# Patient Record
Sex: FEMALE | Race: WHITE | Employment: FULL TIME | URBAN - METROPOLITAN AREA
[De-identification: names, ages, dates, MRNs, and addresses within clinical notes are randomized per-mention and may not be internally consistent; named-entity substitution may affect disease eponyms.]

---

## 2017-07-16 ENCOUNTER — APPOINTMENT (OUTPATIENT)
Dept: CT IMAGING | Age: 62
DRG: 179 | End: 2017-07-16
Payer: COMMERCIAL

## 2017-07-16 ENCOUNTER — HOSPITAL ENCOUNTER (INPATIENT)
Age: 62
LOS: 3 days | Discharge: HOME | DRG: 179 | End: 2017-07-19
Attending: EMERGENCY MEDICINE | Admitting: INTERNAL MEDICINE
Payer: COMMERCIAL

## 2017-07-16 ENCOUNTER — APPOINTMENT (OUTPATIENT)
Dept: GENERAL RADIOLOGY | Age: 62
DRG: 179 | End: 2017-07-16
Payer: COMMERCIAL

## 2017-07-16 DIAGNOSIS — J18.9 PNEUMONIA OF LEFT LOWER LOBE DUE TO INFECTIOUS ORGANISM: Primary | ICD-10-CM

## 2017-07-16 PROBLEM — R11.2 NAUSEA AND VOMITING: Status: ACTIVE | Noted: 2017-07-16

## 2017-07-16 PROBLEM — Z72.0 TOBACCO ABUSE: Status: ACTIVE | Noted: 2017-07-16

## 2017-07-16 LAB
ABSOLUTE EOS #: 0 K/UL (ref 0–0.4)
ABSOLUTE LYMPH #: 1.5 K/UL (ref 1–4.8)
ABSOLUTE MONO #: 1.1 K/UL (ref 0.1–1.2)
ANION GAP SERPL CALCULATED.3IONS-SCNC: 14 MMOL/L (ref 9–17)
BASOPHILS # BLD: 0 %
BASOPHILS ABSOLUTE: 0 K/UL (ref 0–0.2)
BUN BLDV-MCNC: 12 MG/DL (ref 6–20)
BUN/CREAT BLD: ABNORMAL (ref 9–20)
CALCIUM SERPL-MCNC: 8.8 MG/DL (ref 8.6–10.4)
CHLORIDE BLD-SCNC: 95 MMOL/L (ref 98–107)
CO2: 22 MMOL/L (ref 20–31)
CREAT SERPL-MCNC: 0.7 MG/DL (ref 0.5–0.9)
DIFFERENTIAL TYPE: ABNORMAL
EOSINOPHILS RELATIVE PERCENT: 0 %
GFR AFRICAN AMERICAN: >60 ML/MIN
GFR NON-AFRICAN AMERICAN: >60 ML/MIN
GFR SERPL CREATININE-BSD FRML MDRD: ABNORMAL ML/MIN/{1.73_M2}
GFR SERPL CREATININE-BSD FRML MDRD: ABNORMAL ML/MIN/{1.73_M2}
GLUCOSE BLD-MCNC: 137 MG/DL (ref 70–99)
HCT VFR BLD CALC: 44.9 % (ref 36–46)
HEMOGLOBIN: 15 G/DL (ref 12–16)
LYMPHOCYTES # BLD: 7 %
MCH RBC QN AUTO: 30.1 PG (ref 26–34)
MCHC RBC AUTO-ENTMCNC: 33.4 G/DL (ref 31–37)
MCV RBC AUTO: 90.3 FL (ref 80–100)
MONOCYTES # BLD: 5 %
PDW BLD-RTO: 15 % (ref 12.5–15.4)
PLATELET # BLD: 259 K/UL (ref 140–450)
PLATELET ESTIMATE: ABNORMAL
PMV BLD AUTO: 7.6 FL (ref 6–12)
POC TROPONIN I: 0 NG/ML (ref 0–0.1)
POC TROPONIN INTERP: NORMAL
POTASSIUM SERPL-SCNC: 3.7 MMOL/L (ref 3.7–5.3)
RBC # BLD: 4.97 M/UL (ref 4–5.2)
RBC # BLD: ABNORMAL 10*6/UL
SEG NEUTROPHILS: 88 %
SEGMENTED NEUTROPHILS ABSOLUTE COUNT: 19.5 K/UL (ref 1.8–7.7)
SODIUM BLD-SCNC: 131 MMOL/L (ref 135–144)
WBC # BLD: 22.1 K/UL (ref 3.5–11)
WBC # BLD: ABNORMAL 10*3/UL

## 2017-07-16 PROCEDURE — 6360000004 HC RX CONTRAST MEDICATION: Performed by: EMERGENCY MEDICINE

## 2017-07-16 PROCEDURE — 93005 ELECTROCARDIOGRAM TRACING: CPT

## 2017-07-16 PROCEDURE — 6360000002 HC RX W HCPCS: Performed by: INTERNAL MEDICINE

## 2017-07-16 PROCEDURE — 6370000000 HC RX 637 (ALT 250 FOR IP): Performed by: NURSE PRACTITIONER

## 2017-07-16 PROCEDURE — 6360000002 HC RX W HCPCS: Performed by: EMERGENCY MEDICINE

## 2017-07-16 PROCEDURE — 6370000000 HC RX 637 (ALT 250 FOR IP): Performed by: EMERGENCY MEDICINE

## 2017-07-16 PROCEDURE — 99285 EMERGENCY DEPT VISIT HI MDM: CPT

## 2017-07-16 PROCEDURE — 2580000003 HC RX 258: Performed by: INTERNAL MEDICINE

## 2017-07-16 PROCEDURE — 2060000000 HC ICU INTERMEDIATE R&B

## 2017-07-16 PROCEDURE — 96374 THER/PROPH/DIAG INJ IV PUSH: CPT

## 2017-07-16 PROCEDURE — 6370000000 HC RX 637 (ALT 250 FOR IP): Performed by: INTERNAL MEDICINE

## 2017-07-16 PROCEDURE — 96375 TX/PRO/DX INJ NEW DRUG ADDON: CPT

## 2017-07-16 PROCEDURE — 71020 XR CHEST STANDARD TWO VW: CPT

## 2017-07-16 PROCEDURE — 2580000003 HC RX 258: Performed by: EMERGENCY MEDICINE

## 2017-07-16 PROCEDURE — 84484 ASSAY OF TROPONIN QUANT: CPT

## 2017-07-16 PROCEDURE — 99222 1ST HOSP IP/OBS MODERATE 55: CPT | Performed by: INTERNAL MEDICINE

## 2017-07-16 PROCEDURE — 71260 CT THORAX DX C+: CPT

## 2017-07-16 PROCEDURE — 85025 COMPLETE CBC W/AUTO DIFF WBC: CPT

## 2017-07-16 PROCEDURE — 80048 BASIC METABOLIC PNL TOTAL CA: CPT

## 2017-07-16 RX ORDER — ASPIRIN 81 MG/1
324 TABLET, CHEWABLE ORAL ONCE
Status: COMPLETED | OUTPATIENT
Start: 2017-07-16 | End: 2017-07-16

## 2017-07-16 RX ORDER — ONDANSETRON 2 MG/ML
4 INJECTION INTRAMUSCULAR; INTRAVENOUS ONCE
Status: COMPLETED | OUTPATIENT
Start: 2017-07-16 | End: 2017-07-16

## 2017-07-16 RX ORDER — ALBUTEROL SULFATE 2.5 MG/3ML
2.5 SOLUTION RESPIRATORY (INHALATION)
Status: DISCONTINUED | OUTPATIENT
Start: 2017-07-16 | End: 2017-07-17

## 2017-07-16 RX ORDER — ACETAMINOPHEN 325 MG/1
650 TABLET ORAL EVERY 4 HOURS PRN
Status: DISCONTINUED | OUTPATIENT
Start: 2017-07-16 | End: 2017-07-19 | Stop reason: HOSPADM

## 2017-07-16 RX ORDER — BUDESONIDE AND FORMOTEROL FUMARATE DIHYDRATE 80; 4.5 UG/1; UG/1
2 AEROSOL RESPIRATORY (INHALATION) DAILY
Status: ON HOLD | COMMUNITY
End: 2017-07-19 | Stop reason: HOSPADM

## 2017-07-16 RX ORDER — 0.9 % SODIUM CHLORIDE 0.9 %
1000 INTRAVENOUS SOLUTION INTRAVENOUS ONCE
Status: COMPLETED | OUTPATIENT
Start: 2017-07-16 | End: 2017-07-16

## 2017-07-16 RX ORDER — NICOTINE 21 MG/24HR
1 PATCH, TRANSDERMAL 24 HOURS TRANSDERMAL DAILY
Status: DISCONTINUED | OUTPATIENT
Start: 2017-07-16 | End: 2017-07-19 | Stop reason: HOSPADM

## 2017-07-16 RX ORDER — HYDROCODONE BITARTRATE AND ACETAMINOPHEN 5; 325 MG/1; MG/1
2 TABLET ORAL EVERY 4 HOURS PRN
Status: DISCONTINUED | OUTPATIENT
Start: 2017-07-16 | End: 2017-07-19 | Stop reason: HOSPADM

## 2017-07-16 RX ORDER — ALBUTEROL SULFATE 90 UG/1
2 AEROSOL, METERED RESPIRATORY (INHALATION) EVERY 4 HOURS PRN
COMMUNITY
End: 2017-07-31 | Stop reason: SDUPTHER

## 2017-07-16 RX ORDER — SODIUM CHLORIDE 0.9 % (FLUSH) 0.9 %
10 SYRINGE (ML) INJECTION PRN
Status: DISCONTINUED | OUTPATIENT
Start: 2017-07-16 | End: 2017-07-19 | Stop reason: HOSPADM

## 2017-07-16 RX ORDER — ONDANSETRON 2 MG/ML
4 INJECTION INTRAMUSCULAR; INTRAVENOUS EVERY 6 HOURS PRN
Status: DISCONTINUED | OUTPATIENT
Start: 2017-07-16 | End: 2017-07-19 | Stop reason: HOSPADM

## 2017-07-16 RX ORDER — SODIUM CHLORIDE 0.9 % (FLUSH) 0.9 %
10 SYRINGE (ML) INJECTION EVERY 12 HOURS SCHEDULED
Status: DISCONTINUED | OUTPATIENT
Start: 2017-07-16 | End: 2017-07-19 | Stop reason: HOSPADM

## 2017-07-16 RX ORDER — HYDROCODONE BITARTRATE AND ACETAMINOPHEN 5; 325 MG/1; MG/1
1 TABLET ORAL EVERY 4 HOURS PRN
Status: DISCONTINUED | OUTPATIENT
Start: 2017-07-16 | End: 2017-07-19 | Stop reason: HOSPADM

## 2017-07-16 RX ORDER — SODIUM CHLORIDE 9 MG/ML
INJECTION, SOLUTION INTRAVENOUS CONTINUOUS
Status: DISCONTINUED | OUTPATIENT
Start: 2017-07-16 | End: 2017-07-19 | Stop reason: HOSPADM

## 2017-07-16 RX ORDER — MORPHINE SULFATE 4 MG/ML
4 INJECTION, SOLUTION INTRAMUSCULAR; INTRAVENOUS ONCE
Status: COMPLETED | OUTPATIENT
Start: 2017-07-16 | End: 2017-07-16

## 2017-07-16 RX ADMIN — IOVERSOL 75 ML: 741 INJECTION INTRA-ARTERIAL; INTRAVENOUS at 11:35

## 2017-07-16 RX ADMIN — ONDANSETRON 4 MG: 2 INJECTION INTRAMUSCULAR; INTRAVENOUS at 21:15

## 2017-07-16 RX ADMIN — SODIUM CHLORIDE: 9 INJECTION, SOLUTION INTRAVENOUS at 14:39

## 2017-07-16 RX ADMIN — SODIUM CHLORIDE 1000 ML: 9 INJECTION, SOLUTION INTRAVENOUS at 13:35

## 2017-07-16 RX ADMIN — MORPHINE SULFATE 4 MG: 4 INJECTION, SOLUTION INTRAMUSCULAR; INTRAVENOUS at 10:27

## 2017-07-16 RX ADMIN — ENOXAPARIN SODIUM 40 MG: 40 INJECTION SUBCUTANEOUS at 14:39

## 2017-07-16 RX ADMIN — ACETAMINOPHEN 650 MG: 325 TABLET ORAL at 19:53

## 2017-07-16 RX ADMIN — HYDROCODONE BITARTRATE AND ACETAMINOPHEN 2 TABLET: 5; 325 TABLET ORAL at 15:50

## 2017-07-16 RX ADMIN — ONDANSETRON 4 MG: 2 INJECTION INTRAMUSCULAR; INTRAVENOUS at 12:45

## 2017-07-16 RX ADMIN — CEFTRIAXONE SODIUM 1 G: 1 INJECTION, POWDER, FOR SOLUTION INTRAMUSCULAR; INTRAVENOUS at 11:01

## 2017-07-16 RX ADMIN — CEFTRIAXONE SODIUM 1 G: 1 INJECTION, POWDER, FOR SOLUTION INTRAMUSCULAR; INTRAVENOUS at 14:39

## 2017-07-16 RX ADMIN — AZITHROMYCIN MONOHYDRATE 500 MG: 500 INJECTION, POWDER, LYOPHILIZED, FOR SOLUTION INTRAVENOUS at 12:48

## 2017-07-16 RX ADMIN — ONDANSETRON 4 MG: 2 INJECTION, SOLUTION INTRAMUSCULAR; INTRAVENOUS at 10:28

## 2017-07-16 RX ADMIN — ASPIRIN 81 MG 324 MG: 81 TABLET ORAL at 11:01

## 2017-07-16 ASSESSMENT — PAIN DESCRIPTION - ORIENTATION: ORIENTATION: LEFT

## 2017-07-16 ASSESSMENT — PAIN SCALES - GENERAL
PAINLEVEL_OUTOF10: 8
PAINLEVEL_OUTOF10: 5
PAINLEVEL_OUTOF10: 9

## 2017-07-16 ASSESSMENT — PAIN DESCRIPTION - FREQUENCY: FREQUENCY: CONTINUOUS

## 2017-07-16 ASSESSMENT — ENCOUNTER SYMPTOMS
DIARRHEA: 0
SHORTNESS OF BREATH: 1
ABDOMINAL PAIN: 0
CONSTIPATION: 0
COUGH: 1
NAUSEA: 1
CHEST TIGHTNESS: 0
VOMITING: 1
SORE THROAT: 0

## 2017-07-16 ASSESSMENT — PAIN DESCRIPTION - PAIN TYPE
TYPE: ACUTE PAIN
TYPE: ACUTE PAIN

## 2017-07-16 ASSESSMENT — PAIN DESCRIPTION - LOCATION
LOCATION: CHEST
LOCATION: CHEST

## 2017-07-16 ASSESSMENT — PAIN DESCRIPTION - DESCRIPTORS: DESCRIPTORS: SORE;SHARP

## 2017-07-17 LAB
ANION GAP SERPL CALCULATED.3IONS-SCNC: 13 MMOL/L (ref 9–17)
BUN BLDV-MCNC: 13 MG/DL (ref 6–20)
BUN/CREAT BLD: ABNORMAL (ref 9–20)
CALCIUM SERPL-MCNC: 8.5 MG/DL (ref 8.6–10.4)
CHLORIDE BLD-SCNC: 98 MMOL/L (ref 98–107)
CO2: 24 MMOL/L (ref 20–31)
CREAT SERPL-MCNC: 0.62 MG/DL (ref 0.5–0.9)
DIRECT EXAM: ABNORMAL
DIRECT EXAM: NORMAL
DIRECT EXAM: NORMAL
GFR AFRICAN AMERICAN: >60 ML/MIN
GFR NON-AFRICAN AMERICAN: >60 ML/MIN
GFR SERPL CREATININE-BSD FRML MDRD: ABNORMAL ML/MIN/{1.73_M2}
GFR SERPL CREATININE-BSD FRML MDRD: ABNORMAL ML/MIN/{1.73_M2}
GLUCOSE BLD-MCNC: 111 MG/DL (ref 70–99)
HCT VFR BLD CALC: 37.7 % (ref 36–46)
HEMOGLOBIN: 12.8 G/DL (ref 12–16)
Lab: ABNORMAL
Lab: NORMAL
MCH RBC QN AUTO: 30.4 PG (ref 26–34)
MCHC RBC AUTO-ENTMCNC: 34 G/DL (ref 31–37)
MCV RBC AUTO: 89.5 FL (ref 80–100)
PDW BLD-RTO: 14.1 % (ref 12.5–15.4)
PLATELET # BLD: 224 K/UL (ref 140–450)
PMV BLD AUTO: 8 FL (ref 6–12)
POTASSIUM SERPL-SCNC: 3.4 MMOL/L (ref 3.7–5.3)
RBC # BLD: 4.22 M/UL (ref 4–5.2)
SODIUM BLD-SCNC: 135 MMOL/L (ref 135–144)
SPECIMEN DESCRIPTION: ABNORMAL
SPECIMEN DESCRIPTION: NORMAL
STATUS: ABNORMAL
STATUS: NORMAL
WBC # BLD: 17.1 K/UL (ref 3.5–11)

## 2017-07-17 PROCEDURE — 80048 BASIC METABOLIC PNL TOTAL CA: CPT

## 2017-07-17 PROCEDURE — 6370000000 HC RX 637 (ALT 250 FOR IP): Performed by: NURSE PRACTITIONER

## 2017-07-17 PROCEDURE — 94640 AIRWAY INHALATION TREATMENT: CPT

## 2017-07-17 PROCEDURE — 87899 AGENT NOS ASSAY W/OPTIC: CPT

## 2017-07-17 PROCEDURE — 6360000002 HC RX W HCPCS: Performed by: INTERNAL MEDICINE

## 2017-07-17 PROCEDURE — 85027 COMPLETE CBC AUTOMATED: CPT

## 2017-07-17 PROCEDURE — 99232 SBSQ HOSP IP/OBS MODERATE 35: CPT | Performed by: INTERNAL MEDICINE

## 2017-07-17 PROCEDURE — 6370000000 HC RX 637 (ALT 250 FOR IP): Performed by: INTERNAL MEDICINE

## 2017-07-17 PROCEDURE — 92610 EVALUATE SWALLOWING FUNCTION: CPT

## 2017-07-17 PROCEDURE — 87449 NOS EACH ORGANISM AG IA: CPT

## 2017-07-17 PROCEDURE — 2060000000 HC ICU INTERMEDIATE R&B

## 2017-07-17 PROCEDURE — 2580000003 HC RX 258: Performed by: INTERNAL MEDICINE

## 2017-07-17 PROCEDURE — 36415 COLL VENOUS BLD VENIPUNCTURE: CPT

## 2017-07-17 RX ORDER — ALBUTEROL SULFATE 2.5 MG/3ML
2.5 SOLUTION RESPIRATORY (INHALATION)
Status: DISCONTINUED | OUTPATIENT
Start: 2017-07-17 | End: 2017-07-17

## 2017-07-17 RX ORDER — ALBUTEROL SULFATE 2.5 MG/3ML
2.5 SOLUTION RESPIRATORY (INHALATION) EVERY 6 HOURS PRN
Status: DISCONTINUED | OUTPATIENT
Start: 2017-07-17 | End: 2017-07-19 | Stop reason: HOSPADM

## 2017-07-17 RX ORDER — LEVOFLOXACIN 750 MG/1
750 TABLET ORAL DAILY
Status: DISCONTINUED | OUTPATIENT
Start: 2017-07-17 | End: 2017-07-19 | Stop reason: HOSPADM

## 2017-07-17 RX ORDER — IBUPROFEN 600 MG/1
600 TABLET ORAL EVERY 6 HOURS PRN
Status: DISCONTINUED | OUTPATIENT
Start: 2017-07-17 | End: 2017-07-19 | Stop reason: HOSPADM

## 2017-07-17 RX ADMIN — ACETAMINOPHEN 650 MG: 325 TABLET ORAL at 00:05

## 2017-07-17 RX ADMIN — IBUPROFEN 600 MG: 600 TABLET, FILM COATED ORAL at 02:12

## 2017-07-17 RX ADMIN — ONDANSETRON 4 MG: 2 INJECTION INTRAMUSCULAR; INTRAVENOUS at 12:55

## 2017-07-17 RX ADMIN — MOMETASONE FUROATE AND FORMOTEROL FUMARATE DIHYDRATE 2 PUFF: 200; 5 AEROSOL RESPIRATORY (INHALATION) at 20:28

## 2017-07-17 RX ADMIN — LEVOFLOXACIN 750 MG: 750 TABLET, FILM COATED ORAL at 20:55

## 2017-07-17 RX ADMIN — CEFTRIAXONE SODIUM 1 G: 1 INJECTION, POWDER, FOR SOLUTION INTRAMUSCULAR; INTRAVENOUS at 17:12

## 2017-07-17 RX ADMIN — ACETAMINOPHEN 650 MG: 325 TABLET ORAL at 17:27

## 2017-07-17 RX ADMIN — ENOXAPARIN SODIUM 40 MG: 40 INJECTION SUBCUTANEOUS at 20:57

## 2017-07-17 RX ADMIN — IBUPROFEN 600 MG: 600 TABLET, FILM COATED ORAL at 21:20

## 2017-07-17 RX ADMIN — SODIUM CHLORIDE: 9 INJECTION, SOLUTION INTRAVENOUS at 21:20

## 2017-07-17 RX ADMIN — ONDANSETRON 4 MG: 2 INJECTION INTRAMUSCULAR; INTRAVENOUS at 17:20

## 2017-07-17 RX ADMIN — AZITHROMYCIN MONOHYDRATE 500 MG: 500 INJECTION, POWDER, LYOPHILIZED, FOR SOLUTION INTRAVENOUS at 17:12

## 2017-07-17 RX ADMIN — ONDANSETRON 4 MG: 2 INJECTION INTRAMUSCULAR; INTRAVENOUS at 03:15

## 2017-07-17 ASSESSMENT — PAIN SCALES - GENERAL
PAINLEVEL_OUTOF10: 9
PAINLEVEL_OUTOF10: 3

## 2017-07-18 PROBLEM — A48.1 LEGIONELLA PNEUMONIA (HCC): Status: ACTIVE | Noted: 2017-07-18

## 2017-07-18 LAB
ANION GAP SERPL CALCULATED.3IONS-SCNC: 14 MMOL/L (ref 9–17)
BUN BLDV-MCNC: 8 MG/DL (ref 6–20)
BUN/CREAT BLD: ABNORMAL (ref 9–20)
CALCIUM SERPL-MCNC: 8.6 MG/DL (ref 8.6–10.4)
CHLORIDE BLD-SCNC: 105 MMOL/L (ref 98–107)
CO2: 23 MMOL/L (ref 20–31)
CREAT SERPL-MCNC: 0.61 MG/DL (ref 0.5–0.9)
GFR AFRICAN AMERICAN: >60 ML/MIN
GFR NON-AFRICAN AMERICAN: >60 ML/MIN
GFR SERPL CREATININE-BSD FRML MDRD: ABNORMAL ML/MIN/{1.73_M2}
GFR SERPL CREATININE-BSD FRML MDRD: ABNORMAL ML/MIN/{1.73_M2}
GLUCOSE BLD-MCNC: 129 MG/DL (ref 70–99)
HCT VFR BLD CALC: 35.8 % (ref 36–46)
HEMOGLOBIN: 12 G/DL (ref 12–16)
MCH RBC QN AUTO: 30.3 PG (ref 26–34)
MCHC RBC AUTO-ENTMCNC: 33.6 G/DL (ref 31–37)
MCV RBC AUTO: 90.2 FL (ref 80–100)
PDW BLD-RTO: 14.2 % (ref 12.5–15.4)
PLATELET # BLD: 204 K/UL (ref 140–450)
PMV BLD AUTO: 8 FL (ref 6–12)
POTASSIUM SERPL-SCNC: 3.5 MMOL/L (ref 3.7–5.3)
RBC # BLD: 3.97 M/UL (ref 4–5.2)
SODIUM BLD-SCNC: 142 MMOL/L (ref 135–144)
WBC # BLD: 6.7 K/UL (ref 3.5–11)

## 2017-07-18 PROCEDURE — 6360000002 HC RX W HCPCS: Performed by: NURSE PRACTITIONER

## 2017-07-18 PROCEDURE — 94640 AIRWAY INHALATION TREATMENT: CPT

## 2017-07-18 PROCEDURE — 36415 COLL VENOUS BLD VENIPUNCTURE: CPT

## 2017-07-18 PROCEDURE — 80048 BASIC METABOLIC PNL TOTAL CA: CPT

## 2017-07-18 PROCEDURE — 6360000002 HC RX W HCPCS: Performed by: INTERNAL MEDICINE

## 2017-07-18 PROCEDURE — 6370000000 HC RX 637 (ALT 250 FOR IP): Performed by: NURSE PRACTITIONER

## 2017-07-18 PROCEDURE — 2060000000 HC ICU INTERMEDIATE R&B

## 2017-07-18 PROCEDURE — 85027 COMPLETE CBC AUTOMATED: CPT

## 2017-07-18 PROCEDURE — 99232 SBSQ HOSP IP/OBS MODERATE 35: CPT | Performed by: FAMILY MEDICINE

## 2017-07-18 PROCEDURE — 6370000000 HC RX 637 (ALT 250 FOR IP): Performed by: INTERNAL MEDICINE

## 2017-07-18 RX ORDER — METOCLOPRAMIDE HYDROCHLORIDE 5 MG/ML
10 INJECTION INTRAMUSCULAR; INTRAVENOUS EVERY 6 HOURS
Status: DISCONTINUED | OUTPATIENT
Start: 2017-07-18 | End: 2017-07-19 | Stop reason: HOSPADM

## 2017-07-18 RX ADMIN — ONDANSETRON 4 MG: 2 INJECTION INTRAMUSCULAR; INTRAVENOUS at 08:31

## 2017-07-18 RX ADMIN — ENOXAPARIN SODIUM 40 MG: 40 INJECTION SUBCUTANEOUS at 10:10

## 2017-07-18 RX ADMIN — ONDANSETRON 4 MG: 2 INJECTION INTRAMUSCULAR; INTRAVENOUS at 21:48

## 2017-07-18 RX ADMIN — METOCLOPRAMIDE 10 MG: 5 INJECTION, SOLUTION INTRAMUSCULAR; INTRAVENOUS at 19:18

## 2017-07-18 RX ADMIN — IBUPROFEN 600 MG: 600 TABLET, FILM COATED ORAL at 14:51

## 2017-07-18 RX ADMIN — ONDANSETRON 4 MG: 2 INJECTION INTRAMUSCULAR; INTRAVENOUS at 00:07

## 2017-07-18 RX ADMIN — MOMETASONE FUROATE AND FORMOTEROL FUMARATE DIHYDRATE 2 PUFF: 200; 5 AEROSOL RESPIRATORY (INHALATION) at 08:48

## 2017-07-18 RX ADMIN — METOCLOPRAMIDE 10 MG: 5 INJECTION, SOLUTION INTRAMUSCULAR; INTRAVENOUS at 10:11

## 2017-07-18 RX ADMIN — LEVOFLOXACIN 750 MG: 750 TABLET, FILM COATED ORAL at 10:10

## 2017-07-18 RX ADMIN — METOCLOPRAMIDE 10 MG: 5 INJECTION, SOLUTION INTRAMUSCULAR; INTRAVENOUS at 03:36

## 2017-07-18 ASSESSMENT — ENCOUNTER SYMPTOMS
SHORTNESS OF BREATH: 1
NAUSEA: 0
COUGH: 1
ABDOMINAL PAIN: 0
VOMITING: 0
DIARRHEA: 0
WHEEZING: 0

## 2017-07-18 ASSESSMENT — PAIN SCALES - GENERAL: PAINLEVEL_OUTOF10: 8

## 2017-07-19 VITALS
HEIGHT: 68 IN | SYSTOLIC BLOOD PRESSURE: 131 MMHG | BODY MASS INDEX: 28.19 KG/M2 | RESPIRATION RATE: 18 BRPM | OXYGEN SATURATION: 95 % | WEIGHT: 186 LBS | TEMPERATURE: 98.3 F | DIASTOLIC BLOOD PRESSURE: 79 MMHG | HEART RATE: 76 BPM

## 2017-07-19 LAB
ANION GAP SERPL CALCULATED.3IONS-SCNC: 13 MMOL/L (ref 9–17)
BUN BLDV-MCNC: 10 MG/DL (ref 6–20)
BUN/CREAT BLD: ABNORMAL (ref 9–20)
CALCIUM SERPL-MCNC: 8.2 MG/DL (ref 8.6–10.4)
CHLORIDE BLD-SCNC: 109 MMOL/L (ref 98–107)
CO2: 22 MMOL/L (ref 20–31)
CREAT SERPL-MCNC: 0.48 MG/DL (ref 0.5–0.9)
GFR AFRICAN AMERICAN: >60 ML/MIN
GFR NON-AFRICAN AMERICAN: >60 ML/MIN
GFR SERPL CREATININE-BSD FRML MDRD: ABNORMAL ML/MIN/{1.73_M2}
GFR SERPL CREATININE-BSD FRML MDRD: ABNORMAL ML/MIN/{1.73_M2}
GLUCOSE BLD-MCNC: 97 MG/DL (ref 70–99)
POTASSIUM SERPL-SCNC: 3.6 MMOL/L (ref 3.7–5.3)
SODIUM BLD-SCNC: 144 MMOL/L (ref 135–144)

## 2017-07-19 PROCEDURE — 6370000000 HC RX 637 (ALT 250 FOR IP): Performed by: INTERNAL MEDICINE

## 2017-07-19 PROCEDURE — 99239 HOSP IP/OBS DSCHRG MGMT >30: CPT | Performed by: FAMILY MEDICINE

## 2017-07-19 PROCEDURE — 2580000003 HC RX 258: Performed by: INTERNAL MEDICINE

## 2017-07-19 PROCEDURE — 80048 BASIC METABOLIC PNL TOTAL CA: CPT

## 2017-07-19 PROCEDURE — 94640 AIRWAY INHALATION TREATMENT: CPT

## 2017-07-19 PROCEDURE — 36415 COLL VENOUS BLD VENIPUNCTURE: CPT

## 2017-07-19 PROCEDURE — 6360000002 HC RX W HCPCS: Performed by: INTERNAL MEDICINE

## 2017-07-19 PROCEDURE — 6370000000 HC RX 637 (ALT 250 FOR IP): Performed by: NURSE PRACTITIONER

## 2017-07-19 PROCEDURE — 6360000002 HC RX W HCPCS: Performed by: NURSE PRACTITIONER

## 2017-07-19 RX ORDER — LEVOFLOXACIN 750 MG/1
750 TABLET ORAL DAILY
Qty: 8 TABLET | Refills: 0 | Status: SHIPPED | OUTPATIENT
Start: 2017-07-19 | End: 2017-07-27

## 2017-07-19 RX ORDER — IBUPROFEN 600 MG/1
600 TABLET ORAL EVERY 6 HOURS PRN
Qty: 60 TABLET | Refills: 0 | Status: SHIPPED | OUTPATIENT
Start: 2017-07-19 | End: 2020-08-19

## 2017-07-19 RX ORDER — NICOTINE 21 MG/24HR
1 PATCH, TRANSDERMAL 24 HOURS TRANSDERMAL DAILY
Qty: 30 PATCH | Refills: 3 | Status: SHIPPED | OUTPATIENT
Start: 2017-07-19 | End: 2019-02-19

## 2017-07-19 RX ADMIN — METOCLOPRAMIDE 10 MG: 5 INJECTION, SOLUTION INTRAMUSCULAR; INTRAVENOUS at 09:36

## 2017-07-19 RX ADMIN — MOMETASONE FUROATE AND FORMOTEROL FUMARATE DIHYDRATE 2 PUFF: 200; 5 AEROSOL RESPIRATORY (INHALATION) at 09:19

## 2017-07-19 RX ADMIN — ENOXAPARIN SODIUM 40 MG: 40 INJECTION SUBCUTANEOUS at 09:36

## 2017-07-19 RX ADMIN — ACETAMINOPHEN 650 MG: 325 TABLET ORAL at 02:10

## 2017-07-19 RX ADMIN — METOCLOPRAMIDE 10 MG: 5 INJECTION, SOLUTION INTRAMUSCULAR; INTRAVENOUS at 02:10

## 2017-07-19 RX ADMIN — LEVOFLOXACIN 750 MG: 750 TABLET, FILM COATED ORAL at 09:36

## 2017-07-19 RX ADMIN — SODIUM CHLORIDE: 9 INJECTION, SOLUTION INTRAVENOUS at 00:52

## 2017-07-19 ASSESSMENT — ENCOUNTER SYMPTOMS
WHEEZING: 0
COUGH: 1
NAUSEA: 0
ABDOMINAL PAIN: 0
DIARRHEA: 0
SHORTNESS OF BREATH: 1
VOMITING: 0

## 2017-07-19 ASSESSMENT — PAIN SCALES - GENERAL: PAINLEVEL_OUTOF10: 8

## 2017-07-20 LAB
EKG ATRIAL RATE: 107 BPM
EKG P AXIS: 12 DEGREES
EKG P-R INTERVAL: 100 MS
EKG Q-T INTERVAL: 332 MS
EKG QRS DURATION: 82 MS
EKG QTC CALCULATION (BAZETT): 443 MS
EKG R AXIS: 42 DEGREES
EKG T AXIS: 62 DEGREES
EKG VENTRICULAR RATE: 107 BPM

## 2017-07-31 ENCOUNTER — OFFICE VISIT (OUTPATIENT)
Dept: FAMILY MEDICINE CLINIC | Age: 62
End: 2017-07-31
Payer: COMMERCIAL

## 2017-07-31 VITALS
TEMPERATURE: 97.7 F | HEART RATE: 65 BPM | DIASTOLIC BLOOD PRESSURE: 86 MMHG | SYSTOLIC BLOOD PRESSURE: 119 MMHG | RESPIRATION RATE: 22 BRPM | BODY MASS INDEX: 27.43 KG/M2 | HEIGHT: 68 IN | WEIGHT: 181 LBS | OXYGEN SATURATION: 97 %

## 2017-07-31 DIAGNOSIS — R07.89 OTHER CHEST PAIN: ICD-10-CM

## 2017-07-31 DIAGNOSIS — R13.10 DYSPHAGIA, UNSPECIFIED TYPE: ICD-10-CM

## 2017-07-31 DIAGNOSIS — A48.1 LEGIONELLA PNEUMONIA (HCC): Primary | ICD-10-CM

## 2017-07-31 DIAGNOSIS — Z09 HOSPITAL DISCHARGE FOLLOW-UP: ICD-10-CM

## 2017-07-31 DIAGNOSIS — Z76.89 ENCOUNTER TO ESTABLISH CARE: ICD-10-CM

## 2017-07-31 DIAGNOSIS — Z12.31 ENCOUNTER FOR MAMMOGRAM TO ESTABLISH BASELINE MAMMOGRAM: ICD-10-CM

## 2017-07-31 PROCEDURE — 99204 OFFICE O/P NEW MOD 45 MIN: CPT | Performed by: NURSE PRACTITIONER

## 2017-07-31 RX ORDER — ALBUTEROL SULFATE 90 UG/1
2 AEROSOL, METERED RESPIRATORY (INHALATION) EVERY 4 HOURS PRN
Qty: 1 INHALER | Refills: 3 | Status: SHIPPED | OUTPATIENT
Start: 2017-07-31 | End: 2018-03-22 | Stop reason: SDUPTHER

## 2017-07-31 ASSESSMENT — ENCOUNTER SYMPTOMS
SHORTNESS OF BREATH: 1
SINUS PRESSURE: 0
CONSTIPATION: 0
DIARRHEA: 0
TROUBLE SWALLOWING: 1
CHEST TIGHTNESS: 0
ABDOMINAL PAIN: 0
BLOOD IN STOOL: 0
RHINORRHEA: 0
COUGH: 0
EYE DISCHARGE: 0

## 2017-08-08 ENCOUNTER — HOSPITAL ENCOUNTER (OUTPATIENT)
Age: 62
Setting detail: SPECIMEN
Discharge: HOME | End: 2017-08-08
Payer: COMMERCIAL

## 2017-08-08 DIAGNOSIS — Z76.89 ENCOUNTER TO ESTABLISH CARE: ICD-10-CM

## 2017-08-08 LAB
CHOLESTEROL/HDL RATIO: 6.5
CHOLESTEROL: 271 MG/DL
HDLC SERPL-MCNC: 42 MG/DL
HEPATITIS C ANTIBODY: NONREACTIVE
HIV AG/AB: NONREACTIVE
LDL CHOLESTEROL: 187 MG/DL (ref 0–130)
THYROXINE, FREE: 1.02 NG/DL (ref 0.93–1.7)
TRIGL SERPL-MCNC: 212 MG/DL
TSH SERPL DL<=0.05 MIU/L-ACNC: 5.29 MIU/L (ref 0.3–5)
VLDLC SERPL CALC-MCNC: ABNORMAL MG/DL (ref 1–30)

## 2017-08-09 DIAGNOSIS — E78.2 MIXED HYPERLIPIDEMIA: Primary | ICD-10-CM

## 2017-08-09 RX ORDER — ATORVASTATIN CALCIUM 20 MG/1
20 TABLET, FILM COATED ORAL DAILY
Qty: 30 TABLET | Refills: 3 | Status: SHIPPED | OUTPATIENT
Start: 2017-08-09 | End: 2017-08-16

## 2017-08-16 ENCOUNTER — OFFICE VISIT (OUTPATIENT)
Dept: GASTROENTEROLOGY | Age: 62
End: 2017-08-16
Payer: COMMERCIAL

## 2017-08-16 VITALS
WEIGHT: 184 LBS | SYSTOLIC BLOOD PRESSURE: 123 MMHG | TEMPERATURE: 97.2 F | OXYGEN SATURATION: 96 % | BODY MASS INDEX: 27.89 KG/M2 | DIASTOLIC BLOOD PRESSURE: 83 MMHG | RESPIRATION RATE: 14 BRPM | HEART RATE: 89 BPM | HEIGHT: 68 IN

## 2017-08-16 DIAGNOSIS — R13.10 DYSPHAGIA, UNSPECIFIED TYPE: Primary | ICD-10-CM

## 2017-08-16 DIAGNOSIS — Z12.11 COLON CANCER SCREENING: ICD-10-CM

## 2017-08-16 PROCEDURE — 99244 OFF/OP CNSLTJ NEW/EST MOD 40: CPT | Performed by: INTERNAL MEDICINE

## 2017-08-16 ASSESSMENT — ENCOUNTER SYMPTOMS
BACK PAIN: 1
ABDOMINAL PAIN: 0
CONSTIPATION: 1
ABDOMINAL DISTENTION: 1
BLOOD IN STOOL: 0
ALLERGIC/IMMUNOLOGIC NEGATIVE: 1
ANAL BLEEDING: 0
TROUBLE SWALLOWING: 1
VOMITING: 0
COUGH: 1
NAUSEA: 0
RECTAL PAIN: 0
DIARRHEA: 0
WHEEZING: 1
SHORTNESS OF BREATH: 1

## 2017-08-17 RX ORDER — SODIUM, POTASSIUM,MAG SULFATES 17.5-3.13G
SOLUTION, RECONSTITUTED, ORAL ORAL
Qty: 1 BOTTLE | Refills: 0 | Status: SHIPPED | OUTPATIENT
Start: 2017-08-17 | End: 2019-02-19 | Stop reason: ALTCHOICE

## 2017-08-21 ENCOUNTER — OFFICE VISIT (OUTPATIENT)
Dept: FAMILY MEDICINE CLINIC | Age: 62
End: 2017-08-21
Payer: COMMERCIAL

## 2017-08-21 VITALS
HEART RATE: 74 BPM | HEIGHT: 68 IN | SYSTOLIC BLOOD PRESSURE: 138 MMHG | RESPIRATION RATE: 18 BRPM | WEIGHT: 186 LBS | TEMPERATURE: 97.9 F | OXYGEN SATURATION: 94 % | DIASTOLIC BLOOD PRESSURE: 88 MMHG | BODY MASS INDEX: 28.19 KG/M2

## 2017-08-21 DIAGNOSIS — Z71.6 ENCOUNTER FOR SMOKING CESSATION COUNSELING: ICD-10-CM

## 2017-08-21 DIAGNOSIS — N64.4 NIPPLE PAIN: Primary | ICD-10-CM

## 2017-08-21 PROCEDURE — 99214 OFFICE O/P EST MOD 30 MIN: CPT | Performed by: NURSE PRACTITIONER

## 2017-08-21 RX ORDER — BUPROPION HYDROCHLORIDE 300 MG/1
300 TABLET ORAL EVERY MORNING
Qty: 30 TABLET | Refills: 3 | Status: SHIPPED | OUTPATIENT
Start: 2017-08-21 | End: 2018-03-22 | Stop reason: ALTCHOICE

## 2017-08-21 ASSESSMENT — ENCOUNTER SYMPTOMS
ABDOMINAL PAIN: 0
RHINORRHEA: 0
SHORTNESS OF BREATH: 1
COUGH: 0
CONSTIPATION: 0
EYE DISCHARGE: 0
BLOOD IN STOOL: 0
DIARRHEA: 0
TROUBLE SWALLOWING: 1
SINUS PRESSURE: 0
CHEST TIGHTNESS: 0

## 2017-08-21 ASSESSMENT — PATIENT HEALTH QUESTIONNAIRE - PHQ9
2. FEELING DOWN, DEPRESSED OR HOPELESS: 0
SUM OF ALL RESPONSES TO PHQ9 QUESTIONS 1 & 2: 0
1. LITTLE INTEREST OR PLEASURE IN DOING THINGS: 0
SUM OF ALL RESPONSES TO PHQ QUESTIONS 1-9: 0

## 2017-09-01 ENCOUNTER — HOSPITAL ENCOUNTER (OUTPATIENT)
Dept: WOMENS IMAGING | Age: 62
Discharge: HOME | End: 2017-09-01
Payer: COMMERCIAL

## 2017-09-01 DIAGNOSIS — R52 PAIN: ICD-10-CM

## 2017-09-01 DIAGNOSIS — N64.4 NIPPLE PAIN: ICD-10-CM

## 2017-09-01 PROCEDURE — G0279 TOMOSYNTHESIS, MAMMO: HCPCS

## 2017-09-01 PROCEDURE — 76642 ULTRASOUND BREAST LIMITED: CPT

## 2017-09-20 ENCOUNTER — HOSPITAL ENCOUNTER (OUTPATIENT)
Age: 62
Setting detail: OUTPATIENT SURGERY
Discharge: HOME | End: 2017-09-20
Attending: INTERNAL MEDICINE | Admitting: INTERNAL MEDICINE
Payer: COMMERCIAL

## 2017-09-20 VITALS
WEIGHT: 186 LBS | BODY MASS INDEX: 28.19 KG/M2 | HEART RATE: 72 BPM | RESPIRATION RATE: 16 BRPM | TEMPERATURE: 97.7 F | HEIGHT: 68 IN | SYSTOLIC BLOOD PRESSURE: 120 MMHG | DIASTOLIC BLOOD PRESSURE: 83 MMHG | OXYGEN SATURATION: 96 %

## 2017-09-20 PROCEDURE — 7100000011 HC PHASE II RECOVERY - ADDTL 15 MIN: Performed by: INTERNAL MEDICINE

## 2017-09-20 PROCEDURE — 3609012400 HC EGD TRANSORAL BIOPSY SINGLE/MULTIPLE: Performed by: INTERNAL MEDICINE

## 2017-09-20 PROCEDURE — 99152 MOD SED SAME PHYS/QHP 5/>YRS: CPT | Performed by: INTERNAL MEDICINE

## 2017-09-20 PROCEDURE — 3609010600 HC COLONOSCOPY POLYPECTOMY SNARE/COLD BIOPSY: Performed by: INTERNAL MEDICINE

## 2017-09-20 PROCEDURE — C1726 CATH, BAL DIL, NON-VASCULAR: HCPCS | Performed by: INTERNAL MEDICINE

## 2017-09-20 PROCEDURE — 88305 TISSUE EXAM BY PATHOLOGIST: CPT

## 2017-09-20 PROCEDURE — 45380 COLONOSCOPY AND BIOPSY: CPT | Performed by: INTERNAL MEDICINE

## 2017-09-20 PROCEDURE — 7100000010 HC PHASE II RECOVERY - FIRST 15 MIN: Performed by: INTERNAL MEDICINE

## 2017-09-20 PROCEDURE — 99153 MOD SED SAME PHYS/QHP EA: CPT | Performed by: INTERNAL MEDICINE

## 2017-09-20 PROCEDURE — 6370000000 HC RX 637 (ALT 250 FOR IP): Performed by: INTERNAL MEDICINE

## 2017-09-20 PROCEDURE — 43249 ESOPH EGD DILATION <30 MM: CPT | Performed by: INTERNAL MEDICINE

## 2017-09-20 PROCEDURE — 6360000002 HC RX W HCPCS: Performed by: INTERNAL MEDICINE

## 2017-09-20 PROCEDURE — 43239 EGD BIOPSY SINGLE/MULTIPLE: CPT | Performed by: INTERNAL MEDICINE

## 2017-09-20 PROCEDURE — 2580000003 HC RX 258: Performed by: INTERNAL MEDICINE

## 2017-09-20 RX ORDER — MEPERIDINE HYDROCHLORIDE 50 MG/ML
INJECTION INTRAMUSCULAR; INTRAVENOUS; SUBCUTANEOUS PRN
Status: DISCONTINUED | OUTPATIENT
Start: 2017-09-20 | End: 2017-09-20 | Stop reason: HOSPADM

## 2017-09-20 RX ORDER — SODIUM CHLORIDE, SODIUM LACTATE, POTASSIUM CHLORIDE, CALCIUM CHLORIDE 600; 310; 30; 20 MG/100ML; MG/100ML; MG/100ML; MG/100ML
INJECTION, SOLUTION INTRAVENOUS CONTINUOUS
Status: DISCONTINUED | OUTPATIENT
Start: 2017-09-20 | End: 2017-09-20 | Stop reason: HOSPADM

## 2017-09-20 RX ORDER — MIDAZOLAM HYDROCHLORIDE 1 MG/ML
INJECTION INTRAMUSCULAR; INTRAVENOUS PRN
Status: DISCONTINUED | OUTPATIENT
Start: 2017-09-20 | End: 2017-09-20 | Stop reason: HOSPADM

## 2017-09-20 RX ADMIN — SODIUM CHLORIDE, POTASSIUM CHLORIDE, SODIUM LACTATE AND CALCIUM CHLORIDE: 600; 310; 30; 20 INJECTION, SOLUTION INTRAVENOUS at 08:37

## 2017-09-20 ASSESSMENT — PAIN SCALES - GENERAL
PAINLEVEL_OUTOF10: 0

## 2017-09-20 ASSESSMENT — PAIN - FUNCTIONAL ASSESSMENT: PAIN_FUNCTIONAL_ASSESSMENT: 0-10

## 2017-09-21 LAB — SURGICAL PATHOLOGY REPORT: NORMAL

## 2017-10-16 ENCOUNTER — HOSPITAL ENCOUNTER (OUTPATIENT)
Age: 62
Setting detail: OBSERVATION
Discharge: HOME | End: 2017-10-16
Attending: EMERGENCY MEDICINE | Admitting: EMERGENCY MEDICINE
Payer: COMMERCIAL

## 2017-10-16 ENCOUNTER — APPOINTMENT (OUTPATIENT)
Dept: GENERAL RADIOLOGY | Age: 62
End: 2017-10-16
Payer: COMMERCIAL

## 2017-10-16 ENCOUNTER — APPOINTMENT (OUTPATIENT)
Dept: NUCLEAR MEDICINE | Age: 62
End: 2017-10-16
Payer: COMMERCIAL

## 2017-10-16 VITALS
WEIGHT: 180 LBS | HEART RATE: 74 BPM | SYSTOLIC BLOOD PRESSURE: 133 MMHG | RESPIRATION RATE: 16 BRPM | OXYGEN SATURATION: 97 % | BODY MASS INDEX: 27.28 KG/M2 | TEMPERATURE: 97.7 F | HEIGHT: 68 IN | DIASTOLIC BLOOD PRESSURE: 88 MMHG

## 2017-10-16 DIAGNOSIS — R07.9 CHEST PAIN, UNSPECIFIED TYPE: Primary | ICD-10-CM

## 2017-10-16 LAB
ABSOLUTE EOS #: 0.2 K/UL (ref 0–0.4)
ABSOLUTE LYMPH #: 2.7 K/UL (ref 1–4.8)
ABSOLUTE MONO #: 0.4 K/UL (ref 0.1–1.2)
ALBUMIN SERPL-MCNC: 4 G/DL (ref 3.5–5.2)
ALBUMIN/GLOBULIN RATIO: 1.4 (ref 1–2.5)
ALP BLD-CCNC: 105 U/L (ref 35–104)
ALT SERPL-CCNC: 13 U/L (ref 5–33)
ANION GAP SERPL CALCULATED.3IONS-SCNC: 12 MMOL/L (ref 9–17)
AST SERPL-CCNC: 13 U/L
BASOPHILS # BLD: 0 %
BASOPHILS ABSOLUTE: 0 K/UL (ref 0–0.2)
BILIRUB SERPL-MCNC: 0.22 MG/DL (ref 0.3–1.2)
BILIRUBIN DIRECT: 0.11 MG/DL
BILIRUBIN, INDIRECT: 0.11 MG/DL (ref 0–1)
BUN BLDV-MCNC: 9 MG/DL (ref 8–23)
BUN/CREAT BLD: ABNORMAL (ref 9–20)
CALCIUM SERPL-MCNC: 8.9 MG/DL (ref 8.6–10.4)
CHLORIDE BLD-SCNC: 104 MMOL/L (ref 98–107)
CO2: 25 MMOL/L (ref 20–31)
CREAT SERPL-MCNC: 0.56 MG/DL (ref 0.5–0.9)
DIFFERENTIAL TYPE: ABNORMAL
EOSINOPHILS RELATIVE PERCENT: 2 %
GFR AFRICAN AMERICAN: >60 ML/MIN
GFR NON-AFRICAN AMERICAN: >60 ML/MIN
GFR SERPL CREATININE-BSD FRML MDRD: ABNORMAL ML/MIN/{1.73_M2}
GFR SERPL CREATININE-BSD FRML MDRD: ABNORMAL ML/MIN/{1.73_M2}
GLOBULIN: ABNORMAL G/DL (ref 1.5–3.8)
GLUCOSE BLD-MCNC: 137 MG/DL (ref 70–99)
HCT VFR BLD CALC: 44.7 % (ref 36–46)
HEMOGLOBIN: 14.8 G/DL (ref 12–16)
LIPASE: 16 U/L (ref 13–60)
LV EF: 68 %
LVEF MODALITY: NORMAL
LYMPHOCYTES # BLD: 24 %
MCH RBC QN AUTO: 29.7 PG (ref 26–34)
MCHC RBC AUTO-ENTMCNC: 33 G/DL (ref 31–37)
MCV RBC AUTO: 89.9 FL (ref 80–100)
MONOCYTES # BLD: 4 %
PDW BLD-RTO: 15 % (ref 12.5–15.4)
PLATELET # BLD: 303 K/UL (ref 140–450)
PLATELET ESTIMATE: ABNORMAL
PMV BLD AUTO: 7.8 FL (ref 6–12)
POC TROPONIN I: 0 NG/ML (ref 0–0.1)
POC TROPONIN I: 0 NG/ML (ref 0–0.1)
POC TROPONIN INTERP: NORMAL
POC TROPONIN INTERP: NORMAL
POTASSIUM SERPL-SCNC: 3.4 MMOL/L (ref 3.7–5.3)
RBC # BLD: 4.98 M/UL (ref 4–5.2)
RBC # BLD: ABNORMAL 10*6/UL
SEG NEUTROPHILS: 70 %
SEGMENTED NEUTROPHILS ABSOLUTE COUNT: 7.8 K/UL (ref 1.8–7.7)
SODIUM BLD-SCNC: 141 MMOL/L (ref 135–144)
TOTAL PROTEIN: 6.9 G/DL (ref 6.4–8.3)
WBC # BLD: 11.1 K/UL (ref 3.5–11)
WBC # BLD: ABNORMAL 10*3/UL

## 2017-10-16 PROCEDURE — 80076 HEPATIC FUNCTION PANEL: CPT

## 2017-10-16 PROCEDURE — 85025 COMPLETE CBC W/AUTO DIFF WBC: CPT

## 2017-10-16 PROCEDURE — A9500 TC99M SESTAMIBI: HCPCS | Performed by: EMERGENCY MEDICINE

## 2017-10-16 PROCEDURE — 6360000002 HC RX W HCPCS: Performed by: EMERGENCY MEDICINE

## 2017-10-16 PROCEDURE — G0378 HOSPITAL OBSERVATION PER HR: HCPCS

## 2017-10-16 PROCEDURE — 93005 ELECTROCARDIOGRAM TRACING: CPT

## 2017-10-16 PROCEDURE — 3430000000 HC RX DIAGNOSTIC RADIOPHARMACEUTICAL: Performed by: EMERGENCY MEDICINE

## 2017-10-16 PROCEDURE — 96374 THER/PROPH/DIAG INJ IV PUSH: CPT

## 2017-10-16 PROCEDURE — 78452 HT MUSCLE IMAGE SPECT MULT: CPT

## 2017-10-16 PROCEDURE — 6360000002 HC RX W HCPCS

## 2017-10-16 PROCEDURE — 83690 ASSAY OF LIPASE: CPT

## 2017-10-16 PROCEDURE — 96376 TX/PRO/DX INJ SAME DRUG ADON: CPT

## 2017-10-16 PROCEDURE — 96375 TX/PRO/DX INJ NEW DRUG ADDON: CPT

## 2017-10-16 PROCEDURE — 93017 CV STRESS TEST TRACING ONLY: CPT | Performed by: NURSE PRACTITIONER

## 2017-10-16 PROCEDURE — 80048 BASIC METABOLIC PNL TOTAL CA: CPT

## 2017-10-16 PROCEDURE — 71020 XR CHEST STANDARD TWO VW: CPT

## 2017-10-16 PROCEDURE — 84484 ASSAY OF TROPONIN QUANT: CPT

## 2017-10-16 PROCEDURE — 2580000003 HC RX 258: Performed by: EMERGENCY MEDICINE

## 2017-10-16 PROCEDURE — 99285 EMERGENCY DEPT VISIT HI MDM: CPT

## 2017-10-16 RX ORDER — SODIUM CHLORIDE 0.9 % (FLUSH) 0.9 %
10 SYRINGE (ML) INJECTION EVERY 12 HOURS SCHEDULED
Status: DISCONTINUED | OUTPATIENT
Start: 2017-10-16 | End: 2017-10-16 | Stop reason: HOSPADM

## 2017-10-16 RX ORDER — MORPHINE SULFATE 10 MG/ML
INJECTION, SOLUTION INTRAMUSCULAR; INTRAVENOUS
Status: COMPLETED
Start: 2017-10-16 | End: 2017-10-16

## 2017-10-16 RX ORDER — SODIUM CHLORIDE 0.9 % (FLUSH) 0.9 %
10 SYRINGE (ML) INJECTION PRN
Status: DISCONTINUED | OUTPATIENT
Start: 2017-10-16 | End: 2017-10-16 | Stop reason: HOSPADM

## 2017-10-16 RX ORDER — SODIUM CHLORIDE 9 MG/ML
INJECTION, SOLUTION INTRAVENOUS ONCE
Status: COMPLETED | OUTPATIENT
Start: 2017-10-16 | End: 2017-10-16

## 2017-10-16 RX ORDER — METOPROLOL TARTRATE 5 MG/5ML
2.5 INJECTION INTRAVENOUS PRN
Status: DISCONTINUED | OUTPATIENT
Start: 2017-10-16 | End: 2017-10-16

## 2017-10-16 RX ORDER — MORPHINE SULFATE 2 MG/ML
4 INJECTION, SOLUTION INTRAMUSCULAR; INTRAVENOUS ONCE
Status: DISCONTINUED | OUTPATIENT
Start: 2017-10-16 | End: 2017-10-16 | Stop reason: HOSPADM

## 2017-10-16 RX ORDER — AMINOPHYLLINE DIHYDRATE 25 MG/ML
100 INJECTION, SOLUTION INTRAVENOUS
Status: COMPLETED | OUTPATIENT
Start: 2017-10-16 | End: 2017-10-16

## 2017-10-16 RX ORDER — LORATADINE 10 MG/1
10 CAPSULE, LIQUID FILLED ORAL DAILY
COMMUNITY
End: 2019-02-19 | Stop reason: SDUPTHER

## 2017-10-16 RX ORDER — MORPHINE SULFATE 2 MG/ML
2 INJECTION, SOLUTION INTRAMUSCULAR; INTRAVENOUS EVERY 4 HOURS PRN
Status: DISCONTINUED | OUTPATIENT
Start: 2017-10-16 | End: 2017-10-16 | Stop reason: HOSPADM

## 2017-10-16 RX ORDER — NITROGLYCERIN 0.4 MG/1
0.4 TABLET SUBLINGUAL EVERY 5 MIN PRN
Status: DISCONTINUED | OUTPATIENT
Start: 2017-10-16 | End: 2017-10-16

## 2017-10-16 RX ORDER — MORPHINE SULFATE 4 MG/ML
4 INJECTION, SOLUTION INTRAMUSCULAR; INTRAVENOUS EVERY 4 HOURS PRN
Status: DISCONTINUED | OUTPATIENT
Start: 2017-10-16 | End: 2017-10-16 | Stop reason: HOSPADM

## 2017-10-16 RX ORDER — BUPROPION HYDROCHLORIDE 150 MG/1
300 TABLET ORAL EVERY MORNING
Status: DISCONTINUED | OUTPATIENT
Start: 2017-10-16 | End: 2017-10-16 | Stop reason: HOSPADM

## 2017-10-16 RX ORDER — ACETAMINOPHEN 325 MG/1
650 TABLET ORAL EVERY 4 HOURS PRN
Status: DISCONTINUED | OUTPATIENT
Start: 2017-10-16 | End: 2017-10-16 | Stop reason: HOSPADM

## 2017-10-16 RX ADMIN — MORPHINE SULFATE 4 MG: 10 INJECTION, SOLUTION INTRAMUSCULAR; INTRAVENOUS at 08:26

## 2017-10-16 RX ADMIN — TETRAKIS(2-METHOXYISOBUTYLISOCYANIDE)COPPER(I) TETRAFLUOROBORATE 47 MILLICURIE: 1 INJECTION, POWDER, LYOPHILIZED, FOR SOLUTION INTRAVENOUS at 14:15

## 2017-10-16 RX ADMIN — REGADENOSON 0.4 MG: 0.08 INJECTION, SOLUTION INTRAVENOUS at 11:59

## 2017-10-16 RX ADMIN — Medication 10 ML: at 11:36

## 2017-10-16 RX ADMIN — AMINOPHYLLINE 100 MG: 25 INJECTION, SOLUTION INTRAVENOUS at 12:01

## 2017-10-16 RX ADMIN — SODIUM CHLORIDE: 9 INJECTION, SOLUTION INTRAVENOUS at 11:45

## 2017-10-16 RX ADMIN — TETRAKIS(2-METHOXYISOBUTYLISOCYANIDE)COPPER(I) TETRAFLUOROBORATE 15.4 MILLICURIE: 1 INJECTION, POWDER, LYOPHILIZED, FOR SOLUTION INTRAVENOUS at 12:00

## 2017-10-16 ASSESSMENT — PAIN SCALES - GENERAL
PAINLEVEL_OUTOF10: 0
PAINLEVEL_OUTOF10: 8

## 2017-10-16 ASSESSMENT — ENCOUNTER SYMPTOMS
BACK PAIN: 1
SHORTNESS OF BREATH: 1
ABDOMINAL PAIN: 0
RHINORRHEA: 0
CHEST TIGHTNESS: 1

## 2017-10-16 ASSESSMENT — HEART SCORE: ECG: 0

## 2017-10-16 ASSESSMENT — PAIN DESCRIPTION - PAIN TYPE: TYPE: ACUTE PAIN

## 2017-10-16 ASSESSMENT — PAIN DESCRIPTION - FREQUENCY: FREQUENCY: INTERMITTENT

## 2017-10-16 NOTE — ED NOTES
Report to [facility]. Plan for pt to go to stress, then be transported to floor.       Nanci Ibarra RN  10/16/17 1007

## 2017-10-16 NOTE — PROGRESS NOTES
CDU Daily Progress Note  Attending Physician       Pt Name: Elisa Shanks  MRN: [id number]  [place] 1955  Date of evaluation: 10/16/17    I performed a history and physical examination of the patient and discussed management with the resident. I reviewed the residents note and agree with the documented findings and plan of care. Any areas of disagreement are noted on the chart. I was personally present for the key portions of any procedures. I have documented in the chart those procedures where I was not present during the key portions. I have reviewed the emergency nurses triage note. I agree with the chief complaint, past medical history, past surgical history, allergies, medications, social and family history as documented unless otherwise noted below. Documentation of the HPI, Physical Exam and Medical Decision Making performed by medical students or scribes is based on my personal performance of the HPI, PE and MDM. For Physician Assistant/ Nurse Practitioner cases/documentation I have personally evaluated this patient and have completed at least one if not all key elements of the E/M (history, physical exam, and MDM). Additional findings are as noted. The Family History, Social History and Review of Systems are unchanged from the previous day. No significant events overnight. Sharp pain in back associated with the chest tightness assoc with some SOB intermittent over last few days, not exertion related. Denies nausea, vomiting, diaphoresis. SOme cardiac work up some years ago but no stress. PMHx: smoker, asthma. COPD. FHx twin brother with 3 previous heart attacks, trops neg, CXR no acute. Stress test ordered. Patient smokes 10 cigarettes per day for 40 years. Smoking cessation counseling for 3 minutes. Discussed the effects of smoking in regards to heart disease, lung disease, stroke and cancer.  I explained how this negatively effects their condition, will contribute to increased

## 2017-10-16 NOTE — PROCEDURES
[address]                 [place] [address]                             CARDIAC STRESS TEST    PATIENT NAME: Chas Davila                    :             1955  MED REC NO:   [id number]                              ROOM:            6711  ACCOUNT NO:   [de-identified]                            ADMISSION DATE:  10/16/2017  PROVIDER:     Tiffanie Perez STRESS STUDY    DATE OF STUDY:  10/16/2017    INDICATION:  Chest discomfort. CONSENT:  The test was explained and consent was signed. PROTOCOL:  Lexiscan, 0.4 mg infused. 100 mg IV Aminophylline was  given 2 minutes post Cardiolite injection. PREINFUSION EKG:  Normal.  PREINFUSION HR:  73 bpm, infusion HR, 100 bpm.  HR response to  Lexiscan was normal.  PREINFUSION BP:  144/86 mmHg, infusion BP, 160/87 mmHg. BP response  to Lexiscan was normal.  LEXISCAN EKG:  Normal.  CHEST DISCOMFORT:  No chest  discomfort with Lexiscan nor in recovery. ISCHEMIC EKG CHANGES:  None. IMPRESSION:  Electrocardiographically negative Lexiscan stress study.   **Cardiolite report issued from the department of Nuclear Medicine**      Ki Salazar    D: 10/16/2017 14:47:28       T: 10/16/2017 14:49:35     SHAKEEL/BEATA  Job#: [id number]    Doc#: Unknown

## 2017-10-16 NOTE — CARE COORDINATION
Case Management Initial Discharge Plan  Rosa Joyce,         Readmission Risk              Readmission Risk:        20.5       Age 72 or Greater:  0    Admitted from SNF or Requires Paid or Family Care:  0    Currently has CHF,COPD,ARF,CRI,or is on dialysis:  4    Takes more than 5 Prescription Medications:  4    Takes Digoxin,Insulin,Anticoagulants,Narcotics or ASA/Plavix:  [address] in Past 12 Months:  10    On Disability:  0    Patient Considers own Health:  2.5            Met with:patient to discuss discharge plans. Information verified: address, contacts, phone number, , insurance Yes  PCP: Tory Cruz CNP  Date of last visit: 1 month ago    Insurance Provider: Doris Liu 150    Discharge Planning  Current Residence:  Private Residence  Living Arrangements:  [address] has 1 stories/none stairs to climb  Support Systems:  None  Current Services PTA:  None Supplier: none  Patient able to perform ADL's:Independent  DME used to aid ambulation prior to admission: none/during admission none    Potential Assistance Needed:       Pharmacy: [facility]orge   Potential Assistance Purchasing Medications:  No  Does patient want to participate in local refill/ meds to beds program?  No    Patient agreeable to home care: No  [place] of choice provided:  n/a      Type of Home Care Services:  None  Patient expects to be discharged to:  home    Prior SNF/Rehab Placement and Facility: none  Agreeable to SNF/Rehab: No  [place] of choice provided: no   Evaluation: yes    Expected Discharge date:      Follow Up Appointment: Best Day/ Time:      Transportation provider: pt drove herself here  Transportation arrangements needed for discharge: No    Discharge Plan: home        Electronically signed by Ramandeep Reina RN on 10/16/17 at 10:31 AM

## 2017-10-16 NOTE — ED PROVIDER NOTES
[address]  ED  Emergency Department Encounter  Emergency Medicine Resident     Pt Name: Manoj Fall  MRN: [id number]  [place] 1955  Date of evaluation: 10/16/17  PCP:  Butch Huynh [id number]       Chief Complaint   Patient presents with    Shortness of Breath    Chest Pain       HISTORY OF PRESENT ILLNESS  (Location/Symptom, Timing/Onset, Context/Setting, Quality, Duration, Modifying Factors, Severity, Associated signs/symptoms)     Manoj Fall is a 63 y.o. female who presents With complaints last night of sharp stabbing pain radiating into her back associated with the chest tightness that when occurs takes her breath away. States the pain started last night and is worse when she lays back. States the pain is intermittent and is radiates to the epigastric area. Patient denies any associated nausea, vomiting, changes in urination, changes in bowel movement. Patient denying any abdominal pain. Denies any flank pain. Patient does state that she had a heart workup several years ago but has never had a stress test in the past.  Does state that she smokes daily. Has a twin brother that has had 3 previous heart attacks. Patient also stating that she does have past medical history of asthma COPD. PAST MEDICAL / SURGICAL / SOCIAL / FAMILY HISTORY      has a past medical history of Asthma; COPD (chronic obstructive pulmonary disease) ([address] and Osteoarthritis. has a past surgical history that includes Appendectomy; Hysterectomy; Esophagus dilation; Colonoscopy; Endoscopy, colon, diagnostic; egd transoral biopsy single/multiple (N/A, 9/20/2017); and colsc flx w/rmvl of tumor polyp lesion snare tq (N/A, 9/20/2017). Social History     Social History    Marital status:      Spouse name: N/A    Number of children: N/A    Years of education: N/A     Occupational History    Not on file.      Social History Main Topics    Smoking status: Current Every Day Smoker     Packs/day: 0.50     Types: Cigarettes    Smokeless tobacco: Never Used    Alcohol use No    Drug use: No    Sexual activity: No     Other Topics Concern    Not on file     Social History Narrative    No narrative on file       Family History   Problem Relation Age of Onset    Other Mother     Diabetes Mother     Prostate Cancer Father        Allergies:  Review of patient's allergies indicates no known allergies. Home Medications:  Prior to Admission medications    Medication Sig Start Date End Date Taking? Authorizing Provider   loratadine (CLARITIN) 10 MG capsule Take 10 mg by mouth daily   Yes Historical Provider, MD   mometasone-formoterol [facility]) 200-5 MCG/ACT inhaler Inhale 2 puffs into the lungs 2 times daily 7/31/17 10/16/17 Yes Trice Rutherford CNP   albuterol sulfate  (90 Base) MCG/ACT inhaler Inhale 2 puffs into the lungs every 4 hours as needed for Wheezing or Shortness of Breath 7/31/17  Yes Trice Rutherford CNP   buPROPion (WELLBUTRIN XL) 300 MG extended release tablet Take 1 tablet by mouth every morning 8/21/17   Trice Rutherford CNP   Na Sulfate-K Sulfate-Mg Sulf 17.5-3.13-1.6 GM/180ML SOLN Use as directed in your patient instructions. 8/17/17   Raquel Bains MD   ibuprofen (ADVIL;MOTRIN) 600 MG tablet Take 1 tablet by mouth every 6 hours as needed for Pain 7/19/17   Susan Poole MD   nicotine (NICODERM CQ) 14 MG/24HR Place 1 patch onto the skin daily 7/19/17   Susan Poole MD       REVIEW OF SYSTEMS    (2-9 systems for level 4, 10 or more for level 5)      Review of Systems   Constitutional: Negative for chills and fever. HENT: Negative for congestion and rhinorrhea. Eyes: Negative for visual disturbance. Respiratory: Positive for chest tightness and shortness of breath. Cardiovascular: Negative for chest pain. Gastrointestinal: Negative for abdominal pain. Genitourinary: Negative for dysuria and frequency.    Musculoskeletal: Positive for back pain. Negative for arthralgias. Skin: Negative for wound. Neurological: Negative for dizziness and light-headedness. PHYSICAL EXAM   (up to 7 for level 4, 8 or more for level 5)      INITIAL VITALS:   BP (!) 140/77   Pulse 66   Temp 97.5 °F (36.4 °C) (Oral)   Resp 20   Wt 180 lb (81.6 kg)   SpO2 96%   BMI 27.37 kg/m²     Physical Exam   Constitutional: She is oriented to person, place, and time. No distress. HENT:   Head: Normocephalic and atraumatic. Eyes: Right eye exhibits no discharge. Left eye exhibits no discharge. Cardiovascular: Normal rate, regular rhythm and normal heart sounds. Exam reveals no friction rub. No murmur heard. Pulmonary/Chest: Effort normal and breath sounds normal. No stridor. No respiratory distress. She has no wheezes. She has no rales. She exhibits no tenderness. Abdominal: Soft. She exhibits no distension. There is no tenderness. There is no guarding. Neurological: She is alert and oriented to person, place, and time. Skin: Skin is warm and dry. She is not diaphoretic. Nursing note and vitals reviewed.       DIFFERENTIAL  DIAGNOSIS     PLAN (LABS / IMAGING / EKG):  Orders Placed This Encounter   Procedures    XR CHEST STANDARD (2 VW)    NM Myocardial Spect Rest Exercise or Rx    Basic Metabolic Panel    CBC Auto Differential    Hepatic Function Panel    LIPASE    Continuous Pulse Oximetry    POCT troponin    POCT troponin    POCT troponin    EKG 12 Lead    Insert peripheral IV    Insert peripheral IV    PATIENT STATUS (FROM ED OR OR/PROCEDURAL) Observation       MEDICATIONS ORDERED:  Orders Placed This Encounter   Medications    morphine injection 4 mg    morphine (PF) 10 MG/ML injection     JUDY LOZADA: cabinet override       DDX: Emergent: ACS/NSTEMI/STEMI/angina, arrhythmia, trauma, aortic dissection,  PE, PNA, pneumothroax, esophageal rupture, tamponade, Cocaine use  Nonemergent: pneumonia, pericarditis, GERD, MSK, Endocarditis, anxiety     Evaluate for: diaphoresis, present chest pain, tachypnea, BP both arms, heart sounds, JVD, tender chest wall, wheezing    HEART Risk Score for Chest Pain Patients   History and Physical Exam Suspicion Level  (Nausea, Vomiting, Diaphoresis, Radiation, Exertion)   Slightly Suspicious (0 pts)   Moderately Suspicious (1 pt)   Highly Suspicious (2 pts)   EKG Interpretation   Normal (0 pts)   Non-Specific Repolarization Disturbance (1 pt)   Significant ST-Depression (2 pts)   Age of Patient (in years)   = 39 (0 pts)   46-64 (1 pt)   = 65 (2 pts)   Risk Factors    No Risk Factors (0 pts)   1-2 Risk Factors (1 pt)   = 3 Risk Factors (2 pts)   Risk Factors Include:   Hypercholesterolemia   Hypertension   Diabetes Mellitus   Cigarette smoking   Positive family history   Obesity   CAD   (SLE, CKDz, HIV, Cocaine abuse)   Troponin Levels   = Normal Limit (0 pts)   1-3 Times Normal Limit (1 pt)   > 3 Times Normal Limit (2 pts)  TOTAL: 4    Percent Risk for Major Adverse Cardiac Event (MACE)  0-3 pts indicates low risk for MACE   2.5% (DISCHARGE)   4-7 pts indicates moderate risk for MACE  20.3% (OBS)  8-10 pts indicates high risk for MACE  72.7% (EARLY INVASIVE TX)    DIAGNOSTIC RESULTS / EMERGENCY DEPARTMENT COURSE / MDM     LABS:  Results for orders placed or performed during the hospital encounter of 82/43/21   Basic Metabolic Panel   Result Value Ref Range    Glucose 137 (H) 70 - 99 mg/dL    BUN 9 8 - 23 mg/dL    CREATININE 0.56 0.50 - 0.90 mg/dL    Bun/Cre Ratio NOT REPORTED 9 - 20    Calcium 8.9 8.6 - 10.4 mg/dL    Sodium 141 135 - 144 mmol/L    Potassium 3.4 (L) 3.7 - 5.3 mmol/L    Chloride 104 98 - 107 mmol/L    CO2 25 20 - 31 mmol/L    Anion Gap 12 9 - 17 mmol/L    GFR Non-African American >60 >60 mL/min    GFR African American >60 >60 mL/min    GFR Comment          GFR Staging NOT REPORTED    CBC Auto Differential   Result Value Ref Range    WBC 11.1 (H) 3.5 - 11.0 k/uL    RBC 4.98 4.0 - 5.2 m/uL    Hemoglobin 14.8 12.0 - 16.0 g/dL    Hematocrit 44.7 36 - 46 %    MCV 89.9 80 - 100 fL    MCH 29.7 26 - 34 pg    MCHC 33.0 31 - 37 g/dL    RDW 15.0 12.5 - 15.4 %    Platelets 372 437 - 091 k/uL    MPV 7.8 6.0 - 12.0 fL    Differential Type NOT REPORTED     Seg Neutrophils 70 %    Lymphocytes 24 %    Monocytes 4 %    Eosinophils % 2 %    Basophils 0 %    Segs Absolute 7.80 (H) 1.8 - 7.7 k/uL    Absolute Lymph # 2.70 1.0 - 4.8 k/uL    Absolute Mono # 0.40 0.1 - 1.2 k/uL    Absolute Eos # 0.20 0.0 - 0.4 k/uL    Basophils # 0.00 0.0 - 0.2 k/uL    WBC Morphology NOT REPORTED     RBC Morphology NOT REPORTED     Platelet Estimate NOT REPORTED    Hepatic Function Panel   Result Value Ref Range    Alb 4.0 3.5 - 5.2 g/dL    Alkaline Phosphatase 105 (H) 35 - 104 U/L    ALT 13 5 - 33 U/L    AST 13 <32 U/L    Total Bilirubin 0.22 (L) 0.3 - 1.2 mg/dL    Bilirubin, Direct 0.11 <0.31 mg/dL    Bilirubin, Indirect 0.11 0.00 - 1.00 mg/dL    Total Protein 6.9 6.4 - 8.3 g/dL    Globulin NOT REPORTED 1.5 - 3.8 g/dL    Albumin/Globulin Ratio 1.4 1.0 - 2.5   LIPASE   Result Value Ref Range    Lipase 16 13 - 60 U/L   POCT troponin   Result Value Ref Range    POC Troponin I 0.00 0.00 - 0.10 ng/mL    POC Troponin Interp       The Troponin-I (POC) results cannot be compared to the Troponin-T results. POCT troponin   Result Value Ref Range    POC Troponin I 0.00 0.00 - 0.10 ng/mL    POC Troponin Interp       The Troponin-I (POC) results cannot be compared to the Troponin-T results. RADIOLOGY:  Xr Chest Standard (2 Vw)    Result Date: 10/16/2017  EXAMINATION: TWO VIEWS OF THE CHEST 10/16/2017 8:21 am COMPARISON: 07/16/2017 HISTORY: ORDERING SYSTEM PROVIDED HISTORY: back pain, intermittent chest tightness TECHNOLOGIST PROVIDED HISTORY: Reason for exam:->back pain, intermittent chest tightness FINDINGS: Interval resolution of the previously demonstrated left lower lobe pneumonia. The lungs are clear.   No pleural effusion or pneumothorax. Prominent right pericardial fat pad present. Heart size and mediastinal contours are stable. Pulmonary vascular is within normal limits. No acute cardiopulmonary abnormality. Interval resolution of the previous left lower lobe pneumonia. EKG    Rhythm: normal sinus   Rate: normal  Axis: normal  Ectopy: none  Conduction: normal  ST Segments: nonspecific changes  T Waves: non specific changes  Q Waves: none    Clinical Impression: non-specific EKG    Normal Interval Reference:  P-wave <110 ms  -200 ms  QRS <100 ms  QT <420 ms  QTc 330-470 ms    All EKG's are interpreted by the Emergency Department Physician who either signs or Co-signs this chart in the absence of a cardiologist.    EMERGENCY DEPARTMENT COURSE:    INITIAL IMPRESSION: Patient with concerning for atypical presentation for ACS. We'll obtain cardiac workup. Given involvement of epigastrium area will also obtain lipase. Anticipate admission to observation unit for stress test given her elevated heart score as well as no follow-up with heart doctors. MDM: Discussed with patient at bedside the cardiac workup is negative so far. However, given patient's pain is still intermittent and comes and goes and is not relieved entirely while she has been here in the emergency department and elevated heart score would like to admit for stress testing. Patient is agreeable plan. Patient will be admitted for stress tests as well as possible cardiology evaluation at the discretion of the observation unit team.    PROCEDURES:  None    CONSULTS:  None    FINAL IMPRESSION      1.  Chest pain, unspecified type          DISPOSITION / PLAN     DISPOSITION Admitted    PATIENT REFERRED TO:  Samra [address] [id number]  [phone]            DISCHARGE MEDICATIONS:  New Prescriptions    No medications on file       Jv Rodriguez MD  Emergency Medicine Resident, PGY-2  [address] [facility]    (Please note that portions of this note were completed with a voice recognition program.  Efforts were made to edit the dictations but occasionally words are mis-transcribed.)       Mckayla Barraza MD  Resident  10/16/17 1007

## 2017-10-16 NOTE — ED NOTES
Pt is ambulatory with steady gait to room 9. Pt reports SOB, chest tightness, and back pain intermittently since Friday. Pt states that she was recently here for pneumonia, and she is worried that it's that again. Pt is alert, oriented, speaking in full sentences. Cardiac workup in progress.       Amy Leiva RN  10/16/17 6735

## 2017-10-16 NOTE — ED PROVIDER NOTES
[address] [facility]     Emergency Department     Faculty Attestation        I performed a history and physical examination of the patient and discussed management with the resident. I reviewed the residents note and agree with the documented findings and plan of care. Any areas of disagreement are noted on the chart. I was personally present for the key portions of any procedures. I have documented in the chart those procedures where I was not present during the key portions. I have reviewed the emergency nurses triage note. I agree with the chief complaint, past medical history, past surgical history, allergies, medications, social and family history as documented unless otherwise noted below. For mid-level providers such as nurse practitioners as well as physicians assistants:    I have personally seen and evaluated the patient. I find the patient's history and physical exam are consistent with NP/PA documentation. I agree with the care provided, treatment rendered, disposition, & follow-up plan. Additional findings are as noted. Vital Signs: BP (!) 140/77   Pulse 82   Temp 97.5 °F (36.4 °C) (Oral)   Resp 18   Wt 180 lb (81.6 kg)   SpO2 97%   BMI 27.37 kg/m²   PCP:  Mandi Rivera CNP    Pertinent Comments:     Patient presents the emergency Department for evaluation of chest pain and nausea. She states for the past 24 hours she's had episodes of a sharp stabbing pain in her back. It's been happening more frequently and woke her up repeatedly at night. During one of these episodes she had substernal chest pressure as well as jaw pain. She had a cardiac cath 10 years ago that, per patient, was negative. She has a twin brother who is had 3 heart attacks before requiring cardiac stenting. She unfortunately continues to smoke.   She was admitted to the hospital in July of this year for pneumonia and had CT which showed no evidence of

## 2017-10-17 ENCOUNTER — TELEPHONE (OUTPATIENT)
Dept: FAMILY MEDICINE CLINIC | Age: 62
End: 2017-10-17

## 2017-10-17 LAB
EKG ATRIAL RATE: 76 BPM
EKG P AXIS: 56 DEGREES
EKG P-R INTERVAL: 134 MS
EKG Q-T INTERVAL: 374 MS
EKG QRS DURATION: 84 MS
EKG QTC CALCULATION (BAZETT): 420 MS
EKG R AXIS: 30 DEGREES
EKG T AXIS: 58 DEGREES
EKG VENTRICULAR RATE: 76 BPM

## 2017-10-17 NOTE — H&P
rash     (PQRS) Advance directives on face sheet per hospital policy. No change unless specifically mentioned in chart    PAST MEDICAL HISTORY    has a past medical history of Asthma; COPD (chronic obstructive pulmonary disease) ([address] and Osteoarthritis. I have reviewed the past medical history with the patient and it is pertinent to this complaint. SURGICAL HISTORY      has a past surgical history that includes Appendectomy; Hysterectomy; Esophagus dilation; Colonoscopy; Endoscopy, colon, diagnostic; egd transoral biopsy single/multiple (N/A, 2017); and colsc flx w/rmvl of tumor polyp lesion snare tq (N/A, 2017). I have reviewed and agree with Surgical History entered    CURRENT MEDICATIONS       No current facility-administered medications for this encounter. All medication charted and reviewed. ALLERGIES     has No Known Allergies. FAMILY HISTORY     indicated that her mother is . She indicated that her father is . family history includes Diabetes in her mother; Other in her mother; Prostate Cancer in her father. The patient denies any pertinent family history. I have reviewed and agree with the family history entered. I have reviewed the Family History and it is significant to the case    SOCIAL HISTORY      reports that she has been smoking Cigarettes. She has been smoking about 0.50 packs per day. She has never used smokeless tobacco. She reports that she does not drink alcohol or use drugs. I have reviewed and agree with all Social.  There are no concerns for substance abuse/use. PHYSICAL EXAM     INITIAL VITALS:  height is 5' 8\" (1.727 m) and weight is 180 lb (81.6 kg). Her oral temperature is 97.7 °F (36.5 °C). Her blood pressure is 133/88 and her pulse is 74. Her respiration is 16 and oxygen saturation is 97%.       CONSTITUTIONAL: AOx4, no apparent distress, appears stated age    HEAD: normocephalic, atraumatic   EYES: PERRLA, EOMI    ENT: moist adjusted to account for potential artifact. Summed stress score:  0 Summed rest score:  0 Summed reversibility score:  0     1. No definitive scintigraphic evidence for reversible ischemia or infarct. 2. Left ventricular ejection fraction of 68%. 3.  Please see report for EKG portion of the examination which will be performed separately by physician from cardiology. Risk stratification:  Low risk Note:  Risk stratification incorporates both clinical history and test results. Final risk determination is the responsibility of the ordering provider as history and other test results may increase or decrease the risk stratification reported for this examination. Risk stratification criteria are adapted from \"Noninvasive Risk Stratification\" criteria from [employer]. Al, ACC/AATS/AHA/ASE/ASNC/SCAI/SCCT/STS 2017 Appropriate Use Criteria For Coronary Revascularization in Patients With Stable Ischemic Heart Disease [facility] Volume 69, Issue 17, May 2017 High risk (>3% annual death or MI) 1. Severe resting LV dysfunction (LVEF >35%) not readily explained by non coronary causes 2. Resting perfusion abnormalities greater than 10% of the myocardium in patients without prior history or evidence of MI 3. Stress-induced perfusion abnormalities encumbering greater than or equal to 10% myocardium or stress segmental scores indicating multiple vascular territories with abnormalities 4. Stress-induced LV dilatation (TID ratio greater than 1.19 for exercise and greater than 1.39 for regadenoson) Intermediate risk (1% to 3% annual death or MI) 1. Mild/moderate resting LV dysfunction (LVEF 35% to 49%) not readily explained by non coronary causes. 2.  Resting perfusion abnormalities in 5%-9.9% of the myocardium in patients without a history or prior evidence of MI 3.   Stress-induced perfusion abnormality encumbering 5%-9.9% of the myocardium or stress segmental scores indicating 1 vascular territory with abnormalities but without LV dilation 4. Small wall motion abnormality involving 1-2 segments and only 1 coronary bed. Low Risk (Less than 1% annual death or MI) 1. Normal or small myocardial perfusion defect at rest or with stress encumbering less than 5% of the myocardium. LABS:  I have reviewed and interpreted all available lab results.   Labs Reviewed   BASIC METABOLIC PANEL - Abnormal; Notable for the following:        Result Value    Glucose 137 (*)     Potassium 3.4 (*)     All other components within normal limits   CBC WITH AUTO DIFFERENTIAL - Abnormal; Notable for the following:     WBC 11.1 (*)     Segs Absolute 7.80 (*)     All other components within normal limits   HEPATIC FUNCTION PANEL - Abnormal; Notable for the following:     Alkaline Phosphatase 105 (*)     Total Bilirubin 0.22 (*)     All other components within normal limits   LIPASE   POCT TROPONIN   POCT TROPONIN   POCT TROPONIN   POCT TROPONIN           SCREENING TOOLS:    HEART Risk Score for Chest Pain Patients   History and Physical Exam Suspicion Level  (Nausea, Vomiting, Diaphoresis, Radiation, Exertion)   Slightly Suspicious (0 pts)   Moderately Suspicious (1 pt)   Highly Suspicious (2 pts)   EKG Interpretation   Normal (0 pts)   Non-Specific Repolarization Disturbance (1 pt)   Significant ST-Depression (2 pts)   Age of Patient (in years)   = 39 (0 pts)   46-64 (1 pt)   = 65 (2 pts)   Risk Factors   No Risk Factors (0 pts)   1-2 Risk Factors (1 pt)   = 3 Risk Factors (2 pts)   Risk Factors Include:   Hypercholesterolemia   Hypertension   Diabetes Mellitus   Cigarette smoking   Positive family history   Obesity   CAD   (SLE, CKDz, HIV, Cocaine abuse)   Troponin Levels   = Normal Limit (0 pts)   1-3 Times Normal Limit (1 pt)   > 3 Times Normal Limit (2 pts)  TOTAL: 4    Percent Risk for Major Adverse Cardiac Event (MACE)  0-3 pts indicates low risk for MACE   2.5% (DISCHARGE)   4-7 pts indicates moderate risk for MACE  20.3% (OBS)  8-10 pts indicates high risk for MACE  72.7% (EARLY INVASIVE TX)    CDU IMPRESSION / PLAN      Manoj Fall is a 63 y.o. female who presents with    1. Acute 10/10 stabbing mid/thoracic back pain that radiates into her midchest as tightness with associated shortness of breath described as\" took my breath away. \"    -Stress test  -Further workup and evaluation   -Continue home meds, pain control   -Monitor vitals, labs, imaging     DISPO: pending stress test     CONSULTS:    None    PROCEDURES:  Not indicated       PATIENT REFERRED TO:    Eliseo Luciano [address] [place] [id number]  [phone]          Gopal Munoz MD  [address] [facility] [phone]      Dr. Vinicius Pereira will make appointment for you to be seen this week. --  Carlie Marinelli DO   Emergency Medicine Resident     This dictation was generated by voice recognition computer software. Although all attempts are made to edit the dictation for accuracy, there may be errors in the transcription that are not intended.

## 2017-10-18 NOTE — DISCHARGE SUMMARY
Range    POC Troponin I 0.00 0.00 - 0.10 ng/mL    POC Troponin Interp       The Troponin-I (POC) results cannot be compared to the Troponin-T results. EKG 12 Lead   Result Value Ref Range    Ventricular Rate 76 BPM    Atrial Rate 76 BPM    P-R Interval 134 ms    QRS Duration 84 ms    Q-T Interval 374 ms    QTc Calculation (Bazett) 420 ms    P Axis 56 degrees    R Axis 30 degrees    T Axis 58 degrees     Xr Chest Standard (2 Vw)    Result Date: 10/16/2017  EXAMINATION: TWO VIEWS OF THE CHEST 10/16/2017 8:21 am COMPARISON: 07/16/2017 HISTORY: ORDERING SYSTEM PROVIDED HISTORY: back pain, intermittent chest tightness TECHNOLOGIST PROVIDED HISTORY: Reason for exam:->back pain, intermittent chest tightness FINDINGS: Interval resolution of the previously demonstrated left lower lobe pneumonia. The lungs are clear. No pleural effusion or pneumothorax. Prominent right pericardial fat pad present. Heart size and mediastinal contours are stable. Pulmonary vascular is within normal limits. No acute cardiopulmonary abnormality. Interval resolution of the previous left lower lobe pneumonia. Nm Myocardial Spect Rest Exercise Or Rx    Result Date: 10/16/2017  EXAMINATION: MYOCARDIAL PERFUSION IMAGING 10/16/2017 3:46 pm TECHNIQUE: Rest dose:  47 mCi Tc-99m sestamibi intravenously Stress dose:  15.4  mCi Tc-99m sestamibi intravenously Under cardiology supervision, 0.4 mg Lexiscan was infused intravenously prior to injection of the stress dose. SPECT imaging was acquired following injection of the sestamibi. ECG gating was obtained following the stress acquisition. COMPARISON: None Available.  HISTORY: ORDERING SYSTEM PROVIDED HISTORY: CHEST PAIN, ACUTE CORONARY SYNDROME SUSPECT TECHNOLOGIST PROVIDED HISTORY: Ordering Physician Provided Reason for Exam: chest pain, acute coronary syndrome suspect Mechanism of Injury: jaw pain, nausea, cardiac cath 10yrs ago, smoker 51-year-old female with chest pain, jaw pain, palpitations, numbness, shortness of breath, lightheadedness/dizziness, nausea, cardiac catheterization, high cholesterol, and personal history of smoking FINDINGS: The patient achieved a maximum heart rate of 100 beats per minute, 62 % of the maximum age predicted heart rate of 160 beats per minute. Perfusion: There is no scintigraphic evidence for a reversible or fixed perfusion defect to suggest reversible ischemia or infarct. Function: The gated SPECT data demonstrates left ventricular size and normal wall motion. Left ventricular ejection fraction:  68% TID score:  1.12  (Threshold value of 1.39 is used for Lexiscan stress with Tc-99m). There is no stress-induced cavitary dilatation to suggest compensated triple vessel disease. End diastolic volume:  46RU Scores are visually adjusted to account for potential artifact. Summed stress score:  0 Summed rest score:  0 Summed reversibility score:  0     1. No definitive scintigraphic evidence for reversible ischemia or infarct. 2. Left ventricular ejection fraction of 68%. 3.  Please see report for EKG portion of the examination which will be performed separately by physician from cardiology. Risk stratification:  Low risk Note:  Risk stratification incorporates both clinical history and test results. Final risk determination is the responsibility of the ordering provider as history and other test results may increase or decrease the risk stratification reported for this examination. Risk stratification criteria are adapted from \"Noninvasive Risk Stratification\" criteria from [employer]. Al, ACC/AATS/AHA/ASE/ASNC/SCAI/SCCT/STS 2017 Appropriate Use Criteria For Coronary Revascularization in Patients With Stable Ischemic Heart Disease [facility] Volume 69, Issue 17, May 2017 High risk (>3% annual death or MI) 1. Severe resting LV dysfunction (LVEF >35%) not readily explained by non coronary causes 2.   Resting perfusion abnormalities greater than 10% of the myocardium in patients without prior history or evidence of MI 3. Stress-induced perfusion abnormalities encumbering greater than or equal to 10% myocardium or stress segmental scores indicating multiple vascular territories with abnormalities 4. Stress-induced LV dilatation (TID ratio greater than 1.19 for exercise and greater than 1.39 for regadenoson) Intermediate risk (1% to 3% annual death or MI) 1. Mild/moderate resting LV dysfunction (LVEF 35% to 49%) not readily explained by non coronary causes. 2.  Resting perfusion abnormalities in 5%-9.9% of the myocardium in patients without a history or prior evidence of MI 3. Stress-induced perfusion abnormality encumbering 5%-9.9% of the myocardium or stress segmental scores indicating 1 vascular territory with abnormalities but without LV dilation 4. Small wall motion abnormality involving 1-2 segments and only 1 coronary bed. Low Risk (Less than 1% annual death or MI) 1. Normal or small myocardial perfusion defect at rest or with stress encumbering less than 5% of the myocardium. Physical Exam:    General appearance - NAD, AOx 3  Lungs -CTAB, no R/R/R  Heart - RRR, no M/R/G  Abdomen - Soft, NT/ND  Neurological:  MAEx4, No focal motor deficit, sensory loss  Extremities - Cap refil <2 sec in all ext., no edema  Skin -warm, dry      Hospital Course:  Clinical course has improved, labs and imaging reviewed. Julio C Camargo originally presented to the hospital on 10/16/2017  7:47 AM with acute 10/10 stabbing mid/thoracic back pain that radiates into her midchest as tightness with associated shortness of breath described as\" took my breath away. \"  Patient states these episodes started on Friday, were intermittent, lasting a few seconds at a time, and were mild. States they've become more frequent, and progressively worse. Last episode was this morning.   Patient states she smokes daily, and has a twin brother that has had 3 previous heart attacks in the last 10

## 2017-10-27 NOTE — TELEPHONE ENCOUNTER
Lmr, 2nd message to call our office to schedule f/u.       The pt has last appt on:  10/16/2017 Status: Comp    Time: 1:15 PM Length: 15   Visit Type: STV NM JORGE LUIS SPECT SCAN

## 2017-12-01 DIAGNOSIS — R13.10 DYSPHAGIA, UNSPECIFIED TYPE: ICD-10-CM

## 2017-12-01 DIAGNOSIS — Z12.11 COLON CANCER SCREENING: ICD-10-CM

## 2018-03-22 ENCOUNTER — OFFICE VISIT (OUTPATIENT)
Dept: FAMILY MEDICINE CLINIC | Age: 63
End: 2018-03-22
Payer: COMMERCIAL

## 2018-03-22 ENCOUNTER — HOSPITAL ENCOUNTER (OUTPATIENT)
Age: 63
Setting detail: SPECIMEN
Discharge: HOME | End: 2018-03-22
Payer: COMMERCIAL

## 2018-03-22 VITALS
BODY MASS INDEX: 28.79 KG/M2 | OXYGEN SATURATION: 96 % | HEIGHT: 68 IN | TEMPERATURE: 96.7 F | HEART RATE: 81 BPM | DIASTOLIC BLOOD PRESSURE: 78 MMHG | RESPIRATION RATE: 20 BRPM | WEIGHT: 190 LBS | SYSTOLIC BLOOD PRESSURE: 128 MMHG

## 2018-03-22 DIAGNOSIS — M25.421 ELBOW EFFUSION, RIGHT: ICD-10-CM

## 2018-03-22 DIAGNOSIS — R35.89 POLYURIA: ICD-10-CM

## 2018-03-22 DIAGNOSIS — R53.83 FATIGUE, UNSPECIFIED TYPE: Primary | ICD-10-CM

## 2018-03-22 DIAGNOSIS — R73.9 HYPERGLYCEMIA: ICD-10-CM

## 2018-03-22 DIAGNOSIS — Z72.0 TOBACCO ABUSE: ICD-10-CM

## 2018-03-22 DIAGNOSIS — R52 ACHING: ICD-10-CM

## 2018-03-22 DIAGNOSIS — R09.89 LUNG CRACKLES: ICD-10-CM

## 2018-03-22 DIAGNOSIS — R79.89 ELEVATED TSH: ICD-10-CM

## 2018-03-22 DIAGNOSIS — Z71.6 ENCOUNTER FOR SMOKING CESSATION COUNSELING: ICD-10-CM

## 2018-03-22 PROBLEM — R11.2 NAUSEA AND VOMITING: Status: RESOLVED | Noted: 2017-07-16 | Resolved: 2018-03-22

## 2018-03-22 LAB
ABSOLUTE EOS #: 0.27 K/UL (ref 0–0.44)
ABSOLUTE IMMATURE GRANULOCYTE: 0.06 K/UL (ref 0–0.3)
ABSOLUTE LYMPH #: 3.07 K/UL (ref 1.1–3.7)
ABSOLUTE MONO #: 0.57 K/UL (ref 0.1–1.2)
ANION GAP SERPL CALCULATED.3IONS-SCNC: 14 MMOL/L (ref 9–17)
BASOPHILS # BLD: 1 % (ref 0–2)
BASOPHILS ABSOLUTE: 0.05 K/UL (ref 0–0.2)
BUN BLDV-MCNC: 12 MG/DL (ref 8–23)
BUN/CREAT BLD: ABNORMAL (ref 9–20)
CALCIUM SERPL-MCNC: 9.3 MG/DL (ref 8.6–10.4)
CHLORIDE BLD-SCNC: 100 MMOL/L (ref 98–107)
CO2: 25 MMOL/L (ref 20–31)
CREAT SERPL-MCNC: 0.57 MG/DL (ref 0.5–0.9)
DIFFERENTIAL TYPE: ABNORMAL
EOSINOPHILS RELATIVE PERCENT: 3 % (ref 1–4)
GFR AFRICAN AMERICAN: >60 ML/MIN
GFR NON-AFRICAN AMERICAN: >60 ML/MIN
GFR SERPL CREATININE-BSD FRML MDRD: ABNORMAL ML/MIN/{1.73_M2}
GFR SERPL CREATININE-BSD FRML MDRD: ABNORMAL ML/MIN/{1.73_M2}
GLUCOSE BLD-MCNC: 105 MG/DL (ref 70–99)
HCT VFR BLD CALC: 44.9 % (ref 36.3–47.1)
HEMOGLOBIN: 14.3 G/DL (ref 11.9–15.1)
IMMATURE GRANULOCYTES: 1 %
LYMPHOCYTES # BLD: 30 % (ref 24–43)
MCH RBC QN AUTO: 29.5 PG (ref 25.2–33.5)
MCHC RBC AUTO-ENTMCNC: 31.8 G/DL (ref 28.4–34.8)
MCV RBC AUTO: 92.6 FL (ref 82.6–102.9)
MONOCYTES # BLD: 6 % (ref 3–12)
NRBC AUTOMATED: 0 PER 100 WBC
PDW BLD-RTO: 13.8 % (ref 11.8–14.4)
PLATELET # BLD: 338 K/UL (ref 138–453)
PLATELET ESTIMATE: ABNORMAL
PMV BLD AUTO: 9.4 FL (ref 8.1–13.5)
POTASSIUM SERPL-SCNC: 3.7 MMOL/L (ref 3.7–5.3)
RBC # BLD: 4.85 M/UL (ref 3.95–5.11)
RBC # BLD: ABNORMAL 10*6/UL
SEG NEUTROPHILS: 59 % (ref 36–65)
SEGMENTED NEUTROPHILS ABSOLUTE COUNT: 6.21 K/UL (ref 1.5–8.1)
SODIUM BLD-SCNC: 139 MMOL/L (ref 135–144)
THYROXINE, FREE: 1.3 NG/DL (ref 0.93–1.7)
TSH SERPL DL<=0.05 MIU/L-ACNC: 5.42 MIU/L (ref 0.3–5)
WBC # BLD: 10.2 K/UL (ref 3.5–11.3)
WBC # BLD: ABNORMAL 10*3/UL

## 2018-03-22 PROCEDURE — 99214 OFFICE O/P EST MOD 30 MIN: CPT | Performed by: NURSE PRACTITIONER

## 2018-03-22 RX ORDER — BUPROPION HYDROCHLORIDE 300 MG/1
300 TABLET ORAL EVERY MORNING
Qty: 30 TABLET | Refills: 5 | Status: SHIPPED | OUTPATIENT
Start: 2018-03-22 | End: 2019-02-19 | Stop reason: SDUPTHER

## 2018-03-22 RX ORDER — ALBUTEROL SULFATE 90 UG/1
2 AEROSOL, METERED RESPIRATORY (INHALATION) EVERY 4 HOURS PRN
Qty: 1 INHALER | Refills: 3 | Status: SHIPPED | OUTPATIENT
Start: 2018-03-22 | End: 2019-02-19 | Stop reason: SDUPTHER

## 2018-03-22 ASSESSMENT — ENCOUNTER SYMPTOMS
COUGH: 1
VOICE CHANGE: 1
SHORTNESS OF BREATH: 1
SORE THROAT: 1

## 2018-03-22 NOTE — PROGRESS NOTES
[address] [facility] [address]  Dept: [phone]  Dept Fax: [phone]      Tiera Fonseca is a 63 y.o. female who presents today for her medical conditions/complaints as noted below. Tiera Fonseca is c/o of Hoarse (x 2 weeks); Asthma (since she lost her voice ); and Joint Swelling (right elbow tender)            HPI:     HPI  Roberto Aguilar is here with sore hoarse throat. She does have a cough. Nothing is coming up. She has not had any recent illness. She did have legionella pneumonia last year and she has not felt the same since. She is fatigued, having pain, calvin in the shins when she wakes. This pain does improve over the day. She has a swollen right elbow. It is not painful. She did not injure it. There is no redness or warmth.    No results found for: LABA1C          ( goal A1C is < 7)   No results found for: LABMICR  LDL Cholesterol (mg/dL)   Date Value   08/08/2017 187 (H)       (goal LDL is <100)   AST (U/L)   Date Value   10/16/2017 13     ALT (U/L)   Date Value   10/16/2017 13     BUN (mg/dL)   Date Value   10/16/2017 9     BP Readings from Last 3 Encounters:   03/22/18 128/78   10/16/17 133/88   09/20/17 120/83          (goal 120/80)    Past Medical History:   Diagnosis Date    Asthma     COPD (chronic obstructive pulmonary disease) (HCC)     Esophageal ring     Gastritis     Hyperplastic colon polyp     Osteoarthritis       Past Surgical History:   Procedure Laterality Date    APPENDECTOMY      COLONOSCOPY  09/20/2017    FRAGMENTS OF HYPERPLASTIC POLYP    ENDOSCOPY, COLON, DIAGNOSTIC      ESOPHAGEAL DILATATION      HYSTERECTOMY      ND COLSC FLX W/RMVL OF TUMOR POLYP LESION SNARE TQ N/A 9/20/2017    COLONOSCOPY POLYPECTOMY COLD BIOPSY performed by Pamela Zuniga MD at [address] EGD TRANSORAL BIOPSY SINGLE/MULTIPLE N/A 9/20/2017    EGD BIOPSY with esophageal dilitation performed by Pamela Zuniga MD at [facility] into the lungs every 4 hours as needed for Wheezing or Shortness of Breath  Dispense: 1 Inhaler; Refill: 3  - Tiotropium Bromide-Olodaterol 2.5-2.5 MCG/ACT AERS; Inhale 2 puffs into the lungs daily  Dispense: 1 Inhaler; Refill: 5    9. Elbow effusion, right  Will watch      Orders Placed This Encounter   Procedures    XR CHEST (2 VW)     Standing Status:   Future     Standing Expiration Date:   3/22/2019     Order Specific Question:   Reason for exam:     Answer:   crackles, history of pneumonia    TSH With Reflex Ft4     Standing Status:   Future     Standing Expiration Date:   3/22/2019    Hemoglobin A1C     Standing Status:   Future     Standing Expiration Date:   3/22/2019    CBC Auto Differential     Standing Status:   Future     Standing Expiration Date:   3/22/2019    Basic Metabolic Panel     Standing Status:   Future     Standing Expiration Date:   3/22/2019       No Follow-up on file. Patient given educational materials - see patient instructions. Discussed use, benefit, and side effects of prescribed medications. All patient questions answered. Pt voiced understanding. Reviewed health maintenance. Instructed to continue current medications, diet and exercise. Patient agreed with treatment plan. Follow up as directed.      Electronically signed by Tammy Suresh NP on 3/22/2018

## 2018-03-23 LAB
ESTIMATED AVERAGE GLUCOSE: 134 MG/DL
HBA1C MFR BLD: 6.3 % (ref 4–6)

## 2018-04-04 ENCOUNTER — OFFICE VISIT (OUTPATIENT)
Dept: FAMILY MEDICINE CLINIC | Age: 63
End: 2018-04-04
Payer: COMMERCIAL

## 2018-04-04 VITALS
HEART RATE: 83 BPM | HEIGHT: 68 IN | OXYGEN SATURATION: 97 % | WEIGHT: 189 LBS | TEMPERATURE: 97.7 F | BODY MASS INDEX: 28.64 KG/M2 | RESPIRATION RATE: 20 BRPM | DIASTOLIC BLOOD PRESSURE: 74 MMHG | SYSTOLIC BLOOD PRESSURE: 130 MMHG

## 2018-04-04 DIAGNOSIS — R22.33 NODULE OF FINGER OF BOTH HANDS: ICD-10-CM

## 2018-04-04 DIAGNOSIS — R73.03 PRE-DIABETES: ICD-10-CM

## 2018-04-04 DIAGNOSIS — R49.0 VOICE HOARSENESS: ICD-10-CM

## 2018-04-04 DIAGNOSIS — E03.9 HYPOTHYROIDISM, UNSPECIFIED TYPE: Primary | ICD-10-CM

## 2018-04-04 PROCEDURE — 99214 OFFICE O/P EST MOD 30 MIN: CPT | Performed by: NURSE PRACTITIONER

## 2018-04-04 RX ORDER — FLUTICASONE PROPIONATE 50 MCG
2 SPRAY, SUSPENSION (ML) NASAL DAILY
Qty: 1 BOTTLE | Refills: 11 | Status: SHIPPED | OUTPATIENT
Start: 2018-04-04 | End: 2019-02-19 | Stop reason: ALTCHOICE

## 2018-04-04 RX ORDER — LEVOTHYROXINE SODIUM 25 UG/1
25 CAPSULE ORAL DAILY
Qty: 30 CAPSULE | Refills: 1 | Status: SHIPPED | OUTPATIENT
Start: 2018-04-04 | End: 2019-02-19 | Stop reason: SDUPTHER

## 2018-04-04 RX ORDER — METFORMIN HYDROCHLORIDE 500 MG/1
500 TABLET, EXTENDED RELEASE ORAL
Qty: 60 TABLET | Refills: 11 | Status: SHIPPED | OUTPATIENT
Start: 2018-04-04 | End: 2019-04-16 | Stop reason: SDUPTHER

## 2018-04-04 ASSESSMENT — ENCOUNTER SYMPTOMS
SHORTNESS OF BREATH: 0
VOICE CHANGE: 1
SORE THROAT: 0
COUGH: 0

## 2019-02-19 ENCOUNTER — OFFICE VISIT (OUTPATIENT)
Dept: FAMILY MEDICINE CLINIC | Age: 64
End: 2019-02-19
Payer: COMMERCIAL

## 2019-02-19 VITALS
DIASTOLIC BLOOD PRESSURE: 80 MMHG | HEART RATE: 76 BPM | BODY MASS INDEX: 26.4 KG/M2 | TEMPERATURE: 98.5 F | WEIGHT: 173.6 LBS | OXYGEN SATURATION: 95 % | SYSTOLIC BLOOD PRESSURE: 122 MMHG

## 2019-02-19 DIAGNOSIS — R22.33 NODULE OF FINGER OF BOTH HANDS: Primary | ICD-10-CM

## 2019-02-19 DIAGNOSIS — Z76.0 MEDICATION REFILL: ICD-10-CM

## 2019-02-19 DIAGNOSIS — R73.03 PRE-DIABETES: ICD-10-CM

## 2019-02-19 DIAGNOSIS — E03.9 HYPOTHYROIDISM, UNSPECIFIED TYPE: ICD-10-CM

## 2019-02-19 PROCEDURE — 99214 OFFICE O/P EST MOD 30 MIN: CPT | Performed by: NURSE PRACTITIONER

## 2019-02-19 RX ORDER — LEVOTHYROXINE SODIUM 25 UG/1
25 CAPSULE ORAL DAILY
Qty: 30 CAPSULE | Refills: 1 | Status: SHIPPED | OUTPATIENT
Start: 2019-02-19 | End: 2019-04-20 | Stop reason: SDUPTHER

## 2019-02-19 RX ORDER — BUPROPION HYDROCHLORIDE 300 MG/1
300 TABLET ORAL EVERY MORNING
Qty: 30 TABLET | Refills: 5 | Status: SHIPPED | OUTPATIENT
Start: 2019-02-19 | End: 2019-09-05 | Stop reason: SDUPTHER

## 2019-02-19 RX ORDER — LORATADINE 10 MG/1
10 CAPSULE, LIQUID FILLED ORAL DAILY
Qty: 30 CAPSULE | Refills: 5 | Status: SHIPPED | OUTPATIENT
Start: 2019-02-19 | End: 2019-09-05

## 2019-02-19 RX ORDER — ALBUTEROL SULFATE 90 UG/1
2 AEROSOL, METERED RESPIRATORY (INHALATION) EVERY 4 HOURS PRN
Qty: 1 INHALER | Refills: 3 | Status: SHIPPED | OUTPATIENT
Start: 2019-02-19 | End: 2019-09-05 | Stop reason: SDUPTHER

## 2019-02-19 ASSESSMENT — ENCOUNTER SYMPTOMS
CHEST TIGHTNESS: 0
ABDOMINAL PAIN: 0
COUGH: 0
DIARRHEA: 0
EYE DISCHARGE: 0
RHINORRHEA: 0
SHORTNESS OF BREATH: 0
CONSTIPATION: 0
SINUS PRESSURE: 0
BLOOD IN STOOL: 0

## 2019-02-19 ASSESSMENT — PATIENT HEALTH QUESTIONNAIRE - PHQ9
SUM OF ALL RESPONSES TO PHQ QUESTIONS 1-9: 0
2. FEELING DOWN, DEPRESSED OR HOPELESS: 0
SUM OF ALL RESPONSES TO PHQ9 QUESTIONS 1 & 2: 0
1. LITTLE INTEREST OR PLEASURE IN DOING THINGS: 0
SUM OF ALL RESPONSES TO PHQ QUESTIONS 1-9: 0

## 2019-04-08 ENCOUNTER — HOSPITAL ENCOUNTER (OUTPATIENT)
Age: 64
Setting detail: SPECIMEN
Discharge: HOME | End: 2019-04-08
Payer: COMMERCIAL

## 2019-04-08 DIAGNOSIS — E03.9 HYPOTHYROIDISM, UNSPECIFIED TYPE: ICD-10-CM

## 2019-04-08 DIAGNOSIS — R22.33 NODULE OF FINGER OF BOTH HANDS: ICD-10-CM

## 2019-04-08 DIAGNOSIS — R73.03 PRE-DIABETES: ICD-10-CM

## 2019-04-08 LAB
ESTIMATED AVERAGE GLUCOSE: 120 MG/DL
HBA1C MFR BLD: 5.8 % (ref 4–6)
RHEUMATOID FACTOR: <10 IU/ML
TSH SERPL DL<=0.05 MIU/L-ACNC: 3.31 MIU/L (ref 0.3–5)

## 2019-04-16 ENCOUNTER — OFFICE VISIT (OUTPATIENT)
Dept: FAMILY MEDICINE CLINIC | Age: 64
End: 2019-04-16
Payer: COMMERCIAL

## 2019-04-16 VITALS
BODY MASS INDEX: 27.37 KG/M2 | OXYGEN SATURATION: 97 % | HEIGHT: 68 IN | WEIGHT: 180.6 LBS | DIASTOLIC BLOOD PRESSURE: 74 MMHG | SYSTOLIC BLOOD PRESSURE: 112 MMHG | HEART RATE: 78 BPM

## 2019-04-16 DIAGNOSIS — K59.00 CONSTIPATION, UNSPECIFIED CONSTIPATION TYPE: Primary | ICD-10-CM

## 2019-04-16 DIAGNOSIS — Z76.0 MEDICATION REFILL: ICD-10-CM

## 2019-04-16 DIAGNOSIS — G89.29 CHRONIC BILATERAL LOW BACK PAIN WITH RIGHT-SIDED SCIATICA: ICD-10-CM

## 2019-04-16 DIAGNOSIS — Z72.0 TOBACCO ABUSE: ICD-10-CM

## 2019-04-16 DIAGNOSIS — R73.03 PRE-DIABETES: ICD-10-CM

## 2019-04-16 DIAGNOSIS — M54.41 CHRONIC BILATERAL LOW BACK PAIN WITH RIGHT-SIDED SCIATICA: ICD-10-CM

## 2019-04-16 DIAGNOSIS — R22.33 NODULE OF FINGER OF BOTH HANDS: ICD-10-CM

## 2019-04-16 PROCEDURE — 99214 OFFICE O/P EST MOD 30 MIN: CPT | Performed by: NURSE PRACTITIONER

## 2019-04-16 RX ORDER — METFORMIN HYDROCHLORIDE 500 MG/1
500 TABLET, EXTENDED RELEASE ORAL
Qty: 60 TABLET | Refills: 11 | Status: SHIPPED | OUTPATIENT
Start: 2019-04-16 | End: 2020-06-30

## 2019-04-16 RX ORDER — POLYETHYLENE GLYCOL 3350 17 G/17G
17 POWDER, FOR SOLUTION ORAL EVERY OTHER DAY
Qty: 510 G | Refills: 3 | Status: ON HOLD | OUTPATIENT
Start: 2019-04-16 | End: 2021-06-10 | Stop reason: HOSPADM

## 2019-04-16 ASSESSMENT — ENCOUNTER SYMPTOMS
ABDOMINAL PAIN: 0
SINUS PRESSURE: 0
SHORTNESS OF BREATH: 0
CONSTIPATION: 1
COUGH: 0
DIARRHEA: 0
BLOOD IN STOOL: 0
CHEST TIGHTNESS: 0
EYE DISCHARGE: 0
RHINORRHEA: 0

## 2019-04-16 NOTE — PROGRESS NOTES
[address] [place]YAN  [phone]    Aman Khoury is a 66 y.o. female who presents today for her  medical conditions/complaints as noted below. Aman Khoury is c/o of No chief complaint on file. Karina Greer HPI:     HPI       Wt Readings from Last 3 Encounters:   04/16/19 180 lb 9.6 oz (81.9 kg)   02/19/19 173 lb 9.6 oz (78.7 kg)   04/04/18 189 lb (85.7 kg)       Long time constipation- take metamucil daily. Takes laxatives every couple of weeks. She stopped drinking pop, this did not help. She did have a colonoscopy in sept 2017 that showed some small polyps (removed) and diverticula. She notices brbpr with stools, this is a long term problem. No abd pain. She is drinking more water. Pt would like back checked. Chronic back pain. It is in the lumbar. Hurts when she bends and pushes on it. She has numbness that goes into her right buttocks and leg. If she is standing this increases the sharp pain. No loss of bowel or bladder, no numbness or tingling in groin. She does take aleve or ibuprofen. She states she has to roll out of bed. Once she gets up she is ok. Bumps on her hands that are getting painful.  She has had these for years. Robert Dart hands are painful when she wakes up in the am.she feels they are causing her to lose her strength. Using strength balls helps with the pain. She does get some pins and needles feelings in her fingers but she denies any wrist involvement. The nodules have gotten larger over the years. RF negative. Nursing note reviewed and discussed with patient. Patient's medications, allergies, past medical, surgical, social and family histories were reviewed and updated asappropriate.     Current Outpatient Medications   Medication Sig Dispense Refill    mometasone-formoterol (DULERA) 200-5 MCG/ACT inhaler Inhale 2 puffs into the lungs 2 times daily 18 g 0    metFORMIN (GLUCOPHAGE-XR) 500 MG Years: 40.00     Pack years: 20.00     Types: Cigarettes    Smokeless tobacco: Never Used   Substance Use Topics    Alcohol use: No      No Known Allergies    Subjective:      Review of Systems   Constitutional: Positive for fatigue. Negative for activity change, appetite change, chills and fever. HENT: Negative for congestion, ear pain, rhinorrhea and sinus pressure. Eyes: Negative for discharge and visual disturbance. Respiratory: Negative for cough, chest tightness and shortness of breath. Cardiovascular: Negative for chest pain, palpitations and leg swelling. Gastrointestinal: Positive for constipation. Negative for abdominal pain, blood in stool and diarrhea. Genitourinary: Negative for difficulty urinating and hematuria. Musculoskeletal: Positive for arthralgias (bilateral hands). Negative for myalgias. Skin: Negative for rash. Neurological: Negative for dizziness, light-headedness and headaches. Psychiatric/Behavioral: Negative for self-injury. Other pertinent ROS in HPI  Objective:     /74   Pulse 78   Ht 5' 8\" (1.727 m)   Wt 180 lb 9.6 oz (81.9 kg)   SpO2 97%   BMI 27.46 kg/m²    Physical Exam   Constitutional: She is oriented to person, place, and time. She appears well-developed and well-nourished. No distress. HENT:   Head: Normocephalic and atraumatic. Right Ear: External ear normal.   Left Ear: External ear normal.   Nose: Nose normal.   Mouth/Throat: Oropharynx is clear and moist.   Eyes: Pupils are equal, round, and reactive to light. Conjunctivae and EOM are normal.   Neck: Trachea normal, normal range of motion and full passive range of motion without pain. No thyroid mass present. Cardiovascular: Normal rate, regular rhythm, S1 normal, S2 normal and normal heart sounds. Exam reveals no distant heart sounds and no friction rub. Pulmonary/Chest: Effort normal and breath sounds normal. No accessory muscle usage. No respiratory distress.    Abdominal: Soft. Bowel sounds are normal. She exhibits no distension, no ascites and no mass. Musculoskeletal: Normal range of motion. Pain free ROM     Lymphadenopathy:     She has no cervical adenopathy. Neurological: She is oriented to person, place, and time. Gait is normal.   Skin: Skin is warm and dry. No rash noted. She is not diaphoretic. Psychiatric: She has a normal mood and affect. Her behavior is normal. Judgment and thought content normal.     Assessment/PLAN     1. Constipation, unspecified constipation type  Dietary fiber and water encouraged   - polyethylene glycol (MIRALAX) powder; Take 17 g by mouth every other day  Dispense: 510 g; Refill: 3    2. Medication refill    - mometasone-formoterol (DULERA) 200-5 MCG/ACT inhaler; Inhale 2 puffs into the lungs 2 times daily  Dispense: 18 g; Refill: 0    3. Tobacco abuse      4. Pre-diabetes  Improved  Continue to avoid pop  - metFORMIN (GLUCOPHAGE-XR) 500 MG extended release tablet; Take 1 tablet by mouth 2 times daily (before meals) Take 500 mg a day for 2 weeks, then increase the dose to twice a day. Dispense: 60 tablet; Refill: 11    5. Nodule of finger of both hands    - XR HAND LEFT (MIN 3 VIEWS); Future  - XR HAND RIGHT (MIN 3 VIEWS); Future    6. Chronic bilateral low back pain with right-sided sciatica    - XR LUMBAR SPINE (MIN 4 VIEWS); Future      RTO if symptoms worsen or fail to improve  Pt agreeable with plan      Patient given educational materials - see patientinstructions. Discussed use, benefit, and side effects of prescribed medications. All patient questions answered. Pt voiced understanding. Reviewed health maintenance. Instructed to continue current medications, diet andexercise. 1.  Rosa received counseling on the following healthy behaviors: nutrition, exercise and medication adherence  2. Patient given educationalmaterials when available - see patient instructions when applicable  3.   Discussed use, benefit, and side effects of prescribed medications. Barriersto medication compliance addressed. All patient questions answered. Pt voiced understanding. 4.  Reviewed prior labs and health maintenance  5. Continue current medications, diet and exercise. CompletedRefills   Requested Prescriptions     Signed Prescriptions Disp Refills    mometasone-formoterol (DULERA) 200-5 MCG/ACT inhaler 18 g 0     Sig: Inhale 2 puffs into the lungs 2 times daily    metFORMIN (GLUCOPHAGE-XR) 500 MG extended release tablet 60 tablet 11     Sig: Take 1 tablet by mouth 2 times daily (before meals) Take 500 mg a day for 2 weeks, then increase the dose to twice a day.     polyethylene glycol (MIRALAX) powder 510 g 3     Sig: Take 17 g by mouth every other day    tiotropium (SPIRIVA RESPIMAT) 2.5 MCG/ACT AERS inhaler 1 Inhaler 5     Sig: Inhale 2 puffs into the lungs daily         Electronically signed by Jose Denny CNP on 4/16/2019 at 12:02 PM

## 2019-04-16 NOTE — PROGRESS NOTES
Pt her follow up on labs, thyroid, prediabetes  C/o dry mouth and dizziness    Visit Information    Have you changed or started any medications since your last visit including any over-the-counter medicines, vitamins, or herbal medicines? no   Have you stopped taking any of your medications? Is so, why? -  no  Are you having any side effects from any of your medications? - yes - dry mouth, dizziness    Have you seen any other physician or provider since your last visit?  no   Have you had any other diagnostic tests since your last visit?  no   Have you been seen in the emergency room and/or had an admission in a hospital since we last saw you?  no   Have you had your routine dental cleaning in the past 6 months?  no     Do you have an active MyChart account? If no, what is the barrier?   Yes    Patient Care Team:  KEELY Montgomery CNP as PCP - General (Certified Nurse Practitioner)  KELEY Montgomery CNP as PCP - S Attributed Provider    Medical History Review  Past Medical, Family, and Social History reviewed and does contribute to the patient presenting condition    Health Maintenance   Topic Date Due    Pneumococcal 0-64 years Vaccine (1 of 1 - PPSV23) 09/30/1963    Cervical cancer screen  09/30/1978    DTaP/Tdap/Td vaccine (1 - Tdap) 12/01/2019 (Originally 9/30/1976)    Flu vaccine (Season Ended) 02/19/2020 (Originally 9/1/2019)    Shingles Vaccine (1 of 2) 02/19/2020 (Originally 9/30/2007)    Breast cancer screen  09/01/2019    A1C test (Diabetic or Prediabetic)  04/08/2020    Colon cancer screen colonoscopy  09/20/2020    Lipid screen  08/08/2022    Hepatitis C screen  Completed    HIV screen  Completed

## 2019-04-20 DIAGNOSIS — E03.9 HYPOTHYROIDISM, UNSPECIFIED TYPE: ICD-10-CM

## 2019-04-23 RX ORDER — LEVOTHYROXINE SODIUM 0.03 MG/1
TABLET ORAL
Qty: 30 TABLET | Refills: 5 | Status: SHIPPED | OUTPATIENT
Start: 2019-04-23 | End: 2019-09-05 | Stop reason: SDUPTHER

## 2019-06-15 DIAGNOSIS — Z76.0 MEDICATION REFILL: ICD-10-CM

## 2019-06-17 RX ORDER — MOMETASONE FUROATE AND FORMOTEROL FUMARATE DIHYDRATE 200; 5 UG/1; UG/1
AEROSOL RESPIRATORY (INHALATION)
Qty: 3 INHALER | Refills: 1 | Status: SHIPPED | OUTPATIENT
Start: 2019-06-17 | End: 2019-09-05

## 2019-06-17 NOTE — TELEPHONE ENCOUNTER
Last visit: 4/16/19  Last Med refill: 4/16/19  Does patient have enough medication for 72 hours: Yes    Next Visit Date:  No future appointments.     Health Maintenance   Topic Date Due    Pneumococcal 0-64 years Vaccine (1 of 1 - PPSV23) 09/30/1963    Cervical cancer screen  09/30/1978    DTaP/Tdap/Td vaccine (1 - Tdap) 12/01/2019 (Originally 9/30/1976)    Flu vaccine (Season Ended) 02/19/2020 (Originally 9/1/2019)    Shingles Vaccine (1 of 2) 02/19/2020 (Originally 9/30/2007)    Breast cancer screen  09/01/2019    A1C test (Diabetic or Prediabetic)  04/08/2020    Colon cancer screen colonoscopy  09/20/2020    Lipid screen  08/08/2022    Hepatitis C screen  Completed    HIV screen  Completed       Hemoglobin A1C (%)   Date Value   04/08/2019 5.8   03/22/2018 6.3 (H)             ( goal A1C is < 7)   No results found for: LABMICR  LDL Cholesterol (mg/dL)   Date Value   08/08/2017 187 (H)       (goal LDL is <100)   AST (U/L)   Date Value   10/16/2017 13     ALT (U/L)   Date Value   10/16/2017 13     BUN (mg/dL)   Date Value   03/22/2018 12     BP Readings from Last 3 Encounters:   04/16/19 112/74   02/19/19 122/80   04/04/18 130/74          (goal 120/80)    All Future Testing planned in [facility]  Lab Frequency Next Occurrence   XR LUMBAR SPINE (MIN 4 VIEWS) Once 06/10/2019   XR HAND LEFT (MIN 3 VIEWS) Once 06/30/2019   XR HAND RIGHT (MIN 3 VIEWS) Once 06/10/2019               Patient Active Problem List:     Pneumonia of left lower lobe due to infectious organism (HCC)     Tobacco abuse     Legionella pneumonia (HCC)     Dysphagia     Hyperplastic colon polyp     Esophageal ring     Gastritis     Elbow effusion, right

## 2019-06-25 ENCOUNTER — HOSPITAL ENCOUNTER (OUTPATIENT)
Dept: GENERAL RADIOLOGY | Facility: CLINIC | Age: 64
Discharge: HOME | End: 2019-06-27
Payer: COMMERCIAL

## 2019-06-25 ENCOUNTER — APPOINTMENT (OUTPATIENT)
Dept: GENERAL RADIOLOGY | Facility: CLINIC | Age: 64
End: 2019-06-25
Payer: COMMERCIAL

## 2019-06-25 ENCOUNTER — HOSPITAL ENCOUNTER (OUTPATIENT)
Facility: CLINIC | Age: 64
Discharge: HOME | End: 2019-06-27
Payer: COMMERCIAL

## 2019-06-25 DIAGNOSIS — R22.33 NODULE OF FINGER OF BOTH HANDS: ICD-10-CM

## 2019-06-25 DIAGNOSIS — G89.29 CHRONIC BILATERAL LOW BACK PAIN WITH RIGHT-SIDED SCIATICA: ICD-10-CM

## 2019-06-25 DIAGNOSIS — M54.41 CHRONIC BILATERAL LOW BACK PAIN WITH RIGHT-SIDED SCIATICA: ICD-10-CM

## 2019-06-25 PROCEDURE — 72100 X-RAY EXAM L-S SPINE 2/3 VWS: CPT

## 2019-06-25 PROCEDURE — 73130 X-RAY EXAM OF HAND: CPT

## 2019-09-05 ENCOUNTER — OFFICE VISIT (OUTPATIENT)
Dept: FAMILY MEDICINE CLINIC | Age: 64
End: 2019-09-05
Payer: COMMERCIAL

## 2019-09-05 VITALS
SYSTOLIC BLOOD PRESSURE: 118 MMHG | HEART RATE: 90 BPM | BODY MASS INDEX: 27.43 KG/M2 | WEIGHT: 180.4 LBS | TEMPERATURE: 97.8 F | OXYGEN SATURATION: 95 % | DIASTOLIC BLOOD PRESSURE: 78 MMHG

## 2019-09-05 DIAGNOSIS — Z76.0 MEDICATION REFILL: ICD-10-CM

## 2019-09-05 DIAGNOSIS — R22.33 NODULE OF FINGER OF BOTH HANDS: ICD-10-CM

## 2019-09-05 DIAGNOSIS — R07.89 CHEST PRESSURE: ICD-10-CM

## 2019-09-05 DIAGNOSIS — Z12.31 ENCOUNTER FOR SCREENING MAMMOGRAM FOR BREAST CANCER: ICD-10-CM

## 2019-09-05 DIAGNOSIS — E03.9 HYPOTHYROIDISM, UNSPECIFIED TYPE: Primary | ICD-10-CM

## 2019-09-05 PROCEDURE — 99214 OFFICE O/P EST MOD 30 MIN: CPT | Performed by: NURSE PRACTITIONER

## 2019-09-05 RX ORDER — LEVOTHYROXINE SODIUM 0.03 MG/1
TABLET ORAL
Qty: 45 TABLET | Refills: 5 | Status: SHIPPED | OUTPATIENT
Start: 2019-09-05 | End: 2020-01-03 | Stop reason: SDUPTHER

## 2019-09-05 RX ORDER — ALBUTEROL SULFATE 90 UG/1
2 AEROSOL, METERED RESPIRATORY (INHALATION) EVERY 4 HOURS PRN
Qty: 1 INHALER | Refills: 3 | Status: SHIPPED | OUTPATIENT
Start: 2019-09-05 | End: 2020-02-11 | Stop reason: SDUPTHER

## 2019-09-05 RX ORDER — BUPROPION HYDROCHLORIDE 300 MG/1
300 TABLET ORAL EVERY MORNING
Qty: 30 TABLET | Refills: 5 | Status: SHIPPED | OUTPATIENT
Start: 2019-09-05 | End: 2020-08-19

## 2019-09-05 NOTE — PROGRESS NOTES
Patient is present for med refills    Patient would like to discuss thyroid medication  Patient states she is tired, having hair loss    Pharmacy and medication reviewed with patient    Visit Information    Have you changed or started any medications since your last visit including any over-the-counter medicines, vitamins, or herbal medicines? no   Have you stopped taking any of your medications? Is so, why? -  no  Are you having any side effects from any of your medications? - no    Have you seen any other physician or provider since your last visit?  no   Have you had any other diagnostic tests since your last visit?  no   Have you been seen in the emergency room and/or had an admission in a hospital since we last saw you?  no   Have you had your routine dental cleaning in the past 6 months?  no     Do you have an active MyChart account? If no, what is the barrier?   Yes    Patient Care Team:  KEELY Lopez CNP as PCP - General (Certified Nurse Practitioner)  KEELY Lopez CNP as PCP - [facility]    Medical History Review  Past Medical, Family, and Social History reviewed and does contribute to the patient presenting condition    Health Maintenance   Topic Date Due    Pneumococcal 0-64 years Vaccine (1 of 1 - PPSV23) 09/30/1963    Cervical cancer screen  09/30/1978    Breast cancer screen  09/01/2019    Flu vaccine (1) 02/19/2020 (Originally 9/1/2019)    Shingles Vaccine (1 of 2) 02/19/2020 (Originally 9/30/2007)    A1C test (Diabetic or Prediabetic)  04/08/2020    Colon cancer screen colonoscopy  09/20/2020    Lipid screen  08/08/2022    DTaP/Tdap/Td vaccine (2 - Td) 01/01/2026    Hepatitis C screen  Completed    HIV screen  Completed

## 2019-09-05 NOTE — PROGRESS NOTES
[address] [place]  [phone]    Jana Messer is a 66 y.o. female who presents today for her  medical conditions/complaints as noted below. Jana Messer is c/o of Medication Refill  . HPI:     HPI   Pt states she is fatigued and losing hair. She did feel much better when she first started taking the levothyroxine. Copd- breathing is stable. Chest pressure yesterday when cutting grass. She got light headed. Denies sweating. Sat down for a while and felt better. Pt states it has happened before. This is worse than the feeling that she had when she had the stress test. Did not go to er because it \"went away\". Denies sob. Toe nail fungus for years. Has tried otc. Bumps on her hands that are getting painful.  She has had these for years. [facility] hands are painful when she wakes up in the am.she feels they are causing her to lose her strength. Using strength balls helps with the pain. She does get some pins and needles feelings in her fingers but she denies any wrist involvement. The nodules have gotten larger over the years. RF negative. Wt Readings from Last 3 Encounters:   09/05/19 180 lb 6.4 oz (81.8 kg)   04/16/19 180 lb 9.6 oz (81.9 kg)   02/19/19 173 lb 9.6 oz (78.7 kg)         Nursing note reviewed and discussed with patient. Patient's medications, allergies, past medical, surgical, social and family histories were reviewed and updated asappropriate.     Current Outpatient Medications   Medication Sig Dispense Refill    buPROPion (WELLBUTRIN XL) 300 MG extended release tablet Take 1 tablet by mouth every morning 30 tablet 5    albuterol sulfate  (90 Base) MCG/ACT inhaler Inhale 2 puffs into the lungs every 4 hours as needed for Wheezing or Shortness of Breath 1 Inhaler 3    tiotropium (SPIRIVA RESPIMAT) 2.5 MCG/ACT AERS inhaler Inhale 2 puffs into the lungs daily 1 Inhaler 5    [facility] Digital Screen Bilateral [[id number]]; Future    3. Medication refill    - buPROPion (WELLBUTRIN XL) 300 MG extended release tablet; Take 1 tablet by mouth every morning  Dispense: 30 tablet; Refill: 5  - albuterol sulfate  (90 Base) MCG/ACT inhaler; Inhale 2 puffs into the lungs every 4 hours as needed for Wheezing or Shortness of Breath  Dispense: 1 Inhaler; Refill: 3    4. Chest pressure  ekg wnl  Referral to cardio   - EKG 12 lead; Future  - AFL - Yao Cervantes MD, Cardiology, [facility]    5. Nodule of finger of both hands    - US UNLISTED PROCEDURE DIAG; Future      RTO if symptoms worsen or fail to improve  Pt agreeable with plan      Patient given educational materials - see patientinstructions. Discussed use, benefit, and side effects of prescribed medications. All patient questions answered. Pt voiced understanding. Reviewed health maintenance. Instructed to continue current medications, diet andexercise. 1.  Rosa received counseling on the following healthy behaviors: nutrition, exercise and medication adherence  2. Patient given educationalmaterials when available - see patient instructions when applicable  3. Discussed use, benefit, and side effects of prescribed medications. Barriersto medication compliance addressed. All patient questions answered. Pt voiced understanding. 4.  Reviewed prior labs and health maintenance  5. Continue current medications, diet and exercise.     CompletedRefills   Requested Prescriptions     Signed Prescriptions Disp Refills    buPROPion (WELLBUTRIN XL) 300 MG extended release tablet 30 tablet 5     Sig: Take 1 tablet by mouth every morning    albuterol sulfate  (90 Base) MCG/ACT inhaler 1 Inhaler 3     Sig: Inhale 2 puffs into the lungs every 4 hours as needed for Wheezing or Shortness of Breath    tiotropium (SPIRIVA RESPIMAT) 2.5 MCG/ACT AERS inhaler 1 Inhaler 5     Sig: Inhale 2 puffs into the lungs daily    levothyroxine (SYNTHROID)

## 2019-09-11 ENCOUNTER — TELEPHONE (OUTPATIENT)
Dept: FAMILY MEDICINE CLINIC | Age: 64
End: 2019-09-11

## 2019-09-11 DIAGNOSIS — R07.89 CHEST PRESSURE: ICD-10-CM

## 2019-09-11 DIAGNOSIS — R22.33 NODULE OF FINGER OF BOTH HANDS: Primary | ICD-10-CM

## 2019-09-11 PROCEDURE — 93000 ELECTROCARDIOGRAM COMPLETE: CPT | Performed by: NURSE PRACTITIONER

## 2019-09-15 ASSESSMENT — ENCOUNTER SYMPTOMS
EYE DISCHARGE: 0
DIARRHEA: 0
COUGH: 0
BLOOD IN STOOL: 0
CONSTIPATION: 0
ABDOMINAL PAIN: 0
RHINORRHEA: 0
SHORTNESS OF BREATH: 0
CHEST TIGHTNESS: 0
SINUS PRESSURE: 0

## 2019-10-02 ENCOUNTER — OFFICE VISIT (OUTPATIENT)
Dept: FAMILY MEDICINE CLINIC | Age: 64
End: 2019-10-02
Payer: COMMERCIAL

## 2019-10-02 VITALS
TEMPERATURE: 97.8 F | OXYGEN SATURATION: 95 % | DIASTOLIC BLOOD PRESSURE: 80 MMHG | BODY MASS INDEX: 27.28 KG/M2 | WEIGHT: 179.4 LBS | HEART RATE: 81 BPM | SYSTOLIC BLOOD PRESSURE: 130 MMHG

## 2019-10-02 DIAGNOSIS — J44.9 CHRONIC OBSTRUCTIVE PULMONARY DISEASE, UNSPECIFIED COPD TYPE (HCC): ICD-10-CM

## 2019-10-02 DIAGNOSIS — R06.2 WHEEZE: Primary | ICD-10-CM

## 2019-10-02 DIAGNOSIS — R05.9 COUGH: ICD-10-CM

## 2019-10-02 PROCEDURE — 99213 OFFICE O/P EST LOW 20 MIN: CPT | Performed by: NURSE PRACTITIONER

## 2019-10-02 RX ORDER — METHYLPREDNISOLONE 4 MG/1
TABLET ORAL
Qty: 1 KIT | Refills: 0 | Status: SHIPPED | OUTPATIENT
Start: 2019-10-02 | End: 2019-10-08

## 2019-10-02 RX ORDER — BUDESONIDE AND FORMOTEROL FUMARATE DIHYDRATE 160; 4.5 UG/1; UG/1
2 AEROSOL RESPIRATORY (INHALATION) 2 TIMES DAILY
Qty: 3 INHALER | Refills: 1 | Status: SHIPPED | OUTPATIENT
Start: 2019-10-02 | End: 2020-02-11 | Stop reason: SDUPTHER

## 2019-10-02 ASSESSMENT — ENCOUNTER SYMPTOMS
SHORTNESS OF BREATH: 1
WHEEZING: 1
COUGH: 1
RHINORRHEA: 0

## 2019-10-22 ENCOUNTER — OFFICE VISIT (OUTPATIENT)
Dept: FAMILY MEDICINE CLINIC | Age: 64
End: 2019-10-22
Payer: COMMERCIAL

## 2019-10-22 VITALS
BODY MASS INDEX: 27.28 KG/M2 | RESPIRATION RATE: 18 BRPM | WEIGHT: 180 LBS | OXYGEN SATURATION: 99 % | HEIGHT: 68 IN | SYSTOLIC BLOOD PRESSURE: 136 MMHG | TEMPERATURE: 98.8 F | DIASTOLIC BLOOD PRESSURE: 78 MMHG | HEART RATE: 89 BPM

## 2019-10-22 DIAGNOSIS — R05.9 COUGH: Primary | ICD-10-CM

## 2019-10-22 DIAGNOSIS — Z87.09 HISTORY OF COPD: ICD-10-CM

## 2019-10-22 PROCEDURE — 99202 OFFICE O/P NEW SF 15 MIN: CPT | Performed by: NURSE PRACTITIONER

## 2019-10-22 RX ORDER — GUAIFENESIN AND CODEINE PHOSPHATE 100; 10 MG/5ML; MG/5ML
5 SOLUTION ORAL EVERY 6 HOURS PRN
Qty: 100 ML | Refills: 0 | Status: SHIPPED | OUTPATIENT
Start: 2019-10-22 | End: 2019-10-25

## 2019-10-22 RX ORDER — AZITHROMYCIN 250 MG/1
250 TABLET, FILM COATED ORAL SEE ADMIN INSTRUCTIONS
Qty: 6 TABLET | Refills: 0 | Status: SHIPPED | OUTPATIENT
Start: 2019-10-22 | End: 2019-10-27

## 2019-10-22 RX ORDER — BENZONATATE 200 MG/1
200 CAPSULE ORAL 3 TIMES DAILY PRN
Qty: 20 CAPSULE | Refills: 0 | Status: SHIPPED | OUTPATIENT
Start: 2019-10-22 | End: 2019-10-29

## 2019-10-22 ASSESSMENT — PATIENT HEALTH QUESTIONNAIRE - PHQ9
SUM OF ALL RESPONSES TO PHQ QUESTIONS 1-9: 0
SUM OF ALL RESPONSES TO PHQ9 QUESTIONS 1 & 2: 0
1. LITTLE INTEREST OR PLEASURE IN DOING THINGS: 0
SUM OF ALL RESPONSES TO PHQ QUESTIONS 1-9: 0
2. FEELING DOWN, DEPRESSED OR HOPELESS: 0

## 2019-10-22 ASSESSMENT — ENCOUNTER SYMPTOMS
COUGH: 1
WHEEZING: 1
SHORTNESS OF BREATH: 0
SORE THROAT: 0
SINUS PRESSURE: 0

## 2020-01-03 ENCOUNTER — TELEPHONE (OUTPATIENT)
Dept: FAMILY MEDICINE CLINIC | Age: 65
End: 2020-01-03

## 2020-01-03 RX ORDER — LEVOTHYROXINE SODIUM 0.03 MG/1
TABLET ORAL
Qty: 135 TABLET | Refills: 2 | Status: SHIPPED | OUTPATIENT
Start: 2020-01-03 | End: 2020-03-24 | Stop reason: SDUPTHER

## 2020-02-12 RX ORDER — BUDESONIDE AND FORMOTEROL FUMARATE DIHYDRATE 160; 4.5 UG/1; UG/1
2 AEROSOL RESPIRATORY (INHALATION) 2 TIMES DAILY
Qty: 1 INHALER | Refills: 5 | Status: SHIPPED | OUTPATIENT
Start: 2020-02-12 | End: 2020-03-24 | Stop reason: SDUPTHER

## 2020-02-12 RX ORDER — ALBUTEROL SULFATE 90 UG/1
2 AEROSOL, METERED RESPIRATORY (INHALATION) EVERY 4 HOURS PRN
Qty: 1 INHALER | Refills: 5 | Status: SHIPPED | OUTPATIENT
Start: 2020-02-12 | End: 2020-03-24

## 2020-03-24 RX ORDER — BUDESONIDE AND FORMOTEROL FUMARATE DIHYDRATE 160; 4.5 UG/1; UG/1
2 AEROSOL RESPIRATORY (INHALATION) 2 TIMES DAILY
Qty: 1 INHALER | Refills: 5 | Status: SHIPPED | OUTPATIENT
Start: 2020-03-24 | End: 2020-08-19 | Stop reason: SDUPTHER

## 2020-03-24 RX ORDER — LEVOTHYROXINE SODIUM 0.03 MG/1
TABLET ORAL
Qty: 135 TABLET | Refills: 1 | Status: SHIPPED | OUTPATIENT
Start: 2020-03-24 | End: 2020-07-10

## 2020-06-30 RX ORDER — METFORMIN HYDROCHLORIDE 500 MG/1
TABLET, EXTENDED RELEASE ORAL
Qty: 60 TABLET | Refills: 0 | Status: SHIPPED | OUTPATIENT
Start: 2020-06-30 | End: 2020-08-19 | Stop reason: SDUPTHER

## 2020-06-30 RX ORDER — LEVOTHYROXINE SODIUM 0.03 MG/1
TABLET ORAL
Qty: 135 TABLET | Refills: 0 | OUTPATIENT
Start: 2020-06-30

## 2020-07-10 RX ORDER — LEVOTHYROXINE SODIUM 0.03 MG/1
TABLET ORAL
Qty: 135 TABLET | Refills: 0 | Status: SHIPPED | OUTPATIENT
Start: 2020-07-10 | End: 2020-12-28

## 2020-07-10 NOTE — TELEPHONE ENCOUNTER
Last visit: 10/02/2019  Last Med refill: 6/24/2020  Does patient have enough medication for 72 hours: Yes    Next Visit Date:  No future appointments.     Health Maintenance   Topic Date Due    Cervical cancer screen  09/30/1978    Shingles Vaccine (1 of 2) 09/30/2007    Breast cancer screen  09/01/2019    A1C test (Diabetic or Prediabetic)  04/08/2020    Flu vaccine (1) 09/01/2020    Colon cancer screen colonoscopy  09/20/2020    Lipid screen  08/08/2022    DTaP/Tdap/Td vaccine (3 - Td) 01/01/2026    Pneumococcal 0-64 years Vaccine  Completed    Hepatitis C screen  Completed    HIV screen  Completed    Hepatitis A vaccine  Aged Out    Hepatitis B vaccine  Aged Out    Hib vaccine  Aged Out    Meningococcal (ACWY) vaccine  Aged Out       Hemoglobin A1C (%)   Date Value   04/08/2019 5.8   03/22/2018 6.3 (H)             ( goal A1C is < 7)   No results found for: LABMICR  LDL Cholesterol (mg/dL)   Date Value   08/08/2017 187 (H)       (goal LDL is <100)   AST (U/L)   Date Value   10/16/2017 13     ALT (U/L)   Date Value   10/16/2017 13     BUN (mg/dL)   Date Value   03/22/2018 12     BP Readings from Last 3 Encounters:   10/22/19 136/78   10/02/19 130/80   09/05/19 118/78          (goal 120/80)    All Future Testing planned in [facility]  Lab Frequency Next Occurrence   [facility] Digital Screen Bilateral [[id number]] Once 07/15/2020               Patient Active Problem List:     Pneumonia of left lower lobe due to infectious organism (HCC)     Tobacco abuse     Legionella pneumonia ([facility] [address]     Dysphagia     Hyperplastic colon polyp     Esophageal ring     Gastritis     Elbow effusion, right

## 2020-07-10 NOTE — TELEPHONE ENCOUNTER
I left a detailed message letting Lauraine Lefort know she needs to get labs done to check TSH levels. I advised her to go to a [facility] lab where the orders are in the system to have drawn.

## 2020-08-05 ENCOUNTER — TELEPHONE (OUTPATIENT)
Dept: FAMILY MEDICINE CLINIC | Age: 65
End: 2020-08-05

## 2020-08-05 ENCOUNTER — HOSPITAL ENCOUNTER (OUTPATIENT)
Age: 65
Setting detail: SPECIMEN
Discharge: HOME | End: 2020-08-05
Payer: COMMERCIAL

## 2020-08-05 LAB
ANION GAP SERPL CALCULATED.3IONS-SCNC: 15 MMOL/L (ref 9–17)
BUN BLDV-MCNC: 10 MG/DL (ref 8–23)
BUN/CREAT BLD: NORMAL (ref 9–20)
CALCIUM SERPL-MCNC: 9.6 MG/DL (ref 8.6–10.4)
CHLORIDE BLD-SCNC: 104 MMOL/L (ref 98–107)
CHOLESTEROL, FASTING: 237 MG/DL
CHOLESTEROL/HDL RATIO: 6.8
CO2: 24 MMOL/L (ref 20–31)
CREAT SERPL-MCNC: 0.6 MG/DL (ref 0.5–0.9)
GFR AFRICAN AMERICAN: >60 ML/MIN
GFR NON-AFRICAN AMERICAN: >60 ML/MIN
GFR SERPL CREATININE-BSD FRML MDRD: NORMAL ML/MIN/{1.73_M2}
GFR SERPL CREATININE-BSD FRML MDRD: NORMAL ML/MIN/{1.73_M2}
GLUCOSE BLD-MCNC: 92 MG/DL (ref 70–99)
HCT VFR BLD CALC: 46.2 % (ref 36.3–47.1)
HDLC SERPL-MCNC: 35 MG/DL
HEMOGLOBIN: 14.7 G/DL (ref 11.9–15.1)
LDL CHOLESTEROL: 161 MG/DL (ref 0–130)
MCH RBC QN AUTO: 29.7 PG (ref 25.2–33.5)
MCHC RBC AUTO-ENTMCNC: 31.8 G/DL (ref 28.4–34.8)
MCV RBC AUTO: 93.3 FL (ref 82.6–102.9)
NRBC AUTOMATED: 0 PER 100 WBC
PDW BLD-RTO: 13.8 % (ref 11.8–14.4)
PLATELET # BLD: 372 K/UL (ref 138–453)
PMV BLD AUTO: 9.6 FL (ref 8.1–13.5)
POTASSIUM SERPL-SCNC: 4.4 MMOL/L (ref 3.7–5.3)
RBC # BLD: 4.95 M/UL (ref 3.95–5.11)
SODIUM BLD-SCNC: 143 MMOL/L (ref 135–144)
TRIGLYCERIDE, FASTING: 207 MG/DL
TSH SERPL DL<=0.05 MIU/L-ACNC: 2.82 MIU/L (ref 0.3–5)
VLDLC SERPL CALC-MCNC: ABNORMAL MG/DL (ref 1–30)
WBC # BLD: 12 K/UL (ref 3.5–11.3)

## 2020-08-05 NOTE — TELEPHONE ENCOUNTER
Patient stopped in office for labs. States her thyroid lab is normally WITH Reflex, however the currently order is without.

## 2020-08-12 RX ORDER — ATORVASTATIN CALCIUM 20 MG/1
20 TABLET, FILM COATED ORAL DAILY
Qty: 90 TABLET | Refills: 1 | Status: ON HOLD
Start: 2020-08-12 | End: 2021-05-08 | Stop reason: HOSPADM

## 2020-08-19 ENCOUNTER — OFFICE VISIT (OUTPATIENT)
Dept: FAMILY MEDICINE CLINIC | Age: 65
End: 2020-08-19
Payer: COMMERCIAL

## 2020-08-19 VITALS
TEMPERATURE: 97.9 F | WEIGHT: 197.8 LBS | SYSTOLIC BLOOD PRESSURE: 110 MMHG | HEART RATE: 83 BPM | BODY MASS INDEX: 30.08 KG/M2 | DIASTOLIC BLOOD PRESSURE: 80 MMHG | OXYGEN SATURATION: 95 %

## 2020-08-19 LAB — HBA1C MFR BLD: 6.3 %

## 2020-08-19 PROCEDURE — 99214 OFFICE O/P EST MOD 30 MIN: CPT | Performed by: NURSE PRACTITIONER

## 2020-08-19 PROCEDURE — 83036 HEMOGLOBIN GLYCOSYLATED A1C: CPT | Performed by: NURSE PRACTITIONER

## 2020-08-19 RX ORDER — METFORMIN HYDROCHLORIDE 500 MG/1
TABLET, EXTENDED RELEASE ORAL
Qty: 60 TABLET | Refills: 5 | Status: ON HOLD
Start: 2020-08-19 | End: 2021-06-10 | Stop reason: HOSPADM

## 2020-08-19 RX ORDER — BUDESONIDE AND FORMOTEROL FUMARATE DIHYDRATE 160; 4.5 UG/1; UG/1
2 AEROSOL RESPIRATORY (INHALATION) 2 TIMES DAILY
Qty: 1 INHALER | Refills: 5 | Status: ON HOLD
Start: 2020-08-19 | End: 2021-06-10 | Stop reason: HOSPADM

## 2020-08-19 RX ORDER — ALBUTEROL SULFATE 90 UG/1
AEROSOL, METERED RESPIRATORY (INHALATION)
Qty: 18 G | Refills: 5 | Status: ON HOLD
Start: 2020-08-19 | End: 2021-06-10 | Stop reason: HOSPADM

## 2020-08-19 RX ORDER — MELOXICAM 7.5 MG/1
7.5 TABLET ORAL DAILY
Qty: 30 TABLET | Refills: 3 | Status: ON HOLD
Start: 2020-08-19 | End: 2021-06-10 | Stop reason: HOSPADM

## 2020-08-19 ASSESSMENT — PATIENT HEALTH QUESTIONNAIRE - PHQ9
SUM OF ALL RESPONSES TO PHQ9 QUESTIONS 1 & 2: 0
2. FEELING DOWN, DEPRESSED OR HOPELESS: 0
SUM OF ALL RESPONSES TO PHQ QUESTIONS 1-9: 0
SUM OF ALL RESPONSES TO PHQ QUESTIONS 1-9: 0
1. LITTLE INTEREST OR PLEASURE IN DOING THINGS: 0

## 2020-08-19 ASSESSMENT — ENCOUNTER SYMPTOMS
SHORTNESS OF BREATH: 0
COUGH: 0

## 2020-08-19 NOTE — PROGRESS NOTES
Patient is present for check up    Patient states she has an ache in her hands and knees, states it is her arthritis  States she has been taking otc meds but states they are not helping

## 2020-08-19 NOTE — PROGRESS NOTES
[address] [place]  [phone]    Shannan Cooper is a 60 y.o. female who presents today for her  medical conditions/complaints as noted below. Shannan Cooper is c/o of Check-Up  . HPI:     HPI     COPD:  Current treatment includes short-acting beta agonist inhaler, combined beta agonist/steroid inhaler. spiriva. Residual symptoms: chronic dyspnea. She denies increased dyspnea, decreased exercise tolerance, cough, purulent sputum. She requires her rescue inhaler varies time(s) per day. Reports her breathing has been bad with the humidity. Uses katie a lot more regularly when it is humid. Hands are achey. This is a chronic problem. otc helps some. She would like to try another medication for this problem. a1c is 6.3. Reminded about colon due next month    Nursing note reviewed and discussed with patient. Patient's medications, allergies, past medical, surgical, social and family histories were reviewed and updated asappropriate. Current Outpatient Medications   Medication Sig Dispense Refill    albuterol sulfate HFA (VENTOLIN HFA) 108 (90 Base) MCG/ACT inhaler INHALE TWO PUFFS BY MOUTH EVERY 4 HOURS AS NEEDED FOR WHEEZING OR SHORTNESS OF BREATH 18 g 5    budesonide-formoterol (SYMBICORT) 160-4.5 MCG/ACT AERO Inhale 2 puffs into the lungs 2 times daily 1 Inhaler 5    tiotropium (SPIRIVA RESPIMAT) 2.5 MCG/ACT AERS inhaler Inhale 2 puffs into the lungs daily 1 Inhaler 5    meloxicam (MOBIC) 7.5 MG tablet Take 1 tablet by mouth daily 30 tablet 3    metFORMIN (GLUCOPHAGE-XR) 500 MG extended release tablet TAKE ONE TABLET BY MOUTH TWICE A DAY BEFORE MEALS 60 tablet 5    atorvastatin (LIPITOR) 20 MG tablet Take 1 tablet by mouth daily 90 tablet 1    levothyroxine (SYNTHROID) 25 MCG tablet TAKE ONE AND ONE-HALF TABLET BY MOUTH DAILY 135 tablet 0    ciclopirox (PENLAC) 8 % solution Apply topically nightly.  1 Bottle 2  polyethylene glycol (MIRALAX) powder Take 17 g by mouth every other day 510 g 3     No current facility-administered medications for this visit. Past Medical History:   Diagnosis Date    Asthma     COPD (chronic obstructive pulmonary disease) (HCC)     Esophageal ring     Gastritis     Hyperplastic colon polyp     Osteoarthritis       Past Surgical History:   Procedure Laterality Date    APPENDECTOMY      COLONOSCOPY  09/20/2017    FRAGMENTS OF HYPERPLASTIC POLYP    ENDOSCOPY, COLON, DIAGNOSTIC      ESOPHAGEAL DILATATION      HYSTERECTOMY      RI COLSC FLX W/RMVL OF TUMOR POLYP LESION SNARE TQ N/A 9/20/2017    COLONOSCOPY POLYPECTOMY COLD BIOPSY performed by Nicole Gastelum MD at [facility] EGD TRANSORAL BIOPSY SINGLE/MULTIPLE N/A 9/20/2017    EGD BIOPSY with esophageal dilitation performed by Nicole Gastelum MD at [facility]  09/2017    E-ring and gastritis      Family History   Problem Relation Age of Onset    Other Mother     Diabetes Mother     Prostate Cancer Father      Social History     Tobacco Use    Smoking status: Current Every Day Smoker     Packs/day: 0.50     Years: 40.00     Pack years: 20.00     Types: Cigarettes    Smokeless tobacco: Never Used   Substance Use Topics    Alcohol use: No      No Known Allergies    Subjective:      Review of Systems   Constitutional: Negative for chills and fever. Respiratory: Negative for cough and shortness of breath. Cardiovascular: Negative for chest pain, palpitations and leg swelling. Other pertinent ROS in HPI  Objective:     /80 (Site: Left Upper Arm, Position: Sitting, Cuff Size: Large Adult)   Pulse 83   Temp 97.9 °F (36.6 °C)   Wt 197 lb 12.8 oz (89.7 kg)   SpO2 95%   BMI 30.08 kg/m²    Physical Exam  Constitutional:       Appearance: She is well-developed.    HENT:      Right Ear: External ear normal.      Left Ear: External ear normal.      Nose: Nose normal.   Neck: Musculoskeletal: Full passive range of motion without pain and normal range of motion. Cardiovascular:      Rate and Rhythm: Normal rate and regular rhythm. Heart sounds: Normal heart sounds, S1 normal and S2 normal.   Pulmonary:      Effort: Pulmonary effort is normal. No respiratory distress. Breath sounds: Normal breath sounds. Musculoskeletal: Normal range of motion. General: No deformity. Skin:     General: Skin is warm and dry. Neurological:      Mental Status: She is alert and oriented to person, place, and time. Lab Review   Hospital Outpatient Visit on 08/05/2020   Component Date Value    WBC 08/05/2020 12.0*    RBC 08/05/2020 4.95     Hemoglobin 08/05/2020 14.7     Hematocrit 08/05/2020 46.2     MCV 08/05/2020 93.3     MCH 08/05/2020 29.7     MCHC 08/05/2020 31.8     RDW 08/05/2020 13.8     Platelets 37/83/6212 372     MPV 08/05/2020 9.6     NRBC Automated 08/05/2020 0.0     TSH 08/05/2020 2.82     Glucose 08/05/2020 92     BUN 08/05/2020 10     CREATININE 08/05/2020 0.60     Bun/Cre Ratio 08/05/2020 NOT REPORTED     Calcium 08/05/2020 9.6     Sodium 08/05/2020 143     Potassium 08/05/2020 4.4     Chloride 08/05/2020 104     CO2 08/05/2020 24     Anion Gap 08/05/2020 15     GFR Non- 08/05/2020 >60     GFR  08/05/2020 >60     GFR Comment 08/05/2020          GFR Staging 08/05/2020 NOT REPORTED     Cholesterol, Fasting 08/05/2020 237*    HDL 08/05/2020 35*    LDL Cholesterol 08/05/2020 161*    Chol/HDL Ratio 08/05/2020 6.8*    Triglyceride, Fasting 08/05/2020 207*    VLDL 08/05/2020 NOT REPORTED*     Assessment/PLAN   1. Chronic obstructive pulmonary disease, unspecified COPD type ([facility] [address]  Smoking cessation discussed, not interested at this time.    - albuterol sulfate HFA (VENTOLIN HFA) 108 (90 Base) MCG/ACT inhaler; INHALE TWO PUFFS BY MOUTH EVERY 4 HOURS AS NEEDED FOR WHEEZING OR SHORTNESS OF BREATH  Dispense: 18 g; Refill: 5  - budesonide-formoterol (SYMBICORT) 160-4.5 MCG/ACT AERO; Inhale 2 puffs into the lungs 2 times daily  Dispense: 1 Inhaler; Refill: 5  - tiotropium (SPIRIVA RESPIMAT) 2.5 MCG/ACT AERS inhaler; Inhale 2 puffs into the lungs daily  Dispense: 1 Inhaler; Refill: 5    2. Encounter for screening mammogram for breast cancer    - [facility] DIGITAL SCREEN W OR WO CAD BILATERAL; Future    3. Medication refill    - albuterol sulfate HFA (VENTOLIN HFA) 108 (90 Base) MCG/ACT inhaler; INHALE TWO PUFFS BY MOUTH EVERY 4 HOURS AS NEEDED FOR WHEEZING OR SHORTNESS OF BREATH  Dispense: 18 g; Refill: 5    4. Pre-diabetes    - POCT glycosylated hemoglobin (Hb A1C)  - metFORMIN (GLUCOPHAGE-XR) 500 MG extended release tablet; TAKE ONE TABLET BY MOUTH TWICE A DAY BEFORE MEALS  Dispense: 60 tablet; Refill: 5    5. Bilateral hand pain    - meloxicam (MOBIC) 7.5 MG tablet; Take 1 tablet by mouth daily  Dispense: 30 tablet; Refill: 3      RTO if symptoms worsen or fail to improve  Pt agreeable with plan      Patient given educational materials - see patientinstructions. Discussed use, benefit, and side effects of prescribed medications. All patient questions answered. Pt voiced understanding. Reviewed health maintenance. Instructed to continue current medications, diet andexercise. 1.  Rosa received counseling on the following healthy behaviors: nutrition, exercise and medication adherence  2. Patient given educationalmaterials when available - see patient instructions when applicable  3. Discussed use, benefit, and side effects of prescribed medications. Barriersto medication compliance addressed. All patient questions answered. Pt voiced understanding. 4.  Reviewed prior labs and health maintenance when applicable. 5.  Continue current medications, diet and exercise.     CompletedRefills   Requested Prescriptions     Signed Prescriptions Disp Refills    albuterol sulfate HFA (VENTOLIN HFA) 108 (90 Base) MCG/ACT inhaler 18 g 5     Sig: INHALE TWO PUFFS BY MOUTH EVERY 4 HOURS AS NEEDED FOR WHEEZING OR SHORTNESS OF BREATH    budesonide-formoterol (SYMBICORT) 160-4.5 MCG/ACT AERO 1 Inhaler 5     Sig: Inhale 2 puffs into the lungs 2 times daily    tiotropium (SPIRIVA RESPIMAT) 2.5 MCG/ACT AERS inhaler 1 Inhaler 5     Sig: Inhale 2 puffs into the lungs daily    meloxicam (MOBIC) 7.5 MG tablet 30 tablet 3     Sig: Take 1 tablet by mouth daily    metFORMIN (GLUCOPHAGE-XR) 500 MG extended release tablet 60 tablet 5     Sig: TAKE ONE TABLET BY MOUTH TWICE A DAY BEFORE MEALS       This note was transcribed using dictation with Dragon services. Efforts were made to correct any errors but some words may be misinterpreted.     Electronically signed by Cori Egan CNP on 8/19/2020 at 9:16 AM

## 2020-11-24 ENCOUNTER — TELEPHONE (OUTPATIENT)
Dept: FAMILY MEDICINE CLINIC | Age: 65
End: 2020-11-24

## 2020-12-28 NOTE — TELEPHONE ENCOUNTER
Last visit: 8/19/20  Last Med refill: 7/10/20  Does patient have enough medication for 72 hours: No:     Next Visit Date:  No future appointments.     Health Maintenance   Topic Date Due    Cervical cancer screen  09/30/1978    Breast cancer screen  09/01/2019    Flu vaccine (1) 09/01/2020    Colon cancer screen colonoscopy  09/20/2020    Shingles Vaccine (1 of 2) 08/19/2021 (Originally 9/30/2007)    Lipid screen  08/05/2021    A1C test (Diabetic or Prediabetic)  08/19/2021    DTaP/Tdap/Td vaccine (3 - Td) 01/01/2026    Pneumococcal 0-64 years Vaccine  Completed    Hepatitis C screen  Completed    HIV screen  Completed    Hepatitis A vaccine  Aged Out    Hepatitis B vaccine  Aged Out    Hib vaccine  Aged Out    Meningococcal (ACWY) vaccine  Aged Out       Hemoglobin A1C (%)   Date Value   08/19/2020 6.3   04/08/2019 5.8   03/22/2018 6.3 (H)             ( goal A1C is < 7)   No results found for: LABMICR  LDL Cholesterol (mg/dL)   Date Value   08/05/2020 161 (H)   08/08/2017 187 (H)       (goal LDL is <100)   AST (U/L)   Date Value   10/16/2017 13     ALT (U/L)   Date Value   10/16/2017 13     BUN (mg/dL)   Date Value   08/05/2020 10     BP Readings from Last 3 Encounters:   08/19/20 110/80   10/22/19 136/78   10/02/19 130/80          (goal 120/80)    All Future Testing planned in [facility]  Lab Frequency Next Occurrence   [facility] DIGITAL SCREEN W OR WO CAD BILATERAL Once 12/24/2020               Patient Active Problem List:     Pneumonia of left lower lobe due to infectious organism     Tobacco abuse     Legionella pneumonia ([facility] [address]     Dysphagia     Hyperplastic colon polyp     Esophageal ring     Gastritis     Elbow effusion, right

## 2020-12-29 RX ORDER — LEVOTHYROXINE SODIUM 0.03 MG/1
TABLET ORAL
Qty: 135 TABLET | Refills: 1 | Status: ON HOLD
Start: 2020-12-29 | End: 2021-06-10 | Stop reason: HOSPADM

## 2021-05-04 ENCOUNTER — APPOINTMENT (OUTPATIENT)
Dept: INTERVENTIONAL RADIOLOGY/VASCULAR | Age: 66
DRG: 024 | End: 2021-05-04
Payer: COMMERCIAL

## 2021-05-04 ENCOUNTER — APPOINTMENT (OUTPATIENT)
Dept: CT IMAGING | Age: 66
DRG: 024 | End: 2021-05-04
Payer: COMMERCIAL

## 2021-05-04 ENCOUNTER — HOSPITAL ENCOUNTER (OUTPATIENT)
Age: 66
Setting detail: SPECIMEN
Discharge: HOME | End: 2021-05-04

## 2021-05-04 ENCOUNTER — HOSPITAL ENCOUNTER (INPATIENT)
Age: 66
LOS: 6 days | Discharge: NURSING, REHAB OR LONG-TERM CARE | DRG: 024 | End: 2021-05-10
Attending: EMERGENCY MEDICINE | Admitting: PSYCHIATRY & NEUROLOGY
Payer: COMMERCIAL

## 2021-05-04 ENCOUNTER — ANESTHESIA (OUTPATIENT)
Dept: INTERVENTIONAL RADIOLOGY/VASCULAR | Age: 66
DRG: 024 | End: 2021-05-04
Payer: COMMERCIAL

## 2021-05-04 ENCOUNTER — ANESTHESIA EVENT (OUTPATIENT)
Dept: INTERVENTIONAL RADIOLOGY/VASCULAR | Age: 66
DRG: 024 | End: 2021-05-04
Payer: COMMERCIAL

## 2021-05-04 DIAGNOSIS — G89.29 OTHER CHRONIC PAIN: ICD-10-CM

## 2021-05-04 DIAGNOSIS — I63.511 CEREBROVASCULAR ACCIDENT (CVA) DUE TO OCCLUSION OF RIGHT MIDDLE CEREBRAL ARTERY (HCC): Primary | ICD-10-CM

## 2021-05-04 LAB
% CKMB: 1.9 % (ref 0–3)
% CKMB: ABNORMAL %
ABSOLUTE EOS #: 0.16 K/UL (ref 0–0.44)
ABSOLUTE IMMATURE GRANULOCYTE: 0.13 K/UL (ref 0–0.3)
ABSOLUTE LYMPH #: 2.26 K/UL (ref 1.1–3.7)
ABSOLUTE MONO #: 1.09 K/UL (ref 0.1–1.2)
ALLEN TEST: ABNORMAL
ANION GAP SERPL CALCULATED.3IONS-SCNC: 13 MMOL/L (ref 9–17)
ANION GAP SERPL CALCULATED.3IONS-SCNC: ABNORMAL MMOL/L
ANION GAP: 11 MMOL/L (ref 7–16)
BASOPHILS # BLD: 0 % (ref 0–2)
BASOPHILS ABSOLUTE: 0.04 K/UL (ref 0–0.2)
BUN BLDV-MCNC: 19 MG/DL (ref 8–23)
BUN BLDV-MCNC: ABNORMAL MG/DL
BUN/CREAT BLD: ABNORMAL (ref 9–20)
BUN/CREAT BLD: ABNORMAL (ref 9–20)
CALCIUM SERPL-MCNC: 8.6 MG/DL (ref 8.6–10.4)
CALCIUM SERPL-MCNC: ABNORMAL MG/DL
CHLORIDE BLD-SCNC: 105 MMOL/L (ref 98–107)
CHLORIDE BLD-SCNC: ABNORMAL MMOL/L
CK MB: 6.2 NG/ML
CK MB: ABNORMAL NG/ML
CKMB INTERPRETATION: ABNORMAL
CKMB INTERPRETATION: ABNORMAL
CO2: 23 MMOL/L (ref 20–31)
CO2: ABNORMAL MMOL/L
CREAT SERPL-MCNC: 0.49 MG/DL (ref 0.5–0.9)
CREAT SERPL-MCNC: ABNORMAL MG/DL
DIFFERENTIAL TYPE: ABNORMAL
EOSINOPHILS RELATIVE PERCENT: 1 % (ref 1–4)
FIO2: ABNORMAL
GFR AFRICAN AMERICAN: >60 ML/MIN
GFR AFRICAN AMERICAN: ABNORMAL ML/MIN
GFR NON-AFRICAN AMERICAN: >60 ML/MIN
GFR NON-AFRICAN AMERICAN: >60 ML/MIN
GFR NON-AFRICAN AMERICAN: ABNORMAL ML/MIN
GFR SERPL CREATININE-BSD FRML MDRD: >60 ML/MIN
GFR SERPL CREATININE-BSD FRML MDRD: ABNORMAL ML/MIN/{1.73_M2}
GFR SERPL CREATININE-BSD FRML MDRD: NORMAL ML/MIN/{1.73_M2}
GLUCOSE BLD-MCNC: 106 MG/DL (ref 70–99)
GLUCOSE BLD-MCNC: 119 MG/DL (ref 74–100)
GLUCOSE BLD-MCNC: ABNORMAL MG/DL
HCO3 VENOUS: 27 MMOL/L (ref 22–29)
HCT VFR BLD CALC: 51.5 % (ref 36.3–47.1)
HEMOGLOBIN: 16.5 G/DL (ref 11.9–15.1)
IMMATURE GRANULOCYTES: 1 %
INR BLD: ABNORMAL
LYMPHOCYTES # BLD: 12 % (ref 24–43)
MCH RBC QN AUTO: 29.4 PG (ref 25.2–33.5)
MCHC RBC AUTO-ENTMCNC: 32 G/DL (ref 28.4–34.8)
MCV RBC AUTO: 91.8 FL (ref 82.6–102.9)
MODE: ABNORMAL
MONOCYTES # BLD: 6 % (ref 3–12)
MYOGLOBIN: 365 NG/ML (ref 25–58)
MYOGLOBIN: ABNORMAL NG/ML
NEGATIVE BASE EXCESS, VEN: ABNORMAL (ref 0–2)
NRBC AUTOMATED: 0 PER 100 WBC
O2 DEVICE/FLOW/%: ABNORMAL
O2 SAT, VEN: 37 % (ref 60–85)
PARTIAL THROMBOPLASTIN TIME: ABNORMAL SEC
PATIENT TEMP: ABNORMAL
PCO2, VEN: 44 MM HG (ref 41–51)
PDW BLD-RTO: 13.7 % (ref 11.8–14.4)
PH VENOUS: 7.39 (ref 7.32–7.43)
PLATELET # BLD: 413 K/UL (ref 138–453)
PLATELET ESTIMATE: ABNORMAL
PMV BLD AUTO: 9.9 FL (ref 8.1–13.5)
PO2, VEN: 22.1 MM HG (ref 30–50)
POC BUN: 20 MG/DL (ref 8–26)
POC CHLORIDE: 106 MMOL/L (ref 98–107)
POC CREATININE: 0.73 MG/DL (ref 0.51–1.19)
POC HEMATOCRIT: 49 % (ref 36–46)
POC HEMOGLOBIN: 16.5 G/DL (ref 12–16)
POC IONIZED CALCIUM: 1.07 MMOL/L (ref 1.15–1.33)
POC LACTIC ACID: 1.34 MMOL/L (ref 0.56–1.39)
POC PCO2 TEMP: ABNORMAL MM HG
POC PH TEMP: ABNORMAL
POC PO2 TEMP: ABNORMAL MM HG
POC POTASSIUM: 4.8 MMOL/L (ref 3.5–4.5)
POC SODIUM: 143 MMOL/L (ref 138–146)
POC TCO2: 27 MMOL/L (ref 22–30)
POSITIVE BASE EXCESS, VEN: 2 (ref 0–3)
POTASSIUM SERPL-SCNC: 3.6 MMOL/L (ref 3.7–5.3)
POTASSIUM SERPL-SCNC: ABNORMAL MMOL/L
PROTHROMBIN TIME: ABNORMAL SEC
RBC # BLD: 5.61 M/UL (ref 3.95–5.11)
RBC # BLD: ABNORMAL 10*6/UL
SAMPLE SITE: ABNORMAL
SARS-COV-2, RAPID: NOT DETECTED
SEG NEUTROPHILS: 80 % (ref 36–65)
SEGMENTED NEUTROPHILS ABSOLUTE COUNT: 14.76 K/UL (ref 1.5–8.1)
SODIUM BLD-SCNC: 141 MMOL/L (ref 135–144)
SODIUM BLD-SCNC: ABNORMAL MMOL/L
SPECIMEN DESCRIPTION: NORMAL
TOTAL CK: 333 U/L (ref 26–192)
TOTAL CK: ABNORMAL U/L
TOTAL CO2, VENOUS: ABNORMAL MMOL/L (ref 23–30)
TROPONIN INTERP: ABNORMAL
TROPONIN INTERP: NORMAL
TROPONIN T: ABNORMAL NG/ML
TROPONIN T: NORMAL NG/ML
TROPONIN, HIGH SENSITIVITY: 13 NG/L (ref 0–14)
TROPONIN, HIGH SENSITIVITY: ABNORMAL NG/L (ref 0–14)
WBC # BLD: 18.4 K/UL (ref 3.5–11.3)
WBC # BLD: ABNORMAL 10*3/UL

## 2021-05-04 PROCEDURE — 85610 PROTHROMBIN TIME: CPT

## 2021-05-04 PROCEDURE — 2709999900 HC NON-CHARGEABLE SUPPLY

## 2021-05-04 PROCEDURE — 2580000003 HC RX 258: Performed by: STUDENT IN AN ORGANIZED HEALTH CARE EDUCATION/TRAINING PROGRAM

## 2021-05-04 PROCEDURE — 6360000004 HC RX CONTRAST MEDICATION: Performed by: EMERGENCY MEDICINE

## 2021-05-04 PROCEDURE — 99255 IP/OBS CONSLTJ NEW/EST HI 80: CPT | Performed by: PSYCHIATRY & NEUROLOGY

## 2021-05-04 PROCEDURE — C1894 INTRO/SHEATH, NON-LASER: HCPCS

## 2021-05-04 PROCEDURE — 85025 COMPLETE CBC W/AUTO DIFF WBC: CPT

## 2021-05-04 PROCEDURE — A0425 GROUND MILEAGE: HCPCS

## 2021-05-04 PROCEDURE — C1769 GUIDE WIRE: HCPCS

## 2021-05-04 PROCEDURE — 82565 ASSAY OF CREATININE: CPT

## 2021-05-04 PROCEDURE — C1725 CATH, TRANSLUMIN NON-LASER: HCPCS

## 2021-05-04 PROCEDURE — 82553 CREATINE MB FRACTION: CPT

## 2021-05-04 PROCEDURE — 70496 CT ANGIOGRAPHY HEAD: CPT

## 2021-05-04 PROCEDURE — 83874 ASSAY OF MYOGLOBIN: CPT

## 2021-05-04 PROCEDURE — 84484 ASSAY OF TROPONIN QUANT: CPT

## 2021-05-04 PROCEDURE — A0427 ALS1-EMERGENCY: HCPCS

## 2021-05-04 PROCEDURE — 6360000002 HC RX W HCPCS: Performed by: NURSE ANESTHETIST, CERTIFIED REGISTERED

## 2021-05-04 PROCEDURE — 82330 ASSAY OF CALCIUM: CPT

## 2021-05-04 PROCEDURE — C1887 CATHETER, GUIDING: HCPCS

## 2021-05-04 PROCEDURE — 51702 INSERT TEMP BLADDER CATH: CPT

## 2021-05-04 PROCEDURE — 87635 SARS-COV-2 COVID-19 AMP PRB: CPT

## 2021-05-04 PROCEDURE — 80051 ELECTROLYTE PANEL: CPT

## 2021-05-04 PROCEDURE — 3700000001 HC ADD 15 MINUTES (ANESTHESIA)

## 2021-05-04 PROCEDURE — 70450 CT HEAD/BRAIN W/O DYE: CPT

## 2021-05-04 PROCEDURE — 82550 ASSAY OF CK (CPK): CPT

## 2021-05-04 PROCEDURE — 80048 BASIC METABOLIC PNL TOTAL CA: CPT

## 2021-05-04 PROCEDURE — 61645 PERQ ART M-THROMBECT &/NFS: CPT | Performed by: PSYCHIATRY & NEUROLOGY

## 2021-05-04 PROCEDURE — 82803 BLOOD GASES ANY COMBINATION: CPT

## 2021-05-04 PROCEDURE — 85730 THROMBOPLASTIN TIME PARTIAL: CPT

## 2021-05-04 PROCEDURE — C1757 CATH, THROMBECTOMY/EMBOLECT: HCPCS

## 2021-05-04 PROCEDURE — 6370000000 HC RX 637 (ALT 250 FOR IP): Performed by: PSYCHIATRY & NEUROLOGY

## 2021-05-04 PROCEDURE — 3700000000 HC ANESTHESIA ATTENDED CARE

## 2021-05-04 PROCEDURE — 83605 ASSAY OF LACTIC ACID: CPT

## 2021-05-04 PROCEDURE — 85014 HEMATOCRIT: CPT

## 2021-05-04 PROCEDURE — 84520 ASSAY OF UREA NITROGEN: CPT

## 2021-05-04 PROCEDURE — 82947 ASSAY GLUCOSE BLOOD QUANT: CPT

## 2021-05-04 PROCEDURE — 99284 EMERGENCY DEPT VISIT MOD MDM: CPT

## 2021-05-04 PROCEDURE — 93005 ELECTROCARDIOGRAM TRACING: CPT | Performed by: STUDENT IN AN ORGANIZED HEALTH CARE EDUCATION/TRAINING PROGRAM

## 2021-05-04 PROCEDURE — 2000000003 HC NEURO ICU R&B

## 2021-05-04 RX ORDER — CEFAZOLIN SODIUM 2 G/50ML
SOLUTION INTRAVENOUS PRN
Status: DISCONTINUED | OUTPATIENT
Start: 2021-05-04 | End: 2021-05-05 | Stop reason: SDUPTHER

## 2021-05-04 RX ORDER — 0.9 % SODIUM CHLORIDE 0.9 %
1000 INTRAVENOUS SOLUTION INTRAVENOUS ONCE
Status: COMPLETED | OUTPATIENT
Start: 2021-05-04 | End: 2021-05-04

## 2021-05-04 RX ORDER — FENTANYL CITRATE 50 UG/ML
INJECTION, SOLUTION INTRAMUSCULAR; INTRAVENOUS PRN
Status: DISCONTINUED | OUTPATIENT
Start: 2021-05-04 | End: 2021-05-05 | Stop reason: SDUPTHER

## 2021-05-04 RX ORDER — SODIUM CHLORIDE 9 MG/ML
INJECTION, SOLUTION INTRAVENOUS CONTINUOUS PRN
Status: DISCONTINUED | OUTPATIENT
Start: 2021-05-04 | End: 2021-05-05 | Stop reason: SDUPTHER

## 2021-05-04 RX ORDER — ASPIRIN 300 MG/1
300 SUPPOSITORY RECTAL ONCE
Status: COMPLETED | OUTPATIENT
Start: 2021-05-04 | End: 2021-05-04

## 2021-05-04 RX ADMIN — FENTANYL CITRATE 25 MCG: 50 INJECTION, SOLUTION INTRAMUSCULAR; INTRAVENOUS at 23:41

## 2021-05-04 RX ADMIN — IOPAMIDOL 90 ML: 755 INJECTION, SOLUTION INTRAVENOUS at 19:58

## 2021-05-04 RX ADMIN — CEFAZOLIN SODIUM 2000 MG: 2 SOLUTION INTRAVENOUS at 23:01

## 2021-05-04 RX ADMIN — SODIUM CHLORIDE: 9 INJECTION, SOLUTION INTRAVENOUS at 22:37

## 2021-05-04 RX ADMIN — FENTANYL CITRATE 25 MCG: 50 INJECTION, SOLUTION INTRAMUSCULAR; INTRAVENOUS at 22:55

## 2021-05-04 RX ADMIN — ASPIRIN 300 MG: 300 SUPPOSITORY RECTAL at 21:16

## 2021-05-04 RX ADMIN — SODIUM CHLORIDE 1000 ML: 9 INJECTION, SOLUTION INTRAVENOUS at 20:32

## 2021-05-04 ASSESSMENT — PULMONARY FUNCTION TESTS
PIF_VALUE: 1
PIF_VALUE: 2
PIF_VALUE: 22
PIF_VALUE: 2
PIF_VALUE: 1
PIF_VALUE: 2
PIF_VALUE: 1
PIF_VALUE: 29
PIF_VALUE: 29
PIF_VALUE: 2
PIF_VALUE: 19
PIF_VALUE: 1
PIF_VALUE: 1
PIF_VALUE: 31
PIF_VALUE: 1
PIF_VALUE: 2
PIF_VALUE: 1
PIF_VALUE: 3
PIF_VALUE: 2
PIF_VALUE: 1
PIF_VALUE: 2
PIF_VALUE: 1
PIF_VALUE: 27
PIF_VALUE: 1
PIF_VALUE: 24
PIF_VALUE: 1
PIF_VALUE: 26
PIF_VALUE: 1
PIF_VALUE: 1

## 2021-05-04 ASSESSMENT — ENCOUNTER SYMPTOMS
NAUSEA: 0
WHEEZING: 0
VOMITING: 0
COLOR CHANGE: 0
ABDOMINAL PAIN: 0
CHEST TIGHTNESS: 0
BACK PAIN: 0
SHORTNESS OF BREATH: 0
DIARRHEA: 0
CONSTIPATION: 0
TACHYPNEA: 1
COUGH: 0

## 2021-05-04 ASSESSMENT — LIFESTYLE VARIABLES: SMOKING_STATUS: 1

## 2021-05-04 ASSESSMENT — PAIN DESCRIPTION - LOCATION: LOCATION: HEAD

## 2021-05-04 NOTE — ED PROVIDER NOTES
[facility] ED  Emergency Department Encounter  EmergencyMedicine Resident     Pt Name:Rosa Hughes Cargo  MRN: [id number]  [place] 1955  Date of evaluation: 5/4/21  PCP:  KEELY Sheikh CNP    CHIEF COMPLAINT       Chief Complaint   Patient presents with    Cerebrovascular Accident     Pt arrives via MSU straight to CT scanner       HISTORY OF PRESENT ILLNESS  (Location/Symptom, Timing/Onset, Context/Setting, Quality, Duration, Modifying Factors, Severity.)      Daiana Jarrell is a 63 y.o. female who presents with left-sided weakness, sensory loss, hemineglect, right-sided gaze preference with last known well 10 PM last night. Patient apparently rolled off her couch, and has been this way since. Patient arrived at 7:40 PM with stroke team at bedside and was immediately in the [place] [address]. Patient did present for mobile stroke received a normal stroke that showed an acute/subacute right MCA territory infarct. Stroke team at bedside upon arrival, additional imaging ordered. Patient denies any complaints other than the weakness on the left side. Past medical history of diabetes, hypothyroid, COPD, TIA approximately 15 years ago    PAST MEDICAL / SURGICAL / SOCIAL / FAMILY HISTORY      has a past medical history of Asthma, COPD (chronic obstructive pulmonary disease) ([facility] [address] Esophageal ring, Gastritis, Hyperplastic colon polyp, and Osteoarthritis. has a past surgical history that includes Appendectomy; Hysterectomy; Esophagus dilation; Colonoscopy (09/20/2017); Endoscopy, colon, diagnostic; pr egd transoral biopsy single/multiple (N/A, 9/20/2017); pr colsc flx w/rmvl of tumor polyp lesion snare tq (N/A, 9/20/2017); and Upper gastrointestinal endoscopy (09/2017).       Social History     Socioeconomic History    Marital status:      Spouse name: Not on file    Number of children: Not on file    Years of education: Not on file    Highest education level: Not the lungs 2 times daily 8/19/20   KEELY Schofield CNP   tiotropium (SPIRIVA RESPIMAT) 2.5 MCG/ACT AERS inhaler Inhale 2 puffs into the lungs daily 8/19/20   KEELY Schofield CNP   meloxicam (MOBIC) 7.5 MG tablet Take 1 tablet by mouth daily 8/19/20   KEELY Schofield CNP   metFORMIN (GLUCOPHAGE-XR) 500 MG extended release tablet TAKE ONE TABLET BY MOUTH TWICE A DAY BEFORE MEALS 8/19/20   KEELY Schofield CNP   atorvastatin (LIPITOR) 20 MG tablet Take 1 tablet by mouth daily 8/12/20   KEELY Schofield CNP   ciclopirox (PENLAC) 8 % solution Apply topically nightly. 9/5/19   KEELY Schofield CNP   polyethylene glycol [facility]) powder Take 17 g by mouth every other day 4/16/19   KEELY Schofield CNP       REVIEW OF SYSTEMS    (2-9 systems for level 4, 10 or more for level 5)      Review of Systems   Constitutional: Negative for chills, diaphoresis, fatigue and fever. HENT: Negative for congestion and dental problem. Respiratory: Negative for cough, chest tightness, shortness of breath and wheezing. Cardiovascular: Negative for chest pain, palpitations and leg swelling. Gastrointestinal: Negative for abdominal pain, constipation, diarrhea, nausea and vomiting. Endocrine: Negative for polydipsia, polyphagia and polyuria. Genitourinary: Negative for difficulty urinating, dysuria and urgency. Musculoskeletal: Negative for arthralgias, back pain, neck pain and neck stiffness. Skin: Negative for color change, pallor and rash. Neurological: Positive for weakness (Left-sided upper and lower extremity), numbness (Left upper and lower extremity) and headaches. Negative for dizziness and light-headedness. Psychiatric/Behavioral: Negative for behavioral problems and confusion.        PHYSICAL EXAM   (up to 7 for level 4, 8 or more for level 5)      INITIAL VITALS:   BP (!) 142/80   Pulse 87   Resp 23   Ht 5' 8\" (1.727 m) DIAGNOSIS     PLAN (LABS / IMAGING / EKG):  Orders Placed This Encounter   Procedures    COVID-19, Rapid    CT Head WO Contrast    CT HEAD WO CONTRAST    CTA HEAD NECK W CONTRAST    MRI LIMITED BRAIN    IR ANGIOGRAM CAROTID C EREBRAL BILATERAL    STROKE PANEL    Urinalysis with microscopic    ELECTROLYTES PLUS    Hemoglobin and hematocrit, blood    CALCIUM, IONIC (POC)    PREVIOUS SPECIMEN    Basic Metabolic Panel    CK isoenzymes    Myoglobin, Serum    Troponin    PREVIOUS SPECIMEN    Protime-INR    APTT    Venous Blood Gas, POC    Creatinine W/GFR Point of Care    POCT urea (BUN)    Lactic Acid, POC    POCT Glucose    EKG 12 Lead    PATIENT STATUS (FROM ED OR OR/PROCEDURAL) Inpatient       MEDICATIONS ORDERED:  Orders Placed This Encounter   Medications    iopamidol (ISOVUE-370) 76 % injection 90 mL    0.9 % sodium chloride bolus    aspirin suppository 300 mg       DDX: Stroke    MDM/IMPRESSION: Is a 57-year-old female presenting with left-sided weakness, right-sided gaze preference, left hemineglect, left-sided numbness. CT head shows confirmation of acute/subacute right MCA infarct. CTA does show an M1 occlusion. Stroke team at bedside. Stroke panel ordered, EKG ordered, plan to admit. Patient will outside of TPA window as last known well was 10 PM last night, and presented almost 22 hours after initial symptoms    DIAGNOSTIC RESULTS / [facility] / [facility]   LAB RESULTS:  Results for orders placed or performed during the hospital encounter of 05/04/21   COVID-19, Rapid    Specimen: Nasopharyngeal Swab   Result Value Ref Range    Specimen Description . NASOPHARYNGEAL SWAB     SARS-CoV-2, Rapid Not Detected Not Detected   STROKE PANEL   Result Value Ref Range    Glucose SPECIMEN GROSSLY HEMOLYZED mg/dL    BUN SPECIMEN GROSSLY HEMOLYZED mg/dL    CREATININE SPECIMEN GROSSLY HEMOLYZED mg/dL    Bun/Cre Ratio SPECIMEN GROSSLY HEMOLYZED 9 - 20    Calcium SPECIMEN GROSSLY Hg   Creatinine W/GFR Point of Care   Result Value Ref Range    POC Creatinine 0.73 0.51 - 1.19 mg/dL    GFR Comment >60 >60 mL/min    GFR Non-African American >60 >60 mL/min    GFR Comment         POCT urea (BUN)   Result Value Ref Range    POC BUN 20 8 - 26 mg/dL   Lactic Acid, POC   Result Value Ref Range    POC Lactic Acid 1.34 0.56 - 1.39 mmol/L   POCT Glucose   Result Value Ref Range    POC Glucose 119 (H) 74 - 100 mg/dL         RADIOLOGY:  CTA HEAD NECK W CONTRAST   Final Result   Right middle cerebral artery M1 segment occlusion/near occlusion with   decreased size and number of right middle cerebral artery branches in   comparison to the left. Focal severe stenosis of the left vertebral artery distal V4 segment. Dominant right vertebral artery. Critical results were called by Dr. Darryl Perrin Sessions to DR Gurdeep Girard on 5/4/2021   at 20:20.         [address]   Final Result   Moderate-sized acute/subacute stroke in the right middle cerebral artery   territory. No acute intracranial hemorrhage. Critical results were called by Dr. Darryl Perrin Sessions to DR Gurdeep Girard on 5/4/2021   at 20:04. CT Head WO Contrast   Final Result   Moderate-size acute/subacute stroke in the right middle cerebral artery   territory involving the right temporal and frontal lobes as well as the right   basal ganglia. ASPECTS score is 5/10. No acute intracranial hemorrhage. Critical results were called by Dr. Darryl Perrin Sessions to [facility] on   5/4/2021 at 19:36.          MRI LIMITED BRAIN    (Results Pending)   IR ANGIOGRAM CAROTID C EREBRAL BILATERAL    (Results Pending)        EKG  EKG Interpretation    Interpreted by emergency department physician    Rhythm: normal sinus   Rate: normal  Axis: normal  Ectopy: none  Conduction: normal  ST Segments: no acute change  T Waves: no acute change  Q Waves: none    Clinical Impression: no acute changes    Jordi Rudolph    All EKG's are interpreted by the Emergency Department Physician who either signs or Co-signs this chart in the absence of a cardiologist.    EMERGENCY DEPARTMENT COURSE:    Stroke team at bedside upon arrival.  Patient to be admitted to neuro critical care after going to endovascular lab. PROCEDURES:      CONSULTS:  None    CRITICAL CARE:      FINAL IMPRESSION      1. Cerebrovascular accident (CVA) due to occlusion of right middle cerebral artery ([facility] [address]          DISPOSITION / PLAN     DISPOSITION Admitted 05/04/2021 10:36:08 PM      PATIENT REFERRED TO:  No follow-up provider specified.     DISCHARGE MEDICATIONS:  New Prescriptions    No medications on file       Leanna Kirby DO  Emergency Medicine Resident    (Please note that portions of thisnote were completed with a voice recognition program.  Efforts were made to edit the dictations but occasionally words are mis-transcribed.)     Leanna Kirby DO  Resident  05/04/21 1301

## 2021-05-04 NOTE — ED NOTES
Bed: 15  Expected date:   Expected time:   Means of arrival:   Comments:  Mobile stroke     Bruce Chance RN  05/04/21 1949

## 2021-05-05 ENCOUNTER — APPOINTMENT (OUTPATIENT)
Dept: MRI IMAGING | Age: 66
DRG: 024 | End: 2021-05-05
Payer: COMMERCIAL

## 2021-05-05 VITALS — TEMPERATURE: 96.8 F | OXYGEN SATURATION: 100 % | SYSTOLIC BLOOD PRESSURE: 134 MMHG | DIASTOLIC BLOOD PRESSURE: 74 MMHG

## 2021-05-05 LAB
-: ABNORMAL
ABSOLUTE EOS #: 0.13 K/UL (ref 0–0.44)
ABSOLUTE IMMATURE GRANULOCYTE: 0.11 K/UL (ref 0–0.3)
ABSOLUTE LYMPH #: 2.56 K/UL (ref 1.1–3.7)
ABSOLUTE MONO #: 0.9 K/UL (ref 0.1–1.2)
AMORPHOUS: ABNORMAL
ANION GAP SERPL CALCULATED.3IONS-SCNC: 14 MMOL/L (ref 9–17)
BACTERIA: ABNORMAL
BASOPHILS # BLD: 0 % (ref 0–2)
BASOPHILS ABSOLUTE: 0.04 K/UL (ref 0–0.2)
BILIRUBIN URINE: NEGATIVE
BUN BLDV-MCNC: 16 MG/DL (ref 8–23)
BUN/CREAT BLD: ABNORMAL (ref 9–20)
CALCIUM SERPL-MCNC: 7.7 MG/DL (ref 8.6–10.4)
CASTS UA: ABNORMAL /LPF (ref 0–8)
CHLORIDE BLD-SCNC: 112 MMOL/L (ref 98–107)
CHOLESTEROL/HDL RATIO: 7.1
CHOLESTEROL: 206 MG/DL
CO2: 19 MMOL/L (ref 20–31)
COLOR: YELLOW
CREAT SERPL-MCNC: 0.37 MG/DL (ref 0.5–0.9)
CRYSTALS, UA: ABNORMAL /HPF
DIFFERENTIAL TYPE: ABNORMAL
EOSINOPHILS RELATIVE PERCENT: 1 % (ref 1–4)
EPITHELIAL CELLS UA: ABNORMAL /HPF (ref 0–5)
ESTIMATED AVERAGE GLUCOSE: 123 MG/DL
GFR AFRICAN AMERICAN: >60 ML/MIN
GFR NON-AFRICAN AMERICAN: >60 ML/MIN
GFR SERPL CREATININE-BSD FRML MDRD: ABNORMAL ML/MIN/{1.73_M2}
GFR SERPL CREATININE-BSD FRML MDRD: ABNORMAL ML/MIN/{1.73_M2}
GLUCOSE BLD-MCNC: 104 MG/DL (ref 70–99)
GLUCOSE URINE: NEGATIVE
HBA1C MFR BLD: 5.9 % (ref 4–6)
HCT VFR BLD CALC: 39.4 % (ref 36.3–47.1)
HDLC SERPL-MCNC: 29 MG/DL
HEMOGLOBIN: 12.6 G/DL (ref 11.9–15.1)
IMMATURE GRANULOCYTES: 1 %
KETONES, URINE: ABNORMAL
LDL CHOLESTEROL: 140 MG/DL (ref 0–130)
LEUKOCYTE ESTERASE, URINE: NEGATIVE
LV EF: 55 %
LVEF MODALITY: NORMAL
LYMPHOCYTES # BLD: 17 % (ref 24–43)
MAGNESIUM: 1.9 MG/DL (ref 1.6–2.6)
MCH RBC QN AUTO: 29.7 PG (ref 25.2–33.5)
MCHC RBC AUTO-ENTMCNC: 32 G/DL (ref 28.4–34.8)
MCV RBC AUTO: 92.9 FL (ref 82.6–102.9)
MONOCYTES # BLD: 6 % (ref 3–12)
MRSA, DNA, NASAL: NORMAL
MUCUS: ABNORMAL
NITRITE, URINE: NEGATIVE
NRBC AUTOMATED: 0 PER 100 WBC
OTHER OBSERVATIONS UA: ABNORMAL
PDW BLD-RTO: 13.5 % (ref 11.8–14.4)
PH UA: 5.5 (ref 5–8)
PLATELET # BLD: 317 K/UL (ref 138–453)
PLATELET ESTIMATE: ABNORMAL
PMV BLD AUTO: 9.2 FL (ref 8.1–13.5)
POTASSIUM SERPL-SCNC: 3.5 MMOL/L (ref 3.7–5.3)
PROTEIN UA: ABNORMAL
RBC # BLD: 4.24 M/UL (ref 3.95–5.11)
RBC # BLD: ABNORMAL 10*6/UL
RBC UA: ABNORMAL /HPF (ref 0–4)
RENAL EPITHELIAL, UA: ABNORMAL /HPF
SEG NEUTROPHILS: 75 % (ref 36–65)
SEGMENTED NEUTROPHILS ABSOLUTE COUNT: 11.3 K/UL (ref 1.5–8.1)
SODIUM BLD-SCNC: 145 MMOL/L (ref 135–144)
SPECIFIC GRAVITY UA: 1.1 (ref 1–1.03)
SPECIMEN DESCRIPTION: NORMAL
TRICHOMONAS: ABNORMAL
TRIGL SERPL-MCNC: 185 MG/DL
TSH SERPL DL<=0.05 MIU/L-ACNC: 2.78 MIU/L (ref 0.3–5)
TURBIDITY: CLEAR
URINE HGB: ABNORMAL
UROBILINOGEN, URINE: NORMAL
VLDLC SERPL CALC-MCNC: ABNORMAL MG/DL (ref 1–30)
WBC # BLD: 15 K/UL (ref 3.5–11.3)
WBC # BLD: ABNORMAL 10*3/UL
WBC UA: ABNORMAL /HPF (ref 0–5)
YEAST: ABNORMAL

## 2021-05-05 PROCEDURE — 85025 COMPLETE CBC W/AUTO DIFF WBC: CPT

## 2021-05-05 PROCEDURE — 2500000003 HC RX 250 WO HCPCS

## 2021-05-05 PROCEDURE — 2709999900 HC NON-CHARGEABLE SUPPLY

## 2021-05-05 PROCEDURE — 93306 TTE W/DOPPLER COMPLETE: CPT

## 2021-05-05 PROCEDURE — 6360000004 HC RX CONTRAST MEDICATION: Performed by: PSYCHIATRY & NEUROLOGY

## 2021-05-05 PROCEDURE — 97163 PT EVAL HIGH COMPLEX 45 MIN: CPT

## 2021-05-05 PROCEDURE — 2000000003 HC NEURO ICU R&B

## 2021-05-05 PROCEDURE — 2580000003 HC RX 258: Performed by: STUDENT IN AN ORGANIZED HEALTH CARE EDUCATION/TRAINING PROGRAM

## 2021-05-05 PROCEDURE — 87641 MR-STAPH DNA AMP PROBE: CPT

## 2021-05-05 PROCEDURE — 83036 HEMOGLOBIN GLYCOSYLATED A1C: CPT

## 2021-05-05 PROCEDURE — 87086 URINE CULTURE/COLONY COUNT: CPT

## 2021-05-05 PROCEDURE — 6360000002 HC RX W HCPCS

## 2021-05-05 PROCEDURE — C1764 EVENT RECORDER, CARDIAC: HCPCS

## 2021-05-05 PROCEDURE — 80048 BASIC METABOLIC PNL TOTAL CA: CPT

## 2021-05-05 PROCEDURE — 92610 EVALUATE SWALLOWING FUNCTION: CPT

## 2021-05-05 PROCEDURE — 6360000002 HC RX W HCPCS: Performed by: NURSE PRACTITIONER

## 2021-05-05 PROCEDURE — 97530 THERAPEUTIC ACTIVITIES: CPT

## 2021-05-05 PROCEDURE — 97167 OT EVAL HIGH COMPLEX 60 MIN: CPT

## 2021-05-05 PROCEDURE — 33285 INSJ SUBQ CAR RHYTHM MNTR: CPT | Performed by: INTERNAL MEDICINE

## 2021-05-05 PROCEDURE — 99291 CRITICAL CARE FIRST HOUR: CPT | Performed by: PSYCHIATRY & NEUROLOGY

## 2021-05-05 PROCEDURE — B3131ZZ FLUOROSCOPY OF RIGHT COMMON CAROTID ARTERY USING LOW OSMOLAR CONTRAST: ICD-10-PCS | Performed by: PSYCHIATRY & NEUROLOGY

## 2021-05-05 PROCEDURE — 99233 SBSQ HOSP IP/OBS HIGH 50: CPT | Performed by: PSYCHIATRY & NEUROLOGY

## 2021-05-05 PROCEDURE — C1769 GUIDE WIRE: HCPCS

## 2021-05-05 PROCEDURE — 83735 ASSAY OF MAGNESIUM: CPT

## 2021-05-05 PROCEDURE — 97535 SELF CARE MNGMENT TRAINING: CPT

## 2021-05-05 PROCEDURE — 84443 ASSAY THYROID STIM HORMONE: CPT

## 2021-05-05 PROCEDURE — 93312 ECHO TRANSESOPHAGEAL: CPT

## 2021-05-05 PROCEDURE — 6360000002 HC RX W HCPCS: Performed by: NURSE ANESTHETIST, CERTIFIED REGISTERED

## 2021-05-05 PROCEDURE — 03CG3Z7 EXTIRPATION OF MATTER FROM INTRACRANIAL ARTERY USING STENT RETRIEVER, PERCUTANEOUS APPROACH: ICD-10-PCS | Performed by: PSYCHIATRY & NEUROLOGY

## 2021-05-05 PROCEDURE — 80061 LIPID PANEL: CPT

## 2021-05-05 PROCEDURE — C1760 CLOSURE DEV, VASC: HCPCS

## 2021-05-05 PROCEDURE — B3161ZZ FLUOROSCOPY OF RIGHT INTERNAL CAROTID ARTERY USING LOW OSMOLAR CONTRAST: ICD-10-PCS | Performed by: PSYCHIATRY & NEUROLOGY

## 2021-05-05 PROCEDURE — 61645 PERQ ART M-THROMBECT &/NFS: CPT | Performed by: PSYCHIATRY & NEUROLOGY

## 2021-05-05 PROCEDURE — 94640 AIRWAY INHALATION TREATMENT: CPT

## 2021-05-05 PROCEDURE — 70551 MRI BRAIN STEM W/O DYE: CPT

## 2021-05-05 PROCEDURE — B246ZZ4 ULTRASONOGRAPHY OF RIGHT AND LEFT HEART, TRANSESOPHAGEAL: ICD-10-PCS | Performed by: INTERNAL MEDICINE

## 2021-05-05 PROCEDURE — 6370000000 HC RX 637 (ALT 250 FOR IP): Performed by: NURSE PRACTITIONER

## 2021-05-05 PROCEDURE — 92523 SPEECH SOUND LANG COMPREHEN: CPT

## 2021-05-05 PROCEDURE — 81001 URINALYSIS AUTO W/SCOPE: CPT

## 2021-05-05 PROCEDURE — 0JH632Z INSERTION OF MONITORING DEVICE INTO CHEST SUBCUTANEOUS TISSUE AND FASCIA, PERCUTANEOUS APPROACH: ICD-10-PCS | Performed by: INTERNAL MEDICINE

## 2021-05-05 PROCEDURE — 93325 DOPPLER ECHO COLOR FLOW MAPG: CPT

## 2021-05-05 PROCEDURE — 6370000000 HC RX 637 (ALT 250 FOR IP): Performed by: STUDENT IN AN ORGANIZED HEALTH CARE EDUCATION/TRAINING PROGRAM

## 2021-05-05 PROCEDURE — B31R1ZZ FLUOROSCOPY OF INTRACRANIAL ARTERIES USING LOW OSMOLAR CONTRAST: ICD-10-PCS | Performed by: PSYCHIATRY & NEUROLOGY

## 2021-05-05 PROCEDURE — 94664 DEMO&/EVAL PT USE INHALER: CPT

## 2021-05-05 PROCEDURE — 2580000003 HC RX 258: Performed by: NURSE ANESTHETIST, CERTIFIED REGISTERED

## 2021-05-05 RX ORDER — BUDESONIDE AND FORMOTEROL FUMARATE DIHYDRATE 160; 4.5 UG/1; UG/1
2 AEROSOL RESPIRATORY (INHALATION) 2 TIMES DAILY
Status: DISCONTINUED | OUTPATIENT
Start: 2021-05-05 | End: 2021-05-10 | Stop reason: HOSPADM

## 2021-05-05 RX ORDER — ASPIRIN 81 MG/1
81 TABLET, CHEWABLE ORAL DAILY
Status: DISCONTINUED | OUTPATIENT
Start: 2021-05-05 | End: 2021-05-08 | Stop reason: SDUPTHER

## 2021-05-05 RX ORDER — 0.9 % SODIUM CHLORIDE 0.9 %
500 INTRAVENOUS SOLUTION INTRAVENOUS ONCE
Status: COMPLETED | OUTPATIENT
Start: 2021-05-05 | End: 2021-05-05

## 2021-05-05 RX ORDER — SODIUM CHLORIDE 0.9 % (FLUSH) 0.9 %
5-40 SYRINGE (ML) INJECTION EVERY 12 HOURS SCHEDULED
Status: DISCONTINUED | OUTPATIENT
Start: 2021-05-05 | End: 2021-05-10 | Stop reason: HOSPADM

## 2021-05-05 RX ORDER — METFORMIN HYDROCHLORIDE 500 MG/1
500 TABLET, EXTENDED RELEASE ORAL
Status: DISCONTINUED | OUTPATIENT
Start: 2021-05-05 | End: 2021-05-05

## 2021-05-05 RX ORDER — PROMETHAZINE HYDROCHLORIDE 12.5 MG/1
12.5 TABLET ORAL EVERY 6 HOURS PRN
Status: DISCONTINUED | OUTPATIENT
Start: 2021-05-05 | End: 2021-05-10 | Stop reason: HOSPADM

## 2021-05-05 RX ORDER — POTASSIUM CHLORIDE 7.45 MG/ML
10 INJECTION INTRAVENOUS
Status: COMPLETED | OUTPATIENT
Start: 2021-05-05 | End: 2021-05-05

## 2021-05-05 RX ORDER — NICOTINE 21 MG/24HR
1 PATCH, TRANSDERMAL 24 HOURS TRANSDERMAL DAILY
Status: DISCONTINUED | OUTPATIENT
Start: 2021-05-05 | End: 2021-05-10 | Stop reason: HOSPADM

## 2021-05-05 RX ORDER — ATORVASTATIN CALCIUM 80 MG/1
80 TABLET, FILM COATED ORAL DAILY
Status: DISCONTINUED | OUTPATIENT
Start: 2021-05-05 | End: 2021-05-10 | Stop reason: HOSPADM

## 2021-05-05 RX ORDER — ACETAMINOPHEN 650 MG/1
650 SUPPOSITORY RECTAL EVERY 4 HOURS PRN
Status: DISCONTINUED | OUTPATIENT
Start: 2021-05-05 | End: 2021-05-05

## 2021-05-05 RX ORDER — SODIUM CHLORIDE 0.9 % (FLUSH) 0.9 %
5-40 SYRINGE (ML) INJECTION PRN
Status: DISCONTINUED | OUTPATIENT
Start: 2021-05-05 | End: 2021-05-10 | Stop reason: HOSPADM

## 2021-05-05 RX ORDER — ALBUTEROL SULFATE 2.5 MG/3ML
2.5 SOLUTION RESPIRATORY (INHALATION) EVERY 4 HOURS PRN
Status: DISCONTINUED | OUTPATIENT
Start: 2021-05-05 | End: 2021-05-10 | Stop reason: HOSPADM

## 2021-05-05 RX ORDER — POLYETHYLENE GLYCOL 3350 17 G/17G
17 POWDER, FOR SOLUTION ORAL EVERY OTHER DAY
Status: DISCONTINUED | OUTPATIENT
Start: 2021-05-05 | End: 2021-05-10 | Stop reason: HOSPADM

## 2021-05-05 RX ORDER — SODIUM CHLORIDE 9 MG/ML
INJECTION, SOLUTION INTRAVENOUS CONTINUOUS
Status: DISCONTINUED | OUTPATIENT
Start: 2021-05-05 | End: 2021-05-06

## 2021-05-05 RX ORDER — ACETAMINOPHEN 325 MG/1
650 TABLET ORAL EVERY 4 HOURS PRN
Status: DISCONTINUED | OUTPATIENT
Start: 2021-05-05 | End: 2021-05-07

## 2021-05-05 RX ORDER — POLYETHYLENE GLYCOL 3350 17 G/17G
17 POWDER, FOR SOLUTION ORAL EVERY OTHER DAY
Status: DISCONTINUED | OUTPATIENT
Start: 2021-05-05 | End: 2021-05-05

## 2021-05-05 RX ORDER — IODIXANOL 270 MG/ML
150 INJECTION, SOLUTION INTRAVASCULAR
Status: COMPLETED | OUTPATIENT
Start: 2021-05-05 | End: 2021-05-05

## 2021-05-05 RX ORDER — SODIUM CHLORIDE 9 MG/ML
25 INJECTION, SOLUTION INTRAVENOUS PRN
Status: DISCONTINUED | OUTPATIENT
Start: 2021-05-05 | End: 2021-05-10 | Stop reason: HOSPADM

## 2021-05-05 RX ORDER — LEVOTHYROXINE SODIUM 0.03 MG/1
25 TABLET ORAL DAILY
Status: DISCONTINUED | OUTPATIENT
Start: 2021-05-05 | End: 2021-05-10 | Stop reason: HOSPADM

## 2021-05-05 RX ORDER — ACETAMINOPHEN 650 MG/1
650 SUPPOSITORY RECTAL EVERY 4 HOURS PRN
Status: DISCONTINUED | OUTPATIENT
Start: 2021-05-05 | End: 2021-05-10 | Stop reason: HOSPADM

## 2021-05-05 RX ORDER — ONDANSETRON 2 MG/ML
4 INJECTION INTRAMUSCULAR; INTRAVENOUS EVERY 6 HOURS PRN
Status: DISCONTINUED | OUTPATIENT
Start: 2021-05-05 | End: 2021-05-10 | Stop reason: HOSPADM

## 2021-05-05 RX ADMIN — ACETAMINOPHEN 650 MG: 650 SUPPOSITORY RECTAL at 13:15

## 2021-05-05 RX ADMIN — SODIUM CHLORIDE 500 ML: 0.9 INJECTION, SOLUTION INTRAVENOUS at 05:49

## 2021-05-05 RX ADMIN — POTASSIUM CHLORIDE 10 MEQ: 10 INJECTION, SOLUTION INTRAVENOUS at 11:33

## 2021-05-05 RX ADMIN — ACETAMINOPHEN 650 MG: 650 SUPPOSITORY RECTAL at 18:38

## 2021-05-05 RX ADMIN — SODIUM CHLORIDE: 9 INJECTION, SOLUTION INTRAVENOUS at 17:35

## 2021-05-05 RX ADMIN — SODIUM CHLORIDE, PRESERVATIVE FREE 10 ML: 5 INJECTION INTRAVENOUS at 23:48

## 2021-05-05 RX ADMIN — IODIXANOL 138 ML: 270 INJECTION, SOLUTION INTRAVASCULAR at 01:20

## 2021-05-05 RX ADMIN — POTASSIUM CHLORIDE 10 MEQ: 10 INJECTION, SOLUTION INTRAVENOUS at 13:24

## 2021-05-05 RX ADMIN — BUDESONIDE AND FORMOTEROL FUMARATE DIHYDRATE 2 PUFF: 160; 4.5 AEROSOL RESPIRATORY (INHALATION) at 08:05

## 2021-05-05 RX ADMIN — POTASSIUM CHLORIDE 10 MEQ: 10 INJECTION, SOLUTION INTRAVENOUS at 14:57

## 2021-05-05 RX ADMIN — SODIUM CHLORIDE: 9 INJECTION, SOLUTION INTRAVENOUS at 04:15

## 2021-05-05 RX ADMIN — FENTANYL CITRATE 25 MCG: 50 INJECTION, SOLUTION INTRAMUSCULAR; INTRAVENOUS at 00:45

## 2021-05-05 RX ADMIN — SODIUM CHLORIDE: 9 INJECTION, SOLUTION INTRAVENOUS at 00:57

## 2021-05-05 RX ADMIN — POTASSIUM CHLORIDE 10 MEQ: 10 INJECTION, SOLUTION INTRAVENOUS at 10:26

## 2021-05-05 ASSESSMENT — PULMONARY FUNCTION TESTS
PIF_VALUE: 1

## 2021-05-05 ASSESSMENT — PAIN DESCRIPTION - LOCATION
LOCATION: EAR
LOCATION: EAR

## 2021-05-05 ASSESSMENT — PAIN DESCRIPTION - PAIN TYPE: TYPE: ACUTE PAIN

## 2021-05-05 ASSESSMENT — PAIN SCALES - GENERAL
PAINLEVEL_OUTOF10: 4

## 2021-05-05 ASSESSMENT — PAIN DESCRIPTION - DESCRIPTORS
DESCRIPTORS: DISCOMFORT
DESCRIPTORS: ACHING

## 2021-05-05 ASSESSMENT — PAIN DESCRIPTION - PROGRESSION: CLINICAL_PROGRESSION: NOT CHANGED

## 2021-05-05 ASSESSMENT — PAIN DESCRIPTION - ONSET
ONSET: ON-GOING
ONSET: ON-GOING

## 2021-05-05 ASSESSMENT — PAIN DESCRIPTION - ORIENTATION
ORIENTATION: RIGHT
ORIENTATION: RIGHT

## 2021-05-05 ASSESSMENT — PAIN - FUNCTIONAL ASSESSMENT: PAIN_FUNCTIONAL_ASSESSMENT: ACTIVITIES ARE NOT PREVENTED

## 2021-05-05 NOTE — PROGRESS NOTES
[address]    Patient Name: Felicity Apley  MRN: [id number]  YOB: 1955  Date of evaluation: 5/4/2021  Time of evaluation: 9:35 pm  Reason for evaluation: Randomization     Protocol:  Protocol No: DAXA  Version:  NCT: [id number]  NATALIYA: [id number]  IRB: 2016-49  Site PI: Mukesh Beck MD    Subject #: [id number]    Inclusion/Exclusion Criteria:  Felicity Apley continues to meet all inclusion and exclusion criteria for randomization into The DAXA Trial.     Randomization:  Subjects will be assigned to either the medical therapy arm (best medical management alone (including IV rtPA)) or the intra-arterial therapy arm (IAT with mechanical thrombectomy added to best medical management). Subject [id number] has been randomized into the intra-arterial therapy arm. Questions:  Please contact the Research Nurse at [phone] or Dr. Ely Harrison via PerfectServe with any questions.     Hina Malhotra MD    Clinical Research Services  [address] [place] [address], [address] [place] [address]  P: [phone]  F: [phone]

## 2021-05-05 NOTE — ED NOTES
5/4/2021 8:32 PM    Patient: Maribell Florentino, 63 y.o., female Race:cauc  Patient Address: [address] [place] [address]  Incident Address:  [x]Same as patient address     [x] [facility]  [] [facility]  [] [facility] SP[facility] [place]   [] [facility]  [] [facility]  Patient Phone #: [phone]   Insurance: Payor: Nerissa Staff /  /  /   Sigifredo Elam:  []  Yes   [x]  No  Emergency Contact: Extended Emergency Contact Information  Primary Emergency Contact: [address] [place] [address] Phone: [phone]  Relation: Child  MRN: [id number]  [facility]# [id number]  YOB: 1955  Primary Care Physician: Jossue Crow, KEELY - CNP  Advance Directive: [x] Full Code   []DNR-CC   []DNR-CCA    MSU Crew: Nick Powers - Paramedic, German - RN    Pt Transported To:  [x] [place] [address]  [] [facility]  [] [facility]  [] [facility]  [] [facility]  [] [facility]  [facility] [] [facility] [] [facility]    Was patient transported to closest facility? [x]Yes  []No (if No specify reason)   []   Patient/family request    []   Divert to specialist       Response Code   [] 2  [x] 3  []   Change  [] 2  [] 3   Transport Code   [x] 2  [] 3  []   Change  [] 2  [] 3     Mileage:  [facility] 37   Total Miles 6     Call Received 5/4/2021 1825   Dispatched 5/4/2021 1842   Enroute 5/4/2021 1846   Arrived Scene 5/4/2021 1857   At Patient 5/4/2021 1859   Decision to Scan 5/4/2021 1900   Scan 5/4/2021 1911   Departed Scene 5/4/2021 [address] 5/4/2021 1954   Departed Hospital 5/4/2021 2013   In Service 5/4/2021 2013         Allergies  [] Updated/Reviewed by MSU  [x] Historical Data Only  [] Unavailable  has No Known Allergies. Medications  [] Updated/Reviewed by MSU  [x] Historical Data Only  [] Unavailable  Prior to Admission medications    Medication Sig Start Date End Date Taking?  Authorizing Provider   levothyroxine (SYNTHROID) 25 MCG tablet TAKE 1 AND 1/2 TABLET BY MOUTH DAILY 12/29/20   KEELY Palacios - CNP   albuterol sulfate HFA (VENTOLIN HFA) 108 (90 Base) MCG/ACT inhaler INHALE TWO PUFFS BY MOUTH EVERY 4 HOURS AS NEEDED FOR WHEEZING OR SHORTNESS OF BREATH 8/19/20   KEELY Dietz CNP   budesonide-formoterol [facility]) 160-4.5 MCG/ACT AERO Inhale 2 puffs into the lungs 2 times daily 8/19/20   KEELY Dietz CNP   tiotropium (SPIRIVA RESPIMAT) 2.5 MCG/ACT AERS inhaler Inhale 2 puffs into the lungs daily 8/19/20   KEELY Dietz CNP   meloxicam (MOBIC) 7.5 MG tablet Take 1 tablet by mouth daily 8/19/20   KEELY Dietz CNP   metFORMIN (GLUCOPHAGE-XR) 500 MG extended release tablet TAKE ONE TABLET BY MOUTH TWICE A DAY BEFORE MEALS 8/19/20   KEELY Dietz CNP   atorvastatin (LIPITOR) 20 MG tablet Take 1 tablet by mouth daily 8/12/20   KEELY Dietz CNP   ciclopirox (PENLAC) 8 % solution Apply topically nightly. 9/5/19   KEELY Dietz CNP   polyethylene glycol [facility]) powder Take 17 g by mouth every other day 4/16/19   KEELY Dietz CNP      Past Medical History  [] Updated/Reviewed by MSU  [x] Historical Data Only  [] Unavailable   has a past medical history of Asthma, COPD (chronic obstructive pulmonary disease) ([address] Esophageal ring, Gastritis, Hyperplastic colon polyp, and Osteoarthritis. Patient Weight: 175lbs   [x]   Estimated   []   Stated          VITALS          Time BP Pulse   Resp  Rate N-normal  S-shallow  D-deep Monitor SpO2 O2    LPM BGL   Temp Pain   pta 160/103 116 16 n st 98 ra 125  0   1919 139/82 96 14 n sr 96 ra   0   1930 117/70 98 14 n sr 95 ra   0   1940 117/55 104 12 n st 96 ra   0                   Pupils GCS   Time R L E  4 V  5 M  6 T  15   pta 3 3 4 5 6 15   1919 3 3 4 5 6 15   1930 3 3 4 5 6 15   1940 3 3 4 5 6 15                NIH -   record on all transports and Q15 min after tPA administration    NIHSS       Time [address]   1a.  LOC - Arousal Status 0 0 0 0   1b. LOC - Questions 0 0 0 0   1c. LOC - Commands 0 0 0 0   2. Eye Movements (gaze) 1 1 1 1   3. Visual Fields 0 0 0 0   4. Facial Palsy 2 2 2 2   5a. Motor Left Arm 4 4 4 4   5b. Motor Right Arm 0 0 0 0   6a. Motor Left Leg 3 3 3 3   6b. Motor Right Leg 0 0 0 0   7. Limb Ataxia 0 0 0 0   8. Sensory 1 1 1 1   9. Language/Aphasia 0 0 0 0   10. Dysarthria 1 1 1 1   11. Neglect 1 1 1 1   TOTAL 13 13 13 13       Research:    RACE Score: 4 Time: PTA  StrokeVAN Score: (+) Time:1902    Time Last Known Well: 2200 on 5/3/2021 (21 hrs prior to ems arrival)    Narrative:    Dispatched to possible CVA per [facility]. Arrived to find Brennan Pino, 63 y.o., female c/o Stroke symptoms. Report received from [facility] at patients side. EMS states pt. Was found on floor C-spine cleared and pt is (-) for DECAPBTLS. Pt. Has a HX of TIA with no deficits 15 years prior. Pt. Is AnO x 4 and states she was reaching for remote at 2200 the night prior and rolled off the couch on to the floor (where she remained) until a bystander arrived and found her this evening and called EMS. Dee Dee Post at in contact via cellular phone.  (telemed not connecting per the Doc)     Patient received the following medications prior to MSU arrival:none  Patient has the following lines prior to MSU arrival:20g piv RAC  Patient has the following airway placed prior to MSU arrival:none    Patient was transferred to cot via 2 person assist and secured with straps x3. Zoll ECG, SpO2, and NIBP applied and monitored throughout encounter. HOB maintained at 30 degrees. Patient was transferred to ambulance, cot secured in scan position. Non contrast CT scan performed. After scan cot secured in transport position. IV tPA inclusion/exclusion criteria reviewed with patient and physician. Candidate for IV tPA therapy?   [] Yes   [x] No - due to the following exclusion criteria:    [] ICH   [] Taking anticoagulant -   [x] Beyond last time known well window  [] At or returned to baseline   [] Marked improvement of symptoms  [] No disabling symptoms  [] Other -     Patient is being [facility] . During transport patient rests comfortably. Cabin temp maintained at 70-72 °F throughout transport. Patient was transferred to bed CT1 via 4 person sheet lift. . Patient care handoff completed with Susanne Gregory RN at bedside. Imaging:  Images were obtained onboard the MSU including:  [x] CT brain without contrast - see results below:      Ct Head Wo Contrast    Result Date: 5/4/2021  EXAMINATION: CT OF THE HEAD WITHOUT CONTRAST  5/4/2021 7:51 pm TECHNIQUE: CT of the head was performed without the administration of intravenous contrast. Dose modulation, iterative reconstruction, and/or weight based adjustment of the mA/kV was utilized to reduce the radiation dose to as low as reasonably achievable. COMPARISON: Noncontrast CT head done earlier same day. HISTORY: ORDERING SYSTEM PROVIDED HISTORY: L sided weakness TECHNOLOGIST PROVIDED HISTORY: L sided weakness Decision Support Exception - unselect if not a suspected or confirmed emergency medical condition->Emergency Medical Condition (MA) Reason for Exam: stroke,lt sided weakness Acuity: Unknown Type of Exam: Unknown FINDINGS: BRAIN/VENTRICLES: Redemonstration of a moderate-sized area of patchy hypoattenuation with loss of the normal gray-white matter interface in the right temporal lobe, right frontal insular region and right basal ganglia. Surrounding mass effect with partially effacement of the right lateral ventricle. Minimal right left midline shift measures 2 mm at the level of the lateral and 3rd ventricles. No acute intracranial hemorrhage. No hydrocephalus. The basal cisterns are patent. ORBITS: The visualized portion of the orbits demonstrate no acute abnormality. SINUSES: The visualized paranasal sinuses and mastoid air cells demonstrate no acute abnormality.  SOFT TISSUES/SKULL:  No acute abnormality of the visualized skull or soft tissues. Moderate-sized acute/subacute stroke in the right middle cerebral artery territory. No acute intracranial hemorrhage. Critical results were called by Dr. Kaci Oviedo to DR Al Coronel on 5/4/2021 at 20:04. Ct Head Wo Contrast    Result Date: 5/4/2021  EXAMINATION: CT OF THE HEAD WITHOUT CONTRAST  5/4/2021 7:05 pm TECHNIQUE: CT of the head was performed without the administration of intravenous contrast. Dose modulation, iterative reconstruction, and/or weight based adjustment of the mA/kV was utilized to reduce the radiation dose to as low as reasonably achievable. COMPARISON: None. HISTORY: ORDERING SYSTEM PROVIDED HISTORY: Stroke TECHNOLOGIST PROVIDED HISTORY: Stroke FINDINGS: Image quality is degraded by motion artifact. BRAIN/VENTRICLES: Moderate-sized area of patchy hypoattenuation with loss of the normal gray-white matter interface in the right temporal lobe, right frontal insular region and right basal ganglia consistent with acute/subacute infarct. Mild surrounding mass effect with partially effacement of the right lateral ventricle and minimal right left midline shift measuring 2 mm at the level of the lateral and 3rd ventricles. No acute intracranial hemorrhage. No hydrocephalus. The basal cisterns are patent. ORBITS: The visualized portion of the orbits demonstrate no acute abnormality. SINUSES: The visualized paranasal sinuses and mastoid air cells demonstrate no acute abnormality. SOFT TISSUES/SKULL:  No acute abnormality of the visualized skull or soft tissues. Moderate-size acute/subacute stroke in the right middle cerebral artery territory involving the right temporal and frontal lobes as well as the right basal ganglia. ASPECTS score is 5/10. No acute intracranial hemorrhage. Critical results were called by Dr. Kaci Blanca Sessions to [facility] on 5/4/2021 at 19:36.        Physical assessment performed:    Neuro: Pt shows R sided gaze deviation, with partial L sided facial paralysis, L Arm is flaccid, and L leg has some movement but no effort against gravity, Pt states she has a numbing loss of sensation on her L cheek, arm, and leg. Pt also has very mild dysarthria, which she states is not normal for her. Pt is not able to identify her L hand. Resp: lungs clear to auscultation bilaterally, normal effort  Cardiac: regular rate to ST, no murmur, 12 lead ECG shows non-diag. Muscular/skeletal/peripheral vascular: no edema, no redness or tenderness in the calves, pulses present in 4 extremities   Abd/GI/: abd flat, soft, non tender, bowel sounds present x 4 quadrants   Skin: normal color, warm, dry      IV LINES   Time Size Site # Attempts Performed By   pta 20 rac 1 ems                         ACUTE STROKE DYSPHAGIA SCREEN              YES NO   1 GCS less than 13?      n   2 Facial Asymmetry or Weakness?    y   3 Tongue Asymmetry or Weakness? 4 Palatal Asymmetry or Weakness? 5 Signs of aspiration during 3oz water test?*                 If answers to questions 1-4 are NO proceed to 3oz water test               *Administer 3oz of water for sequential drinks, note any throat clearing, cough, or change in vocal quality immediately after and 1 minute following the swallow.                If answers to questions 1-5 are NO proceed with ordered PO meds           Assistance:   [x]    [address]    []    Police    []    Other EMS (See Below)          Hospital Notification:    [facility] [address] [facility] -  [x] [address]  [] [facility]  [] [facility]  [] [facility]  [] [facility] [] [facility] [] [facility] [] [facility]  [] [employer]     [x]    Med Channel:     []    Cell Phone     Med Control Orders Received:   [x] No  []  Yes:     Med Control Physician (if on line medical direction received)  -        Hospital Team Alert:   []    Trauma Alert    []    STEMI Alert         [x]    Stroke Alert    []    RACE Alert      Description Of Valuables: Purse w Cell Phone    Patient Valuables:   [x] With Patient    []    Not Received    []    ER / Lab     Call Outcome:   [x]    Transport to Facility    []    Care Transferred to [facility]    []    Cancelled    []    Patient Refusal    []                           Mobile Stroke Unit    Electronically signed by Patricia Hernadez RN on 5/4/21 at 8:32 PM EDT     Patricia Hernadez RN  05/04/21 2100

## 2021-05-05 NOTE — PLAN OF CARE
Problem: HEMODYNAMIC STATUS  Goal: Patient has stable vital signs and fluid balance  Outcome: Ongoing   Neuro assessment completed, fall precautions in place, aspirations precautions in place, assess for barriers in communication and mobility, interventions to assist in communication and mobility in place, encouraged to call for assistance, adaptive devices used as needed, assess emotional state and support offered, encouraged patient to communicate by available means, and support systems included in patient care. Problem: ACTIVITY INTOLERANCE/IMPAIRED MOBILITY  Goal: Mobility/activity is maintained at optimum level for patient  Outcome: Ongoing     Problem: COMMUNICATION IMPAIRMENT  Goal: Ability to express needs and understand communication  Outcome: Ongoing     Problem: Skin Integrity:  Goal: Will show no infection signs and symptoms  Description: Will show no infection signs and symptoms  Outcome: Ongoing  Goal: Absence of new skin breakdown  Description: Absence of new skin breakdown  Outcome: Ongoing   Skin assessed for breakdown and redness, patient turned regularly, and heels elevated off of bed on pillows. Problem: Falls - Risk of:  Goal: Will remain free from falls  Description: Will remain free from falls  Outcome: Ongoing  Goal: Absence of physical injury  Description: Absence of physical injury  Outcome: Ongoing   Pt remained free of falls during shift. Bed in lowest position, call light within reach, and bed wheels locked. Will continue to monitor.

## 2021-05-05 NOTE — PROGRESS NOTES
Speech Language Pathology  [address]  Initial Speech/Language/Cognitive Assessment    NAME: Kareem Mccormick  : 1955   MRN: [id number]  ADMISSION DATE: 2021  ADMITTING DIAGNOSIS: has Pneumonia of left lower lobe due to infectious organism; Tobacco abuse; Legionella pneumonia ([address] Dysphagia; Hyperplastic colon polyp; Esophageal ring; Gastritis; Elbow effusion, right; and Stroke due to occlusion of right middle cerebral artery ([address] on their problem list.      Date of Eval: 2021   Evaluating Therapist: GIRISH Parekh    RECENT RESULTS  CT OF HEAD/MRI:   Impression   Subacute right MCA distribution infarct most pronounced in the frontotemporal   region and right basal ganglia with a hemorrhagic component and hemosiderin   staining involving the right basal ganglia. Primary Complaint: The patient is a 63 y.o. female with a past medical history of diabetes on Metformin, COPD, smoker, gastritis who presents as a wake-up stroke.     Patient's last known well was 10 PM on 5/3/2021. She awoke with severe left-sided weakness, left-sided facial droop, right-sided gaze preference. Presented to [address] [place] [address] emergency department approximately 21 hours after last known well. CT head showed moderate size acute stroke in the right MCA territory involving the right temporal and frontal lobes, right basal ganglia. Aspects score 5 out of 10. CTA showed near occlusion of right MCA M1 segment with collateralization, severe stenosis of left vertebral V4 segment. Neurovascular evaluated patient and given her exam was worse than what was explained by CT imaging the patient was offered thrombectomy. Patient went to the neuro endovascular lab approximately 2 and half hours after arrival, underwent mechanical thrombectomy, x6 passes which was unsuccessful at removing the occlusion. Exam postoperatively was unchanged.   Patient was transferred to the neuro critical care unit for medical management. Pain:  Pain Assessment  Pain Level: 4  Pain Type: Acute pain  Pain Location: Head    Assessment:     Pt presents with mild-moderate cognitive deficits characterized by impaired recall and verbal reasoning skills. Pt. Presents with mild dysarthria, moderate O/M deficits at this time. ST to follow up and provide treatment to address noted deficits. Education provided. Further therapy recommended at discharge. Recommendations:  Requires SLP Intervention: Yes  Duration/Frequency of Treatment: 3-5X/week  D/C Recommendations: To be determined       Plan:   Goals:  Short-term Goals  Goal 1: Recall 3-5 units with and without distraction with 90% accuracy  Goal 2: Provide comp. recall strategeis to aide in recall  Goal 3: Complete verbal reasoning tasks with 90% accuracy  Goal 4: O/M treatment program for dysarthria  Goal 5: Provide comp. strategies for increased speech clarity   Patient/family involved in developing goals and treatment plan: yes    Subjective:   Previous level of function and limitations: independent  General  Chart Reviewed: Yes     Vision  Vision: Impaired           Objective:     Oral/Motor  Oral Motor: Exceptions to [facility]  Labial ROM: Reduced left  Labial Symmetry: Abnormal symmetry left  Labial Strength: Reduced  Labial Sensation: Reduced  Lingual Symmetry: Abnormal symmetry left  Lingual Strength: Reduced  Lingual Sensation: Reduced  Lingual Coordination: Reduced      Motor Speech  Motor Speech: Exceptions to [facility]  Intelligibility: Mild  Dysarthria : Mild         Cognition:      Orientation  Overall Orientation Status: Within Normal Limits  Attention  Attention: Within Functional Limits  Memory  Memory: Exceptions to WFL(3/3,2/5)  Short-term Memory: Mild(1/3,3/3)  Problem Solving  Problem Solving: Exceptions to [facility]  Verbal Reasoning Skills: Moderate(word associations 2/4.  Word deductions 2/3)  Safety/Judgement  Safety/Judgement: Exceptions to [facility]  Insight: Mild (2/3)  Flexibility of Thought: Mild(2/3)      Prognosis:  Speech Therapy Prognosis  Prognosis: Good  Individuals consulted  Consulted and agree with results and recommendations: Patient    Education:  Patient Education: yes  Patient Education Response: Verbalizes understanding          Therapy Time:   Individual Concurrent Group Co-treatment   Time In 0940         Time Out 0945         Minutes 1983 GIRISH Leblanc  5/5/2021 9:53 AM

## 2021-05-05 NOTE — ANESTHESIA PRE PROCEDURE
Department of Anesthesiology  Preprocedure Note       Name:  Flaco Pinzon   Age:  63 y.o.  :  1955                                          MRN:  [id number]         Date:  2021      Surgeon: * No surgeons listed *    Procedure: * No procedures listed *    Medications prior to admission:   Prior to Admission medications    Medication Sig Start Date End Date Taking? Authorizing Provider   levothyroxine (SYNTHROID) 25 MCG tablet TAKE 1 AND 1/2 TABLET BY MOUTH DAILY 20   KEELY Jones CNP   albuterol sulfate HFA (VENTOLIN HFA) 108 (90 Base) MCG/ACT inhaler INHALE TWO PUFFS BY MOUTH EVERY 4 HOURS AS NEEDED FOR WHEEZING OR SHORTNESS OF BREATH 20   KEELY Jones CNP   budesonide-formoterol [facility]) 160-4.5 MCG/ACT AERO Inhale 2 puffs into the lungs 2 times daily 20   KEELY Jones CNP   tiotropium (SPIRIVA RESPIMAT) 2.5 MCG/ACT AERS inhaler Inhale 2 puffs into the lungs daily 20   KEELY Jones CNP   meloxicam (MOBIC) 7.5 MG tablet Take 1 tablet by mouth daily 20   KEELY Jones CNP   metFORMIN (GLUCOPHAGE-XR) 500 MG extended release tablet TAKE ONE TABLET BY MOUTH TWICE A DAY BEFORE MEALS 20   KEELY Jones CNP   atorvastatin (LIPITOR) 20 MG tablet Take 1 tablet by mouth daily 20   KEELY Jones CNP   ciclopirox (PENLAC) 8 % solution Apply topically nightly. 19   KEELY Jones CNP   polyethylene glycol [facility]) powder Take 17 g by mouth every other day 19   KEELY Jones CNP       Current medications:    No current facility-administered medications for this visit.       Current Outpatient Medications   Medication Sig Dispense Refill    levothyroxine (SYNTHROID) 25 MCG tablet TAKE 1 AND 1/2 TABLET BY MOUTH DAILY 135 tablet 1    albuterol sulfate HFA (VENTOLIN HFA) 108 (90 Base) MCG/ACT inhaler INHALE TWO PUFFS BY MOUTH EVERY 4 HOURS AS NEEDED FOR WHEEZING OR SHORTNESS OF BREATH 18 g 5    budesonide-formoterol (SYMBICORT) 160-4.5 MCG/ACT AERO Inhale 2 puffs into the lungs 2 times daily 1 Inhaler 5    tiotropium (SPIRIVA RESPIMAT) 2.5 MCG/ACT AERS inhaler Inhale 2 puffs into the lungs daily 1 Inhaler 5    meloxicam (MOBIC) 7.5 MG tablet Take 1 tablet by mouth daily 30 tablet 3    metFORMIN (GLUCOPHAGE-XR) 500 MG extended release tablet TAKE ONE TABLET BY MOUTH TWICE A DAY BEFORE MEALS 60 tablet 5    atorvastatin (LIPITOR) 20 MG tablet Take 1 tablet by mouth daily 90 tablet 1    ciclopirox (PENLAC) 8 % solution Apply topically nightly.  1 Bottle 2    polyethylene glycol (MIRALAX) powder Take 17 g by mouth every other day 510 g 3     Facility-Administered Medications Ordered in Other Visits   Medication Dose Route Frequency Provider Last Rate Last Admin    ceFAZolin (ANCEF) 2000 mg in dextrose 3 % 50 mL IVPB (duplex)    PRN Lysbeth Nones, APRN - CRNA   2,000 mg at 05/04/21 2301    fentaNYL (SUBLIMAZE) injection    PRN Lysbeth Nones, APRN - CRNA   25 mcg at 05/04/21 2255       Allergies:  No Known Allergies    Problem List:    Patient Active Problem List   Diagnosis Code    Pneumonia of left lower lobe due to infectious organism J18.9    Tobacco abuse Z72.0    Legionella pneumonia ([facility] A48.1    Dysphagia R13.10    Hyperplastic colon polyp K63.5    Esophageal ring K22.2    Gastritis K29.70    Elbow effusion, right M25.421    Stroke due to occlusion of right middle cerebral artery (HCC) I63.511       Past Medical History:        Diagnosis Date    Asthma     COPD (chronic obstructive pulmonary disease) ([facility]     Esophageal ring     Gastritis     Hyperplastic colon polyp     Osteoarthritis        Past Surgical History:        Procedure Laterality Date    APPENDECTOMY      COLONOSCOPY  09/20/2017    FRAGMENTS OF HYPERPLASTIC POLYP    ENDOSCOPY, COLON, DIAGNOSTIC      ESOPHAGEAL DILATATION      HYSTERECTOMY      [place] COLSC FLX W/RMVL OF TUMOR POLYP LESION SNARE TQ N/A 9/20/2017    COLONOSCOPY POLYPECTOMY COLD BIOPSY performed by Siddhartha Riddle MD at [address] EGD TRANSORAL BIOPSY SINGLE/MULTIPLE N/A 9/20/2017    EGD BIOPSY with esophageal dilitation performed by Siddhartha Riddle MD at [address]  09/2017    E-ring and gastritis        Social History:    Social History     Tobacco Use    Smoking status: Current Every Day Smoker     Packs/day: 0.50     Years: 40.00     Pack years: 20.00     Types: Cigarettes    Smokeless tobacco: Never Used   Substance Use Topics    Alcohol use: No                                Ready to quit: Not Answered  Counseling given: Not Answered      Vital Signs (Current): There were no vitals filed for this visit.                                            BP Readings from Last 3 Encounters:   05/04/21 (!) 142/80   05/04/21 (!) 152/80   08/19/20 110/80       NPO Status:  unk                                                                               BMI:   Wt Readings from Last 3 Encounters:   05/04/21 200 lb (90.7 kg)   08/19/20 197 lb 12.8 oz (89.7 kg)   10/22/19 180 lb (81.6 kg)     There is no height or weight on file to calculate BMI.    CBC:   Lab Results   Component Value Date    WBC 18.4 05/04/2021    RBC 5.61 05/04/2021    HGB 16.5 05/04/2021    HCT 51.5 05/04/2021    MCV 91.8 05/04/2021    RDW 13.7 05/04/2021     05/04/2021       CMP:   Lab Results   Component Value Date     05/04/2021    K 3.6 05/04/2021     05/04/2021    CO2 23 05/04/2021    BUN 19 05/04/2021    CREATININE 0.49 05/04/2021    GFRAA >60 05/04/2021    LABGLOM >60 05/04/2021    GLUCOSE 106 05/04/2021    PROT 6.9 10/16/2017    CALCIUM 8.6 05/04/2021    BILITOT 0.22 10/16/2017    ALKPHOS 105 10/16/2017    AST 13 10/16/2017    ALT 13 10/16/2017       POC Tests:   Recent Labs     05/04/21 1946   POCGLU 119*   POCNA 143   POCK 4.8*   POCCL 106   POCBUN 20   POCHEMO 16.5*   POCHCT 49* Coags:   Lab Results   Component Value Date    PROTIME SPECIMEN GROSSLY HEMOLYZED 05/04/2021    INR SPECIMEN GROSSLY HEMOLYZED 05/04/2021    APTT SPECIMEN GROSSLY HEMOLYZED 05/04/2021       HCG (If Applicable): No results found for: PREGTESTUR, PREGSERUM, HCG, HCGQUANT     ABGs: No results found for: PHART, PO2ART, MWE5CUR, KHI5FJZ, BEART, M1PWAMMQ     Type & Screen (If Applicable):  No results found for: LABABO, LABRH    Drug/Infectious Status (If Applicable):  Lab Results   Component Value Date    HEPCAB NONREACTIVE 08/08/2017       COVID-19 Screening (If Applicable):   Lab Results   Component Value Date    COVID19 Not Detected 05/04/2021           Anesthesia Evaluation   no history of anesthetic complications:   Airway: Mallampati: II     Neck ROM: limited   Dental:          Pulmonary:   (+) COPD:  asthma: current smoker                           Cardiovascular:    (+) hypertension:,                   Neuro/Psych:   (+) CVA:,             GI/Hepatic/Renal:            ROS comment: Esophageal ring. Endo/Other:    (+) DiabetesType II DM, , hypothyroidism: arthritis:., .                 Abdominal:           Vascular:                                          Anesthesia Plan      MAC     ASA 4 - emergent       Induction: intravenous. Anesthetic plan and risks discussed with Unable to obtain due to emergent nature.                       Karolina Watts MD   5/4/2021

## 2021-05-05 NOTE — PROGRESS NOTES
Notified by nursing of small hematoma at right groin site. Writer used manual compression and notified Dr Ross Mcdermott. Pedal pulses remain palpable. [address] Ross Mcdermott and Dr Manuela Pringle at bedside for assessment. Dr Ross Mcdermott applied safeguard. Will continue to closely monitor.

## 2021-05-05 NOTE — OP NOTE
[facility]      Procedure Note  LOOP RECORDER IMPLANT      Ben Sol (18 y.o., female)  1955 5/5/2021      Procedure: Loop Recorder Implant    Operators:  Primary: Dr. Amarilis Honeycutt  Assistant: Estiven Saravia MD         Model # Serial #   Recorder [id number]       · Sedation monitoring  · Loop recorder Implant      Indication: CVA    Procedure  After the usual preparation of the left neck and chest, the patient was draped in the usual sterile manner. Local anesthesia was infused below the left clavicle from the midline laterally. An incision was made below the left breast, lateral to midline. The incision was dilated. The Loop recorder (Reveal) System was advanced using the standard techniques, and positioned successfully with no bleeding or compliaction      Impression / Device:  Successful Implantation of: Reveal / Loop Recporder      Plan:  Telemetry monitoring  Interrogate recorder before discharge  Wound check at [facility] in 7 days  Discharge if patient remains stable        Electronically signed on 05/05/21 at 4:04 PM by:    Estiven Saravia MD   Fellow, [address] [place] [address]. Amarilis Honeycutt MD, Attending Physician  [facility].

## 2021-05-05 NOTE — CONSULTS
Department of stroke/Endovascular Neurology                                           Resident Consult Note                                                     Paged at: 7:30 PM                                                     Arrived at: 7:36 PM  ED bed: 15  Reason for Consult: Left-sided weakness and numbness, right gaze deviation, left facial droop, dysarthria. Stroke attending:  Dr Barakat Fly:   [x]Dr. Marlon Sinclair   []Dr. Heavenly Vernon    History Obtained From:  patient, family member -daughter and son, electronic medical record, medical chart    CHIEF COMPLAINT:       Left-sided weakness and numbness, right gaze deviation, left facial droop, dysarthria. HISTORY OF PRESENT ILLNESS:       The patient is a 63 y.o. female right-handed with past medical history of TIA, tobacco use, COPD, hyperlipidemia, diabetes mellitus type 2 who was transferred by EMS to the hospital with Left-sided weakness and numbness, right gaze deviation, left facial droop, dysarthria. She is on aspirin 81 mg daily. Last known well was 10 PM 5/3/2021. NIHSS of 15 as described below. The patient is alert oriented x4. The patient stated that she slept around 10 PM last night, she woke up and found herself on the floor but she was not able to move until she was checked by her neighbor. EMS was called, initial systolic blood pressure was around 160s. Blood sugar within normal limits. CT head on the mobile stroke unit showed moderate acute to subacute ischemic stroke in the right MCA territory involving the right temporal, frontal and right basal ganglia with aspect score around 4. CT of the head and neck showed occlusion of the right MCA M1. The patient was loaded with rectal aspirin 300 mg. Endovascular neuro fellow Dr Sandhya Lopez was at bedside to discuss possible involving the patient in александр trial, the discussion was done with the patient, the daughter and the son.   All the risk were discussed in a clear way. NIH Stroke Scale Total (if not done complete detailed one below):    1a.  Level of consciousness:  0 - alert; keenly responsive  1b. Level of consciousness questions:  0 - answers both questions correctly  1c. Level of consciousness questions:  0 - performs both tasks correctly  2. Best Gaze:  1  3. Visual:  2  4. Facial Palsy:  2 - partial paralysis (total or near total paralysis of the lower face)  5a. Motor left arm:  2  5b. Motor right arm:  0 - no drift, limb holds 90 (or 45) degrees for full 10 seconds  6a. Motor left le - some effort against gravity; leg falls to bed by 5 seconds but has some effort against gravity  6b. Motor right le - no drift; leg holds 30 degree position for full 5 seconds  7. Limb Ataxia:  0 - absent  8. Sensory:  2 - severe to total sensory loss; patient is not aware of being touched in face, arm, leg  9. Best Language:  1 - mild to moderate aphasia; some obvious loss of fluency or facility of comprehension without significant limitation on ideas expressed or form of expression. Reduction of speech and/or comprehension, however, makes conversation about provided materials difficult or impossible. For example, in conversation about provided materials, examiner can identify picture or naming card content from patient's response. 10.  Dysarthria:  1 - mild to moderate, patient slurs at least some words and at worst, can be understood with some difficulty  11. Extinction and Inattention:  2 - profound lynette-inattention or lynette- inattention to more than one modality.   Does not recognize own hand or orients only to one side of space     NIHSS of 15          Modified Twiggs Score Scale:     [x] Zero: No symptoms at all   [] 1: No significant disability despite symptoms; able to carry out all usual duties and activities   [] 2: Slight disability; unable to carry out all previous activities, but able to look after own affairs without assistance   [] 3:Moderate disability; requiring some help, but able to walk without assistance   [] 4: Moderately severe disability; unable to walk and attend to bodily needs without assistance   [] 5:Severe disability; bedridden, incontinent and requiring constant nursing care and attention         PAST MEDICAL HISTORY :       Past Medical History:        Diagnosis Date    Asthma     COPD (chronic obstructive pulmonary disease) ([facility] [address]     Esophageal ring     Gastritis     Hyperplastic colon polyp     Osteoarthritis        Past Surgical History:        Procedure Laterality Date    APPENDECTOMY      COLONOSCOPY  09/20/2017    FRAGMENTS OF HYPERPLASTIC POLYP    ENDOSCOPY, COLON, DIAGNOSTIC      ESOPHAGEAL DILATATION      HYSTERECTOMY      [place] COLSC FLX W/RMVL OF TUMOR POLYP LESION SNARE TQ N/A 9/20/2017    COLONOSCOPY POLYPECTOMY COLD BIOPSY performed by Benson Ureña MD at [address] [place] [address] EGD TRANSORAL BIOPSY SINGLE/MULTIPLE N/A 9/20/2017    EGD BIOPSY with esophageal dilitation performed by Benson Ureña MD at [address]  09/2017    E-ring and gastritis        Social History:   Social History     Socioeconomic History    Marital status:      Spouse name: Not on file    Number of children: Not on file    Years of education: Not on file    Highest education level: Not on file   Occupational History    Not on file   Social Needs    Financial resource strain: Not on file    Food insecurity     Worry: Not on file     Inability: Not on file    Transportation needs     Medical: Not on file     Non-medical: Not on file   Tobacco Use    Smoking status: Current Every Day Smoker     Packs/day: 0.50     Years: 40.00     Pack years: 20.00     Types: Cigarettes    Smokeless tobacco: Never Used   Substance and Sexual Activity    Alcohol use: No    Drug use: No    Sexual activity: Never   Lifestyle    Physical activity     Days per week: Not on file     Minutes solution Apply topically nightly. 9/5/19   KEELY Dietz CNP   polyethylene glycol [facility]) powder Take 17 g by mouth every other day 4/16/19   KEELY Dietz CNP       Current Medications:   No current facility-administered medications for this encounter. REVIEW OF SYSTEMS:       CONSTITUTIONAL: negative for fatigue and malaise   EYES: negative for double vision and photophobia    HEENT: negative for tinnitus and sore throat   RESPIRATORY: negative for cough, shortness of breath   CARDIOVASCULAR: negative for chest pain, palpitations   GASTROINTESTINAL: negative for nausea, vomiting   GENITOURINARY: negative for incontinence   MUSCULOSKELETAL: negative for neck or back pain   NEUROLOGICAL:  Left-sided weakness, left facial droop, dysarthria   PSYCHIATRIC: negative for fatigue     Review of systems otherwise negative. PHYSICAL EXAM:       There were no vitals taken for this visit.      General Examination    General Awake   Head Normocephalic, without obvious abnormality, atraumatic   Neck Supple, symmetrical, trachea midline, No mass   Lungs Respirations unlabored   Chest Wall No deformity   Heart Regular rate and rhythm   Abdomen No masses   Extremities Extremities normal, atraumatic, no cyanosis or edema     Pulses 2+ and symmetric   Skin: Skin color, texture, turgor normal, no rashes or lesions           NEUROLOGIC EXAMINATION  GENERAL  Appears comfortable and in no distress   HEENT  NC/ AT   cardiovascular  S1 and S2 heard; palpation of pulses: radial pulse    NECK  Supple and no bruits heard   MENTAL STATUS:  Alert, oriented x4, intact memory, no confusion, mild dysarthria and mild expressive aphasia, no hallucination or delusion   CRANIAL NERVES: II     -      PERRL,   Visual fields intact to confrontation, left hemianopsia anopia  III,IV,VI -  EOMs full, partial gaze deviation to the right able to cross the midline, no afferent defect, no JORDANA, no ptosis  V     -     Normal (date and time): 10 PM, 5/3/2021    2. Candidate for IV tPA therapy     Yes []     No  [x] due to the following exclusion criteria: Out of the window    3. Candidate for Thrombectomy    Yes []      No [x] due to the following exclusion criteria: LVO, will assess for александр trial given aspect score of 4. The patient is a 63 y.o. female right-handed with past medical history of TIA, tobacco use, COPD, hyperlipidemia, diabetes mellitus type 2 who was transferred by EMS to the hospital with Left-sided weakness and numbness, right gaze deviation, left facial droop, dysarthria. She is on aspirin 81 mg daily. Last known well was 10 PM 5/3/2021. NIHSS of 15. Aspect score of 4. The patient was candidate for александр trial and was randomized to the surgical arm.    - CTH WO moderate acute to subacute stroke in the right MCA territory including the right frontal right temporal and right basal ganglia  - CTA H/N with right MCA M1 occlusion   - MRI Brain WO   - ECHO   - The patient was loaded with 300 mg of aspirin rectally   - Folic acid 1mg BID   -Lipitor 80mg nightly    - Fasting Lipid panel   - PT, OT, Speech eval    - Hydrate with 1000 cc bolus then IVF NS @ 75cc/hr    - Telemetry    - Neuro checks per protocol  - We recommend SBP < 200, permissive hypertension.  - Blood glucose goal less than 180  - Please avoid dextrose containing solutions              - Discussed with Dr Sindhu Ng and Dr Paolo Strickland. Additional recommendations may follow  Please contact [facility] with any changes in patients neurologic status. Thank you for your consult.        Funmilayo Simeon MD   5/4/2021  8:03 PM

## 2021-05-05 NOTE — PROGRESS NOTES
Dr Marlene Balderas and Dr James Galaviz at bedside with patient, patient's son and daughter discussing DAXA trial

## 2021-05-05 NOTE — PROGRESS NOTES
safety  Insights: Decreased awareness of deficits  Initiation: Requires cues for some  Sequencing: Requires cues for some  Cognition Comment: Pt demo'd L neglect        Sensation  Overall Sensation Status: Impaired(pt reports n/t in L arm and leg, able to identify pressure on L shoulder and forearm but not hand)        LUE AROM (degrees)  LUE AROM : Exceptions  LUE General AROM: flaccid LUE  Left Hand AROM (degrees)  Left Hand General AROM: flaccid LUE  RUE AROM (degrees)  RUE AROM : WFL  Right Hand AROM (degrees)  Right Hand AROM: WFL  LUE Strength  Gross LUE Strength: Exceptions to [facility]  L Hand General: 0/5  LUE Strength Comment: flaccid LUE  RUE Strength  Gross RUE Strength: WFL  R Hand General: 5/5      Plan   Plan  Times per week: 3-5x/wk    AM-PAC Score  AM-[facility] Inpatient Daily Activity Raw Score: 12 (05/05/21 1504)  AM-PAC Inpatient ADL T-Scale Score : 30.6 (05/05/21 1504)  ADL Inpatient CMS 0-100% Score: 66.57 (05/05/21 1504)  ADL Inpatient CMS G-Code Modifier : CL (05/05/21 1504)    Goals  Short term goals  Time Frame for Short term goals: pt will, by discharge  Short term goal 1: dem UB ADL with min A and AE PRN  Short term goal 2: dem LB ADL/toileting with mod A and AE PRN  Short term goal 3: complete functional transfer with max A and LRD PRN  Short term goal 4: engage in functional activity for 20+ minutes with one rest break PRN  Short term goal 5: complete dynamic/static sitting during functional activity with mod A for 20+ minutes  Short term goal 6: dem improved awareness of L side by weight bearing on LUE for 20+ minutes  Short term goal 7: dem improved safety awareness by requiring less than 3 v/c's for safety during functional activity  Short term goal 8: dem compensatory head movement/visual scanning techniques with 5 v/c's to improve awareness of L side     Therapy Time   Individual Concurrent Group Co-treatment   Time In 1323         Time Out 1359         Minutes 36      Co-eval with PT   Timed Code Treatment Minutes: Grisel Delgado

## 2021-05-05 NOTE — PROGRESS NOTES
Physical Therapy    Facility/Department: [address]  Initial Assessment    NAME: Dannielle Rios  : 1955  MRN: [id number]    Date of Service: 2021  Chief Complaint   Patient presents with    Cerebrovascular Accident     Pt arrives via MSU straight to CT scanner       Discharge Recommendations:    Further therapy recommended at discharge. PT Equipment Recommendations  Equipment Needed: (Continue to assess pending progression of mobility- pt currently unsafe to perform functional mobility unassisted)    Assessment   Body structures, Functions, Activity limitations: Decreased functional mobility ; Decreased posture;Decreased endurance;Decreased ROM; Decreased strength;Decreased balance; Increased pain  Assessment: Pt required maxAx2 to perform bed mobility, maxA to correct L trunk lean while seated EOB for ~8 minutes. Pt is a high fall risk and would currently be unsafe to perform functional mobility unassisted. Pt is expected to require continued skilled physical therapy to address functional mobility deficits to maximize independence. Prognosis: Good  Decision Making: Medium Complexity  PT Education: Goals;PT Role;Plan of Care;General Safety; Family Education  Patient Education: Educated patient and family at bedside on PT POC, continuation of therapy, sensitization activites for RUE  REQUIRES PT FOLLOW UP: Yes  Activity Tolerance  Activity Tolerance: Patient Tolerated treatment well       Patient Diagnosis(es): The encounter diagnosis was Cerebrovascular accident (CVA) due to occlusion of right middle cerebral artery ([address] has a past medical history of Asthma, COPD (chronic obstructive pulmonary disease) ([address] Esophageal ring, Gastritis, Hyperplastic colon polyp, Osteoarthritis, and Patient in clinical research study. has a past surgical history that includes Appendectomy; Hysterectomy; Esophagus dilation; Colonoscopy (2017);  Endoscopy, colon, diagnostic; pr egd transoral biopsy single/multiple (N/A, 9/20/2017); pr colsc flx w/rmvl of tumor polyp lesion snare tq (N/A, 9/20/2017); and Upper gastrointestinal endoscopy (09/2017). Restrictions  Restrictions/Precautions  Restrictions/Precautions: Fall Risk  Required Braces or Orthoses?: No  Position Activity Restriction  Other position/activity restrictions: up w/assist; significant L neglect with LUE/LLE deficits  Vision/Hearing  Vision: Impaired(pt demo'd R gaze and L neglect.  Pt able to track visual target from R side to midline of each eye, demo'd convergence in B eyes)  Vision Exceptions: Wears glasses at all times  Hearing: Exceptions to [facility]  Hearing Exceptions: Hard of hearing/hearing concerns(chronic hearing loss in L ear)     Subjective  General  Patient assessed for rehabilitation services?: Yes  Response To Previous Treatment: Not applicable  Family / Caregiver Present: Yes(pt's daughter and son at bedside at end of session)  Follows Commands: Within Functional Limits  Subjective  Subjective: RN and pt in agreement for PT eval; pt supine in bed upon PT arrival, pt pleasant and cooperative throughout session  Pain Screening  Patient Currently in Pain: Yes  Pain Assessment  Pain Assessment: 0-10  Pain Level: 4  Pain Type: Acute pain  Pain Location: Ear  Pain Orientation: Right  Pain Descriptors: Discomfort  Non-Pharmaceutical Pain Intervention(s): Ambulation/Increased Activity;Repositioned  Response to Pain Intervention: Patient Satisfied  Multiple Pain Sites: No  Vital Signs  Patient Currently in Pain: Yes       Orientation  Orientation  Overall Orientation Status: Within Functional Limits  Social/Functional History  Social/Functional History  Lives With: Alone  Type of Home: House  Home Layout: Two level, Able to Live on Main level with bedroom/bathroom  Home Access: Stairs to enter without rails  Entrance Stairs - Number of Steps: 6  Bathroom Shower/Tub: Tub/Shower unit  Bathroom Toilet: Standard  Home Equipment: (no use of DME at baseline)  ADL Assistance: Independent  Homemaking Assistance: Independent  Homemaking Responsibilities: Yes(pt responsible for IADLs)  Ambulation Assistance: Independent  Transfer Assistance: Independent  Active : Yes  Mode of Transportation: [facility]  Occupation: Retired  Type of occupation: grocery store employee  Leisure & Hobbies: repurposing items  Cognition   Cognition  Overall Cognitive Status: Exceptions  Arousal/Alertness: Delayed responses to stimuli  Following Commands: Follows one step commands with repetition; Follows multistep commands with increased time  Attention Span: Attends with cues to redirect  Safety Judgement: Decreased awareness of need for assistance;Decreased awareness of need for safety  Insights: Decreased awareness of deficits  Initiation: Requires cues for some  Sequencing: Requires cues for some    Objective     Observation/Palpation  Observation: Significant L neglect noted with no crossing of midline despite maximal cueing.  Flexion synergy noted of LLE while seated EOB    Joint Mobility  Spine: WFL  ROM RLE: WFL  ROM LLE: PROM WFL  ROM RUE: WFL  ROM LUE: PROM WFL  Strength RLE  Strength RLE: WFL  Strength LLE  Strength LLE: Exception  L Hip Flexion: 0/5  L Knee Flexion: 0/5  L Knee Extension: 0/5  L Ankle Dorsiflexion: 0/5  L Ankle Plantar Flexion: 0/5  Strength RUE  Strength RUE: WFL  Strength LUE  Strength LUE: Exception  L Shoulder Flexion: 0/5  L Elbow Flexion: 0/5  L Elbow Extension: 0/5  L Wrist Flexion: 0/5  L Wrist Extension: 0/5     Sensation  Overall Sensation Status: Impaired(pt reports n/t in L arm and leg, able to identify pressure on L shoulder and forearm but not hand)  Bed mobility  Comment: pt required maxA to maintain upright trunk posture and correct L trunk lean while seated EOB for ~8 minutes  Transfers  Comment: unable to formally assess due to poor sitting tolerance  Ambulation  Ambulation?: No  Stairs/Curb  Stairs?: No     Balance  Posture: Fair  Sitting - Static: Poor;+  Sitting - Dynamic: Poor;-  Comments: pt required maxA to maintain upright trunk posture while seated EOB        Plan   Plan  Times per week: 5-6x/week  Current Treatment Recommendations: Strengthening, Transfer Training, Endurance Training, Patient/Caregiver Education & Training, ROM, Balance Training, Gait Training, Home Exercise Program, Functional Mobility Training, Stair training, Safety Education & Training  Safety Devices  Type of devices: Left in bed, Bed alarm in place, Call light within reach, Gait belt, Nurse notified, Patient at risk for falls  Restraints  Initially in place: No    AM-PAC Score     AM-PAC Inpatient Mobility without Stair Climbing Raw Score : 7 (05/05/21 1556)  AM-PAC Inpatient without Stair Climbing T-Scale Score : 28.66 (05/05/21 1556)  Mobility Inpatient CMS 0-100% Score: 86.29 (05/05/21 1556)  Mobility Inpatient without Stair CMS G-Code Modifier : CM (05/05/21 1556)       Goals  Short term goals  Time Frame for Short term goals: 14  Short term goal 1: Pt to perform bed mobility Fabiola  Short term goal 2: Pt to attempt functional transfers to [address] term goal 3: Demonstrate dynamic sitting balance of fair + to maximize independence  Short term goal 4: Demonstrate standing dynamic balance of fair + to decrease fall risk during functional transfers  Patient Goals   Patient goals :  To go home       Therapy Time   Individual Concurrent Group Co-treatment   Time In 1323         Time Out 1359         Minutes 36         Timed Code Treatment Minutes: 23 Minutes       Jo Ann Kirby, PT

## 2021-05-05 NOTE — PROGRESS NOTES
Endovascular Neurosurgery Progress Note    SUBJECTIVE:   No reported events since the procedure. Pt denies any new symptoms. She wants to go home. Review of Systems:  CONSTITUTIONAL:  negative for fevers, chills, fatigue and malaise    EYES:  negative for double vision, blurred vision and photophobia     HEENT:  negative for tinnitus, epistaxis and sore throat    RESPIRATORY:  negative for cough, shortness of breath, wheezing    CARDIOVASCULAR:  negative for chest pain, palpitations, syncope, edema    GASTROINTESTINAL:  negative for nausea, vomiting    GENITOURINARY:  negative for incontinence    MUSCULOSKELETAL:  negative for neck or back pain    NEUROLOGICAL:  Negative for weakness and tingling  negative for headaches and dizziness    PSYCHIATRIC:  negative for anxiety      Review of systems otherwise negative. OBJECTIVE:     Vitals:    05/05/21 0857   BP: 131/69   Pulse: 73   Resp: 18   Temp:    SpO2: 93%        General:  Gen: normal habitus, NAD  HEENT: NCAT, mucosa moist  Cvs: RRR, S1 S2 normal  Resp: symmetric unlabored breathing  Abd: s/nd/nt  Ext: no edema  Skin: no lesions seen, warm and dry. R groin site resolved hematoma    Neuro:  Gen: awake and alert, oriented x3. Lang/speech: no aphasia. Mild dysarthria. Follows commands. CN: PERRL, R gaze unable to cross midline, L hemianopia, LT decreased L face,severe L droop, hearing intact  Motor: R hemibody 5/5. LUE 1/5, LLE 0/5  Sense: decreased LT LUE, L side neglect. Coord: deferred  DTR: deferred  Gait: deferred    NIH Stroke Scale:   1a  Level of consciousness: 0 - alert; keenly responsive   1b. LOC questions:  0 - answers both questions correctly   1c. LOC commands: 0 - performs both tasks correctly   2. Best Gaze: 2 - forced deviation, or total gaze paresis not overcome by oculocephalic maneuver   3. Visual: 3 - bilateral hemianopia (blind including cortical blindness   4.  Facial Palsy: 2 - partial paralysis (total or near total paralysis of the lower face)   5a. Motor left arm: 4 - no movement   5b. Motor right arm: 0 - no drift, limb holds 90 (or 45) degrees for full 10 seconds   6a. Motor left le - no movement   6b  Motor right le - no drift; leg holds 30 degree position for full 5 seconds   7. Limb Ataxia: 0 - absent   8. Sensory: 2 - severe to total sensory loss; patient is not aware of being touched in face, arm, leg   9. Best Language:  0 - no aphasia, normal   10. Dysarthria: 1 - mild to moderate, patient slurs at least some words and at worst, can be understood with some difficulty   11. Extinction and Inattention: 2 - profound lynette-inattention or lynette- inattention to more than one modality. Does not recognize own hand or orients only to one side of space          Total:   20     MRS: 5      LABS:   Reviewed. RADIOLOGY:   Images were personally reviewed including:  MRI brain wo con 2021  Subacute right MCA distribution infarct most pronounced in the frontotemporal   region and right basal ganglia with a hemorrhagic component and hemosiderin   staining involving the right basal ganglia. DSA 2021  --acute right M1 MCA occlusion  --attempted mechanical thrombectomy (solitaire x 4x40mm and trevo 4x41mm stent retriever with [id number] and catalyst 5 reperfusion catheter and infinity 088 long sheath aspiration), tici 0 after 6 passes    ASSESSMENT:   61yo female with pmh of tia, copd, osteoarthritis, smoker, gastritis. Pt presents as wake up R MCA syndrome, imaging shows large core stroke (ASPECTS 4) with R M1 occlusion. etio suspect cardioembolic. S/p MT TICI 0 after 6 passes. Pt is enrolled in DAXA study    Pt has residual L hemiplegia, R gaze, neglect, L hemianopia, L droop, dysarthria. PLAN:   --SBP goal   --repeat ct head 2021 AM  --f/u MRI brain wo con  --continue asa 81  --Upon discharge follow up with Dr. Pineda in 2 weeks after discharge and Dr. Silvano Pitt in 3 months after discharge.     Case discussed with Dr. Roy Memory attending.     Bard Hailey MD, PhD    Stroke, [facility]  [facility]  Electronically signed 5/5/2021 at 9:30 AM

## 2021-05-05 NOTE — ED NOTES
Vitals during ct scan as follows:    144/94 (110)  95BPM  98% RA  23 RR       Ira Gregory RN  05/04/21 2027

## 2021-05-05 NOTE — SEDATION DOCUMENTATION
Pt transported to Neuro IR on monitor with Dr Christiano MANDUJANO and family. Transferred from stretcher to IR table and applied to monitors. Pt is alert with left facial droop, LUE weakness, weak hand grasp with slight antigravity. LLE slight antigravity and able to wiggle toes. Decreased sensation left side, dysarthria and left neglect/ left gaze preference but can cross midline. Pedal pulses are palpable and marked. Continue to closely monitor.

## 2021-05-05 NOTE — ED NOTES
Patient depart from ED with Jacklyn Larson with Neuro team and family     Wenceslao Fabian RN  05/04/21 5420

## 2021-05-05 NOTE — PLAN OF CARE
Problem: HEMODYNAMIC STATUS  Goal: Patient has stable vital signs and fluid balance  5/5/2021 1750 by Ashlyn Quesada RN  Outcome: Ongoing  Note: Patient with stable vital signs and fluid balance. Problem: Skin Integrity:  Goal: Absence of new skin breakdown  Description: Absence of new skin breakdown  5/5/2021 1750 by sAhlyn Quesada RN  Outcome: Ongoing  Note: No new skin breakdown noted. Patient repositioned q2h. Problem: Falls - Risk of:  Goal: Will remain free from falls  Description: Will remain free from falls  5/5/2021 1750 by Ashlyn Quesada RN  Outcome: Ongoing  Note: Patient remains free from falls this shift. Problem: Pain:  Goal: Pain level will decrease  Description: Pain level will decrease  Outcome: Ongoing  Note: Pain level assessment complete. Pt educated on pain scale and control interventions. PRN pain medication given per pt request. Pt instructed to call out with new onset of pain or unrelieved pain. Will continue to monitor.

## 2021-05-05 NOTE — BRIEF OP NOTE
[facility]    NEUROENDOVASCULAR SERVICE: POST-OP NOTE: 5/5/2021    Pt Name: Racquel Stanford  MRN: [id number]  YOB: 1955  Date of Procedure: 5/5/2021  Primary Care Physician: KEELY Proctor - CNP      Pre-Procedural Diagnosis: acute right M1 MCA occlusion with subacute changes  Post-Procedural Diagnosis: as above      Procedure Performed: mechanical thrombectomy (stent retriever with reperfusion catheter and long sheath aspiration) of the right m1 mca occlusion    Surgeon:   Leslee Gold MD    Fellow:  Kaylie Felix MD, PhD     1st Assistant:  Lissa Gonzalez    PRE-PROCEDURAL EXAM:  Prestroke baseline mRS MODIFIED AGUSTIN SCORE: 0 - No symptoms at all. Neurological exam performed and unchanged from initial H&P or consult  CT head ASPECT score: 4    Anesthesia: MAC anesthesia  Complications: none    Intra-Operative EXAM:  Neurological exam performed and unchanged from initial H&P or consult    EBL: < less than 150       Cc            Specimens: Were not Obtained  Contrast:     Visipaque 270 low osmolar 138 Cc             Fluoro: 37.9 min    Findings:  Please see dictated Radiology note for further details  --acute right M1 MCA occlusion  --attempted mechanical thrombectomy (solitaire x 4x40mm and trevo 4x41mm stent retriever with vecta71 and catalyst 5 reperfusion catheter and infinity 088 long sheath aspiration), tici 0 after 6 passes    TICI score: 0    POST-PROCEDURAL EXAM :   Mildly worse exam: R gaze unable to cross midline. L hemiplegia. Other findings same. Closure:  right StarClose 6   F        POST-PROCEDURAL MONITORING : see orders  Disposition: Neuro ICU      Recommendations:  --Back to NSICU  --Do not bend right leg for 3 hours. --Groin checks per protocol. --Peripheral pulse checks per protocol.   --SBP goal   --repeat ct head in the am  --continue asa 81  --Upon discharge follow up with Dr. Alexandria Morgan in 2 weeks after discharge and Dr. Favio Olivera in 3 months after discharge.     Mariela Gay MD PhD fellow    Marbin Rider MD   Pager [phone]  Stroke, [facility]  [facility] [address]  Electronically signed 5/5/2021 at 1:25 AM

## 2021-05-05 NOTE — RESEARCH
[address]    Patient Name: Racquel Stanford  MRN: [id number]  YOB: 1955  Date of Consent: 04 May 2021  Time of Consent: 21:26  Reason for evaluation: Ischemic Stroke; Consent and Randomization    Protocol No: DAXA  Protocol Version:  1.3  NCT: [id number]  NATALIYA: [id number]  IRB: 2018-49  Site PI: Horace Pereira MD    Treatment/Alternatives/Risks:  Dr. Rosy Devi met with the patients family and explained the research treatment, risks, alternative treatments, and expected outcomes. He provided a copy of the DAXA consent for their review. The patients family read the consent and all their questions were answered. Kenyetta Rice family wished her to participate in the study and the informed consent was signed by her son at 21:26 on 04 May 2021. Randomization into study arm completed after consent. Consent Version Date:  03/22/21  How was the consent form discussed with the study participant or LAR? In Person    How was the consent form transmitted to the study participant or LAR?  Please check which one applies  In Person    How was the consent form sent back to the research team? Please check which one applies  In [address] Margaret Farmer [address] [place] [address], [facility] Gary Elaine  P: [phone]  F: [phone]

## 2021-05-05 NOTE — CARE COORDINATION
Case Management Initial Discharge Plan  [address],             Met with:patient to discuss discharge plans. Information verified: address, contacts, phone number, , insurance Yes    Emergency Contact/Next of Kin name & number: Shekhar Sena [phone]    PCP: Luis A Castellon MD  Date of last visit:2021  Insurance Provider: Arline Monzon    Discharge Planning    Living Arrangements:      Support Systems:       Home has 2 stories  few stairs to climb to get into front door, flight stairs to climb to reach second floor  Location of bedroom/bathroom in home upper    Patient able to perform ADL's:Independent    Current Services (outpatient & in home) none   DME equipment: none   DME provider: n/a    Receiving oral anticoagulation therapy? No    If indicated:   Physician managing anticoagulation treatment:   Where does patient obtain lab work for ATC treatment? Potential Assistance Needed:       Patient agreeable to home care: Yes  Freedom of choice provided:  yes    Prior SNF/Rehab Placement and Facility:   Agreeable to SNF/Rehab: Yes  [place] of choice provided: yes     Evaluation: no    Expected Discharge date:       Patient expects to be discharged to: Follow Up Appointment: Best Day/ Time:      Transportation provider: self/family  Transportation arrangements needed for discharge: Yes    Readmission Risk              Risk of Unplanned Readmission:        7             Does patient have a readmission risk score greater than 14?: No  If yes, follow-up appointment must be made within 7 days of discharge.      Goals of Care: safety      Discharge Plan: home vs ARU pending therapy          Electronically signed by Plata RN on 21 at 2:10 PM EDT

## 2021-05-05 NOTE — ED PROVIDER NOTES
[address] ED     Emergency Department     Faculty Attestation        I performed a history and physical examination of the patient and discussed management with the resident. I reviewed the residents note and agree with the documented findings and plan of care. Any areas of disagreement are noted on the chart. I was personally present for the key portions of any procedures. I have documented in the chart those procedures where I was not present during the key portions. I have reviewed the emergency nurses triage note. I agree with the chief complaint, past medical history, past surgical history, allergies, medications, social and family history as documented unless otherwise noted below. For Physician Assistant/ Nurse Practitioner cases/documentation I have personally evaluated this patient and have completed at least one if not all key elements of the E/M (history, physical exam, and MDM). Additional findings are as noted. Vital Signs: BP: (!) 155/96  Pulse: 97  Resp: 23    SpO2: 95 %  PCP:  Haroon Azul, APRN - CNP    Pertinent Comments:     Brought in by mobile stroke unit with right gaze and left-sided deficits. Initial CT head with acute chronic stroke on mobile stroke unit CT. Last known normal was 21 hours ago. Patient arrives and go stat to CT for CTAs of head neck. Neuro interventional team in the [place] [id number] with the patient. CRITICAL CARE: There was a high probability of clinically significant/life threatening deterioration in this patient's condition which required my urgent intervention. Total critical care time was 20 minutes. This excludes any time for separately reportable procedures. This patient was evaluated in the Emergency Department for symptoms described in the history of present illness.  He/she was evaluated in the context of the global COVID-19 pandemic, which necessitated consideration that the patient might be at risk for infection with the SARS-CoV-2 virus that causes COVID-19. Institutional protocols and algorithms that pertain to the evaluation of patients at risk for COVID-19 are in a state of rapid change based on information released by regulatory bodies including the CDC and federal and state organizations. These policies and algorithms were followed during the patient's care in the ED. (Please note that portions of this note were completed with a voice recognition program. Efforts were made to edit the dictations but occasionally words are mis-transcribed.  Whenever words are used in this note in any gender, they shall be construed as though they were used in the gender appropriate to the circumstances; and whenever words are used in this note in the singular or plural form, they shall be construed as though they were used in the form appropriate to the circumstances.)    MD Sonny Gutierres  Attending Emergency Medicine Physician            Francy Cadena MD  05/04/21 2026

## 2021-05-05 NOTE — VIRTUAL HEALTH
[facility] Stroke and Vascular Neurology Consult for  [facility] Stroke Unit Stroke Alert through [address] @ 7:30 pm  5/4/2021 8:12 PM  Pt Name: Rema Allen  MRN: [id number]  YOB: 1955  Date of evaluation: 5/4/2021  Primary Care Physician: KEELY Simpson CNP  Reason for Evaluation: Stroke evaluation with Phone Consult, Discussion and Review of imaging    Rema Allen is a 63 y.o. female with past medical history including TIA with no residual symptoms. Last known well at 10 PM on 5/3. She was brought with right gaze/left-sided weakness. LKW: 10 PM on 5/3. NIH:  13    Allergies  has No Known Allergies. Medications  Prior to Admission medications    Medication Sig Start Date End Date Taking? Authorizing Provider   levothyroxine (SYNTHROID) 25 MCG tablet TAKE 1 AND 1/2 TABLET BY MOUTH DAILY 12/29/20   KEELY Rodriguez CNP   albuterol sulfate HFA (VENTOLIN HFA) 108 (90 Base) MCG/ACT inhaler INHALE TWO PUFFS BY MOUTH EVERY 4 HOURS AS NEEDED FOR WHEEZING OR SHORTNESS OF BREATH 8/19/20   KEELY Rodriguez CNP   budesonide-formoterol [facility]) 160-4.5 MCG/ACT AERO Inhale 2 puffs into the lungs 2 times daily 8/19/20   KEELY Rodriguez CNP   tiotropium (SPIRIVA RESPIMAT) 2.5 MCG/ACT AERS inhaler Inhale 2 puffs into the lungs daily 8/19/20   KEELY Rodriguez CNP   meloxicam (MOBIC) 7.5 MG tablet Take 1 tablet by mouth daily 8/19/20   KEELY Rodriguez CNP   metFORMIN (GLUCOPHAGE-XR) 500 MG extended release tablet TAKE ONE TABLET BY MOUTH TWICE A DAY BEFORE MEALS 8/19/20   KEELY Rodriguez CNP   atorvastatin (LIPITOR) 20 MG tablet Take 1 tablet by mouth daily 8/12/20   KEELY Rodriguez CNP   ciclopirox (PENLAC) 8 % solution Apply topically nightly.  9/5/19   KEELY Rodriguez CNP   polyethylene glycol (MIRALAX) powder Take 17 g by mouth every other 5' 8\" (1.727 m)   Wt 200 lb (90.7 kg)   SpO2 95%   BMI 30.41 kg/m²   Reported right gaze, left-sided weakness, left facial droop. Pre-Morbid mRS: 0    Imaging:  Images were personally reviewed with KAILEE. MELISA used to review images including:  CT brain without contrast: No acute bleeding noted. Right MCA moderate to large ischemic stroke    Assessment    Differential DDx:  1. Acute ischemic stroke. 2. Moderate to large core ischemic stroke. Recommendations:  1. NIH 13  2. Recommend Inpatient Neurology Consult for further assessment and evaluation   3. IV fluids  4. Give aspirin 325 mg now  5. Obtain CTA head and neck. 6. Transfer to [facility] ED. 7. Evaluating for possible александр candidate      Discussed with Stroke team      This is a Phone Consult, I have not seen the patient face to face, the telemedicine device was not utilized.     Ally Bagley MD   Stroke, Neurocritical Care And/or [address] [place] [address] Stroke [address]  Electronically signed 5/4/2021 at 8:12 PM

## 2021-05-05 NOTE — PROGRESS NOTES
GROUP THERAPY PROGRESS NOTE      Joelle Pool was present for medication group. GROUP TIME: 45 minutes, Thursdays 2pm    PERSONAL GOAL FOR PARTICIPATION: To be present for group, participate in discussion, and answer patient-directed questions. GOAL ORIENTATION: Personal    THERAPEUTIC INTERVENTIONS REVIEWED AND DISCUSSED: The following topics were presented: storage of medications, how to remember to refill medications and keep up with doctor appointments, relapse prevention, keeping a record of all medication including prescription and non-prescription drugs, and who to contact with medication questions. Patients were given time to ask questions regarding their current therapy. IMPRESSION OF PARTICIPATION: Shanika Lebron arrived late to group, remained primarily quiet throughout group but did occasionally provide answers to discussion questions. She was provided with a pillbox at the conclusion of group.      Alda Govea, PHARMD, BCPS RAPID Covid 19 swab taken from left nare, labeled, placed in red dot bag, and handed off to second healthcare worker outside of room for transport to laboratory per hospital policy and procedure. Patient tolerated procedure well.

## 2021-05-05 NOTE — OP NOTE
[facility]  Transesophageal Echocardiogram       Today's Date:  5/5/2021  Ordering Physician:  Dr. Jf Snow  Indication:   CVA    Operators:  Primary:   Dr. Anamika Negrete (Attending Physician)  Assistant:   Angelene Sever, MD (Cardiovascular Fellow)      Pre Procedure Conscious Sedation Data:    ASA Class:    [] I [x] II [] III [] IV    Mallampati Class:  [] I [x] II [] III [] IV    Procedure:    Patient seen and examined. History and Physical reviewed. Labs reviewed. After informed consent was obtained with explanation of the risks and benefits, the patient was brought to cardiac cath lab. All sedation was administered by the cardiologist. The oropharynx was pre-anesthesized with viscous lidocaine and cetacaine spray. The ultrasound probe was passed without any difficulty. PHYLLIS findings:    LA:  Normal  ANNETTA:  No thrombus  RA:  Normal  RV:   Normal  LV:  Normal  Estimated LVEF:  55%  Aorta:   Mild atheromatous disease arch  Pericardium: No pericardial effusion  Septum:  No intracardiac shunt via color Doppler. No intracardiac shunt via injection of agitated saline. Valves:    Mitral Valve: Structurally normal. No regurgitation is identified. Aortic Valve: The aortic valve is trileaflet and opens adequately. No regurgitiation is identified. Tricuspid valve: Structurally normal. No regurgitation is identified. Pulmonary valve: Normal. No significant regurgitation    No valvular vegetations or thrombus identified. Summary:     1. A PHYLLIS was performed without complications. 2. LVEF 55%  3. No thrombus or valvular vegetation identified. 4. No intra-cardiac shunt identified. Electronically signed by Angelene Sever, MD on 5/5/2021 at 4:02 PM  Cardiovascular Diseases Fellow  [address] [place] [address]. Anamika Negrete MD, Attending Physician  [facility].

## 2021-05-05 NOTE — ED NOTES
Pt presents to ED s/p stroke, pt last known well 2200 5/3/21. Pt states she was on the couch leaning over for the remote when she fell and was not able to get up. Family found her today 5/4/21 and called EMS, EMS arrival 1900 5/4/21. Pt arrival to [address] [employer] went straight to CT scanner then roomed to room 15. Stroke team present on arrival. Pt A&Ox4 and is aware of situation. Pt has left side dullness. NIH to be charted by stroke team. Stroke team at bedside to assess.      Estefani Blood RN  05/04/21 2024

## 2021-05-05 NOTE — PROGRESS NOTES
Speech Language Pathology  [address]   CLINICAL BEDSIDE SWALLOW EVALUATION    NAME: Ofelia Marinelli  : 1955  MRN: [id number]    ADMISSION DATE: 2021  ADMITTING DIAGNOSIS: has Pneumonia of left lower lobe due to infectious organism; Tobacco abuse; Legionella pneumonia ([facility] [address] Dysphagia; Hyperplastic colon polyp; Esophageal ring; Gastritis; Elbow effusion, right; and Stroke due to occlusion of right middle cerebral artery ([facility] [address] on their problem list.      Date of Eval: 2021  Evaluating Therapist: Toño Mccoy    Current Diet level:  Current Diet : NPO  Current Liquid Diet : NPO      Primary Complaint: Ofelia Marinelli is a 63 y.o. female who presents with left-sided weakness, sensory loss, hemineglect, right-sided gaze preference with last known well 10 PM last night. Patient apparently rolled off her couch, and has been this way since. Patient arrived at 7:40 PM with stroke team at bedside and was immediately in the [place] [id number]. Patient did present for mobile stroke received a normal stroke that showed an acute/subacute right MCA territory infarct. Stroke team at bedside upon arrival, additional imaging ordered. Patient denies any complaints other than the weakness on the left side. Past medical history of diabetes, hypothyroid, COPD, TIA approximately 15 years ago       Pain:  Pain Assessment  Pain Level: 4  Pain Type: Acute pain  Pain Location: Head    Reason for Referral  Ofelia Marinelli was referred for a bedside swallow evaluation to assess the efficiency of her swallow function, identify signs and symptoms of aspiration and make recommendations regarding safe dietary consistencies, effective compensatory strategies, and safe eating environment. Impression: Patient presents with  safe swallow for Dysphagia Pureed (Dysphagia I)  with Mildly Thick (Nectar) liquids as evidenced by no overt s/s of aspiration noted with consistencies tested.   Recommend small sips and bites, only feed when alert and awake and upright at 90 degrees for all PO intake. Recommend close monitoring for overt/clinical s/s of aspiration and D/C PO intake and complete Modified Barium Swallow Study should they occur. Results and recommendations reported to RN. Dysphagia Outcome Severity Scale: Level 3: Moderate dysphagia- Total assisstance, supervision or strategies. Two or more diet consistencies restricted     Treatment Plan  Requires SLP Intervention: Yes  Duration/Frequency of Treatment: 3-5X/week  D/C Recommendations: To be determined       Recommended Diet and Intervention  Diet Solids Recommendation: Dysphagia Pureed (Dysphagia I)  Liquid Consistency Recommendation: Mildly Thick (Nectar)  Recommended Form of Meds: Meds in puree     Therapeutic Interventions: Diet tolerance monitoring;Oral motor exercises    Compensatory Swallowing Strategies  Compensatory Swallowing Strategies: Small bites/sips;Eat/Feed slowly;Upright as possible for all oral intake    Treatment/Goals  Dysphagia Goals: The patient will tolerate recommended diet without observed clinical signs of aspiration; The patient will tolerate instrumental swallowing procedure    General  Chart Reviewed: Yes  Behavior/Cognition: Alert; Cooperative  Respiratory Status: O2 via nasual cannula  Communication Observation: Functional  Follows Directions: Complex  Dentition: Some missing teeth  Patient Positioning: Upright in bed  Baseline Vocal Quality: Normal  Volitional Cough: Strong  Consistencies Administered: Dysphagia Soft and Bite-Sized (Dysphagia III); Dysphagia Pureed (Dysphagia I); Nectar - teaspoon;Nectar - straw; Thin - straw; Thin - cup    Oral Motor Deficits  Oral/Motor  Oral Motor: Exceptions to [facility]  Labial ROM: Reduced left  Labial Symmetry: Abnormal symmetry left  Labial Strength: Reduced  Labial Sensation: Reduced  Lingual Symmetry: Abnormal symmetry left  Lingual Strength: Reduced  Lingual Sensation: Reduced    Oral Phase Dysfunction  Oral Phase  Oral Phase: Exceptions  Oral Phase  Oral Phase - Comment: Extended oral manipulation with mild stasis on the right     Indicators of Pharyngeal Phase Dysfunction   Pharyngeal Phase  Pharyngeal Phase: Exceptions  Pharyngeal Phase   Pharyngeal: Thin: + immediate coughing noted X2 with cup and straw, + throat clear noted 2/2. Nectar thick, Puree: No overt s/s of aspiration noted with consistencies tested.   Fruit: + immediate coughing noted with peach trials    Prognosis  Prognosis  Prognosis for safe diet advancement: fair  Individuals consulted  Consulted and agree with results and recommendations: Patient    Education  Patient Education: yes  Patient Education Response: Verbalizes understanding             Therapy Time  SLP Individual Minutes  Time In: 0930  Time Out: 0940  Minutes: [address]  5/5/2021 9:46 AM

## 2021-05-05 NOTE — CONSULTS
Hyperplastic colon polyp, and Osteoarthritis. Past Surgical History   has a past surgical history that includes Appendectomy; Hysterectomy; Esophagus dilation; Colonoscopy (09/20/2017); Endoscopy, colon, diagnostic; pr egd transoral biopsy single/multiple (N/A, 9/20/2017); pr colsc flx w/rmvl of tumor polyp lesion snare tq (N/A, 9/20/2017); and Upper gastrointestinal endoscopy (09/2017). Social History   reports that she has been smoking cigarettes. She has a 20.00 pack-year smoking history. She has never used smokeless tobacco.   reports no history of alcohol use. reports no history of drug use. Family History  family history includes Cancer in her brother; Diabetes in her mother; Other in her mother; Prostate Cancer in her father. Review of Systems:  CONSTITUTIONAL:  negative for fevers, chills, fatigue and malaise    EYES:  negative for double vision, blurred vision and photophobia     HEENT:  negative for tinnitus, epistaxis and sore throat    RESPIRATORY:  negative for cough, shortness of breath, wheezing    CARDIOVASCULAR:  negative for chest pain, palpitations, syncope, edema    GASTROINTESTINAL:  negative for nausea, vomiting    GENITOURINARY:  negative for incontinence    MUSCULOSKELETAL:  negative for neck or back pain    NEUROLOGICAL:  Negative for weakness and tingling  negative for headaches and dizziness    PSYCHIATRIC:  negative for anxiety      Review of systems otherwise negative. OBJECTIVE:     Vitals:    05/04/21 2130   BP: (!) 142/80   Pulse: 87   Resp:    SpO2:         General:  Gen: normal habitus, NAD  HEENT: NCAT, mucosa moist  Cvs: RRR, S1 S2 normal  Resp: symmetric unlabored breathing  Abd: s/nd/nt  Ext: no edema  Skin: no lesions seen, warm and dry    Neuro:  Gen: awake and alert, oriented x3. Lang/speech: no aphasia or dysarthria. Follows commands. CN: PERRL, R gaze pref able to overcome, L hemianopia, V1-3 intact, hearing intact, L face droop   Motor: R hemibody 5/5.  L hemibody 3-/5 severe drift  Sense: decreased LT on L hemibody. L neglect  Coord: no gross ataxia  DTR: deferred  Gait: deferred    NIH Stroke Scale:   1a  Level of consciousness: 0 - alert; keenly responsive   1b. LOC questions:  0 - answers both questions correctly   1c. LOC commands: 0 - performs both tasks correctly   2. Best Gaze: 1 - partial gaze palsy   3. Visual: 2 - complete hemianopia   4. Facial Palsy: 2 - partial paralysis (total or near total paralysis of the lower face)   5a. Motor left arm: 2 - some effort against gravity, limb cannot get to or maintain (if cued) 90 (or 45) degrees, drifts down to bed, but has some effort against gravity    5b. Motor right arm: 0 - no drift, limb holds 90 (or 45) degrees for full 10 seconds   6a. Motor left le - some effort against gravity; leg falls to bed by 5 seconds but has some effort against gravity   6b  Motor right le - no drift; leg holds 30 degree position for full 5 seconds   7. Limb Ataxia: 0 - absent   8. Sensory: 2 - severe to total sensory loss; patient is not aware of being touched in face, arm, leg   9. Best Language:  1 - mild to moderate aphasia; some obvious loss of fluency or facility of comprehension without significant limitation on ideas expressed or form of expression. Reduction of speech and/or comprehension, however, makes conversation about provided materials difficult or impossible. For example, in conversation about provided materials, examiner can identify picture or naming card content from patient's response. 10. Dysarthria: 1 - mild to moderate, patient slurs at least some words and at worst, can be understood with some difficulty   11. Extinction and Inattention: 2 - profound lynette-inattention or lynette- inattention to more than one modality. Does not recognize own hand or orients only to one side of space          Total:   15     MRS: 0 (premorbid)      LABS:   Reviewed.     RADIOLOGY:   Images were personally reviewed

## 2021-05-05 NOTE — ANESTHESIA POSTPROCEDURE EVALUATION
Department of Anesthesiology  Postprocedure Note    Patient: Felicity Apley  MRN: [id number]  YOB: 1955  Date of evaluation: 5/5/2021  Time:  3:54 AM     Procedure Summary     Date: 05/04/21 Room / Location: [address] [place] [address] 83 Procedures    Anesthesia Start: 2237 Anesthesia Stop: 05/05/21 0126    Procedure: IR ANGIOGRAM CAROTID CEREBRAL BILATERAL Diagnosis: (Thrombectomy)    Scheduled Providers:  Responsible Provider: Juli Moreno MD    Anesthesia Type: MAC ASA Status: 4 - Emergent          Anesthesia Type: MAC    Carlito Phase I:      Carlito Phase II:      Last vitals: Reviewed and per EMR flowsheets.        Anesthesia Post Evaluation    Patient location during evaluation: PACU  Patient participation: complete - patient cannot participate  Level of consciousness: lethargic  Pain scale: NA.  Nausea & Vomiting: no vomiting  Respiratory status: room air

## 2021-05-05 NOTE — H&P
Neuro ICU History & Physical    Patient Name: Ofelia Marinelli  Patient : 1955  Room/Bed: 1011/8699-42  Code Status: Full  Allergies: No Known Allergies    CHIEF COMPLAINT     Stroke    HPI    History Obtained From: Chart, patient    The patient is a 63 y.o. female with a past medical history of diabetes on Metformin, COPD, smoker, gastritis who presents as a wake-up stroke. Patient's last known well was 10 PM on 5/3/2021. She awoke with severe left-sided weakness, left-sided facial droop, right-sided gaze preference. [facility] hours after last known well. CT head showed moderate size acute stroke in the right MCA territory involving the right temporal and frontal lobes, right basal ganglia. Aspects score 5 out of 10. CTA showed near occlusion of right MCA M1 segment with collateralization, severe stenosis of left vertebral V4 segment. Neurovascular evaluated patient and given her exam was worse than what was explained by CT imaging the patient was offered thrombectomy. Patient went to the neuro endovascular lab approximately 2 and half hours after arrival, underwent mechanical thrombectomy, x6 passes which was unsuccessful at removing the occlusion. Exam postoperatively was unchanged. Patient was transferred to the neuro critical care unit for medical management.     Admitted to ICU From: ED   Reason for ICU Admission: Stroke s/p thrombectomy       PATIENT HISTORY   Past Medical History:        Diagnosis Date    Asthma     COPD (chronic obstructive pulmonary disease) ([facility] [address]     Esophageal ring     Gastritis     Hyperplastic colon polyp     Osteoarthritis        Past Surgical History:        Procedure Laterality Date    APPENDECTOMY      COLONOSCOPY  2017    FRAGMENTS OF HYPERPLASTIC POLYP    ENDOSCOPY, COLON, DIAGNOSTIC      ESOPHAGEAL DILATATION      HYSTERECTOMY      DC COLSC FLX W/RMVL OF TUMOR POLYP LESION SNARE TQ N/A 9/20/2017    COLONOSCOPY POLYPECTOMY COLD BIOPSY performed by Sindy Connor MD at [address] EGD TRANSORAL BIOPSY SINGLE/MULTIPLE N/A 9/20/2017    EGD BIOPSY with esophageal dilitation performed by Sindy Connor MD at [address]  09/2017    E-ring and gastritis        Social History:   Social History     Socioeconomic History    Marital status:      Spouse name: Not on file    Number of children: Not on file    Years of education: Not on file    Highest education level: Not on file   Occupational History    Not on file   Social Needs    Financial resource strain: Not on file    Food insecurity     Worry: Not on file     Inability: Not on file    Transportation needs     Medical: Not on file     Non-medical: Not on file   Tobacco Use    Smoking status: Current Every Day Smoker     Packs/day: 0.50     Years: 40.00     Pack years: 20.00     Types: Cigarettes    Smokeless tobacco: Never Used   Substance and Sexual Activity    Alcohol use: No    Drug use: No    Sexual activity: Never   Lifestyle    Physical activity     Days per week: Not on file     Minutes per session: Not on file    Stress: Not on file   Relationships    Social connections     Talks on phone: Not on file     Gets together: Not on file     Attends Hindu service: Not on file     Active member of club or organization: Not on file     Attends meetings of clubs or organizations: Not on file     Relationship status: Not on file    Intimate partner violence     Fear of current or ex partner: Not on file     Emotionally abused: Not on file     Physically abused: Not on file     Forced sexual activity: Not on file   Other Topics Concern    Not on file   Social History Narrative    Not on file       Family History:       Problem Relation Age of Onset    Other Mother     Diabetes Mother     Prostate Cancer Father     Cancer Brother         lining of lung       Allergies:    Patient has no known allergies. Medications Prior to Admission:    Medications Prior to Admission: levothyroxine (SYNTHROID) 25 MCG tablet, TAKE 1 AND 1/2 TABLET BY MOUTH DAILY  albuterol sulfate HFA (VENTOLIN HFA) 108 (90 Base) MCG/ACT inhaler, INHALE TWO PUFFS BY MOUTH EVERY 4 HOURS AS NEEDED FOR WHEEZING OR SHORTNESS OF BREATH  budesonide-formoterol (SYMBICORT) 160-4.5 MCG/ACT AERO, Inhale 2 puffs into the lungs 2 times daily  tiotropium (SPIRIVA RESPIMAT) 2.5 MCG/ACT AERS inhaler, Inhale 2 puffs into the lungs daily  meloxicam (MOBIC) 7.5 MG tablet, Take 1 tablet by mouth daily  metFORMIN (GLUCOPHAGE-XR) 500 MG extended release tablet, TAKE ONE TABLET BY MOUTH TWICE A DAY BEFORE MEALS  atorvastatin (LIPITOR) 20 MG tablet, Take 1 tablet by mouth daily  ciclopirox (PENLAC) 8 % solution, Apply topically nightly.   polyethylene glycol (MIRALAX) powder, Take 17 g by mouth every other day    Current Medications:  Current Facility-Administered Medications: acetaminophen (TYLENOL) tablet 650 mg, 650 mg, Oral, Q4H PRN  promethazine (PHENERGAN) tablet 12.5 mg, 12.5 mg, Oral, Q6H PRN **OR** ondansetron (ZOFRAN) injection 4 mg, 4 mg, Intravenous, Q6H PRN    REVIEW OF SYSTEMS     CONSTITUTIONAL: negative for fatigue and malaise   EYES: negative for double vision and photophobia    HEENT: negative for tinnitus and sore throat   RESPIRATORY: negative for cough, shortness of breath   CARDIOVASCULAR: negative for chest pain, palpitations, or syncope   GASTROINTESTINAL: negative for abdominal pain, nausea, vomiting, diarrhea, or constipation    GENITOURINARY: negative for incontinence or retention    MUSCULOSKELETAL: negative for neck or back pain, negative for extremity pain   NEUROLOGICAL: Negative for seizures, headaches, aphasia, dysarthria Positive for weakness, numbness   PSYCHIATRIC: negative for agitation, hallucination, SI/HI   SKIN Negative for spontaneous contusions, rashes, or lesions      PHYSICAL EXAM:     BP (!) 140/79   Pulse Facial Palsy:  2 - partial paralysis (total or near total paralysis of the lower face)  5a. Motor left arm:  2 - some effort against gravity, limb cannot get to or maintain (if cued) 90 (or 45) degrees, drifts down to bed, but has some effort against gravity   5b. Motor right arm:  0 - no drift, limb holds 90 (or 45) degrees for full 10 seconds  6a. Motor left le - some effort against gravity; leg falls to bed by 5 seconds but has some effort against gravity  6b. Motor right le - no drift; leg holds 30 degree position for full 5 seconds  7. Limb Ataxia:  0 - absent  8. Sensory:  2 - severe to total sensory loss; patient is not aware of being touched in face, arm, leg  9. Best Language:  0 - no aphasia, normal  10. Dysarthria:  0 - normal  11. Extinction and Inattention:  2 - profound lynette-inattention or lynette- inattention to more than one modality.   Does not recognize own hand or orients only to one side of space   NIHSS: 13    LABS AND IMAGING:     RECENT LABS:  CBC with Differential:    Lab Results   Component Value Date    WBC 18.4 2021    RBC 5.61 2021    HGB 16.5 2021    HCT 51.5 2021     2021    MCV 91.8 2021    MCH 29.4 2021    MCHC 32.0 2021    RDW 13.7 2021    LYMPHOPCT 12 2021    MONOPCT 6 2021    BASOPCT 0 2021    MONOSABS 1.09 2021    LYMPHSABS 2.26 2021    EOSABS 0.16 2021    BASOSABS 0.04 2021    DIFFTYPE NOT REPORTED 2021     BMP:    Lab Results   Component Value Date     2021    K 3.6 2021     2021    CO2 23 2021    BUN 19 2021    LABALBU 4.0 10/16/2017    CREATININE 0.49 2021    CALCIUM 8.6 2021    GFRAA >60 2021    LABGLOM >60 2021    GLUCOSE 106 2021       RADIOLOGY:   CTA HEAD NECK W CONTRAST   Final Result   Right middle cerebral artery M1 segment occlusion/near occlusion with   decreased size and number of right middle cerebral artery branches in   comparison to the left. Focal severe stenosis of the left vertebral artery distal V4 segment. Dominant right vertebral artery. Critical results were called by Dr. Giovanna Oviedo to DR Diogenes Pisano on 5/4/2021   at 20:20.         [address]   Final Result   Moderate-sized acute/subacute stroke in the right middle cerebral artery   territory. No acute intracranial hemorrhage. Critical results were called by Dr. Giovanna Oviedo to DR Diogenes Pisano on 5/4/2021   at 20:04. CT Head WO Contrast   Final Result   Moderate-size acute/subacute stroke in the right middle cerebral artery   territory involving the right temporal and frontal lobes as well as the right   basal ganglia. ASPECTS score is 5/10. No acute intracranial hemorrhage. Critical results were called by Dr. Giovanna Oviedo to [facility] on   5/4/2021 at 19:36. MRI LIMITED BRAIN    (Results Pending)   IR ANGIOGRAM CAROTID C EREBRAL BILATERAL    (Results Pending)          Labs and Images reviewed with:    [] Sandeep Gama MD    [] Carlos Eduardo Gomez MD  [x] Kenna Flores MD  --[] there are no new interval images to review. ASSESSMENT AND PLAN:         ASSESSMENT:     This is a 63 y.o. female with acute stroke due to M1 near complete occlusion, status post unsuccessful mechanical thrombectomy. History of COPD, diabetes. Patient care will be discussed with attending, will reevaluate patient along with attending. PLAN/MEDICAL DECISION MAKING:      NEUROLOGIC:  - NEV following   -SBP 90 -150   - Repeat CT head, no CTa in am per NEV  - MRI Brain   - Atorvastatin 80 qD  -ASA 81  - Neuro checks per protocol    CARDIOVASCULAR:  - Goal SBP 90 - 150  - Continue telemetry    PULMONARY:  - Tolerating Roomair  - COPD; cont.  Home inhalers    RENAL/FLUID/ELECTROLYTE:  - IVF: 75 NS  - Replace electrolytes PRN  - Daily BMP    GI/NUTRITION:  NUTRITION:  No diet orders on file  - Bowel regimen: PG   - GI prophylaxis: NA    ID:  - Continue to monitor for fevers  - Daily CBC    HEME:   -CBC pending  - Daily CBC    ENDOCRINE:  - Continue to monitor blood glucose, goal <180  - cont metformin    OTHER:  - PT/OT/ST     PROPHYLAXIS:  Stress ulcer: none    DVT PROPHYLAXIS:  - SCD sleeves - knee high    DISPOSITION: ICU      Francoise Uriostegui DO  Neuro Critical Care Service   Pager [phone]  5/5/2021     2:02 AM

## 2021-05-06 ENCOUNTER — APPOINTMENT (OUTPATIENT)
Dept: CT IMAGING | Age: 66
DRG: 024 | End: 2021-05-06
Payer: COMMERCIAL

## 2021-05-06 LAB
ABSOLUTE EOS #: 0.24 K/UL (ref 0–0.44)
ABSOLUTE IMMATURE GRANULOCYTE: 0.05 K/UL (ref 0–0.3)
ABSOLUTE LYMPH #: 2.63 K/UL (ref 1.1–3.7)
ABSOLUTE MONO #: 0.76 K/UL (ref 0.1–1.2)
ANION GAP SERPL CALCULATED.3IONS-SCNC: 9 MMOL/L (ref 9–17)
BASOPHILS # BLD: 0 % (ref 0–2)
BASOPHILS ABSOLUTE: 0.04 K/UL (ref 0–0.2)
BUN BLDV-MCNC: 13 MG/DL (ref 8–23)
BUN/CREAT BLD: ABNORMAL (ref 9–20)
CALCIUM SERPL-MCNC: 8.2 MG/DL (ref 8.6–10.4)
CHLORIDE BLD-SCNC: 109 MMOL/L (ref 98–107)
CO2: 22 MMOL/L (ref 20–31)
CREAT SERPL-MCNC: 0.4 MG/DL (ref 0.5–0.9)
CULTURE: NO GROWTH
DIFFERENTIAL TYPE: ABNORMAL
EKG ATRIAL RATE: 89 BPM
EKG P AXIS: 46 DEGREES
EKG P-R INTERVAL: 116 MS
EKG Q-T INTERVAL: 384 MS
EKG QRS DURATION: 82 MS
EKG QTC CALCULATION (BAZETT): 467 MS
EKG R AXIS: 20 DEGREES
EKG T AXIS: 66 DEGREES
EKG VENTRICULAR RATE: 89 BPM
EOSINOPHILS RELATIVE PERCENT: 2 % (ref 1–4)
GFR AFRICAN AMERICAN: >60 ML/MIN
GFR NON-AFRICAN AMERICAN: >60 ML/MIN
GFR SERPL CREATININE-BSD FRML MDRD: ABNORMAL ML/MIN/{1.73_M2}
GFR SERPL CREATININE-BSD FRML MDRD: ABNORMAL ML/MIN/{1.73_M2}
GLUCOSE BLD-MCNC: 102 MG/DL (ref 70–99)
HCT VFR BLD CALC: 39 % (ref 36.3–47.1)
HEMOGLOBIN: 12.1 G/DL (ref 11.9–15.1)
IMMATURE GRANULOCYTES: 0 %
LYMPHOCYTES # BLD: 24 % (ref 24–43)
Lab: NORMAL
MCH RBC QN AUTO: 29.6 PG (ref 25.2–33.5)
MCHC RBC AUTO-ENTMCNC: 31 G/DL (ref 28.4–34.8)
MCV RBC AUTO: 95.4 FL (ref 82.6–102.9)
MONOCYTES # BLD: 7 % (ref 3–12)
NRBC AUTOMATED: 0 PER 100 WBC
PDW BLD-RTO: 13.4 % (ref 11.8–14.4)
PLATELET # BLD: 286 K/UL (ref 138–453)
PLATELET ESTIMATE: ABNORMAL
PMV BLD AUTO: 9.4 FL (ref 8.1–13.5)
POTASSIUM SERPL-SCNC: 3.6 MMOL/L (ref 3.7–5.3)
RBC # BLD: 4.09 M/UL (ref 3.95–5.11)
RBC # BLD: ABNORMAL 10*6/UL
SEG NEUTROPHILS: 67 % (ref 36–65)
SEGMENTED NEUTROPHILS ABSOLUTE COUNT: 7.45 K/UL (ref 1.5–8.1)
SODIUM BLD-SCNC: 140 MMOL/L (ref 135–144)
SPECIMEN DESCRIPTION: NORMAL
WBC # BLD: 11.2 K/UL (ref 3.5–11.3)
WBC # BLD: ABNORMAL 10*3/UL

## 2021-05-06 PROCEDURE — 70450 CT HEAD/BRAIN W/O DYE: CPT

## 2021-05-06 PROCEDURE — 99233 SBSQ HOSP IP/OBS HIGH 50: CPT | Performed by: PSYCHIATRY & NEUROLOGY

## 2021-05-06 PROCEDURE — 36415 COLL VENOUS BLD VENIPUNCTURE: CPT

## 2021-05-06 PROCEDURE — 99232 SBSQ HOSP IP/OBS MODERATE 35: CPT | Performed by: PSYCHIATRY & NEUROLOGY

## 2021-05-06 PROCEDURE — 6360000002 HC RX W HCPCS: Performed by: STUDENT IN AN ORGANIZED HEALTH CARE EDUCATION/TRAINING PROGRAM

## 2021-05-06 PROCEDURE — 97129 THER IVNTJ 1ST 15 MIN: CPT

## 2021-05-06 PROCEDURE — 6370000000 HC RX 637 (ALT 250 FOR IP): Performed by: STUDENT IN AN ORGANIZED HEALTH CARE EDUCATION/TRAINING PROGRAM

## 2021-05-06 PROCEDURE — 2060000000 HC ICU INTERMEDIATE R&B

## 2021-05-06 PROCEDURE — 97530 THERAPEUTIC ACTIVITIES: CPT

## 2021-05-06 PROCEDURE — 92526 ORAL FUNCTION THERAPY: CPT

## 2021-05-06 PROCEDURE — 51701 INSERT BLADDER CATHETER: CPT

## 2021-05-06 PROCEDURE — 99254 IP/OBS CNSLTJ NEW/EST MOD 60: CPT | Performed by: STUDENT IN AN ORGANIZED HEALTH CARE EDUCATION/TRAINING PROGRAM

## 2021-05-06 PROCEDURE — 6360000002 HC RX W HCPCS: Performed by: PSYCHIATRY & NEUROLOGY

## 2021-05-06 PROCEDURE — 51798 US URINE CAPACITY MEASURE: CPT

## 2021-05-06 PROCEDURE — 99223 1ST HOSP IP/OBS HIGH 75: CPT | Performed by: PSYCHIATRY & NEUROLOGY

## 2021-05-06 PROCEDURE — 6370000000 HC RX 637 (ALT 250 FOR IP): Performed by: NURSE PRACTITIONER

## 2021-05-06 PROCEDURE — 94640 AIRWAY INHALATION TREATMENT: CPT

## 2021-05-06 PROCEDURE — 93010 ELECTROCARDIOGRAM REPORT: CPT | Performed by: INTERNAL MEDICINE

## 2021-05-06 PROCEDURE — 97535 SELF CARE MNGMENT TRAINING: CPT

## 2021-05-06 PROCEDURE — 85025 COMPLETE CBC W/AUTO DIFF WBC: CPT

## 2021-05-06 PROCEDURE — 80048 BASIC METABOLIC PNL TOTAL CA: CPT

## 2021-05-06 RX ORDER — METHOCARBAMOL 750 MG/1
1500 TABLET, FILM COATED ORAL ONCE
Status: DISCONTINUED | OUTPATIENT
Start: 2021-05-06 | End: 2021-05-06

## 2021-05-06 RX ORDER — HEPARIN SODIUM 5000 [USP'U]/ML
5000 INJECTION, SOLUTION INTRAVENOUS; SUBCUTANEOUS EVERY 8 HOURS SCHEDULED
Status: DISCONTINUED | OUTPATIENT
Start: 2021-05-06 | End: 2021-05-10 | Stop reason: HOSPADM

## 2021-05-06 RX ORDER — AMLODIPINE BESYLATE 5 MG/1
5 TABLET ORAL DAILY
Status: DISCONTINUED | OUTPATIENT
Start: 2021-05-06 | End: 2021-05-10 | Stop reason: HOSPADM

## 2021-05-06 RX ORDER — METHOCARBAMOL 750 MG/1
750 TABLET, FILM COATED ORAL ONCE
Status: COMPLETED | OUTPATIENT
Start: 2021-05-06 | End: 2021-05-06

## 2021-05-06 RX ORDER — FENTANYL CITRATE 50 UG/ML
25 INJECTION, SOLUTION INTRAMUSCULAR; INTRAVENOUS
Status: DISCONTINUED | OUTPATIENT
Start: 2021-05-06 | End: 2021-05-06

## 2021-05-06 RX ADMIN — ACETAMINOPHEN 650 MG: 325 TABLET ORAL at 18:10

## 2021-05-06 RX ADMIN — LEVOTHYROXINE SODIUM 25 MCG: 25 TABLET ORAL at 08:42

## 2021-05-06 RX ADMIN — HEPARIN SODIUM 5000 UNITS: 5000 INJECTION INTRAVENOUS; SUBCUTANEOUS at 23:40

## 2021-05-06 RX ADMIN — METHOCARBAMOL TABLETS 750 MG: 750 TABLET, COATED ORAL at 10:19

## 2021-05-06 RX ADMIN — BUDESONIDE AND FORMOTEROL FUMARATE DIHYDRATE 2 PUFF: 160; 4.5 AEROSOL RESPIRATORY (INHALATION) at 20:00

## 2021-05-06 RX ADMIN — ACETAMINOPHEN 650 MG: 650 SUPPOSITORY RECTAL at 12:52

## 2021-05-06 RX ADMIN — HEPARIN SODIUM 5000 UNITS: 5000 INJECTION INTRAVENOUS; SUBCUTANEOUS at 10:19

## 2021-05-06 RX ADMIN — ACETAMINOPHEN 650 MG: 325 TABLET ORAL at 05:51

## 2021-05-06 RX ADMIN — AMLODIPINE BESYLATE 5 MG: 5 TABLET ORAL at 18:05

## 2021-05-06 RX ADMIN — ATORVASTATIN CALCIUM 80 MG: 80 TABLET, FILM COATED ORAL at 08:42

## 2021-05-06 RX ADMIN — HEPARIN SODIUM 5000 UNITS: 5000 INJECTION INTRAVENOUS; SUBCUTANEOUS at 14:00

## 2021-05-06 RX ADMIN — BUDESONIDE AND FORMOTEROL FUMARATE DIHYDRATE 2 PUFF: 160; 4.5 AEROSOL RESPIRATORY (INHALATION) at 08:26

## 2021-05-06 RX ADMIN — ACETAMINOPHEN 650 MG: 325 TABLET ORAL at 00:06

## 2021-05-06 ASSESSMENT — PAIN DESCRIPTION - LOCATION
LOCATION: HEAD
LOCATION: HEAD;SHOULDER
LOCATION: HEAD

## 2021-05-06 ASSESSMENT — PAIN SCALES - GENERAL
PAINLEVEL_OUTOF10: 2
PAINLEVEL_OUTOF10: 0
PAINLEVEL_OUTOF10: 8
PAINLEVEL_OUTOF10: 4
PAINLEVEL_OUTOF10: 0
PAINLEVEL_OUTOF10: 8
PAINLEVEL_OUTOF10: 4
PAINLEVEL_OUTOF10: 8
PAINLEVEL_OUTOF10: 2

## 2021-05-06 ASSESSMENT — PAIN DESCRIPTION - FREQUENCY: FREQUENCY: CONTINUOUS

## 2021-05-06 ASSESSMENT — PAIN DESCRIPTION - DESCRIPTORS: DESCRIPTORS: ACHING

## 2021-05-06 ASSESSMENT — PAIN DESCRIPTION - PAIN TYPE: TYPE: ACUTE PAIN

## 2021-05-06 ASSESSMENT — ENCOUNTER SYMPTOMS
BLURRED VISION: 1
ABDOMINAL PAIN: 0
DOUBLE VISION: 0
SHORTNESS OF BREATH: 0

## 2021-05-06 NOTE — PROGRESS NOTES
Notified [place], [facility], that per Dr Eyal Lou pt is approp for ARU. Requested if ok to initiate precert, and [place] states precert can be initiated. Writer spoke with Vicente @ [employer] [facility] [employer] who states this plan has no comprehensive benefits for ARU. Notified [place] and requested if pt has other insurance. Charmaine'nino vm from [place] stating pt has no other insurance at this time. Writer notified [place] that pt may benefit from the HELP program to determine if she qualifies for Medicaid.

## 2021-05-06 NOTE — CONSULTS
vision. Negative for double vision. Respiratory: Negative for shortness of breath. Cardiovascular: Negative for chest pain. Gastrointestinal: Negative for abdominal pain. Genitourinary:        No difficulty urinating   Musculoskeletal:        +muscle spasms in the left trunk   Skin: Negative for rash. Neurological: Positive for dizziness, sensory change, focal weakness and headaches. Premorbid function:  Independent    Current function:    PT:  Restrictions/Precautions: Fall Risk  Other position/activity restrictions: up w/assist; significant L neglect with LUE/LLE deficits   Transfers  Sit to Stand: Maximum Assistance, 2 Person Assistance  Stand to sit: Maximum Assistance, 2 Person Assistance  Comment: pt performed STS x2 in [address] [place] [address] requiring Max A x2; pt. stood for approx 30 seconds each STS; pt. demos signifcant left lateral lean with max cues to correct       Transfers  Sit to Stand: Maximum Assistance, 2 Person Assistance  Stand to sit: Maximum Assistance, 2 Person Assistance  Comment: pt performed STS x2 in [address] [place] [address] requiring Max A x2; pt. stood for approx 30 seconds each STS; pt. demos signifcant left lateral lean with max cues to correct  Ambulation  Ambulation?: No                 OT:   ADL  Feeding: Moderate assistance, Verbal cueing, Increased time to complete  Grooming:  Moderate assistance, Verbal cueing, Increased time to complete  UE Bathing: Verbal cueing, Increased time to complete, Moderate assistance  LE Bathing: Verbal cueing, Increased time to complete, Maximum assistance  UE Dressing: Increased time to complete, Verbal cueing, Moderate assistance  LE Dressing: Maximum assistance, Verbal cueing, Increased time to complete  Toileting: Maximum assistance, Increased time to complete  Additional Comments: ADLs not attempted d/t pt req max A for sitting balance         Balance  Sitting Balance: Maximum assistance(pt sat EOB ~15 min)   Standing Balance  Comment: stand not attempted d/t pt's difficulty with sitting balance  Functional Mobility  Functional Mobility Comments: func mob not attempted d/t pt's difficulty with sitting balance     Bed mobility  Rolling to Left: Maximum assistance, 2 Person assistance  Rolling to Right: Maximum assistance, 2 Person assistance  Supine to Sit: 2 Person assistance, Maximum assistance  Sit to Supine: 2 Person assistance, Maximum assistance  Scootin Person assistance, Maximal assistance  Comment: pt. grossly requires Max A x2 for bed mobility; pt. demos flaccid L UE and L LE  Transfers  Transfer Comments: transfer not attempted d/t pt's difficulty with sitting balance                 SLP:  Impression: Patient presents with  safe swallow for Dysphagia Pureed (Dysphagia I)  with Mildly Thick (Nectar) liquids as evidenced by no overt s/s of aspiration noted with consistencies tested. Recommend small sips and bites, only feed when alert and awake and upright at 90 degrees for all PO intake. Recommend close monitoring for overt/clinical s/s of aspiration and D/C PO intake and complete Modified Barium Swallow Study should they occur. Results and recommendations reported to RN.     Dysphagia Outcome Severity Scale: Level 3: Moderate dysphagia- Total assisstance, supervision or strategies. Two or more diet consistencies restricted      Recommended Diet and Intervention  Diet Solids Recommendation: Dysphagia Pureed (Dysphagia I)  Liquid Consistency Recommendation: Mildly Thick (Nectar)  Recommended Form of Meds: Meds in puree  Therapeutic Interventions: Diet tolerance monitoring;Oral motor exercises      Assessment:  Pt presents with mild-moderate cognitive deficits characterized by impaired recall and verbal reasoning skills. Pt. Presents with mild dysarthria, moderate O/M deficits at this time. ST to follow up and provide treatment to address noted deficits. Education provided. Further therapy recommended at discharge.       Past Medical History: Diagnosis Date    Asthma     [facility]     Diabetes mellitus ([facility]     Esophageal ring     Gastritis     Hyperlipidemia     Hyperplastic colon polyp     Hypertension     Osteoarthritis     Patient in clinical research study 05/04/2021    DAXA     TIA (transient ischemic attack) 2013    Per Daughter       Past Surgical History:        Procedure Laterality Date    APPENDECTOMY      COLONOSCOPY  09/20/2017    FRAGMENTS OF HYPERPLASTIC POLYP    ENDOSCOPY, COLON, DIAGNOSTIC      ESOPHAGEAL DILATATION      HYSTERECTOMY      GA COLSC FLX W/RMVL OF TUMOR POLYP LESION SNARE TQ N/A 9/20/2017    COLONOSCOPY POLYPECTOMY COLD BIOPSY performed by Mari Tipton MD at [address] EGD TRANSORAL BIOPSY SINGLE/MULTIPLE N/A 9/20/2017    EGD BIOPSY with esophageal dilitation performed by Mari Tipton MD at [address]  09/2017    E-ring and gastritis        Allergies:    No Known Allergies     Current Medications:   Current Facility-Administered Medications: heparin (porcine) injection 5,000 Units, 5,000 Units, Subcutaneous, 3 times per day  acetaminophen (TYLENOL) tablet 650 mg, 650 mg, Oral, Q4H PRN  promethazine (PHENERGAN) tablet 12.5 mg, 12.5 mg, Oral, Q6H PRN **OR** ondansetron (ZOFRAN) injection 4 mg, 4 mg, Intravenous, Q6H PRN  albuterol (PROVENTIL) nebulizer solution 2.5 mg, 2.5 mg, Nebulization, Q4H PRN  budesonide-formoterol (SYMBICORT) 160-4.5 MCG/ACT inhaler 2 puff, 2 puff, Inhalation, BID  levothyroxine (SYNTHROID) tablet 25 mcg, 25 mcg, Oral, Daily  tiotropium (SPIRIVA RESPIMAT) 2.5 MCG/ACT inhaler 2 puff, 2 puff, Inhalation, Daily  atorvastatin (LIPITOR) tablet 80 mg, 80 mg, Oral, Daily  [Held by provider] aspirin chewable tablet 81 mg, 81 mg, Oral, Daily  polyethylene glycol (GLYCOLAX) packet 17 g, 17 g, Oral, Every Other Day  nicotine (NICODERM CQ) 14 MG/24HR 1 patch, 1 patch, Transdermal, Daily  acetaminophen (TYLENOL) suppository 650 mg, 650 mg, Rectal, Q4H PRN  sodium chloride flush 0.9 % injection 5-40 mL, 5-40 mL, Intravenous, 2 times per day  sodium chloride flush 0.9 % injection 5-40 mL, 5-40 mL, Intravenous, PRN  0.9 % sodium chloride infusion, 25 mL, Intravenous, PRN    Family History:       Problem Relation Age of Onset    Other Mother     Diabetes Mother     Prostate Cancer Father     Cancer Brother         lining of lung       Social History:  Lives With: Alone  Type of Home: House  Home Layout: Two level, Able to Live on Main level with bedroom/bathroom  Home Access: Stairs to enter without rails  Entrance Stairs - Number of Steps: 6  Bathroom Shower/Tub: Tub/Shower unit  Bathroom Toilet: Standard  Home Equipment: (no use of DME at baseline)  ADL Assistance: Independent  Homemaking Assistance: Independent  Homemaking Responsibilities: Yes(pt responsible for IADLs)  Ambulation Assistance: Independent  Transfer Assistance: Independent  Active : Yes  Mode of Transportation: [employer]  Occupation: Retired  Type of occupation: grocery store employee  Leisure & Hobbies: repurposing items  Social History     Socioeconomic History    Marital status:      Spouse name: Not on file    Number of children: Not on file    Years of education: Not on file    Highest education level: Not on file   Occupational History    Not on file   Social Needs    Financial resource strain: Not on file    Food insecurity     Worry: Not on file     Inability: Not on file    Transportation needs     Medical: Not on file     Non-medical: Not on file   Tobacco Use    Smoking status: Current Every Day Smoker     Packs/day: 0.50     Years: 40.00     Pack years: 20.00     Types: Cigarettes    Smokeless tobacco: Never Used   Substance and Sexual Activity    Alcohol use: No    Drug use: No    Sexual activity: Never   Lifestyle    Physical activity     Days per week: Not on file     Minutes per session: Not on file    Stress: Not on file   Relationships    Social connections     Talks on phone: Not on file     Gets together: Not on file     Attends Alevism service: Not on file     Active member of club or organization: Not on file     Attends meetings of clubs or organizations: Not on file     Relationship status: Not on file    Intimate partner violence     Fear of current or ex partner: Not on file     Emotionally abused: Not on file     Physically abused: Not on file     Forced sexual activity: Not on file   Other Topics Concern    Not on file   Social History Narrative    Not on file       Physical Exam:  BP (!) 160/77   Pulse 79   Temp 98.3 °F (36.8 °C) (Oral)   Resp 18   Ht 5' 8\" (1.727 m)   Wt 200 lb (90.7 kg)   SpO2 98%   BMI 30.41 kg/m²     GEN: Well developed, well nourished, no acute distress  HEENT: NCAT. Has some difficulty tracking to the left, although EOM appear to be intact - may be related to left neglect or visual deficit. Hearing grossly intact. Mucous membranes pink and moist.  RESP: Normal breath sounds with no wheezing, rales, or rhonchi. Respirations WNL and unlabored. CV: Regular rate and rhythm. No murmurs, rubs, or gallops. ABD: Soft, non-distended, BS+ and equal.  NEURO: Alert. Speech with possible mild expressive aphasia. Left facial droop. Asymmetrical shoulder shrug with right greater than left. Midline tongue protrusion. Sensation to light touch decreased in the left upper and lower limbs. Sensation to light touch intact in the right upper and lower limbs. MSK:  Absent AROM in the left upper and lower limbs due to weakness. Otherwise functional ROM. Muscle bulk is normal bilaterally. Strength 5/5 in right upper and lower limbs. Strength 0/5 in left upper limb. No significant movement noted in the left lower limb on exam - only minimal movement of the toes noted. LIMBS: Edema present in left upper limb. SKIN: Warm and dry with good turgor. PSYCH: Mood WNL. Flat affect.  Appropriately interactive. Diagnostics:    CBC:   Recent Labs     05/04/21 2020 05/05/21 0633 05/06/21  0400   WBC 18.4* 15.0* 11.2   RBC 5.61* 4.24 4.09   HGB 16.5* 12.6 12.1   HCT 51.5* 39.4 39.0   MCV 91.8 92.9 95.4   RDW 13.7 13.5 13.4    317 286     BMP:   Recent Labs     05/04/21 2109 05/05/21 0633 05/06/21  0400    145* 140   K 3.6* 3.5* 3.6*    112* 109*   CO2 23 19* 22   BUN 19 16 13   CREATININE 0.49* 0.37* 0.40*   GLUCOSE 106* 104* 102*      HbA1c:   Lab Results   Component Value Date    LABA1C 5.9 05/05/2021     BNP: No results for input(s): BNP in the last 72 hours. PT/INR:   Recent Labs     05/04/21 2020   PROTIME SPECIMEN GROSSLY HEMOLYZED   INR SPECIMEN GROSSLY HEMOLYZED     APTT:   Recent Labs     05/04/21 2020   APTT SPECIMEN GROSSLY HEMOLYZED     CARDIAC ENZYMES:   Recent Labs     05/04/21 2020 05/04/21 2109   CKMB SPECIMEN GROSSLY HEMOLYZED 6.2*   TROPONINT SPECIMEN GROSSLY HEMOLYZED NOT REPORTED     FASTING LIPID PANEL:  Lab Results   Component Value Date    CHOL 206 (H) 05/05/2021    HDL 29 (L) 05/05/2021    TRIG 185 (H) 05/05/2021     LIVER PROFILE: No results for input(s): AST, ALT, ALB, BILIDIR, BILITOT, ALKPHOS in the last 72 hours. Radiology:  CT HEAD WO CONTRAST   Preliminary Result   1. Right MCA distribution infarct with extension since prior CT of 4 May 2020   and hemorrhagic transformation most significant along the anterior   distribution of the infarct. A hyperdense right MCA sign is present. 2. Attenuation of the right lateral ventricle with minimal leftward shift of   2 mm at the level of the foramen of Monro. 3. Minimal sinus inflammation. MRI BRAIN WO CONTRAST   Final Result   Subacute right MCA distribution infarct most pronounced in the frontotemporal   region and right basal ganglia with a hemorrhagic component and hemosiderin   staining involving the right basal ganglia.          IR ANGIOGRAM CAROTID C EREBRAL BILATERAL   Final Result      CTA HEAD NECK W CONTRAST   Final Result   Right middle cerebral artery M1 segment occlusion/near occlusion with   decreased size and number of right middle cerebral artery branches in   comparison to the left. Focal severe stenosis of the left vertebral artery distal V4 segment. Dominant right vertebral artery. Critical results were called by Dr. Carla Devi Sessions to DR Jimmy Tong on 5/4/2021   at 20:20.         [address]   Final Result   Moderate-sized acute/subacute stroke in the right middle cerebral artery   territory. No acute intracranial hemorrhage. Critical results were called by Dr. Carla Devi Sessions to DR Jimmy Tong on 5/4/2021   at 20:04. CT Head WO Contrast   Final Result   Moderate-size acute/subacute stroke in the right middle cerebral artery   territory involving the right temporal and frontal lobes as well as the right   basal ganglia. ASPECTS score is 5/10. No acute intracranial hemorrhage. Critical results were called by Dr. Carla Devi Sessions to [facility] on   5/4/2021 at 19:36. Impression:    1. Right MCA CVA with hemorrhagic conversion  2. Left hemiparesis  3. Cognitive impairment  4. Dysphagia, on pureed solids and nectar-thick liquids  5. Possible mild expressive aphasia  6. History of TIA  7. HTN  8. HLD  9. Diabetes  10. COPD, asthma  11. Esophageal ring  12. Obesity    Recommendations:    1. Diagnosis:  Right MCA CVA with hemorrhagic conversion  2. Therapy: Has PT/OT/SLP needs  3. Medical Necessity: As above  4. Support: Lives alone, daughter and son are able to provide 24-hour supervision upon discharge  5. Rehab Recommendation:  The patient will benefit from acute inpatient rehabilitation once medically stable per primary service. Anticipate she will be able to tolerate 3 hours of therapy per day in rehabilitation.  The patient requires multidisciplinary rehabilitation treatment including medical management by a PM&R physician, 24 hour rehabilitation nursing, physical therapy, occupational therapy, speech therapy, rehabilitation social work, and nutrition services. Patient and family also require education in post-hospital precautions and home exercise routine, adaptive techniques and deficit compensation strategies, strengthening and conditioning, equipment prescription and instructions in use. 6. DVT Prophylaxis: Heparin    It was my pleasure to evaluate Rosa Cook today. Please call with questions.     Sharda Moore MD

## 2021-05-06 NOTE — PROGRESS NOTES
Speech Language Pathology  Speech Language Pathology  [facility]    Dysphagia / Cognitive Treatment Note    Date: 5/6/2021  Patients Name: Ray Reich  MRN: [id number]  Diagnosis:   Patient Active Problem List   Diagnosis Code    Pneumonia of left lower lobe due to infectious organism J18.9    Tobacco abuse Z72.0    Legionella pneumonia ([facility] A48.1    Dysphagia R13.10    Hyperplastic colon polyp K63.5    Esophageal ring K22.2    Gastritis K29.70    Elbow effusion, right M25.421    Stroke due to occlusion of right middle cerebral artery (HCC) I63.511    Nihss score 15 R29.715    Acute left hemiparesis (HCC) G81.94    Cerebrovascular accident (CVA) due to occlusion of right middle cerebral artery ([facility] I63.511       Pain: denies    Cognitive Treatment    Treatment time: 5580-8865    Subjective: [] Alert [x] Cooperative     [] Confused     [] Agitated    [x] Lethargic      Objective/Assessment:  Attention: pt lethargic, required min-mod cues to remain alert & awake to session    Recall:  Delayed Recall, 4 Unrelated Units:   5-Minute Delay: 1/4 independently, increased to 4/4 with mod verbal cues   10-Minute Delay: 3/4 independently, increased to 4/4 with  Min-mod verbal cues    Immediate Recall, 4 Units: 5/5 independently    Immediate Recall, 5 Units: 5/5 independently    Problem Solving/Reasoning:  Analogies: 5/5 independently    Dysphagia: Pt seen for diet tolerance monitoring & possible diet upgrade. Pt seen for BSSE on 5-5-21, and a Dysphagia Pureed diet with Mildly Thick liquids was recommended. Pt continues to present with probable safe swallow for Dysphagia Pureed diet with Mildly Thick (Nectar) liquids, as evidenced by no overt s/s of aspiration with these consistencies. Pt presented with +immediate cough with thin liquid.  Pt observed with +anterior oral loss of all consistencies, +L facial droop, and reports \"I feel like there's a hole in my cheek when I eat and drink. \" Education provided re: safe swallowing strategies & pt verbalized understanding. Other: Pt completed O/M exercise program for facial weakness 10x 1 set with mod-max verbal & visual cues. Written list of exercises left at bedside and instructed pt to complete x2 daily. Pt verbalized understanding. Plan:  [x] Continue ST services    [] Discharge from ST:      Discharge recommendations: Further therapy recommended at discharge. Treatment completed by:  Lauren Burnett M.S. [id number] [facility]

## 2021-05-06 NOTE — PROGRESS NOTES
cueing; Increased time to complete; Moderate assistance(seated on EOB)  UB bathing; set up, verbal cueing, min A ( supine in bed )  LE Bathing: Setup;Maximum assistance ( supine in bed d/t being soaked in urine upon arrival)  UE Dressing: Setup; Moderate assistance; Increased time to complete;Verbal cueing ( supine in bed )  LE Dressing: Setup;Maximum assistance ( supine in bed)  Toileting: Setup;Maximum assistance; Increased time to complete(brief mgt)        Balance  Sitting Balance: Moderate assistance(strong lean to L and verbal/tactile cues to improve UB posture but pt not able to maintain d/t general weakness)  Bed mobility  Rolling to Left: Moderate assistance  Rolling to Right: Maximum assistance;2 Person assistance  Supine to Sit: Maximum assistance;2 Person assistance  Sit to Supine: 2 Person assistance;Maximum assistance  Scooting: Maximal assistance;2 Person assistance  Comment: Pt able to use RUE to assist in bed mob but flaccid on L UE/LE     Attendance  Participation: Active participation    Cognition  Overall Cognitive Status: Exceptions  Arousal/Alertness: Delayed responses to stimuli  Following Commands: Follows one step commands with increased time; Follows one step commands with repetition  Attention Span: Attends with cues to redirect  Safety Judgement: Decreased awareness of need for assistance;Decreased awareness of need for safety  Insights: Decreased awareness of deficits  Initiation: Requires cues for some  Sequencing: Requires cues for some        Plan   Plan  Times per week: 3-5x/wk    Goals  Short term goals  Time Frame for Short term goals: pt will, by discharge  Short term goal 1: dem UB ADL with min A and AE PRN  Short term goal 2: dem LB ADL/toileting with mod A and AE PRN  Short term goal 3: complete functional transfer with max A and LRD PRN  Short term goal 4: engage in functional activity for 20+ minutes with one rest break PRN  Short term goal 5: complete dynamic/static sitting during functional activity with mod A for 20+ minutes  Short term goal 6: dem improved awareness of L side by weight bearing on LUE for 20+ minutes  Short term goal 7: dem improved safety awareness by requiring less than 3 v/c's for safety during functional activity  Short term goal 8: dem compensatory head movement/visual scanning techniques with 5 v/c's to improve awareness of L side       Therapy Time   Individual Concurrent Group Co-treatment   Time In 1350         Time Out 1510         Minutes 80             Time code min: 80 min       ALONZO Herrera/ALEN

## 2021-05-06 NOTE — CARE COORDINATION
Transitional plan:    Pt/Ot notes reviewed, PMR consulted today, awaiting evals, will need choices.      200 Met with patient and daughters at bedside to discuss IP Rehab, list of choices provided, choice is [facility], message left for Suze that referral was placed and PMR consult was also placed    [phone] with Suze colin started    0710 Call from [facility] the patient does not have coverage with the card provided, I met with the family at bedside and that is the only medical coverage the patient has, Suze notified and she relates it is not insurance coverage as it is a benefit, HELP program notified and requested to do a medicaid eval, family and patient updated

## 2021-05-06 NOTE — PROGRESS NOTES
Poor;-  Standing - Dynamic: Poor;-  Comments: pt. stood in nilson stedy requiring Max A x2; pt. sat EOB for approx 10 minutes requiring Mod A- Max A to correct posterior and left lateral lean  Exercises  Knee Long Arc Quad: PROM L LE; AROM R LE x 10  Ankle Pumps: PROM L LE x10 AROM R LE x 10     Goals  Short term goals  Time Frame for Short term goals: 15  Short term goal 1: Pt to perform bed mobility Fabiola  Short term goal 2: Pt to attempt functional transfers to [address] term goal 3: Demonstrate dynamic sitting balance of fair + to maximize independence  Short term goal 4: Demonstrate standing dynamic balance of fair + to decrease fall risk during functional transfers  Patient Goals   Patient goals : To go home    Plan    Plan  Times per week: 5-6x/week  Current Treatment Recommendations: Strengthening, Transfer Training, Endurance Training, Patient/Caregiver Education & Training, ROM, Balance Training, Gait Training, Home Exercise Program, Functional Mobility Training, Stair training, Safety Education & Training  Safety Devices  Type of devices: Nurse notified, Left in bed, Call light within reach, All fall risk precautions in place, Gait belt  Restraints  Initially in place: No     Therapy Time   Individual Concurrent Group Co-treatment   Time In 0918         Time Out 0949         Minutes 31         Timed Code Treatment Minutes: 555 [address]     Treatment performed by Student PTA under the supervision of co-signing PTA who agrees with all treatment and documentation.    Nick Larios PTA

## 2021-05-06 NOTE — FLOWSHEET NOTE
05/06/21 1719   Encounter Summary   Services provided to: Family   Referral/Consult From: Multi-disciplinary team   Support System Children;Family members   Place of Confucianism Unknown   Contact Jew No   Continue Visiting   (5/6/21)   Complexity of Encounter Low   Length of Encounter 15 minutes   Routine   Type Follow up   Assessment Calm; Approachable   Outcome Acceptance;Expressed gratitude   Advance Directives (For Healthcare)   Pre-existing DNR Comfort Care/DNR Arrest/DNI Order No   Healthcare Directive No, patient does not have an advance directive for healthcare treatment   Information on Healthcare Directives Requested Yes   Patient Requests Assistance Yes, referral made to    Advance Directives Documents given;Documents explained

## 2021-05-06 NOTE — PROGRESS NOTES
Daily Progress Note  Neuro Critical Care    Patient Name: Maribell Florentino  Patient : 1955  Room/Bed: 0143/0348-01  Code Status: Full  Allergies: No Known Allergies    CHIEF COMPLAINT:      Stroke     INTERVAL HISTORY    Initial Presentation (Admitted NICU): This patient is a 79-year-old female with a past medical history of diabetes on metformin, COPD with a history of tobacco use, gastritis who presents as a wake-up stroke. Patient's last known well is 10 PM on 546 2021, she woke up with severe left-sided weakness, left-sided facial droop, right-sided gaze preference. Patient presented to [facility]'s emergency department approximately 21 hours after last known well. CT head at that time showed moderate size acute stroke in the right MCA territory involving the right temporal and frontal lobes, right basal ganglia. Patient aspect score is 5 out of 10. CT showed near occlusion of the right MCA M1 segment with collateralization, severe stenosis of the left vertebral V4 segment. Patient was evaluated by neurovascular at that time given that her exam is worse than was expected by CT imaging, patient was offered thrombectomy. Patient underwent neurovascular lab approximately 2-1/2 hours after arrival, underwent mechanical thrombectomy, x6 passes which were unsuccessful at removing the occlusion. Patient postoperatively remained unchanged, has a petechial hemorrhage. Patient's PHYLLIS negative, loop was placed, patient is on a dysphagia 1 diet nectar. MRI brain shows Subacute right MCA distribution infarct most pronounced in the frontotemporal region and right basal ganglia with a hemorrhagic component and hemosiderin staining involving the right basal ganglia.   Patient held on DVT prophylaxis, ASA at this time,.    2021: Urinary retention, straight catheter for bladder scanner than 400 mL, follow-up repeat CT head without contrast  Straight cath x2 for urinary retention     Hospital Course:   Patient continues to display left sided hemineglect, , right sided gaze preference, no movement in left upper extremity, left lower extremity trace movement with the ability to bend the knee. Patient has complete hemineglect of the left side, also complained of paraspinal muscle pain, treated with Robaxin scheduled. Patient hemodynamically scale, low insulin dose sliding scale, levothyroxine 25 mcg daily, plan to start aggressive PT OT today. Last 24h:   No acute events overnight.       CURRENT MEDICATIONS:  SCHEDULED MEDICATIONS:   budesonide-formoterol  2 puff Inhalation BID    levothyroxine  25 mcg Oral Daily    tiotropium  2 puff Inhalation Daily    atorvastatin  80 mg Oral Daily    [Held by provider] aspirin  81 mg Oral Daily    polyethylene glycol  17 g Oral Every Other Day    nicotine  1 patch Transdermal Daily    sodium chloride flush  5-40 mL Intravenous 2 times per day     CONTINUOUS INFUSIONS:   sodium chloride 75 mL/hr at 21 1735    sodium chloride       PRN MEDICATIONS:   acetaminophen, promethazine **OR** ondansetron, albuterol, acetaminophen, sodium chloride flush, sodium chloride    VITALS:  Temperature Range: Temp: 98.5 °F (36.9 °C) Temp  Av.2 °F (36.8 °C)  Min: 97.9 °F (36.6 °C)  Max: 98.6 °F (37 °C)  BP Range: Systolic (79FCM), MDO:394 , Min:104 , YJL:588     Diastolic (45QOE), NQL:51, Min:37, Max:84    Pulse Range: Pulse  Av.4  Min: 70  Max: 91  Respiration Range: Resp  Av.9  Min: 11  Max: 25  Current Pulse Ox: SpO2: 96 %  24HR Pulse Ox Range: SpO2  Av.1 %  Min: 90 %  Max: 97 %  Patient Vitals for the past 12 hrs:   BP Temp Temp src Pulse Resp SpO2   21 0200 126/62 -- -- 76 22 96 %   21 0100 (!) 172/56 -- -- 75 24 92 %   21 0000 129/60 98.5 °F (36.9 °C) Oral 70 23 94 %   21 2300 139/82 -- -- 72 25 95 %   21 2206 (!) 153/72 -- --  16 97 %   21 2200 -- -- -- -- -- 97 %   21 2100 (!) 142/74 -- --  21 94 % 05/05/21 2000 (!) 139/53 98.1 °F (36.7 °C) Oral 85 25 95 %   05/05/21 1900 (!) 147/47 -- -- 78 22 94 %     Estimated body mass index is 30.41 kg/m² as calculated from the following:    Height as of this encounter: 5' 8\" (1.727 m). Weight as of this encounter: 200 lb (90.7 kg).  []<16 Severe malnutrition  []16-16.99 Moderate malnutrition  []17-18.49 Mild malnutrition  []18.5-24.9 Normal  []25-29.9 Overweight (not obese)  [x]30-34.9 Obese class 1 (Low Risk)  []35-39.9 Obese class 2 (Moderate Risk)  []?40 Obese class 3 (High Risk)    RECENT LABS:   Lab Results   Component Value Date    WBC 11.2 05/06/2021    HGB 12.1 05/06/2021    HCT 39.0 05/06/2021     05/06/2021    CHOL 206 (H) 05/05/2021    TRIG 185 (H) 05/05/2021    HDL 29 (L) 05/05/2021    ALT 13 10/16/2017    AST 13 10/16/2017     05/06/2021    K 3.6 (L) 05/06/2021     (H) 05/06/2021    CREATININE 0.40 (L) 05/06/2021    BUN 13 05/06/2021    CO2 22 05/06/2021    TSH 2.78 05/05/2021    INR SPECIMEN GROSSLY HEMOLYZED 05/04/2021    LABA1C 5.9 05/05/2021     24 HOUR INTAKE/OUTPUT:    Intake/Output Summary (Last 24 hours) at 5/6/2021 0649  Last data filed at 5/5/2021 1300  Gross per 24 hour   Intake --   Output 450 ml   Net -450 ml       IMAGING:   Ct Head Wo Contrast    Result Date: 5/4/2021  EXAMINATION: CT OF THE HEAD WITHOUT CONTRAST  5/4/2021 7:51 pm TECHNIQUE: CT of the head was performed without the administration of intravenous contrast. Dose modulation, iterative reconstruction, and/or weight based adjustment of the mA/kV was utilized to reduce the radiation dose to as low as reasonably achievable. COMPARISON: Noncontrast CT head done earlier same day.  HISTORY: ORDERING SYSTEM PROVIDED HISTORY: L sided weakness TECHNOLOGIST PROVIDED HISTORY: L sided weakness Decision Support Exception - unselect if not a suspected or confirmed emergency medical condition->Emergency Medical Condition (MA) Reason for Exam: stroke,lt sided weakness Acuity: Unknown Type of Exam: Unknown FINDINGS: BRAIN/VENTRICLES: Redemonstration of a moderate-sized area of patchy hypoattenuation with loss of the normal gray-white matter interface in the right temporal lobe, right frontal insular region and right basal ganglia. Surrounding mass effect with partially effacement of the right lateral ventricle. Minimal right left midline shift measures 2 mm at the level of the lateral and 3rd ventricles. No acute intracranial hemorrhage. No hydrocephalus. The basal cisterns are patent. ORBITS: The visualized portion of the orbits demonstrate no acute abnormality. SINUSES: The visualized paranasal sinuses and mastoid air cells demonstrate no acute abnormality. SOFT TISSUES/SKULL:  No acute abnormality of the visualized skull or soft tissues. Moderate-sized acute/subacute stroke in the right middle cerebral artery territory. No acute intracranial hemorrhage. Critical results were called by Dr. Han Sessions to DR Mary Carmen Osman on 5/4/2021 at 20:04. Ct Head Wo Contrast    Result Date: 5/4/2021  EXAMINATION: CT OF THE HEAD WITHOUT CONTRAST  5/4/2021 7:05 pm TECHNIQUE: CT of the head was performed without the administration of intravenous contrast. Dose modulation, iterative reconstruction, and/or weight based adjustment of the mA/kV was utilized to reduce the radiation dose to as low as reasonably achievable. COMPARISON: None. HISTORY: ORDERING SYSTEM PROVIDED HISTORY: Stroke TECHNOLOGIST PROVIDED HISTORY: Stroke FINDINGS: Image quality is degraded by motion artifact. BRAIN/VENTRICLES: Moderate-sized area of patchy hypoattenuation with loss of the normal gray-white matter interface in the right temporal lobe, right frontal insular region and right basal ganglia consistent with acute/subacute infarct.   Mild surrounding mass effect with partially effacement of the right lateral ventricle and minimal right left midline shift measuring 2 mm at the level of the lateral and 3rd ventricles. No acute intracranial hemorrhage. No hydrocephalus. The basal cisterns are patent. ORBITS: The visualized portion of the orbits demonstrate no acute abnormality. SINUSES: The visualized paranasal sinuses and mastoid air cells demonstrate no acute abnormality. SOFT TISSUES/SKULL:  No acute abnormality of the visualized skull or soft tissues. Moderate-size acute/subacute stroke in the right middle cerebral artery territory involving the right temporal and frontal lobes as well as the right basal ganglia. ASPECTS score is 5/10. No acute intracranial hemorrhage. Critical results were called by Dr. Guille Oviedo to [facility] on 5/4/2021 at 19:36. Ir Angiogram Carotid C Erebral Bilateral    Result Date: 5/5/2021  Date of Service: 5/4/2021 Diagnosis: Acute Ischemic Stroke Right M1 mca occlusion Patient arrived to the angio suite at: 1035 Puncture obtained at: 1058 Vascular access was removed at: [place]: Yonatan Mc MD PhD Procedures: 1. Mechanical thrombectomy of the right M1 middle cerebral artery (MCA) with Stent retriever and Combined Suctioning x 5 passes 2. Right Internal Carotid Artery (ICA) Cerebral Angiography 3. Right Common Carotid Artery (CCA) Cerebral Angiography 4. Right Common Femoral Artery (CFA) Angiogram Neurointerventionalist: Marlin Turner MD Access: Right Common Femoral Artery Anesthesia: MAC anesthesia. Fluoro time: 37.9 minutes Contrast amount: 138 cc of Visipaque-270 Indication and Clinical History: 63year old female with past medical history of transient ischemic attack, chronic obstructive pulmonary disease, osteoarthritis, smoker, gastritis. Patient presents as wake up right middle cerebral artery syndrome last well 20:00 the night before. Initial evaluation on mobile stroke unit with CT head imaging showing large core stroke ([place] Stroke Program Early Computer Tomography Score of 4) with right M1 middle cerebral artery occlusion.  Etiology suspect cardioembolic. Cta head and neck at [facility] confirming right m1 mca occlusion. After detailed discussion with family, proceeding with cerebral angiogram with possible intervention. Consent: After explaining the risks and benefit to the family, including but not limited to vessel injury or perforation, stroke, intracranial hemorrhage, radiation, dye allergic reaction, groin hematoma and death, an informed consent was obtained, signed and witnessed by son Technique and Interpretations: The patient was brought to the interventional suite and placed in a supine position by the stroke and anesthesia team. The patient's right groin was prepped and draped in standard sterile fashion and under local anesthesia with conscious sedation, the right common femoral artery was accessed with a micropuncture technique, and a 6 [place] Infinity  90cm long sheath was placed within the right common femoral artery, establishing arterial access using Seldinger technique. A 5F Friday select catheter was advanced coaxially with a guide wire. Right CCA Technique: The Right CCA was selectively catheterized under fluoroscopic guidance. Images were obtained in standard biplane projections. Right CCA Interpretation: The RIght common carotid artery injection demonstrates normal antegrade flow into the external and internal carotid arteries with normal filling of the external carotid artery branches. There is a grade 1 kink in the proximal cervical right ICA. There is  a plaque in the right ICA carotid bulb without stenosis by NASCET criteria. Course and caliber of the cervical portion of the right internal carotid artery are otherwise unremarkable. Right ICA Technique: The Right ICA was selectively catheterized under fluoroscopic guidance. Images were obtained in standard biplane projections. Right ICA Interpretation: The RIght ICA images demonstrated complete occlusion at the level of the Right m1 MCA.  Grade 1 collateral blood supply to the right MCA territory per the American Society of Interventional and Therapeutic Neuroradiology Radiology/Society of interventional Radiology(ASITN/SIR) collateral grade scale. There is noted dorsal right ophthalmic artery, prominent right posterior communicating artery. There is moderate stenosis of the distal right A2 anterior cerebral artery at the origin of the anterior internal frontal branch of the anterior cerebral artery. FIRST PASS MECHANICAL THROMBECTOMY OF RIGHT M1 CLOT [facility] IN THE HEIGHTS) Solitaire 4x40 and Vecta 071: The long sheath was advanced to the right ICA petrous segment and the select catheter was removed. A Vecta 71 intermediate catheter was advanced coaxially with a rebar 18 microcatheter and synchro2 014 soft microwire into the right ICA. The microwire and  microcatheter were advanced through the right M1 clot and into the right superior m2 branch. The microwire was removed, and a Solitaire X 4x40mm stent retriever was advanced under fluoroscopic guidance, unsheathed spanning the right M1 MCA clot, and allowed to integrate with the clot for at least 5 minutes. The microcatheter was \"stripped off. \" the intermediate catheter was over the stent retriever wire into the clot. The intermediate catheter was gently advanced and the stent retriever device was gently pulled back until resistance was met. The stent retriever and the intermediate catheter were removed from the body as a unit while applying continuous aspiration from the long sheath (using manual aspiration through a 60 cc syringe) as well as contact aspiration through the intermediate reperfusion catheter (using the Penumbra engine suction). White thrombus was visualized on the stent retriever. Recanalization achieved TICI 0. SECOND PASS MECHANICAL THROMBECTOMY OF RIGHT M1 CLOT [facility] IN THE HEIGHTS) Solitaire 4x40 and Vecta 071:  The Vecta 71 intermediate catheter was re-advanced coaxially with the microcatheter and microwire into the right ICA. The microwire and microcatheter were advanced through the right M1 clot and into the right distal superior m2 branch. The microwire was removed, and the stent retriever was re-advanced under fluoroscopic guidance, unsheathed spanning the right M1 MCA clot, and allowed to integrate with the clot for at least 5 minutes. The microcatheter was \"stripped off. \" the intermediate catheter was advanced over the stent retriever wire into the clot. The intermediate catheter was gently advanced and the stent retriever device was gently pulled back until resistance was met. The stent retriever and the intermediate catheter were removed from the body as a unit while applying continuous aspiration from the long sheath (using manual aspiration through a 60 cc syringe) as well as contact aspiration through the intermediate catheter (using the Penumbra engine suction). Minimal thrombus was visualized on the stent retriever. Recanalization achieved TICI 0. THIRD PASS MECHANICAL THROMBECTOMY OF RIGHT M1 CLOT [facility] IN THE HEIGHTS) Solitaire 4x40 and Vecta 071: The Vecta 71 intermediate catheter was re-advanced coaxially with the microcatheter and microwire into the right ICA. The microwire and microcatheter were advanced through the right M1 clot and into the right distal inferior m2 branch. The microwire was removed, and the stent retriever was re-advanced under fluoroscopic guidance, unsheathed spanning the right M1 MCA clot, and allowed to integrate with the clot for at least 5 minutes. The microcatheter was \"stripped off. \" the intermediate catheter was advanced over the stent retriever wire into the clot. The intermediate catheter was gently advanced and the stent retriever device was gently pulled back until resistance was met.  The stent retriever and the intermediate catheter were removed from the body as a unit while applying continuous aspiration from the long sheath (using manual aspiration through a 60 cc syringe) as well as contact aspiration through the intermediate catheter (using the Penumbra engine suction). Minimal thrombus was visualized on the stent retriever. Recanalization achieved TICI 0. FOURTH PASS MECHANICAL THROMBECTOMY OF RIGHT M1 CLOT (SOLUMBRA) Solitaire 4x04 and Cat 5: The Vecta 71 was switched out for a Catalyst 5 intermediate catheter, which was advanced coaxially with the microcatheter and microwire into the right ICA. The microwire and microcatheter were advanced through the right M1 clot and into the right distal superior m2 branch. The microwire was removed, and the stent retriever was re-advanced under fluoroscopic guidance, unsheathed spanning the right M1 MCA clot, and allowed to integrate with the clot for at least 5 minutes. The microcatheter was \"stripped off. \" the intermediate catheter was advanced over the stent retriever wire into the clot. The intermediate catheter was gently advanced and the stent retriever device was gently pulled back until resistance was met. The stent retriever and the intermediate catheter were removed from the body as a unit while applying continuous aspiration from the long sheath (using manual aspiration through a 60 cc syringe) as well as contact aspiration through the intermediate catheter (using the Penumbra engine suction). Minimal thrombus was visualized on the stent retriever. Recanalization achieved TICI 0. FIFTH PASS MECHANICAL THROMBECTOMY OF RIGHT M1 CLOT (SOLUMBRA) Trevo 4x41 and Cat 5 : The Catalyst 5 intermediate catheter was re-advanced coaxially with the Trevo trak 21 microcatheter and microwire into the right ICA. The microwire and microcatheter were advanced through the right M1 clot and into the right superior frontal m2/3 branch. The microwire was removed, and a Trevo NXT 4x41mm stent retriever was advanced under fluoroscopic guidance, unsheathed spanning the right M1 MCA clot, and allowed to integrate with the clot for at least 5 minutes.  The microcatheter was Exam: stroke,lt sided weakness Acuity: Unknown Type of Exam: Unknown FINDINGS: CTA NECK: AORTIC ARCH/ARCH VESSELS: No dissection or arterial injury. No significant stenosis of the brachiocephalic or subclavian arteries. CAROTID ARTERIES: No dissection, arterial injury, or hemodynamically significant stenosis by NASCET criteria. Mild calcified right carotid bulb atherosclerotic plaque. VERTEBRAL ARTERIES: No dissection, arterial injury, or significant stenosis. Dominant right vertebral artery. SOFT TISSUES: The lung apices are clear. No cervical or superior mediastinal lymphadenopathy. The larynx and pharynx are unremarkable. No acute abnormality of the salivary and thyroid glands. BONES: No acute osseous abnormality. CTA HEAD: ANTERIOR CIRCULATION: Right middle cerebral artery M1 segment occlusion/near occlusion with overall decreased size and number of right middle cerebral artery branches in comparison to the left. No focal hemodynamically significant stenosis or occlusion in the bilateral anterior and left middle cerebral arteries. Atherosclerosis of the bilateral intracranial internal carotid arteries without hemodynamically significant stenosis. No anterior circulation aneurysm. POSTERIOR CIRCULATION: Small left vertebral artery V4 segment demonstrates focal severe stenosis in the distal V4 segment. No significant stenosis of the right vertebral, basilar, or posterior cerebral arteries. No aneurysm. OTHER: No dural venous sinus thrombosis on this non-dedicated study. BRAIN: Redemonstration of moderate-sized acute/subacute stroke in the right middle cerebral artery territory. Right middle cerebral artery M1 segment occlusion/near occlusion with decreased size and number of right middle cerebral artery branches in comparison to the left. Focal severe stenosis of the left vertebral artery distal V4 segment. Dominant right vertebral artery.  Critical results were called by Dr. Han Sessions to  [facility] on 5/4/2021 at 20:20. Mri Brain Wo Contrast    Result Date: 5/5/2021  EXAMINATION: MRI OF THE BRAIN WITHOUT CONTRAST  5/5/2021 8:48 am TECHNIQUE: Multiplanar multisequence MRI of the brain was performed without the administration of intravenous contrast. COMPARISON: CT brain performed 05/04/2021. HISTORY: ORDERING SYSTEM PROVIDED HISTORY: Stroke. L weakness, neglect, sensory deficit, facial droop TECHNOLOGIST PROVIDED HISTORY: Stroke. L weakness, neglect, sensory deficit, facial droop FINDINGS: INTRACRANIAL STRUCTURES/VENTRICLES: The sellar and suprasellar structures, optic chiasm, corpus callosum, pineal gland, tectum, and midline brainstem structures are unremarkable. The craniocervical junction is unremarkable. There is redemonstration of a right MCA distribution infarct with FLAIR signal abnormality and restricted diffusion most pronounced in the right basal ganglia and right frontotemporal regions. There is blooming artifact on susceptibility weighted imaging within the right basal ganglia portion consistent with hemorrhagic component and hemosiderin staining. There is mild mass effect without significant midline shift. The infratentorial structures are unremarkable) ORBITS: The visualized portion of the orbits demonstrate no acute abnormality. SINUSES: The mastoid air cells are normally aerated. There is mild chronic sinusitis. BONES/SOFT TISSUES: The bone marrow signal intensity appears normal. The soft tissues demonstrate no acute abnormality. Subacute right MCA distribution infarct most pronounced in the frontotemporal region and right basal ganglia with a hemorrhagic component and hemosiderin staining involving the right basal ganglia. Labs and Images reviewed with:  [] Dr. Christine Ybarra    [x] Dr. Monica Carcamo  [] Dr. Dakota Schmitz  [] There are no new interval images to review.      PHYSICAL EXAM       CONSTITUTIONAL:  Well developed, well nourished, alert and oriented x 3, in no acute distress. GCS 15. Nontoxic. HEAD:  normocephalic, atraumatic    EYES:  PERRLA, EOMI.   ENT:  moist mucous membranes   NECK:  supple, symmetric   LUNGS:  Equal air entry bilaterally   CARDIOVASCULAR:  normal s1 / s2, RRR, distal pulses intact   ABDOMEN:  Soft, no rigidity   NEUROLOGIC:  Mental Status:  A & O x3,awake    Right-sided gaze preference, may overcome,             Cranial Nerves:    II: Visual acuity:  normal  II: Visual fields:  abnormal - L neglect  III: Pupils:  equal, round, reactive to light  III,IV,VI: Extra Ocular Movements: intact  V: Facial sensation:  intact  V: Corneal reflex:  completed. intact  VII: Facial strength: abnormal left-sided partial paralysis  VIII: Hearing:  intact    Motor Exam:    Drift:  present -left upper left lower extremity, unable to move left upper extremity, left lower extremity withdrawal to pain, no purposeful movement  Tone:  normal    Motor exam is 5 out of 5 all extremities with the exception of: Left-sided deficit noted above    Sensory:    Touch:    Right Upper Extremity:  normal  Left Upper Extremity:  abnormal -a sensate  Right Lower Extremity:  normal  Left Lower Extremity:  abnormal -a sensate    Coordination/Dysmetria:  Finger to Nose:   Right:  normal  Left:  abnormal -unab   NIH Stroke Scale Total (if not done complete detailed one below):    1a.  Level of consciousness:  0 - alert; keenly responsive  1b. Level of consciousness questions:  0 - answers both questions correctly  1c. Level of consciousness questions:  0 - performs both tasks correctly  2. Best Gaze:  1 - partial gaze palsy  3. Visual:  2 - complete hemianopia  4. Facial Palsy:  2 - partial paralysis (total or near total paralysis of the lower face)  5a. Motor left arm:  3 - no effort against gravity, limb falls  5b. Motor right arm:  0 - no drift, limb holds 90 (or 45) degrees for full 10 seconds  6a.   Motor left leg:  3 - no effort against gravity; leg falls to bed immediately  6b. Motor right le - no drift; leg holds 30 degree position for full 5 seconds  7. Limb Ataxia:  0 - absent  8. Sensory:  2 - severe to total sensory loss; patient is not aware of being touched in face, arm, leg  9. Best Language:  0 - no aphasia, normal  10. Dysarthria:  0 - normal  11. Extinction and Inattention:  2 - profound lynette-inattention or lynette- inattention to more than one modality. Does not recognize own hand or orients only to one side of space   TOTAL: 15    DRAINS:  [] There are no drains for Neuro Critical Care to monitor at this time. ASSESSMENT AND PLAN:           NEUROLOGIC:  -MRI brain showed Subacute right MCA distribution infarct most pronounced in the frontotemporal region and right basal ganglia with a hemorrhagic component and hemosiderin staining involving the right basal ganglia. -Tash Reilly following, systolic blood pressure between 90 and 150  -Follow-up repeat CT head in 48 hours 2021  -Hold ASA, DVT  -Lipitor 80 mg nightly  - Neuro checks per protocol    CARDIOVASCULAR:  - Goal SBP 90-1 50  - Continue telemetry    PULMONARY:  -Tolerating room air    RENAL/FLUID/ELECTROLYTE:  - BUN 13/ Creatinine 0.4  - IVF: NS 75   - Replace electrolytes PRN  - Daily BMP    GI/NUTRITION:  NUTRITION:  DIET CARDIAC; Carb Control: 3 carb choices (45 gms)/meal; Mildly Thick (Nectar)  - Bowel regimen: GlycoLax    ID:  - Tmax 98.6  - WBC 11.2  - Infectious work up negative so far  - Continue to monitor for fevers  - Daily CBC    HEME:   - H&H 12.1/39  - Platelets 439  - Daily CBC    ENDOCRINE:  - Continue to monitor blood glucose, goal <180  -    OTHER:  - PT/OT/ST   - Code Status: Full    PROPHYLAXIS:  Stress ulcer: nector diet     DVT PROPHYLAXIS:  - SCD sleeves - Thigh High   - MAXIM stockings - Thigh High  -Heparin TID     DISPOSITION:  [] To remain ICU:   [x] OK for out of ICU from Neuro Critical Care standpoint    We will continue to follow along.      For any changes in exam or patient status please contact Neuro Critical Care.       Ashlee Stockton MD  Neuro Critical Care  Pager [phone]  5/6/2021     6:49 AM

## 2021-05-06 NOTE — PROGRESS NOTES
Neuro Critical Care Sign Out to Neurology Resident      Date and time: 5/6/2021 12:47 PM  Patient's name:  Suzi Booth  Medical Record Number: [id number]  Patient's account/billing number: [de-identified]  Patient's YOB: 1955  Age: 63 y.o. Date of Admission: 5/4/2021  7:49 PM  Length of stay during current admission: 2    Primary Care Physician: Chemo Harman MD    Code Status: Full Code    Mode of physician to physician communication:        [] Via telephone   [x] In person     Date and time of sign-out: 5/6/2021 12:47 PM    Accepting Neurology Resident: Dr. Kaley Justin    Accepting team's attending: Dr. Jaye Alexander     Patient's current ICU Bed:  593     Patient's assigned bed on floor:          [] Med-Surg Monitored [x] Step-down         Reason for ICU admission:     Stroke    ICU course summary:     Patient's last known well is 10 PM on 546 2 4/2021, she woke up with severe left-sided weakness, left-sided facial droop, right-sided gaze preference. Patient presented to [facility]'s emergency department approximately 21 hours after last known well. CT head at that time showed moderate size acute stroke in the right MCA territory involving the right temporal and frontal lobes, right basal ganglia. Patient aspect score is 5 out of 10. CT showed near occlusion of the right MCA M1 segment with collateralization, severe stenosis of the left vertebral V4 segment. Patient was evaluated by neurovascular at that time given that her exam is worse than was expected by CT imaging, patient was offered thrombectomy. Patient underwent neurovascular lab approximately 2-1/2 hours after arrival, underwent mechanical thrombectomy, x6 passes which were unsuccessful at removing the occlusion. Patient postoperatively remained unchanged, has a petechial hemorrhage. Patient's PHYLLIS negative, loop was placed, patient is on a dysphagia 1 diet nectar.  MRI brain shows Subacute right MCA distribution infarct most pronounced in the frontotemporal region and right basal ganglia with a hemorrhagic component and hemosiderin staining involving the right basal ganglia. Patient held on DVT prophylaxis, ASA at this time,.     5/6/2021: Urinary retention, straight catheter for bladder scanner than 400 mL, follow-up repeat CT head without contrast  Straight cath x2 for urinary retention      Hospital Course:   Patient continues to display left sided hemineglect, , right sided gaze preference, no movement in left upper extremity, left lower extremity trace movement with the ability to bend the knee. Patient has complete hemineglect of the left side, also complained of paraspinal muscle pain, treated with Robaxin scheduled. Patient hemodynamically scale, low insulin dose sliding scale, levothyroxine 25 mcg daily, plan to start aggressive PT OT today. Procedures during patient's ICU stay:     none    Current Vitals:     BP (!) 160/77   Pulse 79   Temp 98.3 °F (36.8 °C) (Oral)   Resp 18   Ht 5' 8\" (1.727 m)   Wt 200 lb (90.7 kg)   SpO2 98%   BMI 30.41 kg/m²       Cultures:     Blood cultures:                 [] None drawn      [x] Negative             []  Positive (Details:  )  Urine Culture:                   [] None drawn      [x] Negative             []  Positive (Details:  )  Sputum Culture:               [x] None drawn       [] Negative             []  Positive (Details:  )   Endotracheal aspirate:     [x] None drawn       [] Negative             []  Positive (Details:  )       Consults:     1.   PM&R, PT OT    Assessment:     Patient Active Problem List    Diagnosis Date Noted    Nihss score 15     Acute left hemiparesis ([address]     Cerebrovascular accident (CVA) due to occlusion of right middle cerebral artery ([address]     Stroke due to occlusion of right middle cerebral artery ([address] 05/04/2021    Elbow effusion, right 03/22/2018    Hyperplastic colon polyp     Esophageal ring     Gastritis     Dysphagia 08/16/2017   

## 2021-05-06 NOTE — PLAN OF CARE
BRONCHOSPASM/BRONCHOCONSTRICTION     [x]         IMPROVE AERATION/BREATH SOUNDS  [x]   ADMINISTER BRONCHODILATOR THERAPY AS APPROPRIATE  [x]   ASSESS BREATH SOUNDS  [x]   IMPLEMENT AEROSOL/MDI PROTOCOL  [x]   PATIENT EDUCATION AS NEEDED   MECHANICAL VENTILATION     [x]  PROVIDE OPTIMAL VENTILATION  [x]   ASSESS FOR EXTUBATION READINESS  [x]   ASSESS FOR WEANING READINESS  [x]  EXTUBATE AS TOLERATED  [x]  IMPLEMENT ADULT MECHANICAL VENTILATION PROTOCOL  [x]  MAINTAIN ADEQUATE OXYGENATION  [x]  PERFORM SPONTANEOUS WEANING TRIAL AS TOLERATED

## 2021-05-06 NOTE — PLAN OF CARE
Problem: HEMODYNAMIC STATUS  Goal: Patient has stable vital signs and fluid balance  5/6/2021 1842 by Corina Antunez RN  Outcome: Ongoing  5/6/2021 0736 by Asuncion Reyez RN  Outcome: Ongoing     Problem: ACTIVITY INTOLERANCE/IMPAIRED MOBILITY  Goal: Mobility/activity is maintained at optimum level for patient  5/6/2021 1842 by Corina Antunez RN  Outcome: Ongoing  5/6/2021 0736 by Asuncion Reyez RN  Outcome: Ongoing     Problem: COMMUNICATION IMPAIRMENT  Goal: Ability to express needs and understand communication  5/6/2021 1842 by Corina Antunez RN  Outcome: Ongoing  5/6/2021 0736 by Asuncion Reyez RN  Outcome: Ongoing     Problem: Skin Integrity:  Goal: Will show no infection signs and symptoms  Description: Will show no infection signs and symptoms  Outcome: Ongoing  Goal: Absence of new skin breakdown  Description: Absence of new skin breakdown  Outcome: Ongoing  Skin assessment complete. No breakdown noted. Interventions in place. See doc flowsheet for interventions initiated. Pt encouraged to turn.  Will continue to monitor for additional needs and skin breakdown      Problem: Falls - Risk of:  Goal: Will remain free from falls  Description: Will remain free from falls  Outcome: Ongoing  Goal: Absence of physical injury  Description: Absence of physical injury  Outcome: Ongoing

## 2021-05-06 NOTE — H&P
[id number] [facility] Neurology   [address] [place] [address]    HISTORY AND PHYSICAL EXAMINATION            Date:   5/6/2021  Patient name:  Tiara Maynard  Date of admission:  5/4/2021  7:49 PM  MRN:   [id number]  Account:  [de-identified]  YOB: 1955  PCP:    Marcy Box MD  Room:   [address] [place] [address]  Code Status:    Full Code    Chief Complaint:     Chief Complaint   Patient presents with    Cerebrovascular Accident     Pt arrives via MSU straight to CT scanner   CVA    History Obtained From:     patient, family member - Sister    History of Present Illness: The patient is a 63 y.o. female, she has a PMH of diabetes, COPD, gastritis who presented as a wake-up stroke. Patient was LK W at 10 PM on 5/3 when she woke up with left-sided weakness and left facial droop and right gaze deviation. She had presented 21 hours after her LK W. Aspect score was 5 out of 10 and CTA of the head and neck showed a right MCA M1 occlusion with collateralization. Patient underwent a cerebral angiogram and had an unsuccessful thrombectomy, 6 passes were attempted in total. TICI 0. Patient had an MRI which showed right MCA stroke with hemorrhagic component extending to the basal ganglia. Patient also had a PHYLLIS which did not show any intramural thrombus, no PFO was noted. Loop recorder was placed. Patient was found by her son after the Realtor could not get in touch with the patient who was taking care of her brothers estate after his recent passing. The realtor contacted her daughter by [employer]. She was able to get in touch with her brother who resides near the patient. She was found on the floor, unclear exactly when she was last known well. At baseline, patient is functional and able to perform her daily activities without any significant impairment. She normally resides on her own and is fully functional.  Currently, she is plegic on the left side with present sensation on that side. Also displays a right gaze preference along with a left facial droop. NIH stroke scale appears to be 14. Past Medical History:     Past Medical History:   Diagnosis Date    Asthma     [facility] obstructive [facility] disease) [facility]     Diabetes mellitus ([facility]     Esophageal ring     Gastritis     Hyperlipidemia     Hyperplastic colon polyp     Hypertension     Osteoarthritis     Patient in clinical research study 05/04/2021    DAXA     TIA (transient ischemic attack) 2013    Per Daughter        Past Surgical History:     Past Surgical History:   Procedure Laterality Date    APPENDECTOMY      COLONOSCOPY  09/20/2017    FRAGMENTS OF HYPERPLASTIC POLYP    ENDOSCOPY, COLON, DIAGNOSTIC      ESOPHAGEAL DILATATION      HYSTERECTOMY      MI COLSC FLX W/RMVL OF TUMOR POLYP LESION SNARE TQ N/A 9/20/2017    COLONOSCOPY POLYPECTOMY COLD BIOPSY performed by Cortez Licea MD at [facility] EGD TRANSORAL BIOPSY SINGLE/MULTIPLE N/A 9/20/2017    EGD BIOPSY with esophageal dilitation performed by Cortez Licea MD at [facility]  09/2017    E-ring and gastritis         Medications Prior to Admission:     Prior to Admission medications    Medication Sig Start Date End Date Taking?  Authorizing Provider   levothyroxine (SYNTHROID) 25 MCG tablet TAKE 1 AND 1/2 TABLET BY MOUTH DAILY 12/29/20   Tyra KEELY Pabon - CNP   albuterol sulfate HFA (VENTOLIN HFA) 108 (90 Base) MCG/ACT inhaler INHALE TWO PUFFS BY MOUTH EVERY 4 HOURS AS NEEDED FOR WHEEZING OR SHORTNESS OF BREATH 8/19/20   Tyra Cm APRN - CNP   budesonide-formoterol [facility]) 160-4.5 MCG/ACT AERO Inhale 2 puffs into the lungs 2 times daily 8/19/20   Tyra Cm APRN - CNP   tiotropium (SPIRIVA RESPIMAT) 2.5 MCG/ACT AERS inhaler Inhale 2 puffs into the lungs daily 8/19/20   Tyra [place], APRN - CNP   meloxicam (MOBIC) 7.5 MG tablet Take 1 tablet by mouth daily 8/19/20   Gilbert August KEELY Alvarado CNP   metFORMIN (GLUCOPHAGE-XR) 500 MG extended release tablet TAKE ONE TABLET BY MOUTH TWICE A DAY BEFORE MEALS 20   KEELY Parr CNP   atorvastatin (LIPITOR) 20 MG tablet Take 1 tablet by mouth daily 20   KEELY Parr CNP   ciclopirox (PENLAC) 8 % solution Apply topically nightly. 19   KEELY Parr CNP   polyethylene glycol [facility]) powder Take 17 g by mouth every other day 19   KEELY Parr CNP        Allergies:     Patient has no known allergies. Social History:     Tobacco:    reports that she has been smoking cigarettes. She has a 20.00 pack-year smoking history. She has never used smokeless tobacco.  Alcohol:      reports no history of alcohol use. Drug Use:  reports no history of drug use. Family History:     Family History   Problem Relation Age of Onset    Other Mother     Diabetes Mother     Prostate Cancer Father     Cancer Brother         lining of lung       Review of Systems:     ROS:  Constitutional  Negative for fever and chills    HEENT  Negative for ear discharge, ear pain, nosebleed    Eyes  Negative for photophobia, pain and discharge    Respiratory  Negative for hemoptysis and sputum    Cardiovascular  Negative for orthopnea, claudication and PND    Gastrointestinal  Negative for abdominal pain, diarrhea, blood in stool    Musculoskeletal  Negative for joint pain, negative for myalgia    Skin  Negative for rash or itching    Neurological Negative for seizures, weakness, headache, dysarthria, aphasia   Endo/heme/allergies  Negative for polydipsia, environmental allergy    Psychiatric/behavioral  Negative for suicidal ideation.  Patient is not anxious        Physical Exam:   BP (!) 160/77   Pulse 79   Temp 98.3 °F (36.8 °C) (Oral)   Resp 18   Ht 5' 8\" (1.727 m)   Wt 200 lb (90.7 kg)   SpO2 98%   BMI 30.41 kg/m²   Temp (24hrs), Av.2 °F (36.8 °C), Min:97.6 °F (36.4 °C), Max:98.6 °F (37 °C)    Recent Labs     05/04/21 1946   POCGLU 119*       Intake/Output Summary (Last 24 hours) at 5/6/2021 1523  Last data filed at 5/6/2021 1025  Gross per 24 hour   Intake 855 ml   Output 1200 ml   Net -345 ml       General Appearance:  alert, well appearing, and in no acute distress  HEENT:  normocephalic, atraumatic. Lungs: Bilateral equal air entry, clear to ausculation, no wheezing, rales or rhonchi, normal effort  Cardiovascular: normal rate, regular rhythm, no murmur, gallop, rub. Abdomen: Soft, nontender, nondistended, normal bowel sounds, no hepatomegaly or splenomegaly    NEUROLOGIC EXAMINATION  MENTAL STATUS:  Awake, alert and able to follow commands. Normal Glabellar Response   CRANIAL NERVES: Pupils equal and reactive, right gaze preference but able to proceed to midline. Left facial droop, tongue midline elevates uvula, able to close eyes and raise eyebrows. No anesthesia of the face.        MOTOR FUNCTION: Observation: No fasciculations, no atrophy, No tremors/involuntary movements in all 4 extremities proximally and distally    Passive Movement: Normal tone and bulk in all 4 extremities proximally and distally    Active Movement:  RUE: Significant for good strength of grade 5/5 in proximal muscle groups on abduction, adduction, flexion, extension, internal and external rotation and grade 5/5 on distal muscle groups on extension and flexion    LUE: Plegic in left upper extremity    RLE: Significant for good strength of grade 5/5 in proximal muscle groups on abduction, adduction, flexion, extension, internal and external rotation and grade 5/5 on distal muscle groups on extension and flexion    LLE: Plegic in left lower extremity       SENSORY FUNCTION:  RUE: Normal sensation to light touch, temperature, pin prick, vibration, proprioception (Joint position sense)     LUE: Decreased sensation to LT in left upper extremity     RLE: Normal sensation to light touch, temperature, pin prick, vibration, proprioception(Joint position sense)     LLE: Decreased sensation to LT.    CEREBELLAR FUNCTION:  Intact fine motor control over bilateral upper extremities and bilateral lower extremities (finger to nose, rapid alternating hand movement, finger tapping and heel to shin)   REFLEX FUNCTION:  Right Triceps (C7): 2/2                                             Left Tricep (C7): 2/2   Right Bicep (C5, C6): 2/2                                         Left Bicep (C5, C6): 2/2   Right Brachiocephalic (C6): 2/2                               Left Brachiocephalic (C6): 2/2     Right Patella (L4): 2/2                                              Left Patella (L4): 2/2   Right Achilles (S1): 2/2                                            Left Achilles (S1): 2/2      Hahn Sign: Absent       Negative Kerning & Brudzinski absent   STATION and GAIT  deferred     Investigations:      Laboratory Testing:  Recent Results (from the past 24 hour(s))   Basic Metabolic Panel w/ Reflex to MG    Collection Time: 05/06/21  4:00 AM   Result Value Ref Range    Glucose 102 (H) 70 - 99 mg/dL    BUN 13 8 - 23 mg/dL    CREATININE 0.40 (L) 0.50 - 0.90 mg/dL    Bun/Cre Ratio NOT REPORTED 9 - 20    Calcium 8.2 (L) 8.6 - 10.4 mg/dL    Sodium 140 135 - 144 mmol/L    Potassium 3.6 (L) 3.7 - 5.3 mmol/L    Chloride 109 (H) 98 - 107 mmol/L    CO2 22 20 - 31 mmol/L    Anion Gap 9 9 - 17 mmol/L    GFR Non-African American >60 >60 mL/min    GFR African American >60 >60 mL/min    GFR Comment          GFR Staging NOT REPORTED    CBC WITH AUTO DIFFERENTIAL    Collection Time: 05/06/21  4:00 AM   Result Value Ref Range    WBC 11.2 3.5 - 11.3 k/uL    RBC 4.09 3.95 - 5.11 m/uL    Hemoglobin 12.1 11.9 - 15.1 g/dL    Hematocrit 39.0 36.3 - 47.1 %    MCV 95.4 82.6 - 102.9 fL    MCH 29.6 25.2 - 33.5 pg    MCHC 31.0 28.4 - 34.8 g/dL    RDW 13.4 11.8 - 14.4 %    Platelets 733 690 - 604 k/uL    MPV 9.4 8.1 - 13.5 fL    NRBC Automated 0.0 0.0 per 100 WBC    Differential Type NOT REPORTED     Seg Neutrophils 67 (H) 36 - 65 %    Lymphocytes 24 24 - 43 %    Monocytes 7 3 - 12 %    Eosinophils % 2 1 - 4 %    Basophils 0 0 - 2 %    Immature Granulocytes 0 0 %    Segs Absolute 7.45 1.50 - 8.10 k/uL    Absolute Lymph # 2.63 1.10 - 3.70 k/uL    Absolute Mono # 0.76 0.10 - 1.20 k/uL    Absolute Eos # 0.24 0.00 - 0.44 k/uL    Basophils Absolute 0.04 0.00 - 0.20 k/uL    Absolute Immature Granulocyte 0.05 0.00 - 0.30 k/uL    WBC Morphology NOT REPORTED     RBC Morphology NOT REPORTED     Platelet Estimate NOT REPORTED        Imaging/Diagnostics:    1. Assessment & Differential Dx:      Primary Problem  <principal problem not specified>    Active Hospital Problems    Diagnosis Date Noted    Nihss score 15 [R29.715]     Acute left hemiparesis (HCC) [G81.94]     Cerebrovascular accident (CVA) due to occlusion of right middle cerebral artery ([facility] [I63.511]     Stroke due to occlusion of right middle cerebral artery [facility]) [I63.511] 05/04/2021           Plan:     51-year-old female who was found down, last known well approximately 21 hours before. Patient was found to have a right MCA M1 occlusion. Thrombectomy was not successful, MRI showed patient had a right MCA stroke with hemorrhagic conversion. Initially she was displaying hemineglect, she is plegic on the left with a right gaze preference and left facial droop. Repeat CT head is pending for 48 hours on 5/8 before considering antiplatelet therapy. Patient has been placed on DVT prophylaxis, she is on a dysphagia 1 diet and following commands appropriately.       Neurological-ischemic stroke  -Monitor for any neurological changes  -Follow-up with neuro endovascular  -Repeat CT in 48 hours, 5/8 before starting antiplatelet therapy  -SBP goal 90-1 50  -PT/OT  -PM&R pending  -Continue high-dose statin therapy    Cardiovascular  -SBP goal   -Continue on telemetry as directed  -PHYLLIS-no thrombus, loop recorder in place  -We will add Norvasc 5 mg daily    Respiratory  -Maintain O2 saturation greater than 90%  -DuoNebs as needed    GI  -Dysphagia diet     Renal  -Follow-up BUNs/creatinine  -Monitor urine output    Hematology  -Follow-up CBC in a.m.  -DVT prophylaxis with heparin-     ID  -Monitor for hypo and hyperthermia  -Tylenol as needed if T-max rater than 101  -Panculture if febrile        Consultations:   IP CONSULT TO NEUROCRITICAL CARE  IP CONSULT TO PHYSICAL MEDICINE REHAB  IP CONSULT TO NEUROLOGY     Patient is admitted as inpatient status because of co-morbidities listed above, severity of signs and symptoms as outlined, requirement for current medical therapies and most importantly because of direct risk to patient if care not provided in a hospital setting.     Halie Grewal MD  Neurology Resident PGY-3  5/6/2021 at 3:23 PM

## 2021-05-07 ENCOUNTER — APPOINTMENT (OUTPATIENT)
Dept: CT IMAGING | Age: 66
DRG: 024 | End: 2021-05-07
Payer: COMMERCIAL

## 2021-05-07 LAB
ABSOLUTE EOS #: 0.23 K/UL (ref 0–0.44)
ABSOLUTE IMMATURE GRANULOCYTE: 0.04 K/UL (ref 0–0.3)
ABSOLUTE LYMPH #: 2.35 K/UL (ref 1.1–3.7)
ABSOLUTE MONO #: 0.67 K/UL (ref 0.1–1.2)
ANION GAP SERPL CALCULATED.3IONS-SCNC: 10 MMOL/L (ref 9–17)
BASOPHILS # BLD: 0 % (ref 0–2)
BASOPHILS ABSOLUTE: 0.04 K/UL (ref 0–0.2)
BUN BLDV-MCNC: 8 MG/DL (ref 8–23)
BUN/CREAT BLD: ABNORMAL (ref 9–20)
CALCIUM SERPL-MCNC: 8.2 MG/DL (ref 8.6–10.4)
CHLORIDE BLD-SCNC: 107 MMOL/L (ref 98–107)
CO2: 22 MMOL/L (ref 20–31)
CREAT SERPL-MCNC: 0.39 MG/DL (ref 0.5–0.9)
DIFFERENTIAL TYPE: ABNORMAL
EOSINOPHILS RELATIVE PERCENT: 2 % (ref 1–4)
GFR AFRICAN AMERICAN: >60 ML/MIN
GFR NON-AFRICAN AMERICAN: >60 ML/MIN
GFR SERPL CREATININE-BSD FRML MDRD: ABNORMAL ML/MIN/{1.73_M2}
GFR SERPL CREATININE-BSD FRML MDRD: ABNORMAL ML/MIN/{1.73_M2}
GLUCOSE BLD-MCNC: 101 MG/DL (ref 65–105)
GLUCOSE BLD-MCNC: 104 MG/DL (ref 70–99)
GLUCOSE BLD-MCNC: 117 MG/DL (ref 65–105)
HCT VFR BLD CALC: 40.2 % (ref 36.3–47.1)
HEMOGLOBIN: 12.6 G/DL (ref 11.9–15.1)
IMMATURE GRANULOCYTES: 0 %
LYMPHOCYTES # BLD: 21 % (ref 24–43)
MCH RBC QN AUTO: 29.5 PG (ref 25.2–33.5)
MCHC RBC AUTO-ENTMCNC: 31.3 G/DL (ref 28.4–34.8)
MCV RBC AUTO: 94.1 FL (ref 82.6–102.9)
MONOCYTES # BLD: 6 % (ref 3–12)
NRBC AUTOMATED: 0 PER 100 WBC
PDW BLD-RTO: 13.2 % (ref 11.8–14.4)
PLATELET # BLD: 297 K/UL (ref 138–453)
PLATELET ESTIMATE: ABNORMAL
PMV BLD AUTO: 9.1 FL (ref 8.1–13.5)
POTASSIUM SERPL-SCNC: 3.7 MMOL/L (ref 3.7–5.3)
RBC # BLD: 4.27 M/UL (ref 3.95–5.11)
RBC # BLD: ABNORMAL 10*6/UL
SEG NEUTROPHILS: 71 % (ref 36–65)
SEGMENTED NEUTROPHILS ABSOLUTE COUNT: 8.01 K/UL (ref 1.5–8.1)
SODIUM BLD-SCNC: 139 MMOL/L (ref 135–144)
WBC # BLD: 11.3 K/UL (ref 3.5–11.3)
WBC # BLD: ABNORMAL 10*3/UL

## 2021-05-07 PROCEDURE — 70450 CT HEAD/BRAIN W/O DYE: CPT

## 2021-05-07 PROCEDURE — 2580000003 HC RX 258: Performed by: STUDENT IN AN ORGANIZED HEALTH CARE EDUCATION/TRAINING PROGRAM

## 2021-05-07 PROCEDURE — 6370000000 HC RX 637 (ALT 250 FOR IP): Performed by: STUDENT IN AN ORGANIZED HEALTH CARE EDUCATION/TRAINING PROGRAM

## 2021-05-07 PROCEDURE — 2060000000 HC ICU INTERMEDIATE R&B

## 2021-05-07 PROCEDURE — 99233 SBSQ HOSP IP/OBS HIGH 50: CPT | Performed by: PSYCHIATRY & NEUROLOGY

## 2021-05-07 PROCEDURE — 6360000002 HC RX W HCPCS: Performed by: PSYCHIATRY & NEUROLOGY

## 2021-05-07 PROCEDURE — 82947 ASSAY GLUCOSE BLOOD QUANT: CPT

## 2021-05-07 PROCEDURE — 6370000000 HC RX 637 (ALT 250 FOR IP): Performed by: NURSE PRACTITIONER

## 2021-05-07 PROCEDURE — 92526 ORAL FUNCTION THERAPY: CPT

## 2021-05-07 PROCEDURE — 85025 COMPLETE CBC W/AUTO DIFF WBC: CPT

## 2021-05-07 PROCEDURE — 97535 SELF CARE MNGMENT TRAINING: CPT

## 2021-05-07 PROCEDURE — 97110 THERAPEUTIC EXERCISES: CPT

## 2021-05-07 PROCEDURE — 97530 THERAPEUTIC ACTIVITIES: CPT

## 2021-05-07 PROCEDURE — 99232 SBSQ HOSP IP/OBS MODERATE 35: CPT | Performed by: PSYCHIATRY & NEUROLOGY

## 2021-05-07 PROCEDURE — 80048 BASIC METABOLIC PNL TOTAL CA: CPT

## 2021-05-07 PROCEDURE — 36415 COLL VENOUS BLD VENIPUNCTURE: CPT

## 2021-05-07 PROCEDURE — 97129 THER IVNTJ 1ST 15 MIN: CPT

## 2021-05-07 PROCEDURE — 94640 AIRWAY INHALATION TREATMENT: CPT

## 2021-05-07 RX ORDER — DEXTROSE MONOHYDRATE 50 MG/ML
100 INJECTION, SOLUTION INTRAVENOUS PRN
Status: DISCONTINUED | OUTPATIENT
Start: 2021-05-07 | End: 2021-05-10 | Stop reason: HOSPADM

## 2021-05-07 RX ORDER — ACETAMINOPHEN 500 MG
1000 TABLET ORAL EVERY 8 HOURS PRN
Status: DISCONTINUED | OUTPATIENT
Start: 2021-05-07 | End: 2021-05-10 | Stop reason: HOSPADM

## 2021-05-07 RX ORDER — TRAMADOL HYDROCHLORIDE 50 MG/1
25 TABLET ORAL EVERY 8 HOURS PRN
Status: COMPLETED | OUTPATIENT
Start: 2021-05-07 | End: 2021-05-09

## 2021-05-07 RX ORDER — NICOTINE POLACRILEX 4 MG
15 LOZENGE BUCCAL PRN
Status: DISCONTINUED | OUTPATIENT
Start: 2021-05-07 | End: 2021-05-10 | Stop reason: HOSPADM

## 2021-05-07 RX ORDER — DEXTROSE MONOHYDRATE 25 G/50ML
12.5 INJECTION, SOLUTION INTRAVENOUS PRN
Status: DISCONTINUED | OUTPATIENT
Start: 2021-05-07 | End: 2021-05-10 | Stop reason: HOSPADM

## 2021-05-07 RX ADMIN — SODIUM CHLORIDE, PRESERVATIVE FREE 10 ML: 5 INJECTION INTRAVENOUS at 10:04

## 2021-05-07 RX ADMIN — ACETAMINOPHEN 650 MG: 650 SUPPOSITORY RECTAL at 08:49

## 2021-05-07 RX ADMIN — SODIUM CHLORIDE, PRESERVATIVE FREE 10 ML: 5 INJECTION INTRAVENOUS at 21:11

## 2021-05-07 RX ADMIN — AMLODIPINE BESYLATE 5 MG: 5 TABLET ORAL at 10:04

## 2021-05-07 RX ADMIN — HEPARIN SODIUM 5000 UNITS: 5000 INJECTION INTRAVENOUS; SUBCUTANEOUS at 21:10

## 2021-05-07 RX ADMIN — HEPARIN SODIUM 5000 UNITS: 5000 INJECTION INTRAVENOUS; SUBCUTANEOUS at 14:42

## 2021-05-07 RX ADMIN — ATORVASTATIN CALCIUM 80 MG: 80 TABLET, FILM COATED ORAL at 10:04

## 2021-05-07 RX ADMIN — BUDESONIDE AND FORMOTEROL FUMARATE DIHYDRATE 2 PUFF: 160; 4.5 AEROSOL RESPIRATORY (INHALATION) at 19:42

## 2021-05-07 RX ADMIN — BUDESONIDE AND FORMOTEROL FUMARATE DIHYDRATE 2 PUFF: 160; 4.5 AEROSOL RESPIRATORY (INHALATION) at 10:27

## 2021-05-07 RX ADMIN — TRAMADOL HYDROCHLORIDE 25 MG: 50 TABLET, FILM COATED ORAL at 21:09

## 2021-05-07 RX ADMIN — ACETAMINOPHEN 650 MG: 650 SUPPOSITORY RECTAL at 14:47

## 2021-05-07 RX ADMIN — HEPARIN SODIUM 5000 UNITS: 5000 INJECTION INTRAVENOUS; SUBCUTANEOUS at 05:35

## 2021-05-07 RX ADMIN — LEVOTHYROXINE SODIUM 25 MCG: 25 TABLET ORAL at 10:04

## 2021-05-07 ASSESSMENT — PAIN SCALES - GENERAL
PAINLEVEL_OUTOF10: 5

## 2021-05-07 ASSESSMENT — PAIN DESCRIPTION - PAIN TYPE: TYPE: ACUTE PAIN

## 2021-05-07 ASSESSMENT — PAIN DESCRIPTION - DESCRIPTORS: DESCRIPTORS: ACHING

## 2021-05-07 NOTE — CARE COORDINATION
Transitional planning:  Reena Yo, at [employer]. States this pt does Not qualify for Medicaid. She is over income. Jh Nicolasa said he will come up and speak with pt and family. Left message with Suze at [facility] regarding bed availability with no insurance for ARU.     1205 Spoke with Suze at [facility]. States she will call \"[facility]\" and find out if they can take this pt without insurance. this pt does not qualify for Medicaid. If cleared by Romario Martinez, they can take pt over the week-end.   1214 PS Dr. Loree Das notifying him of above. Dr. Araceli Faria. 1440 Per Suze, pt. Is accepted to [facility]. Will take over weekend after CT scan of head is completed. 80 Met with pt and daughters and daughter over speaker phone, exclaimed happiness when heard their mom/pt was accepted at [facility]. 65 Dr. Loree Das states CT of head will be completed tomorrow.

## 2021-05-07 NOTE — RESEARCH
Josephmariannaisadora Oswald    Patient Name: Suzi Booth  MRN: [id number]  YOB: 1955  Date of evaluation: 5/7/2021  Time of evaluation: 13:43  Reason for evaluation: Hospitalization Research Follow Up     Protocol:  Protocol No: DAXA  NCT: [id number]  NATALIYA: [id number]  IRB: 2016-49  Site PI: Jean Paul Feliciano MD    Subject #: [id number]    Concomitant medications reviewed, subject assessed for protocol defined events. Protocol defined events reviewed with Dr. Sandhya Lopez and reported as required. Patient presentation including LKW and symptom onset clarified with Dr. Sandhya Lopez today and family previously (Wednesday May 5th 24 H follow up visit). Patient initally presented as an Unwitnessed Stroke with LKW 03 May 2021 at 22:00 when patient recalled reaching for remote and rolling onto floor. Patient did not wake up with symptoms, but experienced symptoms as noted after which time she was unable to get up off of the floor or call for help. Patients daughters reported to this writer that Rosa's symptom discovery occurred 04 May 2021 at 18:00 by son who then activated EMS. 24H blinded NIHSS assessment completed by [address] = 16. Today, patient in bed, alert and oriented, eating lunch with family present. Upcoming DAXA research visits reviewed with subject and family who remain agreeable to continued participation. Discharge planning is currently seeking discharge and rehab options that will accomodate patient's insurance coverage. Family and patient encouraged to reach out to this writer and the research department with any questions and concerns. We will continue to follow the patient and monitor for any issues throughout the study. Please contact Steveмарина Margret with any questions.        Richard Trinidad RN  Clinical Research Services  [address] [place] [address], [address]  P: [phone]  F: [phone]

## 2021-05-07 NOTE — PROGRESS NOTES
[facility] Neurology   [address] [facility] [place]    PROGRESS NOTE            Date:   5/7/2021  Patient name:  Serjio Vasquez  Date of admission:  5/4/2021  7:49 PM  MRN:   [id number]  Account:  [de-identified]  YOB: 1955  PCP:    Tiago Box MD  Room:   [address] [place] [address]  Code Status:    Full Code    Chief Complaint:     Chief Complaint   Patient presents with    Cerebrovascular Accident     Pt arrives via MSU straight to CT scanner   CVA    History Obtained From:     patient, family member - Sister    History of Present Illness: The patient is a 63 y.o. female, she has a PMH of diabetes, COPD, gastritis who presented as a wake-up stroke. Patient was LK W at 10 PM on 5/3 when she woke up with left-sided weakness and left facial droop and right gaze deviation. She had presented 21 hours after her LK W. Aspect score was 5 out of 10 and CTA of the head and neck showed a right MCA M1 occlusion with collateralization. Patient underwent a cerebral angiogram and had an unsuccessful thrombectomy, 6 passes were attempted in total. TICI 0. Patient had an MRI which showed right MCA stroke with hemorrhagic component extending to the basal ganglia. Patient also had a PHYLLIS which did not show any intramural thrombus, no PFO was noted. Loop recorder was placed. Patient was found by her son after the Realtor could not get in touch with the patient who was taking care of her brothers estate after his recent passing. The realtor contacted her daughter by [employer]. She was able to get in touch with her brother who resides near the patient. She was found on the floor, unclear exactly when she was last known well. At baseline, patient is functional and able to perform her daily activities without any significant impairment. She normally resides on her own and is fully functional.  Currently, she is plegic on the left side with present sensation on that side.   Also displays a right gaze preference along with a left facial droop. NIH stroke scale appears to be 14. INTERVAL HISTORY  No acute events overnight, no significant change in patient's neurological status, she was complaining of a headache and we repeated CT head this morning which did not show any worsening of hemorrhage. Tentative plan to repeat CT in the morning and start antiplatelet therapy. She still has a right gaze preference and plegic on the left. Past Medical History:     Past Medical History:   Diagnosis Date    Asthma     [facility] disease[facility]     Diabetes mellitus ([facility]     Esophageal ring     Gastritis     Hyperlipidemia     Hyperplastic colon polyp     Hypertension     Osteoarthritis     Patient in clinical research study 05/04/2021    DAXA     TIA (transient ischemic attack) 2013    Per Daughter        Past Surgical History:     Past Surgical History:   Procedure Laterality Date    APPENDECTOMY      COLONOSCOPY  09/20/2017    FRAGMENTS OF HYPERPLASTIC POLYP    ENDOSCOPY, COLON, DIAGNOSTIC      ESOPHAGEAL DILATATION      HYSTERECTOMY      W[facility] COLSC FLX W/RMVL OF TUMOR POLYP LESION SNARE TQ N/A 9/20/2017    COLONOSCOPY POLYPECTOMY COLD BIOPSY performed by Adina Huerta MD at [address] [place] [address] EGD TRANSORAL BIOPSY SINGLE/MULTIPLE N/A 9/20/2017    EGD BIOPSY with esophageal dilitation performed by Adina Huerta MD at [address] [place] [address]  09/2017    E-ring and gastritis         Medications Prior to Admission:     Prior to Admission medications    Medication Sig Start Date End Date Taking?  Authorizing Provider   levothyroxine (SYNTHROID) 25 MCG tablet TAKE 1 AND 1/2 TABLET BY MOUTH DAILY 12/29/20   KEELY Parr CNP   albuterol sulfate HFA (VENTOLIN HFA) 108 (90 Base) MCG/ACT inhaler INHALE TWO PUFFS BY MOUTH EVERY 4 HOURS AS NEEDED FOR WHEEZING OR SHORTNESS OF BREATH 8/19/20   KEELY Parr CNP budesonide-formoterol (SYMBICORT) 160-4.5 MCG/ACT AERO Inhale 2 puffs into the lungs 2 times daily 8/19/20   KEELY Bryson CNP   tiotropium (SPIRIVA RESPIMAT) 2.5 MCG/ACT AERS inhaler Inhale 2 puffs into the lungs daily 8/19/20   KEELY Bryson - CNP   meloxicam (MOBIC) 7.5 MG tablet Take 1 tablet by mouth daily 8/19/20   KEELY Bryson CNP   metFORMIN (GLUCOPHAGE-XR) 500 MG extended release tablet TAKE ONE TABLET BY MOUTH TWICE A DAY BEFORE MEALS 8/19/20   KEELY Bryson CNP   atorvastatin (LIPITOR) 20 MG tablet Take 1 tablet by mouth daily 8/12/20   KEELY Bryson CNP   ciclopirox (PENLAC) 8 % solution Apply topically nightly. 9/5/19   KEELY Bryson CNP   polyethylene glycol [place]) powder Take 17 g by mouth every other day 4/16/19   KEELY Bryson CNP        Allergies:     Patient has no known allergies. Social History:     Tobacco:    reports that she has been smoking cigarettes. She has a 20.00 pack-year smoking history. She has never used smokeless tobacco.  Alcohol:      reports no history of alcohol use. Drug Use:  reports no history of drug use.     Family History:     Family History   Problem Relation Age of Onset    Other Mother     Diabetes Mother     Prostate Cancer Father     Cancer Brother         lining of lung       Review of Systems:     ROS:  Constitutional  Negative for fever and chills    HEENT  Negative for ear discharge, ear pain, nosebleed    Eyes  Negative for photophobia, pain and discharge    Respiratory  Negative for hemoptysis and sputum    Cardiovascular  Negative for orthopnea, claudication and PND    Gastrointestinal  Negative for abdominal pain, diarrhea, blood in stool    Musculoskeletal  Negative for joint pain, negative for myalgia    Skin  Negative for rash or itching    Neurological Negative for seizures, weakness, headache, dysarthria, aphasia   Endo/heme/allergies Negative for polydipsia, environmental allergy    Psychiatric/behavioral  Negative for suicidal ideation. Patient is not anxious        Physical Exam:   /66   Pulse 86   Temp 98.1 °F (36.7 °C) (Oral)   Resp 22   Ht 5' 8\" (1.727 m)   Wt 200 lb (90.7 kg)   SpO2 95%   BMI 30.41 kg/m²   Temp (24hrs), Av.2 °F (36.8 °C), Min:98.1 °F (36.7 °C), Max:98.4 °F (36.9 °C)    Recent Labs     21  19421  1304   POCGLU 119* 101       Intake/Output Summary (Last 24 hours) at 2021 1632  Last data filed at 2021 1232  Gross per 24 hour   Intake --   Output 200 ml   Net -200 ml       General Appearance:  alert, well appearing, and in no acute distress  HEENT:  normocephalic, atraumatic. Lungs: Bilateral equal air entry, clear to ausculation, no wheezing, rales or rhonchi, normal effort  Cardiovascular: normal rate, regular rhythm, no murmur, gallop, rub. Abdomen: Soft, nontender, nondistended, normal bowel sounds, no hepatomegaly or splenomegaly    NEUROLOGIC EXAMINATION  MENTAL STATUS:  Awake, alert and able to follow commands. Normal Glabellar Response   CRANIAL NERVES: Pupils equal and reactive, right gaze preference but able to proceed to midline. Left facial droop, tongue midline elevates uvula, able to close eyes and raise eyebrows. No anesthesia of the face.        MOTOR FUNCTION: Observation: No fasciculations, no atrophy, No tremors/involuntary movements in all 4 extremities proximally and distally    Passive Movement: Normal tone and bulk in all 4 extremities proximally and distally    Active Movement:  RUE: Significant for good strength of grade 5/5 in proximal muscle groups on abduction, adduction, flexion, extension, internal and external rotation and grade 5/5 on distal muscle groups on extension and flexion    LUE: Plegic in left upper extremity    RLE: Significant for good strength of grade 5/5 in proximal muscle groups on abduction, adduction, flexion, extension, internal and external rotation and grade 5/5 on distal muscle groups on extension and flexion    LLE: Plegic in left lower extremity       SENSORY FUNCTION:  RUE: Normal sensation to light touch, temperature, pin prick, vibration, proprioception (Joint position sense)     LUE: Decreased sensation to LT in left upper extremity     RLE: Normal sensation to light touch, temperature, pin prick, vibration, proprioception(Joint position sense)     LLE: Decreased sensation to LT.    CEREBELLAR FUNCTION:  Intact fine motor control over bilateral upper extremities and bilateral lower extremities (finger to nose, rapid alternating hand movement, finger tapping and heel to shin)   REFLEX FUNCTION:  Right Triceps (C7): 2/2                                             Left Tricep (C7): 2/2   Right Bicep (C5, C6): 2/2                                         Left Bicep (C5, C6): 2/2   Right Brachiocephalic (C6): 2/2                               Left Brachiocephalic (C6): 2/2     Right Patella (L4): 2/2                                              Left Patella (L4): 2/2   Right Achilles (S1): 2/2                                            Left Achilles (S1): 2/2      Hahn Sign: Absent       Negative Kerning & Brudzinski absent   STATION and GAIT  deferred     Investigations:      Laboratory Testing:  Recent Results (from the past 24 hour(s))   Basic Metabolic Panel w/ Reflex to MG    Collection Time: 05/07/21  5:14 AM   Result Value Ref Range    Glucose 104 (H) 70 - 99 mg/dL    BUN 8 8 - 23 mg/dL    CREATININE 0.39 (L) 0.50 - 0.90 mg/dL    Bun/Cre Ratio NOT REPORTED 9 - 20    Calcium 8.2 (L) 8.6 - 10.4 mg/dL    Sodium 139 135 - 144 mmol/L    Potassium 3.7 3.7 - 5.3 mmol/L    Chloride 107 98 - 107 mmol/L    CO2 22 20 - 31 mmol/L    Anion Gap 10 9 - 17 mmol/L    GFR Non-African American >60 >60 mL/min    GFR African American >60 >60 mL/min    GFR Comment          GFR Staging NOT REPORTED    CBC WITH AUTO DIFFERENTIAL    Collection Time: 05/07/21  5:14 AM   Result Value Ref Range    WBC 11.3 3.5 - 11.3 k/uL    RBC 4.27 3.95 - 5.11 m/uL    Hemoglobin 12.6 11.9 - 15.1 g/dL    Hematocrit 40.2 36.3 - 47.1 %    MCV 94.1 82.6 - 102.9 fL    MCH 29.5 25.2 - 33.5 pg    MCHC 31.3 28.4 - 34.8 g/dL    RDW 13.2 11.8 - 14.4 %    Platelets 371 814 - 230 k/uL    MPV 9.1 8.1 - 13.5 fL    NRBC Automated 0.0 0.0 per 100 WBC    Differential Type NOT REPORTED     Seg Neutrophils 71 (H) 36 - 65 %    Lymphocytes 21 (L) 24 - 43 %    Monocytes 6 3 - 12 %    Eosinophils % 2 1 - 4 %    Basophils 0 0 - 2 %    Immature Granulocytes 0 0 %    Segs Absolute 8.01 1.50 - 8.10 k/uL    Absolute Lymph # 2.35 1.10 - 3.70 k/uL    Absolute Mono # 0.67 0.10 - 1.20 k/uL    Absolute Eos # 0.23 0.00 - 0.44 k/uL    Basophils Absolute 0.04 0.00 - 0.20 k/uL    Absolute Immature Granulocyte 0.04 0.00 - 0.30 k/uL    WBC Morphology NOT REPORTED     RBC Morphology NOT REPORTED     Platelet Estimate NOT REPORTED    POC Glucose Fingerstick    Collection Time: 05/07/21  1:04 PM   Result Value Ref Range    POC Glucose 101 65 - 105 mg/dL       Imaging/Diagnostics:    1. Assessment & Differential Dx:      Primary Problem  <principal problem not specified>    Active Hospital Problems    Diagnosis Date Noted    Nihss score 15 [R29.715]     Acute left hemiparesis (HCC) [G81.94]     Cerebrovascular accident (CVA) due to occlusion of right middle cerebral artery ([facility] [address] [I63.511]     Stroke due to occlusion of right middle cerebral artery [facility]) [I63.511] 05/04/2021           Plan:     79-year-old female who was found down, last known well approximately 21 hours before. Patient was found to have a right MCA M1 occlusion. Thrombectomy was not successful, MRI showed patient had a right MCA stroke with hemorrhagic conversion. Initially she was displaying hemineglect, she is plegic on the left with a right gaze preference and left facial droop.   Repeat CT head is pending for 48 hours on 5/8 before considering antiplatelet therapy. No acute events overnight, blood pressure goal remains . Repeat CT pending for a.m., she will be continued on DVT prophylaxis, her blood pressure has been within parameters with Norvasc. Tentative discharge tomorrow if CT stable. She is also had a previous PHYLLIS which did not show any thrombus. Neurological-ischemic stroke  -Monitor for any neurological changes  -Follow-up with neuro endovascular  -Repeat CT in am, 5/8 before starting antiplatelet therapy  -SBP goal 90-1 50  -PT/OT ongoing  -PM&R follow-up appreciated, would benefit from acute rehab  -Continue high-dose statin therapy    Cardiovascular  -SBP goal   -Continue on telemetry as directed  -PHYLLIS-no thrombus, loop recorder in place  -Continue Norvasc-BP improved    Respiratory  -Maintain O2 saturation greater than 90%  -DuoNebs as needed    GI  -Dysphagia diet   -BM regimen    Renal  -Follow-up BUNs/creatinine  -Monitor urine output    Hematology  -Follow-up CBC in a.m.  -DVT prophylaxis with heparin-     ID  -Monitor for hypo and hyperthermia  -Tylenol as needed if T-max rater than 101  -Panculture if febrile        Consultations:   IP CONSULT TO NEUROCRITICAL CARE  IP CONSULT TO PHYSICAL MEDICINE REHAB  IP CONSULT TO NEUROLOGY     Patient is admitted as inpatient status because of co-morbidities listed above, severity of signs and symptoms as outlined, requirement for current medical therapies and most importantly because of direct risk to patient if care not provided in a hospital setting.     Daisy Loo MD  Neurology Resident PGY-3  5/7/2021 at 4:32 PM

## 2021-05-07 NOTE — PROGRESS NOTES
Speech Language Pathology  Speech Language Pathology  [address] [facility]    Cognitive Treatment Note    Date: 5/7/2021  Patients Name: New Mccollum  MRN: [id number]  Diagnosis:   Patient Active Problem List   Diagnosis Code    Pneumonia of left lower lobe due to infectious organism J18.9    Tobacco abuse Z72.0    Legionella pneumonia ([facility] [address] A48.1    Dysphagia R13.10    Hyperplastic colon polyp K63.5    Esophageal ring K22.2    Gastritis K29.70    Elbow effusion, right M25.421    Stroke due to occlusion of right middle cerebral artery (HCC) I63.511    Nihss score 15 R29.715    Acute left hemiparesis (HCC) G81.94    Cerebrovascular accident (CVA) due to occlusion of right middle cerebral artery ([facility] [address] I63.511     Pain: 0/10    Cognitive Treatment    Treatment time: 2:15-2:43    Subjective: [x] Alert [x] Cooperative     [] Confused     [] Agitated    [] Lethargic    Objective/Assessment:  Attention: maintained throughout session, distractions minimized     Recall:   Delayed recall, 4 units: 2/4 increased to 4/4 with max verbal cues (10-minute delay)     Problem Solving/Reasoning:  Stating Situational Problems: 3/4 increased to 4/4 with min verbal cues  Word deductions: 9/10 increased to 10/10 with min verbal cues     Other:   Pt also seen for diet tolerance monitoring. Pt given trials of thin liquids by straw, soft  Solids, and pureed solids. Pt with +mod extended and decreased mastication with soft solid, +mod-max lingual residual with soft solid cleared with liquid wash. +immediate cough x1/6 trials of thin liquids with straw following large sip. No s/s of aspiration with small, single sips. No s/s of aspiration with puree.  recommends pt diet upgrade to Dysphagia I: Pureed diet with thin liquids with small single sips. Recommend assist feed. Recommend check for pocketing on L-side. If s/s of aspiration occur, recommend d/c PO intake and complete MBSS to r/o/confirm aspiration. Safe swallowing strategies discussed with pt and pt's children at bedside with good return. Recommendations reported to RN. Plan:  [x] Continue ST services    [] Discharge from ST:      Discharge recommendations: Further therapy recommended at discharge.     Treatment completed by: RENALDO Arnett

## 2021-05-07 NOTE — PROGRESS NOTES
Restriction  Other position/activity restrictions: up w/assist; significant L neglect with LUE/LLE deficits   Pain: yes, head ache, 6/10  Subjective   General  Patient assessed for rehabilitation services?: Yes  Family / Caregiver Present: Yes(supportive daughter)      Orientation  Orientation  Overall Orientation Status: Within Functional Limits  Objective    Pt in bed upon arrival and PTA in room providing a tx. Pt pleasant and motivated, brief mgt completed supine in bed. Pt still demo L side neglect and not able to keep head in neutral position ( R gaze noted) rolled up a towel at end of session to place on pt's R side of neck to promote neutral head position. Pt still not able to use L UE and LLE functionally d/t being flaccid. Pt tolerated sitting on EOB for ~ 25 min to engage in simple grooming and weight bearing through L UE. Verbal/tactile cues to improve UB posture and head posture- fair return- pt not able to maintain. Verbal/tactile cues to scan to the L side of environment and L side of table- fair return. Pt requires 2 person assistance for transfers w/ the nilson stedy d/t decreased balance, strength, and coordination. Pt returned to bed at end of session and ice pack given for pt's head ache, RN notified and arrived to provide tylenol. ADL  Grooming: Setup;Verbal cueing; Increased time to complete;Minimal assistance(seated on EOB- oral care and washed face)  LE Dressing: Setup; Moderate assistance  Toileting: Setup;Maximum assistance; Increased time to complete(brief mgt)        Balance  Sitting Balance: Moderate assistance(seated on EOB)  Standing Balance: Moderate assistance(+2 person assistance)  Standing Balance  Time: stood 2x for ~ 2 min each time, 2 seated rest breaks on the nilson stedy seat  Activity: static standing and verbal/tactile cues to improve UB posture- fair return  Comment: nilson stedy used, pt required set up & support to L UE d/t being flaccid.  pt required assistance to set up L LE on nilson stedy platform  Bed mobility  Rolling to Left: Minimal assistance  Rolling to Right: Moderate assistance;Maximum assistance  Supine to Sit: Maximum assistance;2 Person assistance  Sit to Supine: Maximum assistance;2 Person assistance  Scooting: Maximal assistance  Comment: pt used R hand to use bed rails and verbal/tactile cues to sequence- good return     Attendance  Participation: Active participation     Cognition  Overall Cognitive Status: Exceptions  Arousal/Alertness: Delayed responses to stimuli  Following Commands: Follows one step commands with increased time; Follows one step commands with repetition  Attention Span: Attends with cues to redirect  Safety Judgement: Decreased awareness of need for assistance;Decreased awareness of need for safety  Insights: Decreased awareness of deficits  Initiation: Requires cues for some  Sequencing: Requires cues for some       Plan   Plan  Times per week: 3-5x/wk    Goals  Short term goals  Time Frame for Short term goals: pt will, by discharge  Short term goal 1: dem UB ADL with min A and AE PRN  Short term goal 2: dem LB ADL/toileting with mod A and AE PRN  Short term goal 3: complete functional transfer with max A and LRD PRN  Short term goal 4: engage in functional activity for 20+ minutes with one rest break PRN  Short term goal 5: complete dynamic/static sitting during functional activity with mod A for 20+ minutes  Short term goal 6: dem improved awareness of L side by weight bearing on LUE for 20+ minutes  Short term goal 7: dem improved safety awareness by requiring less than 3 v/c's for safety during functional activity  Short term goal 8: dem compensatory head movement/visual scanning techniques with 5 v/c's to improve awareness of L side       Therapy Time   Individual Concurrent Group Co-treatment   Time In 0915      PTA   Time Out 1010         Minutes 55      17       time code min:38 min d/t co-tx w/ PTA    ALONZO Cardona/ALEN

## 2021-05-07 NOTE — PROGRESS NOTES
no effort against gravity, limb falls   5b. Motor right arm: 0 - no drift, limb holds 90 (or 45) degrees for full 10 seconds   6a. Motor left leg: 3 - no effort against gravity; leg falls to bed immediately   6b  Motor right le - no drift; leg holds 30 degree position for full 5 seconds   7. Limb Ataxia: 0 - absent   8. Sensory: 2 - severe to total sensory loss; patient is not aware of being touched in face, arm, leg   9. Best Language:  0 - no aphasia, normal   10. Dysarthria: 1 - mild to moderate, patient slurs at least some words and at worst, can be understood with some difficulty   11. Extinction and Inattention: 2 - profound lynette-inattention or lynette- inattention to more than one modality. Does not recognize own hand or orients only to one side of space          Total:   18     MRS: 5      LABS:   Reviewed. RADIOLOGY:   Images were personally reviewed including:  CT head 2021  1. Right MCA distribution infarct with extension since prior CT of 4 May 2020   and hemorrhagic transformation most significant along the anterior   distribution of the infarct.  A hyperdense right MCA sign is present. 2. Attenuation of the right lateral ventricle with minimal leftward shift of   2 mm at the level of the foramen of Monro. 3. Minimal sinus inflammation. MRI brain wo con 2021  Subacute right MCA distribution infarct most pronounced in the frontotemporal   region and right basal ganglia with a hemorrhagic component and hemosiderin   staining involving the right basal ganglia. DSA 2021  --acute right M1 MCA occlusion  --attempted mechanical thrombectomy (solitaire x 4x40mm and trevo 4x41mm stent retriever with [id number] and catalyst 5 reperfusion catheter and infinity 088 long sheath aspiration), tici 0 after 6 passes    ASSESSMENT:   61yo female with pmh of tia, copd, osteoarthritis, smoker, gastritis.  Pt presents as wake up R MCA syndrome, imaging shows large core stroke (ASPECTS 4) with R M1 occlusion. etio suspect cardioembolic. S/p MT TICI 0 after 6 passes. Pt is enrolled in DAX[employer] study    Pt has residual L hemiplegia, R gaze, neglect, L hemianopia, L droop, dysarthria. CT head 5/6/2021 shows more hemoconversion with evolving stroke. PLAN:   --SBP goal   --asa 81 held due to hemo conversion. Restart when safe to do so per nsicu  --workup and care as per nsicu   --Upon discharge follow up with Dr. Pineda in 2 weeks after discharge and Dr. Princess Alexander in 3 months after discharge. Case discussed with Dr. Princess Alexander attending.     Natty Navarrete MD, PhD    Stroke, [facility]  [facility]  Electronically signed 5/6/2021 at 10:21 PM

## 2021-05-07 NOTE — PROGRESS NOTES
Spoke with ALBANIA Spangler CM, who states per  HELP program pt does not qualify for Medicaid. Writer informed Joy Eubanks that Liz Lamb will discuss with [facility] to determine if we can accept pt. Notified Joy Eubanks that per [facility], [place] [facility] [place]. Joy Eubanks states pt will likely be ready tomorrow after repeat CT and neuro clearance, and writer will complete Admission Assessment in anticipation of pt's admit to [facility]. Will need d/c readmit completed with continuation of DVT prophylaxis as well as report called to [phone] when pt is medically ready. Admission Assessment completed and Dr Shanon King notified.

## 2021-05-07 NOTE — PROCEDURES
PATIENT HEALTH QUESTIONNAIRE-9                                            (PHQ-9)    Over the past 2 weeks, how often have you been bothered by any of the following problems?     -------------------------------------------------------------------------------------------------------------------  1. Little interest or pleasure in doing things   0x 1 2 3  Pt reply's no   Daughter conveyed that patient has not been seeing her friends as much, she use to meet every tues with them she has not done that in long while.     -------------------------------------------------------------------------------------------------------------------  2. Feeling down, depressed or hopeless   0x 1 2 3  Patient reply no. Daughter conveyed she is concerned that patient may not know she could be depressed. She reports patient lost her  6 yrs ago, and just nursed her twin brother through cancer and he  10 months ago. Daughter reports she has had bouts with depression or being overwhelmed and she will call her (daughter) and talk about what's bothering her. She has cried occasionally. Daughter thought she had been put on Wellbutrin in past for depression but patient interjected that It was used to help her stop smoking, not for depression, and It didn't work.    -------------------------------------------------------------------------------------------------------------------  3. Trouble falling or staying asleep, or sleeping too much  0x 1 2 3  -------------------------------------------------------------------------------------------------------------------  4. Feeling tired or having little energy   0 1x 2 3  Occasionally I feel tired and have little energy. -------------------------------------------------------------------------------------------------------------------  5. Poor appetite or overeating    0x 1 2 3  Patient is on dysphagia III diet.

## 2021-05-07 NOTE — PROGRESS NOTES
[address] [facility]   Acute Inpatient Rehab Preadmission Assessment    Patient Name: Billie Espinoza        MRN: [id number]    : 1955  (66 y.o.)  Gender: female     Admitted from:   [facility]  [x][facility]   [][facility]   [facility]   []Outside Admission - Location:                                 [x]Initial         []Updated    Date of Onset / Admission to the acute hospital:  21    Inpatient Rehabilitation Admitting Diagnosis:  CVA    Did patient have surgery/procedures? []No  [x]Yes    Procedure Performed: mechanical thrombectomy    Loop Recorder    Transesophageal Echocardiogram      Physicians: Raman Dale    Risk for clinical complications:  High    Co-morbidities:     1. Left hemiparesis  2. Cognitive impairment  3. Dysphagia, on pureed solids and nectar-thick liquids  4. Possible mild expressive aphasia  5. History of TIA  6. HTN  7. HLD  8. Diabetes  9. COPD, asthma  10. Esophageal ring  11. Obesity    Financial Information  Primary insurance:  []Medicare [] Medicare HMO  []Commercial insurance    []Medicaid   [] Medicaid HMO []Workers Compensation   [x]Personal Pay    Secondary Insurance:  []Medicare [] Medicare HMO  []Commercial insurance    []Medicaid  []Medicaid HMO  []Workers Compensation [x]None    Precautions:   []Cardiac Precautions:    []Total hip precautions:    []Weight Bearing status:  [x]Safety Precautions/Concerns:  Fall Risk, General Precautions  [x]Visually impaired: Wears glasses at all times. Pt demo'd R gaze and L neglect.  Pt able to track visual target from R side to midline of each eye, (pt's head never crossed midline towards L side either) demo'd convergence in B eyes    [x]Hard of Hearing: Hard of hearing/hearing concerns(chronic hearing loss in L ear)     Isolation Precautions:         []Yes  [x]No  If Yes:  [] Droplet  []Contact []Airborne     []VRE     []MRSA     []C-diff         [] TB       [] ESBL [] MDRO          [] Other:        Physiatrist:  []      []   Dr. Akilah Zavala  [x]   Dr. Dangelo Parker  []   Dr. Jorge Skinner    Patients Occupation: Retired    Reviewed Lab and Diagnostic reports from Current Admission: Yes    Patients Prior Functional  Level: Prior Function  ADL Assistance: Independent  Homemaking Assistance: Independent  Ambulation Assistance: Independent  Transfer Assistance: Independent    History of current illness:  Brennan Pino is a 63 y.o. female with history of TIA, HTN, HLD, diabetes, COPD/asthma, and esophageal ring admitted to [place] [address] on 5/4/2021.       She initially presented with left-sided weakness, left facial droop, and right-sided gaze preference for 1 day. NIHSS 15.  CT head showed moderate-size acute/subacute stroke in the right MCA territory. CTA head/neck showed near occlusion of the right MCA M1 segment and severe stenosis of the left vertebral V4 segment. She underwent mechanical thrombectomy of the right M1 MCA occlusion, which was unsuccessful, on 5/5/21 (Dr. Shama Flores). MRI brain showed subacute right MCA distribution infarct, most pronounced in the frontotemporal region and right basal ganglia with a hemorrhagic component and hemosiderin staining involving the right basal ganglia. PHYLLIS showed EF 55%, no shunt. Loop recorder was implanted on 5/5/21 (Dr. Zeke Harvey). Repeat CT head showed extension and hemorrhagic transformation of right MCA infarct.     She currently reports headaches, mildly blurred vision, dizziness, numbness/tingling of the left upper and lower limbs, and left-sided weakness. She also notes muscle spasms in the left trunk.   She denies any changes in bladder or bowel control.     Patient's 3 daughters are present at bedside and report that the patient will have 24-hour supervision after discharge.       Current functional status for upper extremity ADLs:  UE Bathing: Verbal cueing, Increased time to complete, Moderate assistance  UE Dressing: Setup, Moderate assistance, Increased time to complete, Verbal cueing    Current functional status for lower extremity ADLs:  LE Bathing: Setup, Maximum assistance  LE Dressing: Setup, Moderate assistance    Current functional status for bed, chair, wheelchair transfers:  Transfers  Sit to Stand: Maximum Assistance, 2 Person Assistance  Stand to sit: Maximum Assistance, 2 Person Assistance  Comment: pt performed STS x2 in nilson galdamez requiring Max A x2; pt. stood for approx 30 seconds each STS; pt. demos signifcant left lateral lean with max cues to correct    Current functional status for toilet transfers:  Maximum Assistance       Current functional status for locomotion:  Ambulation  Ambulation?: No    Current functional status for comprehension: Minimal contact assistance    Current functional status for expression: Minimal contact assistance    Current functional status for social interaction: Minimal contact assistance    Current functional status for problem solving:  Moderate assistance    Current functional status for memory: Minimal contact assistance    Current Deficits R/T Impairment: Impaired Functional Mobility and Decreased ADLs    Required Therapy:   [x] Physical Therapy  [x] Occupational Therapy   [x] Speech Therapy, as appropriate    Additional Services:  [x]   [] Recreational Therapy, as appropriate  [x] Nutrition  [] Dialysis  [] Other:     Rehab Justification:  Needs 3 hrs therapy per day or 15 hours per week:  Yes  Identified Rehab Nursing needs: Yes  Intense Interdisciplinary need:  Yes  Need for 24 hr physician supervision:  Yes  Measurable improved quality of life:  Yes  Willingness to participate:  Yes  Medical Necessity:  Yes  Patient able to tolerate care proposed:  Yes    Expected Discharge Destination/Functional Level:  Home with assist  Expected length of time to achieve that level of improvement: 1-2 weeks  Expected Post Discharge Treatments: Home with possible Home Care    Other information relevant to the care needs: n/a    Acute Inpatient Rehabilitation Disclosure Statement will be provided to patient upon admission to ARU with patient's verbalization of understanding. I have reviewed and concur with the findings and results of the pre-admission screening assessment completed by the Inpatient Rehabilitation Admissions Coordinator.

## 2021-05-07 NOTE — PLAN OF CARE
Problem: HEMODYNAMIC STATUS  Goal: Patient has stable vital signs and fluid balance  5/7/2021 0318 by Lexi Patel RN  Outcome: Ongoing  Note: Neuro assessment completed, fall precautions in place, aspirations precautions in place, assess for barriers in communication and mobility, interventions to assist in communication and mobility in place, encouraged to call for assistance, adaptive devices used as needed, assess emotional state and support offered, encouraged patient to communicate by available means, and support systems included in patient care. 5/6/2021 1842 by Linda Singh RN  Outcome: Ongoing     Problem: ACTIVITY INTOLERANCE/IMPAIRED MOBILITY  Goal: Mobility/activity is maintained at optimum level for patient  5/7/2021 0318 by Lexi Patel RN  Outcome: Ongoing  5/6/2021 1842 by Linda Singh RN  Outcome: Ongoing     Problem: COMMUNICATION IMPAIRMENT  Goal: Ability to express needs and understand communication  5/7/2021 0318 by Lexi Patel RN  Outcome: Ongoing  5/6/2021 1842 by Linda Singh RN  Outcome: Ongoing     Problem: Skin Integrity:  Goal: Will show no infection signs and symptoms  Description: Will show no infection signs and symptoms  5/7/2021 0318 by Lexi Patel RN  Outcome: Ongoing  Note: Skin assessment complete. No new breakdown. Patient repositioned every two hours. Mepilex applied to sacrum. 5/6/2021 1842 by Linda Singh RN  Outcome: Ongoing  Goal: Absence of new skin breakdown  Description: Absence of new skin breakdown  5/7/2021 0318 by Lexi Patel RN  Outcome: Ongoing  5/6/2021 1842 by Linda Singh RN  Outcome: Ongoing     Problem: Falls - Risk of:  Goal: Will remain free from falls  Description: Will remain free from falls  5/7/2021 0318 by Lexi Patel RN  Outcome: Ongoing  Note: Patient remained free from injury. Patient verbalized understanding of need for the safety precautions. Bed remains in the lowest position.  Call light remains within reach. [place] [facility] in use.      5/6/2021 1842 by Sharon Almanza RN  Outcome: Ongoing  Goal: Absence of physical injury  Description: Absence of physical injury  5/7/2021 0318 by Michaela Chawla RN  Outcome: Ongoing  5/6/2021 1842 by Sharon Almanza RN  Outcome: Ongoing     Problem: Pain:  Goal: Pain level will decrease  Description: Pain level will decrease  5/6/2021 1842 by Sharon Almanza RN  Outcome: Ongoing  Goal: Control of acute pain  Description: Control of acute pain  5/6/2021 1842 by Sharon Almanza RN  Outcome: Ongoing  Goal: Control of chronic pain  Description: Control of chronic pain  5/6/2021 1842 by Sharon Almanza RN  Outcome: Ongoing     Problem: Musculor/Skeletal Functional Status  Goal: Highest potential functional level  5/6/2021 1842 by Sharon Almanza RN  Outcome: Ongoing  Goal: Absence of falls  5/6/2021 1842 by Sharon Almanza RN  Outcome: Ongoing

## 2021-05-07 NOTE — PROGRESS NOTES
Physical Therapy  Facility/Department: [facility]  Daily Treatment Note  NAME: Andre Crowell  : 1955  MRN: [id number]    Date of Service: 2021    Discharge Recommendations:  Further therapy recommended at discharge. The patient should be able to tolerate at least three hours of therapy per day over 5 days or 15 hours over 7 days. PT Equipment Recommendations  Equipment Needed: No  Other: CTA; pending further progress with mobility    Assessment   Assessment: pt. grossly requires max A x2 for bed mobility and to perform STS x2 with nilson galdamez; pt. demos a signifcant left lateral lean with max cues to correct; pt. is a high fall risk and is unsafe to perform any functional mobility unassisted. Pt. will benefit from continued skilled physical therapy to address all deficits noted  Prognosis: Good  Decision Making: Medium Complexity  PT Education: Goals;PT Role;Plan of Care;General Safety; Family Education  REQUIRES PT FOLLOW UP: Yes  Activity Tolerance  Activity Tolerance: Patient limited by endurance; Patient limited by fatigue;Patient limited by pain     Patient Diagnosis(es): The encounter diagnosis was Cerebrovascular accident (CVA) due to occlusion of right middle cerebral artery ([address] has a past medical history of Asthma, COPD (chronic obstructive pulmonary disease) ([address] Diabetes mellitus ([address] Esophageal ring, Gastritis, Hyperlipidemia, Hyperplastic colon polyp, Hypertension, Osteoarthritis, Patient in clinical research study, and TIA (transient ischemic attack). has a past surgical history that includes Appendectomy; Hysterectomy; Esophagus dilation; Colonoscopy (2017); Endoscopy, colon, diagnostic; pr egd transoral biopsy single/multiple (N/A, 2017); pr colsc flx w/rmvl of tumor polyp lesion snare tq (N/A, 2017); and Upper gastrointestinal endoscopy (2017).     Restrictions  Restrictions/Precautions  Restrictions/Precautions: Fall Risk  Required Braces or Orthoses?: posterior and left lateral lean  Exercises  Straight Leg Raise: PROM L LE; AROM R LE x 10  Knee Long Arc Quad: PROM L LE; AROM R LE x 10  Ankle Pumps: PROM L LE x10 AROM R LE x 10  Core Strengthening: EOB for approx 20 minutes to perform ADL's with OT     Goals  Short term goals  Time Frame for Short term goals: 14  Short term goal 1: Pt to perform bed mobility Fabiola  Short term goal 2: Pt to attempt functional transfers to [address] term goal 3: Demonstrate dynamic sitting balance of fair + to maximize independence  Short term goal 4: Demonstrate standing dynamic balance of fair + to decrease fall risk during functional transfers  Patient Goals   Patient goals : To go home    Plan    Plan  Times per week: 5-6x/week  Current Treatment Recommendations: Strengthening, Transfer Training, Endurance Training, Patient/Caregiver Education & Training, ROM, Balance Training, Gait Training, Home Exercise Program, Functional Mobility Training, Stair training, Safety Education & Training  Safety Devices  Type of devices: Nurse notified, Left in bed, All fall risk precautions in place, Call light within reach, Gait belt  Restraints  Initially in place: No     Therapy Time   Individual Concurrent Group Co-treatment   Time In 0906         Time Out 0956         Minutes 50         Timed Code Treatment Minutes: [address] performed by Student PTA under the supervision of co-signing PTA who agrees with all treatment and documentation.     Daina Rivera

## 2021-05-08 ENCOUNTER — APPOINTMENT (OUTPATIENT)
Dept: CT IMAGING | Age: 66
DRG: 024 | End: 2021-05-08
Payer: COMMERCIAL

## 2021-05-08 LAB
ABSOLUTE EOS #: 0.28 K/UL (ref 0–0.44)
ABSOLUTE IMMATURE GRANULOCYTE: 0.05 K/UL (ref 0–0.3)
ABSOLUTE LYMPH #: 2.28 K/UL (ref 1.1–3.7)
ABSOLUTE MONO #: 0.78 K/UL (ref 0.1–1.2)
ANION GAP SERPL CALCULATED.3IONS-SCNC: 8 MMOL/L (ref 9–17)
BASOPHILS # BLD: 0 % (ref 0–2)
BASOPHILS ABSOLUTE: 0.03 K/UL (ref 0–0.2)
BUN BLDV-MCNC: 13 MG/DL (ref 8–23)
BUN/CREAT BLD: ABNORMAL (ref 9–20)
CALCIUM SERPL-MCNC: 8.8 MG/DL (ref 8.6–10.4)
CHLORIDE BLD-SCNC: 106 MMOL/L (ref 98–107)
CO2: 24 MMOL/L (ref 20–31)
CREAT SERPL-MCNC: 0.45 MG/DL (ref 0.5–0.9)
DIFFERENTIAL TYPE: ABNORMAL
EOSINOPHILS RELATIVE PERCENT: 3 % (ref 1–4)
GFR AFRICAN AMERICAN: >60 ML/MIN
GFR NON-AFRICAN AMERICAN: >60 ML/MIN
GFR SERPL CREATININE-BSD FRML MDRD: ABNORMAL ML/MIN/{1.73_M2}
GFR SERPL CREATININE-BSD FRML MDRD: ABNORMAL ML/MIN/{1.73_M2}
GLUCOSE BLD-MCNC: 107 MG/DL (ref 65–105)
GLUCOSE BLD-MCNC: 111 MG/DL (ref 65–105)
GLUCOSE BLD-MCNC: 122 MG/DL (ref 70–99)
GLUCOSE BLD-MCNC: 123 MG/DL (ref 65–105)
HCT VFR BLD CALC: 42.5 % (ref 36.3–47.1)
HEMOGLOBIN: 13.2 G/DL (ref 11.9–15.1)
IMMATURE GRANULOCYTES: 0 %
LYMPHOCYTES # BLD: 20 % (ref 24–43)
MCH RBC QN AUTO: 29.6 PG (ref 25.2–33.5)
MCHC RBC AUTO-ENTMCNC: 31.1 G/DL (ref 28.4–34.8)
MCV RBC AUTO: 95.3 FL (ref 82.6–102.9)
MONOCYTES # BLD: 7 % (ref 3–12)
NRBC AUTOMATED: 0 PER 100 WBC
PDW BLD-RTO: 13.4 % (ref 11.8–14.4)
PLATELET # BLD: 331 K/UL (ref 138–453)
PLATELET ESTIMATE: ABNORMAL
PMV BLD AUTO: 9.3 FL (ref 8.1–13.5)
POTASSIUM SERPL-SCNC: 3.8 MMOL/L (ref 3.7–5.3)
RBC # BLD: 4.46 M/UL (ref 3.95–5.11)
RBC # BLD: ABNORMAL 10*6/UL
SEG NEUTROPHILS: 70 % (ref 36–65)
SEGMENTED NEUTROPHILS ABSOLUTE COUNT: 7.87 K/UL (ref 1.5–8.1)
SODIUM BLD-SCNC: 138 MMOL/L (ref 135–144)
WBC # BLD: 11.3 K/UL (ref 3.5–11.3)
WBC # BLD: ABNORMAL 10*3/UL

## 2021-05-08 PROCEDURE — 94640 AIRWAY INHALATION TREATMENT: CPT

## 2021-05-08 PROCEDURE — 97530 THERAPEUTIC ACTIVITIES: CPT

## 2021-05-08 PROCEDURE — 99232 SBSQ HOSP IP/OBS MODERATE 35: CPT | Performed by: PSYCHIATRY & NEUROLOGY

## 2021-05-08 PROCEDURE — 97112 NEUROMUSCULAR REEDUCATION: CPT

## 2021-05-08 PROCEDURE — 85025 COMPLETE CBC W/AUTO DIFF WBC: CPT

## 2021-05-08 PROCEDURE — 70450 CT HEAD/BRAIN W/O DYE: CPT

## 2021-05-08 PROCEDURE — 6370000000 HC RX 637 (ALT 250 FOR IP): Performed by: STUDENT IN AN ORGANIZED HEALTH CARE EDUCATION/TRAINING PROGRAM

## 2021-05-08 PROCEDURE — 6360000002 HC RX W HCPCS: Performed by: PSYCHIATRY & NEUROLOGY

## 2021-05-08 PROCEDURE — 80048 BASIC METABOLIC PNL TOTAL CA: CPT

## 2021-05-08 PROCEDURE — 6370000000 HC RX 637 (ALT 250 FOR IP): Performed by: NURSE PRACTITIONER

## 2021-05-08 PROCEDURE — 2580000003 HC RX 258: Performed by: STUDENT IN AN ORGANIZED HEALTH CARE EDUCATION/TRAINING PROGRAM

## 2021-05-08 PROCEDURE — 82947 ASSAY GLUCOSE BLOOD QUANT: CPT

## 2021-05-08 PROCEDURE — 2060000000 HC ICU INTERMEDIATE R&B

## 2021-05-08 PROCEDURE — 97110 THERAPEUTIC EXERCISES: CPT

## 2021-05-08 PROCEDURE — 99233 SBSQ HOSP IP/OBS HIGH 50: CPT | Performed by: PSYCHIATRY & NEUROLOGY

## 2021-05-08 PROCEDURE — 36415 COLL VENOUS BLD VENIPUNCTURE: CPT

## 2021-05-08 RX ORDER — NICOTINE 21 MG/24HR
1 PATCH, TRANSDERMAL 24 HOURS TRANSDERMAL DAILY
Qty: 30 PATCH | Refills: 3 | Status: ON HOLD | OUTPATIENT
Start: 2021-05-09 | End: 2021-06-10 | Stop reason: HOSPADM

## 2021-05-08 RX ORDER — ASPIRIN 81 MG/1
81 TABLET, CHEWABLE ORAL DAILY
Qty: 30 TABLET | Refills: 3 | Status: SHIPPED | OUTPATIENT
Start: 2021-05-08

## 2021-05-08 RX ORDER — AMLODIPINE BESYLATE 5 MG/1
5 TABLET ORAL DAILY
Qty: 30 TABLET | Refills: 3 | Status: SHIPPED | OUTPATIENT
Start: 2021-05-09

## 2021-05-08 RX ORDER — TRAMADOL HYDROCHLORIDE 50 MG/1
25 TABLET ORAL EVERY 8 HOURS PRN
Qty: 21 TABLET | Refills: 0 | Status: ON HOLD | OUTPATIENT
Start: 2021-05-08 | End: 2021-06-10 | Stop reason: HOSPADM

## 2021-05-08 RX ORDER — ASPIRIN 81 MG/1
81 TABLET, CHEWABLE ORAL DAILY
Status: DISCONTINUED | OUTPATIENT
Start: 2021-05-08 | End: 2021-05-10 | Stop reason: HOSPADM

## 2021-05-08 RX ORDER — HEPARIN SODIUM 5000 [USP'U]/ML
5000 INJECTION, SOLUTION INTRAVENOUS; SUBCUTANEOUS EVERY 8 HOURS SCHEDULED
Qty: 30 ML | Refills: 1 | Status: SHIPPED | OUTPATIENT
Start: 2021-05-08

## 2021-05-08 RX ORDER — ATORVASTATIN CALCIUM 80 MG/1
80 TABLET, FILM COATED ORAL DAILY
Qty: 30 TABLET | Refills: 3 | Status: SHIPPED | OUTPATIENT
Start: 2021-05-09

## 2021-05-08 RX ADMIN — AMLODIPINE BESYLATE 5 MG: 5 TABLET ORAL at 09:02

## 2021-05-08 RX ADMIN — ACETAMINOPHEN 1000 MG: 500 TABLET ORAL at 21:07

## 2021-05-08 RX ADMIN — LEVOTHYROXINE SODIUM 25 MCG: 25 TABLET ORAL at 09:02

## 2021-05-08 RX ADMIN — ACETAMINOPHEN 1000 MG: 500 TABLET ORAL at 11:15

## 2021-05-08 RX ADMIN — HEPARIN SODIUM 5000 UNITS: 5000 INJECTION INTRAVENOUS; SUBCUTANEOUS at 15:26

## 2021-05-08 RX ADMIN — HEPARIN SODIUM 5000 UNITS: 5000 INJECTION INTRAVENOUS; SUBCUTANEOUS at 08:56

## 2021-05-08 RX ADMIN — HEPARIN SODIUM 5000 UNITS: 5000 INJECTION INTRAVENOUS; SUBCUTANEOUS at 21:07

## 2021-05-08 RX ADMIN — SODIUM CHLORIDE, PRESERVATIVE FREE 10 ML: 5 INJECTION INTRAVENOUS at 09:02

## 2021-05-08 RX ADMIN — ASPIRIN 81 MG: 81 TABLET, CHEWABLE ORAL at 15:26

## 2021-05-08 RX ADMIN — ACETAMINOPHEN 1000 MG: 500 TABLET ORAL at 02:14

## 2021-05-08 RX ADMIN — ATORVASTATIN CALCIUM 80 MG: 80 TABLET, FILM COATED ORAL at 09:02

## 2021-05-08 RX ADMIN — BUDESONIDE AND FORMOTEROL FUMARATE DIHYDRATE 2 PUFF: 160; 4.5 AEROSOL RESPIRATORY (INHALATION) at 10:31

## 2021-05-08 ASSESSMENT — PAIN SCALES - GENERAL
PAINLEVEL_OUTOF10: 8
PAINLEVEL_OUTOF10: 8
PAINLEVEL_OUTOF10: 4
PAINLEVEL_OUTOF10: 6

## 2021-05-08 NOTE — DISCHARGE SUMMARY
[facility]     Department of Neurology    INPATIENT DISCHARGE SUMMARY        Patient Identification:  Ben Sol is a 63 y.o. female. :  1955  MRN: [id number]     Acct: [de-identified]   Admit Date:  2021  Discharge date and time: No discharge date for patient encounter. Attending Provider: Babak Miller DO                                     Admission Diagnoses:   Stroke due to occlusion of right middle cerebral artery ([address] [I63.511]    Discharge Diagnoses: Active Problems:    Stroke due to occlusion of right middle cerebral artery (HCC)    Nihss score 15    Acute left hemiparesis (HCC)    Cerebrovascular accident (CVA) due to occlusion of right middle cerebral artery ([address]  Resolved Problems:    * No resolved hospital problems. *       Consults:   rehabilitation medicine    Brief Inpatient course:  72-year-old female with PMH of COPD, diabetes, gastritis who initially presented as a wake-up stroke. Patient was found by her son after the Realtor could not get in touch with the patient who was taking care of her brothers estate after his recent passing. The realtor contacted her daughter by [employer]. She was able to get in touch with her brother who resides near the patient. She was found on the floor, unclear exactly when she was last known well. Patient was last known well at approximately 10 PM on 5/3/2021 when she woke up with left-sided weakness and left facial droop and a right gaze deviation. Aspect score wa s 5, CT of the head neck showed a right MCA M1 occlusion with collateralization. Patient underwent an unsuccessful thrombectomy with multiple attempts made. TICI 0. Patient MRI which showed right MCA stroke with hemorrhagic component extending to the basal ganglia. She had a PHYLLIS which did not show any intramural thrombus or PFO. She did have a loop recorder placed.   She continues to have right gaze preference with left-sided plegia but able to follow commands. She was also started on Norvasc and remains on aspirin 81 mg and statin. She was also started on DVT prophylaxis. [facility] She will follow up with neuro endovascular, neurology and cardiology as outpatient. Her blood pressure has been within parameters and she is a good candidate for acute rehab. Repeat CT before starting aspirin has been stable. LDL-140  A1c-5.9      Procedures:   Cerebral angiogram  PHYLLIS.   Loop recorder    Any Hospital Acquired Infections: none    Discharge Functional Status:  Stable  Patient stable for discharge    Disposition: [facility]    Patient Instructions:   Current Discharge Medication List      CONTINUE these medications which have NOT CHANGED    Details   levothyroxine (SYNTHROID) 25 MCG tablet TAKE 1 AND 1/2 TABLET BY MOUTH DAILY  Qty: 135 tablet, Refills: 1    Associated Diagnoses: Hypothyroidism, unspecified type      albuterol sulfate HFA (VENTOLIN HFA) 108 (90 Base) MCG/ACT inhaler INHALE TWO PUFFS BY MOUTH EVERY 4 HOURS AS NEEDED FOR WHEEZING OR SHORTNESS OF BREATH  Qty: 18 g, Refills: 5    Associated Diagnoses: Medication refill; Chronic obstructive pulmonary disease, unspecified COPD type (HCC)      budesonide-formoterol (SYMBICORT) 160-4.5 MCG/ACT AERO Inhale 2 puffs into the lungs 2 times daily  Qty: 1 Inhaler, Refills: 5    Associated Diagnoses: Chronic obstructive pulmonary disease, unspecified COPD type (HCC)      tiotropium (SPIRIVA RESPIMAT) 2.5 MCG/ACT AERS inhaler Inhale 2 puffs into the lungs daily  Qty: 1 Inhaler, Refills: 5    Associated Diagnoses: Chronic obstructive pulmonary disease, unspecified COPD type (HCC)      meloxicam (MOBIC) 7.5 MG tablet Take 1 tablet by mouth daily  Qty: 30 tablet, Refills: 3    Associated Diagnoses: Bilateral hand pain      metFORMIN (GLUCOPHAGE-XR) 500 MG extended release tablet TAKE ONE TABLET BY MOUTH TWICE A DAY BEFORE MEALS  Qty: 60 tablet, Refills: 5    Associated Diagnoses: Pre-diabetes      atorvastatin (LIPITOR) 20 MG tablet Take 1 tablet by mouth daily  Qty: 90 tablet, Refills: 1      ciclopirox (PENLAC) 8 % solution Apply topically nightly.   Qty: 1 Bottle, Refills: 2      polyethylene glycol (MIRALAX) powder Take 17 g by mouth every other day  Qty: 510 g, Refills: 3    Associated Diagnoses: Constipation, unspecified constipation type             Activity: activity as tolerated    Diet: Carbohydrate diet    Follow-up:    [facility]  [address] [place] [address]  [phone]    Cardiology -- for loop recorder management     Ewa Garcia MD  Ne[facility] [place], [address] [place] [address] 2, [address] [place] [address] Road  [phone]    In 2 weeks      Mariama Artis MD  [address]  [address] [facility]  [phone]    In 3 months      Carleen Hernandez, [facility] 2[facility]  [phone]    In 3 months  stroke follow up      Follow up labs: none    Follow up imaging: none    Note that over 30 minutes was spent in preparing discharge papers, discussing discharge with patient, medication review, etc.      Carleen Hernandez MD,  PGY 3 Neurology Resident  Department of Neurology  [address]  5/9/2021, 4:46 PM

## 2021-05-08 NOTE — CARE COORDINATION
Received a call from [place] at [address] [place] [address]. She informs me that patient will not be able to admit today to D/T staffing.   She tells me that she may be able to admit tomorrow afternoon/evening

## 2021-05-08 NOTE — PLAN OF CARE
Problem: HEMODYNAMIC STATUS  Goal: Patient has stable vital signs and fluid balance  5/8/2021 1852 by Trey Mascorro RN  Outcome: Met This Shift  Note: Neuro assessment completed, fall precautions in place, aspirations precautions in place, assess for barriers in communication and mobility, interventions to assist in communication and mobility in place, encouraged to call for assistance, adaptive devices used as needed, assess emotional state and support offered, encouraged patient to communicate by available means, and support systems included in patient care.

## 2021-05-08 NOTE — PROGRESS NOTES
right gaze preference along with a left facial droop. NIH stroke scale appears to be 14. INTERVAL HISTORY  No acute events overnight, no significant change in patient's neurological status. She still has a right gaze preference with left-sided plegia in the upper extremity and paresis in the lower extremity. Repeat CT was performed today which did not show worsening of hemorrhage, we did start patient on antiplatelet therapy. Past Medical History:     Past Medical History:   Diagnosis Date    Asthma     COPD [facility]ronic [facility] disease) [facility]     Diabetes mellitus ([facility]     Esophageal ring     Gastritis     Hyperlipidemia     Hyperplastic colon polyp     Hypertension     Osteoarthritis     Patient in clinical research study 05/04/2021    DAXA     TIA (transient ischemic attack) 2013    Per Daughter        Past Surgical History:     Past Surgical History:   Procedure Laterality Date    APPENDECTOMY      COLONOSCOPY  09/20/2017    FRAGMENTS OF HYPERPLASTIC POLYP    ENDOSCOPY, COLON, DIAGNOSTIC      ESOPHAGEAL DILATATION      HYSTERECTOMY      RI COLSC FLX W/RMVL OF TUMOR POLYP LESION SNARE TQ N/A 9/20/2017    COLONOSCOPY POLYPECTOMY COLD BIOPSY performed by Codie Ho MD at [address] EGD TRANSORAL BIOPSY SINGLE/MULTIPLE N/A 9/20/2017    EGD BIOPSY with esophageal dilitation performed by Codie Ho MD at [address] [place] [address]  09/2017    E-ring and gastritis         Medications Prior to Admission:     Prior to Admission medications    Medication Sig Start Date End Date Taking?  Authorizing Provider   levothyroxine (SYNTHROID) 25 MCG tablet TAKE 1 AND 1/2 TABLET BY MOUTH DAILY 12/29/20   KEELY Bettencourt CNP   albuterol sulfate HFA (VENTOLIN HFA) 108 (90 Base) MCG/ACT inhaler INHALE TWO PUFFS BY MOUTH EVERY 4 HOURS AS NEEDED FOR WHEEZING OR SHORTNESS OF BREATH 8/19/20   KEELY Bettencourt CNP   budesonide-formoterol [facility]) 160-4.5 MCG/ACT AERO Inhale 2 puffs into the lungs 2 times daily 8/19/20   Lorretta Hamper, APRN - CNP   tiotropium (SPIRIVA RESPIMAT) 2.5 MCG/ACT AERS inhaler Inhale 2 puffs into the lungs daily 8/19/20   Lorretta Hamper, APRN - CNP   meloxicam (MOBIC) 7.5 MG tablet Take 1 tablet by mouth daily 8/19/20   Lorretta Hamper, APRN - CNP   metFORMIN (GLUCOPHAGE-XR) 500 MG extended release tablet TAKE ONE TABLET BY MOUTH TWICE A DAY BEFORE MEALS 8/19/20   Lorretta Hamper, APRN - CNP   atorvastatin (LIPITOR) 20 MG tablet Take 1 tablet by mouth daily 8/12/20   Lorretta Hamper, APRN - CNP   ciclopirox (PENLAC) 8 % solution Apply topically nightly. 9/5/19   Lorretta Hamper, APRN - CNP   polyethylene glycol [place] [facility]) powder Take 17 g by mouth every other day 4/16/19   Lorretta Hamper, APRN - CNP        Allergies:     Patient has no known allergies. Social History:     Tobacco:    reports that she has been smoking cigarettes. She has a 20.00 pack-year smoking history. She has never used smokeless tobacco.  Alcohol:      reports no history of alcohol use. Drug Use:  reports no history of drug use.     Family History:     Family History   Problem Relation Age of Onset    Other Mother     Diabetes Mother     Prostate Cancer Father     Cancer Brother         lining of lung       Review of Systems:     ROS:  Constitutional  Negative for fever and chills    HEENT  Negative for ear discharge, ear pain, nosebleed    Eyes  Negative for photophobia, pain and discharge    Respiratory  Negative for hemoptysis and sputum    Cardiovascular  Negative for orthopnea, claudication and PND    Gastrointestinal  Negative for abdominal pain, diarrhea, blood in stool    Musculoskeletal  Negative for joint pain, negative for myalgia    Skin  Negative for rash or itching    Neurological Negative for seizures, weakness, headache, dysarthria, aphasia   Endo/heme/allergies  Negative for polydipsia, environmental allergy    Psychiatric/behavioral  Negative for suicidal ideation. Patient is not anxious        Physical Exam:   /72   Pulse 75   Temp 98.4 °F (36.9 °C) (Oral)   Resp 15   Ht 5' 8\" (1.727 m)   Wt 200 lb (90.7 kg)   SpO2 98%   BMI 30.41 kg/m²   Temp (24hrs), Av.1 °F (36.7 °C), Min:96.8 °F (36 °C), Max:99 °F (37.2 °C)    Recent Labs     21  1304 21  2212 21  0858   POCGLU 101 117* 107*       Intake/Output Summary (Last 24 hours) at 2021 1129  Last data filed at 2021 1232  Gross per 24 hour   Intake --   Output 200 ml   Net -200 ml       General Appearance:  alert, well appearing, and in no acute distress  HEENT:  normocephalic, atraumatic. Lungs: Bilateral equal air entry, clear to ausculation, no wheezing, rales or rhonchi, normal effort  Cardiovascular: normal rate, regular rhythm, no murmur, gallop, rub. Abdomen: Soft, nontender, nondistended, normal bowel sounds, no hepatomegaly or splenomegaly    NEUROLOGIC EXAMINATION  MENTAL STATUS:  Awake, alert and able to follow commands. Normal Glabellar Response   CRANIAL NERVES: Pupils equal and reactive, right gaze preference but able to proceed to midline. Left facial droop, tongue midline elevates uvula, able to close eyes and raise eyebrows. No anesthesia of the face.        MOTOR FUNCTION: Observation: No fasciculations, no atrophy, No tremors/involuntary movements in all 4 extremities proximally and distally    Passive Movement: Normal tone and bulk in all 4 extremities proximally and distally    Active Movement:  RUE: Significant for good strength of grade 5/5 in proximal muscle groups on abduction, adduction, flexion, extension, internal and external rotation and grade 5/5 on distal muscle groups on extension and flexion    LUE: Plegic in left upper extremity    RLE: Significant for good strength of grade 5/5 in proximal muscle groups on abduction, adduction, flexion, extension, internal and external rotation and grade 5/5 on distal muscle groups on extension and flexion    LLE: Plegic in left lower extremity       SENSORY FUNCTION:  RUE: Normal sensation to light touch, temperature, pin prick, vibration, proprioception (Joint position sense)     LUE: Decreased sensation to LT in left upper extremity     RLE: Normal sensation to light touch, temperature, pin prick, vibration, proprioception(Joint position sense)     LLE: Decreased sensation to LT.    CEREBELLAR FUNCTION:  Intact fine motor control over bilateral upper extremities and bilateral lower extremities (finger to nose, rapid alternating hand movement, finger tapping and heel to shin)   REFLEX FUNCTION:  Right Triceps (C7): 2/2                                             Left Tricep (C7): 2/2   Right Bicep (C5, C6): 2/2                                         Left Bicep (C5, C6): 2/2   Right Brachiocephalic (C6): 2/2                               Left Brachiocephalic (C6): 2/2     Right Patella (L4): 2/2                                              Left Patella (L4): 2/2   Right Achilles (S1): 2/2                                            Left Achilles (S1): 2/2      Hahn Sign: Absent       Negative Kerning & Brudzinski absent   STATION and GAIT  deferred     Investigations:      Laboratory Testing:  Recent Results (from the past 24 hour(s))   POC Glucose Fingerstick    Collection Time: 05/07/21  1:04 PM   Result Value Ref Range    POC Glucose 101 65 - 105 mg/dL   POC Glucose Fingerstick    Collection Time: 05/07/21 10:12 PM   Result Value Ref Range    POC Glucose 117 (H) 65 - 105 mg/dL   Basic Metabolic Panel w/ Reflex to MG    Collection Time: 05/08/21  4:47 AM   Result Value Ref Range    Glucose 122 (H) 70 - 99 mg/dL    BUN 13 8 - 23 mg/dL    CREATININE 0.45 (L) 0.50 - 0.90 mg/dL    Bun/Cre Ratio NOT REPORTED 9 - 20    Calcium 8.8 8.6 - 10.4 mg/dL    Sodium 138 135 - 144 mmol/L    Potassium 3.8 3.7 - 5.3 mmol/L    Chloride 106 98 - 107 mmol/L    CO2 24 20 - 31 mmol/L    Anion Gap 8 (L) 9 - 17 mmol/L    GFR Non-African American >60 >60 mL/min    GFR African American >60 >60 mL/min    GFR Comment          GFR Staging NOT REPORTED    CBC WITH AUTO DIFFERENTIAL    Collection Time: 05/08/21  4:47 AM   Result Value Ref Range    WBC 11.3 3.5 - 11.3 k/uL    RBC 4.46 3.95 - 5.11 m/uL    Hemoglobin 13.2 11.9 - 15.1 g/dL    Hematocrit 42.5 36.3 - 47.1 %    MCV 95.3 82.6 - 102.9 fL    MCH 29.6 25.2 - 33.5 pg    MCHC 31.1 28.4 - 34.8 g/dL    RDW 13.4 11.8 - 14.4 %    Platelets 004 632 - 642 k/uL    MPV 9.3 8.1 - 13.5 fL    NRBC Automated 0.0 0.0 per 100 WBC    Differential Type NOT REPORTED     Seg Neutrophils 70 (H) 36 - 65 %    Lymphocytes 20 (L) 24 - 43 %    Monocytes 7 3 - 12 %    Eosinophils % 3 1 - 4 %    Basophils 0 0 - 2 %    Immature Granulocytes 0 0 %    Segs Absolute 7.87 1.50 - 8.10 k/uL    Absolute Lymph # 2.28 1.10 - 3.70 k/uL    Absolute Mono # 0.78 0.10 - 1.20 k/uL    Absolute Eos # 0.28 0.00 - 0.44 k/uL    Basophils Absolute 0.03 0.00 - 0.20 k/uL    Absolute Immature Granulocyte 0.05 0.00 - 0.30 k/uL    WBC Morphology NOT REPORTED     RBC Morphology NOT REPORTED     Platelet Estimate NOT REPORTED    POC Glucose Fingerstick    Collection Time: 05/08/21  8:58 AM   Result Value Ref Range    POC Glucose 107 (H) 65 - 105 mg/dL       Imaging/Diagnostics:    1. Assessment & Differential Dx:      Primary Problem  <principal problem not specified>    Active Hospital Problems    Diagnosis Date Noted    Nihss score 15 [R29.715]     Acute left hemiparesis (HCC) [G81.94]     Cerebrovascular accident (CVA) due to occlusion of right middle cerebral artery ([facility] [address] [I63.511]     Stroke due to occlusion of right middle cerebral artery [facility]) [I63.511] 05/04/2021           Plan:     61-year-old female who was found down, last known well approximately 21 hours before. Patient was found to have a right MCA M1 occlusion.   Thrombectomy was not successful, MRI showed patient had a right MCA stroke with hemorrhagic conversion. Initially she was displaying hemineglect, she is plegic on the left with a right gaze preference and left facial droop. Repeat CT head is pending for 48 hours on 5/8 before considering antiplatelet therapy. PHYLLIS did not show any thrombus    No acute events overnight, repeat CT performed this morning did not show any expansion or worsening of hemorrhagic conversion of MCA stroke. Neurologically there has not been any change in her exam as she does have a right gaze preference and left-sided paresis/weakness. We did start patient on aspirin 81 mg and she is continued on statin therapy. Dispo pending for acute rehab. Neurological-ischemic stroke  -Monitor for any neurological changes  -Follow-up with neuro endovascular  -Repeat CT head stable, started antiplatelet therapy with aspirin 81 mg  -SBP goal   -PT/OT ongoing  -Disposition to acute rehab pending  -Continue high-dose statin therapy    Cardiovascular-BP well controlled  -SBP goal   -Continue on telemetry as directed  -PHYLLIS-no thrombus, loop recorder in place  -Continue Norvasc-    Respiratory  -Maintain O2 saturation greater than 90%  -DuoNebs as needed    GI  -Dysphagia diet   -BM regimen    Renal  -Follow-up BUNs/creatinine  -Monitor urine output    Hematology  -Follow-up CBC in a.m.  -DVT prophylaxis with heparin-     ID  -Monitor for hypo and hyperthermia  -Tylenol as needed if T-max rater than 101  -Panculture if febrile        Consultations:   IP CONSULT TO NEUROCRITICAL CARE  IP CONSULT TO PHYSICAL MEDICINE REHAB  IP CONSULT TO NEUROLOGY     Patient is admitted as inpatient status because of co-morbidities listed above, severity of signs and symptoms as outlined, requirement for current medical therapies and most importantly because of direct risk to patient if care not provided in a hospital setting.     Camilla Paige MD  Neurology Resident PGY-3  5/8/2021 at 11:29 AM

## 2021-05-08 NOTE — PROGRESS NOTES
Yes  Family / Caregiver Present: Yes  Subjective  Subjective: RN & pt agreeable to PT. pt's daughter reports pt to discharge to inpatient rehab. General Comment  Comments: Upon arrival pt in bed. pt's daughter & son present. Orientation  Orientation  Overall Orientation Status: Within Functional Limits     Objective   Bed mobility  Bridging: Maximum assistance(x5 reps, assist to stabilize LLE & to bridge)  Supine to Sit: Maximum assistance(to get toward R EOB, linen used)  Sit to Supine: Dependent/Total(d/t pt fatigue)  Transfers  Sit to Stand: Maximum Assistance(partial sit-stand , from EOB with R lateral scooting toward HOB while seated EOB & 5 reps)  Stand to sit: Maximum Assistance  Comment: pt stood @ 15 seconds from EOB with max assistx1 & max v.c's for technique. pt unable to fully upright posture d/t flexed hips. Ambulation  Ambulation?: No  Balance  Posture: Fair(minus, elevates R shoulder when using to assist with seated balance)  Sitting - Static: Fair;-(@ EOB, v.c's to correct L lean) EOB sitting @ 10 minutes  Exercises  Upper Extremity: RUE AROM, LUE PROM 10 reps shoulder flexion/ext/horizontal abd, elbow flexion/ext, forearm pronation/supination  Comments: pt gayathri prolong manual stretch to bilat: hamstrings, hip adductors, PF, quads, gluteals. pt's daughter educated on stretching pt's LE. manual R upper trap & bilat scalenes stretch   AAROM with cervical rotation toward L with attempt to visually track. Pt gayathri well. Goals  Short term goals  Time Frame for Short term goals: 15  Short term goal 1: Pt to perform bed mobility Fabioal  Short term goal 2: Pt to attempt functional transfers to [address] term goal 3: Demonstrate dynamic sitting balance of fair + to maximize independence  Short term goal 4: Demonstrate standing dynamic balance of fair + to decrease fall risk during functional transfers  Patient Goals   Patient goals :  To go home    Plan    Plan  Times per week: 5-6x/week  Current Treatment Recommendations: Strengthening, ROM, Balance Training, Functional Mobility Training, Gait Training, Endurance Training, Neuromuscular Re-education, Home Exercise Program, Patient/Caregiver Education & Training, Positioning  Safety Devices  Type of devices:  All fall risk precautions in place  Restraints  Initially in place: No     Therapy Time   Individual Concurrent Group Co-treatment   Time In  11:28         Time Out  12:14         Minutes  40'                 [address] [facility]

## 2021-05-09 LAB
ABSOLUTE EOS #: 0.39 K/UL (ref 0–0.44)
ABSOLUTE IMMATURE GRANULOCYTE: 0.08 K/UL (ref 0–0.3)
ABSOLUTE LYMPH #: 2.46 K/UL (ref 1.1–3.7)
ABSOLUTE MONO #: 0.72 K/UL (ref 0.1–1.2)
ANION GAP SERPL CALCULATED.3IONS-SCNC: 8 MMOL/L (ref 9–17)
BASOPHILS # BLD: 0 % (ref 0–2)
BASOPHILS ABSOLUTE: 0.05 K/UL (ref 0–0.2)
BUN BLDV-MCNC: 18 MG/DL (ref 8–23)
BUN/CREAT BLD: ABNORMAL (ref 9–20)
CALCIUM SERPL-MCNC: 8.8 MG/DL (ref 8.6–10.4)
CHLORIDE BLD-SCNC: 104 MMOL/L (ref 98–107)
CO2: 25 MMOL/L (ref 20–31)
CREAT SERPL-MCNC: 0.46 MG/DL (ref 0.5–0.9)
DIFFERENTIAL TYPE: ABNORMAL
EOSINOPHILS RELATIVE PERCENT: 3 % (ref 1–4)
GFR AFRICAN AMERICAN: >60 ML/MIN
GFR NON-AFRICAN AMERICAN: >60 ML/MIN
GFR SERPL CREATININE-BSD FRML MDRD: ABNORMAL ML/MIN/{1.73_M2}
GFR SERPL CREATININE-BSD FRML MDRD: ABNORMAL ML/MIN/{1.73_M2}
GLUCOSE BLD-MCNC: 107 MG/DL (ref 65–105)
GLUCOSE BLD-MCNC: 117 MG/DL (ref 70–99)
GLUCOSE BLD-MCNC: 152 MG/DL (ref 65–105)
HCT VFR BLD CALC: 43.9 % (ref 36.3–47.1)
HEMOGLOBIN: 13.3 G/DL (ref 11.9–15.1)
IMMATURE GRANULOCYTES: 1 %
LYMPHOCYTES # BLD: 21 % (ref 24–43)
MCH RBC QN AUTO: 29.2 PG (ref 25.2–33.5)
MCHC RBC AUTO-ENTMCNC: 30.3 G/DL (ref 28.4–34.8)
MCV RBC AUTO: 96.3 FL (ref 82.6–102.9)
MONOCYTES # BLD: 6 % (ref 3–12)
NRBC AUTOMATED: 0 PER 100 WBC
PDW BLD-RTO: 13.5 % (ref 11.8–14.4)
PLATELET # BLD: 357 K/UL (ref 138–453)
PLATELET ESTIMATE: ABNORMAL
PMV BLD AUTO: 9.4 FL (ref 8.1–13.5)
POTASSIUM SERPL-SCNC: 3.7 MMOL/L (ref 3.7–5.3)
RBC # BLD: 4.56 M/UL (ref 3.95–5.11)
RBC # BLD: ABNORMAL 10*6/UL
SEG NEUTROPHILS: 69 % (ref 36–65)
SEGMENTED NEUTROPHILS ABSOLUTE COUNT: 8.09 K/UL (ref 1.5–8.1)
SODIUM BLD-SCNC: 137 MMOL/L (ref 135–144)
WBC # BLD: 11.8 K/UL (ref 3.5–11.3)
WBC # BLD: ABNORMAL 10*3/UL

## 2021-05-09 PROCEDURE — 99239 HOSP IP/OBS DSCHRG MGMT >30: CPT | Performed by: PSYCHIATRY & NEUROLOGY

## 2021-05-09 PROCEDURE — 85025 COMPLETE CBC W/AUTO DIFF WBC: CPT

## 2021-05-09 PROCEDURE — 82947 ASSAY GLUCOSE BLOOD QUANT: CPT

## 2021-05-09 PROCEDURE — 6370000000 HC RX 637 (ALT 250 FOR IP): Performed by: STUDENT IN AN ORGANIZED HEALTH CARE EDUCATION/TRAINING PROGRAM

## 2021-05-09 PROCEDURE — 36415 COLL VENOUS BLD VENIPUNCTURE: CPT

## 2021-05-09 PROCEDURE — 94640 AIRWAY INHALATION TREATMENT: CPT

## 2021-05-09 PROCEDURE — 80048 BASIC METABOLIC PNL TOTAL CA: CPT

## 2021-05-09 PROCEDURE — 2060000000 HC ICU INTERMEDIATE R&B

## 2021-05-09 PROCEDURE — 6360000002 HC RX W HCPCS: Performed by: PSYCHIATRY & NEUROLOGY

## 2021-05-09 PROCEDURE — 97530 THERAPEUTIC ACTIVITIES: CPT

## 2021-05-09 PROCEDURE — 6370000000 HC RX 637 (ALT 250 FOR IP): Performed by: NURSE PRACTITIONER

## 2021-05-09 PROCEDURE — 97535 SELF CARE MNGMENT TRAINING: CPT

## 2021-05-09 PROCEDURE — 2580000003 HC RX 258: Performed by: STUDENT IN AN ORGANIZED HEALTH CARE EDUCATION/TRAINING PROGRAM

## 2021-05-09 PROCEDURE — 99233 SBSQ HOSP IP/OBS HIGH 50: CPT | Performed by: PSYCHIATRY & NEUROLOGY

## 2021-05-09 RX ORDER — PROMETHAZINE HYDROCHLORIDE 12.5 MG/1
12.5 TABLET ORAL EVERY 6 HOURS PRN
Status: CANCELLED | OUTPATIENT
Start: 2021-05-09

## 2021-05-09 RX ORDER — SODIUM CHLORIDE 9 MG/ML
25 INJECTION, SOLUTION INTRAVENOUS PRN
Status: CANCELLED | OUTPATIENT
Start: 2021-05-09

## 2021-05-09 RX ORDER — BUDESONIDE AND FORMOTEROL FUMARATE DIHYDRATE 160; 4.5 UG/1; UG/1
2 AEROSOL RESPIRATORY (INHALATION) 2 TIMES DAILY
Status: CANCELLED | OUTPATIENT
Start: 2021-05-09

## 2021-05-09 RX ORDER — ACETAMINOPHEN 500 MG
1000 TABLET ORAL EVERY 8 HOURS PRN
Status: CANCELLED | OUTPATIENT
Start: 2021-05-09

## 2021-05-09 RX ORDER — LEVOTHYROXINE SODIUM 0.03 MG/1
25 TABLET ORAL DAILY
Status: CANCELLED | OUTPATIENT
Start: 2021-05-10

## 2021-05-09 RX ORDER — ONDANSETRON 2 MG/ML
4 INJECTION INTRAMUSCULAR; INTRAVENOUS EVERY 6 HOURS PRN
Status: CANCELLED | OUTPATIENT
Start: 2021-05-09

## 2021-05-09 RX ORDER — NICOTINE 21 MG/24HR
1 PATCH, TRANSDERMAL 24 HOURS TRANSDERMAL DAILY
Status: CANCELLED | OUTPATIENT
Start: 2021-05-10

## 2021-05-09 RX ORDER — HEPARIN SODIUM 5000 [USP'U]/ML
5000 INJECTION, SOLUTION INTRAVENOUS; SUBCUTANEOUS EVERY 8 HOURS SCHEDULED
Status: CANCELLED | OUTPATIENT
Start: 2021-05-09

## 2021-05-09 RX ORDER — SODIUM CHLORIDE 0.9 % (FLUSH) 0.9 %
5-40 SYRINGE (ML) INJECTION EVERY 12 HOURS SCHEDULED
Status: CANCELLED | OUTPATIENT
Start: 2021-05-09

## 2021-05-09 RX ORDER — POLYETHYLENE GLYCOL 3350 17 G/17G
17 POWDER, FOR SOLUTION ORAL EVERY OTHER DAY
Status: CANCELLED | OUTPATIENT
Start: 2021-05-11

## 2021-05-09 RX ORDER — ACETAMINOPHEN 650 MG/1
650 SUPPOSITORY RECTAL EVERY 4 HOURS PRN
Status: CANCELLED | OUTPATIENT
Start: 2021-05-09

## 2021-05-09 RX ORDER — AMLODIPINE BESYLATE 5 MG/1
5 TABLET ORAL DAILY
Status: CANCELLED | OUTPATIENT
Start: 2021-05-10

## 2021-05-09 RX ORDER — ALBUTEROL SULFATE 2.5 MG/3ML
2.5 SOLUTION RESPIRATORY (INHALATION) EVERY 4 HOURS PRN
Status: CANCELLED | OUTPATIENT
Start: 2021-05-09

## 2021-05-09 RX ORDER — ASPIRIN 81 MG/1
81 TABLET, CHEWABLE ORAL DAILY
Status: CANCELLED | OUTPATIENT
Start: 2021-05-10

## 2021-05-09 RX ORDER — TRAMADOL HYDROCHLORIDE 50 MG/1
25 TABLET ORAL EVERY 8 HOURS PRN
Status: CANCELLED | OUTPATIENT
Start: 2021-05-09

## 2021-05-09 RX ORDER — NICOTINE POLACRILEX 4 MG
15 LOZENGE BUCCAL PRN
Status: CANCELLED | OUTPATIENT
Start: 2021-05-09

## 2021-05-09 RX ORDER — SODIUM CHLORIDE 0.9 % (FLUSH) 0.9 %
5-40 SYRINGE (ML) INJECTION PRN
Status: CANCELLED | OUTPATIENT
Start: 2021-05-09

## 2021-05-09 RX ORDER — ATORVASTATIN CALCIUM 80 MG/1
80 TABLET, FILM COATED ORAL DAILY
Status: CANCELLED | OUTPATIENT
Start: 2021-05-10

## 2021-05-09 RX ORDER — DEXTROSE MONOHYDRATE 25 G/50ML
12.5 INJECTION, SOLUTION INTRAVENOUS PRN
Status: CANCELLED | OUTPATIENT
Start: 2021-05-09

## 2021-05-09 RX ADMIN — LEVOTHYROXINE SODIUM 25 MCG: 25 TABLET ORAL at 08:42

## 2021-05-09 RX ADMIN — AMLODIPINE BESYLATE 5 MG: 5 TABLET ORAL at 08:42

## 2021-05-09 RX ADMIN — ASPIRIN 81 MG: 81 TABLET, CHEWABLE ORAL at 08:42

## 2021-05-09 RX ADMIN — ACETAMINOPHEN 1000 MG: 500 TABLET ORAL at 15:31

## 2021-05-09 RX ADMIN — POLYETHYLENE GLYCOL 3350 17 G: 17 POWDER, FOR SOLUTION ORAL at 08:55

## 2021-05-09 RX ADMIN — HEPARIN SODIUM 5000 UNITS: 5000 INJECTION INTRAVENOUS; SUBCUTANEOUS at 18:53

## 2021-05-09 RX ADMIN — INSULIN LISPRO 1 UNITS: 100 INJECTION, SOLUTION INTRAVENOUS; SUBCUTANEOUS at 12:54

## 2021-05-09 RX ADMIN — ACETAMINOPHEN 1000 MG: 500 TABLET ORAL at 23:14

## 2021-05-09 RX ADMIN — TRAMADOL HYDROCHLORIDE 25 MG: 50 TABLET, FILM COATED ORAL at 08:55

## 2021-05-09 RX ADMIN — HEPARIN SODIUM 5000 UNITS: 5000 INJECTION INTRAVENOUS; SUBCUTANEOUS at 08:43

## 2021-05-09 RX ADMIN — SODIUM CHLORIDE, PRESERVATIVE FREE 10 ML: 5 INJECTION INTRAVENOUS at 08:48

## 2021-05-09 RX ADMIN — ACETAMINOPHEN 1000 MG: 500 TABLET ORAL at 07:33

## 2021-05-09 RX ADMIN — BUDESONIDE AND FORMOTEROL FUMARATE DIHYDRATE 2 PUFF: 160; 4.5 AEROSOL RESPIRATORY (INHALATION) at 18:45

## 2021-05-09 RX ADMIN — ATORVASTATIN CALCIUM 80 MG: 80 TABLET, FILM COATED ORAL at 08:43

## 2021-05-09 ASSESSMENT — PAIN DESCRIPTION - LOCATION
LOCATION: HEAD
LOCATION: SHOULDER

## 2021-05-09 ASSESSMENT — PAIN SCALES - GENERAL
PAINLEVEL_OUTOF10: 8
PAINLEVEL_OUTOF10: 8

## 2021-05-09 ASSESSMENT — PAIN DESCRIPTION - DESCRIPTORS: DESCRIPTORS: HEADACHE

## 2021-05-09 ASSESSMENT — PAIN DESCRIPTION - PAIN TYPE
TYPE: ACUTE PAIN;NEUROPATHIC PAIN
TYPE: ACUTE PAIN

## 2021-05-09 ASSESSMENT — PAIN DESCRIPTION - FREQUENCY: FREQUENCY: INTERMITTENT

## 2021-05-09 NOTE — CARE COORDINATION
Discharge [id number] [facility]  Clinical Case Management Department  Written by:  Ministerio Bain, RN    Patient Name: Kareem Mccormick  Attending Provider: Jose Cruz Alejandro DO  Admit Date: 2021  7:49 PM  MRN: [id number]  Account: [de-identified]                     : 1955  Discharge Date: 21      Disposition: [facility]  Family at bedside notified of transport at [address] [place] [address],, RN

## 2021-05-09 NOTE — PROGRESS NOTES
Endovascular Neurosurgery Progress Note    SUBJECTIVE:   No reported events overnight. Pt this am has no acute complaints. Review of Systems:  CONSTITUTIONAL:  negative for fevers, chills, fatigue and malaise    EYES:  negative for double vision, blurred vision and photophobia     HEENT:  negative for tinnitus, epistaxis and sore throat    RESPIRATORY:  negative for cough, shortness of breath, wheezing    CARDIOVASCULAR:  negative for chest pain, palpitations, syncope, edema    GASTROINTESTINAL:  negative for nausea, vomiting    GENITOURINARY:  negative for incontinence    MUSCULOSKELETAL:  negative for neck or back pain    NEUROLOGICAL:  Negative for weakness and tingling  negative for headaches and dizziness    PSYCHIATRIC:  negative for anxiety      Review of systems otherwise negative. OBJECTIVE:     Vitals:    05/09/21 1105   BP: 117/67   Pulse: 84   Resp: (!) 39   Temp: 98 °F (36.7 °C)   SpO2: 96%        General:  Gen: normal habitus, NAD  HEENT: NCAT, mucosa moist  Cvs: RRR, S1 S2 normal  Resp: symmetric unlabored breathing  Abd: s/nd/nt  Ext: no edema  Skin: no lesions seen, warm and dry. Neuro:  Gen: awake and alert, oriented x3. Lang/speech: no aphasia. Mild dysarthria. Follows commands. CN: PERRL, R gaze unable to cross midline, L hemianopia, LT decreased L face, severe L droop, hearing intact  Motor: R hemibody 5/5. LUE 1/5, LLE 1/5  Sense: decreased LT LUE, L side neglect. Coord: deferred  DTR: deferred  Gait: deferred    NIH Stroke Scale:   1a  Level of consciousness: 0 - alert; keenly responsive   1b. LOC questions:  0 - answers both questions correctly   1c. LOC commands: 0 - performs both tasks correctly   2. Best Gaze: 2 - forced deviation, or total gaze paresis not overcome by oculocephalic maneuver   3. Visual: 3 - bilateral hemianopia (blind including cortical blindness   4. Facial Palsy: 2 - partial paralysis (total or near total paralysis of the lower face)   5a.  Motor left arm: 3 - no effort against gravity, limb falls   5b. Motor right arm: 0 - no drift, limb holds 90 (or 45) degrees for full 10 seconds   6a. Motor left leg: 3 - no effort against gravity; leg falls to bed immediately   6b  Motor right le - no drift; leg holds 30 degree position for full 5 seconds   7. Limb Ataxia: 0 - absent   8. Sensory: 2 - severe to total sensory loss; patient is not aware of being touched in face, arm, leg   9. Best Language:  0 - no aphasia, normal   10. Dysarthria: 1 - mild to moderate, patient slurs at least some words and at worst, can be understood with some difficulty   11. Extinction and Inattention: 2 - profound lynette-inattention or lynette- inattention to more than one modality. Does not recognize own hand or orients only to one side of space          Total:   18     MRS: 5      LABS:   Reviewed. RADIOLOGY:   Images were personally reviewed including:  CT head 2021  Evolving MCA infarct with no evidence for new infarct.  No evidence for   hemorrhage persistent mass effect on the right lateral ventricle with minimal   midline shift from right to left     CT head 2021  1. Right MCA distribution infarct, stable.  There is no significant change   from the prior exam.     MRI brain wo con 2021  Subacute right MCA distribution infarct most pronounced in the frontotemporal   region and right basal ganglia with a hemorrhagic component and hemosiderin   staining involving the right basal ganglia. DSA 2021  --acute right M1 MCA occlusion  --attempted mechanical thrombectomy (solitaire x 4x40mm and trevo 4x41mm stent retriever with [id number] and catalyst 5 reperfusion catheter and infinity 088 long sheath aspiration), tici 0 after 6 passes    ASSESSMENT:   63yo female with pmh of tia, copd, osteoarthritis, smoker, gastritis. Pt presents as wake up R MCA syndrome, imaging shows large core stroke (ASPECTS 4) with R M1 occlusion. etio suspect cardioembolic.  S/p MT TICI 0 after 6 passes. Pt is enrolled in DAXA study    Pt has residual L hemiplegia, R gaze, neglect, L hemianopia, L droop, dysarthria. CT head 5/6/2021 shows more hemoconversion with evolving stroke. CT head 5/7/2021 stable. PLAN:   --SBP goal   --continue asa 81  --continue lipitor 80  --workup and care as per nsicu   --Upon discharge follow up with Dr. Pineda in 2 weeks after discharge and Dr. Manuela Pringle in 3 months after discharge. Case discussed with Dr. Manuela Pringle attending.     Bard Hailey MD, PhD    Stroke, [facility]  [facility]  Electronically signed 5/9/2021 at 12:59 PM

## 2021-05-09 NOTE — DISCHARGE INSTR - COC
Continuity of Care Form    Patient Name: Brennan Pino   :  1955  MRN:  [id number]    Admit date:  2021  Discharge date:  21    Code Status Order: Full Code   Advance Directives:   Advance Care Flowsheet Documentation     Date/Time Healthcare Directive Type of Healthcare Directive Copy in [address] Agent's Name Healthcare Agent's Phone Number    21 5772  No, patient does not have an advance directive for healthcare treatment  --  --  --  --  --    21 0200  Yes, patient has an advance directive for healthcare treatment  --  --  --  --  --          Admitting Physician:  Tommie Norris DO  PCP: Colonel Sivakumar MD    Discharging Nurse: [facility] Unit/Room#: 9425/8533-82  Discharging Unit Phone Number: [phone]    Emergency Contact:   Extended Emergency Contact Information  Primary Emergency Contact: [address] [place] [address] Phone: [phone]  Relation: Child  Secondary Emergency Contact: [facility]  Mobile Phone: [phone]  Relation: Child    Past Surgical History:  Past Surgical History:   Procedure Laterality Date    APPENDECTOMY      COLONOSCOPY  2017    FRAGMENTS OF HYPERPLASTIC POLYP    ENDOSCOPY, COLON, DIAGNOSTIC      ESOPHAGEAL DILATATION      HYSTERECTOMY      ID COLSC FLX W/RMVL OF TUMOR POLYP LESION SNARE TQ N/A 2017    COLONOSCOPY POLYPECTOMY COLD BIOPSY performed by James Stone MD at [address] [place] [address] EGD TRANSORAL BIOPSY SINGLE/MULTIPLE N/A 2017    EGD BIOPSY with esophageal dilitation performed by James Stone MD at [facility]  2017    E-ring and gastritis        Immunization History:   Immunization History   Administered Date(s) Administered    Influenza Virus Vaccine 10/29/2019    Influenza, Lella Seton, Recombinant, IM PF (Flublok 18 yrs and older) 10/29/2019    Pneumococcal Polysaccharide ([id number]) 10/29/2019    Tdap (Boostrix, Adacel) 12/12/2012, 01/01/2016       Active Problems:  Patient Active Problem List   Diagnosis Code    Pneumonia of left lower lobe due to infectious organism J18.9    Tobacco abuse Z72.0    Legionella [facility] A48.1    Dysphagia R13.10    Hyperplastic colon polyp K63.5    Esophageal ring K22.2    Gastritis K29.70    Elbow effusion, right M25.421    Stroke due to occlusion of right middle cerebral artery (HCC) I63.511    Nihss score 15 R29.715    Acute left hemiparesis (HCC) G81.94    Cerebrovascular accident (CVA) due to occlusion of right middle cerebral artery (HCC) I63.511       Isolation/Infection:   Isolation          No Isolation        Patient Infection Status     None to display          Nurse Assessment:  Last Vital Signs: BP (!) 131/101   Pulse 87   Temp 98.2 °F (36.8 °C) (Oral)   Resp 22   Ht 5' 8\" (1.727 m)   Wt 200 lb (90.7 kg)   SpO2 98%   BMI 30.41 kg/m²     Last documented pain score (0-10 scale): Pain Level: 7  Last Weight:   Wt Readings from Last 1 Encounters:   05/04/21 200 lb (90.7 kg)     Mental Status:  oriented and alert    IV Access:  - None    Nursing Mobility/ADLs:  Walking   Dependent  Transfer  Dependent  Bathing  Assisted  Dressing  Assisted  Toileting  Assisted  Feeding  Independent  Med Admin  Assisted  Med Delivery   prefers mixed with pudding    Wound Care Documentation and Therapy:        Elimination:  Continence:   · Bowel: Yes  · Bladder: Yes  Urinary Catheter: None   Colostomy/Ileostomy/Ileal Conduit: No       Date of Last BM: 5/9/21    No intake or output data in the 24 hours ending 05/09/21 0931  No intake/output data recorded. Safety Concerns:     None    Impairments/Disabilities:      None    Nutrition Therapy:  Current Nutrition Therapy:   - Oral Diet:  Carb Control 3 carbs/meal (1500kcals/day) and Dysphagia 1 pureed    Routes of Feeding: Oral  Liquids:  Thin Liquids  Daily Fluid Restriction: no  Last Modified Barium Swallow with Video (Video Swallowing Test):     Treatments at the Time of Hospital Discharge:   Respiratory Treatments: ***  Oxygen Therapy:  is not on home oxygen therapy. Ventilator:    - No ventilator support    Rehab Therapies: Physical Therapy, Occupational Therapy and Speech/Language Therapy  Weight Bearing Status/Restrictions: No weight bearing restirctions  Other Medical Equipment (for information only, NOT a DME order):  ***  Other Treatments: ***    Patient's personal belongings (please select all that are sent with patient):  {[facility] DME Belongings:962157467}    RN SIGNATURE:  Electronically signed by Opal Higginbotham RN on 5/9/21 at 9:54 AM EDT    CASE MANAGEMENT/SOCIAL WORK SECTION    Inpatient Status Date: ***    Readmission Risk Assessment Score:  Readmission Risk              Risk of Unplanned Readmission:        8           Discharging to Facility/ Agency   · :  [place] Details Fax Higinio Schulte            [address] [place], [address] [address]       Phone: [phone]       Fax: [phone]          Dialysis Facility (if applicable)   · Name:  · Address:  · Dialysis Schedule:  · Phone:  · Fax:    / signature: Electronically signed by [place] RN on 5/9/21 at 9:31 AM EDT    PHYSICIAN SECTION    Prognosis: {Prognosis:4471793652}    Condition at Discharge: [address] Patient Condition:108842150}    Rehab Potential (if transferring to Rehab): {Prognosis:3366730466}    Recommended Labs or Other Treatments After Discharge: ***    Physician Certification: I certify the above information and transfer of Wing Hose  is necessary for the continuing treatment of the diagnosis listed and that she requires {Admit to Appropriate Level of Care:85287} for {GREATER/LESS:495499101} 30 days.      Update Admission H&P: {CHP DME Changes in QSPPP:387729717}    PHYSICIAN SIGNATURE:  {Esignature:347064261}

## 2021-05-09 NOTE — PLAN OF CARE
Neuro assessment completed, fall precautions in place, aspirations precautions in place, assess for barriers in communication and mobility, interventions to assist in communication and mobility in place, encouraged to call for assistance, adaptive devices used as needed, assess emotional state and support offered, encouraged patient to communicate by available means, and support systems included in patient care. Skin assessed for breakdown and redness, patient turned regularly, and heels elevated off of bed on pillows. Fall assessment preformed. Bed in low locked position with call light and tray table within reach. Education given. Will continue to monitor. Problem: HEMODYNAMIC STATUS  Goal: Patient has stable vital signs and fluid balance  5/9/2021 0625 by Gladys Cid RN  Outcome: Ongoing  5/8/2021 1852 by Johnathon Lesch, RN  Outcome: Met This Shift  Note: Neuro assessment completed, fall precautions in place, aspirations precautions in place, assess for barriers in communication and mobility, interventions to assist in communication and mobility in place, encouraged to call for assistance, adaptive devices used as needed, assess emotional state and support offered, encouraged patient to communicate by available means, and support systems included in patient care.        Problem: ACTIVITY INTOLERANCE/IMPAIRED MOBILITY  Goal: Mobility/activity is maintained at optimum level for patient  Outcome: Ongoing     Problem: COMMUNICATION IMPAIRMENT  Goal: Ability to express needs and understand communication  Outcome: Ongoing     Problem: Skin Integrity:  Goal: Will show no infection signs and symptoms  Description: Will show no infection signs and symptoms  Outcome: Ongoing  Goal: Absence of new skin breakdown  Description: Absence of new skin breakdown  Outcome: Ongoing     Problem: Falls - Risk of:  Goal: Will remain free from falls  Description: Will remain free from falls  Outcome: Ongoing  Goal: Absence of physical injury  Description: Absence of physical injury  Outcome: Ongoing

## 2021-05-09 NOTE — PROGRESS NOTES
Occupational Therapy  Facility/Department: [facility] NEURO  Daily Treatment Note  NAME: Kayden Fisher  : 1955  MRN: [id number]    Date of Service: 2021    Discharge Recommendations:  Further therapy recommended at discharge. The patient should be able to tolerate at least three hours of therapy per day over 5 days or 15 hours over 7 days. Discussed with OTR to update goals. Assessment   Performance deficits / Impairments: Decreased functional mobility ; Decreased ADL status; Decreased strength;Decreased endurance;Decreased balance;Decreased high-level IADLs;Decreased vision/visual deficit; Decreased cognition;Decreased safe awareness;Decreased ROM; Decreased fine motor control;Decreased coordination;Decreased posture  Prognosis: Fair  OT Education: OT Role;Transfer Training  Patient Education: purpose of OT; proper hand and foot placement; balance maintaince; visual scanning  Barriers to Learning: pt demo F carry over with increased verbal cues  REQUIRES OT FOLLOW UP: Yes  Activity Tolerance  Activity Tolerance: Patient limited by fatigue;Patient limited by pain  Activity Tolerance: L shoulder pain  Safety Devices  Safety Devices in place: Yes  Type of devices: Patient at risk for falls; Left in bed;Call light within reach; Bed alarm in place  Restraints  Initially in place: No         Patient Diagnosis(es): The primary encounter diagnosis was Cerebrovascular accident (CVA) due to occlusion of right middle cerebral artery ([facility] A diagnosis of Other chronic pain was also pertinent to this visit. has a past medical history of Asthma, COPD (chronic obstructive pulmonary disease) ([address] Diabetes mellitus ([address] Esophageal ring, Gastritis, Hyperlipidemia, Hyperplastic colon polyp, Hypertension, Osteoarthritis, Patient in clinical research study, and TIA (transient ischemic attack). has a past surgical history that includes Appendectomy;  Hysterectomy; Esophagus dilation; Colonoscopy (2017); Endoscopy, colon, diagnostic; pr egd transoral biopsy single/multiple (N/A, 9/20/2017); pr colsc flx w/rmvl of tumor polyp lesion snare tq (N/A, 9/20/2017); and Upper gastrointestinal endoscopy (09/2017). Restrictions  Restrictions/Precautions  Restrictions/Precautions: Fall Risk  Required Braces or Orthoses?: No  Position Activity Restriction  Other position/activity restrictions: up w/assist; significant L neglect with LUE/LLE deficits  Subjective   General  Chart Reviewed: Yes  Patient assessed for rehabilitation services?: Yes  Family / Caregiver Present: Yes(pt daughter present)  General Comment  Comments: RN and pt agreeable to therapy this day  Pain Assessment  Pain Assessment: 0-10  Pain Level: 8  Pain Type: Acute pain;Neuropathic pain  Pain Location: Shoulder  Pain Orientation: Left  Non-Pharmaceutical Pain Intervention(s): Distraction;Repositioned; Therapeutic presence  Vital Signs  Patient Currently in Pain: Yes   Objective    ADL  Grooming: Contact guard assistance;Setup;Verbal cueing; Increased time to complete(face washing completed seated at EOB)  Additional Comments: Pt supine in bed at start of session motivated for therapy. Pt declined hair brushing however agreeable to face washing. Further ADLs not attempted this day sec to pt increased fatigue. Balance  Sitting Balance: Moderate assistance(Mod A for intitial sit increasing to Min A with intermitent periods of CGA pt demo L lateral lean req cues to correct)  Standing Balance  Comment: NOLAN sec to pt increased fatigue and decreased sitting balance  Functional Mobility  Functional Mobility Comments: NOLAN  Bed mobility  Rolling to Left: Contact guard assistance  Rolling to Right: Moderate assistance  Supine to Sit: Maximum assistance(req assist with LLE and trunk)  Sit to Supine:  Moderate assistance(req assist with BLE)  Scooting: Maximal assistance  Comment: HOB elevated utilizing bed rails; multiple rolls completed this day to change than 3 v/c's for safety during functional activity  Short term goal 8: dem compensatory head movement/visual scanning techniques with 5 v/c's to improve awareness of L side       Therapy Time   Individual Concurrent Group Co-treatment   Time In 0907         Time Out 0946         Minutes 39         Timed Code Treatment Minutes: [address], ROSADO/L

## 2021-05-09 NOTE — PROGRESS NOTES
[facility]   [address] [place] [address]    PROGRESS NOTE            Date:   5/9/2021  Patient name:  Billie Espinoza  Date of admission:  5/4/2021  7:49 PM  MRN:   [id number]  Account:  [de-identified]  YOB: 1955  PCP:    Betty Box MD  Room:   [address] [place] [address]  Code Status:    Full Code    Chief Complaint:     Chief Complaint   Patient presents with    Cerebrovascular Accident     Pt arrives via MSU straight to CT scanner   CVA    History Obtained From:     patient, family member - Sister    History of Present Illness: The patient is a 63 y.o. female, she has a PMH of diabetes, COPD, gastritis who presented as a wake-up stroke. Patient was LK W at 10 PM on 5/3 when she woke up with left-sided weakness and left facial droop and right gaze deviation. She had presented 21 hours after her LK W. Aspect score was 5 out of 10 and CTA of the head and neck showed a right MCA M1 occlusion with collateralization. Patient underwent a cerebral angiogram and had an unsuccessful thrombectomy, 6 passes were attempted in total. TICI 0. Patient had an MRI which showed right MCA stroke with hemorrhagic component extending to the basal ganglia. Patient also had a PHYLLIS which did not show any intramural thrombus, no PFO was noted. Loop recorder was placed. Patient was found by her son after the Realtor could not get in touch with the patient who was taking care of her brothers estate after his recent passing. The realtor contacted her daughter by [employer]. She was able to get in touch with her brother who resides near the patient. She was found on the floor, unclear exactly when she was last known well. At baseline, patient is functional and able to perform her daily activities without any significant impairment. She normally resides on her own and is fully functional.  Currently, she is plegic on the left side with present sensation on that side.   Also displays a 5/9/21  Yes Leigh Ann Shepherd MD   amLODIPine (NORVASC) 5 MG tablet Take 1 tablet by mouth daily 5/9/21  Yes Leigh Ann Shepherd MD   atorvastatin (LIPITOR) 80 MG tablet Take 1 tablet by mouth daily 5/9/21  Yes Leigh Ann Shepherd MD   levothyroxine (SYNTHROID) 25 MCG tablet TAKE 1 AND 1/2 TABLET BY MOUTH DAILY 12/29/20   KEELY Iyer CNP   albuterol sulfate HFA (VENTOLIN HFA) 108 (90 Base) MCG/ACT inhaler INHALE TWO PUFFS BY MOUTH EVERY 4 HOURS AS NEEDED FOR WHEEZING OR SHORTNESS OF BREATH 8/19/20   KEELY Iyer CNP   budesonide-formoterol [facility]) 160-4.5 MCG/ACT AERO Inhale 2 puffs into the lungs 2 times daily 8/19/20   KEELY Iyer CNP   tiotropium (SPIRIVA RESPIMAT) 2.5 MCG/ACT AERS inhaler Inhale 2 puffs into the lungs daily 8/19/20   KEELY Iyer CNP   meloxicam (MOBIC) 7.5 MG tablet Take 1 tablet by mouth daily 8/19/20   KEELY Iyer CNP   metFORMIN (GLUCOPHAGE-XR) 500 MG extended release tablet TAKE ONE TABLET BY MOUTH TWICE A DAY BEFORE MEALS 8/19/20   KEELY Iyer CNP   ciclopirox (PENLAC) 8 % solution Apply topically nightly. 9/5/19   KEELY Iyer CNP   polyethylene glycol [facility]) powder Take 17 g by mouth every other day 4/16/19   KEELY Iyer CNP        Allergies:     Patient has no known allergies. Social History:     Tobacco:    reports that she has been smoking cigarettes. She has a 20.00 pack-year smoking history. She has never used smokeless tobacco.  Alcohol:      reports no history of alcohol use. Drug Use:  reports no history of drug use.     Family History:     Family History   Problem Relation Age of Onset    Other Mother     Diabetes Mother     Prostate Cancer Father     Cancer Brother         lining of lung       Review of Systems:     ROS:  Constitutional  Negative for fever and chills    HEENT  Negative for ear discharge, ear pain, nosebleed    Eyes  Negative for photophobia, pain and discharge    Respiratory  Negative for hemoptysis and sputum    Cardiovascular  Negative for orthopnea, claudication and PND    Gastrointestinal  Negative for abdominal pain, diarrhea, blood in stool    Musculoskeletal  Negative for joint pain, negative for myalgia    Skin  Negative for rash or itching    Neurological Negative for seizures, weakness, headache, dysarthria, aphasia   Endo/heme/allergies  Negative for polydipsia, environmental allergy    Psychiatric/behavioral  Negative for suicidal ideation. Patient is not anxious        Physical Exam:   BP (!) 131/101   Pulse 87   Temp 98.2 °F (36.8 °C) (Oral)   Resp 22   Ht 5' 8\" (1.727 m)   Wt 200 lb (90.7 kg)   SpO2 98%   BMI 30.41 kg/m²   Temp (24hrs), Av.1 °F (36.7 °C), Min:97.7 °F (36.5 °C), Max:98.4 °F (36.9 °C)    Recent Labs     21  2212 21  0858 21  1614 21  2049   POCGLU 117* 107* 123* 111*     No intake or output data in the 24 hours ending 21 0905    General Appearance:  alert, well appearing, and in no acute distress  HEENT:  normocephalic, atraumatic. Lungs: Bilateral equal air entry, clear to ausculation, no wheezing, rales or rhonchi, normal effort  Cardiovascular: normal rate, regular rhythm, no murmur, gallop, rub. Abdomen: Soft, nontender, nondistended, normal bowel sounds, no hepatomegaly or splenomegaly    NEUROLOGIC EXAMINATION  MENTAL STATUS:  Awake, alert and able to follow commands. Normal Glabellar Response   CRANIAL NERVES: Pupils equal and reactive, right gaze preference but able to proceed to midline. Left facial droop, tongue midline elevates uvula, able to close eyes and raise eyebrows. No anesthesia of the face.        MOTOR FUNCTION: Observation: No fasciculations, no atrophy, No tremors/involuntary movements in all 4 extremities proximally and distally    Passive Movement: Normal tone and bulk in all 4 extremities proximally and distally    Active Movement:  RUE: 111 (H) 65 - 105 mg/dL   Basic Metabolic Panel w/ Reflex to MG    Collection Time: 05/09/21  5:08 AM   Result Value Ref Range    Glucose 117 (H) 70 - 99 mg/dL    BUN 18 8 - 23 mg/dL    CREATININE 0.46 (L) 0.50 - 0.90 mg/dL    Bun/Cre Ratio NOT REPORTED 9 - 20    Calcium 8.8 8.6 - 10.4 mg/dL    Sodium 137 135 - 144 mmol/L    Potassium 3.7 3.7 - 5.3 mmol/L    Chloride 104 98 - 107 mmol/L    CO2 25 20 - 31 mmol/L    Anion Gap 8 (L) 9 - 17 mmol/L    GFR Non-African American >60 >60 mL/min    GFR African American >60 >60 mL/min    GFR Comment          GFR Staging NOT REPORTED    CBC WITH AUTO DIFFERENTIAL    Collection Time: 05/09/21  5:08 AM   Result Value Ref Range    WBC 11.8 (H) 3.5 - 11.3 k/uL    RBC 4.56 3.95 - 5.11 m/uL    Hemoglobin 13.3 11.9 - 15.1 g/dL    Hematocrit 43.9 36.3 - 47.1 %    MCV 96.3 82.6 - 102.9 fL    MCH 29.2 25.2 - 33.5 pg    MCHC 30.3 28.4 - 34.8 g/dL    RDW 13.5 11.8 - 14.4 %    Platelets 075 583 - 511 k/uL    MPV 9.4 8.1 - 13.5 fL    NRBC Automated 0.0 0.0 per 100 WBC    Differential Type NOT REPORTED     Seg Neutrophils 69 (H) 36 - 65 %    Lymphocytes 21 (L) 24 - 43 %    Monocytes 6 3 - 12 %    Eosinophils % 3 1 - 4 %    Basophils 0 0 - 2 %    Immature Granulocytes 1 (H) 0 %    Segs Absolute 8.09 1.50 - 8.10 k/uL    Absolute Lymph # 2.46 1.10 - 3.70 k/uL    Absolute Mono # 0.72 0.10 - 1.20 k/uL    Absolute Eos # 0.39 0.00 - 0.44 k/uL    Basophils Absolute 0.05 0.00 - 0.20 k/uL    Absolute Immature Granulocyte 0.08 0.00 - 0.30 k/uL    WBC Morphology NOT REPORTED     RBC Morphology NOT REPORTED     Platelet Estimate NOT REPORTED        Imaging/Diagnostics:    1.      Assessment & Differential Dx:      Primary Problem  <principal problem not specified>    Active Hospital Problems    Diagnosis Date Noted    Nihss score 15 [R29.715]     Acute left hemiparesis (HCC) [G81.94]     Cerebrovascular accident (CVA) due to occlusion of right middle cerebral artery ([facility] [I63.511]     Stroke due to occlusion of right middle cerebral artery [facility]) [I63.511] 05/04/2021           Plan:     72-year-old female who was found down, last known well approximately 21 hours before. Patient was found to have a right MCA M1 occlusion. Thrombectomy was not successful, MRI showed patient had a right MCA stroke with hemorrhagic conversion. Initially she was displaying hemineglect, she is plegic on the left with a right gaze preference and left facial droop. Repeat CT head is pending for 48 hours on 5/8 before considering antiplatelet therapy. PHYLLIS did not show any thrombus    No acute events overnight, patient remains on aspirin 81 mg along with statin therapy. She is a tentative discharge to acute rehab this evening, she will follow up with neuro endovascular and neurology as outpatient. Her blood pressure is adequately controlled with Norvasc.         Neurological-ischemic stroke  -Monitor for any neurological changes  -Follow-up with neuro endovascular  -Repeat CT head stable, started antiplatelet therapy with aspirin 81 mg  -SBP goal   -PT/OT ongoing  -Discharge to rehab this evening  -Continue high-dose statin therapy  -Follow-up with neuro endovascular as outpatient    Cardiovascular-BP well controlled  -SBP goal   -Continue on telemetry as directed  -PHYLLIS-no thrombus, loop recorder in place  -Continue Norvasc-    Respiratory  -Maintain O2 saturation greater than 90%  -DuoNebs as needed    GI  -Dysphagia diet   -BM regimen    Renal  -Follow-up BUNs/creatinine  -Monitor urine output    Hematology  -Follow-up CBC in a.m.  -DVT prophylaxis with heparin-     ID  -Monitor for hypo and hyperthermia  -Tylenol as needed if T-max rater than 101  -Panculture if febrile        Consultations:   IP CONSULT TO NEUROCRITICAL CARE  IP CONSULT TO PHYSICAL MEDICINE REHAB  IP CONSULT TO NEUROLOGY     Patient is admitted as inpatient status because of co-morbidities listed above, severity of signs and symptoms as outlined, requirement for current medical therapies and most importantly because of direct risk to patient if care not provided in a hospital setting.     Lillie Gutierres MD  Neurology Resident PGY-3  5/9/2021 at 9:05 AM

## 2021-05-09 NOTE — PROGRESS NOTES
Endovascular Neurosurgery Progress Note    SUBJECTIVE:   No reported events overnight. Pt this am has complaint of mild headache. Review of Systems:  CONSTITUTIONAL:  negative for fevers, chills, fatigue and malaise    EYES:  negative for double vision, blurred vision and photophobia     HEENT:  negative for tinnitus, epistaxis and sore throat    RESPIRATORY:  negative for cough, shortness of breath, wheezing    CARDIOVASCULAR:  negative for chest pain, palpitations, syncope, edema    GASTROINTESTINAL:  negative for nausea, vomiting    GENITOURINARY:  negative for incontinence    MUSCULOSKELETAL:  negative for neck or back pain    NEUROLOGICAL:  Negative for weakness and tingling  negative for headaches and dizziness    PSYCHIATRIC:  negative for anxiety      Review of systems otherwise negative. OBJECTIVE:     Vitals:    05/08/21 1614   BP: (!) 123/59   Pulse: 76   Resp: 22   Temp: 97.7 °F (36.5 °C)   SpO2: 100%        General:  Gen: normal habitus, NAD  HEENT: NCAT, mucosa moist  Cvs: RRR, S1 S2 normal  Resp: symmetric unlabored breathing  Abd: s/nd/nt  Ext: no edema  Skin: no lesions seen, warm and dry. Neuro:  Gen: awake and alert, oriented x3. Lang/speech: no aphasia. Mild dysarthria. Follows commands. CN: PERRL, R gaze unable to cross midline, L hemianopia, LT decreased L face,severe L droop, hearing intact  Motor: R hemibody 5/5. LUE 1/5, LLE 1/5  Sense: decreased LT LUE, L side neglect. Coord: deferred  DTR: deferred  Gait: deferred    NIH Stroke Scale:   1a  Level of consciousness: 0 - alert; keenly responsive   1b. LOC questions:  0 - answers both questions correctly   1c. LOC commands: 0 - performs both tasks correctly   2. Best Gaze: 2 - forced deviation, or total gaze paresis not overcome by oculocephalic maneuver   3. Visual: 3 - bilateral hemianopia (blind including cortical blindness   4. Facial Palsy: 2 - partial paralysis (total or near total paralysis of the lower face)   5a. Motor left arm: 3 - no effort against gravity, limb falls   5b. Motor right arm: 0 - no drift, limb holds 90 (or 45) degrees for full 10 seconds   6a. Motor left leg: 3 - no effort against gravity; leg falls to bed immediately   6b  Motor right le - no drift; leg holds 30 degree position for full 5 seconds   7. Limb Ataxia: 0 - absent   8. Sensory: 2 - severe to total sensory loss; patient is not aware of being touched in face, arm, leg   9. Best Language:  0 - no aphasia, normal   10. Dysarthria: 1 - mild to moderate, patient slurs at least some words and at worst, can be understood with some difficulty   11. Extinction and Inattention: 2 - profound lynette-inattention or lynette- inattention to more than one modality. Does not recognize own hand or orients only to one side of space          Total:   18     MRS: 5      LABS:   Reviewed. RADIOLOGY:   Images were personally reviewed including:  CT head 2021  Evolving MCA infarct with no evidence for new infarct.  No evidence for   hemorrhage persistent mass effect on the right lateral ventricle with minimal   midline shift from right to left     CT head 2021  1. Right MCA distribution infarct, stable.  There is no significant change   from the prior exam.     MRI brain wo con 2021  Subacute right MCA distribution infarct most pronounced in the frontotemporal   region and right basal ganglia with a hemorrhagic component and hemosiderin   staining involving the right basal ganglia. DSA 2021  --acute right M1 MCA occlusion  --attempted mechanical thrombectomy (solitaire x 4x40mm and trevo 4x41mm stent retriever with [id number] and catalyst 5 reperfusion catheter and infinity 088 long sheath aspiration), tici 0 after 6 passes    ASSESSMENT:   61yo female with pmh of tia, copd, osteoarthritis, smoker, gastritis. Pt presents as wake up R MCA syndrome, imaging shows large core stroke (ASPECTS 4) with R M1 occlusion. etio suspect cardioembolic.  S/p MT TICI 0 after 6 passes. Pt is enrolled in DAXA study    Pt has residual L hemiplegia, R gaze, neglect, L hemianopia, L droop, dysarthria. CT head 5/6/2021 shows more hemoconversion with evolving stroke. CT head 5/7/2021 stable. PLAN:   --SBP goal   --continue asa 81  --workup and care as per nsicu   --Upon discharge follow up with Dr. Pineda in 2 weeks after discharge and Dr. Raul Kc in 3 months after discharge. Case discussed with Dr. Raul Kc attending.     Michelle Cartagena MD, PhD    Stroke, [facility]  [facility]  Electronically signed 5/8/2021 at 10:32 PM

## 2021-05-10 ENCOUNTER — HOSPITAL ENCOUNTER (INPATIENT)
Age: 66
LOS: 31 days | Discharge: NURSING, REHAB OR LONG-TERM CARE | DRG: 057 | End: 2021-06-10
Attending: STUDENT IN AN ORGANIZED HEALTH CARE EDUCATION/TRAINING PROGRAM | Admitting: PHYSICAL MEDICINE & REHABILITATION
Payer: COMMERCIAL

## 2021-05-10 VITALS
TEMPERATURE: 98.1 F | OXYGEN SATURATION: 91 % | WEIGHT: 200 LBS | HEIGHT: 68 IN | SYSTOLIC BLOOD PRESSURE: 147 MMHG | DIASTOLIC BLOOD PRESSURE: 76 MMHG | BODY MASS INDEX: 30.31 KG/M2 | HEART RATE: 79 BPM | RESPIRATION RATE: 18 BRPM

## 2021-05-10 PROBLEM — R73.03 PRE-DIABETES: Status: ACTIVE | Noted: 2021-05-10

## 2021-05-10 PROBLEM — I63.9 ACUTE CVA (CEREBROVASCULAR ACCIDENT) (HCC): Status: ACTIVE | Noted: 2021-05-10

## 2021-05-10 PROBLEM — I10 ESSENTIAL HYPERTENSION: Status: ACTIVE | Noted: 2021-05-10

## 2021-05-10 PROBLEM — J44.9 COPD (CHRONIC OBSTRUCTIVE PULMONARY DISEASE) (HCC): Status: ACTIVE | Noted: 2021-05-10

## 2021-05-10 PROBLEM — E78.5 HYPERLIPIDEMIA: Status: ACTIVE | Noted: 2021-05-10

## 2021-05-10 LAB
ABSOLUTE EOS #: 0.4 K/UL (ref 0–0.44)
ABSOLUTE IMMATURE GRANULOCYTE: 0.11 K/UL (ref 0–0.3)
ABSOLUTE LYMPH #: 2.26 K/UL (ref 1.1–3.7)
ABSOLUTE MONO #: 0.67 K/UL (ref 0.1–1.2)
ANION GAP SERPL CALCULATED.3IONS-SCNC: 12 MMOL/L (ref 9–17)
BASOPHILS # BLD: 1 % (ref 0–2)
BASOPHILS ABSOLUTE: 0.06 K/UL (ref 0–0.2)
BUN BLDV-MCNC: 20 MG/DL (ref 8–23)
BUN/CREAT BLD: ABNORMAL (ref 9–20)
CALCIUM SERPL-MCNC: 9.2 MG/DL (ref 8.6–10.4)
CHLORIDE BLD-SCNC: 107 MMOL/L (ref 98–107)
CO2: 24 MMOL/L (ref 20–31)
CREAT SERPL-MCNC: 0.5 MG/DL (ref 0.5–0.9)
DIFFERENTIAL TYPE: ABNORMAL
EOSINOPHILS RELATIVE PERCENT: 3 % (ref 1–4)
GFR AFRICAN AMERICAN: >60 ML/MIN
GFR NON-AFRICAN AMERICAN: >60 ML/MIN
GFR SERPL CREATININE-BSD FRML MDRD: ABNORMAL ML/MIN/{1.73_M2}
GFR SERPL CREATININE-BSD FRML MDRD: ABNORMAL ML/MIN/{1.73_M2}
GLUCOSE BLD-MCNC: 113 MG/DL (ref 70–99)
GLUCOSE BLD-MCNC: 114 MG/DL (ref 65–105)
GLUCOSE BLD-MCNC: 124 MG/DL (ref 65–105)
GLUCOSE BLD-MCNC: 143 MG/DL (ref 65–105)
GLUCOSE BLD-MCNC: 148 MG/DL (ref 65–105)
HCT VFR BLD CALC: 44.2 % (ref 36.3–47.1)
HEMOGLOBIN: 13.7 G/DL (ref 11.9–15.1)
IMMATURE GRANULOCYTES: 1 %
LYMPHOCYTES # BLD: 19 % (ref 24–43)
MCH RBC QN AUTO: 29.3 PG (ref 25.2–33.5)
MCHC RBC AUTO-ENTMCNC: 31 G/DL (ref 28.4–34.8)
MCV RBC AUTO: 94.6 FL (ref 82.6–102.9)
MONOCYTES # BLD: 6 % (ref 3–12)
NRBC AUTOMATED: 0 PER 100 WBC
PDW BLD-RTO: 13.5 % (ref 11.8–14.4)
PLATELET # BLD: 362 K/UL (ref 138–453)
PLATELET ESTIMATE: ABNORMAL
PMV BLD AUTO: 9.6 FL (ref 8.1–13.5)
POTASSIUM SERPL-SCNC: 3.9 MMOL/L (ref 3.7–5.3)
RBC # BLD: 4.67 M/UL (ref 3.95–5.11)
RBC # BLD: ABNORMAL 10*6/UL
SEG NEUTROPHILS: 70 % (ref 36–65)
SEGMENTED NEUTROPHILS ABSOLUTE COUNT: 8.44 K/UL (ref 1.5–8.1)
SODIUM BLD-SCNC: 143 MMOL/L (ref 135–144)
WBC # BLD: 11.9 K/UL (ref 3.5–11.3)
WBC # BLD: ABNORMAL 10*3/UL

## 2021-05-10 PROCEDURE — 6370000000 HC RX 637 (ALT 250 FOR IP): Performed by: PHYSICAL MEDICINE & REHABILITATION

## 2021-05-10 PROCEDURE — 99223 1ST HOSP IP/OBS HIGH 75: CPT | Performed by: PHYSICAL MEDICINE & REHABILITATION

## 2021-05-10 PROCEDURE — 97129 THER IVNTJ 1ST 15 MIN: CPT

## 2021-05-10 PROCEDURE — 97535 SELF CARE MNGMENT TRAINING: CPT

## 2021-05-10 PROCEDURE — 92523 SPEECH SOUND LANG COMPREHEN: CPT

## 2021-05-10 PROCEDURE — 6370000000 HC RX 637 (ALT 250 FOR IP): Performed by: STUDENT IN AN ORGANIZED HEALTH CARE EDUCATION/TRAINING PROGRAM

## 2021-05-10 PROCEDURE — 97110 THERAPEUTIC EXERCISES: CPT

## 2021-05-10 PROCEDURE — 97166 OT EVAL MOD COMPLEX 45 MIN: CPT

## 2021-05-10 PROCEDURE — 51798 US URINE CAPACITY MEASURE: CPT

## 2021-05-10 PROCEDURE — 97530 THERAPEUTIC ACTIVITIES: CPT

## 2021-05-10 PROCEDURE — 97162 PT EVAL MOD COMPLEX 30 MIN: CPT

## 2021-05-10 PROCEDURE — 99253 IP/OBS CNSLTJ NEW/EST LOW 45: CPT | Performed by: INTERNAL MEDICINE

## 2021-05-10 PROCEDURE — 6360000002 HC RX W HCPCS: Performed by: STUDENT IN AN ORGANIZED HEALTH CARE EDUCATION/TRAINING PROGRAM

## 2021-05-10 PROCEDURE — 80048 BASIC METABOLIC PNL TOTAL CA: CPT

## 2021-05-10 PROCEDURE — 82947 ASSAY GLUCOSE BLOOD QUANT: CPT

## 2021-05-10 PROCEDURE — 1180000000 HC REHAB R&B

## 2021-05-10 PROCEDURE — 36415 COLL VENOUS BLD VENIPUNCTURE: CPT

## 2021-05-10 PROCEDURE — 85025 COMPLETE CBC W/AUTO DIFF WBC: CPT

## 2021-05-10 RX ORDER — PROMETHAZINE HYDROCHLORIDE 12.5 MG/1
12.5 TABLET ORAL EVERY 6 HOURS PRN
Status: DISCONTINUED | OUTPATIENT
Start: 2021-05-10 | End: 2021-06-10 | Stop reason: HOSPADM

## 2021-05-10 RX ORDER — ACETAMINOPHEN 325 MG/1
650 TABLET ORAL EVERY 4 HOURS PRN
Status: DISCONTINUED | OUTPATIENT
Start: 2021-05-10 | End: 2021-05-11

## 2021-05-10 RX ORDER — LEVOTHYROXINE SODIUM 0.03 MG/1
25 TABLET ORAL DAILY
Status: DISCONTINUED | OUTPATIENT
Start: 2021-05-10 | End: 2021-06-10 | Stop reason: HOSPADM

## 2021-05-10 RX ORDER — ONDANSETRON 2 MG/ML
4 INJECTION INTRAMUSCULAR; INTRAVENOUS EVERY 6 HOURS PRN
Status: DISCONTINUED | OUTPATIENT
Start: 2021-05-10 | End: 2021-06-10 | Stop reason: HOSPADM

## 2021-05-10 RX ORDER — ASPIRIN 81 MG/1
81 TABLET, CHEWABLE ORAL DAILY
Status: DISCONTINUED | OUTPATIENT
Start: 2021-05-10 | End: 2021-06-10 | Stop reason: HOSPADM

## 2021-05-10 RX ORDER — ACETAMINOPHEN 500 MG
1000 TABLET ORAL EVERY 8 HOURS PRN
Status: DISCONTINUED | OUTPATIENT
Start: 2021-05-10 | End: 2021-05-11

## 2021-05-10 RX ORDER — HEPARIN SODIUM 5000 [USP'U]/ML
5000 INJECTION, SOLUTION INTRAVENOUS; SUBCUTANEOUS EVERY 8 HOURS SCHEDULED
Status: DISCONTINUED | OUTPATIENT
Start: 2021-05-10 | End: 2021-05-12 | Stop reason: SDUPTHER

## 2021-05-10 RX ORDER — BUDESONIDE AND FORMOTEROL FUMARATE DIHYDRATE 160; 4.5 UG/1; UG/1
2 AEROSOL RESPIRATORY (INHALATION) 2 TIMES DAILY
Status: DISCONTINUED | OUTPATIENT
Start: 2021-05-10 | End: 2021-06-10 | Stop reason: HOSPADM

## 2021-05-10 RX ORDER — NICOTINE 21 MG/24HR
1 PATCH, TRANSDERMAL 24 HOURS TRANSDERMAL DAILY
Status: DISCONTINUED | OUTPATIENT
Start: 2021-05-10 | End: 2021-06-10 | Stop reason: HOSPADM

## 2021-05-10 RX ORDER — ATORVASTATIN CALCIUM 80 MG/1
80 TABLET, FILM COATED ORAL DAILY
Status: DISCONTINUED | OUTPATIENT
Start: 2021-05-10 | End: 2021-06-10 | Stop reason: HOSPADM

## 2021-05-10 RX ORDER — DEXTROSE MONOHYDRATE 25 G/50ML
12.5 INJECTION, SOLUTION INTRAVENOUS PRN
Status: DISCONTINUED | OUTPATIENT
Start: 2021-05-10 | End: 2021-06-10 | Stop reason: HOSPADM

## 2021-05-10 RX ORDER — ALBUTEROL SULFATE 2.5 MG/3ML
2.5 SOLUTION RESPIRATORY (INHALATION) EVERY 4 HOURS PRN
Status: DISCONTINUED | OUTPATIENT
Start: 2021-05-10 | End: 2021-06-10 | Stop reason: HOSPADM

## 2021-05-10 RX ORDER — POLYETHYLENE GLYCOL 3350 17 G/17G
17 POWDER, FOR SOLUTION ORAL EVERY OTHER DAY
Status: DISCONTINUED | OUTPATIENT
Start: 2021-05-11 | End: 2021-05-11

## 2021-05-10 RX ORDER — SODIUM CHLORIDE 9 MG/ML
25 INJECTION, SOLUTION INTRAVENOUS PRN
Status: DISCONTINUED | OUTPATIENT
Start: 2021-05-10 | End: 2021-06-10 | Stop reason: HOSPADM

## 2021-05-10 RX ORDER — TRAMADOL HYDROCHLORIDE 50 MG/1
25 TABLET ORAL EVERY 8 HOURS PRN
Status: COMPLETED | OUTPATIENT
Start: 2021-05-10 | End: 2021-05-11

## 2021-05-10 RX ORDER — ACETAMINOPHEN 650 MG/1
650 SUPPOSITORY RECTAL EVERY 4 HOURS PRN
Status: DISCONTINUED | OUTPATIENT
Start: 2021-05-10 | End: 2021-05-11

## 2021-05-10 RX ORDER — AMLODIPINE BESYLATE 5 MG/1
5 TABLET ORAL DAILY
Status: DISCONTINUED | OUTPATIENT
Start: 2021-05-10 | End: 2021-06-10 | Stop reason: HOSPADM

## 2021-05-10 RX ORDER — SENNA PLUS 8.6 MG/1
2 TABLET ORAL DAILY PRN
Status: DISCONTINUED | OUTPATIENT
Start: 2021-05-10 | End: 2021-06-10 | Stop reason: HOSPADM

## 2021-05-10 RX ORDER — SODIUM CHLORIDE 0.9 % (FLUSH) 0.9 %
5-40 SYRINGE (ML) INJECTION PRN
Status: DISCONTINUED | OUTPATIENT
Start: 2021-05-10 | End: 2021-06-10 | Stop reason: HOSPADM

## 2021-05-10 RX ORDER — NICOTINE POLACRILEX 4 MG
15 LOZENGE BUCCAL PRN
Status: DISCONTINUED | OUTPATIENT
Start: 2021-05-10 | End: 2021-06-10 | Stop reason: HOSPADM

## 2021-05-10 RX ORDER — BISACODYL 10 MG
10 SUPPOSITORY, RECTAL RECTAL DAILY PRN
Status: DISCONTINUED | OUTPATIENT
Start: 2021-05-10 | End: 2021-06-10 | Stop reason: HOSPADM

## 2021-05-10 RX ORDER — SODIUM CHLORIDE 0.9 % (FLUSH) 0.9 %
5-40 SYRINGE (ML) INJECTION EVERY 12 HOURS SCHEDULED
Status: DISCONTINUED | OUTPATIENT
Start: 2021-05-10 | End: 2021-05-12

## 2021-05-10 RX ORDER — POLYETHYLENE GLYCOL 3350 17 G/17G
17 POWDER, FOR SOLUTION ORAL DAILY
Status: DISCONTINUED | OUTPATIENT
Start: 2021-05-10 | End: 2021-06-10 | Stop reason: HOSPADM

## 2021-05-10 RX ADMIN — Medication 2 PUFF: at 07:41

## 2021-05-10 RX ADMIN — AMLODIPINE BESYLATE 5 MG: 5 TABLET ORAL at 07:41

## 2021-05-10 RX ADMIN — TRAMADOL HYDROCHLORIDE 25 MG: 50 TABLET, FILM COATED ORAL at 14:43

## 2021-05-10 RX ADMIN — ASPIRIN 81 MG CHEWABLE TABLET 81 MG: 81 TABLET CHEWABLE at 07:40

## 2021-05-10 RX ADMIN — INSULIN LISPRO 1 UNITS: 100 INJECTION, SOLUTION INTRAVENOUS; SUBCUTANEOUS at 22:00

## 2021-05-10 RX ADMIN — Medication 2 PUFF: at 22:01

## 2021-05-10 RX ADMIN — ATORVASTATIN CALCIUM 80 MG: 80 TABLET, FILM COATED ORAL at 07:41

## 2021-05-10 RX ADMIN — ACETAMINOPHEN 1000 MG: 500 TABLET, FILM COATED ORAL at 07:41

## 2021-05-10 RX ADMIN — ACETAMINOPHEN 650 MG: 325 TABLET, FILM COATED ORAL at 18:43

## 2021-05-10 RX ADMIN — INSULIN LISPRO 1 UNITS: 100 INJECTION, SOLUTION INTRAVENOUS; SUBCUTANEOUS at 11:20

## 2021-05-10 RX ADMIN — HEPARIN SODIUM 5000 UNITS: 5000 INJECTION INTRAVENOUS; SUBCUTANEOUS at 17:02

## 2021-05-10 ASSESSMENT — PAIN DESCRIPTION - LOCATION
LOCATION_2: SHOULDER
LOCATION: HEAD
LOCATION: HEAD

## 2021-05-10 ASSESSMENT — PAIN SCALES - GENERAL
PAINLEVEL_OUTOF10: 8
PAINLEVEL_OUTOF10: 6
PAINLEVEL_OUTOF10: 8

## 2021-05-10 ASSESSMENT — PAIN DESCRIPTION - INTENSITY: RATING_2: 6

## 2021-05-10 ASSESSMENT — PAIN DESCRIPTION - PAIN TYPE
TYPE: ACUTE PAIN
TYPE_2: ACUTE PAIN
TYPE: ACUTE PAIN

## 2021-05-10 ASSESSMENT — PAIN DESCRIPTION - ORIENTATION
ORIENTATION_2: ANTERIOR;PROXIMAL;LEFT
ORIENTATION: RIGHT;LEFT;ANTERIOR;POSTERIOR

## 2021-05-10 ASSESSMENT — PAIN DESCRIPTION - ONSET
ONSET: ON-GOING
ONSET_2: ON-GOING

## 2021-05-10 ASSESSMENT — PAIN DESCRIPTION - DESCRIPTORS
DESCRIPTORS: HEADACHE
DESCRIPTORS_2: DISCOMFORT

## 2021-05-10 ASSESSMENT — PAIN DESCRIPTION - PROGRESSION: CLINICAL_PROGRESSION: NOT CHANGED

## 2021-05-10 ASSESSMENT — PAIN - FUNCTIONAL ASSESSMENT
PAIN_FUNCTIONAL_ASSESSMENT: PREVENTS OR INTERFERES SOME ACTIVE ACTIVITIES AND ADLS
PAIN_FUNCTIONAL_ASSESSMENT: 0-10

## 2021-05-10 ASSESSMENT — PAIN DESCRIPTION - DURATION: DURATION_2: INTERMITTENT

## 2021-05-10 ASSESSMENT — PAIN DESCRIPTION - FREQUENCY: FREQUENCY: CONTINUOUS

## 2021-05-10 NOTE — CONSULTS
[place] [address] Internal Medicine    CONSULTATION    / FOLLOW UP VISIT       Date:   5/10/2021  Patient name:  Florian Kerr  Date of admission:  5/10/2021  7:13 AM  MRN:   [id number]  Account:  [de-identified]  YOB: 1955  PCP:    Good Cedeno MD  Room:   36/1821-  Code Status:    Full Code    Physician Requesting Consult: Iker Scott MD    History of Present Illness:      C/C ;  Medical comorbidity management     REASON FOR CONSULT;  Medical comorbidity and medication management ;                                                 *Active Problems:    Tobacco abuse    Gastritis    Acute left hemiparesis ([address]    Acute CVA (cerebrovascular accident) ([address]    COPD (chronic obstructive pulmonary disease) ([address]    Essential hypertension    Hyperlipidemia    Pre-diabetes  Resolved Problems:    * No resolved hospital problems. *           HPI:   Patient is a 61yo female with PMH DM, COPD, gastritis who presented with CVA. Patient last known well at 10pm on 5/3 and woke up with left sided weakness, left sided facial droop and right gaze deviation. CTA of head and neck showed right MCA M1 occlusion with collateralization. Patient underwent cerebral angiogram with unsuccessful thrombectomy. MRI brain showed right MCA stroke with hemorrhagic component. PHYLLIS at that time did not show any intramural thrombus. Loop recorder placed by Dr. Lizy Mayo. Repeat head CT showing extension and hemorrhagic transformation of right MCA infarct. At baseline, patient is able to perform ADLs without impairment. At present, she continues to have left sided weakness but minimal movement in left lower extremity and 0/5 in left upper extremity. Past and Surgical hx as in H and P  Social History:     Tobacco:    reports that she has been smoking cigarettes. She has a 20.00 pack-year smoking history.  She has never used smokeless tobacco.  Alcohol:      reports no history of alcohol BILIDIR 0.11 10/16/2017    LABALBU 4.0 10/16/2017             Radiology:         Medications: Allergies:  No Known Allergies    Current Meds:   Scheduled Meds:    sodium chloride flush  5-40 mL Intravenous 2 times per day    amLODIPine  5 mg Oral Daily    aspirin  81 mg Oral Daily    atorvastatin  80 mg Oral Daily    budesonide-formoterol  2 puff Inhalation BID    heparin (porcine)  5,000 Units Subcutaneous 3 times per day    insulin lispro  0-3 Units Subcutaneous Nightly    insulin lispro  0-6 Units Subcutaneous TID WC    levothyroxine  25 mcg Oral Daily    nicotine  1 patch Transdermal Daily    [START ON 5/11/2021] polyethylene glycol  17 g Oral Every Other Day    tiotropium  2 puff Inhalation Daily    polyethylene glycol  17 g Oral Daily     Continuous Infusions:    sodium chloride       PRN Meds: sodium chloride flush, acetaminophen, promethazine **OR** ondansetron, sodium chloride, acetaminophen, albuterol, dextrose, glucagon (rDNA), glucose, traMADol, acetaminophen, senna, bisacodyl        Assessment :       Assessment Dx    Active Problems:    Tobacco abuse    Gastritis    Acute left hemiparesis (HCC)    Acute CVA (cerebrovascular accident) ([facility]    COPD (chronic obstructive pulmonary disease) ([facility]    Essential hypertension    Hyperlipidemia    Pre-diabetes  Resolved Problems:    * No resolved hospital problems. *      Plan:     5/10/21  BP well controlled. Continue norvasc 5 mg daily  Copd stable. continue bronchodilators  Synthroid 25 mcg daily   lipitor 80 mg daily  Asa 81 mg daily  Insulin sliding scale  nicoderm patch  Continue PT/OT  Encourage PO intake  Leukocytosis likely reactive. Continue to monitor. Woo Duncan MD    Attestation and add on       I have discussed the care of Daiana Jarrell , including pertinent history and exam findings,      5/10/21    with the resident.   I have seen and examined the patient and the key elements of all parts of the encounter have been

## 2021-05-10 NOTE — PROGRESS NOTES
[facility] called and confirmed  for patient at [phone]. Transport information was faxed as requested dated 5/10 with Writer name and signature. Brittany was called and informed patient was on the way to facility. Patient was safely transferred from bed to stretcher with [facility] crew.

## 2021-05-10 NOTE — PROGRESS NOTES
Pt discharged deferred until 6:30 am per [facility], receiving facility will not be taking admitted patient due to possible renewal of precert. Evaluation will occur in the morning per [facility].

## 2021-05-10 NOTE — PROGRESS NOTES
roll to her L with verbal cues and using HR only. Pt able to scoot/boost self up in bed pulling with RUE on bed rails and using (B) LE's to push. Writer supported and facilitated LLE with activiy. Bed mobility  Bridging: Minimal assistance(to support and facilitate LLE only)  Scooting: Minimal assistance(to support and facilitate LLE to boost up in bed)        Stairs/Curb  Stairs?: No       EXERCISES    Other exercises?: Yes  Other exercises 1: supine (B) LE ex: AROM RLE/PROM LLE x 10-15ea(SAQ's completed with 1.5# weight on RLE)  Other exercises 2: Bridges x 10-Min A  Other exercises 3: bed mobility completed with CGA/Min A           Activity Tolerance: Patient limited by fatigue, Patient limited by endurance  PT Equipment Recommendations  Other: CTA; pending further progress with mobility     Current Treatment Recommendations: Strengthening, Balance Training, Functional Mobility Training, Transfer Training, Endurance Training, Wheelchair Mobility Training, Gait Training, Stair training, Neuromuscular Re-education, Home Exercise Program, Safety Education & Training, Patient/Caregiver Education & Training, Modalities, Positioning    Conditions Requiring Skilled Therapeutic Intervention  Body structures, Functions, Activity limitations: Decreased functional mobility ; Decreased strength;Decreased safe awareness;Decreased endurance;Decreased balance;Decreased posture;Decreased coordination;Decreased vision/visual deficit; Decreased fine motor control  Assessment: Pt presents with Left hemiparesis, and left side neglect. Pt nate to dangle at EOB at max A with severe left side lean. Pt dependent for transfers at this time with nilson susan. Pt needs skilled therapy to adrress her deficts and imporve fucntion/safety for safe discharge to home.     Treatment Diagnosis: Weakness, impaired mobility  Prognosis: Good  REQUIRES PT FOLLOW UP: Yes  Discharge Recommendations: Patient would benefit from continued therapy after discharge;Home with assist PRN    Goals  Short term goals  Time Frame for Short term goals: 10 days  Short term goal 1: Pt able to roll in bed side to side at min A without bed rail  Short term goal 2: Pt able to perform supine<>sit at mod A  Short term goal 3: Pt able to perform sit<>stand at min/mod A . Short term goal 4: Pt able to progress to pivot transfers at max A   Short term goal 5: Pt able to improve sitting balance at EOB/EOM at SBA for static balance and able to track to left side as well as turn to head to left side to scan her environment with minimal cues. Short term goal 6: Pt able to tolerate standing in // bars with R UE support and assist at mod A  Short term goal 7: Pt able to propel w/c with R UE/R LE distance of 50 to 80 ft, straight path and turn around 180 degrees, at min/mod A   Long term goals  Time Frame for Long term goals : By DC  Long term goal 1: Pt able to roll in bed mod-I  Long term goal 2: Pt able to perform supine <> sit at CGA/min A  Long term goal 3: Pt able to perform pivot transfers min A / mod A and able to scan Left side environment. Long term goal 4: Pt able to ambulate in // bars, or with appropriate assistive device and/or LE bracing, distance of  20 to 30 ft, min/mod A x 2. Long term goal 5: Pt able to propel w/c on level surfaces distance 100 to 150 ft, SBA/CGA. Long term goal 6: Educate pt/family in safe technique for mobility and  to go up and down steps to enter/exit home. Long term goal 7: Improve sitting balance at EOM to good, and standing balance with R UE support to fair- to reduce fall risk. Long term goal 8: Improve PASS score to 20/36 improve overall function.         05/10/21 1534   PT Individual Minutes   Time In 1510   Time Out 1530   Minutes 20       Electronically signed by Roro Teresa PTA on 5/10/21 at 3:42 PM EDT

## 2021-05-10 NOTE — FLOWSHEET NOTE
Pt's daughter Shiraz Huffman was with her, getting room settled for patient before Shiraz Huffman left to go back to [place]. Patient said her goal is to \"get back home as soon as possible. \" We talked about the hard work of rehab getting her as self sufficient as possible. Shiraz Huffman said she felt comfortable that she could leave and go home given the responsiveness of the staff already. She has other siblings who are local. Writer let patient know spiritual care is part of the interdisciplinary team and available any time. Patient was clearly tired from being transferred and getting settled but was appreciative of visit.      05/10/21 1646   Encounter Summary   Services provided to: Patient and family together   Referral/Consult From: [address] [place] [address] Road Visiting   (5-10-21)   Complexity of Encounter Moderate   Length of Encounter 15 minutes   Routine   Type Initial   Assessment Calm; Approachable   Intervention Active listening;Explored feelings, thoughts, concerns;[place];Sustaining presence/ Ministry of presence; Discussed illness/injury and it's impact   Outcome Expressed gratitude;Engaged in conversation;Expressed feelings/needs/concerns;Receptive

## 2021-05-10 NOTE — CARE COORDINATION
assist pt with 24 hr support if necessary as they plan on taking \"shifts\".                   Electronically signed by: RADHA Velez on 5/10/2021 at 7:44 AM

## 2021-05-10 NOTE — PROGRESS NOTES
Physical Therapy    Facility/Department: [facility]  Initial Assessment    NAME: Ashley Murrell  : 1955  MRN: [id number]    Date of Service: 5/10/2021    Discharge Recommendations:  Patient would benefit from continued therapy after discharge, Home with assist PRN        Assessment   Body structures, Functions, Activity limitations: Decreased functional mobility ; Decreased strength;Decreased safe awareness;Decreased endurance;Decreased balance;Decreased posture;Decreased coordination;Decreased vision/visual deficit; Decreased fine motor control  Assessment: Pt presents with Left hemiparesis, and left side neglect. Pt nate to dangle at EOB at max A with severe left side lean. Pt dependent for transfers at this time with nilson davis. Pt needs skilled therapy to adrress her deficts and imporve fucntion/safety for safe discharge to home. Treatment Diagnosis: Weakness, impaired mobility  Prognosis: Good  Decision Making: Medium Complexity  Exam: ROM, MMT, fucntion, PASS score  PT Education: Precautions; Family Education;General Safety;Plan of Care;PT Role;Goals;Transfer Training; Adaptive Device Training  Patient Education: Educated pt/family about left neglect, discussed with family ti encourage pt to look/scan Left side and to sit on her left , so pt is encouraged to look to left side. REQUIRES PT FOLLOW UP: Yes  Activity Tolerance  Activity Tolerance: Patient limited by fatigue;Patient limited by endurance  Other Comments  Comments: Left pt in a recliner chair with piloow used for L UE postioning. Patient Diagnosis(es): There were no encounter diagnoses. has a past medical history of Asthma, COPD (chronic obstructive pulmonary disease) ([address] Diabetes mellitus ([address] Esophageal ring, Gastritis, Hyperlipidemia, Hyperplastic colon polyp, Hypertension, Osteoarthritis, Patient in clinical research study, and TIA (transient ischemic attack).    has a past surgical history that includes Appendectomy; Hysterectomy; Esophagus dilation; Colonoscopy (09/20/2017); Endoscopy, colon, diagnostic; pr egd transoral biopsy single/multiple (N/A, 9/20/2017); pr colsc flx w/rmvl of tumor polyp lesion snare tq (N/A, 9/20/2017); and Upper gastrointestinal endoscopy (09/2017). Restrictions  Restrictions/Precautions  Restrictions/Precautions: Fall Risk  Implants present? : (Loop recorder)  Position Activity Restriction  Other position/activity restrictions: up w/assist; significant L neglect with LUE/LLE deficits  Vision/Hearing  Vision: Impaired  Vision Exceptions: Wears glasses for reading  Hearing: Exceptions to [facility]  Hearing Exceptions: Hard of hearing/hearing concerns(chronic hearing loss in L ear)     Subjective  General  Patient assessed for rehabilitation services?: Yes  Referring Practitioner: Dr Austen Duffy  Referral Date : 05/10/21  Diagnosis: CVA  Follows Commands: Within Functional Limits  Pain Screening  Patient Currently in Pain: Yes  Pain Assessment  Pain Assessment: 0-10  Pain Level: 6(Pt reports she got pain meds and it started working)  Patient's Stated Pain Goal: No pain  Pain Type: Acute pain  Pain Location: Head  Pain Orientation: Right;Left; Anterior;Posterior(More central, behind eyes and above ears.)  Pain Descriptors: Headache  Pain Frequency: Continuous  Pain Onset: On-going  Clinical Progression: Not changed  Functional Pain Assessment: Prevents or interferes some active activities and ADLs  Response to Pain Intervention: Patient Satisfied  Multiple Pain Sites: Yes  Pain 2  Pain Rating 2: 6(With movement)  Patient's Stated Pain Goal 2: no pain  Pain Type 2: Acute Pain  Pain Location 2: Shoulder  Pain Orientation 2: Anterior;Proximal;Left  Pain Descriptors 2: Discomfort  Pain Duration 2: Intermittent  Pain Onset 2: On-going  Clinical Progression 2: (worse with ROM Left shoulder)  Vital Signs  Patient Currently in Pain: Yes       Orientation     Social/Functional History  Social/Functional History  Lives With: Alone  Type of Home: House  Home Layout: Two level, Able to Live on Main level with bedroom/bathroom, Laundry in basement  Home Access: Stairs to enter without rails  Entrance Stairs - Number of Steps: 3 (Front entrance)  Bathroom Shower/Tub: Tub/Shower unit, Curtain  Bathroom Toilet: Standard  Bathroom Equipment: Hand-held shower  Home Equipment: (No DME)  ADL Assistance: Independent  Homemaking Assistance: Independent  Homemaking Responsibilities: Yes  Ambulation Assistance: Independent  Transfer Assistance: Independent  Active : Yes  Mode of Transportation: SUV  Occupation: Retired  Type of occupation: grocery store employee  Additional Comments: Pt independent for all mobility, self care and IADL's. Cognition        Objective          AROM RLE (degrees)  RLE AROM: WFL  PROM LLE (degrees)  LLE PROM: WFL  AROM RUE (degrees)  RUE AROM : WFL  PROM LUE (degrees)  LUE General PROM: Pain at Left shoulder-proximal with PROM  Strength RLE  Strength RLE: WFL  Strength LLE  Comment: Hip extensors ~ 1/5, no mucle contractions flets in other muscle group. Strength RUE  Strength RUE: WFL  Strength LUE  Comment: Flaccid L UE  Tone LLE  LLE Tone: Hypotonic  Motor Control  Gross Motor?: Exceptions  Comments: Flaccid L UE/L LE. Sensation  Overall Sensation Status: Impaired  Additional Comments: Numbness in left arm/legs, whole body. Bed mobility  Bridging: Moderate assistance(Therapist assist with L LE flexed in bed and stabilizing L LE .)  Rolling to Left: Minimal assistance  Rolling to Right: Maximum assistance  Supine to Sit: Maximum assistance(Assist needed for L LE and trunk.)  Sit to Supine: (Left pt in a recliner)  Scooting: Dependent/Total  Comment: Pt dangles at EOB for 8 to 9 minutes, severe left neglect, and left side lean, Pt able to turn her head to midline withcues and correct left lean with cues and min assist, Pt needs R UE support and min/mod A  to maintain static balance at EOB .   Transfers  Sit to Stand: Maximum Assistance;2 Person Assistance  Stand to sit: 2 Person Assistance;Maximum Assistance  Bed to Chair: Dependent/Total;2 Person Assistance(jarvis stedy for transfers.)  Comment: Pt stood with nilson stedy for ~ 20 secs and 25 secs using nilson stedy, therapist assist with L hand  on nilson stedy, no quad contraction noted in standing position. Ambulation  Ambulation?: No     Balance  Posture: Fair  Sitting - Static: Poor;+  Sitting - Dynamic: Poor  Standing - Static: Poor;-(nilson stedy)  Standing - Dynamic: (Not assessed)  Other exercises  Other exercises?: Yes  Other exercises 1: Dangling at EOB for 7 to 8 minutes, mod A for staic balance,worked on weight shifting to righ tside and turning head to left side  Other exercises 2: Sit to stand with nilson stedy  Other exercises 3:  Supine-Bridging  reps , 2 sets, PROM other joints. Plan   Plan  Times per week: 1.5 hr/day, 5 to 7 days/week  Current Treatment Recommendations: Strengthening, Balance Training, Functional Mobility Training, Transfer Training, Endurance Training, Wheelchair Mobility Training, Gait Training, Stair training, Neuromuscular Re-education, Home Exercise Program, Safety Education & Training, Patient/Caregiver Education & Training, Modalities, Positioning  Safety Devices  Type of devices: All fall risk precautions in place, Call light within reach, Gait belt(Daughter present with pt in the room. )      OutComes Score  Postural Assessment Scale for Stroke Patients   (PASS) Scoring Form     Give the subject instructions for each item as written below. When scoring the item, record the lowest response category that applies for each item. Maintaining a Posture  1. Sitting Without Support  Examiner: Have the subject sit on a bench/mat without support and with feet flat on the floor. 0 Cannot sit  2. Standing with Support Examiner: Have the subject stand, providing support as needed.  Evaluate only the ability to stand with or without support. Do not consider the quality of the stance. 1     Can stand with strong support of 2 people   3. Standing Without Support  Examiner:  Have the subject stand without support. Evaluate only the ability to stand with or without support. Do not consider the quality of the stance. 0  Cannot stand without support  4. Standing on Non-paretic Leg  Examiner: Have the subject stand on the non-paretic leg. Evaluate only the ability to bear weight entirely on the non-paretic leg. Do not consider how the subject accomplishes the task. 0  Cannot stand on non-paretic leg    5. Standing on Paretic Leg  Examiner: Have the subject stand on the paretic leg. Evaluate only the ability to bear weight entirely on the paretic leg. Do not consider how the subject accomplishes the task. 0  Cannot stand of paretic leg     Maintaining Posture SUBTOTAL     1/15    Changing a Posture  6. Supine to Paretic Side Lateral  Examiner:  Begin with the subject in supine on a treatment mat. Instruct the subject to roll to the paretic side (lateral movement). Assist as necessary. Evaluated the subject's performance on the amount of help required. Do not consider the quality of performance. 2  Can perform with little help  7. Supine to Non-paretic Side Lateral  Examiner:  Begin with the subject in supine on a treatment mat. Instruct the subject to roll to the non-paretic side (lateral movement). Assist as necessary. Evaluate the subject's Performance on the amount of help required. Do not consider the quality of performance. 1  Can perform with much help   8. Supine to Sitting up on the Edge of the Mat   Examiner:  Begin with the subject in supine on a treatment mat. Instruct the subject to come to sitting on the edge of the mat. Assist as necessary. Evaluate the subject's performance on the amount of help required. Do not considered the quality performance. 1  Can perform with much help    9.   Sitting on the Edge of the Mat to Supine  Examiner: Begin with the subject sitting on the edge of a treatment mat. Instruct the subject to return to supine. Assist as necessary. Evaluate the subjects performance on the amount of help required. Co not consider the quality of performance. 1  Can perform with much help   10. Sitting to Standing Up  Examiner:  Begin with the subject sitting on the edge of a treatment mat. Instruct the subject to stand up without support. Assist if necessary. Evaluated the subject's performance on the amount ot help required. Do not consider the quality of the performance. 1  Can perform with much help   11. Standing Up to Sitting Down  Examiner:  Begin with the subject standing by the edge of a treatment mat. Instruct the subject to sit on the edge of mat without support. Assist if necessary. Evaluated the subject's performance on the amount of help required. Do not consider the quality of the performance. 1  Can perform with much help   12 . Standing, Picking up a Pencil from the Floor  Examiner:  Begin with the subject standing. Instruct the subject to  a pencil from the floor without support. Assist if necessary. Evaluate the subject's performance on the amount of help required. Do Not consider the quality of the performance. 0  Cannot perform    Changing Posture SUBTOTAL   7/21    PASS TOTAL   8/36       Goals  Short term goals  Time Frame for Short term goals: 10 days  Short term goal 1: Pt able to roll in bed side to side at min A without bed rail  Short term goal 2: Pt able to perform supine<>sit at mod A  Short term goal 3: Pt able to perform sit<>stand at min/mod A . Short term goal 4: Pt able to progress to pivot transfers at max A   Short term goal 5: Pt able to improve sitting balance at EOB/EOM at SBA for static balance and able to track to left side as well as turn to head to left side to scan her environment with minimal cues.    Short term goal 6: Pt able to tolerate standing in // bars with R UE support and assist at mod A  Short term goal 7: Pt able to propel w/c with R UE/R LE distance of 50 to 80 ft, straight path and turn around 180 degrees, at min/mod A   Long term goals  Time Frame for Long term goals : By DC  Long term goal 1: Pt able to roll in bed mod-I  Long term goal 2: Pt able to perform supine <> sit at CGA/min A  Long term goal 3: Pt able to perform pivot transfers min A / mod A and able to scan Left side environment. Long term goal 4: Pt able to ambulate in // bars, or with appropriate assistive device and/or LE bracing, distance of  20 to 30 ft, min/mod A x 2. Long term goal 5: Pt able to propel w/c on level surfaces distance 100 to 150 ft, SBA/CGA. Long term goal 6: Educate pt/family in safe technique for mobility and  to go up and down steps to enter/exit home. Long term goal 7: Improve sitting balance at EOM to good, and standing balance with R UE support to fair- to reduce fall risk. Long term goal 8: Improve PASS score to 20/36 improve overall function.    Patient Goals   Patient goals : Get better and go home       Therapy Time   Individual Concurrent Group Co-treatment   Time In 91 11 [phone]         Time Out 0948         Minutes 61         Timed Code Treatment Minutes: 40 Minutes       Malaika Reina, PT

## 2021-05-10 NOTE — PROGRESS NOTES
to go home and enjoy my house. \" specifies:\"get in and out of bed on my own, shower by myself. \"  Comments: pt fair assessment of her deficits, states her speech is ok, \"other than it is all jumbled up. \"  (L facial droop)  Orientation  Overall Orientation Status: Within Functional Limits  Vision  Vision Exceptions: Wears glasses for reading(severe L neglect- cancel H worksheet- found only 4/ 20 on far R side of page)  Hearing  Hearing Exceptions: Hard of hearing/hearing concerns  Social/Functional History  Lives With: Alone  Type of Home: House  Home Layout: Two level, Able to Live on Main level with bedroom/bathroom, Laundry in basement  Home Access: Stairs to enter without rails  Entrance Stairs - Number of Steps: 3 (Front entrance)  Bathroom Shower/Tub: Tub/Shower unit, Curtain(tub shower is on main floor)  Bathroom Toilet: Standard  Bathroom Equipment: Hand-held shower  Home Equipment: (No DME)  ADL Assistance: [address] [place] [address] Avenue: Independent  Homemaking Responsibilities: Yes  Ambulation Assistance: Independent  Transfer Assistance: Independent  Active : Yes  Mode of Transportation: [facility]  Occupation: Retired  Type of occupation: grocery store employee  Leisure & Hobbies: repurposing items, has a dog and fenced in back yard. IADL Comments: dtr, Yecenia Bunch RN lives in [facility], Tahira Preston lives 20 min away, son, Paola Andrews lives 4 blocks away. Additional Comments: Pt independent for all mobility, self care and IADL's. Objective      Cognition  Arousal/Alertness: Inconsistent responses to stimuli  Following Commands:  Follows one step commands with increased time  Attention Span: Attends with cues to redirect, Difficulty dividing attention  Safety Judgement: Decreased awareness of need for assistance, Decreased awareness of need for safety  Problem Solving: Decreased awareness of errors, Assistance required to identify errors made, Assistance required to correct errors made, Assistance required to generate solutions, Assistance required to implement solutions  Insights: Decreased awareness of deficits  Initiation: Requires cues for some  Sequencing: Requires cues for some  Cognition Comment: despite awareness of L neglect- poor compensation during adls, fair problem solving demo with 1 hand don shirt- new technique. decreased safety awareness ie. decreased attention, concern re L lean. per ST:  mild dysarthria d/t significant L sided labial/lingual weakness, mildly impaired reasoning/problem solving and moderately impaired recall. Pt. demonstrated L sided visual neglect.    Perception  Overall Perceptual Status: Impaired(Pt with head turned to R throughout, is able to turn her head to midline with cues)  Unilateral Attention: Cues to attend left visual field, Cues to maintain midline in sitting, Cues to maintain midline in standing, Cues to attend to left side of body(dtr notes progress with locate L arm,  unable to find food on L side of tray, cancel H worksheet- found only 4/ 20 on far R side of page)  Initiation: (fair for familiar)  Motor Planning: (fair)  Sensation  Overall Sensation Status: Impaired  Light Touch: Severe deficits in the LUE  Additional Comments: pt unable to localize light touch to L hand or forearm until just distal to elbow     UE Function           LUE Strength  Gross LUE Strength: Exceptions to [facility]  L Shoulder Flex: 0/5  L Shoulder ABduction: 0/5  L Elbow Flex: 0/5  L Elbow Ext: 0/5  L Forearm Pron: 0/5  L Forearm Sup: 0/5  L Wrist Flex: 0/5  L Wrist Ext: 0/5  L Hand General: 0/5        LUE PROM (degrees)  LUE PROM: Exceptions  L Shoulder Flex  0-180: 0-90  L Shoulder ABduction 0-180: 0-90  LUE AROM (degrees)  LUE AROM : Exceptions  LUE General AROM: flaccid LUE, 0 AROM     Left Hand AROM (degrees)  Left Hand General AROM: flaccid LUE  RUE Strength  Gross RUE Strength: WFL            RUE AROM (degrees)  RUE AROM : WFL     Right Hand AROM (degrees)  Right Hand AROM: [facility] Fine Motor Skills  Coordination  Movements Are Fluid And Coordinated: No  Coordination and Movement description: Fine motor impairments, Gross motor impairments, Left UE   Comment: Pt unable to produce any movement in LUE               Quality of Movement Other  Comment: Pt unable to produce any movement in LUE       Mobility  Supine to Sit: Maximum assistance  Sit to Supine: 2 Person assistance, Dependent/Total       Balance  Sitting Balance: Maximum assistance(tolerated 8 minutes:  with RUE on rail can maintain static sit balance for 10 seconds with CGA but frequent, sudden LOB to L without warning requiring max assist to prevent fall.)  Standing Balance: Dependent/Total(max x 2 static stand with RUE support in nilson stedy)  Standing Balance  Time: 1-3 min x 8  Activity: adl transfers, LE self care, max x 2 static stand with RUE support in nilson stedy  Comment: in w/c sitting leaning to L, needing physical assist to pull trunk forward in w/c  Functional Mobility  Functional - Mobility Device: Wheelchair  Activity: To/from bathroom  Assist Level: Dependent/Total  Bed mobility  Supine to Sit: Maximum assistance  Sit to Supine: 2 Person assistance;Dependent/Total     Transfers  Stand Step Transfers: Dependent/Total  Sit to stand: Dependent/Total(max x 2-3 with RUE pulling on nilson stedy)  Stand to sit: Dependent/Total, 2 Person assistance(mod-max x 2)  Transfer Comments: nilson stedy  Toilet Transfers  Equipment Used: Grab bars  Toilet Transfer: Dependent/Total, 2 Person assistance  Toilet Transfers Comments: max x 2-3      Activity Tolerance: Patient limited by fatigue, Patient limited by pain  Activity Tolerance: care taken to support LUE in all mobility and place pillow under when at rest. pt recently in bed when OT arrived, had been in recliner post PT. adls from w/c, max fatigue, L trunk lean increases with fatigue, pt falling asleep while eating. pt was assisted to return to bed at end of OT.   She reports headache today, worse with extraocular movements and associated with light sensitivity. this am  sat on toilet x 2 and unable to urinate, dtr expressed her concerns re dehydration to  internal med MD today, dtr states pt has not been having urinary retention. ADL     ADL  Feeding: Moderate assistance;Verbal cueing; Increased time to complete;Setup(severe L neglect)  Grooming: Minimal assistance;Setup(brush hair, brush teeth, wash face)  UE Bathing: Verbal cueing; Increased time to complete;Maximum assistance;Setup  LE Bathing: Dependent/Total  UE Dressing: Setup; Increased time to complete;Verbal cueing;Maximum assistance(pullover shirt)  LE Dressing: (max x 2-3)  Toileting: (max x 2-3)  Additional Comments: dysphagia; pureed with thin liquids, has food spillage L mouth. pt is wearing briefs, having urgency and unable to toilet in time but today has sat on toilet x 2 and unable to go (2nd attempt during OT and pt was requesting to toilet)  . up in w/c to sink for groom, bathe and dress, changed from gown to wearing t shirt, pullup briefs, pants, teds, slipper socks. , requires A x 2-3 for standing adl tasks in nilson galdamez.   OT scores     Eating  Assistance Needed: Partial/moderate assistance  CARE Score: 3  Discharge Goal: Supervision or touching assistance  Oral Hygiene  Assistance Needed: Partial/moderate assistance  CARE Score: 3  Discharge Goal: Set-up or clean-up assistance  Toileting Hygiene  Assistance Needed: Dependent  CARE Score: 1  Discharge Goal: Substantial/maximal assistance  Shower/Bathe Self  Assistance Needed: Dependent  CARE Score: 1  Discharge Goal: Substantial/maximal assistance  Upper Body Dressing  Assistance Needed: Substantial/maximal assistance  CARE Score: 2  Discharge Goal: Set-up or clean-up assistance(t shirt only)  Lower Body Dressing  Assistance Needed: Dependent  CARE Score: 1  Discharge Goal: Substantial/maximal assistance  Putting [address] Needed: Dependent  CARE Score: 1  Discharge Goal: Substantial/maximal assistance  Toilet Transfer  Assistance Needed: Dependent  CARE Score: 1  Discharge Goal: Substantial/maximal assistance  Goals  Patient Goals   Patient goals : \"get as close to normal as possible. \"  specifies:\"get in and out of bed on my own, shower by myself. \"  Short term goals  Time Frame for Short term goals: 1 week  Short term goal 1: mod A UE bathe and dress  Short term goal 2: set up brush teeth  Short term goal 3: min feeding, with cues able to locate L side of tray and scan to locate items on L. Short term goal 4: tolerate 6-8 min sitting edge of bed (static sit)  with min assist and RUE support, head at midline and fair safety awareness  Short term goal 5: pt to initiate reposition LUE with RUE with good safety ie. hold by wrist not by 1 finger  Short term goal 6: demo improved awareness of L side and neuromuscular christian by weight bearing on LUE with physical assist to complete  Short term goal 7: from w/c:  consistently maintain midline trunk control and demo able to lean forward 4 inches away from back of chair without physical assist for adls. Short term goal 8: tolerate 2 min static stand for adls with RUE support with mod of 2 and assist with LLE as needed. Long term goals  Time Frame for Long term goals : by discharge  Long term goal 1: set up and occasional verbal cues ie.  L visual scan for self feeding  Long term goal 2: set up oral care  Long term goal 3: max x 1 toileting and adl transfers  Long term goal 4: min UE bathe and dress (shirt)  Long term goal 5: max x 1 LE bathe and dress, able to cross to reach RLE with min assist and keep trunk balance    Assessment  Performance deficits / Impairments: Decreased functional mobility , Decreased ADL status, Decreased strength, Decreased endurance, Decreased balance, Decreased high-level IADLs, Decreased vision/visual deficit, Decreased cognition, Decreased safe awareness, Decreased ROM, Decreased fine motor control, Decreased coordination, Decreased posture, Decreased sensation  Assessment: severely impaired loss of adl indep post CVA with severe L lynette deficits, dysphagia, L neglect  Prognosis: Good(for stated goals)  History: ischemic CVA,  subacute right MCA distribution infarct, L lynette, dysphagia, L neglect, loop recorder 5/5/21 (Dr. Anamika Negrete)  Exam: 14 performance deficits  Assistance / Modification: high due to mobility, L lynette, cognition, L neglect  REQUIRES OT FOLLOW UP: Yes  Discharge Recommendations: Patient would benefit from continued therapy after discharge  Plan  Times per week: 5-7  Times per day: Twice a day  Current Treatment Recommendations: Strengthening, Positioning, ROM, Safety Education & Training, Balance Training, Patient/Caregiver Education & Training, Self-Care / ADL, Cognitive/Perceptual Training, Functional Mobility Training, Neuromuscular Re-education, Equipment Evaluation, Education, & procurement, Endurance Training, Cognitive Reorientation  OT Education  OT Education: OT Role, Transfer Training, Plan of Care, ADL Adaptive Strategies, Family Education, Equipment  Patient Education: ed pt and dtr re OT role, rehab program and schedule, use of nilson stedy, safe transfers, lynette technique to don shirt, need to support pt with pillows due to L lateral lean when upright in bed to eat, L neglect and ways to cue, lynette technique for LLE in mobility ie supine to sit, when in w/c without leg rest in bathroom, lifting LUE to move holding wrist not by 1 finger.        OT Individual Minutes  Time In: 1024  Time Out: 3535  Minutes: 129    Electronically signed by Jett Cortes OT on 5/10/21 at 6:55 PM EDT

## 2021-05-10 NOTE — PROGRESS NOTES
Speech Language Pathology  Facility/Department: [facility]UTE [facility]  Initial Speech/Language/Cognitive Assessment    NAME: Ben Sol  : 1955   MRN: [id number]  ADMISSION DATE: 5/10/2021  ADMITTING DIAGNOSIS: has Pneumonia of left lower lobe due to infectious organism; Tobacco abuse; Legionella pneumonia ([facility] [address] Dysphagia; Hyperplastic colon polyp; Esophageal ring; Gastritis; Elbow effusion, right; Stroke due to occlusion of right middle cerebral artery ([address] Nihss score 15; Acute left hemiparesis ([address] Cerebrovascular accident (CVA) due to occlusion of right middle cerebral artery ([facility] [address] Acute CVA (cerebrovascular accident) [facility]); COPD (chronic obstructive pulmonary disease) ([facility] [address] Essential hypertension; Hyperlipidemia; and Pre-diabetes on their problem list.    Date of Eval: 5/10/2021   Evaluating Therapist: GIRISH Hood    RECENT RESULTS  CT OF HEAD/MRI:    - CT head-   Evolving MCA infarct with no evidence for new infarct.  No evidence for   hemorrhage persistent mass effect on the right lateral ventricle with minimal   midline shift from right to left         Primary Complaint:   Per ARU preadmission assessment:  Rosa Cook is a 63 y. o. female with history of TIA, HTN, HLD, diabetes, COPD/asthma, and esophageal ring admitted to [facility] 2021.       She initially presented with left-sided weakness, left facial droop, and right-sided gaze preference for 1 day.  NIHSS 15.  CT head showed moderate-size acute/subacute stroke in the right MCA territory.   CTA head/neck showed near occlusion of the right MCA M1 segment and severe stenosis of the left vertebral V4 segment.  She underwent mechanical thrombectomy of the right M1 MCA occlusion, which was unsuccessful, on 21 (Dr. Elyse Ivy).  MRI brain showed subacute right MCA distribution infarct, most pronounced in the frontotemporal region and right basal ganglia with a hemorrhagic component and hemosiderin staining involving the right basal ganglia.  PHYLLIS showed EF 55%, no shunt.  Loop recorder was implanted on 5/5/21 (Dr. Meliza Coronado).  Repeat CT head showed extension and hemorrhagic transformation of right MCA infarct.     She currently reports headaches, mildly blurred vision, dizziness, numbness/tingling of the left upper and lower limbs, and left-sided weakness.  She also notes muscle spasms in the left trunk.  She denies any changes in bladder or bowel control.     Patient's 3 daughters are present at bedside and report that the patient will have 24-hour supervision after discharge. Pain:  Pain Assessment  8/10 headache    Assessment:      Diagnosis: Pt. demonstrated mild dysarthria d/t significant L sided labial/lingual weakness, mildly impaired reasoning/problem solving and moderately impaired recall. Pt. demonstrated L sided visual neglect. No overt s/s aspiration demonstrated c thin liquids. Pt. is on thin liquids, pureed solids. Treatment warranted for swallowing, speech and cognition for increased function for home d/c.     Recommendations:  Requires SLP Intervention: Yes             Plan:   Goals:  Short-term Goals  Goal 1: Recall 3-5 units with and without distraction with 90% accuracy  Goal 2: Provide comp. recall strategeis to aide in recall  Goal 3: Complete verbal reasoning tasks, convergent and divergent naming with 90% accuracy  Goal 4: O/M treatment program for dysarthria and dysphagia  Goal 5: Provide comp. strategies for increased speech clarity   Patient/family involved in developing goals and treatment plan: family not present    Subjective:   Previous level of function and limitations: independent        Vision  Vision: Impaired  Vision Exceptions: Wears glasses for reading(L sided neglect)  Hearing  Hearing: Exceptions to [facility]  Hearing Exceptions: Hard of hearing/hearing concerns           Objective:     Oral/Motor  Oral Motor: Exceptions to [facility]  Labial ROM: Reduced left  Labial Symmetry: Abnormal symmetry

## 2021-05-10 NOTE — H&P
Physical Medicine & Rehabilitation History and Physical  [facility] Acute Rehabilitation Unit     Primary care provider: Iram Rodriguez MD     Chief Complaint and Reason for Rehabilitation Admission:   ADL and Mobility deficits secondary to ischemic CVA    History of Present Illness:  Morales Martines  is a 63 y.o. right-handed single    female admitted to the [address] [place] [address] on 5/10/2021. She was originally admitted to [facility] on 5/4/2021.       She initially presented with left-sided weakness, left facial droop, and right-sided gaze preference for 1 day. NIHSS 15.  CT head showed moderate-size acute/subacute stroke in the right MCA territory. CTA head/neck showed near occlusion of the right MCA M1 segment and severe stenosis of the left vertebral V4 segment. She underwent mechanical thrombectomy of the right M1 MCA occlusion, which was unsuccessful, on 5/5/21 (Dr. Devante Martinez). MRI brain showed subacute right MCA distribution infarct, most pronounced in the frontotemporal region and right basal ganglia with a hemorrhagic component and hemosiderin staining involving the right basal ganglia. PHYLLIS showed EF 55%, no shunt. Loop recorder was implanted on 5/5/21 (Dr. Dell Ryan). Repeat CT head showed extension and hemorrhagic transformation of right MCA infarct. She is currently requiring assistance for self-care activities and mobility prompting this admission. She reports headache today, worse with extraocular movements and associated with light sensitivity. No associated N/V. It does respond to Tylenol. Her daughter is present today.      Premorbid function:  Independent    Current Function:  PT:  Restrictions/Precautions: Fall Risk  Implants present? : (Loop recorder)  Other position/activity restrictions: up w/assist; significant L neglect with LUE/LLE deficits   Transfers  Sit to Stand: Maximum Assistance, 2 Person Assistance  Stand to sit: 2 Person Assistance, Maximum Assistance  Bed to Chair: Dependent/Total, 2 Person Assistance(jarvis stedy for transfers.)  Comment: Pt stood with nilson stedy for ~ 20 secs and 25 secs using nilson stedy, therapist assist with L hand  on nilson stedy, no quad contraction noted in standing position. Transfers  Sit to Stand: Maximum Assistance, 2 Person Assistance  Stand to sit: 2 Person Assistance, Maximum Assistance  Bed to Chair: Dependent/Total, 2 Person Assistance(jarvis stedy for transfers.)  Comment: Pt stood with nilson stedy for ~ 20 secs and 25 secs using nilson stedy, therapist assist with L hand  on nilson stedy, no quad contraction noted in standing position. Ambulation  Ambulation?: No                 OT:                          Bed mobility  Bridging: Minimal assistance(to support and facilitate LLE only)  Rolling to Left: Minimal assistance  Rolling to Right: Maximum assistance  Supine to Sit: Maximum assistance(Assist needed for L LE and trunk.)  Sit to Supine: (Left pt in a recliner)  Scooting: Minimal assistance(to support and facilitate LLE to boost up in bed)  Comment: Pt dangles at EOB for 8 to 9 minutes, severe left neglect, and left side lean, Pt able to turn her head to midline withcues and correct left lean with cues and min assist, Pt needs R UE support and min/mod A  to maintain static balance at EOB . SPEECH:  Diagnosis: Pt. demonstrated mild dysarthria d/t significant L sided labial/lingual weakness, mildly impaired reasoning/problem solving and moderately impaired recall. Pt. demonstrated L sided visual neglect. No overt s/s aspiration demonstrated c thin liquids. Pt. is on thin liquids, pureed solids. Treatment warranted for swallowing, speech and cognition for increased function for home d/c.     Past Medical History:      Diagnosis Date    Asthma     [facility]     Diabetes mellitus [facility]     Esophageal ring     Gastritis     Hyperlipidemia  Hyperplastic colon polyp     Hypertension     Osteoarthritis     Patient in clinical research study 05/04/2021    DAXA     TIA (transient ischemic attack) 2013    Per Daughter       Past Surgical History:      Procedure Laterality Date    APPENDECTOMY      COLONOSCOPY  09/20/2017    FRAGMENTS OF HYPERPLASTIC POLYP    ENDOSCOPY, COLON, DIAGNOSTIC      ESOPHAGEAL DILATATION      HYSTERECTOMY      DE COLSC FLX W/RMVL OF TUMOR POLYP LESION SNARE TQ N/A 9/20/2017    COLONOSCOPY POLYPECTOMY COLD BIOPSY performed by Basilia Coronado MD at [address] [place] [address] EGD TRANSORAL BIOPSY SINGLE/MULTIPLE N/A 9/20/2017    EGD BIOPSY with esophageal dilitation performed by Basilia Coronado MD at [facility].  09/2017    E-ring and gastritis        Allergies:    Patient has no known allergies.     Medications   Scheduled Meds:   sodium chloride flush  5-40 mL Intravenous 2 times per day    amLODIPine  5 mg Oral Daily    aspirin  81 mg Oral Daily    atorvastatin  80 mg Oral Daily    budesonide-formoterol  2 puff Inhalation BID    heparin (porcine)  5,000 Units Subcutaneous 3 times per day    insulin lispro  0-3 Units Subcutaneous Nightly    insulin lispro  0-6 Units Subcutaneous TID WC    levothyroxine  25 mcg Oral Daily    nicotine  1 patch Transdermal Daily    [START ON 5/11/2021] polyethylene glycol  17 g Oral Every Other Day    tiotropium  2 puff Inhalation Daily    polyethylene glycol  17 g Oral Daily     Continuous Infusions:   sodium chloride       PRN Meds:.sodium chloride flush, acetaminophen, promethazine **OR** ondansetron, sodium chloride, acetaminophen, albuterol, dextrose, glucagon (rDNA), glucose, traMADol, acetaminophen, senna, bisacodyl     Social History:  Lives With: Alone  Type of Home: House  Home Layout: Two level, Able to Live on Main level with bedroom/bathroom  Home Access: Stairs to enter without rails  Entrance Stairs - Number of Steps: 6  Bathroom Shower/Tub: Tub/Shower unit  Bathroom Toilet: Standard  Home Equipment: (no use of DME at baseline)  ADL Assistance: [address]: Independent  Homemaking Responsibilities: Yes(pt responsible for IADLs)  Ambulation Assistance: Independent  Transfer Assistance: Independent  Active : Yes  Mode of Transportation: SUV  Occupation: Retired  Type of occupation: grocery store employee  Leisure & Hobbies: repurposing items  Social History     Socioeconomic History    Marital status:      Spouse name: Not on file    Number of children: Not on file    Years of education: Not on file    Highest education level: Not on file   Occupational History    Not on file   Social Needs    Financial resource strain: Not on file    Food insecurity     Worry: Not on file     Inability: Not on file   [employer] needs     Medical: Not on file     Non-medical: Not on file   Tobacco Use    Smoking status: Current Every Day Smoker     Packs/day: 0.50     Years: 40.00     Pack years: 20.00     Types: Cigarettes    Smokeless tobacco: Never Used   Substance and Sexual Activity    Alcohol use: No    Drug use: No    Sexual activity: Never   Lifestyle    Physical activity     Days per week: Not on file     Minutes per session: Not on file    Stress: Not on file   Relationships    Social connections     Talks on phone: Not on file     Gets together: Not on file     Attends [employer] service: Not on file     Active member of club or organization: Not on file     Attends meetings of clubs or organizations: Not on file     Relationship status: Not on file    Intimate partner violence     Fear of current or ex partner: Not on file     Emotionally abused: Not on file     Physically abused: Not on file     Forced sexual activity: Not on file   Other Topics Concern    Not on file   Social History Narrative    Not on file       Family History:       Problem Relation Age of Onset    Other Mother     Diabetes Mother     Prostate Cancer Father     Cancer Brother         lining of lung       Diagnostics:     CT HEAD WO CONTRAST   Preliminary Result   1. Right MCA distribution infarct with extension since prior CT of 4 May 2020   and hemorrhagic transformation most significant along the anterior   distribution of the infarct. A hyperdense right MCA sign is present. 2. Attenuation of the right lateral ventricle with minimal leftward shift of   2 mm at the level of the foramen of Monro. 3. Minimal sinus inflammation.           MRI BRAIN WO CONTRAST   Final Result   Subacute right MCA distribution infarct most pronounced in the frontotemporal   region and right basal ganglia with a hemorrhagic component and hemosiderin   staining involving the right basal ganglia.           IR ANGIOGRAM CAROTID C EREBRAL BILATERAL   Final Result       CTA HEAD NECK W CONTRAST   Final Result   Right middle cerebral artery M1 segment occlusion/near occlusion with   decreased size and number of right middle cerebral artery branches in   comparison to the left.       Focal severe stenosis of the left vertebral artery distal V4 segment. Dominant right vertebral artery.       Critical results were called by Dr. aHn Sessions to DR Al Coronel on 5/4/2021   at 20:20.           CT HEAD WO CONTRAST   Final Result   Moderate-sized acute/subacute stroke in the right middle cerebral artery   territory.       No acute intracranial hemorrhage.       Critical results were called by Dr. Han Sessions to DR Al Coronel on 5/4/2021   at 20:04.           CT Head WO Contrast   Final Result   Moderate-size acute/subacute stroke in the right middle cerebral artery   territory involving the right temporal and frontal lobes as well as the right   basal ganglia.   ASPECTS score is 5/10.       No acute intracranial hemorrhage.             CBC:   Recent Labs     05/08/21  0447 05/09/21  0508 05/10/21  0514   WBC 11.3 11.8* 11.9*   RBC 4.46 4.56 4.67   HGB 13.2 13.3 13.7   HCT 42.5 43.9 44.2   MCV 95.3 96.3 94.6   RDW 13.4 13.5 13.5    357 362     BMP:   Recent Labs     05/08/21  0447 05/09/21  0508 05/10/21  0514    137 143   K 3.8 3.7 3.9    104 107   CO2 24 25 24   BUN 13 18 20   CREATININE 0.45* 0.46* 0.50   GLUCOSE 122* 117* 113*     HbA1c:   Lab Results   Component Value Date    LABA1C 5.9 05/05/2021     BNP: No results for input(s): BNP in the last 72 hours. PT/INR: No results for input(s): PROTIME, INR in the last 72 hours. APTT: No results for input(s): APTT in the last 72 hours. CARDIAC ENZYMES: No results for input(s): CKMB, CKMBINDEX, TROPONINT in the last 72 hours. Invalid input(s): CKTOTAL;3  FASTING LIPID PANEL:  Lab Results   Component Value Date    CHOL 206 (H) 05/05/2021    HDL 29 (L) 05/05/2021    TRIG 185 (H) 05/05/2021     LIVER PROFILE: No results for input(s): AST, ALT, ALB, BILIDIR, BILITOT, ALKPHOS in the last 72 hours. Review of Systems:  CONSTITUTIONAL:  Denies fevers, chills, sweats or fatigue. EYES:  Denies diplopia, blind spots, blurring. HEENT:  Denies hearing loss, trouble chewing or swallowing. RESPIRATORY:  No wheezing, coughing, shortness of breath. CARDIOVASCULAR:  Denies chest pain, palpitations, lightheadedness. GASTROINTESTINAL:  Denies heartburn, nausea, constipation, diarrhea, abdominal pain. GENITOURINARY:  No urgency, frequency, incontinence, dysuria. ENDOCRINE:  Denies hot or cold intolerance. MUSCULOSKELETAL:  Denies focal joint pain, back pain, neck pain. NEUROLOGICAL:  Denies focal numbness, tingling, balance loss, headache. BEHAVIOR/PSYCH:  Denies memory loss, insomnia. Reports some depressed mood and anxiety. SKIN:  No ulcers, rash, bruises. Physical Exam:  /88   Pulse 82   Temp 98.4 °F (36.9 °C) (Oral)   Resp 19   Ht 5' 8\" (1.727 m)   Wt 191 lb 8 oz (86.9 kg)   SpO2 93%   BMI 29.12 kg/m²     GEN: Well developed, well nourished, in NAD  HEENT:  NCAT. PERRL. EOMI. Mucous membranes pink and moist.   PULM:  Clear to ausculation. No rales or rhonchi. Respirations WNL and unlabored. CV:  Regular rate rhythm. No murmurs or gallops. GI:  Abdomen soft. Nontender. Non-distended. BS + and equal.    NEUROLOGICAL: A&O x3. Sensation intact to light touch. DTRs 2+. Right gaze preference. Impaired L CN VII.   MSK:  Functional ROM RUE and RLEs. Impaired AROM LUE and LLE. Ej Shafer Motor testing 4+/5 key muscles RUE and RLE. L shoulder shrug 1/5, L hip flexion/knee extension 1/5. Distal LUE and LLE muscles 0/5. SKIN: Warm dry and intact. Good turgor. EXTREMITIES:  No calf tenderness to palpation. No edema BLEs. Ej Shafer PSYCH: Mood WNL. Appropriately interactive. Affect WNL. Principal Diagnosis/plan:  The patient is a 63y.o. year old with ADL and Mobility deficits secondary to ischemic CVA with hemorrhagic conversion    She will require close medical monitoring for the comorbidities listed below. She will benefit from intensive interdisciplinary therapies and rehab nursing care and is appropriate for inpatient rehabilitation. The post admission physician evaluation (KOREY) is consistent with the pre-admission assessment. See above findings to reflect the elements required in the KOREY. Patient's admitting condition is consistent with the findings of the preadmission assessment by the rehabilitation admissions coordinator. Diagnoses/plan:    1. Ischemic R MCA CVA with hemorrhagic conversion and L non-dominant hemiparesis:  PT/OT for gait, mobility, strengthening, endurance, ADLs, and self care. On ASA, atorvastatin. 2. Headache: Has Tylenol prn headache. Will monitor and consider adding amitriptyline if needed. 3. Cognitive impairment: SLP treating  4. Dysphagia/aphasia: SLP treating. On thickened liquids. 5. HTN/Hyperlipidemia: on amlodipine  6. DM: on insulin sliding scale  7. COPD/asthma: on albuterol nebulizers prn, on symbicort BID, spiriva  8.  Esophageal Ring: Will monitor dysphagia  9. Hypothyroidism: on levothyroxine  10. Nicotine dependence: on Nicoderm  11. Bowel Management: Miralax daily, senokot prn, dulcolax prn. 12. DVT Prophylaxis:  heparin, SCD's while in bed and MAXIM's   13. Internal medicine for medical management      Estimated Length of Stay:  2 weeks. Prognosis  fair    Goals    Home at Minimal Assist  Supervision at Discharge: Romie Lux MD     This note is created with the assistance of a speech recognition program.  While intending to generate a document that actually reflects the content of the visit, the document can still have some errors including those of syntax and sound a like substitutions which may escape proof reading. In such instances, actual meaning can be extrapolated by contextual diversion.

## 2021-05-10 NOTE — PROGRESS NOTES
[address]  Acute Inpatient Rehab Preadmission Assessment    Patient Name: Lanie Vigil        MRN: [id number]    : 1955  (66 y.o.)  Gender: female     Admitted from:   [facility]  [x][facility]   [][facility]   [][facility]    []Outside Admission - Location:                                 []Initial         [x]Updated    Date of Onset / Admission to the acute hospital:  21    Inpatient Rehabilitation Admitting Diagnosis:  CVA    Did patient have surgery/procedures? []No  [x]Yes    Procedure Performed: mechanical thrombectomy    Loop Recorder    Transesophageal Echocardiogram      Physicians: Raman Muñoz    Risk for clinical complications:  High    Co-morbidities:     1. Left hemiparesis  2. Cognitive impairment  3. Dysphagia, on pureed solids and nectar-thick liquids  4. Possible mild expressive aphasia  5. History of TIA  6. HTN  7. HLD  8. Diabetes  9. COPD, asthma  10. Esophageal ring  11. Obesity    Financial Information  Primary insurance:  []Medicare [] Medicare HMO  []Commercial insurance    []Medicaid   [] Medicaid HMO []Workers Compensation   [x]Personal Pay    Secondary Insurance:  []Medicare [] Medicare HMO  []Commercial insurance    []Medicaid  []Medicaid HMO  []Workers Compensation [x]None    Precautions:   []Cardiac Precautions:    []Total hip precautions:    []Weight Bearing status:  [x]Safety Precautions/Concerns:  Fall Risk, General Precautions  [x]Visually impaired: Wears glasses at all times. Pt demo'd R gaze and L neglect.  Pt able to track visual target from R side to midline of each eye, (pt's head never crossed midline towards L side either) demo'd convergence in B eyes    [x]Hard of Hearing: Hard of hearing/hearing concerns(chronic hearing loss in L ear)     Isolation Precautions:         []Yes  [x]No  If Yes:  [] Droplet  []Contact []Airborne     []VRE     []MRSA     []C-diff         [] TB       [] ESBL [] MDRO          [] Other:        Physiatrist:  []      []   Dr. Rodri Durham  [x]   Dr. Qasim Nina  []   Dr. Estevan Roman    Patients Occupation: Retired    Reviewed Lab and Diagnostic reports from Current Admission: Yes    Patients Prior Functional  Level: Prior Function  ADL Assistance: Independent  Homemaking Assistance: Independent  Ambulation Assistance: Independent  Transfer Assistance: Independent    History of current illness:  Maribell Florentino is a 63 y.o. female with history of TIA, HTN, HLD, diabetes, COPD/asthma, and esophageal ring admitted to [facility] on 5/4/2021.       She initially presented with left-sided weakness, left facial droop, and right-sided gaze preference for 1 day. NIHSS 15.  CT head showed moderate-size acute/subacute stroke in the right MCA territory. CTA head/neck showed near occlusion of the right MCA M1 segment and severe stenosis of the left vertebral V4 segment. She underwent mechanical thrombectomy of the right M1 MCA occlusion, which was unsuccessful, on 5/5/21 (Dr. Di Cobb). MRI brain showed subacute right MCA distribution infarct, most pronounced in the frontotemporal region and right basal ganglia with a hemorrhagic component and hemosiderin staining involving the right basal ganglia. PHYLLIS showed EF 55%, no shunt. Loop recorder was implanted on 5/5/21 (Dr. Dominga Bhardwaj). Repeat CT head showed extension and hemorrhagic transformation of right MCA infarct.     She currently reports headaches, mildly blurred vision, dizziness, numbness/tingling of the left upper and lower limbs, and left-sided weakness. She also notes muscle spasms in the left trunk.   She denies any changes in bladder or bowel control.     Patient's 3 daughters are present at bedside and report that the patient will have 24-hour supervision after discharge.       Current functional status for upper extremity ADLs:  UE Bathing: Verbal cueing, Increased time to complete, Moderate assistance  UE Dressing: Setup, Moderate assistance, Increased time to complete, Verbal cueing    Current functional status for lower extremity ADLs:  LE Bathing: Setup, Maximum assistance  LE Dressing: Setup, Moderate assistance    Current functional status for bed, chair, wheelchair transfers:  Transfers  Sit to Stand: Maximum Assistance(partial , from EOB with with R lateral scooting & 5 reps)  Stand to sit: Maximum Assistance  Comment: pt stood @ 15 seconds from EOB with max assistx1 & max v.c's. pt unable to fully upright posture d/t felxed hips. Current functional status for toilet transfers:  Maximum Assistance       Current functional status for locomotion:  Ambulation  Ambulation?: No    Current functional status for comprehension: Minimal contact assistance    Current functional status for expression: Minimal contact assistance    Current functional status for social interaction: Minimal contact assistance    Current functional status for problem solving:  Moderate assistance    Current functional status for memory: Minimal contact assistance    Current Deficits R/T Impairment: Impaired Functional Mobility and Decreased ADLs    Required Therapy:   [x] Physical Therapy  [x] Occupational Therapy   [x] Speech Therapy, as appropriate    Additional Services:  [x]   [] Recreational Therapy, as appropriate  [x] Nutrition  [] Dialysis  [] Other:     Rehab Justification:  Needs 3 hrs therapy per day or 15 hours per week:  Yes  Identified Rehab Nursing needs: Yes  Intense Interdisciplinary need:  Yes  Need for 24 hr physician supervision:  Yes  Measurable improved quality of life:  Yes  Willingness to participate:  Yes  Medical Necessity:  Yes  Patient able to tolerate care proposed:  Yes    Expected Discharge Destination/Functional Level:  Home with assist  Expected length of time to achieve that level of improvement: 1-2 weeks  Expected Post Discharge Treatments: Home with possible Home Care    Other information relevant to the care needs:  n/a    Acute Inpatient Rehabilitation Disclosure Statement will be provided to patient upon admission to ARU with patient's verbalization of understanding. I have reviewed and concur with the findings and results of the pre-admission screening assessment completed by the Inpatient Rehabilitation Admissions Coordinator.

## 2021-05-11 LAB
ABSOLUTE EOS #: 0.3 K/UL (ref 0–0.4)
ABSOLUTE IMMATURE GRANULOCYTE: ABNORMAL K/UL (ref 0–0.3)
ABSOLUTE LYMPH #: 2.1 K/UL (ref 1–4.8)
ABSOLUTE MONO #: 0.8 K/UL (ref 0.1–1.3)
ANION GAP SERPL CALCULATED.3IONS-SCNC: 7 MMOL/L (ref 9–17)
BASOPHILS # BLD: 0 % (ref 0–2)
BASOPHILS ABSOLUTE: 0 K/UL (ref 0–0.2)
BUN BLDV-MCNC: 20 MG/DL (ref 8–23)
BUN/CREAT BLD: ABNORMAL (ref 9–20)
CALCIUM SERPL-MCNC: 9.5 MG/DL (ref 8.6–10.4)
CHLORIDE BLD-SCNC: 103 MMOL/L (ref 98–107)
CO2: 28 MMOL/L (ref 20–31)
CREAT SERPL-MCNC: 0.58 MG/DL (ref 0.5–0.9)
DIFFERENTIAL TYPE: ABNORMAL
EOSINOPHILS RELATIVE PERCENT: 2 % (ref 0–4)
GFR AFRICAN AMERICAN: >60 ML/MIN
GFR NON-AFRICAN AMERICAN: >60 ML/MIN
GFR SERPL CREATININE-BSD FRML MDRD: ABNORMAL ML/MIN/{1.73_M2}
GFR SERPL CREATININE-BSD FRML MDRD: ABNORMAL ML/MIN/{1.73_M2}
GLUCOSE BLD-MCNC: 111 MG/DL (ref 65–105)
GLUCOSE BLD-MCNC: 125 MG/DL (ref 65–105)
GLUCOSE BLD-MCNC: 125 MG/DL (ref 65–105)
GLUCOSE BLD-MCNC: 147 MG/DL (ref 65–105)
GLUCOSE BLD-MCNC: 163 MG/DL (ref 70–99)
HCT VFR BLD CALC: 40.4 % (ref 36–46)
HEMOGLOBIN: 13.6 G/DL (ref 12–16)
IMMATURE GRANULOCYTES: ABNORMAL %
LYMPHOCYTES # BLD: 16 % (ref 24–44)
MCH RBC QN AUTO: 30.8 PG (ref 26–34)
MCHC RBC AUTO-ENTMCNC: 33.7 G/DL (ref 31–37)
MCV RBC AUTO: 91.3 FL (ref 80–100)
MONOCYTES # BLD: 6 % (ref 1–7)
NRBC AUTOMATED: ABNORMAL PER 100 WBC
PDW BLD-RTO: 14.3 % (ref 11.5–14.9)
PLATELET # BLD: 389 K/UL (ref 150–450)
PLATELET ESTIMATE: ABNORMAL
PMV BLD AUTO: 7.6 FL (ref 6–12)
POTASSIUM SERPL-SCNC: 4.1 MMOL/L (ref 3.7–5.3)
RBC # BLD: 4.42 M/UL (ref 4–5.2)
RBC # BLD: ABNORMAL 10*6/UL
SEG NEUTROPHILS: 76 % (ref 36–66)
SEGMENTED NEUTROPHILS ABSOLUTE COUNT: 10.4 K/UL (ref 1.3–9.1)
SODIUM BLD-SCNC: 138 MMOL/L (ref 135–144)
WBC # BLD: 13.6 K/UL (ref 3.5–11)
WBC # BLD: ABNORMAL 10*3/UL

## 2021-05-11 PROCEDURE — 85025 COMPLETE CBC W/AUTO DIFF WBC: CPT

## 2021-05-11 PROCEDURE — 97112 NEUROMUSCULAR REEDUCATION: CPT

## 2021-05-11 PROCEDURE — 97110 THERAPEUTIC EXERCISES: CPT

## 2021-05-11 PROCEDURE — 97542 WHEELCHAIR MNGMENT TRAINING: CPT

## 2021-05-11 PROCEDURE — 97530 THERAPEUTIC ACTIVITIES: CPT

## 2021-05-11 PROCEDURE — 6370000000 HC RX 637 (ALT 250 FOR IP): Performed by: PHYSICAL MEDICINE & REHABILITATION

## 2021-05-11 PROCEDURE — 6360000002 HC RX W HCPCS: Performed by: STUDENT IN AN ORGANIZED HEALTH CARE EDUCATION/TRAINING PROGRAM

## 2021-05-11 PROCEDURE — 92526 ORAL FUNCTION THERAPY: CPT

## 2021-05-11 PROCEDURE — 82947 ASSAY GLUCOSE BLOOD QUANT: CPT

## 2021-05-11 PROCEDURE — 80048 BASIC METABOLIC PNL TOTAL CA: CPT

## 2021-05-11 PROCEDURE — 6370000000 HC RX 637 (ALT 250 FOR IP): Performed by: STUDENT IN AN ORGANIZED HEALTH CARE EDUCATION/TRAINING PROGRAM

## 2021-05-11 PROCEDURE — 99232 SBSQ HOSP IP/OBS MODERATE 35: CPT | Performed by: INTERNAL MEDICINE

## 2021-05-11 PROCEDURE — 99232 SBSQ HOSP IP/OBS MODERATE 35: CPT | Performed by: PHYSICAL MEDICINE & REHABILITATION

## 2021-05-11 PROCEDURE — 1180000000 HC REHAB R&B

## 2021-05-11 PROCEDURE — 36415 COLL VENOUS BLD VENIPUNCTURE: CPT

## 2021-05-11 PROCEDURE — 97129 THER IVNTJ 1ST 15 MIN: CPT

## 2021-05-11 PROCEDURE — 97535 SELF CARE MNGMENT TRAINING: CPT

## 2021-05-11 RX ORDER — POLYVINYL ALCOHOL 14 MG/ML
1 SOLUTION/ DROPS OPHTHALMIC PRN
Status: DISCONTINUED | OUTPATIENT
Start: 2021-05-11 | End: 2021-06-10 | Stop reason: HOSPADM

## 2021-05-11 RX ORDER — ACETAMINOPHEN 500 MG
1000 TABLET ORAL EVERY 8 HOURS SCHEDULED
Status: DISCONTINUED | OUTPATIENT
Start: 2021-05-11 | End: 2021-06-10 | Stop reason: HOSPADM

## 2021-05-11 RX ADMIN — POLYETHYLENE GLYCOL 3350 17 G: 17 POWDER, FOR SOLUTION ORAL at 07:01

## 2021-05-11 RX ADMIN — ACETAMINOPHEN 650 MG: 325 TABLET, FILM COATED ORAL at 18:18

## 2021-05-11 RX ADMIN — ASPIRIN 81 MG CHEWABLE TABLET 81 MG: 81 TABLET CHEWABLE at 07:01

## 2021-05-11 RX ADMIN — TRAMADOL HYDROCHLORIDE 25 MG: 50 TABLET, FILM COATED ORAL at 05:31

## 2021-05-11 RX ADMIN — INSULIN LISPRO 1 UNITS: 100 INJECTION, SOLUTION INTRAVENOUS; SUBCUTANEOUS at 22:48

## 2021-05-11 RX ADMIN — HEPARIN SODIUM 5000 UNITS: 5000 INJECTION INTRAVENOUS; SUBCUTANEOUS at 16:46

## 2021-05-11 RX ADMIN — ATORVASTATIN CALCIUM 80 MG: 80 TABLET, FILM COATED ORAL at 07:01

## 2021-05-11 RX ADMIN — Medication 2 PUFF: at 07:02

## 2021-05-11 RX ADMIN — HEPARIN SODIUM 5000 UNITS: 5000 INJECTION INTRAVENOUS; SUBCUTANEOUS at 07:04

## 2021-05-11 RX ADMIN — LEVOTHYROXINE SODIUM 25 MCG: 0.03 TABLET ORAL at 05:28

## 2021-05-11 RX ADMIN — ACETAMINOPHEN 650 MG: 325 TABLET, FILM COATED ORAL at 12:38

## 2021-05-11 RX ADMIN — Medication 2 PUFF: at 21:50

## 2021-05-11 RX ADMIN — ACETAMINOPHEN 1000 MG: 500 TABLET, FILM COATED ORAL at 21:56

## 2021-05-11 RX ADMIN — AMLODIPINE BESYLATE 5 MG: 5 TABLET ORAL at 07:01

## 2021-05-11 RX ADMIN — HEPARIN SODIUM 5000 UNITS: 5000 INJECTION INTRAVENOUS; SUBCUTANEOUS at 03:16

## 2021-05-11 ASSESSMENT — PAIN SCALES - GENERAL
PAINLEVEL_OUTOF10: 6
PAINLEVEL_OUTOF10: 8
PAINLEVEL_OUTOF10: 4

## 2021-05-11 ASSESSMENT — PAIN DESCRIPTION - LOCATION: LOCATION: SHOULDER

## 2021-05-11 ASSESSMENT — PAIN DESCRIPTION - ORIENTATION: ORIENTATION: LEFT

## 2021-05-11 ASSESSMENT — PAIN SCALES - WONG BAKER: WONGBAKER_NUMERICALRESPONSE: 6

## 2021-05-11 NOTE — PLAN OF CARE
Problem: Pain:  Goal: Pain level will decrease  Description: Pain level will decrease  Outcome: Ongoing   Pt had no c/o pain this shift    Problem: Skin Integrity:  Goal: Will show no infection signs and symptoms  Description: Will show no infection signs and symptoms  Outcome: Ongoing   No new skin breakdown noted this shift    Problem: Falls - Risk of:  Goal: Will remain free from falls  Description: Will remain free from falls  Outcome: Ongoing   Pt assessed as a fall risk this shift. Remains free from falls and accidental injury at this time. Fall precautions in place, including falling star sign and fall risk band on pt. Floor free from obstacles, and bed is locked and in lowest position. Adequate lighting provided. Pt encouraged to call before getting OOB for any need. Bed alarm activated. Will continue to monitor needs during hourly rounding, and reinforce education on use of call light.

## 2021-05-11 NOTE — CONSULTS
[facility]    CONSULTATION    / FOLLOW UP VISIT       Date:   5/11/2021  Patient name:  Kareem Mccormick  Date of admission:  5/10/2021  7:13 AM  MRN:   [id number]  Account:  [de-identified]  YOB: 1955  PCP:    Marcin Cobb MD  Room:   0024/3381-24  Code Status:    Full Code    Physician Requesting Consult: Nico Villanueva MD    History of Present Illness:      C/C ;  Medical comorbidity management     Subjective:  Patient seen and examined at bedside. Resting comfortably in bed in no acute distress. No change in left-sided weakness. Afebrile, hemodynamically stable, saturating well on room air. Leukocytosis continues to uptrend 13.6. We will continue to monitor. Patient eating appropriately, still no bowel movements. Continues to work with PT/OT. HPI:   Patient is a 61yo female with PMH DM, COPD, gastritis who presented with CVA. Patient last known well at 10pm on 5/3 and woke up with left sided weakness, left sided facial droop and right gaze deviation. CTA of head and neck showed right MCA M1 occlusion with collateralization. Patient underwent cerebral angiogram with unsuccessful thrombectomy. MRI brain showed right MCA stroke with hemorrhagic component. PHYLLIS at that time did not show any intramural thrombus. Loop recorder placed by Dr. Anamika Negrete. Repeat head CT showing extension and hemorrhagic transformation of right MCA infarct. Social History:     Tobacco:    reports that she has been smoking cigarettes. She has a 20.00 pack-year smoking history. She has never used smokeless tobacco.  Alcohol:      reports no history of alcohol use. Drug Use:  reports no history of drug use.     Review of Systems:     Review of Systems   Review of Systems - General ROS: negative for - chills, fatigue, fever, night sweats, weight gain or weight loss  ENT ROS: positive for headaches, negative for oral lesions or sore throat  Respiratory ROS: no cough, shortness of breath, or wheezing  Cardiovascular ROS: no chest pain or dyspnea on exertion  Gastrointestinal ROS: no abdominal pain, change in bowel habits, or black or bloody stools  Neurological ROS: positive weakness       Physical Exam:     Physical Exam   Vitals:    05/10/21 1113 05/10/21 1230 05/10/21 1850 05/11/21 0645   BP:   (!) 143/77 134/83   Pulse:   91 88   Resp:   16 18   Temp:   98.5 °F (36.9 °C) 98.6 °F (37 °C)   TempSrc:    Oral   SpO2:   95%    Weight:  191 lb 8 oz (86.9 kg)     Height: 5' 8\" (1.727 m) 5' 8\" (1.727 m)                     Body mass index is 29.12 kg/m². General Appearance:   - cooperative,   Alert and oriented x3                                                    Pulmonary/Chest:        Clear to auscultation bilaterally . No wheezes, rales or rhonchi . Cardiovascular:            Normal rate, regular rhythm,                                          No murmur or  Gallop . Abdomen:                       Soft, non-tender                                                                                    Extremities:                   0/5 strength left upper extremity, 1/5 left lower extremity. Data:     URINE ANALYSIS: No results found for: LABURIN     CBC:  Lab Results   Component Value Date    WBC 13.6 05/11/2021    HGB 13.6 05/11/2021     05/11/2021        BMP:    Lab Results   Component Value Date     05/11/2021    K 4.1 05/11/2021     05/11/2021    CO2 28 05/11/2021    BUN 20 05/11/2021    CREATININE 0.58 05/11/2021    GLUCOSE 163 05/11/2021      LIVER PROFILE:  Lab Results   Component Value Date    ALT 13 10/16/2017    AST 13 10/16/2017    PROT 6.9 10/16/2017    BILITOT 0.22 10/16/2017    BILIDIR 0.11 10/16/2017    LABALBU 4.0 10/16/2017             Radiology:         Medications:      Allergies:  No Known Allergies    Current Meds:   Scheduled Meds:    sodium plan and orders as documented by the resident.     ---- ;     [address] [place] [address], [address] [place] [address].    Phone [phone]   Fax: [phone]  Answering Service: [phone]

## 2021-05-11 NOTE — PLAN OF CARE
Nutrition Problem #1: Inadequate oral intake  Intervention: Food and/or Nutrient Delivery: Modify Current Diet, Start Oral Nutrition Supplement  Nutritional Goals: PO intake % of meals and supplements

## 2021-05-11 NOTE — PROGRESS NOTES
Physical Therapy  [address]  Acute Rehabilitation Physical Therapy Progress Note    Date: 21  Patient Name: Mable Duron       Room: 6954/1532-76  MRN: [id number]   Account: [de-identified]   : 1955  (63 y.o.) Gender: female      Diagnosis: ischemic CVA,  subacute right MCA distribution infarct, L lynette, dysphagia, L neglect, loop recorder 21 (Dr. Castro Chin)  Past Medical History:  has a past medical history of Asthma, COPD (chronic obstructive pulmonary disease) ([facility] [address] Diabetes mellitus ([facility] [address] Esophageal ring, Gastritis, Hyperlipidemia, Hyperplastic colon polyp, Hypertension, Osteoarthritis, Patient in clinical research study, and TIA (transient ischemic attack). Past Surgical History:   has a past surgical history that includes Appendectomy; Hysterectomy; Esophagus dilation; Colonoscopy (2017); Endoscopy, colon, diagnostic; pr egd transoral biopsy single/multiple (N/A, 2017); pr colsc flx w/rmvl of tumor polyp lesion snare tq (N/A, 2017); and Upper gastrointestinal endoscopy (2017). Restrictions/Precautions  Restrictions/Precautions: Fall Risk  Required Braces or Orthoses?: No  Implants present? : (Loop recorder)  Position Activity Restriction  Other position/activity restrictions: significant L neglect with LUE/LLE deficits    Subjective: Pt c/o continued pain in L shoulder, feels as though L LE is breaking when she stands (more tolerable in PM)  Comments: Pt drowsy AM (worse) & PM, requires frequent redirection. Difficulty hearing or attending instruction. Vital Signs  Patient Currently in Pain: Yes  Pain Assessment: Faces  Lara-Baker Pain Rating: Hurts even more  Pain Location: Shoulder  Pain Orientation: Left  Non-Pharmaceutical Pain Intervention(s): Repositioned; Rest  Response to Pain Intervention: None;Patient Satisfied    Bed Mobility   Bridging: Minimal assistance(to support and facilitate LLE only)  Sit to Supine:  Moderate assistance(L LE & trunk adjustment)  Scooting: Minimal assistance(to support and facilitate LLE to bridge/lateral scoot)  Comment: Flat bed, 2 pillows, rail    Transfers  Sit to Stand: Moderate Assistance;2 Person Assistance(Jacqueline Harrison, less in parallel bars)  Stand to sit: Moderate Assistance;2 Person Assistance(with cues, improved control at end of day)  Stand pivot transfers/bed to chair transfers: dependent/totat [place])    Wheelchair Activities  Propulsion: Yes  Propulsion 1  Propulsion: Manual  Level: Level Tile  Method: RUE;RLE  Level of Assistance: Maximum assistance  Description/ Details: Veering L initially; educated Pt to pull with UE & LE to turn R. For straight path, Pt demonstrating some difficulty coordinating movement as she repeatedly reverted to UE/LE pulling despite instruction for UE push/LE pull for straight path propulsion; Mod-Max A for straight path due to veering L with R-sided propulsion. Pt able to perform large-radius 180º turn with cueing. Distance: 48'    Balance   Posture: Poor  Sitting - Static: Poor;+(sitting edge of bed, R UE support, no back support)  Sitting - Dynamic: Poor(sitting edge of bed, R UE support, no back support)  Standing - Static: Poor(parallel bars)     Exercises   Other exercises?: Yes  Other exercises 3: bed mobility  Other exercises 4: Seated bilateral LE exercises, 1.5# R LE, passive ROM L LE, 15x each  Other exercises 5: Seated reaching outside base of support and across midline, addressing L neglect, 3 min. Other exercises 6: Sit to stand: 3x in [place], 4x in parallel bars, Mod/Max A x2(Max/dependent to extend L knee & hip)  Other exercises 7: Standing tolerance, 1 min.  in parallel bars with Mod A x2(Mirror feedback for posture/to address L neglect c daughter)    Activity Tolerance: Patient limited by fatigue, Patient limited by endurance    Patient education  New Education Provided:  Stroke recovery/neuro re-education  Learner:family and patient  Method: explanation       Outcome: acknowledged understanding of     Current Treatment Recommendations: Strengthening, Balance Training, Functional Mobility Training, Transfer Training, Endurance Training, Wheelchair Mobility Training, Gait Training, Stair training, Neuromuscular Re-education, Home Exercise Program, Safety Education & Training, Patient/Caregiver Education & Training, Modalities, Positioning    Conditions Requiring Skilled Therapeutic Intervention  Body structures, Functions, Activity limitations: Decreased functional mobility ; Decreased strength;Decreased safe awareness;Decreased endurance;Decreased balance;Decreased posture;Decreased coordination;Decreased vision/visual deficit; Decreased fine motor control  Assessment: Improvement AM to PM in sit to stand technique. REQUIRES PT FOLLOW UP: Yes  Discharge Recommendations: Patient would benefit from continued therapy after discharge;Home with assist PRN    Goals  Short term goals  Time Frame for Short term goals: 10 days  Short term goal 1: Pt able to roll in bed side to side at min A without bed rail  Short term goal 2: Pt able to perform supine<>sit at mod A  Short term goal 3: Pt able to perform sit<>stand at min/mod A . Short term goal 4: Pt able to progress to pivot transfers at max A   Short term goal 5: Pt able to improve sitting balance at EOB/EOM at SBA for static balance and able to track to left side as well as turn to head to left side to scan her environment with minimal cues.    Short term goal 6: Pt able to tolerate standing in // bars with R UE support and assist at mod A  Short term goal 7: Pt able to propel w/c with R UE/R LE distance of 50 to 80 ft, straight path and turn around 180 degrees, at min/mod A   Long term goals  Time Frame for Long term goals : By DC  Long term goal 1: Pt able to roll in bed mod-I  Long term goal 2: Pt able to perform supine <> sit at CGA/min A  Long term goal 3: Pt able to perform pivot transfers min A / mod A and able to scan Left side environment. Long term goal 4: Pt able to ambulate in // bars, or with appropriate assistive device and/or LE bracing, distance of  20 to 30 ft, min/mod A x 2. Long term goal 5: Pt able to propel w/c on level surfaces distance 100 to 150 ft, SBA/CGA. Long term goal 6: Educate pt/family in safe technique for mobility and  to go up and down steps to enter/exit home. Long term goal 7: Improve sitting balance at EOM to good, and standing balance with R UE support to fair- to reduce fall risk. Long term goal 8: Improve PASS score to 20/36 improve overall function.         05/11/21 0833 05/11/21 1356   PT Individual Minutes   Time In [id number]   Time Out 0933 1416   Minutes 60 31     Electronically signed by Robi Benítez PTA on 5/11/21 at 4:44 PM EDT

## 2021-05-11 NOTE — CARE COORDINATION
Julita Zuniga MD   Physician   Physical Medicine and Rehabilitation   Progress Notes   Signed   Date of Service:  5/10/2021  7:00 AM         Related encounter: ED to Hosp-Admission (Discharged) from 2021 in 1 [facility]  Acute Inpatient Rehab Preadmission Assessment     Patient Name: Ray Reich        MRN:   [id number]    : 1955  [de-identified]63 y.o.)  Gender: female      Admitted from:   []?[facility]  [x]? [facility]   []? [facility]   []? [facility]   []? Outside Admission - Location:                                 []?Initial         [x]? Updated     Date of Onset / Admission to the acute hospital:  21     Inpatient Rehabilitation Admitting Diagnosis:  CVA     Did patient have surgery/procedures? []? No  [x]? Yes     Procedure Performed: mechanical thrombectomy     Loop Recorder     Transesophageal Echocardiogram        Physicians: Juancho Gibbons     Risk for clinical complications:  High     Co-morbidities:      1. Left hemiparesis  2. Cognitive impairment  3. Dysphagia, on pureed solids and nectar-thick liquids  4. Possible mild expressive aphasia  5. History of TIA  6. HTN  7. HLD  8. Diabetes  9. COPD, asthma  10. Esophageal ring  11. Obesity     Financial Information  Primary insurance:  []? Medicare     []? Medicare HMO      []? [employer]    []? Medicaid      []? Medicaid HMO       []? Workers Compensation        [x]? Personal Pay     Secondary Insurance:  []? Medicare     []? Medicare HMO      []? [employer]    []? Medicaid      []? Medicaid HMO        []? Workers Compensation      [x]? None     Precautions:   []? Cardiac Precautions:            []? Total hip precautions:           []? Weight Bearing status:  [x]? Safety Precautions/Concerns:  Fall Risk, General Precautions  [x]? Visually impaired: Wears glasses at all times. Pt demo'd R gaze and L neglect.  Pt able to track visual target from R side to midline of each eye, (pt's head never crossed midline towards L side either) demo'd convergence in B eyes                          [x]? Hard of Hearing: Hard of hearing/hearing concerns(chronic hearing loss in L ear)      Isolation Precautions:         []? Yes              [x]? No  If Yes:   []? Droplet  []? Contact           []? Airborne     []? VRE     []? MRSA        []? C-diff         []? TB             []? ESBL         []? MDRO          []? Other:                        Physiatrist:  []?      []? Dr. Tyrell Farrell  [x]? Dr. Mague Jones  []? Dr. Elzbieta Chong     Patients Occupation: Retired     Reviewed Lab and Diagnostic reports from Current Admission: Yes     Patients Prior Functional  Level: Prior Function  ADL Assistance: Independent  Homemaking Assistance: Independent  Ambulation Assistance: Independent  Transfer Assistance: Independent     History of current illness:  Kimberly Mc a 63 y. o. female with history of TIA, HTN, HLD, diabetes, COPD/asthma, and esophageal ring admitted to [facility] 5/4/2021.       She initially presented with left-sided weakness, left facial droop, and right-sided gaze preference for 1 day.  NIHSS 15.  CT head showed moderate-size acute/subacute stroke in the right MCA territory.  CTA head/neck showed near occlusion of the right MCA M1 segment and severe stenosis of the left vertebral V4 segment.  She underwent mechanical thrombectomy of the right M1 MCA occlusion, which was unsuccessful, on 5/5/21 (Dr. Sharifa Nolan).  MRI brain showed subacute right MCA distribution infarct, most pronounced in the frontotemporal region and right basal ganglia with a hemorrhagic component and hemosiderin staining involving the right basal ganglia.  PHYLLIS showed EF 55%, no shunt.  Loop recorder was implanted on 5/5/21 (Dr. Andreina Tracey).   Repeat CT head showed extension and hemorrhagic transformation of right MCA infarct.     She currently reports headaches, mildly blurred vision, dizziness, numbness/tingling of the left upper and lower limbs, and left-sided weakness.  She also notes muscle spasms in the left trunk.  She denies any changes in bladder or bowel control.     Patient's 3 daughters are present at bedside and report that the patient will have 24-hour supervision after discharge.        Current functional status for upper extremity ADLs:  UE Bathing: Verbal cueing, Increased time to complete, Moderate assistance  UE Dressing: Setup, Moderate assistance, Increased time to complete, Verbal cueing     Current functional status for lower extremity ADLs:  LE Bathing: Setup, Maximum assistance  LE Dressing: Setup, Moderate assistance     Current functional status for bed, chair, wheelchair transfers:  Transfers  Sit to Stand: Maximum Assistance(partial , from EOB with with R lateral scooting & 5 reps)  Stand to sit: Maximum Assistance  Comment: pt stood @ 15 seconds from EOB with max assistx1 & max v.c's. pt unable to fully upright posture d/t felxed hips.     Current functional status for toilet transfers:  Maximum Assistance     Current functional status for locomotion:  Ambulation  Ambulation?: No     Current functional status for comprehension: Minimal contact assistance     Current functional status for expression: Minimal contact assistance     Current functional status for social interaction: Minimal contact assistance     Current functional status for problem solving: Moderate assistance     Current functional status for memory: Minimal contact assistance     Current Deficits R/T Impairment: Impaired Functional Mobility and Decreased ADLs     Required Therapy:   [x]? Physical Therapy  [x]? Occupational Therapy   [x]? Speech Therapy, as appropriate     Additional Services:  [x]?   []? Recreational Therapy, as appropriate  [x]? Nutrition  []? Dialysis  []?  Other:      Rehab Justification:  Needs 3 hrs therapy per day or 15 hours per week:  Yes  Identified Rehab Nursing needs: Yes  Intense Interdisciplinary need: Yes  Need for 24 hr physician supervision:  Yes  Measurable improved quality of life:  Yes  Willingness to participate:  Yes  Medical Necessity:  Yes  Patient able to tolerate care proposed: Yes     Expected Discharge Destination/Functional Level:  Home with assist  Expected length of time to achieve that level of improvement: 1-2 weeks  Expected Post Discharge Treatments: Home with possible Home Care     Other information relevant to the care needs:  n/a     Acute Inpatient Rehabilitation Disclosure Statement will be provided to patient upon admission to ARU with patient's verbalization of understanding.       I have reviewed and concur with the findings and results of the pre-admission screening assessment completed by the Inpatient Rehabilitation Admissions Coordinator.

## 2021-05-11 NOTE — PROGRESS NOTES
[address] [facility]    ACUTE REHABILITATION OCCUPATIONAL THERAPY  DAILY NOTE    Date: 21  Patient Name: Mable Duron      Room: 2204/7906-35    MRN: [id number]   : 1955  (63 y.o.)  Gender: female      Diagnosis: ischemic CVA,  subacute right MCA distribution infarct, L lynette, dysphagia, L neglect, loop recorder 21 (Dr. Castro Chin)  Additional Pertinent Hx: 60-year-old female who was found down, last known well approximately 21 hours before. Patient was found to have a right MCA M1 occlusion. Thrombectomy was not successful, MRI showed patient had a right MCA stroke with hemorrhagic conversion.   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 : (Loop recorder)  Other position/activity restrictions: up w/assist; significant L neglect with LUE/LLE deficits, pureed with thin liquids  Required Braces or Orthoses?: No  Equipment Used: Bed, Wheelchair, Other(nilson steady )    Subjective  Comments: Pt is lethargic  Restrictions/Precautions: Fall Risk  Overall Orientation Status: Within Functional Limits        Objective  Perception  Overall Perceptual Status: Impaired  Unilateral Attention: Cues to attend left visual field;Cues to maintain midline in sitting;Cues to maintain midline in standing;Cues to attend to left side of body  Balance  Standing Balance: Dependent/Total(Nilson steady with LUE positioned,LLE positioning during stand)  Bed mobility  Sit to Supine: 2 Person assistance;Dependent/Total(requires one person to adjust legs onto bed.)  Transfers  Sit to stand: Dependent/Total(Min A x 2 with nilson stedy)  Stand to sit: Dependent/Total;2 Person assistance(Min A x 2 with nilson stedy)  Standing Balance  Time: ~1-2mins  Activity: transfer to bed  Comment: vc to address Left lean  Functional Mobility  Functional - Mobility Device: Wheelchair  Activity: To/From therapy expressed her concerns re dehydration to  internal med MD today, dtr states pt has not been having urinary retention. Safety Devices in place: Yes  Type of devices: Patient at risk for falls; Left in bed;Call light within reach; Bed alarm in place  Restraints  Initially in place: Yes  Restraints: per pt request             Plan  Plan  Times per week: 5-7  Times per day: Twice a day  Current Treatment Recommendations: Strengthening, Positioning, ROM, Safety Education & Training, Balance Training, Patient/Caregiver Education & Training, Self-Care / ADL, Cognitive/Perceptual Training, Functional Mobility Training, Neuromuscular Re-education, Equipment Evaluation, Education, & procurement, Endurance Training, Cognitive Reorientation  Patient Goals   Patient goals : \"get as close to normal as possible. \"  specifies:\"get in and out of bed on my own, shower by myself. \"  Short term goals  Time Frame for Short term goals: 1 week  Short term goal 1: mod A UE bathe and dress  Short term goal 2: set up brush teeth  Short term goal 3: min feeding, with cues able to locate L side of tray and scan to locate items on L. Short term goal 4: tolerate 6-8 min sitting edge of bed (static sit)  with min assist and RUE support, head at midline and fair safety awareness  Short term goal 5: pt to initiate reposition LUE with RUE with good safety ie. hold by wrist not by 1 finger  Short term goal 6: demo improved awareness of L side and neuromuscular christian by weight bearing on LUE with physical assist to complete  Short term goal 7: from w/c:  consistently maintain midline trunk control and demo able to lean forward 4 inches away from back of chair without physical assist for adls. Short term goal 8: tolerate 2 min static stand for adls with RUE support with mod of 2 and assist with LLE as needed. Long term goals  Time Frame for Long term goals : by discharge  Long term goal 1: set up and occasional verbal cues ie.  L visual scan for self feeding  Long term goal 2: set up oral care  Long term goal 3: max x 1 toileting and adl transfers  Long term goal 4: min UE bathe and dress (shirt)  Long term goal 5: max x 1 LE bathe and dress, able to cross to reach RLE with min assist and keep trunk balance         05/11/21 1541   OT Individual Minutes   Time In 6139   Time Out 1451   Minutes 33       Electronically signed by Max AGGARWAL on 5/11/21 at 5:09 PM EDT  This patient  Felicity Apley  was seen by the Occupational Therapy Student identified above, including any treatment and documentation activity. The above documentation completed by DEBORAH Student  was reviewed and accepted by the Supervising Clinical Instructor PRACHI DEAN & LASHAUN BAXTER [facility]/L  .

## 2021-05-11 NOTE — PROGRESS NOTES
2 person A. Pt required positioning with hand over hand of LUE and verbal cues to maintain upright position due to L side lean. Standing Balance  Time: 1-2 minutes x 4  Activity: Transfers, lower body bathing and dressing. Comment: Verbal cues to maintain midline positioning in nilson stedy. A with placement of LUE. Functional Mobility  Functional - Mobility Device: Wheelchair  Activity: To/from bathroom  Assist Level: Dependent/Total                             ADL  Grooming: Minimal assistance;Setup(sitting in wheelchair at sink side)  UE Bathing: Moderate assistance  LE Bathing: Dependent/Total  UE Dressing: Moderate assistance  LE Dressing: Dependent/Total(2 assist in nilson stedy)  Toileting: Dependent/Total(Brief; 2 A in nilson [facility] for brief change and hygiene)  Additional Comments: Pt finishing breakfast upon arrival. Pt required verbal cues for spillage on L side of mouth. Lower body bathing/dressing completed while supine in bed with bottom hygiene and pulling up pants while standing in nislon stedy with 2 person A. Pt completed upper body bathing and dressing while sitting in wheelchair at sink side. Pt required intermittent min A for sitting support during task due to heavy left side lean. Pt able to correct with verbal cues however demonstrated difficulty maintaining due to weakness. Pt required verbal and tactile cues to attent to left side of the body with bathing and dressing tasks. Pt educated on lynette dressing techniques with fair carryover noted. Further education required. Assessment  Performance deficits / Impairments: Decreased functional mobility ; Decreased ADL status; Decreased strength;Decreased endurance;Decreased balance;Decreased high-level IADLs;Decreased vision/visual deficit; Decreased cognition;Decreased safe awareness;Decreased ROM; Decreased fine motor control;Decreased coordination;Decreased posture;Decreased sensation  Prognosis: Good  Discharge Recommendations: Patient would benefit from continued therapy after discharge  Activity Tolerance: Patient limited by fatigue  Safety Devices in place: Yes  Type of devices: All fall risk precautions in place;Gait belt;Patient at risk for falls; Left in chair;Nurse notified(Transported to PT)          Patient Education: OT POC, safety with transfers, attend to left side of body, adaptive strategies  Patient Goals   Patient goals : \"get as close to normal as possible. \"  specifies:\"get in and out of bed on my own, shower by myself. \"  Learner:patient  Method: explanation       Outcome: needs reinforcement        Plan  Plan  Times per week: 5-7  Times per day: Twice a day  Current Treatment Recommendations: Strengthening, Positioning, ROM, Safety Education & Training, Balance Training, Patient/Caregiver Education & Training, Self-Care / ADL, Cognitive/Perceptual Training, Functional Mobility Training, Neuromuscular Re-education, Equipment Evaluation, Education, & procurement, Endurance Training, Cognitive Reorientation  Patient Goals   Patient goals : \"get as close to normal as possible. \"  specifies:\"get in and out of bed on my own, shower by myself. \"  Short term goals  Time Frame for Short term goals: 1 week  Short term goal 1: mod A UE bathe and dress  Short term goal 2: set up brush teeth  Short term goal 3: min feeding, with cues able to locate L side of tray and scan to locate items on L.   Short term goal 4: tolerate 6-8 min sitting edge of bed (static sit)  with min assist and RUE support, head at midline and fair safety awareness  Short term goal 5: pt to initiate reposition LUE with RUE with good safety ie. hold by wrist not by 1 finger  Short term goal 6: demo improved awareness of L side and neuromuscular christian by weight bearing on LUE with physical assist to complete  Short term goal 7: from w/c:  consistently maintain midline trunk control and demo able to lean forward 4 inches away from back of chair without physical assist for adls.  Short term goal 8: tolerate 2 min static stand for adls with RUE support with mod of 2 and assist with LLE as needed. Long term goals  Time Frame for Long term goals : by discharge  Long term goal 1: set up and occasional verbal cues ie.  L visual scan for self feeding  Long term goal 2: set up oral care  Long term goal 3: max x 1 toileting and adl transfers  Long term goal 4: min UE bathe and dress (shirt)  Long term goal 5: max x 1 LE bathe and dress, able to cross to reach RLE with min assist and keep trunk balance  Timed Code Treatment Minutes: 59 Minutes    Electronically signed by Eloy Burkitt, OT on 5/11/21 at 3:47 PM EDT

## 2021-05-11 NOTE — PROGRESS NOTES
Speech Language Pathology  Speech Language Pathology  [facility] Acute Rehab Unit at [facility]    Cognitive Treatment Note    Date: 5/11/2021  Patients Name: Kareem Mccormick  MRN: [id number]  Diagnosis:   Patient Active Problem List   Diagnosis Code    Pneumonia of left lower lobe due to infectious organism J18.9    Tobacco abuse Z72.0    Legionella pneumonia ([facility] [address] A48.1    Dysphagia R13.10    Hyperplastic colon polyp K63.5    Esophageal ring K22.2    Gastritis K29.70    Elbow effusion, right M25.421    Stroke due to occlusion of right middle cerebral artery ([facility]) I63.511    Nihss score 15 R29.715    Acute left hemiparesis ([facility]) G81.94    Cerebrovascular accident (CVA) due to occlusion of right middle cerebral artery ([facility] [address] I63.511    Acute CVA (cerebrovascular accident) ([facility] [address] I63.9    COPD (chronic obstructive pulmonary disease) ([facility]) J44.9    Essential hypertension I10    Hyperlipidemia E78.5    Pre-diabetes R73.03       Pain: 8/10  (headache, pt. Called RN for Tylenol)    Cognitive Treatment    Treatment time: 7071-4621    Subjective: [x] Alert [x] Cooperative     [] Confused     [] Agitated    [] Lethargic    Objective/Assessment:  Attention:  Pt. Easily distracted, cues needed to redirect attention. Pt. Often falling asleep, cues given to maintain alertness. Verbal cues needed for increased attention to midline, pt. c extreme L sided neglect. Recall: paragraph recall- 100%. Organization: n/a    Problem Solving/Reasoning: name 12 items in category- 100%. Word deductions- 90%, 100% c cues. 3 reasons for given situation- 67%, 88% c cues. Other:  Pt. Completed oral motor exercises x3, 10 reps each with mod cues. Pt. Demonstrates significant L sided weakness. No overt s/s aspiration when pt. Taking Tylenol c sips of water from straw. Functional oral clearing noted. Per pt. Daughter, pt. Demonstrated pocketing x1 when eating lunch.       Plan:  [x] Continue ST services    [] Discharge from [address] [place] [address] Dr:      Discharge recommendations: [] Inpatient Rehab   [] [place]   [] Outpatient Therapy  [] Follow up at trauma clinic   [] Other:       Treatment completed by: Pretty Hayden A.CCC/SLP

## 2021-05-11 NOTE — CONSULTS
Comprehensive Nutrition Assessment    Type and Reason for Visit:  Initial, Consult(Evaluate and Treat)    Nutrition Recommendations/Plan: Modify diet to 4 carbohydrate choices per meal, 3-4 gm Na, Pureed and add Glucerna twice daily. Nutrition Assessment:  Pt admitted to rehab with ADL and Mobility deficits secondary to ischemic CVA. Pt has been averaging around 50% intake of meals. Willing to try Glucerna x 2 per day. States carbohydrate control diet not followed closely at home. Will increase cargohydrate allowance to 4 choices per meal. Will also liberalize cardiac restriction to 3-4 gm Na. Malnutrition Assessment:  Malnutrition Status: At risk for malnutrition (Comment)    Context:  Acute Illness     Findings of the 6 clinical characteristics of malnutrition:  Energy Intake:  Mild decrease in energy intake (Comment)  Weight Loss:  Unable to assess     Body Fat Loss:  No significant body fat loss     Muscle Mass Loss:  No significant muscle mass loss    Fluid Accumulation:  No significant fluid accumulation     Strength:  Not Performed    Estimated Daily Nutrient Needs:  Energy (kcal):  1770 based on [employer] with 1.2 factor; Weight Used for Energy Requirements:  Admission     Protein (g):  83-89 based on 1.3-1.4 gm per kg; Weight Used for Protein Requirements:  Ideal          Nutrition Related Findings:  Hx: DM, Esophageal Ring (current dysphagia related to CVA). No edema. Glucose: 163. Insulin coverage. Other labs and meds reviewed.       Wounds:  Surgical Incision       Current Nutrition Therapies:    DIET CARDIAC; Carb Control: 4 carb choices (60 gms)/meal; Dysphagia Pureed  Dietary Nutrition Supplements: Diabetic Oral Supplement    Anthropometric Measures:  · Height: 5' 8\" (172.7 cm)  · Current Body Weight: 191 lb 9.3 oz (86.9 kg)   · Admission Body Weight: 191 lb 9.3 oz (86.9 kg)    · Usual Body Weight: 195 lb (88.5 kg)(per pt)     · Ideal Body Weight: 140 lbs; % Ideal Body Weight 136.8 %   · BMI: 29.1  · BMI Categories: Overweight (BMI 25.0-29. 9)       Nutrition Diagnosis:   · Inadequate oral intake related to swallowing difficulty(acute illness) as evidenced by intake 26-50%, intake 51-75%, poor intake prior to admission    Nutrition Interventions:   Food and/or Nutrient Delivery:  Modify Current Diet, Start Oral Nutrition Supplement  Nutrition Education/Counseling:  Education needed   Coordination of Nutrition Care:  No recommendation at this time    Goals:  PO intake % of meals and supplements       Nutrition Monitoring and Evaluation:   Behavioral-Environmental Outcomes:  None Identified   Food/Nutrient Intake Outcomes:  Diet Advancement/Tolerance, Food and Nutrient Intake, Supplement Intake  Physical Signs/Symptoms Outcomes:  Biochemical Data, Chewing or Swallowing, GI Status, Skin, Weight     Discharge Planning: Too soon to determine     Some areas of assessment may be incomplete due to standard COVID-19 Precautions. Mac Belle R.D., L.D.   Phone: [phone]

## 2021-05-11 NOTE — PROGRESS NOTES
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 Patient is well, but has had some minor complaints of headaches and dry burning eyes. ROS:  Denies fevers, chills, sweats. No chest pain, palpitations, lightheadedness. Denies coughing, wheezing or shortness of breath. Denies abdominal pain, nausea, diarrhea or constipation. No new areas of joint pain. Denies new areas of numbness or weakness. Denies new anxiety or depression issues. No new skin problems. Rehabilitation:   Progressing in therapies. PT:  Restrictions/Precautions: Fall Risk  Implants present? : (Loop recorder)  Other position/activity restrictions: up w/assist; significant L neglect with LUE/LLE deficits   Transfers  Sit to Stand: Maximum Assistance, 2 Person Assistance  Stand to sit: 2 Person Assistance, Maximum Assistance  Bed to Chair: Dependent/Total, 2 Person Assistance(jarvis stedy for transfers.)  Comment: Pt stood with nilson stedy for ~ 20 secs and 25 secs using nilson stedy, therapist assist with L hand  on nilson stedy, no quad contraction noted in standing position. Transfers  Sit to Stand: Maximum Assistance, 2 Person Assistance  Stand to sit: 2 Person Assistance, Maximum Assistance  Bed to Chair: Dependent/Total, 2 Person Assistance(jarvis stedy for transfers.)  Comment: Pt stood with nilson stedy for ~ 20 secs and 25 secs using nilson stedy, therapist assist with L hand  on nilson stedy, no quad contraction noted in standing position. Ambulation  Ambulation?: No               OT:  ADL  Feeding:  Moderate assistance, Verbal cueing, Increased time to complete, Setup(severe L neglect)  Grooming: Minimal assistance, Setup(brush hair, brush teeth, wash face)  UE Bathing: Verbal cueing, Increased time to complete, Maximum assistance, Setup  LE Bathing: Dependent/Total  UE Dressing: Setup, Increased time to complete, Verbal cueing, Maximum assistance(pullover shirt)  LE Dressing: (max x 2-3)  Toileting: (max x 2-3)  Additional Comments: dysphagia; pureed with thin liquids, has food spillage L mouth. pt is wearing briefs, having urgency and unable to toilet in time but today has sat on toilet x 2 and unable to go (2nd attempt during OT and pt was requesting to toilet)  . up in w/c to sink for groom, bathe and dress, changed from gown to wearing t shirt, pullup briefs, pants, teds, slipper socks. , requires A x 2-3 for standing adl tasks in [facility]. Balance  Sitting Balance: Maximum assistance(tolerated 8 minutes:  with RUE on rail can maintain static sit balance for 10 seconds with CGA but frequent, sudden LOB to L without warning requiring max assist to prevent fall.)  Standing Balance: Dependent/Total(max x 2 static stand with RUE support in [facility])   Standing Balance  Time: 1-3 min x 8  Activity: adl transfers, LE self care, max x 2 static stand with RUE support in [facility]  Comment: in w/c sitting leaning to L, needing physical assist to pull trunk forward in w/c  Functional Mobility  Functional - Mobility Device: Wheelchair  Activity: To/from bathroom  Assist Level: Dependent/Total     Bed mobility  Bridging: Minimal assistance(to support and facilitate LLE only)  Rolling to Left: Minimal assistance  Rolling to Right: Maximum assistance  Supine to Sit: Maximum assistance  Sit to Supine: 2 Person assistance, Dependent/Total  Scooting: Minimal assistance(to support and facilitate LLE to boost up in bed)  Comment: Pt dangles at EOB for 8 to 9 minutes, severe left neglect, and left side lean, Pt able to turn her head to midline withcues and correct left lean with cues and min assist, Pt needs R UE support and min/mod A  to maintain static balance at EOB .   Transfers  Stand Step Transfers: Dependent/Total  Sit to stand: Dependent/Total(max x 2-3 with RUE pulling on [facility])  Stand to sit: Dependent/Total, 2 Person assistance(mod-max x 2)  Transfer Comments: nilson galdamez   Toilet Transfers  Equipment Used: Grab bars  Toilet Transfer: Dependent/Total, 2 Person assistance  Toilet Transfers Comments: max x 2-3             SPEECH:  Subjective: [x]? Alert     [x]? Cooperative     []? Confused     []? Agitated    []? Lethargic     Objective/Assessment:  Attention:  Pt. Easily distracted, cues needed to redirect attention. Pt. Often falling asleep, cues given to maintain alertness. Verbal cues needed for increased attention to midline, pt. c extreme L sided neglect.      Recall: paragraph recall- 100%.       Organization: n/a     Problem Solving/Reasoning: name 12 items in category- 100%. Word deductions- 90%, 100% c cues. 3 reasons for given situation- 67%, 88% c cues.       Other:  Pt. Completed oral motor exercises x3, 10 reps each with mod cues. Pt. Demonstrates significant L sided weakness. No overt s/s aspiration when pt. Taking Tylenol c sips of water from straw. Functional oral clearing noted. Per pt. Daughter, pt. Demonstrated pocketing x1 when eating lunch. Objective:  /83   Pulse 88   Temp 98.6 °F (37 °C) (Oral)   Resp 18   Ht 5' 8\" (1.727 m)   Wt 191 lb 8 oz (86.9 kg)   SpO2 95%   BMI 29.12 kg/m²       GEN: Well developed, well nourished, in NAD  HEENT:  NCAT. PERRL. EOMI. Mucous membranes pink and moist.   PULM:  Clear to ausculation. No rales or rhonchi. Respirations WNL and unlabored. CV:  Regular rate rhythm. No murmurs or gallops. GI:  Abdomen soft. Nontender. Non-distended. BS + and equal.    NEUROLOGICAL: A&O x3. Sensation intact to light touch. Right gaze preference. Impaired L CN VII.   MSK:  Functional ROM RUE and RLEs. Impaired AROM LUE and LLE. Nancy Luisa Motor testing 4+/5 key muscles RUE and RLE. L shoulder shrug 1/5, L hip flexion/knee extension 1/5. Distal LUE and LLE muscles 0/5. SKIN: Warm dry and intact. Good turgor. EXTREMITIES:  No calf tenderness to palpation.  No edema BLEs. .    PSYCH: Mood WNL. Appropriately interactive. Affect WNL. Diagnostics:     CBC:   Recent Labs     05/09/21  0508 05/10/21  0514 05/11/21  0716   WBC 11.8* 11.9* 13.6*   RBC 4.56 4.67 4.42   HGB 13.3 13.7 13.6   HCT 43.9 44.2 40.4   MCV 96.3 94.6 91.3   RDW 13.5 13.5 14.3    362 389     BMP:   Recent Labs     05/09/21  0508 05/10/21  0514 05/11/21  0716    143 138   K 3.7 3.9 4.1    107 103   CO2 25 24 28   BUN 18 20 20   CREATININE 0.46* 0.50 0.58   GLUCOSE 117* 113* 163*     BNP: No results for input(s): BNP in the last 72 hours. PT/INR: No results for input(s): PROTIME, INR in the last 72 hours. APTT: No results for input(s): APTT in the last 72 hours. CARDIAC ENZYMES: No results for input(s): CKMB, CKMBINDEX, TROPONINT in the last 72 hours. Invalid input(s): CKTOTAL;3  FASTING LIPID PANEL:  Lab Results   Component Value Date    CHOL 206 (H) 05/05/2021    HDL 29 (L) 05/05/2021    TRIG 185 (H) 05/05/2021     LIVER PROFILE: No results for input(s): AST, ALT, ALB, BILIDIR, BILITOT, ALKPHOS in the last 72 hours.      Current Medications:   Current Facility-Administered Medications: sodium chloride flush 0.9 % injection 5-40 mL, 5-40 mL, Intravenous, 2 times per day  sodium chloride flush 0.9 % injection 5-40 mL, 5-40 mL, Intravenous, PRN  acetaminophen (TYLENOL) tablet 1,000 mg, 1,000 mg, Oral, Q8H PRN  promethazine (PHENERGAN) tablet 12.5 mg, 12.5 mg, Oral, Q6H PRN **OR** ondansetron (ZOFRAN) injection 4 mg, 4 mg, Intravenous, Q6H PRN  0.9 % sodium chloride infusion, 25 mL, Intravenous, PRN  acetaminophen (TYLENOL) suppository 650 mg, 650 mg, Rectal, Q4H PRN  albuterol (PROVENTIL) nebulizer solution 2.5 mg, 2.5 mg, Nebulization, Q4H PRN  amLODIPine (NORVASC) tablet 5 mg, 5 mg, Oral, Daily  aspirin chewable tablet 81 mg, 81 mg, Oral, Daily  atorvastatin (LIPITOR) tablet 80 mg, 80 mg, Oral, Daily  budesonide-formoterol (SYMBICORT) 160-4.5 MCG/ACT inhaler 2 puff, 2 puff, Inhalation, BID  dextrose 50 % IV solution, 12.5 g, Intravenous, PRN  glucagon (rDNA) injection 1 mg, 1 mg, Intramuscular, PRN  glucose (GLUTOSE) 40 % oral gel 15 g, 15 g, Oral, PRN  heparin (porcine) injection 5,000 Units, 5,000 Units, Subcutaneous, 3 times per day  insulin lispro (HUMALOG) injection vial 0-3 Units, 0-3 Units, Subcutaneous, Nightly  insulin lispro (HUMALOG) injection vial 0-6 Units, 0-6 Units, Subcutaneous, TID WC  levothyroxine (SYNTHROID) tablet 25 mcg, 25 mcg, Oral, Daily  nicotine (NICODERM CQ) 14 MG/24HR 1 patch, 1 patch, Transdermal, Daily  polyethylene glycol (GLYCOLAX) packet 17 g, 17 g, Oral, Every Other Day  tiotropium (SPIRIVA RESPIMAT) 2.5 MCG/ACT inhaler 2 puff, 2 puff, Inhalation, Daily  acetaminophen (TYLENOL) tablet 650 mg, 650 mg, Oral, Q4H PRN  polyethylene glycol (GLYCOLAX) packet 17 g, 17 g, Oral, Daily  senna (SENOKOT) tablet 17.2 mg, 2 tablet, Oral, Daily PRN  bisacodyl (DULCOLAX) suppository 10 mg, 10 mg, Rectal, Daily PRN      Impression/Plan:   Impaired ADLs, gait, and mobility due to:      1. Ischemic R MCA CVA with hemorrhagic conversion and L non-dominant hemiparesis:  PT/OT for gait, mobility, strengthening, endurance, ADLs, and self care. On ASA, atorvastatin. 2. Headache: Changed prn Tylenol to routine TID dosing. Can consider adding amitriptyline if no relief. 3. Dry eyes: add natural tears. 4. Cognitive impairment: SLP treating  5. Dysphagia/aphasia: SLP treating. On thickened liquids. 6. HTN/Hyperlipidemia: on amlodipine  7. DM: on insulin sliding scale  8. COPD/asthma: on albuterol nebulizers prn, on symbicort BID, spiriva  9. Esophageal Ring: Will monitor dysphagia  10. Hypothyroidism: on levothyroxine  11. Nicotine dependence: on Nicoderm  12. Bowel Management: Miralax daily, senokot prn, dulcolax prn. 13. DVT Prophylaxis:  heparin, SCD's while in bed and MAXIM's   14.  Internal medicine for medical management    Electronically signed by Kina Ac MD on 5/11/2021

## 2021-05-12 LAB
ABSOLUTE EOS #: 0.3 K/UL (ref 0–0.4)
ABSOLUTE IMMATURE GRANULOCYTE: ABNORMAL K/UL (ref 0–0.3)
ABSOLUTE LYMPH #: 2.1 K/UL (ref 1–4.8)
ABSOLUTE MONO #: 0.7 K/UL (ref 0.1–1.3)
BASOPHILS # BLD: 0 % (ref 0–2)
BASOPHILS ABSOLUTE: 0 K/UL (ref 0–0.2)
DIFFERENTIAL TYPE: ABNORMAL
EOSINOPHILS RELATIVE PERCENT: 3 % (ref 0–4)
GLUCOSE BLD-MCNC: 108 MG/DL (ref 65–105)
GLUCOSE BLD-MCNC: 111 MG/DL (ref 65–105)
GLUCOSE BLD-MCNC: 113 MG/DL (ref 65–105)
GLUCOSE BLD-MCNC: 116 MG/DL (ref 65–105)
GLUCOSE BLD-MCNC: 140 MG/DL (ref 65–105)
HCT VFR BLD CALC: 41 % (ref 36–46)
HEMOGLOBIN: 13.8 G/DL (ref 12–16)
IMMATURE GRANULOCYTES: ABNORMAL %
LYMPHOCYTES # BLD: 19 % (ref 24–44)
MCH RBC QN AUTO: 30.7 PG (ref 26–34)
MCHC RBC AUTO-ENTMCNC: 33.7 G/DL (ref 31–37)
MCV RBC AUTO: 91.2 FL (ref 80–100)
MONOCYTES # BLD: 6 % (ref 1–7)
NRBC AUTOMATED: ABNORMAL PER 100 WBC
PDW BLD-RTO: 14.5 % (ref 11.5–14.9)
PLATELET # BLD: 397 K/UL (ref 150–450)
PLATELET ESTIMATE: ABNORMAL
PMV BLD AUTO: 7.9 FL (ref 6–12)
RBC # BLD: 4.49 M/UL (ref 4–5.2)
RBC # BLD: ABNORMAL 10*6/UL
SEG NEUTROPHILS: 72 % (ref 36–66)
SEGMENTED NEUTROPHILS ABSOLUTE COUNT: 8.2 K/UL (ref 1.3–9.1)
WBC # BLD: 11.4 K/UL (ref 3.5–11)
WBC # BLD: ABNORMAL 10*3/UL

## 2021-05-12 PROCEDURE — 6370000000 HC RX 637 (ALT 250 FOR IP): Performed by: PHYSICAL MEDICINE & REHABILITATION

## 2021-05-12 PROCEDURE — 97535 SELF CARE MNGMENT TRAINING: CPT

## 2021-05-12 PROCEDURE — 97112 NEUROMUSCULAR REEDUCATION: CPT

## 2021-05-12 PROCEDURE — 99232 SBSQ HOSP IP/OBS MODERATE 35: CPT | Performed by: PHYSICAL MEDICINE & REHABILITATION

## 2021-05-12 PROCEDURE — 97530 THERAPEUTIC ACTIVITIES: CPT

## 2021-05-12 PROCEDURE — 36415 COLL VENOUS BLD VENIPUNCTURE: CPT

## 2021-05-12 PROCEDURE — 1180000000 HC REHAB R&B

## 2021-05-12 PROCEDURE — 6360000002 HC RX W HCPCS: Performed by: STUDENT IN AN ORGANIZED HEALTH CARE EDUCATION/TRAINING PROGRAM

## 2021-05-12 PROCEDURE — 99231 SBSQ HOSP IP/OBS SF/LOW 25: CPT | Performed by: INTERNAL MEDICINE

## 2021-05-12 PROCEDURE — 92526 ORAL FUNCTION THERAPY: CPT

## 2021-05-12 PROCEDURE — 97129 THER IVNTJ 1ST 15 MIN: CPT

## 2021-05-12 PROCEDURE — 51798 US URINE CAPACITY MEASURE: CPT

## 2021-05-12 PROCEDURE — 97542 WHEELCHAIR MNGMENT TRAINING: CPT

## 2021-05-12 PROCEDURE — 6360000002 HC RX W HCPCS: Performed by: PHYSICAL MEDICINE & REHABILITATION

## 2021-05-12 PROCEDURE — 85025 COMPLETE CBC W/AUTO DIFF WBC: CPT

## 2021-05-12 PROCEDURE — 97110 THERAPEUTIC EXERCISES: CPT

## 2021-05-12 PROCEDURE — 6370000000 HC RX 637 (ALT 250 FOR IP): Performed by: STUDENT IN AN ORGANIZED HEALTH CARE EDUCATION/TRAINING PROGRAM

## 2021-05-12 RX ORDER — HEPARIN SODIUM 5000 [USP'U]/ML
5000 INJECTION, SOLUTION INTRAVENOUS; SUBCUTANEOUS EVERY 8 HOURS SCHEDULED
Status: DISCONTINUED | OUTPATIENT
Start: 2021-05-12 | End: 2021-06-10 | Stop reason: HOSPADM

## 2021-05-12 RX ADMIN — ACETAMINOPHEN 1000 MG: 500 TABLET, FILM COATED ORAL at 20:59

## 2021-05-12 RX ADMIN — AMLODIPINE BESYLATE 5 MG: 5 TABLET ORAL at 10:22

## 2021-05-12 RX ADMIN — Medication 2 PUFF: at 10:20

## 2021-05-12 RX ADMIN — ACETAMINOPHEN 1000 MG: 500 TABLET, FILM COATED ORAL at 13:54

## 2021-05-12 RX ADMIN — HEPARIN SODIUM 5000 UNITS: 5000 INJECTION INTRAVENOUS; SUBCUTANEOUS at 10:12

## 2021-05-12 RX ADMIN — INSULIN LISPRO 1 UNITS: 100 INJECTION, SOLUTION INTRAVENOUS; SUBCUTANEOUS at 21:44

## 2021-05-12 RX ADMIN — Medication 2 PUFF: at 10:23

## 2021-05-12 RX ADMIN — LEVOTHYROXINE SODIUM 25 MCG: 0.03 TABLET ORAL at 06:49

## 2021-05-12 RX ADMIN — ASPIRIN 81 MG CHEWABLE TABLET 81 MG: 81 TABLET CHEWABLE at 10:22

## 2021-05-12 RX ADMIN — POLYETHYLENE GLYCOL 3350 17 G: 17 POWDER, FOR SOLUTION ORAL at 10:22

## 2021-05-12 RX ADMIN — HEPARIN SODIUM 5000 UNITS: 5000 INJECTION INTRAVENOUS; SUBCUTANEOUS at 21:20

## 2021-05-12 RX ADMIN — ATORVASTATIN CALCIUM 80 MG: 80 TABLET, FILM COATED ORAL at 10:23

## 2021-05-12 RX ADMIN — ACETAMINOPHEN 1000 MG: 500 TABLET, FILM COATED ORAL at 06:49

## 2021-05-12 ASSESSMENT — PAIN DESCRIPTION - ORIENTATION: ORIENTATION: LEFT

## 2021-05-12 ASSESSMENT — PAIN SCALES - GENERAL
PAINLEVEL_OUTOF10: 6
PAINLEVEL_OUTOF10: 5
PAINLEVEL_OUTOF10: 4
PAINLEVEL_OUTOF10: 5

## 2021-05-12 ASSESSMENT — PAIN DESCRIPTION - PAIN TYPE: TYPE: ACUTE PAIN

## 2021-05-12 ASSESSMENT — PAIN DESCRIPTION - LOCATION: LOCATION: SHOULDER

## 2021-05-12 NOTE — PROGRESS NOTES
Speech Language Pathology  Speech Language Pathology  [facility] Acute Rehab Unit at [facility]    Cognitive Treatment Note    Date: 5/12/2021  Patients Name: Brennan Pino  MRN: [id number]  Diagnosis:   Patient Active Problem List   Diagnosis Code    Pneumonia of left lower lobe due to infectious organism J18.9    Tobacco abuse Z72.0    Legionella pneumonia ([facility] [address] A48.1    Dysphagia R13.10    Hyperplastic colon polyp K63.5    Esophageal ring K22.2    Gastritis K29.70    Elbow effusion, right M25.421    Stroke due to occlusion of right middle cerebral artery ([facility]) I63.511    Nihss score 15 R29.715    Acute left hemiparesis ([facility]) G81.94    Cerebrovascular accident (CVA) due to occlusion of right middle cerebral artery ([facility] [address] I63.511    Acute CVA (cerebrovascular accident) ([facility] [address] I63.9    COPD (chronic obstructive pulmonary disease) ([facility]) J44.9    Essential hypertension I10    Hyperlipidemia E78.5    Pre-diabetes R73.03       Pain: 0/10    Cognitive Treatment    Treatment time: 9716-1510    Subjective: [x] Alert [x] Cooperative     [] Confused     [] Agitated    [] Lethargic    Objective/Assessment:  Attention:  Pt. Easily distracted, cues needed to redirect attention. Verbal cues needed for increased attention to midline, pt. c extreme L sided neglect. Recall: paragraph recall- 92%. 3 word memory and mental manipulation (ranking, uncontrolled)- 100%, ranking, controlled- 50%, 100% c cues. Organization: n/a    Problem Solving/Reasoning: Missing equipment- 100%    Other:  Pt. Completed oral motor exercises x3, 10 reps each with mod cues. Pt. Demonstrates significant L sided weakness. Pt. Son and daughter present, many questions re: swallowing and diet recommendations, verbalized understanding to education provided.       Plan:  [x] Continue ST services    [] Discharge from ST:      Discharge recommendations: [] Inpatient Rehab   [] [place]   [] Outpatient Therapy  []

## 2021-05-12 NOTE — PROGRESS NOTES
Moderate assistance, Verbal cueing, Increased time to complete, Setup(severe L neglect)  Grooming: Minimal assistance, Setup(sitting in wheelchair at sink side)  UE Bathing: Moderate assistance  LE Bathing: Dependent/Total  UE Dressing: Moderate assistance  LE Dressing: Dependent/Total(2 assist in jacqueline stedy)  Toileting: Dependent/Total(Brief; 2 A in jacqueline stedy for brief change and hygiene)  Additional Comments: Pt finishing breakfast upon arrival. Pt required verbal cues for spillage on L side of mouth. Lower body bathing/dressing completed while supine in bed with bottom hygiene and pulling up pants while standing in jacqueline stedy with 2 person A. Pt completed upper body bathing and dressing while sitting in wheelchair at sink side. Pt required intermittent min A for sitting support during task due to heavy left side lean. Pt able to correct with verbal cues however demonstrated difficulty maintaining due to weakness. Pt required verbal and tactile cues to attent to left side of the body with bathing and dressing tasks. Pt educated on lynette dressing techniques with fair carryover noted. Further education required.           Balance  Sitting Balance: Minimal assistance  Standing Balance: Dependent/Total(Jacqueline steady with LUE positioned,LLE positioning during stand)   Standing Balance  Time: ~1-2mins  Activity: transfer to bed  Comment: vc to address Left lean  Functional Mobility  Functional - Mobility Device: Wheelchair  Activity: To/From therapy gym  Assist Level: Dependent/Total     Bed mobility  Bridging: Minimal assistance(to support and facilitate LLE only)  Rolling to Left: Minimal assistance  Rolling to Right: Maximum assistance  Supine to Sit: Moderate assistance, 2 Person assistance  Sit to Supine: Maximum assistance(for proper positioning and to adjust legs onto bed)  Scooting: Minimal assistance(to support and facilitate LLE to bridge/lateral scoot)  Comment: Pt requires Mod assistance of 2 people for repositioning in bed  Transfers  Stand Step Transfers: Dependent/Total  Sit to stand: Dependent/Total(Min A x 2 with nilson stedy)  Stand to sit: Dependent/Total, 2 Person assistance(Min A x 2 with nilson stedy)  Transfer Comments: Pt completed transfers with use of nilson stedy with 2 person A. Pt required positioning with hand over hand of LUE and verbal cues to maintain upright position due to L side lean. Toilet Transfers  Equipment Used: Grab bars  Toilet Transfer: Dependent/Total, 2 Person assistance  Toilet Transfers Comments: max x 2-3        Wheelchair Bed Transfers  Wheelchair/Bed - Technique: Evans Arrington stejonn)  Equipment Used: Bed, Wheelchair, Other(nilson steady )  Level of Asssistance: Dependent/Total(Min A of 2 with nilson steady )    SPEECH:  Subjective: [x]? Alert     [x]? Cooperative     []? Confused     []? Agitated    []? Lethargic     Objective/Assessment:  Attention:  Pt. Easily distracted, cues needed to redirect attention. Verbal cues needed for increased attention to midline, pt. c extreme L sided neglect.      Recall: paragraph recall- 92%. 3 word memory and mental manipulation (ranking, uncontrolled)- 100%, ranking, controlled- 50%, 100% c cues.       Organization: n/a     Problem Solving/Reasoning: Missing equipment- 100%     Other:  Pt. Completed oral motor exercises x3, 10 reps each with mod cues. Pt. Demonstrates significant L sided weakness. Pt. Son and daughter present, many questions re: swallowing and diet recommendations, verbalized understanding to education provided. Objective:  /64   Pulse 79   Temp 98 °F (36.7 °C)   Resp 19   Ht 5' 8\" (1.727 m)   Wt 191 lb 8 oz (86.9 kg)   SpO2 95%   BMI 29.12 kg/m²       GEN: Well developed, well nourished, in NAD  HEENT:  NCAT. PERRL. EOMI. Mucous membranes pink and moist.   PULM:  Clear to ausculation. No rales or rhonchi. Respirations WNL and unlabored. CV:  Regular rate rhythm. No murmurs or gallops. GI:  Abdomen soft. Nontender. Non-distended. BS + and equal.    NEUROLOGICAL: A&O x3. Sensation intact to light touch. Right gaze preference. Impaired L CN VII.   MSK:  Functional ROM RUE and RLEs. Impaired AROM LUE and LLE. Delmon Green Motor testing 4+/5 key muscles RUE and RLE. L shoulder shrug 1/5, L hip flexion/knee extension 1/5. Distal LUE and LLE muscles 0/5. SKIN: Warm dry and intact. Good turgor. EXTREMITIES:  No calf tenderness to palpation. No edema BLEs. Delmon Green PSYCH: Mood WNL. Appropriately interactive. Affect flat    Diagnostics:     CBC:   Recent Labs     05/10/21  0514 05/11/21  0716 05/12/21  0636   WBC 11.9* 13.6* 11.4*   RBC 4.67 4.42 4.49   HGB 13.7 13.6 13.8   HCT 44.2 40.4 41.0   MCV 94.6 91.3 91.2   RDW 13.5 14.3 14.5    389 397     BMP:   Recent Labs     05/10/21  0514 05/11/21  0716    138   K 3.9 4.1    103   CO2 24 28   BUN 20 20   CREATININE 0.50 0.58   GLUCOSE 113* 163*     BNP: No results for input(s): BNP in the last 72 hours. PT/INR: No results for input(s): PROTIME, INR in the last 72 hours. APTT: No results for input(s): APTT in the last 72 hours. CARDIAC ENZYMES: No results for input(s): CKMB, CKMBINDEX, TROPONINT in the last 72 hours. Invalid input(s): CKTOTAL;3  FASTING LIPID PANEL:  Lab Results   Component Value Date    CHOL 206 (H) 05/05/2021    HDL 29 (L) 05/05/2021    TRIG 185 (H) 05/05/2021     LIVER PROFILE: No results for input(s): AST, ALT, ALB, BILIDIR, BILITOT, ALKPHOS in the last 72 hours.      Current Medications:   Current Facility-Administered Medications: acetaminophen (TYLENOL) tablet 1,000 mg, 1,000 mg, Oral, 3 times per day  polyvinyl alcohol (LIQUIFILM TEARS) 1.4 % ophthalmic solution 1 drop, 1 drop, Both Eyes, PRN  sodium chloride flush 0.9 % injection 5-40 mL, 5-40 mL, Intravenous, 2 times per day  sodium chloride flush 0.9 % injection 5-40 mL, 5-40 mL, Intravenous, PRN  promethazine (PHENERGAN) tablet 12.5 mg, 12.5 mg, Oral, Q6H PRN **OR** ondansetron (ZOFRAN) injection 4 mg, 4 mg, Intravenous, Q6H PRN  0.9 % sodium chloride infusion, 25 mL, Intravenous, PRN  albuterol (PROVENTIL) nebulizer solution 2.5 mg, 2.5 mg, Nebulization, Q4H PRN  amLODIPine (NORVASC) tablet 5 mg, 5 mg, Oral, Daily  aspirin chewable tablet 81 mg, 81 mg, Oral, Daily  atorvastatin (LIPITOR) tablet 80 mg, 80 mg, Oral, Daily  budesonide-formoterol (SYMBICORT) 160-4.5 MCG/ACT inhaler 2 puff, 2 puff, Inhalation, BID  dextrose 50 % IV solution, 12.5 g, Intravenous, PRN  glucagon (rDNA) injection 1 mg, 1 mg, Intramuscular, PRN  glucose (GLUTOSE) 40 % oral gel 15 g, 15 g, Oral, PRN  heparin (porcine) injection 5,000 Units, 5,000 Units, Subcutaneous, 3 times per day  insulin lispro (HUMALOG) injection vial 0-3 Units, 0-3 Units, Subcutaneous, Nightly  insulin lispro (HUMALOG) injection vial 0-6 Units, 0-6 Units, Subcutaneous, TID WC  levothyroxine (SYNTHROID) tablet 25 mcg, 25 mcg, Oral, Daily  nicotine (NICODERM CQ) 14 MG/24HR 1 patch, 1 patch, Transdermal, Daily  tiotropium (SPIRIVA RESPIMAT) 2.5 MCG/ACT inhaler 2 puff, 2 puff, Inhalation, Daily  polyethylene glycol (GLYCOLAX) packet 17 g, 17 g, Oral, Daily  senna (SENOKOT) tablet 17.2 mg, 2 tablet, Oral, Daily PRN  bisacodyl (DULCOLAX) suppository 10 mg, 10 mg, Rectal, Daily PRN      Impression/Plan:   Impaired ADLs, gait, and mobility due to:      1. Ischemic R MCA CVA with hemorrhagic conversion and L non-dominant hemiparesis:  PT/OT for gait, mobility, strengthening, endurance, ADLs, and self care. On ASA, atorvastatin. 2. Headache: Has Tylenol routine TID. Can consider adding amitriptyline if no relief. 3. Dry eyes: add natural tears. 4. Cognitive impairment: SLP treating  5. Dysphagia/aphasia: SLP treating. On thickened liquids. Will consider [employer]. 6. HTN/Hyperlipidemia: on amlodipine  7. DM: on insulin sliding scale  8. COPD/asthma: on albuterol nebulizers prn, on symbicort BID, spiriva  9.  Esophageal Ring: Will monitor dysphagia  10. Hypothyroidism: on levothyroxine  11. Nicotine dependence: on Nicoderm  12. Bowel Management: Miralax daily, senokot prn, dulcolax prn. 13. DVT Prophylaxis:  heparin, SCD's while in bed and MAXIM's   14. Internal medicine for medical management    Electronically signed by Nanci Lala MD on 5/12/2021 at 9:42 AM      This note is created with the assistance of a speech recognition program.  While intending to generate a document that actually reflects the content of the visit, the document can still have some errors including those of syntax and sound a like substitutions which may escape proof reading. In such instances, actual meaning can be extrapolated by contextual diversion.

## 2021-05-12 NOTE — PROGRESS NOTES
[address]   ACUTE REHABILITATION OCCUPATIONAL THERAPY  DAILY NOTE    Date: 21  Patient Name: Ben Sol      Room: 4595/6690-69    MRN: [id number]   : 1955  (63 y.o.)  Gender: female      Diagnosis: ischemic CVA,  subacute right MCA distribution infarct, L lynette, dysphagia, L neglect, loop recorder 21 (Dr. Olmos November)       Restrictions  Restrictions/Precautions: Fall Risk  Implants present? : (Loop recorder)  Other position/activity restrictions: up w/assist; significant L neglect with LUE/LLE deficits, pureed with thin liquids  Required Braces or Orthoses?: No  Equipment Used: Bed, Wheelchair, Other(nilson steady )    Subjective  Subjective: \"It's so hard. (new deficits post CVA)    I hate for you guys to see me like this. \"  (referring to her kids)  Comments: pt is expressing her grief appropriately and talking about her feelings with OT and son in am and OT and dtrOtilia in pm.                Objective  Cognition  Cognition Comment: improved problem solving and memory for lynette technique to brush hair. decreased safety judgement- pt will suddenly let go of nilson stedy with R hand causing severe increased LOB to L with lack of concern noted. pt demo some awareness of fall risk in w/c noting concern for forward reach during adls, caution and assist with dynamic sitting balance in w/c was reinforced. Balance  Sitting Balance: (improved sitting balance on toilet (with nilson stedy in position)  maintains static sit balance with supervision today.)  Standing Balance: Maximum assistance  Bed mobility  Sit to Supine: Moderate assistance  Comment: pt calling for son to assist supine to sit, OT ed pt re technique and was encouraged complete task with OT, son observed.   Transfers  Stand Step Transfers: Dependent/Total  Sit to stand: Dependent/Total(at times max x 1, SBA of 1)  Stand to sit: Dependent/Total;2 Person assistance  Transfer Comments: Pt completed transfers with use of nilson denice with 2 person A. Pt required positioning with hand over hand of LUE on nilson galdamez bar and verbal cues and intermittent assist to maintain upright position due to L side lean. pt can easily sit to stand from nilson galdamez paddles with min of 1 or less. Standing Balance  Time: 1-3 min x  Activity: adls with nilson galdamez, can maintain static with RUE support and max of 1 or less  Comment: seated in w/c cues to address Left lean as well as assist needed to shift hips in chair to L and back,  during stand to sit pt having difficulty sitting level in chair. L lean in nilson galdamez also requiring assist of 1 for this and assist of 1 to move nilson galdamez. Functional Mobility  Functional - Mobility Device: Wheelchair  Assist Level: Minimal assistance  Functional Mobility Comments: pt ed and practiced w/c mobility in room using RUE and RLE with LLE on leg rest foward and back 5 feet. Toilet Transfers  Equipment Used: Grab bars  Toilet Transfer: Dependent/Total;2 Person assistance  Toilet Transfers Comments: nilson galdamez A x 2 up to toilet, then assist x 1 stand to sit,   A x 2 sit to stand from toilet,  pt requested toileting, but could not urinate, RN was notified. pm pt in different pants, RN confirms pt incontinent through briefs at times  Fine Motor: pt with flaccid LUE and unable to use for adls, showing increased awareness of managing limb with RUE ie. pt initiated using RUE to lift LUE when OT began to position w/c laptray  Type of ROM/Therapeutic Exercise  Type of ROM/Therapeutic Exercise: PROM  Comment: L shoulder with scapular mobilization, ex to practice dynamic sitting balance in w/c leaning forward away from back of chair then back several reps with RUE on w/c armrest.  Exercises  Shoulder Flexion: x  Shoulder ABduction: x                       ADL  Feeding: Verbal cueing; Increased time to complete;Setup(per dtr report, pt increasing her initiation to find items on L side of tray.)  Grooming: Setup(wash face, brush teeth, ed dtr and son re obtain flip top toothpaste for easier lynette use.)  UE Bathing: Moderate assistance  LE Bathing: (briefs, pants, teds, socks. with assist BLE pt is able to cross BLE to reach for adls, (more assist on L))  UE Dressing: Moderate assistance  LE Dressing: Dependent/Total  Toileting: Dependent/Total  Additional Comments: supine to sit, transfer nilson galdamez assist x 2 to toilet, then to w/c, completed grooming at sink, LE bathe and dress, transferred to recliner,  pm transferred recliner to w/c, UE bathe (showed pt lynette technique to wash RUE)  and dress in bathroom, practiced w/c mobility, attempted to don R shoe (too tight) re don sock. Positioning  Wheelchair Position Type: 1/2 lap tray  Wheelchair Postion Comment: good LUE positioning in w/c with L arm tray in place       Assessment        Type of devices: Call light within reach; Patient at risk for falls; All fall risk precautions in place(pt was left in recliner with family providing supervision in am, pt was delivered to PTA care in pm)         05/12/21 1453 05/12/21 1455   OT Individual Minutes   Time In [id number]   Time Out [id number]   Minutes 60 40          Patient Education:  Patient Goals   Patient goals : \"get as close to normal as possible. \"  specifies:\"get in and out of bed on my own, shower by myself. \"  Learner:family and patient  Method: demonstration and explanation       Outcome: acknowledged understanding , demonstrated understanding and needs reinforcement   OT Education  OT Education: ADL Adaptive Strategies; Family Education  Patient Education: ed pt, Daina Wilkes re lynette technique to don socks, assist needed to cross LLE to reach for adls, use of RLE to carry LLE to mobilize w/c in room for adls without leg rests. pt ed and practiced w/c mobility in room using RUE and RLE with LLE on leg rest foward and back 5 feet.     Plan  Plan  Times per week: 5-7  Times per day: Twice a day  Current Treatment Recommendations:

## 2021-05-12 NOTE — PLAN OF CARE
Individualized Plan of Care  [facility]    Rehabilitation physician: Dr Victor M Bianchi Date: 5/10/2021     Rehabilitation Diagnosis: Acute CVA (cerebrovascular accident) [facility]) [I63.9]     Rehabilitation impairments: self care, mobility, motor dysfunction, pain management, safety and communication    Factors facilitating achievement of predicted outcomes: Family support, Motivated, Cooperative, Pleasant, Good insight into deficits and Has homemaker services  Barriers to the achievement of predicted outcomes: Pain, Depression, Decreased endurance, Upper extremity weakness, Lower extremity weakness and Medication managment    Patient Goals: Improve independence with mobility, Increase overall strength and endurance, Increase balance, Increase independence with activities of daily living, Increase self-awareness, Increase safety awareness, Increase community integration, Increase socialization, Integrate appropriate pain management plan, Assure adequate nutritional option for discharge and Provide appropriate patient and family education      NURSING:  Nursing goals for Racquel Stanford while on the rehabilitation unit will include:  Adequate number of bowel movements, Urinate with no urinary retention >300ml in bladder, Maintain O2 SATs at an acceptable level during stay, Effective pain management while on the rehabilitation unit, Establish adequate pain control plan for discharge, Absence of skin breakdown while on the rehabilitation unit, Improved skin integrity via assessments including wound measurements, Avoidance of any hospital acquired infections, No signs/symptoms of infection at the wound site, Freedom from injury during hospitalization and Complete education with patient/family with understanding demonstrated regarding disease process and resultant impairment     In order to achieve these goals, nursing interventions may include bowel/bladder training, education for medical assistive devices, medication education, O2 saturation management, energy conservation, stress management techniques, fall prevention, alarms protocol, seating and positioning, skin/wound care, pressure relief instruction, dressing changes, infection protection, DVT prophylaxis, assistance with safe transfers , and/or assistance with bathroom activities and hygiene. PHYSICAL THERAPY:  Goals:        Short term goals  Time Frame for Short term goals: 10 days  Short term goal 1: Pt able to roll in bed side to side at min A without bed rail  Short term goal 2: Pt able to perform supine<>sit at mod A  Short term goal 3: Pt able to perform sit<>stand at min/mod A . Short term goal 4: Pt able to progress to pivot transfers at max A   Short term goal 5: Pt able to improve sitting balance at EOB/EOM at SBA for static balance and able to track to left side as well as turn to head to left side to scan her environment with minimal cues. Short term goal 6: Pt able to tolerate standing in // bars with R UE support and assist at mod A  Short term goal 7: Pt able to propel w/c with R UE/R LE distance of 50 to 80 ft, straight path and turn around 180 degrees, at min/mod A   Long term goals  Time Frame for Long term goals : By DC  Long term goal 1: Pt able to roll in bed mod-I  Long term goal 2: Pt able to perform supine <> sit at CGA/min A  Long term goal 3: Pt able to perform pivot transfers min A / mod A and able to scan Left side environment. Long term goal 4: Pt able to ambulate in // bars, or with appropriate assistive device and/or LE bracing, distance of  20 to 30 ft, min/mod A x 2. Long term goal 5: Pt able to propel w/c on level surfaces distance 100 to 150 ft, SBA/CGA. Long term goal 6: Educate pt/family in safe technique for mobility and  to go up and down steps to enter/exit home.    Long term goal 7: Improve sitting balance at EOM to good, and standing balance with R UE support to fair- to reduce fall risk.   Long term goal 8: Improve PASS score to 20/36 improve overall function. Plan of Care: Pt to be seen by physical therapy services 1 Hour 30 Minutes per day at least 5 out of 7 days per week     Anticipated interventions may include therapeutic exercises, gait training, neuromuscular re-ed, transfer training, community reintegration, bed mobility, w/c mobility and training. OCCUPATIONAL THERAPY:  Goals:             Short term goals  Time Frame for Short term goals: 1 week  Short term goal 1: mod A UE bathe and dress  Short term goal 2: set up brush teeth  Short term goal 3: min feeding, with cues able to locate L side of tray and scan to locate items on L. Short term goal 4: tolerate 6-8 min sitting edge of bed (static sit)  with min assist and RUE support, head at midline and fair safety awareness  Short term goal 5: pt to initiate reposition LUE with RUE with good safety ie. hold by wrist not by 1 finger  Short term goal 6: demo improved awareness of L side and neuromuscular christian by weight bearing on LUE with physical assist to complete  Short term goal 7: from w/c:  consistently maintain midline trunk control and demo able to lean forward 4 inches away from back of chair without physical assist for adls. Short term goal 8: tolerate 2 min static stand for adls with RUE support with mod of 2 and assist with LLE as needed. Long term goals  Time Frame for Long term goals : by discharge  Long term goal 1: set up and occasional verbal cues ie.  L visual scan for self feeding  Long term goal 2: set up oral care  Long term goal 3: max x 1 toileting and adl transfers  Long term goal 4: min UE bathe and dress (shirt)  Long term goal 5: max x 1 LE bathe and dress, able to cross to reach RLE with min assist and keep trunk balance    Plan of Care: Patient to be seen by occupational therapy services 1 Hour 30 Minutes per day at least 5 out of 7 days per week     Anticipated interventions may include ADL and

## 2021-05-12 NOTE — PATIENT CARE CONFERENCE
[address]   ACUTE REHABILITATION  TEAM CONFERENCE NOTE  Date: 21  Patient Name: Nadia Bal       Room: 8633/0849-84  MRN: [id number]       : 1955  (66 y.o.)     Gender: female   Referring Practitioner: Dr Marlin Turner CVA (cerebrovascular accident) [facility]) [I63.9]  Diagnosis: ischemic CVA,  subacute right MCA distribution infarct, L lynette, dysphagia, L neglect, loop recorder 21 (Dr. Luis Ceja)     NURSING  Bladder  Incontinent Daily  Bowel   Occasionally Incontinent  Date of Last BM: 21  Intervention    Both Bowel & Bladder Program     Wounds/Incisions/Ulcers: No skin issues identified  Medication Education Program: Patient currently unable to manage medications and family being educated  Pain: Patient's pain is currently controlled with -  Scheduled Tylenol    Fall Risk:  Falling star program initiated    PHYSICAL THERAPY  Bed Mobility  Bridging: Minimal assistance(to support and facilitate LLE only)  Rolling: Stand by assistance;Rolling Left;Rolling Right  Supine to Sit: Moderate assistance(L LE & trunk; good response to R UE cue; attn to L UE)  Sit to Supine: Moderate assistance(L LE & trunk adjustment)  Scooting: Minimal assistance(to support and facilitate LLE to bridge/lateral scoot)  Comment: Mat, wedge, 2 pillows; also, in room, flat bed, rail, 2 pillows. Transfers:  Sit to Stand: 2 Person Assistance;Minimal Assistance(Improved in Celestia Obey & //; blocking L knee, ext. L hip )  Stand to sit: Moderate Assistance;2 Person Assistance(with cues, improved control at end of day)  Bed to Chair: Dependent/Total(Jacqueline Spann)     Wheelchair Activities  Propulsion: Yes  Propulsion 1  Propulsion: Manual  Level: Level Tile  Method: RUE;RLE  Level of Assistance: Minimal assistance;Maximum assistance  Description/ Details: R shoe donned today with greatly improved control (steering and propulsion).  Veers L, requiring most assist for straight path maintenance & attention to L Body Dressing   1  Assistance Needed: Dependent     Putting On/Taking Off Footwear   1  Assistance Needed: Dependent      Dependent/Total   Toilet Transfer   1  Assistance Needed: Dependent      Equipment Used: Grab bars  Toilet Transfer: Dependent/Total;2 Person assistance  Toilet Transfers Comments: nilson galdamez A x 2 up to toilet, then assist x 1 stand to sit,   A x 2 sit to stand from toilet,  pt requested toileting, but could not urinate, RN was notified. pm pt in different pants, RN confirms pt incontinent through briefs at times   [address]   1 Assistance Needed: Dependent      Dependent/Total    Bed mobility  Supine to Sit: Moderate assistance;2 Person assistance  Sit to Supine: Moderate assistance  Comment: pt calling for son to assist supine to sit, OT ed pt re technique and was encouraged complete task with OT, son observed. Balance  Sitting Balance: (improved sitting balance on toilet (with nilson galdamez in position)  maintains static sit balance with supervision today.)  Standing Balance: Maximum assistance  Standing Balance  Time: 1-3 min x  Activity: adls with nilson galdamez, can maintain static with RUE support and max of 1 or less  Comment: seated in w/c cues to address Left lean as well as assist needed to shift hips in chair to L and back,  during stand to sit pt having difficulty sitting level in chair. L lean in nilson galdamez also requiring assist of 1 for this and assist of 1 to move nilson galdamez. Assessment: severely impaired loss of adl indep post CVA with severe L lynette deficits, dysphagia, L neglect    Short term goals  Time Frame for Short term goals: 1 week  Short term goal 1: mod A UE bathe and dress  Short term goal 2: set up brush teeth  Short term goal 3: min feeding, with cues able to locate L side of tray and scan to locate items on L.   Short term goal 4: tolerate 6-8 min sitting edge of bed (static sit)  with min assist and RUE support, head at midline and fair safety awareness  Short term goal 5: pt to initiate reposition LUE with RUE with good safety ie. hold by wrist not by 1 finger  Short term goal 6: demo improved awareness of L side and neuromuscular christian by weight bearing on LUE with physical assist to complete  Short term goal 7: from w/c:  consistently maintain midline trunk control and demo able to lean forward 4 inches away from back of chair without physical assist for adls. Short term goal 8: tolerate 2 min static stand for adls with RUE support with mod of 2 and assist with LLE as needed. SPEECH THERAPY  Min A memory and problem solving. Pt. Is on pureed solids and thin liquids. Short Term Goal: supervision level memory and problem solving. NUTRITION  Weight: 191 lb 8 oz (86.9 kg) / Body mass index is 29.12 kg/m². Diet Rx: Carbohydrate Control, 3-4 gm Na, Pureed. Glucerna twice daily. PO intake has been decreased with intake averaging approximately 50% of meals. Pt is consuming some of the supplements. Ref. Range 5/11/2021 06:36 5/11/2021 11:16 5/11/2021 16:32 5/11/2021 20:08 5/12/2021 06:19 5/12/2021 11:19 5/12/2021 16:22   POC Glucose Latest Ref Range: 65 - 105 mg/dL 125 (H) 125 (H) 111 (H) 147 (H) 108 (H) 111 (H) 113 (H)   Please see nutrition note for details. SOCIAL WORK ASSESSMENT  Assessment: Pt currently lives alone and identified her family as supportive. She has 3 children Sayra Turner present). Pt plans to return home with a goal of out-pt therapy. Her daughter stated they will be able to assist pt with 24 hr support if necessary as they plan on taking \"shifts\".      Pre-Admission Status:  Lives With: Alone  Type of Home: House  Home Layout: Two level, Able to Live on Main level with bedroom/bathroom, Laundry in basement  Home Access: Stairs to enter without rails  Entrance Stairs - Number of Steps: 3 (Front entrance)  Bathroom Shower/Tub: Tub/Shower unit, Curtain(tub shower is on main floor)  Bathroom Toilet: Standard  Bathroom Equipment: Hand-held shower  Home Equipment: (No DME)  ADL Assistance: Independent  Homemaking Assistance: Independent  Homemaking Responsibilities: Yes  Ambulation Assistance: Independent  Transfer Assistance: Independent  Active : Yes  Mode of Transportation: SUV  Occupation: Retired  Type of occupation: grocery store employee  Leisure & Hobbies: repurposing items, has a dog and fenced in back yard. IADL Comments: dtr, Karla Chaudhry RN lives in [facility], Ru Caputo lives 20 min away, sonNeal lives 4 blocks away. Additional Comments: Pt independent for all mobility, self care and IADL's. Family Education: Family available and participating in education    Percentage Risk for Readmission: Low 0 - 18%   Risk of Unplanned Readmission:      8 %    Critical Items: None       Problem / Barrier Intervention / Plan  Results   dysphagia Oral motor exercises    Impaired cognition Cognitive retraining exercises    Impaired ability to care for self related to CVA  Patient and family training in modified care strategies to increase independence and safety during care tasks     Impaired function related to deficits form CVA Facilitation for muscle contractions-progressing to strengthening, balance activities and functional mobility training, including family training for safe mobility     Steps at entrance of home Depending on pt's progress in motor return, discuss/practise safe technique for enter/exiting home with family. Progress to stair training for pt, if pt able. Severe left neglect. Work on positioning, having pt scan left environment, pt/family education. Total Self Care Score    Total Mobility Score  Admission Score:  12      Admission Score:  17  Goal:  22/42         Goal:  33/90   `  Discharge Plan   Estimated Discharge Date: 6/2/2021  Home evaluation needed?  Home Evaluation Indication (NO, Requires ReEval, YES/Date): Requires re-evaluation closer to discharge  Overnight or Day Pass: No  Factors facilitating achievement of predicted outcomes: Family support, Motivated, Cooperative, Good insight into deficits and Has homemaker services  Barriers to the achievement of predicted outcomes: Depression, Decreased endurance, Upper extremity weakness, Lower extremity weakness, Incontinence of bladder, Skin Care and Medication managment    Functional Goals at discharge:  Predicted Outcome: Home with familyPATIENT'S LEVEL OF ASSISTANCE: Minimal Assistance and Moderate Assistance  Discharge therapy goals:  PT: Long term goals  Time Frame for Long term goals : By DC  Long term goal 1: Pt able to roll in bed mod-I  Long term goal 2: Pt able to perform supine <> sit at CGA/min A  Long term goal 3: Pt able to perform pivot transfers min A / mod A and able to scan Left side environment. Long term goal 4: Pt able to ambulate in // bars, or with appropriate assistive device and/or LE bracing, distance of  20 to 30 ft, min/mod A x 2. Long term goal 5: Pt able to propel w/c on level surfaces distance 100 to 150 ft, SBA/CGA. Long term goal 6: Educate pt/family in safe technique for mobility and  to go up and down steps to enter/exit home. Long term goal 7: Improve sitting balance at EOM to good, and standing balance with R UE support to fair- to reduce fall risk. Long term goal 8: Improve PASS score to 20/36 improve overall function. OT:Long term goals  Time Frame for Long term goals : by discharge  Long term goal 1: set up and occasional verbal cues ie.  L visual scan for self feeding  Long term goal 2: set up oral care  Long term goal 3: max x 1 toileting and adl transfers  Long term goal 4: min UE bathe and dress (shirt)  Long term goal 5: max x 1 LE bathe and dress, able to cross to reach RLE with min assist and keep trunk balance  ST: mod I memory and problem solving, diet at least restrictive consistency    Team Members Present at Conference:  :  Trudi Young  Occupational Therapist: Leigha Durant Grant Douglas OT   Physical Therapist:Rafi Altman PT  Speech Therapist: Gagandeep GRAMAJOCCC/SLP  Nurse: Parish Rose RN   Dietary/Nutrition: Maryann Lee RD, LD  Pastoral Care: Estrella Valencia  CMG: Tianna Greer RN    I approve the established interdisciplinary plan of care as documented within the medical record of [address] [place] [address]     Shelley Mims MD

## 2021-05-12 NOTE — PROGRESS NOTES
Physical Therapy  [address]  Acute Rehabilitation Physical Therapy Progress Note    Date: 21  Patient Name: Jeferson Kang       Room: 0809/3772-02  MRN: [id number]   Account: [de-identified]   : 1955  (63 y.o.) Gender: female      Diagnosis: ischemic CVA,  subacute right MCA distribution infarct, L lynette, dysphagia, L neglect, loop recorder 21 (Dr. Elliot Phan)  Past Medical History:  has a past medical history of Asthma, COPD (chronic obstructive pulmonary disease) ([facility] Diabetes mellitus ([facility] [facility] Esophageal ring, Gastritis, Hyperlipidemia, Hyperplastic colon polyp, Hypertension, Osteoarthritis, Patient in clinical research study, and TIA (transient ischemic attack). Past Surgical History:   has a past surgical history that includes Appendectomy; Hysterectomy; Esophagus dilation; Colonoscopy (2017); Endoscopy, colon, diagnostic; pr egd transoral biopsy single/multiple (N/A, 2017); pr colsc flx w/rmvl of tumor polyp lesion snare tq (N/A, 2017); and Upper gastrointestinal endoscopy (2017). Restrictions/Precautions  Restrictions/Precautions: Fall Risk  Required Braces or Orthoses?: No  Position Activity Restriction  Other position/activity restrictions: significant L neglect with LUE/LLE deficits    Subjective: Pt c/o continued pain in L shoulder, daughter requesting sling for L UE as Pt was c/o increased pain as she leans L during toilet transfer. Comments: Pt more alert today than prevous day. Asked BHARGAVI Betancur to obtain Rooke boot & ask Dr. Pamela Gillis about appropriateness of sling. Vital Signs  Patient Currently in Pain: Yes  Pain Assessment: 0-10  Pain Level: 5  Pain Type: Acute pain  Pain Location: Shoulder  Pain Orientation: Left  Non-Pharmaceutical Pain Intervention(s): Repositioned;Rest;Elevation; Ambulation/Increased Activity  Response to Pain Intervention: None;Patient Satisfied    Bed Mobility   Bridging: Minimal assistance(to support and facilitate LLE only)  Rolling: Stand by assistance;Rolling Left;Rolling Right  Supine to Sit: Moderate assistance(L LE & trunk; good response to R UE cue; attn to L UE)  Sit to Supine: Moderate assistance(L LE & trunk adjustment)  Scooting: Minimal assistance(to support and facilitate LLE to bridge/lateral scoot)  Comment: Mat, wedge, 2 pillows; also, in room, flat bed, rail, 2 pillows. Transfers  Sit to Stand: 2 Person Assistance;Minimal Assistance(Improved in LAHTI & //; blocking L knee, ext. L hip )  Stand to sit: Moderate Assistance;2 Person Assistance(with cues, improved control at end of day)  Bed to Chair: Dependent/Total(Jacqueline Henderson)    Stairs/Curb  Stairs?: No    Wheelchair Activities  Propulsion: Yes  Propulsion 1  Propulsion: Manual  Level: Level Tile  Method: RUE;RLE  Level of Assistance: Minimal assistance;Maximum assistance  Description/ Details: R shoe donned today with greatly improved control (steering and propulsion). Veers L, requiring most assist for straight path maintenance & attention to L sided obstacles (required Max A 2 times to avoid propelling into wall on L); educated Pt to pull with UE & LE to turn R. For straight path, Pt demonstrating some difficulty coordinating movement as she repeatedly reverted to UE/LE pulling despite instruction for UE push/LE pull for straight path propulsion; Mod-Max A for straight path due to veering L with R-sided propulsion. Large-radius 180º turn. Distance: 100'    Balance   Posture: Poor  Sitting - Static: Poor;+(sitting edge of bed, R UE support, no back support)  Sitting - Dynamic: Poor(sitting edge of bed, R UE support, no back support)  Standing - Static: Poor(parallel bars)     Exercises   Other exercises?: Yes  Other exercises 1: supine bilateral LE exercises, R LE 1.5#, passive ROM L LE, x15-20 each  Other exercises 2: L heel cord stretch, 2x45 sec.    Other exercises 3: bed mobility  Other exercises 5: Seated reaching outside base of support and across midline, addressing L neglect, 3 min. Other exercises 6: Sit to stand: 4x in [facility], 3x in parallel bars, Mod/Max A x2(Max/dep. to extend L knee & hip, maintain neutral pelvis rot)  Other exercises 7: Standing tolerance, 90 sec. in parallel bars with Mod A x2(Mirror feedback for posture/to address L neglect c daughter)  Other exercises 8: Toilet transfer x1(Pt states supine L LE ex stir need to urinate. )    Activity Tolerance: Patient limited by fatigue, Patient limited by endurance    Current Treatment Recommendations: Strengthening, Balance Training, Functional Mobility Training, Transfer Training, Endurance Training, Wheelchair Mobility Training, Gait Training, Stair training, Neuromuscular Re-education, Home Exercise Program, Safety Education & Training, Patient/Caregiver Education & Training, Modalities, Positioning    Conditions Requiring Skilled Therapeutic Intervention  Body structures, Functions, Activity limitations: Decreased functional mobility ; Decreased strength;Decreased safe awareness;Decreased endurance;Decreased balance;Decreased posture;Decreased coordination;Decreased vision/visual deficit; Decreased fine motor control  Assessment: Improved/eased initiation of sit to stand. REQUIRES PT FOLLOW UP: Yes  Discharge Recommendations: Patient would benefit from continued therapy after discharge;Home with assist PRN    Goals  Short term goals  Time Frame for Short term goals: 10 days  Short term goal 1: Pt able to roll in bed side to side at min A without bed rail  Short term goal 2: Pt able to perform supine<>sit at mod A  Short term goal 3: Pt able to perform sit<>stand at min/mod A . Short term goal 4: Pt able to progress to pivot transfers at max A   Short term goal 5: Pt able to improve sitting balance at EOB/EOM at SBA for static balance and able to track to left side as well as turn to head to left side to scan her environment with minimal cues.    Short term goal 6: Pt able to tolerate standing in // bars with R UE support and assist at mod A  Short term goal 7: Pt able to propel w/c with R UE/R LE distance of 50 to 80 ft, straight path and turn around 180 degrees, at min/mod A   Long term goals  Time Frame for Long term goals : By DC  Long term goal 1: Pt able to roll in bed mod-I  Long term goal 2: Pt able to perform supine <> sit at CGA/min A  Long term goal 3: Pt able to perform pivot transfers min A / mod A and able to scan Left side environment. Long term goal 4: Pt able to ambulate in // bars, or with appropriate assistive device and/or LE bracing, distance of  20 to 30 ft, min/mod A x 2. Long term goal 5: Pt able to propel w/c on level surfaces distance 100 to 150 ft, SBA/CGA. Long term goal 6: Educate pt/family in safe technique for mobility and  to go up and down steps to enter/exit home. Long term goal 7: Improve sitting balance at EOM to good, and standing balance with R UE support to fair- to reduce fall risk. Long term goal 8: Improve PASS score to 20/36 improve overall function.         05/12/21 1353 05/12/21 1354   PT Individual Minutes   Time In 1032 1310   Time Out 1122 1351   Minutes 50 41     Electronically signed by Manuela Turner PTA on 5/12/21 at 5:42 PM EDT

## 2021-05-12 NOTE — PLAN OF CARE
Problem: Falls - Risk of:  Goal: Will remain free from falls  Description: Will remain free from falls  5/12/2021 0320 by Hong Roldan RN  Outcome: Met This Shift  Goal: Absence of physical injury  Description: Absence of physical injury  5/12/2021 0320 by Hong Roldan RN  Outcome: Met This Shift     Problem: Neurological  Goal: Maximum potential motor/sensory/cognitive function  5/12/2021 0320 by Hong Roldan RN  Outcome: Ongoing     Problem: Pain:  Goal: Pain level will decrease  Description: Pain level will decrease  5/12/2021 0320 by Hong Roldan RN  Outcome: Ongoing  Goal: Control of acute pain  Description: Control of acute pain  5/12/2021 0320 by Hong Roldan RN  Outcome: Ongoing  Goal: Control of chronic pain  Description: Control of chronic pain  5/12/2021 0320 by Hong Roldan RN  Outcome: Ongoing     Problem: Neurological  Goal: Maximum potential motor/sensory/cognitive function  5/12/2021 0320 by Hong Roldan RN  Outcome: Ongoing     Problem: Skin Integrity:  Goal: Will show no infection signs and symptoms  Description: Will show no infection signs and symptoms  5/12/2021 0320 by Hong Roldan RN  Outcome: Ongoing  Goal: Absence of new skin breakdown  Description: Absence of new skin breakdown  5/12/2021 0320 by Hong Roldan RN  Outcome: Ongoing     Problem: Nutrition  Goal: Optimal nutrition therapy  5/12/2021 0320 by Hong Roldan RN  Outcome: Ongoing

## 2021-05-12 NOTE — CONSULTS
[facility] Internal Medicine    CONSULTATION    / FOLLOW UP VISIT       Date:   5/12/2021  Patient name:  New Mccollum  Date of admission:  5/10/2021  7:13 AM  MRN:   [id number]  Account:  [de-identified]  YOB: 1955  PCP:    Juany Warner MD  Room:   5579/4307-46  Code Status:    Full Code    Physician Requesting Consult: Sharda Moore MD    History of Present Illness:      C/C ;  Medical comorbidity management     Subjective:  Patient seen and examined at bedside. Resting comfortably in bed in no acute distress. No change in left-sided weakness. Afebrile, hemodynamically stable, saturating well on room air. Leukocytosis continues to uptrend 13.6. We will continue to monitor. Patient eating appropriately, still no bowel movements. Continues to work with PT/OT. HPI:   Patient is a 61yo female with PMH DM, COPD, gastritis who presented with CVA. Patient last known well at 10pm on 5/3 and woke up with left sided weakness, left sided facial droop and right gaze deviation. CTA of head and neck showed right MCA M1 occlusion with collateralization. Patient underwent cerebral angiogram with unsuccessful thrombectomy. MRI brain showed right MCA stroke with hemorrhagic component. PHYLLIS at that time did not show any intramural thrombus. Loop recorder placed by Dr. Renata De Anda. Repeat head CT showing extension and hemorrhagic transformation of right MCA infarct. Social History:     Tobacco:    reports that she has been smoking cigarettes. She has a 20.00 pack-year smoking history. She has never used smokeless tobacco.  Alcohol:      reports no history of alcohol use. Drug Use:  reports no history of drug use.     Review of Systems:     Review of Systems   Review of Systems - General ROS: negative for - chills, fatigue, fever, night sweats, weight gain or weight loss  ENT ROS: positive for headaches, negative for oral lesions or sore throat  Respiratory  acetaminophen  1,000 mg Oral 3 times per day    amLODIPine  5 mg Oral Daily    aspirin  81 mg Oral Daily    atorvastatin  80 mg Oral Daily    budesonide-formoterol  2 puff Inhalation BID    heparin (porcine)  5,000 Units Subcutaneous 3 times per day    insulin lispro  0-3 Units Subcutaneous Nightly    insulin lispro  0-6 Units Subcutaneous TID WC    levothyroxine  25 mcg Oral Daily    nicotine  1 patch Transdermal Daily    tiotropium  2 puff Inhalation Daily    polyethylene glycol  17 g Oral Daily     Continuous Infusions:    sodium chloride       PRN Meds: polyvinyl alcohol, sodium chloride flush, promethazine **OR** ondansetron, sodium chloride, albuterol, dextrose, glucagon (rDNA), glucose, senna, bisacodyl        Assessment :       Assessment Dx    Active Problems:    Tobacco abuse    Gastritis    Acute left hemiparesis (HCC)    Acute CVA (cerebrovascular accident) ([facility] [address]    COPD (chronic obstructive pulmonary disease) ([facility] [address]    Essential hypertension    Hyperlipidemia    Pre-diabetes  Resolved Problems:    * No resolved hospital problems. *      Plan:     5/11/21  BP well controlled. Continue norvasc 5 mg daily  Copd stable. continue bronchodilators  Synthroid 25 mcg daily   lipitor 80 mg daily  Asa 81 mg daily  Insulin sliding scale  nicoderm patch  Continue PT/OT  Encourage PO intake  Leukocytosis continues trending up to 13.6. Likely reactive as no signs of infection. Continue to monitor       --   5/12/2021    · Progressing satisfactory with rehab therapies . 1. Continue current regimen        --     [address], [address] [place] [address].    Phone [phone]   Fax: [phone]  Answering Service: [phone]

## 2021-05-13 LAB
GLUCOSE BLD-MCNC: 112 MG/DL (ref 65–105)
GLUCOSE BLD-MCNC: 113 MG/DL (ref 65–105)
GLUCOSE BLD-MCNC: 138 MG/DL (ref 65–105)
GLUCOSE BLD-MCNC: 147 MG/DL (ref 65–105)

## 2021-05-13 PROCEDURE — 82947 ASSAY GLUCOSE BLOOD QUANT: CPT

## 2021-05-13 PROCEDURE — 92526 ORAL FUNCTION THERAPY: CPT

## 2021-05-13 PROCEDURE — 6370000000 HC RX 637 (ALT 250 FOR IP): Performed by: STUDENT IN AN ORGANIZED HEALTH CARE EDUCATION/TRAINING PROGRAM

## 2021-05-13 PROCEDURE — 97112 NEUROMUSCULAR REEDUCATION: CPT

## 2021-05-13 PROCEDURE — 97542 WHEELCHAIR MNGMENT TRAINING: CPT

## 2021-05-13 PROCEDURE — 97530 THERAPEUTIC ACTIVITIES: CPT

## 2021-05-13 PROCEDURE — 97129 THER IVNTJ 1ST 15 MIN: CPT

## 2021-05-13 PROCEDURE — 6370000000 HC RX 637 (ALT 250 FOR IP): Performed by: PHYSICAL MEDICINE & REHABILITATION

## 2021-05-13 PROCEDURE — 99231 SBSQ HOSP IP/OBS SF/LOW 25: CPT | Performed by: INTERNAL MEDICINE

## 2021-05-13 PROCEDURE — 97110 THERAPEUTIC EXERCISES: CPT

## 2021-05-13 PROCEDURE — 6360000002 HC RX W HCPCS: Performed by: PHYSICAL MEDICINE & REHABILITATION

## 2021-05-13 PROCEDURE — 97535 SELF CARE MNGMENT TRAINING: CPT

## 2021-05-13 PROCEDURE — 99232 SBSQ HOSP IP/OBS MODERATE 35: CPT | Performed by: PHYSICAL MEDICINE & REHABILITATION

## 2021-05-13 PROCEDURE — 1180000000 HC REHAB R&B

## 2021-05-13 RX ORDER — AMITRIPTYLINE HYDROCHLORIDE 25 MG/1
25 TABLET, FILM COATED ORAL NIGHTLY
Status: DISCONTINUED | OUTPATIENT
Start: 2021-05-13 | End: 2021-06-10 | Stop reason: HOSPADM

## 2021-05-13 RX ADMIN — ATORVASTATIN CALCIUM 80 MG: 80 TABLET, FILM COATED ORAL at 07:28

## 2021-05-13 RX ADMIN — AMITRIPTYLINE HYDROCHLORIDE 25 MG: 25 TABLET, FILM COATED ORAL at 22:34

## 2021-05-13 RX ADMIN — Medication 2 PUFF: at 21:05

## 2021-05-13 RX ADMIN — AMLODIPINE BESYLATE 5 MG: 5 TABLET ORAL at 07:28

## 2021-05-13 RX ADMIN — Medication 2 PUFF: at 11:37

## 2021-05-13 RX ADMIN — ACETAMINOPHEN 1000 MG: 500 TABLET, FILM COATED ORAL at 16:05

## 2021-05-13 RX ADMIN — Medication 2 PUFF: at 11:31

## 2021-05-13 RX ADMIN — ACETAMINOPHEN 1000 MG: 500 TABLET, FILM COATED ORAL at 06:31

## 2021-05-13 RX ADMIN — INSULIN LISPRO 1 UNITS: 100 INJECTION, SOLUTION INTRAVENOUS; SUBCUTANEOUS at 13:07

## 2021-05-13 RX ADMIN — ASPIRIN 81 MG CHEWABLE TABLET 81 MG: 81 TABLET CHEWABLE at 07:28

## 2021-05-13 RX ADMIN — HEPARIN SODIUM 5000 UNITS: 5000 INJECTION INTRAVENOUS; SUBCUTANEOUS at 16:09

## 2021-05-13 RX ADMIN — ACETAMINOPHEN 1000 MG: 500 TABLET, FILM COATED ORAL at 21:10

## 2021-05-13 RX ADMIN — HEPARIN SODIUM 5000 UNITS: 5000 INJECTION INTRAVENOUS; SUBCUTANEOUS at 06:29

## 2021-05-13 RX ADMIN — LEVOTHYROXINE SODIUM 25 MCG: 0.03 TABLET ORAL at 06:35

## 2021-05-13 RX ADMIN — HEPARIN SODIUM 5000 UNITS: 5000 INJECTION INTRAVENOUS; SUBCUTANEOUS at 21:30

## 2021-05-13 ASSESSMENT — PAIN SCALES - GENERAL
PAINLEVEL_OUTOF10: 2
PAINLEVEL_OUTOF10: 6
PAINLEVEL_OUTOF10: 5
PAINLEVEL_OUTOF10: 3

## 2021-05-13 ASSESSMENT — PAIN DESCRIPTION - DESCRIPTORS: DESCRIPTORS: HEADACHE

## 2021-05-13 ASSESSMENT — PAIN DESCRIPTION - PAIN TYPE: TYPE: ACUTE PAIN

## 2021-05-13 ASSESSMENT — PAIN DESCRIPTION - LOCATION: LOCATION: HEAD

## 2021-05-13 ASSESSMENT — PAIN SCALES - WONG BAKER: WONGBAKER_NUMERICALRESPONSE: 4

## 2021-05-13 NOTE — PROGRESS NOTES
controlled sit)  Transfer Comments: Pt completed transfers with use of nilson steady with 2 person A. Pt required positioning with hand over hand of LUE on nilson steady bar and verbal cues and intermittent assist to maintain upright position due to L side lean. pt can easily sit to stand from nilson steady paddles with min of one or less. .  Standing Balance  Time: ~1-2 min  Activity: Transfer, LB clothing mgmt during toileting   Functional Mobility  Functional - Mobility Device: Wheelchair  Activity: To/From therapy gym  Assist Level: Dependent/Total  Toilet Transfers  Toilet - Technique: Darrol Amanda steady )  Equipment Used: Raised toilet seat without rails  Toilet Transfer: Minimal assistance;2 Person assistance(Assist for proper positioning)  Toilet Transfers Comments: Janes Rodriguez steady to and from toilet requiring Min A to sit, Mod A fo sit to stand  Fine Motor: pt with flaccid LUE and unable to use for adls, hand over hand provided for brushing hair        Additional Activities: Other  Additional Activities: Pt was able to identify 10/10 cards from a deck when held on her left side turning her head to read each card. Neuromuscular Education  Other: Endura tape applied for support           ADL  Grooming: Setup  UE Dressing: Minimal assistance;Verbal cueing; Increased time to complete(pt was able to doff shirt, required assistance to thread)  Additional Comments: pt was able to doff shirt, required assistance to thread LUE, provided explanation on why the affected arm should be dressed first.   Toileting:  Moderate Assistance (Mod A for brief management, no brittanie care due to not urinating dispite feeling the need to go, pt was able to manage LE clothing down up with CGA for toileting task in nilson steady.)  Positioning  Wheelchair Position Type: 1/2 lap tray  Wheelchair Postion Comment: good LUE positioning in w/c with L arm tray in place       Assessment  Performance deficits / Impairments: Decreased functional mobility ;Decreased ADL status; Decreased strength;Decreased endurance;Decreased balance;Decreased high-level IADLs;Decreased vision/visual deficit; Decreased cognition;Decreased safe awareness;Decreased ROM; Decreased fine motor control;Decreased coordination;Decreased posture;Decreased sensation  Assessment: severely impaired loss of adl indep post CVA with severe L lynette deficits, dysphagia, L neglect  Prognosis: Good  Discharge Recommendations: Patient would benefit from continued therapy after discharge  Activity Tolerance: Patient limited by fatigue  Safety Devices in place: Yes  Type of devices: Call light within reach; Patient at risk for falls; All fall risk precautions in place             Plan  Plan  Times per week: 5-7  Times per day: Twice a day  Current Treatment Recommendations: Strengthening, Positioning, ROM, Safety Education & Training, Balance Training, Patient/Caregiver Education & Training, Self-Care / ADL, Cognitive/Perceptual Training, Functional Mobility Training, Neuromuscular Re-education, Equipment Evaluation, Education, & procurement, Endurance Training, Cognitive Reorientation  Patient Goals   Patient goals : \"get as close to normal as possible. \"  specifies:\"get in and out of bed on my own, shower by myself. \"  Short term goals  Time Frame for Short term goals: 1 week  Short term goal 1: mod A UE bathe and dress  Short term goal 2: set up brush teeth  Short term goal 3: min feeding, with cues able to locate L side of tray and scan to locate items on L.   Short term goal 4: tolerate 6-8 min sitting edge of bed (static sit)  with min assist and RUE support, head at midline and fair safety awareness  Short term goal 5: pt to initiate reposition LUE with RUE with good safety ie. hold by wrist not by 1 finger  Short term goal 6: demo improved awareness of L side and neuromuscular christian by weight bearing on LUE with physical assist to complete  Short term goal 7: from w/c:  consistently maintain midline trunk control and demo able to lean forward 4 inches away from back of chair without physical assist for adls. Short term goal 8: tolerate 2 min static stand for adls with RUE support with mod of 2 and assist with LLE as needed. Long term goals  Time Frame for Long term goals : by discharge  Long term goal 1: set up and occasional verbal cues ie. L visual scan for self feeding  Long term goal 2: set up oral care  Long term goal 3: max x 1 toileting and adl transfers  Long term goal 4: min UE bathe and dress (shirt)  Long term goal 5: max x 1 LE bathe and dress, able to cross to reach RLE with min assist and keep trunk balance         05/13/21 1252 05/13/21 1518   OT Individual Minutes   Time In 9718 2268   Time Out 1031 1329   Minutes 54 31       Electronically signed by Garfield AGGARWAL on 5/13/21 at 4:10 PM EDT  This patient  Wing Hose  was seen by the Occupational Therapy Student identified above, including any treatment and documentation activity.   The above documentation completed by DEBORAH Student  was reviewed and accepted by the [address] [place] [address] ALONZO/L

## 2021-05-13 NOTE — PROGRESS NOTES
Physical Therapy  [address] [facility]   Acute Rehabilitation Physical Therapy Progress Note    Date: 21  Patient Name: New Mccollum       Room: 8253/6190-78  MRN: [id number]   Account: [de-identified]   : 1955  (63 y.o.) Gender: female      Diagnosis: ischemic CVA,  subacute right MCA distribution infarct, L lynette, dysphagia, L neglect, loop recorder 21 (Dr. Renata De Anda)  Past Medical History:  has a past medical history of Asthma, COPD (chronic obstructive pulmonary disease) ([facility] Esophageal ring, Gastritis, Hyperlipidemia, Hyperplastic colon polyp, Hypertension, Osteoarthritis, Patient in clinical research study, and TIA (transient ischemic attack). Past Surgical History:   has a past surgical history that includes Appendectomy; Hysterectomy; Esophagus dilation; Colonoscopy (2017); Endoscopy, colon, diagnostic; pr egd transoral biopsy single/multiple (N/A, 2017); pr colsc flx w/rmvl of tumor polyp lesion snare tq (N/A, 2017); and Upper gastrointestinal endoscopy (2017). Restrictions/Precautions  Restrictions/Precautions: Fall Risk  Required Braces or Orthoses?: No  Position Activity Restriction  Other position/activity restrictions: significant L neglect with LUE/LLE deficits    Subjective: Daughter requesting outdoor visit with Pt's grandchildren and dog in PM. Family stated nurse ordered Rooke boot, but hasn't arrived yet, & sling request was rejected. Comments: Pt more alert today than prevous day, easily distracted by environment. Vital Signs  Patient Currently in Pain: Yes  Pain Assessment: Faces  Lara-Baker Pain Rating: Hurts little more  Pain Type: Acute pain  Pain Location: Head  Pain Descriptors: Headache  Non-Pharmaceutical Pain Intervention(s): Environmental changes(lights dimmed)  Response to Pain Intervention: Patient Satisfied    Bed Mobility   Sit to Supine:  Moderate assistance(bilateral LEs)  Scooting: Stand by assistance(to scoot forward on mat)  Comment: Bed flat, no rail, 2 pillows    Transfers  Sit to Stand: 2 Person Assistance; Moderate Assistance(from lower seat heights; Marnimariellealexandra Antonioe)  Stand to sit: Moderate Assistance;2 Person Assistance(Fair control; D/T for L UE safety)  Bed to Chair: Dependent/Total(Jacqueline Ambrose)    Wheelchair Activities  Propulsion: Yes  Propulsion 1  Propulsion: Manual  Level: Uneven(outdoors + ramp)  Method: RUE  Level of Assistance: Moderate assistance  Description/ Details: Assisting to roll safely down ramp; manuevering clockwise over bricks, requires cues to avoid obstacles. Distance: 100'    Balance   Posture: Poor  Sitting - Static: Poor;+(sitting edge of bed, R UE support, no back support)  Sitting - Dynamic: Poor(sitting edge of bed, R UE support, no back support)  Standing - Static: Poor(parallel bars)     Exercises   Other exercises?: Yes  Other exercises 6: Sit to stand: 4x in CORI Harrison(Max/dep. to extend L knee & hip, maintain neutral pelvis rot)  Other exercises 7: Standing tolerance, 90 sec. in Helen Perales, CGA(Mirror feedback for posture/to address L neglect c daughter)  Other exercises 8: Seated edge of mat core stabilization exercises, supporting and moving L UE with R UE, bilateral LE exercises (1.5 R, passive ROM L), 30 min. (Level of assist varied: CGA to Max A)  Other exercises 9: Exercises to address L neglect with family, dog; supported L UE patting of dog.  20 min. total    Activity Tolerance: Patient limited by fatigue, Patient limited by endurance    Current Treatment Recommendations: Strengthening, Balance Training, Functional Mobility Training, Transfer Training, Endurance Training, Wheelchair Mobility Training, Gait Training, Stair training, Neuromuscular Re-education, Home Exercise Program, Safety Education & Training, Patient/Caregiver Education & Training, Modalities, Positioning    Conditions Requiring Skilled Therapeutic Intervention  Body structures, Functions, Improve PASS score to 20/36 improve overall function.       05/13/21 1137 05/13/21 1502   PT Individual Minutes   Time In 3280 1330   Time Out 7108 4877   Minutes 53 45   Time Code Minutes   Timed Code Treatment Minutes  --  37 Minutes     Electronically signed by Elie Ambrose PTA on 5/13/21 at 5:14 PM EDT

## 2021-05-13 NOTE — PROGRESS NOTES
[facility] Internal Medicine    Progress Note  Family / Caregiver Present: Yes(daughters Yoko Campbell (AM))  Referring Practitioner: Dr Jodie Taylor  Comments: Pt more alert today than prevous day. Asked RN [address] [facility] [facility] to obtain Rooke boot & ask Dr. Shilpa Espitia about appropriateness of sling.   5/13/2021    11:54 AM    Name:   Flaco Pinzon  MRN:     [id number]     Acct:      [de-identified]   Room:   [address] [place] [address]  IP Day:  3  Admit Date:  5/10/2021  7:13 AM    PCP:   Meenu Box MD  Code Status:  Full Code    Subjective:     C/C: medical mgmt    Active Problems:    Tobacco abuse    Gastritis    Acute left hemiparesis ([address]    Acute CVA (cerebrovascular accident) ([address]    COPD (chronic obstructive pulmonary disease) ([facility]    Essential hypertension    Hyperlipidemia    Pre-diabetes  Resolved Problems:    * No resolved hospital problems. *      Interval History Status: improved. 5/13/21  Patient seen and examined at bedside. On her way to therapy today. No acute overnight events. Patient eating properly, having normal BMs. Afebrile, hemodynamically stable. Labs reviewed. Leukocytosis resolved, down to 11. No acute complaints.      Significant last 24 hr data reviewed ;   Vitals:    05/12/21 0630 05/12/21 1844 05/13/21 0728 05/13/21 0745   BP: 127/64 107/66 127/66 121/66   Pulse: 79 87  79   Resp: 19 18  18   Temp: 98 °F (36.7 °C) 98.5 °F (36.9 °C)  98.1 °F (36.7 °C)   TempSrc:  Oral  Oral   SpO2: 95% 93%  93%   Weight:       Height:          Recent Results (from the past 24 hour(s))   POC Glucose Fingerstick    Collection Time: 05/12/21  4:22 PM   Result Value Ref Range    POC Glucose 113 (H) 65 - 105 mg/dL   POC Glucose Fingerstick    Collection Time: 05/12/21  8:06 PM   Result Value Ref Range    POC Glucose 140 (H) 65 - 105 mg/dL   POC Glucose Fingerstick    Collection Time: 05/13/21  6:18 AM   Result Value Ref Range    POC Glucose 112 (H) 65 - 105 mg/dL   POC Nightly    insulin lispro  0-6 Units Subcutaneous TID     levothyroxine  25 mcg Oral Daily    nicotine  1 patch Transdermal Daily    tiotropium  2 puff Inhalation Daily    polyethylene glycol  17 g Oral Daily     Continuous Infusions:    sodium chloride       PRN Meds: polyvinyl alcohol, sodium chloride flush, promethazine **OR** ondansetron, sodium chloride, albuterol, dextrose, glucagon (rDNA), glucose, senna, bisacodyl      Physical Examination:        /66   Pulse 79   Temp 98.1 °F (36.7 °C) (Oral)   Resp 18   Ht 5' 8\" (1.727 m)   Wt 191 lb 8 oz (86.9 kg)   SpO2 93%   BMI 29.12 kg/m²   Temp (24hrs), Av.3 °F (36.8 °C), Min:98.1 °F (36.7 °C), Max:98.5 °F (36.9 °C)    Recent Labs     21  1622 21  0618 21  1145   POCGLU 113* 140* 112* 147*     No intake or output data in the 24 hours ending 21 1154    General Appearance:  alert, well appearing, and in no acute distress  Mental status: oriented to person, place, and time with normal affect  Head:  normocephalic, atraumatic. Eye: no icterus, redness, pupils equal and reactive, extraocular eye movements intact, conjunctiva clear  Ear: normal external ear, no discharge, hearing intact  Nose:  no drainage noted  Mouth: mucous membranes moist  Neck: supple, no carotid bruits, thyroid not palpable  Lungs: Clear to auscultation bilaterally. No accessory muscle use. Cardiovascular: normal rate, regular rhythm, no murmur, gallop, rub. Abdomen: Soft, nontender, nondistended, normal bowel sounds, no hepatomegaly or splenomegaly  Neurologic: Left upper and lower extremity weakness, 0-1/5. No sensory loss.   Skin: No gross lesions, rashes, bruising or bleeding on exposed skin area  Extremities:  peripheral pulses palpable, no pedal edema or calf pain with palpation    Assessment:        Primary Problem  <principal problem not specified>    Active Hospital Problems    Diagnosis Date Noted    Acute CVA (cerebrovascular accident) ([facility] [I63.9] 05/10/2021    [facility] [J44.9] 05/10/2021    Essential hypertension [I10] 05/10/2021    Hyperlipidemia [E78.5] 05/10/2021    Pre-diabetes [R73.03] 05/10/2021    Acute left hemiparesis (HCC) [G81.94]     Gastritis [K29.70]     Tobacco abuse [Z72.0] 07/16/2017     Plan:        BP well controlled. Continue norvasc 5 mg daily  Copd stable. continue bronchodilators  Synthroid 25 mcg daily   lipitor 80 mg daily  Asa 81 mg daily  Insulin sliding scale  nicoderm patch  Continue PT/OT  Encourage PO intake  Leukocytosis resolved. Likely reactionary. Flaco Barrett MD  Internal Medicine Resident, PGY-2  [address], [place]  5/13/2021, 11:55 AM    Attestation and add on       I have discussed the care of [address] [place] [address] , including pertinent history and exam findings,      5/13/21    with the resident. I have seen and examined the patient and the key elements of all parts of the encounter have been performed by me . I agree with the assessment, plan and orders as documented by the resident.     ---- ;     [address] [place] [address], [address] [place] [address].    Phone [phone]   Fax: [phone]  Answering Service: [phone]

## 2021-05-13 NOTE — PLAN OF CARE
Problem: Neurological  Goal: Maximum potential motor/sensory/cognitive function  Outcome: Ongoing     Problem: Pain:  Goal: Pain level will decrease  Description: Pain level will decrease  Outcome: Ongoing  Goal: Control of acute pain  Description: Control of acute pain  Outcome: Ongoing  Goal: Control of chronic pain  Description: Control of chronic pain  Outcome: Ongoing     Problem: Neurological  Goal: Maximum potential motor/sensory/cognitive function  Outcome: Ongoing     Problem: Skin Integrity:  Goal: Will show no infection signs and symptoms  Description: Will show no infection signs and symptoms  Outcome: Ongoing  Goal: Absence of new skin breakdown  Description: Absence of new skin breakdown  Outcome: Ongoing     Problem: Falls - Risk of:  Goal: Will remain free from falls  Description: Will remain free from falls  Outcome: Ongoing  Goal: Absence of physical injury  Description: Absence of physical injury  Outcome: Ongoing     Problem: Nutrition  Goal: Optimal nutrition therapy  Outcome: Ongoing

## 2021-05-13 NOTE — PROGRESS NOTES
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 Patient is c/o continued headache pain which \"feels like a migraine\" despite routine Tylenol. She reports good sleep. Poor appetite. No new issues with bowel or bladder. Observed performing sit to stands in nilson steady in therapy. ROS:  Denies fevers, chills, sweats. No chest pain, palpitations, lightheadedness. Denies coughing, wheezing or shortness of breath. Denies abdominal pain, nausea, diarrhea or constipation. No new areas of joint pain. Denies new areas of numbness or weakness. Denies new anxiety or depression issues. No new skin problems. Rehabilitation:   Progressing in therapies. PT:  Restrictions/Precautions: Fall Risk  Implants present? : (Loop recorder)  Other position/activity restrictions: significant L neglect with LUE/LLE deficits   Transfers  Sit to Stand: 2 Person Assistance, Minimal Assistance(Improved in Morna Geoff & //; blocking L knee, ext. L hip )  Stand to sit: Moderate Assistance, 2 Person Assistance(with cues, improved control at end of day)  Bed to Chair: Dependent/Total(Nilson Mandeep)  Stand Pivot Transfers: Dependent/Total(Nilson Mandeep)  Comment: Pt stood with nilson stedy for ~ 20 secs and 25 secs using nilson stedy, therapist assist with L hand  on nilson stedy, no quad contraction noted in standing position. Transfers  Sit to Stand: 2 Person Assistance, Minimal Assistance(Improved in Morna Geoff & //; blocking L knee, ext. L hip )  Stand to sit: Moderate Assistance, 2 Person Assistance(with cues, improved control at end of day)  Bed to Chair: Dependent/Total(Nilson [place])  Stand Pivot Transfers: Dependent/Total(Nilson Mandeep)  Comment: Pt stood with nilson stedy for ~ 20 secs and 25 secs using nilson stedy, therapist assist with L hand  on nilson stedy, no quad contraction noted in standing position.    Ambulation  Ambulation?: No OT:  ADL  Feeding: Verbal cueing, Increased time to complete, Setup(per dtr report, pt increasing her initiation to find items on L side of tray.)  Grooming: Setup(wash face, brush teeth, ed dtr and son re obtain flip top toothpaste for easier lynette use.)  UE Bathing: Moderate assistance  LE Bathing: (briefs, pants, teds, socks. with assist BLE pt is able to cross BLE to reach for adls, (more assist on L))  UE Dressing: Moderate assistance  LE Dressing: Dependent/Total  Toileting: Dependent/Total  Additional Comments: supine to sit, transfer nilson galdamez assist x 2 to toilet, then to w/c, completed grooming at sink, LE bathe and dress, transferred to recliner,  pm transferred recliner to w/c, UE bathe (showed pt lynette technique to wash RUE)  and dress in bathroom, practiced w/c mobility, attempted to don R shoe (too tight) re don sock. Balance  Sitting Balance: (improved sitting balance on toilet (with nilson galdamez in position)  maintains static sit balance with supervision today.)  Standing Balance: Maximum assistance   Standing Balance  Time: 1-3 min x  Activity: adls with nilson galdamez, can maintain static with RUE support and max of 1 or less  Comment: seated in w/c cues to address Left lean as well as assist needed to shift hips in chair to L and back,  during stand to sit pt having difficulty sitting level in chair. L lean in nilson galdamez also requiring assist of 1 for this and assist of 1 to move nilson galdamez. Functional Mobility  Functional - Mobility Device: Wheelchair  Activity: To/From therapy gym  Assist Level: Minimal assistance  Functional Mobility Comments: pt ed and practiced w/c mobility in room using RUE and RLE with LLE on leg rest foward and back 5 feet. Bed mobility  Bridging: Minimal assistance(to support and facilitate LLE only)  Rolling to Left: Minimal assistance  Rolling to Right: Maximum assistance  Supine to Sit: Moderate assistance, 2 Person assistance  Sit to Supine:  Moderate assistance  Scooting: Minimal assistance(to support and facilitate LLE to bridge/lateral scoot)  Comment: pt calling for son to assist supine to sit, OT ed pt re technique and was encouraged complete task with OT, son observed. Transfers  Stand Step Transfers: Dependent/Total  Sit to stand: Dependent/Total(at times max x 1, SBA of 1)  Stand to sit: Dependent/Total, 2 Person assistance  Transfer Comments: Pt completed transfers with use of nilson stedy with 2 person A. Pt required positioning with hand over hand of LUE on nilson stedy bar and verbal cues and intermittent assist to maintain upright position due to L side lean. pt can easily sit to stand from nilson stedy paddles with min of 1 or less. Toilet Transfers  Equipment Used: Grab bars  Toilet Transfer: Dependent/Total, 2 Person assistance  Toilet Transfers Comments: nilson stedy A x 2 up to toilet, then assist x 1 stand to sit,   A x 2 sit to stand from toilet,  pt requested toileting, but could not urinate, RN was notified. pm pt in different pants, RN confirms pt incontinent through briefs at times        Wheelchair Bed Transfers  Wheelchair/Bed - Technique: Elner Plump stedy)  Equipment Used: Bed, Wheelchair, Other(nilson steady )  Level of Asssistance: Dependent/Total(Min A of 2 with nilson steady )    SPEECH:  Subjective: [x]? Alert     [x]? Cooperative     []? Confused     []? Agitated    []? Lethargic     Objective/Assessment:  Attention:  Pt. Easily distracted, cues needed to redirect attention. Verbal cues needed for increased attention to midline, pt. c extreme L sided neglect.      Recall: paragraph recall- 90%, 100% c cues. 4 word category inclusion- 84%, 100% c cues.       Organization: n/a     Problem Solving/Reasoning: dual word meanings- 80%.      Other:  Pt. Completed oral motor exercises x3, 10 reps each with mod cues. Pt. Demonstrates significant L sided weakness.  Pt.  daughter present, many questions swallowing exercises, verbalized understanding to education provided. Pt. Instructed on PROM exercises. Pt. Applied x4 exercises, 10 reps each. Objective:  /66   Pulse 79   Temp 98.1 °F (36.7 °C) (Oral)   Resp 18   Ht 5' 8\" (1.727 m)   Wt 191 lb 8 oz (86.9 kg)   SpO2 93%   BMI 29.12 kg/m²       GEN: Well developed, well nourished, in NAD  HEENT:  NCAT. PERRL. EOMI. Mucous membranes pink and moist.   PULM:  Clear to ausculation. No rales or rhonchi. Respirations WNL and unlabored. CV:  Regular rate rhythm. No murmurs or gallops. GI:  Abdomen soft. Nontender. Non-distended. BS + and equal.    NEUROLOGICAL: A&O x3. Sensation intact to light touch. Right gaze preference. Impaired L CN VII.   MSK:  Functional ROM RUE and RLEs. Impaired AROM LUE and LLE. [facility] Motor testing 4+/5 key muscles RUE and RLE. L shoulder shrug 1/5, L hip flexion/knee extension 1/5. Distal LUE and LLE muscles 0/5. SKIN: Warm dry and intact. Good turgor. EXTREMITIES:  No calf tenderness to palpation. No edema BLEs. [facility] PSYCH: Mood WNL. Appropriately interactive. Affect flat    Diagnostics:     CBC:   Recent Labs     05/11/21  0716 05/12/21  0636   WBC 13.6* 11.4*   RBC 4.42 4.49   HGB 13.6 13.8   HCT 40.4 41.0   MCV 91.3 91.2   RDW 14.3 14.5    397     BMP:   Recent Labs     05/11/21  0716      K 4.1      CO2 28   BUN 20   CREATININE 0.58   GLUCOSE 163*     BNP: No results for input(s): BNP in the last 72 hours. PT/INR: No results for input(s): PROTIME, INR in the last 72 hours. APTT: No results for input(s): APTT in the last 72 hours. CARDIAC ENZYMES: No results for input(s): CKMB, CKMBINDEX, TROPONINT in the last 72 hours. Invalid input(s): CKTOTAL;3  FASTING LIPID PANEL:  Lab Results   Component Value Date    CHOL 206 (H) 05/05/2021    HDL 29 (L) 05/05/2021    TRIG 185 (H) 05/05/2021     LIVER PROFILE: No results for input(s): AST, ALT, ALB, BILIDIR, BILITOT, ALKPHOS in the last 72 hours.      Current Medications:   Current Facility-Administered Medications: heparin (porcine) injection 5,000 Units, 5,000 Units, Subcutaneous, 3 times per day  acetaminophen (TYLENOL) tablet 1,000 mg, 1,000 mg, Oral, 3 times per day  polyvinyl alcohol (LIQUIFILM TEARS) 1.4 % ophthalmic solution 1 drop, 1 drop, Both Eyes, PRN  sodium chloride flush 0.9 % injection 5-40 mL, 5-40 mL, Intravenous, PRN  promethazine (PHENERGAN) tablet 12.5 mg, 12.5 mg, Oral, Q6H PRN **OR** ondansetron (ZOFRAN) injection 4 mg, 4 mg, Intravenous, Q6H PRN  0.9 % sodium chloride infusion, 25 mL, Intravenous, PRN  albuterol (PROVENTIL) nebulizer solution 2.5 mg, 2.5 mg, Nebulization, Q4H PRN  amLODIPine (NORVASC) tablet 5 mg, 5 mg, Oral, Daily  aspirin chewable tablet 81 mg, 81 mg, Oral, Daily  atorvastatin (LIPITOR) tablet 80 mg, 80 mg, Oral, Daily  budesonide-formoterol (SYMBICORT) 160-4.5 MCG/ACT inhaler 2 puff, 2 puff, Inhalation, BID  dextrose 50 % IV solution, 12.5 g, Intravenous, PRN  glucagon (rDNA) injection 1 mg, 1 mg, Intramuscular, PRN  glucose (GLUTOSE) 40 % oral gel 15 g, 15 g, Oral, PRN  insulin lispro (HUMALOG) injection vial 0-3 Units, 0-3 Units, Subcutaneous, Nightly  insulin lispro (HUMALOG) injection vial 0-6 Units, 0-6 Units, Subcutaneous, TID WC  levothyroxine (SYNTHROID) tablet 25 mcg, 25 mcg, Oral, Daily  nicotine (NICODERM CQ) 14 MG/24HR 1 patch, 1 patch, Transdermal, Daily  tiotropium (SPIRIVA RESPIMAT) 2.5 MCG/ACT inhaler 2 puff, 2 puff, Inhalation, Daily  polyethylene glycol (GLYCOLAX) packet 17 g, 17 g, Oral, Daily  senna (SENOKOT) tablet 17.2 mg, 2 tablet, Oral, Daily PRN  bisacodyl (DULCOLAX) suppository 10 mg, 10 mg, Rectal, Daily PRN      Impression/Plan:   Impaired ADLs, gait, and mobility due to:      1. Ischemic R MCA CVA with hemorrhagic conversion and L non-dominant hemiparesis:  PT/OT for gait, mobility, strengthening, endurance, ADLs, and self care. On ASA, atorvastatin. 2. Headache: Has Tylenol routine TID. Will start amitriptyline tonight. 3. Dry eyes: has natural tears. 4. Cognitive impairment: SLP treating  5. Dysphagia/aphasia: SLP treating. On pureed diet, thin liquids  6. HTN/Hyperlipidemia: on amlodipine  7. DM: on insulin sliding scale  8. COPD/asthma: on albuterol nebulizers prn, on symbicort BID, spiriva  9. Esophageal Ring: Will monitor dysphagia  10. Hypothyroidism: on levothyroxine  11. Nicotine dependence: on Nicoderm  12. Bowel Management: Miralax daily, senokot prn, dulcolax prn. 13. DVT Prophylaxis:  heparin, SCD's while in bed and MAXIM's   14. Internal medicine for medical management    Electronically signed by Maya Oneill MD on 5/13/2021 at 9:39 AM      This note is created with the assistance of a speech recognition program.  While intending to generate a document that actually reflects the content of the visit, the document can still have some errors including those of syntax and sound a like substitutions which may escape proof reading. In such instances, actual meaning can be extrapolated by contextual diversion.

## 2021-05-13 NOTE — PROGRESS NOTES
Speech Language Pathology  Speech Language Pathology  [facility] Acute Rehab Unit at [facility]    Cognitive Treatment Note    Date: 5/13/2021  Patients Name: Wing Hutson  MRN: [id number]  Diagnosis:   Patient Active Problem List   Diagnosis Code    Pneumonia of left lower lobe due to infectious organism J18.9    Tobacco abuse Z72.0    Legionella pneumonia ([facility] A48.1    Dysphagia R13.10    Hyperplastic colon polyp K63.5    Esophageal ring K22.2    Gastritis K29.70    Elbow effusion, right M25.421    Stroke due to occlusion of right middle cerebral artery (HCC) I63.511    Nihss score 15 R29.715    Acute left hemiparesis (HCC) G81.94    Cerebrovascular accident (CVA) due to occlusion of right middle cerebral artery ([facility] I63.511    Acute CVA (cerebrovascular accident) ([facility] I63.9    COPD (chronic obstructive pulmonary disease) ([facility] J44.9    Essential hypertension I10    Hyperlipidemia E78.5    Pre-diabetes R73.03       Pain: 0/10    Cognitive Treatment    Treatment time: 9917-5245    Subjective: [x] Alert [x] Cooperative     [] Confused     [] Agitated    [] Lethargic    Objective/Assessment:  Attention:  Pt. Easily distracted, cues needed to redirect attention. Verbal cues needed for increased attention to midline, pt. c extreme L sided neglect. Recall: paragraph recall- 90%, 100% c cues. 4 word category inclusion- 84%, 100% c cues. Organization: n/a    Problem Solving/Reasoning: dual word meanings- 80%. Other:  Pt. Completed oral motor exercises x3, 10 reps each with mod cues. Pt. Demonstrates significant L sided weakness. Pt.  daughter present, many questions swallowing exercises, verbalized understanding to education provided. Pt. Instructed on PROM exercises. Pt. Applied x4 exercises, 10 reps each.      Plan:  [x] Continue ST services    [] Discharge from ST:      Discharge recommendations: [] Inpatient Rehab   [] [place]   [] Outpatient Therapy  [] Follow up at trauma clinic   [] Other:       Treatment completed by: Melba Salguero. SUZANNE/SLP

## 2021-05-14 LAB
GLUCOSE BLD-MCNC: 109 MG/DL (ref 65–105)
GLUCOSE BLD-MCNC: 110 MG/DL (ref 65–105)
GLUCOSE BLD-MCNC: 112 MG/DL (ref 65–105)
GLUCOSE BLD-MCNC: 123 MG/DL (ref 65–105)

## 2021-05-14 PROCEDURE — 82947 ASSAY GLUCOSE BLOOD QUANT: CPT

## 2021-05-14 PROCEDURE — 6370000000 HC RX 637 (ALT 250 FOR IP): Performed by: STUDENT IN AN ORGANIZED HEALTH CARE EDUCATION/TRAINING PROGRAM

## 2021-05-14 PROCEDURE — 97530 THERAPEUTIC ACTIVITIES: CPT

## 2021-05-14 PROCEDURE — 97110 THERAPEUTIC EXERCISES: CPT

## 2021-05-14 PROCEDURE — 97542 WHEELCHAIR MNGMENT TRAINING: CPT

## 2021-05-14 PROCEDURE — 6370000000 HC RX 637 (ALT 250 FOR IP): Performed by: PHYSICAL MEDICINE & REHABILITATION

## 2021-05-14 PROCEDURE — 6360000002 HC RX W HCPCS: Performed by: PHYSICAL MEDICINE & REHABILITATION

## 2021-05-14 PROCEDURE — 99232 SBSQ HOSP IP/OBS MODERATE 35: CPT | Performed by: INTERNAL MEDICINE

## 2021-05-14 PROCEDURE — 92526 ORAL FUNCTION THERAPY: CPT

## 2021-05-14 PROCEDURE — 97112 NEUROMUSCULAR REEDUCATION: CPT

## 2021-05-14 PROCEDURE — 97129 THER IVNTJ 1ST 15 MIN: CPT

## 2021-05-14 PROCEDURE — 1180000000 HC REHAB R&B

## 2021-05-14 PROCEDURE — 97535 SELF CARE MNGMENT TRAINING: CPT

## 2021-05-14 PROCEDURE — 99232 SBSQ HOSP IP/OBS MODERATE 35: CPT | Performed by: PHYSICAL MEDICINE & REHABILITATION

## 2021-05-14 RX ADMIN — ASPIRIN 81 MG CHEWABLE TABLET 81 MG: 81 TABLET CHEWABLE at 08:00

## 2021-05-14 RX ADMIN — AMITRIPTYLINE HYDROCHLORIDE 25 MG: 25 TABLET, FILM COATED ORAL at 23:18

## 2021-05-14 RX ADMIN — ACETAMINOPHEN 1000 MG: 500 TABLET, FILM COATED ORAL at 23:19

## 2021-05-14 RX ADMIN — Medication 2 PUFF: at 08:02

## 2021-05-14 RX ADMIN — ACETAMINOPHEN 1000 MG: 500 TABLET, FILM COATED ORAL at 14:16

## 2021-05-14 RX ADMIN — AMLODIPINE BESYLATE 5 MG: 5 TABLET ORAL at 07:59

## 2021-05-14 RX ADMIN — Medication 2 PUFF: at 08:01

## 2021-05-14 RX ADMIN — LEVOTHYROXINE SODIUM 25 MCG: 0.03 TABLET ORAL at 06:21

## 2021-05-14 RX ADMIN — HEPARIN SODIUM 5000 UNITS: 5000 INJECTION INTRAVENOUS; SUBCUTANEOUS at 23:21

## 2021-05-14 RX ADMIN — Medication 2 PUFF: at 23:19

## 2021-05-14 RX ADMIN — HEPARIN SODIUM 5000 UNITS: 5000 INJECTION INTRAVENOUS; SUBCUTANEOUS at 06:25

## 2021-05-14 RX ADMIN — ATORVASTATIN CALCIUM 80 MG: 80 TABLET, FILM COATED ORAL at 08:00

## 2021-05-14 RX ADMIN — ACETAMINOPHEN 1000 MG: 500 TABLET, FILM COATED ORAL at 06:21

## 2021-05-14 RX ADMIN — HEPARIN SODIUM 5000 UNITS: 5000 INJECTION INTRAVENOUS; SUBCUTANEOUS at 14:15

## 2021-05-14 ASSESSMENT — PAIN DESCRIPTION - DESCRIPTORS: DESCRIPTORS: HEADACHE

## 2021-05-14 ASSESSMENT — PAIN SCALES - GENERAL
PAINLEVEL_OUTOF10: 4
PAINLEVEL_OUTOF10: 0
PAINLEVEL_OUTOF10: 2
PAINLEVEL_OUTOF10: 0

## 2021-05-14 ASSESSMENT — PAIN SCALES - WONG BAKER: WONGBAKER_NUMERICALRESPONSE: 6

## 2021-05-14 ASSESSMENT — PAIN DESCRIPTION - PAIN TYPE: TYPE: ACUTE PAIN

## 2021-05-14 ASSESSMENT — PAIN DESCRIPTION - LOCATION: LOCATION: HEAD

## 2021-05-14 NOTE — PROGRESS NOTES
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 Patient is doing well today. She notes improvement in her headache pain with new amitriptyline. She reports low appetite. No other new issues today. ROS:  Denies fevers, chills, sweats. No chest pain, palpitations, lightheadedness. Denies coughing, wheezing or shortness of breath. Denies abdominal pain, nausea, diarrhea or constipation. No new areas of joint pain. Denies new areas of numbness or weakness. Denies new anxiety or depression issues. No new skin problems. Rehabilitation:   Progressing in therapies. PT:  Restrictions/Precautions: Fall Risk  Implants present? : (Loop recorder)  Other position/activity restrictions: significant L neglect with LUE/LLE deficits   Transfers  Sit to Stand: 2 Person Assistance, Moderate Assistance(from lower seat heights; Dearl Boy)  Stand to sit: Moderate Assistance, 2 Person Assistance(Fair control; D/T for L UE safety)  Bed to Chair: Dependent/Total(Nilson Tiwari)  Stand Pivot Transfers: Dependent/Total(Nilson Stu Tiwari)  Comment: Pt stood with nilson stedy for ~ 20 secs and 25 secs using nilson stedy, therapist assist with L hand  on nilson stedy, no quad contraction noted in standing position. Transfers  Sit to Stand: 2 Person Assistance, Moderate Assistance(from lower seat heights; Dearl Boy)  Stand to sit: Moderate Assistance, 2 Person Assistance(Fair control; D/T for L UE safety)  Bed to Chair: Dependent/Total(Nilson Tiwari)  Stand Pivot Transfers: Dependent/Total(Nilson Stu Tiwari)  Comment: Pt stood with nilson stedy for ~ 20 secs and 25 secs using nilson stedy, therapist assist with L hand  on nilson stedy, no quad contraction noted in standing position.    Ambulation  Ambulation?: No               OT:  ADL  Feeding: Verbal cueing, Increased time to complete, Setup(per dtr report, pt increasing her initiation to find items on L side of tray.)  Grooming: Setup  UE Bathing: Moderate assistance  LE Bathing: (briefs, pants, teds, socks. with assist BLE pt is able to cross BLE to reach for adls, (more assist on L))  UE Dressing: Minimal assistance, Verbal cueing, Increased time to complete(pt was able to doff shirt, required assistance to thread )  LE Dressing: Dependent/Total  Toileting: Moderate assistance  Additional Comments: pt was able to doff shirt, required assistance to thread LUE, provided explination on why the affected arm should be dressed first. Mod A for breif management, no brittanie care due to not urinating dispite feeling the need to go, pt was able to manage LE clothing for tolieting task. Balance  Sitting Balance: Contact guard assistance(for L lean )  Standing Balance: Dependent/Total(Nilson steady, Min A for L lean, 2nd person CGA)   Standing Balance  Time: ~1-2 min  Activity: Transfer   Comment: seated in w/c cues to address Left lean as well as assist needed to shift hips in chair to L and back,  during stand to sit pt having difficulty sitting level in chair. L lean in nilson stedy also requiring assist of 1 for this and assist of 1 to move nilson stedy. Functional Mobility  Functional - Mobility Device: Wheelchair  Activity: To/From therapy gym  Assist Level: Dependent/Total  Functional Mobility Comments: pt ed and practiced w/c mobility in room using RUE and RLE with LLE on leg rest foward and back 5 feet. Bed mobility  Bridging: Minimal assistance(to support and facilitate LLE only)  Rolling to Left: Minimal assistance  Rolling to Right: Maximum assistance  Supine to Sit: 2 Person assistance, Moderate assistance(HOB elevated pt utilized bed rails, required assist for legs)  Sit to Supine:  Moderate assistance  Scooting: Stand by assistance(to scoot forward on mat)  Comment: pt calling for son to assist supine to sit, OT ed pt re technique and was encouraged complete task with OT, son observed. Transfers  Stand Step Transfers: Dependent/Total  Sit to stand: (from bed)  Stand to sit: (assist for a controlled sit)  Transfer Comments: Pt completed transfers with use of nilson stedy with 2 person A. Pt required positioning with hand over hand of LUE on nilson stedy bar and verbal cues and intermittent assist to maintain upright position due to L side lean. pt can easily sit to stand from nilson stedy paddles with min of one or less. .   Toilet Transfers  Toilet - Technique: Radha [place] steady )  Equipment Used: Raised toilet seat without rails  Toilet Transfer: Minimal assistance, 2 Person assistance(Assist for proper positioning)  Toilet Transfers Comments: Gilberto Plummer steady to and from [employer] requiring Min A to sit, Mod A fo sit to stand        Wheelchair Bed Transfers  Wheelchair/Bed - Technique: Radha Fernandoland denice)  Equipment Used: Bed, Wheelchair, Other(nilson steady )  Level of Asssistance: Dependent/Total(2 person Mod A from bed w/ nilson steady )    SPEECH:  Subjective: []? Alert     [x]? Cooperative     []? Confused     []? Agitated    [x]? Lethargic     Objective/Assessment:  Attention:  Pt. Easily distracted and frequently falling asleep, cues needed to redirect attention and remain alert. Verbal cues needed for increased attention to midline, pt. c extreme L sided neglect. Scanning objects on L side on tray table- mod to max A.      Recall: n/a     Organization: n/a     Problem Solving/Reasoning: Divergent naming- 12 items in category (norm=14 items).       Other:  Pt. Completed oral motor exercises x3, 10 reps each with mod cues. Pt. Demonstrates significant L sided weakness. ST utilized vital stim for L sided oral sling (one channel only), 9.5 mA for 22 minutes. Pt. Daughter present, updated re: plan.     Objective:  /76   Pulse 77   Temp 98.6 °F (37 °C) (Oral)   Resp 18   Ht 5' 8\" (1.727 m)   Wt 191 lb 8 oz (86.9 kg)   SpO2 95%   BMI 29.12 kg/m²       GEN: Well developed, well nourished, in NAD  HEENT:  NCAT. PERRL. EOMI. Mucous membranes pink and moist.   PULM:  Clear to ausculation. No rales or rhonchi. Respirations WNL and unlabored. CV:  Regular rate rhythm. No murmurs or gallops. GI:  Abdomen soft. Nontender. Non-distended. BS + and equal.    NEUROLOGICAL: A&O x3. Sensation intact to light touch. Right gaze preference - some improvement in tracking and attending to the left. . Impaired L CN VII.   MSK:  Functional ROM RUE and RLEs. Impaired AROM LUE and LLE. [facility] Motor testing 4+/5 key muscles RUE and RLE. L shoulder shrug 1/5, L hip flexion 1/5. Distal LUE and LLE muscles 0/5. SKIN: Warm dry and intact. Good turgor. EXTREMITIES:  No calf tenderness to palpation. No edema BLEs. [facility] PSYCH: Mood WNL. Appropriately interactive. Affect flat    Diagnostics:     CBC:   Recent Labs     05/12/21  0636   WBC 11.4*   RBC 4.49   HGB 13.8   HCT 41.0   MCV 91.2   RDW 14.5        BMP:   No results for input(s): NA, K, CL, CO2, PHOS, BUN, CREATININE, GLUCOSE in the last 72 hours. Invalid input(s): CA  BNP: No results for input(s): BNP in the last 72 hours. PT/INR: No results for input(s): PROTIME, INR in the last 72 hours. APTT: No results for input(s): APTT in the last 72 hours. CARDIAC ENZYMES: No results for input(s): CKMB, CKMBINDEX, TROPONINT in the last 72 hours. Invalid input(s): CKTOTAL;3  FASTING LIPID PANEL:  Lab Results   Component Value Date    CHOL 206 (H) 05/05/2021    HDL 29 (L) 05/05/2021    TRIG 185 (H) 05/05/2021     LIVER PROFILE: No results for input(s): AST, ALT, ALB, BILIDIR, BILITOT, ALKPHOS in the last 72 hours.      Current Medications:   Current Facility-Administered Medications: amitriptyline (ELAVIL) tablet 25 mg, 25 mg, Oral, Nightly  heparin (porcine) injection 5,000 Units, 5,000 Units, Subcutaneous, 3 times per day  acetaminophen (TYLENOL) tablet 1,000 mg, 1,000 mg, Oral, 3 times per day  polyvinyl alcohol (LIQUIFILM TEARS) 1.4 % ophthalmic solution 1 drop, 1 BID, spiriva  9. Esophageal Ring: Will monitor dysphagia  10. Hypothyroidism: on levothyroxine  11. Nicotine dependence: on Nicoderm  12. Bowel Management: Miralax daily, senokot prn, dulcolax prn. 13. DVT Prophylaxis:  heparin, SCD's while in bed and MAXIM's   14. Internal medicine for medical management    Electronically signed by Radha Jackson MD on 5/14/2021 at 9:35 AM      This note is created with the assistance of a speech recognition program.  While intending to generate a document that actually reflects the content of the visit, the document can still have some errors including those of syntax and sound a like substitutions which may escape proof reading. In such instances, actual meaning can be extrapolated by contextual diversion.

## 2021-05-14 NOTE — PROGRESS NOTES
TIMED VOID: Patient was assisted up to bathroom using stedy with assist of 2. Brief damp. Voided large amount, however, missed hat. No BM at this time.

## 2021-05-14 NOTE — PROGRESS NOTES
[facility]    Progress Note  Patient assessed for rehabilitation services?: Yes  Family / Caregiver Present: Yes(daughter Fran Olivas & son Josselin Tse)  Referring Practitioner: Dr Karishma Miller  Comments: Pt drowsy during AM session. UE sling present today; donned for standing activity. New cushion provided with waffle on top 2º family report of Pt c/o of pain when sitting.   5/14/2021    12:20 PM    Name:   Bea Santa  MRN:     [id number]     Acct:      [de-identified]   Room:   [address] [place] [address] Day:  4  Admit Date:  5/10/2021  7:13 AM    PCP:   Joanne Box MD  Code Status:  Full Code    Subjective:     C/C: medical mgmt    Active Problems:    Tobacco abuse    Gastritis    Acute left hemiparesis ([address]    Acute CVA (cerebrovascular accident) ([facility] [address]    COPD (chronic obstructive pulmonary disease) ([facility] [address]    Essential hypertension    Hyperlipidemia    Pre-diabetes  Resolved Problems:    * No resolved hospital problems. *      Interval History Status: improved. 5/14/21  Patient seen and examined at bedside. No acute overnight events. Afebrile, hemodynamically stable. Continues to work with PT/OT. No acute changes in hemiparesis. No acute complaints.      Significant last 24 hr data reviewed ;   Vitals:    05/13/21 0728 05/13/21 0745 05/13/21 1843 05/14/21 0645   BP: 127/66 121/66 (!) 115/53 122/76   Pulse:  79 76 77   Resp:  18 18 20   Temp:  98.1 °F (36.7 °C) 98.5 °F (36.9 °C) 98.6 °F (37 °C)   TempSrc:  Oral Oral Oral   SpO2:  93% 93% 95%   Weight:       Height:          Recent Results (from the past 24 hour(s))   POC Glucose Fingerstick    Collection Time: 05/13/21  4:53 PM   Result Value Ref Range    POC Glucose 113 (H) 65 - 105 mg/dL   POC Glucose Fingerstick    Collection Time: 05/13/21  8:51 PM   Result Value Ref Range    POC Glucose 138 (H) 65 - 105 mg/dL   POC Glucose Fingerstick    Collection Time: 05/14/21  7:07 AM   Result Value Ref Range POC Glucose 112 (H) 65 - 105 mg/dL   POC Glucose Fingerstick    Collection Time: 05/14/21 10:58 AM   Result Value Ref Range    POC Glucose 110 (H) 65 - 105 mg/dL     Recent Labs     05/13/21  1145 05/13/21  1653 05/13/21  2051 05/14/21  0707 05/14/21  1058   POCGLU 147* 113* 138* 112* 110*        No results found. HPI:   See history in H and P      Review of Systems:     Constitutional:  negative for chills, fevers, sweats  Respiratory:  negative for cough, dyspnea on exertion, hemoptysis, shortness of breath, wheezing  Cardiovascular:  negative for chest pain, chest pressure/discomfort, lower extremity edema, palpitations  Gastrointestinal:  negative for abdominal pain, constipation, diarrhea, nausea, vomiting  Neurological: Positive for left-sided weakness. Negative for dizziness, headache  Data:     Past Medical History:  no change     Social History:  no change    Family History: @no change    Vitals:      I/O (24Hr): No intake or output data in the 24 hours ending 05/14/21 1220    Labs:    URINE ANALYSIS: No results found for: LABURIN     CBC:  Lab Results   Component Value Date    WBC 11.4 05/12/2021    HGB 13.8 05/12/2021     05/12/2021        BMP:    Lab Results   Component Value Date     05/11/2021    K 4.1 05/11/2021     05/11/2021    CO2 28 05/11/2021    BUN 20 05/11/2021    CREATININE 0.58 05/11/2021    GLUCOSE 163 05/11/2021      LIVER PROFILE:  Lab Results   Component Value Date    ALT 13 10/16/2017    AST 13 10/16/2017    PROT 6.9 10/16/2017    BILITOT 0.22 10/16/2017    BILIDIR 0.11 10/16/2017    LABALBU 4.0 10/16/2017               Radiology:  Medications:      Allergies:      Current Meds:   Scheduled Meds:    amitriptyline  25 mg Oral Nightly    heparin (porcine)  5,000 Units Subcutaneous 3 times per day    acetaminophen  1,000 mg Oral 3 times per day    amLODIPine  5 mg Oral Daily    aspirin  81 mg Oral Daily    atorvastatin  80 mg Oral Daily    budesonide-formoterol  2 puff Inhalation BID    insulin lispro  0-3 Units Subcutaneous Nightly    insulin lispro  0-6 Units Subcutaneous TID WC    levothyroxine  25 mcg Oral Daily    nicotine  1 patch Transdermal Daily    tiotropium  2 puff Inhalation Daily    polyethylene glycol  17 g Oral Daily     Continuous Infusions:    sodium chloride       PRN Meds: polyvinyl alcohol, sodium chloride flush, promethazine **OR** ondansetron, sodium chloride, albuterol, dextrose, glucagon (rDNA), glucose, senna, bisacodyl      Physical Examination:        /76   Pulse 77   Temp 98.6 °F (37 °C) (Oral)   Resp 20   Ht 5' 8\" (1.727 m)   Wt 191 lb 8 oz (86.9 kg)   SpO2 95%   BMI 29.12 kg/m²   Temp (24hrs), Av.6 °F (37 °C), Min:98.5 °F (36.9 °C), Max:98.6 °F (37 °C)    Recent Labs     21  1653 21  2051 21  0707 21  1058   POCGLU 113* 138* 112* 110*     No intake or output data in the 24 hours ending 21 1220    General Appearance:  alert, well appearing, and in no acute distress  Mental status: oriented to person, place, and time with normal affect  Head:  normocephalic, atraumatic. Eye: no icterus, redness, pupils equal and reactive, extraocular eye movements intact, conjunctiva clear  Ear: normal external ear, no discharge, hearing intact  Nose:  no drainage noted  Mouth: mucous membranes moist  Neck: supple, no carotid bruits, thyroid not palpable  Lungs: Clear to auscultation bilaterally. No accessory muscle use. Cardiovascular: normal rate, regular rhythm, no murmur, gallop, rub. Abdomen: Soft, nontender, nondistended, normal bowel sounds, no hepatomegaly or splenomegaly  Neurologic: Left upper and lower extremity weakness, 0-1/5. No sensory loss.   Skin: No gross lesions, rashes, bruising or bleeding on exposed skin area  Extremities:  peripheral pulses palpable, no pedal edema or calf pain with palpation    Assessment:        Primary Problem  <principal problem not

## 2021-05-14 NOTE — PROGRESS NOTES
supporting back and legs with pillows placed under LUE for support. Transfers  Sit to stand: Dependent/Total(Min A x 2 with nilson stejonn)  Stand to sit: Dependent/Total(min A x 2 with nilson stedy)  Transfer Comments: Verbal cues to maintain midline due to left side lean  Standing Balance  Time: 1-2 minutes x 5  Activity: Transfers, lower body dressing, lower body bathing  Comment: with nilson galdamez  Functional Mobility  Functional - Mobility Device: Wheelchair  Activity: To/from bathroom  Assist Level: Dependent/Total     Type of ROM/Therapeutic Exercise  Type of ROM/Therapeutic Exercise: PROM  Comment: OT facilitated PROM 20x each to promote ROM in LUE. Exercises  Shoulder Flexion: x  Shoulder Extension: x  Elbow Flexion: x  Elbow Extension: x  Wrist Flexion: x  Wrist Extension: x     Additional Activities: PM: pt completed visual scanning activity in hallway. Pt was to identify cones places on the right and left side of the hallway at various heights. Pt required max verbal cues to locate cones on the left side of the body due to left side neglect. Pt stated that she was able to see the cones on the left side with OT noticing that pt was not looking all the way to see the cone. OT would then have pt identify the color of the cone so that it required the pt to turn and scan and find the cone in the hallway. ADL  Feeding: Stand by assistance(sitting edge of bed)  Grooming: Setup  UE Bathing: None(Pt denies)  LE Bathing: Dependent/Total(Nilson stedy to complete bottom hygiene)  UE Dressing: Minimal assistance(A with LUE)  LE Dressing: Dependent/Total(with nilson denice to doff/don lower body clothing)  Toileting: None(pt denies)  Additional Comments: OT faciliated pt in self feeding task while sitting edge of bed. Pt required intermittent min A for sitting balance while completing task. Pt required verbal cues to locate items on the left side of the tray and to left side mouth food spillage.  Pt required verbal cues throuhgout to attent to left side of body and to provide support to LUE. Sling donned on this date while pt out of bed. Positioning  Bed Postion Comment: Pt positoned in bed after session. Pt requesting side line position. Pillows places between knees, back, and under LUE to provide support. Adaptations: LUE placed in slinge when out of bed. Assessment  Performance deficits / Impairments: Decreased functional mobility ; Decreased ADL status; Decreased strength;Decreased endurance;Decreased balance;Decreased high-level IADLs;Decreased vision/visual deficit; Decreased cognition;Decreased safe awareness;Decreased ROM; Decreased fine motor control;Decreased coordination;Decreased posture;Decreased sensation  Prognosis: Good  Discharge Recommendations: Patient would benefit from continued therapy after discharge  Activity Tolerance: Patient limited by fatigue  Safety Devices in place: Yes  Type of devices: Call light within reach; Patient at risk for falls; All fall risk precautions in place;Gait belt;Left in bed;Nurse notified          Patient Education: Transfer safety, visual scanning, attending to left side of the body  Patient Goals   Patient goals : \"get as close to normal as possible. \"  specifies:\"get in and out of bed on my own, shower by myself. \"  Learner:patient  Method: demonstration and explanation       Outcome: needs reinforcement        Plan  Plan  Times per week: 5-7  Times per day: Twice a day  Current Treatment Recommendations: Strengthening, Positioning, ROM, Safety Education & Training, Balance Training, Patient/Caregiver Education & Training, Self-Care / ADL, Cognitive/Perceptual Training, Functional Mobility Training, Neuromuscular Re-education, Equipment Evaluation, Education, & procurement, Endurance Training, Cognitive Reorientation  Patient Goals   Patient goals : \"get as close to normal as possible. \"  specifies:\"get in and out of bed on my own, shower by myself. \"  Short term goals  Time Frame for Short term goals: 1 week  Short term goal 1: mod A UE bathe and dress  Short term goal 2: set up brush teeth  Short term goal 3: min feeding, with cues able to locate L side of tray and scan to locate items on L. Short term goal 4: tolerate 6-8 min sitting edge of bed (static sit)  with min assist and RUE support, head at midline and fair safety awareness  Short term goal 5: pt to initiate reposition LUE with RUE with good safety ie. hold by wrist not by 1 finger  Short term goal 6: demo improved awareness of L side and neuromuscular christian by weight bearing on LUE with physical assist to complete  Short term goal 7: from w/c:  consistently maintain midline trunk control and demo able to lean forward 4 inches away from back of chair without physical assist for adls. Short term goal 8: tolerate 2 min static stand for adls with RUE support with mod of 2 and assist with LLE as needed. Long term goals  Time Frame for Long term goals : by discharge  Long term goal 1: set up and occasional verbal cues ie.  L visual scan for self feeding  Long term goal 2: set up oral care  Long term goal 3: max x 1 toileting and adl transfers  Long term goal 4: min UE bathe and dress (shirt)  Long term goal 5: max x 1 LE bathe and dress, able to cross to reach RLE with min assist and keep trunk balance     05/14/21 0735 05/14/21 1234   OT Individual Minutes   Time In [address] [place] [address]   Time Out [id number]   Minutes 67 32   Time Code Minutes    Timed Code Treatment Minutes 67 Minutes 32 Minutes     Electronically signed by Smiley Hill OT on 5/14/21 at 4:11 PM EDT

## 2021-05-14 NOTE — CARE COORDINATION
Met with daughter Lena Hobbs. She expressed concern with pt not having insurance. They are currently working to get her coverage on the [employer]. Contacted HELP; vm left.

## 2021-05-14 NOTE — PROGRESS NOTES
Physical Therapy  [address]  Acute Rehabilitation Physical Therapy Progress Note    Date: 21  Patient Name: Daiana Jarrell       Room: 8121/8886-38  MRN: [id number]   Account: [de-identified]   : 1955  (63 y.o.) Gender: female      Diagnosis: ischemic CVA,  subacute right MCA distribution infarct, L lynette, dysphagia, L neglect, loop recorder 21 (Dr. Cayla Damon)  Past Medical History:  has a past medical history of Asthma, COPD (chronic obstructive pulmonary disease) ([facility] [address] Diabetes mellitus ([facility] [address] Esophageal ring, Gastritis, Hyperlipidemia, Hyperplastic colon polyp, Hypertension, Osteoarthritis, Patient in clinical research study, and TIA (transient ischemic attack). Past Surgical History:   has a past surgical history that includes Appendectomy; Hysterectomy; Esophagus dilation; Colonoscopy (2017); Endoscopy, colon, diagnostic; pr egd transoral biopsy single/multiple (N/A, 2017); pr colsc flx w/rmvl of tumor polyp lesion snare tq (N/A, 2017); and Upper gastrointestinal endoscopy (2017). Restrictions/Precautions  Restrictions/Precautions: Fall Risk  Required Braces or Orthoses?: No  Position Activity Restriction  Other position/activity restrictions: significant L neglect with LUE/LLE deficits    Subjective: Pt reports fatigue in AM.   Comments: Pt drowsy during AM session. UE sling present today; donned for standing activity. New cushion provided with waffle on top 2º family report of Pt c/o of pain when sitting. Vital Signs  Patient Currently in Pain: Yes  Pain Assessment: Faces  Lara-Baker Pain Rating: Hurts even more  Pain Type: Acute pain  Pain Location: Head  Pain Descriptors: Headache  Non-Pharmaceutical Pain Intervention(s): Environmental changes  Response to Pain Intervention: None    Bed Mobility   Supine to Sit: Moderate assistance(L LE and trunk)  Sit to Supine: Moderate assistance(x2: bilateral LEs & trunk.  Education on scooping L LE c R)  Scooting: Stand by assistance(to scoot forward on mat/chair)  Comment: HOB >45*, rail, 2 pillows    Transfers  Sit to Stand: 2 Person Assistance; Moderate Assistance(from lower seat heights; Aron Brunson)  Stand to sit: Moderate Assistance;2 Person Assistance(Fair control; D/T for L UE safety)  Bed to Chair: Dependent/Total(Jacqueline Harrison)    Ambulation 1  Surface: level tile  Device: Parallel Bars  Other Apparatus: Left(UE sling)  Assistance: Maximum assistance;2 Person assistance  Quality of Gait: Dependent/total to advance L LE and maintaint L knee extension. Good core stabilization, fair ability to weight shift. Gait Deviations: Decreased step length; Slow Juanita  Distance: 2 steps each LE  Comments: short distance only: No quad control demonstrated    Stairs/Curb  Stairs?: No    Wheelchair Activities  Propulsion: Yes  Propulsion 1  Propulsion: Manual  Level: Uneven(outdoors + ramp)  Method: RUE;RLE  Level of Assistance: Maximum assistance  Description/ Details: Pt manages propulsion with foot (fast), requires cues to use R UE & which direction to push/pull, lacks coordination with LE, veers L. Speed & L neglect necessitates Max A for obstacle avoidance, safety and maintaining straight path. Cues & assist for turns. Lacks awareness of what hand propulsion direction does for steering/requires repeated cues. Cues for foot placement to steer with no subsequent recall. Distance: 26'    Balance   Posture: Poor  Sitting - Static: Poor;+(sitting edge of bed, R UE support, no back support)  Sitting - Dynamic: Poor(sitting edge of bed, R UE support, no back support)  Standing - Static: Poor(parallel bars)     Exercises   Other exercises?: Yes  Other exercises 2: L heel cord stretch, 2x45 sec.    Other exercises 4: Seated bilateral LE exercises, 1.5# R LE, passive ROM L LE, 15x each  Other exercises 5: Seated reaching outside base of support and across midline, addressing L neglect, 20 min. total with 1 reclining rest term goal 6: Pt able to tolerate standing in // bars with R UE support and assist at mod A  Short term goal 7: Pt able to propel w/c with R UE/R LE distance of 50 to 80 ft, straight path and turn around 180 degrees, at min/mod A   Long term goals  Time Frame for Long term goals : By DC  Long term goal 1: Pt able to roll in bed mod-I  Long term goal 2: Pt able to perform supine <> sit at CGA/min A  Long term goal 3: Pt able to perform pivot transfers min A / mod A and able to scan Left side environment. Long term goal 4: Pt able to ambulate in // bars, or with appropriate assistive device and/or LE bracing, distance of  20 to 30 ft, min/mod A x 2. Long term goal 5: Pt able to propel w/c on level surfaces distance 100 to 150 ft, SBA/CGA. Long term goal 6: Educate pt/family in safe technique for mobility and  to go up and down steps to enter/exit home. Long term goal 7: Improve sitting balance at EOM to good, and standing balance with R UE support to fair- to reduce fall risk. Long term goal 8: Improve PASS score to 20/36 improve overall function.         05/14/21 1035 05/14/21 1431   PT Individual Minutes   Time In 0932 1345   Time Out [id number]   Minutes 63 40     Electronically signed by Josh Hairston PTA on 5/14/21 at 3:08 PM EDT

## 2021-05-14 NOTE — PROGRESS NOTES
Speech Language Pathology  Speech Language Pathology  [facility] Acute Rehab Unit at [address] [facility]    Cognitive Treatment Note    Date: 5/14/2021  Patients Name: Cathleen Adame  MRN: [id number]  Diagnosis:   Patient Active Problem List   Diagnosis Code    Pneumonia of left lower lobe due to infectious organism J18.9    Tobacco abuse Z72.0    Legionella pneumonia ([facility] [address] A48.1    Dysphagia R13.10    Hyperplastic colon polyp K63.5    Esophageal ring K22.2    Gastritis K29.70    Elbow effusion, right M25.421    Stroke due to occlusion of right middle cerebral artery (HCC) I63.511    Nihss score 15 R29.715    Acute left hemiparesis (HCC) G81.94    Cerebrovascular accident (CVA) due to occlusion of right middle cerebral artery ([facility] [address] I63.511    Acute CVA (cerebrovascular accident) ([facility] [address] I63.9    COPD (chronic obstructive pulmonary disease) ([facility] [address] J44.9    Essential hypertension I10    Hyperlipidemia E78.5    Pre-diabetes R73.03       Pain: 0/10    Cognitive Treatment    Treatment time: 4609-3088    Subjective: [] Alert [x] Cooperative     [] Confused     [] Agitated    [x] Lethargic    Objective/Assessment:  Attention:  Pt. Easily distracted and frequently falling asleep, cues needed to redirect attention and remain alert. Verbal cues needed for increased attention to midline, pt. c extreme L sided neglect. Scanning objects on L side on tray table- mod to max A. Recall: n/a    Organization: n/a    Problem Solving/Reasoning: Divergent naming- 12 items in category (norm=14 items). Other:  Pt. Completed oral motor exercises x3, 10 reps each with mod cues. Pt. Demonstrates significant L sided weakness. ST utilized vital stim for L sided oral sling (one channel only), 9.5 mA for 22 minutes. Pt. Daughter present, updated re: plan.     Plan:  [x] Continue ST services    [] Discharge from ST:      Discharge recommendations: [] Inpatient Rehab   [] [place]   [] Outpatient Therapy  [] Follow up at trauma clinic   [] Other:       Treatment completed by: Peggy Velázquez A.CCC/SLP

## 2021-05-15 LAB
GLUCOSE BLD-MCNC: 116 MG/DL (ref 65–105)
GLUCOSE BLD-MCNC: 124 MG/DL (ref 65–105)
GLUCOSE BLD-MCNC: 128 MG/DL (ref 65–105)
GLUCOSE BLD-MCNC: 129 MG/DL (ref 65–105)

## 2021-05-15 PROCEDURE — 97110 THERAPEUTIC EXERCISES: CPT

## 2021-05-15 PROCEDURE — 6370000000 HC RX 637 (ALT 250 FOR IP): Performed by: PHYSICAL MEDICINE & REHABILITATION

## 2021-05-15 PROCEDURE — 6360000002 HC RX W HCPCS: Performed by: PHYSICAL MEDICINE & REHABILITATION

## 2021-05-15 PROCEDURE — 97112 NEUROMUSCULAR REEDUCATION: CPT

## 2021-05-15 PROCEDURE — 82947 ASSAY GLUCOSE BLOOD QUANT: CPT

## 2021-05-15 PROCEDURE — 97535 SELF CARE MNGMENT TRAINING: CPT

## 2021-05-15 PROCEDURE — 99232 SBSQ HOSP IP/OBS MODERATE 35: CPT | Performed by: PHYSICAL MEDICINE & REHABILITATION

## 2021-05-15 PROCEDURE — 97116 GAIT TRAINING THERAPY: CPT

## 2021-05-15 PROCEDURE — 6370000000 HC RX 637 (ALT 250 FOR IP): Performed by: STUDENT IN AN ORGANIZED HEALTH CARE EDUCATION/TRAINING PROGRAM

## 2021-05-15 PROCEDURE — 97129 THER IVNTJ 1ST 15 MIN: CPT

## 2021-05-15 PROCEDURE — 97530 THERAPEUTIC ACTIVITIES: CPT

## 2021-05-15 PROCEDURE — 92507 TX SP LANG VOICE COMM INDIV: CPT

## 2021-05-15 PROCEDURE — 1180000000 HC REHAB R&B

## 2021-05-15 RX ORDER — FLUOXETINE HYDROCHLORIDE 20 MG/1
20 CAPSULE ORAL DAILY
Status: DISCONTINUED | OUTPATIENT
Start: 2021-05-15 | End: 2021-06-10 | Stop reason: HOSPADM

## 2021-05-15 RX ADMIN — AMLODIPINE BESYLATE 5 MG: 5 TABLET ORAL at 08:01

## 2021-05-15 RX ADMIN — Medication 2 PUFF: at 08:03

## 2021-05-15 RX ADMIN — ASPIRIN 81 MG CHEWABLE TABLET 81 MG: 81 TABLET CHEWABLE at 08:03

## 2021-05-15 RX ADMIN — AMITRIPTYLINE HYDROCHLORIDE 25 MG: 25 TABLET, FILM COATED ORAL at 20:49

## 2021-05-15 RX ADMIN — ACETAMINOPHEN 1000 MG: 500 TABLET, FILM COATED ORAL at 20:49

## 2021-05-15 RX ADMIN — ACETAMINOPHEN 1000 MG: 500 TABLET, FILM COATED ORAL at 06:34

## 2021-05-15 RX ADMIN — HEPARIN SODIUM 5000 UNITS: 5000 INJECTION INTRAVENOUS; SUBCUTANEOUS at 13:50

## 2021-05-15 RX ADMIN — LEVOTHYROXINE SODIUM 25 MCG: 0.03 TABLET ORAL at 06:34

## 2021-05-15 RX ADMIN — ATORVASTATIN CALCIUM 80 MG: 80 TABLET, FILM COATED ORAL at 08:03

## 2021-05-15 RX ADMIN — Medication 2 PUFF: at 20:48

## 2021-05-15 RX ADMIN — HEPARIN SODIUM 5000 UNITS: 5000 INJECTION INTRAVENOUS; SUBCUTANEOUS at 06:36

## 2021-05-15 RX ADMIN — POLYETHYLENE GLYCOL 3350 17 G: 17 POWDER, FOR SOLUTION ORAL at 08:03

## 2021-05-15 RX ADMIN — HEPARIN SODIUM 5000 UNITS: 5000 INJECTION INTRAVENOUS; SUBCUTANEOUS at 20:59

## 2021-05-15 RX ADMIN — ACETAMINOPHEN 1000 MG: 500 TABLET, FILM COATED ORAL at 13:50

## 2021-05-15 RX ADMIN — FLUOXETINE HYDROCHLORIDE 20 MG: 20 CAPSULE ORAL at 13:50

## 2021-05-15 ASSESSMENT — PAIN SCALES - GENERAL
PAINLEVEL_OUTOF10: 1
PAINLEVEL_OUTOF10: 4
PAINLEVEL_OUTOF10: 0
PAINLEVEL_OUTOF10: 4

## 2021-05-15 NOTE — PROGRESS NOTES
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 Patient is doing well today. No return of headache since amitriptyline started. Some improvement in L shoulder shrug. Notes some depressed mood. No new issues with sleep, appetite, bowel, or bladder. ROS:  Denies fevers, chills, sweats. No chest pain, palpitations, lightheadedness. Denies coughing, wheezing or shortness of breath. Denies abdominal pain, nausea, diarrhea or constipation. No new areas of joint pain. Denies new areas of numbness or weakness. Denies new anxiety  issues. No new skin problems. Rehabilitation:   Progressing in therapies. PT:  Restrictions/Precautions: Fall Risk  Implants present? :  (loop recorder)  Other position/activity restrictions: significant L neglect with LUE/LLE deficits   Transfers  Sit to Stand: 2 Person Assistance, Moderate Assistance (from lower seat heights; Engage [employer])  Stand to sit: Moderate Assistance, 2 Person Assistance ([address] control; D/T for L UE safety)  Bed to Chair: Dependent/Total Christinia Mahesh)  Stand Pivot Transfers: Dependent/Total Christinia Mahesh)  Comment: Pt stood with nilson stedy for ~ 20 secs and 25 secs using nilson stedy, therapist assist with L hand  on nilson stedy, no quad contraction noted in standing position. Ambulation 1  Surface: level tile  Device: Parallel Bars  Other Apparatus: Left (UE sling)  Assistance: Maximum assistance, 2 Person assistance  Quality of Gait: Dependent/total to advance L LE and maintaint L knee extension. Good core stabilization, fair ability to weight shift. Gait Deviations: Decreased step length, Slow Juanita  Distance: 2 steps each LE  Comments: short distance only: No quad control demonstrated    Transfers  Sit to Stand: 2 Person Assistance, Moderate Assistance (from lower seat heights;  Engage [employer])  Stand to sit: Moderate Assistance, 2 Person Assistance ([employer] control; D/T for L UE safety)  Bed to Chair: Dependent/Total Kitty Proffer)  Stand Pivot Transfers: Dependent/Total Kitty Proffer)  Comment: Pt stood with nilson stedy for ~ 20 secs and 25 secs using nilson stedy, therapist assist with L hand  on nilson stedy, no quad contraction noted in standing position. Ambulation  Ambulation?: Yes  Ambulation 1  Surface: level tile  Device: Parallel Bars  Other Apparatus: Left (UE sling)  Assistance: Maximum assistance, 2 Person assistance  Quality of Gait: Dependent/total to advance L LE and maintaint L knee extension. Good core stabilization, fair ability to weight shift. Gait Deviations: Decreased step length, Slow Juanita  Distance: 2 steps each LE  Comments: short distance only: No quad control demonstrated    Surface: level tile  Ambulation 1  Surface: level tile  Device: Parallel Bars  Other Apparatus: Left (UE sling)  Assistance: Maximum assistance, 2 Person assistance  Quality of Gait: Dependent/total to advance L LE and maintaint L knee extension. Good core stabilization, fair ability to weight shift. Gait Deviations: Decreased step length, Slow Juanita  Distance: 2 steps each LE  Comments: short distance only: No quad control demonstrated    OT:  ADL  Feeding: Setup  Grooming: Setup, Verbal cueing, Stand by assistance, Minimal assistance (Assist with hand washing, SBA/set up for other tasks. )  UE Bathing: Minimal assistance, Setup, Verbal cueing (A with RUE, cues for throughness/addressing LUE)  LE Bathing: Maximum assistance (pt able to wash top of LEs thigh-just below knee)  UE Dressing: Minimal assistance, Verbal cueing, Setup (Assist to address/thread LUE)  LE Dressing: Maximum assistance (pt assists with threading RLE with reacher. )  Toileting: None (completed prior to session; no report provided. )  Additional Comments: OT faciliated pt engagement in self care tasks while seated sinkside.  Pt completes oral hygiene very quickly; cues for throughness, recommended flip top toothpaste and dycem for increased independence with set up due to flaccid UE. Introduced and utilized reacher for increased independence in [employer] dressing; pt able to doff socks with min A and thread pants with moderate assist. Pt completes  nilson stedy to complete donning of pants; dependnet to pull up. Pt reports feeling too unsteady to remove RUE to participate in task this date. Balance  Sitting Balance: Contact guard assistance (for safety with unsupported sitting, slight forward reach)  Standing Balance: Dependent/Total (min A in nilson stedy; assist to stabilize LUE on front bar. )   Standing Balance  Time: ~1 min   Activity: LB dressing task. Comment: pt uncomfortable with engaging RUE in dressing task this date due unsteadiness. Functional Mobility  Functional - Mobility Device: Wheelchair  Activity: To/from bathroom  Assist Level: Dependent/Total  Functional Mobility Comments: pt ed and practiced w/c mobility in room using RUE and RLE with LLE on leg rest foward and back 5 feet. Bed mobility  Bridging: Minimal assistance (to support and facilitate LLE only)  Rolling to Left: Minimal assistance  Rolling to Right: Maximum assistance  Supine to Sit: 2 Person assistance, Moderate assistance (HOB elevated)  Sit to Supine: 2 Person assistance, Moderate assistance  Scooting: Stand by assistance (to scoot forward on mat/chair)  Comment: Pt request to return to supine position after self care tasks. Pt positioned on Left side with pillows supporting back and legs with pillows placed under LUE for support. Transfers  Stand Step Transfers: Dependent/Total  Sit to stand: Dependent/Total  Stand to sit: Dependent/Total  Transfer Comments: use of nilson stedy for sit<>stands at this time; moderate assist provided for sit<>stand however heavier mod to sitting for increased controlled sit.     Toilet Transfers  Toilet - Technique:  Chhaya davis )  Equipment Used: Raised toilet seat without rails  Toilet Transfer: Minimal assistance, 2 Person assistance (Assist for proper positioning)  Toilet Transfers Comments: Ludivina Ramirez steady to and from [employer] requiring Min A to sit, Mod A fo sit to stand        Wheelchair Bed Transfers  Wheelchair/Bed - Technique:  Keenan galdamez)  Equipment Used: Bed, Wheelchair, Other (nilson steady )  Level of Asssistance: Dependent/Total (Min A x 2 with nilson galdamez)    SPEECH:  Subjective: []? Alert     [x]? Cooperative     []? Confused     []? Agitated    [x]? Lethargic     Objective/Assessment:  Attention:  Pt. Easily distracted and frequently falling asleep, cues needed to redirect attention and remain alert. Verbal cues needed for increased attention to midline, pt. c extreme L sided neglect.        Recall: Working memory:  Mental manipulation (3 units, word order)- 100% accuracy praveen.      Organization: n/a     Problem Solving/Reasoning: Problem solving, missing equipment- 90% accuracy praveen, 100% c cues. Convergent categorization (concrete)- 90% accuracy praveen, 100% c cues. Divergent thinking, add 1 item to list of 3 (concrete)- 90% accuracy praveen, 100% c cues.      Other:  Pt. Completed oral motor exercises x7, 10 reps each with mod cues. Pt. Demonstrates significant L sided weakness. Pt. Demonstrating difficulty with lingual and labial lateralization. Pt. Daughter present and providing encouragement throughout. Pt's daughter asking writer what she can do with Pt. To practice. Writer provided Pt's daughter with handout of oral motor exercises. Pt's daughter verbalized understanding of exercises on handout and reporting will complete with Pt. Objective:  /62   Pulse 78   Temp 98.6 °F (37 °C) (Oral)   Resp 16   Ht 5' 8\" (1.727 m)   Wt 191 lb 8 oz (86.9 kg)   SpO2 96%   BMI 29.12 kg/m²       GEN: Well developed, well nourished, in NAD  HEENT:  NCAT. PERRL. EOMI. Mucous membranes pink and moist.   PULM:  Clear to ausculation. No rales or rhonchi. Respirations WNL and unlabored. CV:  Regular rate rhythm. No murmurs or gallops. GI:  Abdomen soft. Nontender. Non-distended. BS + and equal.    NEUROLOGICAL: A&O x3. Sensation intact to light touch. Right gaze preference - some improvement in tracking and attending to the left. Impaired L CN VII.   MSK:  Functional ROM RUE and RLEs. Impaired AROM LUE and LLE. Clement Fly Motor testing 4+/5 key muscles RUE and RLE. L shoulder shrug 1/5, L hip flexion 1/5. Distal LUE and LLE muscles 0/5. SKIN: Warm dry and intact. Good turgor. EXTREMITIES:  No calf tenderness to palpation. No edema BLEs. Clement Fly PSYCH: Mood depressed. Appropriately interactive. Affect flat    Diagnostics:     CBC:   No results for input(s): WBC, RBC, HGB, HCT, MCV, RDW, PLT in the last 72 hours. BMP:   No results for input(s): NA, K, CL, CO2, PHOS, BUN, CREATININE, GLUCOSE in the last 72 hours. Invalid input(s): CA  BNP: No results for input(s): BNP in the last 72 hours. PT/INR: No results for input(s): PROTIME, INR in the last 72 hours. APTT: No results for input(s): APTT in the last 72 hours. CARDIAC ENZYMES: No results for input(s): CKMB, CKMBINDEX, TROPONINT in the last 72 hours. Invalid input(s): CKTOTAL;3  FASTING LIPID PANEL:  Lab Results   Component Value Date    CHOL 206 (H) 05/05/2021    HDL 29 (L) 05/05/2021    TRIG 185 (H) 05/05/2021     LIVER PROFILE: No results for input(s): AST, ALT, ALB, BILIDIR, BILITOT, ALKPHOS in the last 72 hours.      Current Medications:   Current Facility-Administered Medications: amitriptyline (ELAVIL) tablet 25 mg, 25 mg, Oral, Nightly  heparin (porcine) injection 5,000 Units, 5,000 Units, Subcutaneous, 3 times per day  acetaminophen (TYLENOL) tablet 1,000 mg, 1,000 mg, Oral, 3 times per day  polyvinyl alcohol (LIQUIFILM TEARS) 1.4 % ophthalmic solution 1 drop, 1 drop, Both Eyes, PRN  sodium chloride flush 0.9 % injection 5-40 mL, 5-40 mL, Intravenous, PRN  promethazine (PHENERGAN) tablet 12.5 mg, 12.5 mg, Oral, Q6H PRN **OR** ondansetron (ZOFRAN) injection 4 mg, 4 mg, Intravenous, Q6H PRN  0.9 % sodium chloride infusion, 25 mL, Intravenous, PRN  albuterol (PROVENTIL) nebulizer solution 2.5 mg, 2.5 mg, Nebulization, Q4H PRN  amLODIPine (NORVASC) tablet 5 mg, 5 mg, Oral, Daily  aspirin chewable tablet 81 mg, 81 mg, Oral, Daily  atorvastatin (LIPITOR) tablet 80 mg, 80 mg, Oral, Daily  budesonide-formoterol (SYMBICORT) 160-4.5 MCG/ACT inhaler 2 puff, 2 puff, Inhalation, BID  dextrose 50 % IV solution, 12.5 g, Intravenous, PRN  glucagon (rDNA) injection 1 mg, 1 mg, Intramuscular, PRN  glucose (GLUTOSE) 40 % oral gel 15 g, 15 g, Oral, PRN  insulin lispro (HUMALOG) injection vial 0-3 Units, 0-3 Units, Subcutaneous, Nightly  insulin lispro (HUMALOG) injection vial 0-6 Units, 0-6 Units, Subcutaneous, TID WC  levothyroxine (SYNTHROID) tablet 25 mcg, 25 mcg, Oral, Daily  nicotine (NICODERM CQ) 14 MG/24HR 1 patch, 1 patch, Transdermal, Daily  tiotropium (SPIRIVA RESPIMAT) 2.5 MCG/ACT inhaler 2 puff, 2 puff, Inhalation, Daily  polyethylene glycol (GLYCOLAX) packet 17 g, 17 g, Oral, Daily  senna (SENOKOT) tablet 17.2 mg, 2 tablet, Oral, Daily PRN  bisacodyl (DULCOLAX) suppository 10 mg, 10 mg, Rectal, Daily PRN      Impression/Plan:   Impaired ADLs, gait, and mobility due to:      1. Ischemic R MCA CVA with hemorrhagic conversion and L non-dominant hemiparesis:  PT/OT for gait, mobility, strengthening, endurance, ADLs, and self care. On ASA, atorvastatin. 2. Headache: Has Tylenol routine TID. Improved on amitriptyline. 3. Dry eyes: has natural tears. 4. Cognitive impairment: SLP treating  5. Dysphagia/aphasia: SLP treating. On pureed diet, thin liquids  6. HTN/Hyperlipidemia: on amlodipine  7. DM: on insulin sliding scale  8. COPD/asthma: on albuterol nebulizers prn, on symbicort BID, spiriva  9. Adjustment disorder with depressed mood: will start fluoxetine 5/15  10.  Esophageal Ring: Will monitor - on dysphagia diet  11. Hypothyroidism: on levothyroxine  12. Nicotine dependence: on Nicoderm  13. Bowel Management: Miralax daily, senokot prn, dulcolax prn. 14. DVT Prophylaxis:  heparin, SCD's while in bed and MAXIM's   15. Internal medicine for medical management    Electronically signed by Kim Casillas MD on 5/15/2021 at 10:32 AM      This note is created with the assistance of a speech recognition program.  While intending to generate a document that actually reflects the content of the visit, the document can still have some errors including those of syntax and sound a like substitutions which may escape proof reading. In such instances, actual meaning can be extrapolated by contextual diversion.

## 2021-05-15 NOTE — PROGRESS NOTES
L UE safety)  Bed to Chair: Maximum assistance;2 Person Assistance (squat pivot to right , no LE advancement left foot blocked)      Ambulation 1  Surface: level tile  Device: Parallel Bars  Other Apparatus: Left (UE sling)  Assistance: Maximum assistance;2 Person assistance  Quality of Gait: Dependent/total to advance L LE and maintaint L knee extension. Good core stabilization, fair ability to weight shift. Gait Deviations: Decreased step length; Slow Juanita  Distance: 2 steps each LE  Comments: short distance only: No quad control demonstrated        Stairs/Curb  Stairs?: No   Wheelchair Activities  Propulsion: No (pt requests toileting in PM )      FIMS:      BALANCE Posture: Poor  Sitting - Static: Poor;+ (sitting edge of bed, R UE support, no back support)  Sitting - Dynamic: Poor (sitting edge of bed, R UE support, no back support)  Standing - Static: Poor (parallel bars)    EXERCISES    Other exercises?: Yes  Other exercises 1: bilateral LE seated x 10 right AROM left AAROM (reciprical pattern tactile cues left target muscle)  Other exercises 2: standing left UE support parallel bars right <> left weight shift (left knee blocked , maximaum assist , visual input )  Other exercises 3: verbal instruction scanning vision left and left lynette body throughtout treatments   Other exercises 4: seated EOM left sling doffed hand overhand placment PTA knee due to mild anterior shoulder pain on mat (visual input , COG over NIGHAT, right <>left weightshift )  Other exercises 5: seated EOM anterior <> posteror trunk and right <> left rotation  (CGA / minimal assist left rotation )           Activity Tolerance: Patient limited by fatigue, Patient limited by endurance          Patient Education  New Education Provided:    Learner:patient  Method: demonstration and explanation       Outcome: needs reinforcement     Current Treatment Recommendations: Strengthening, Balance Training, Functional Mobility Training, Transfer Training, Endurance Training, Wheelchair Mobility Training, Gait Training, Stair training, Neuromuscular Re-education, Home Exercise Program, Safety Education & Training, Patient/Caregiver Education & Training, Modalities, Positioning    Conditions Requiring Skilled Therapeutic Intervention  Body structures, Functions, Activity limitations: Decreased functional mobility ; Decreased strength;Decreased safe awareness;Decreased endurance;Decreased balance;Decreased posture;Decreased coordination;Decreased vision/visual deficit; Decreased fine motor control  REQUIRES PT FOLLOW UP: Yes  Discharge Recommendations: Patient would benefit from continued therapy after discharge;Home with assist PRN    Goals  Short term goals  Time Frame for Short term goals: 10 days  Short term goal 1: Pt able to roll in bed side to side at min A without bed rail  Short term goal 2: Pt able to perform supine<>sit at mod A  Short term goal 3: Pt able to perform sit<>stand at min/mod A . Short term goal 4: Pt able to progress to pivot transfers at max A   Short term goal 5: Pt able to improve sitting balance at EOB/EOM at SBA for static balance and able to track to left side as well as turn to head to left side to scan her environment with minimal cues. Short term goal 6: Pt able to tolerate standing in // bars with R UE support and assist at mod A  Short term goal 7: Pt able to propel w/c with R UE/R LE distance of 50 to 80 ft, straight path and turn around 180 degrees, at min/mod A   Long term goals  Time Frame for Long term goals : By DC  Long term goal 1: Pt able to roll in bed mod-I  Long term goal 2: Pt able to perform supine <> sit at CGA/min A  Long term goal 3: Pt able to perform pivot transfers min A / mod A and able to scan Left side environment. Long term goal 4: Pt able to ambulate in // bars, or with appropriate assistive device and/or LE bracing, distance of  20 to 30 ft, min/mod A x 2.   Long term goal 5: Pt able to propel w/c on level surfaces distance 100 to 150 ft, SBA/CGA. Long term goal 6: Educate pt/family in safe technique for mobility and  to go up and down steps to enter/exit home. Long term goal 7: Improve sitting balance at EOM to good, and standing balance with R UE support to fair- to reduce fall risk. Long term goal 8: Improve PASS score to 20/36 improve overall function.      Electronically signed by Maria Antonia Rao PTA on 5/15/21 at 3:33 PM EDT     05/15/21 1143 05/15/21 1532   PT Individual Minutes   Time In [id number]   Time Out [id number]   YUELVVT 90 45

## 2021-05-15 NOTE — PROGRESS NOTES
Impaired  Unilateral Attention: Cues to attend left visual field;Cues to maintain midline in sitting;Cues to maintain midline in standing;Cues to attend to left side of body  Initiation: Hand over hand to initiate tasks  Balance  Sitting Balance: Contact guard assistance (for safety with unsupported sitting, slight forward reach)  Standing Balance: Dependent/Total (min A in nilson stedy; assist to stabilize LUE on front bar. )  Bed mobility  Sit to Supine: Moderate assistance (Assist with LEs over bed, trunk positioning assist. )  Transfers  Sit to stand: Dependent/Total  Stand to sit: Dependent/Total  Transfer Comments: use of nilson stedy for sit<>stands at this time; moderate assist provided for sit<>stand however heavier mod to sitting for increased controlled sit. Standing Balance  Time: AM:~1 min PM: <1 min x 2   Activity: AM: LB dressing task PM: transfer  Comment: AM: pt uncomfortable with engaging RUE in dressing task this date due unsteadiness. Functional Mobility  Functional - Mobility Device: Wheelchair  Activity: To/from bathroom  Assist Level: Dependent/Total     Type of ROM/Therapeutic Exercise  Type of ROM/Therapeutic Exercise: PROM  Comment: OT facilitated PROM in all planes to promote ROM in LUE and prevent contractures. Writer encouraged pt to observe UE mvmt for increased brain connection and awarness with poor return. Writer also educated pt and daughter on various techniques for self PROM to UE while in bed. Pt reports discomfort with shoulder ROM, reports pain primarily with overhead shoulder flexion. Exercises  Other: light massage provided to L shoulder and neck area to assist with tightness due to constant head turned to R side. Pt able to tolerate however reports uncomfortable.             Neuromuscular Education  Neuromuscular education: Yes  Vibration: Vibration applied to LUE to stimulate sensory receptors in muscles for increased return, PROM facilitatied with desired ROM, no noted return this date, cues throughout for attention to LUE to address visual feedback with poor return. Pt reports discomfort with stimluation. Taping: endura tape still placed in good condition. ADL  Feeding: Setup  Grooming: Setup;Verbal cueing;Stand by assistance;Minimal assistance (Assist with hand washing, SBA/set up for other tasks. )  UE Bathing: Minimal assistance;Setup;Verbal cueing (A with RUE, cues for throughness/addressing LUE)  LE Bathing: Maximum assistance (pt able to wash top of LEs thigh-just below knee)  UE Dressing: Minimal assistance;Verbal cueing;Setup (Assist to address/thread LUE)  LE Dressing: Maximum assistance (pt assists with threading RLE with reacher. )  Toileting: None (completed prior to session; no report provided. )  Additional Comments: OT faciliated pt engagement in self care tasks while seated sinkside. Pt completes oral hygiene very quickly; cues for throughness, recommended flip top toothpaste and dycem for increased independence with set up due to flaccid UE. Introduced and utilized reacher for increased independence in [employer] dressing; pt able to doff socks with min A and thread pants with moderate assist. Pt completes  nilson stedy to complete donning of pants; dependnet to pull up. Pt reports feeling too unsteady to remove RUE to participate in task this date. Assessment  Performance deficits / Impairments: Decreased functional mobility ; Decreased ADL status; Decreased strength;Decreased endurance;Decreased balance;Decreased high-level IADLs;Decreased vision/visual deficit; Decreased cognition;Decreased safe awareness;Decreased ROM; Decreased fine motor control;Decreased coordination;Decreased posture;Decreased sensation  Prognosis: Good  Discharge Recommendations: Patient would benefit from continued therapy after discharge  Activity Tolerance: Patient limited by fatigue  Safety Devices in place: Yes  Type of devices: Call light within reach; Patient at bathe and dress, able to cross to reach RLE with min assist and keep trunk balance        05/15/21 0819 05/15/21 1443   OT Individual Minutes   Time In 5865 1402   Time Out 0818 1435   Minutes 44 33     Electronically signed by ERIN Lara on 5/15/21 at 3:10 PM EDT

## 2021-05-15 NOTE — PROGRESS NOTES
Speech Language Pathology  Speech Language Pathology  [facility]    Cognitive Treatment Note    Date: 5/15/2021  Patients Name: Maribell Florentino  MRN: [id number]  Diagnosis:   Patient Active Problem List   Diagnosis Code    Pneumonia of left lower lobe due to infectious organism J18.9    Tobacco abuse Z72.0    Legionella pneumonia ([facility] [address] A48.1    Dysphagia R13.10    Hyperplastic colon polyp K63.5    Esophageal ring K22.2    Gastritis K29.70    Elbow effusion, right M25.421    Stroke due to occlusion of right middle cerebral artery (HCC) I63.511    Nihss score 15 R29.715    Acute left hemiparesis (HCC) G81.94    Cerebrovascular accident (CVA) due to occlusion of right middle cerebral artery ([facility] [address] I63.511    Acute CVA (cerebrovascular accident) ([facility] [address] I63.9    COPD (chronic obstructive pulmonary disease) ([facility] [address] J44.9    Essential hypertension I10    Hyperlipidemia E78.5    Pre-diabetes R73.03       Pain: 0/10    Cognitive Treatment    Treatment time: 6832-9395    Subjective: [] Alert [x] Cooperative     [] Confused     [] Agitated    [x] Lethargic    Objective/Assessment:  Attention:  Pt. Easily distracted and frequently falling asleep, cues needed to redirect attention and remain alert. Verbal cues needed for increased attention to midline, pt. c extreme L sided neglect. Recall: Working memory:  Mental manipulation (3 units, word order)- 100% accuracy praveen. Organization: n/a    Problem Solving/Reasoning: Problem solving, missing equipment- 90% accuracy praveen, 100% c cues. Convergent categorization (concrete)- 90% accuracy praveen, 100% c cues. Divergent thinking, add 1 item to list of 3 (concrete)- 90% accuracy praveen, 100% c cues. Other:  Pt. Completed oral motor exercises x7, 10 reps each with mod cues. Pt. Demonstrates significant L sided weakness. Pt. Demonstrating difficulty with lingual and labial lateralization.     Pt. Daughter present and providing encouragement throughout. Pt's daughter asking writer what she can do with Pt. To practice. Writer provided Pt's daughter with handout of oral motor exercises. Pt's daughter verbalized understanding of exercises on handout and reporting will complete with Pt. Plan:  [x] Continue ST services    [] Discharge from ST:      Discharge recommendations: [] Inpatient Rehab   [] [place]   [] Outpatient Therapy  [] Follow up at trauma clinic   [] Other:       Treatment completed by:  Celia Chan M.A., CCC-SLP

## 2021-05-15 NOTE — PLAN OF CARE
Problem: Neurological  Goal: Maximum potential motor/sensory/cognitive function  Outcome: Ongoing     Problem: Pain:  Goal: Pain level will decrease  Description: Pain level will decrease  Outcome: Ongoing     Problem: Pain:  Goal: Control of acute pain  Description: Control of acute pain  Outcome: Ongoing     Problem: Pain:  Goal: Control of chronic pain  Description: Control of chronic pain  Outcome: Ongoing     Problem: Neurological  Goal: Maximum potential motor/sensory/cognitive function  Outcome: Ongoing     Problem: Skin Integrity:  Goal: Will show no infection signs and symptoms  Description: Will show no infection signs and symptoms  Outcome: Ongoing     Problem: Falls - Risk of:  Goal: Will remain free from falls  Description: Will remain free from falls  Outcome: Ongoing

## 2021-05-15 NOTE — PLAN OF CARE
Problem: Neurological  Goal: Maximum potential motor/sensory/cognitive function  5/15/2021 1738 by Sebastian Mcdonald RN  Outcome: Ongoing  5/15/2021 1723 by Sebastian Mcdonald RN  Outcome: Ongoing     Problem: Pain:  Goal: Pain level will decrease  Description: Pain level will decrease  5/15/2021 1738 by Sebastian Mcdonald RN  Outcome: Ongoing  5/15/2021 1723 by Sebastian Mcdonald RN  Outcome: Ongoing  Goal: Control of acute pain  Description: Control of acute pain  5/15/2021 1738 by Sebastian Mcdonald RN  Outcome: Ongoing  5/15/2021 1723 by Sebastian Mcdonald RN  Outcome: Ongoing  Goal: Control of chronic pain  Description: Control of chronic pain  5/15/2021 1738 by Sebastian Mcdonald RN  Outcome: Ongoing  5/15/2021 1723 by Sebastian Mcdonald RN  Outcome: Ongoing     Problem: Neurological  Goal: Maximum potential motor/sensory/cognitive function  5/15/2021 1738 by Sebastian Mcdonald RN  Outcome: Ongoing  5/15/2021 1723 by Sebastian Mcdonald RN  Outcome: Ongoing     Problem: Skin Integrity:  Goal: Will show no infection signs and symptoms  Description: Will show no infection signs and symptoms  5/15/2021 1738 by Sebastian Mcdonald RN  Outcome: Ongoing  5/15/2021 1723 by Sebastian Mcdonald RN  Outcome: Ongoing  Goal: Absence of new skin breakdown  Description: Absence of new skin breakdown  5/15/2021 1738 by Sebastian Mcdonald RN  Outcome: Ongoing  5/15/2021 1723 by Sebastian Mcdonald RN  Outcome: Ongoing     Problem: Falls - Risk of:  Goal: Will remain free from falls  Description: Will remain free from falls  5/15/2021 1738 by Sebastian Mcdonald RN  Outcome: Ongoing  5/15/2021 1723 by Sebastian Mcdonald RN  Outcome: Ongoing  Goal: Absence of physical injury  Description: Absence of physical injury  5/15/2021 1738 by Sebastian Mcdonald RN  Outcome: Ongoing  5/15/2021 1723 by Sebastian Mcdonald RN  Outcome: Ongoing     Problem: Nutrition  Goal: Optimal nutrition therapy  5/15/2021 1738 by Beverlie Sables, RN  Outcome: Ongoing  5/15/2021 1723 by Sebastian Mcdonald, RN  Outcome: Ongoing

## 2021-05-16 LAB
GLUCOSE BLD-MCNC: 105 MG/DL (ref 65–105)
GLUCOSE BLD-MCNC: 109 MG/DL (ref 65–105)
GLUCOSE BLD-MCNC: 110 MG/DL (ref 65–105)
GLUCOSE BLD-MCNC: 116 MG/DL (ref 65–105)

## 2021-05-16 PROCEDURE — 1180000000 HC REHAB R&B

## 2021-05-16 PROCEDURE — 6360000002 HC RX W HCPCS: Performed by: PHYSICAL MEDICINE & REHABILITATION

## 2021-05-16 PROCEDURE — 99232 SBSQ HOSP IP/OBS MODERATE 35: CPT | Performed by: INTERNAL MEDICINE

## 2021-05-16 PROCEDURE — 6370000000 HC RX 637 (ALT 250 FOR IP): Performed by: STUDENT IN AN ORGANIZED HEALTH CARE EDUCATION/TRAINING PROGRAM

## 2021-05-16 PROCEDURE — 6370000000 HC RX 637 (ALT 250 FOR IP): Performed by: PHYSICAL MEDICINE & REHABILITATION

## 2021-05-16 PROCEDURE — 99232 SBSQ HOSP IP/OBS MODERATE 35: CPT | Performed by: PHYSICAL MEDICINE & REHABILITATION

## 2021-05-16 PROCEDURE — 82947 ASSAY GLUCOSE BLOOD QUANT: CPT

## 2021-05-16 RX ADMIN — ASPIRIN 81 MG CHEWABLE TABLET 81 MG: 81 TABLET CHEWABLE at 08:10

## 2021-05-16 RX ADMIN — AMITRIPTYLINE HYDROCHLORIDE 25 MG: 25 TABLET, FILM COATED ORAL at 21:23

## 2021-05-16 RX ADMIN — LEVOTHYROXINE SODIUM 25 MCG: 0.03 TABLET ORAL at 06:37

## 2021-05-16 RX ADMIN — ATORVASTATIN CALCIUM 80 MG: 80 TABLET, FILM COATED ORAL at 08:11

## 2021-05-16 RX ADMIN — Medication 2 PUFF: at 08:11

## 2021-05-16 RX ADMIN — AMLODIPINE BESYLATE 5 MG: 5 TABLET ORAL at 08:10

## 2021-05-16 RX ADMIN — ACETAMINOPHEN 1000 MG: 500 TABLET, FILM COATED ORAL at 06:37

## 2021-05-16 RX ADMIN — HEPARIN SODIUM 5000 UNITS: 5000 INJECTION INTRAVENOUS; SUBCUTANEOUS at 07:31

## 2021-05-16 RX ADMIN — HEPARIN SODIUM 5000 UNITS: 5000 INJECTION INTRAVENOUS; SUBCUTANEOUS at 21:25

## 2021-05-16 RX ADMIN — Medication 2 PUFF: at 21:22

## 2021-05-16 RX ADMIN — FLUOXETINE HYDROCHLORIDE 20 MG: 20 CAPSULE ORAL at 08:10

## 2021-05-16 RX ADMIN — ACETAMINOPHEN 1000 MG: 500 TABLET, FILM COATED ORAL at 21:23

## 2021-05-16 RX ADMIN — HEPARIN SODIUM 5000 UNITS: 5000 INJECTION INTRAVENOUS; SUBCUTANEOUS at 13:32

## 2021-05-16 RX ADMIN — ACETAMINOPHEN 1000 MG: 500 TABLET, FILM COATED ORAL at 13:31

## 2021-05-16 ASSESSMENT — PAIN SCALES - GENERAL
PAINLEVEL_OUTOF10: 2
PAINLEVEL_OUTOF10: 0
PAINLEVEL_OUTOF10: 2

## 2021-05-16 ASSESSMENT — PAIN DESCRIPTION - PAIN TYPE: TYPE: ACUTE PAIN

## 2021-05-16 ASSESSMENT — PAIN DESCRIPTION - LOCATION: LOCATION: HEAD

## 2021-05-16 NOTE — PROGRESS NOTES
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 Patient is continuing to do well today. She notes only mild headache when she has one. Her mood is improving. She denies any new issues with sleep, appetite, bowel, or bladder. ROS:  Denies fevers, chills, sweats. No chest pain, palpitations, lightheadedness. Denies coughing, wheezing or shortness of breath. Denies abdominal pain, nausea, diarrhea or constipation. No new areas of joint pain. Denies new areas of numbness or weakness. Denies new anxiety  issues. No new skin problems. Rehabilitation:   Progressing in therapies. PT:  Restrictions/Precautions: Fall Risk  Implants present? :  (loop recorder)  Other position/activity restrictions: significant L neglect with LUE/LLE deficits   Transfers  Sit to Stand: 2 Person Assistance, Moderate Assistance (Abigail Wildes and WC to  parallel bars )  Stand to sit: Moderate Assistance, 2 Person Assistance (Fair control; D/T for L UE safety)  Bed to Chair: Maximum assistance, 2 Person Assistance (squat pivot to right , no LE advancement left foot blocked)  Stand Pivot Transfers: Dependent/Total Abigail Founds)  Comment: multiple transfers with nilson steady bed <> WC, mat table, and cammode  Ambulation 1  Surface: level tile  Device: Parallel Bars  Other Apparatus: Left (UE sling)  Assistance: Maximum assistance, 2 Person assistance  Quality of Gait: Dependent/total to advance L LE and maintaint L knee extension. Good core stabilization, fair ability to weight shift.   Gait Deviations: Decreased step length, Slow Juanita  Distance: 2 steps each LE  Comments: short distance only: No quad control demonstrated    Transfers  Sit to Stand: 2 Person Assistance, Moderate Assistance Benson Harrison and WC to  parallel bars )  Stand to sit: Moderate Assistance, 2 Person Assistance ([address] control; D/T for L UE safety)  Bed to Chair: Maximum assistance, 2 Person Assistance (squat pivot to right , no LE advancement left foot blocked)  Stand Pivot Transfers: Dependent/Total Enrique Villafana)  Comment: multiple transfers with nilson steady bed <> WC, mat table, and cammode  Ambulation  Ambulation?: Yes  Ambulation 1  Surface: level tile  Device: Parallel Bars  Other Apparatus: Left (UE sling)  Assistance: Maximum assistance, 2 Person assistance  Quality of Gait: Dependent/total to advance L LE and maintaint L knee extension. Good core stabilization, fair ability to weight shift. Gait Deviations: Decreased step length, Slow Juanita  Distance: 2 steps each LE  Comments: short distance only: No quad control demonstrated    Surface: level tile  Ambulation 1  Surface: level tile  Device: Parallel Bars  Other Apparatus: Left (UE sling)  Assistance: Maximum assistance, 2 Person assistance  Quality of Gait: Dependent/total to advance L LE and maintaint L knee extension. Good core stabilization, fair ability to weight shift. Gait Deviations: Decreased step length, Slow Juanita  Distance: 2 steps each LE  Comments: short distance only: No quad control demonstrated    OT:  ADL  Feeding: Setup  Grooming: Setup, Verbal cueing, Stand by assistance, Minimal assistance (Assist with hand washing, SBA/set up for other tasks. )  UE Bathing: Minimal assistance, Setup, Verbal cueing (A with RUE, cues for throughness/addressing LUE)  LE Bathing: Maximum assistance (pt able to wash top of LEs thigh-just below knee)  UE Dressing: Minimal assistance, Verbal cueing, Setup (Assist to address/thread LUE)  LE Dressing: Maximum assistance (pt assists with threading RLE with reacher. )  Toileting: None (completed prior to session; no report provided. )  Additional Comments: OT faciliated pt engagement in self care tasks while seated sinkside.  Pt completes oral hygiene very quickly; cues for throughness, recommended flip top toothpaste and dycem for increased independence with set up due to flaccid UE. Introduced and utilized reacher for increased independence in [place] dressing; pt able to doff socks with min A and thread pants with moderate assist. Pt completes  nilson stedy to complete donning of pants; dependnet to pull up. Pt reports feeling too unsteady to remove RUE to participate in task this date. Balance  Sitting Balance: Contact guard assistance (for safety with unsupported sitting, slight forward reach)  Standing Balance: Dependent/Total (min A in nilson stedy; assist to stabilize LUE on front bar. )   Standing Balance  Time: AM:~1 min PM: <1 min x 2   Activity: AM: LB dressing task PM: transfer  Comment: AM: pt uncomfortable with engaging RUE in dressing task this date due unsteadiness. Functional Mobility  Functional - Mobility Device: Wheelchair  Activity: To/from bathroom  Assist Level: Dependent/Total  Functional Mobility Comments: pt ed and practiced w/c mobility in room using RUE and RLE with LLE on leg rest foward and back 5 feet. Bed mobility  Bridging: Minimal assistance (to support and facilitate LLE only)  Rolling to Left: Minimal assistance  Rolling to Right: Maximum assistance  Supine to Sit: 2 Person assistance, Moderate assistance (HOB elevated)  Sit to Supine: Moderate assistance (Assist with LEs over bed, trunk positioning assist. )  Scooting: Minimal assistance (left hip with instruction )  Comment: Pt request to return to supine position after self care tasks. Pt positioned on Left side with pillows supporting back and legs with pillows placed under LUE for support. Transfers  Stand Step Transfers: Dependent/Total  Sit to stand: Dependent/Total  Stand to sit: Dependent/Total  Transfer Comments: use of nilson stedy for sit<>stands at this time; moderate assist provided for sit<>stand however heavier mod to sitting for increased controlled sit.     Toilet Transfers  Toilet - Technique:  Hattie davis )  Equipment Used: Raised toilet seat without rails  Toilet Transfer: Minimal assistance, 2 Person assistance (Assist for proper positioning)  Toilet Transfers Comments: Milton Garcia steady to and from [employer] requiring Min A to sit, Mod A fo sit to stand        Wheelchair Bed Transfers  Wheelchair/Bed - Technique:  Levell Monty galdamez)  Equipment Used: Bed, Wheelchair, Other (nilson steady )  Level of Asssistance: Dependent/Total (Min A x 2 with nilson stedy)    SPEECH:    Objective:  /67   Pulse 79   Temp 98 °F (36.7 °C) (Oral)   Resp 19   Ht 5' 8\" (1.727 m)   Wt 191 lb 8 oz (86.9 kg)   SpO2 93%   BMI 29.12 kg/m²       GEN: Well developed, well nourished, in NAD  HEENT:  NCAT. PERRL. EOMI. Mucous membranes pink and moist.   PULM:  Clear to ausculation. No rales or rhonchi. Respirations WNL and unlabored. CV:  Regular rate rhythm. No murmurs or gallops. GI:  Abdomen soft. Nontender. Non-distended. BS + and equal.    NEUROLOGICAL: A&O x3. Sensation intact to light touch. Right gaze preference - some improvement in tracking and attending to the left. Impaired L CN VII.   MSK:  Functional ROM RUE and RLEs. Impaired AROM LUE and LLE. [place] Motor testing 4+/5 key muscles RUE and RLE. L shoulder shrug 1/5, L hip flexion 2/5. Distal LUE and LLE muscles 0/5. SKIN: Warm dry and intact. Good turgor. EXTREMITIES:  No calf tenderness to palpation. No edema BLEs. [place] PSYCH: Mood depressed. Appropriately interactive. Affect flat    Diagnostics:     CBC:   No results for input(s): WBC, RBC, HGB, HCT, MCV, RDW, PLT in the last 72 hours. BMP:   No results for input(s): NA, K, CL, CO2, PHOS, BUN, CREATININE, GLUCOSE in the last 72 hours. Invalid input(s): CA  BNP: No results for input(s): BNP in the last 72 hours. PT/INR: No results for input(s): PROTIME, INR in the last 72 hours. APTT: No results for input(s): APTT in the last 72 hours. CARDIAC ENZYMES: No results for input(s): CKMB, CKMBINDEX, TROPONINT in the last 72 hours.     Invalid input(s): CKTOTAL;3  FASTING LIPID PANEL:  Lab Results   Component Value Date    CHOL 206 (H) 05/05/2021    HDL 29 (L) 05/05/2021    TRIG 185 (H) 05/05/2021     LIVER PROFILE: No results for input(s): AST, ALT, ALB, BILIDIR, BILITOT, ALKPHOS in the last 72 hours.      Current Medications:   Current Facility-Administered Medications: FLUoxetine (PROZAC) capsule 20 mg, 20 mg, Oral, Daily  amitriptyline (ELAVIL) tablet 25 mg, 25 mg, Oral, Nightly  heparin (porcine) injection 5,000 Units, 5,000 Units, Subcutaneous, 3 times per day  acetaminophen (TYLENOL) tablet 1,000 mg, 1,000 mg, Oral, 3 times per day  polyvinyl alcohol (LIQUIFILM TEARS) 1.4 % ophthalmic solution 1 drop, 1 drop, Both Eyes, PRN  sodium chloride flush 0.9 % injection 5-40 mL, 5-40 mL, Intravenous, PRN  promethazine (PHENERGAN) tablet 12.5 mg, 12.5 mg, Oral, Q6H PRN **OR** ondansetron (ZOFRAN) injection 4 mg, 4 mg, Intravenous, Q6H PRN  0.9 % sodium chloride infusion, 25 mL, Intravenous, PRN  albuterol (PROVENTIL) nebulizer solution 2.5 mg, 2.5 mg, Nebulization, Q4H PRN  amLODIPine (NORVASC) tablet 5 mg, 5 mg, Oral, Daily  aspirin chewable tablet 81 mg, 81 mg, Oral, Daily  atorvastatin (LIPITOR) tablet 80 mg, 80 mg, Oral, Daily  budesonide-formoterol (SYMBICORT) 160-4.5 MCG/ACT inhaler 2 puff, 2 puff, Inhalation, BID  dextrose 50 % IV solution, 12.5 g, Intravenous, PRN  glucagon (rDNA) injection 1 mg, 1 mg, Intramuscular, PRN  glucose (GLUTOSE) 40 % oral gel 15 g, 15 g, Oral, PRN  insulin lispro (HUMALOG) injection vial 0-3 Units, 0-3 Units, Subcutaneous, Nightly  insulin lispro (HUMALOG) injection vial 0-6 Units, 0-6 Units, Subcutaneous, TID WC  levothyroxine (SYNTHROID) tablet 25 mcg, 25 mcg, Oral, Daily  nicotine (NICODERM CQ) 14 MG/24HR 1 patch, 1 patch, Transdermal, Daily  tiotropium (SPIRIVA RESPIMAT) 2.5 MCG/ACT inhaler 2 puff, 2 puff, Inhalation, Daily  polyethylene glycol (GLYCOLAX) packet 17 g, 17 g, Oral, Daily  senna (SENOKOT) tablet 17.2 mg, 2 tablet, Oral, Daily

## 2021-05-16 NOTE — PLAN OF CARE
Problem: Neurological  Goal: Maximum potential motor/sensory/cognitive function  5/16/2021 0130 by Ana Hernandez RN  Outcome: Ongoing     Problem: Pain:  Goal: Pain level will decrease  Description: Pain level will decrease  5/16/2021 0130 by Ana Hernandez RN  Outcome: Ongoing     Problem: Pain:  Goal: Control of acute pain  Description: Control of acute pain  5/16/2021 0130 by Ana Hernandez RN  Outcome: Ongoing     Problem: Pain:  Goal: Control of chronic pain  Description: Control of chronic pain  5/16/2021 0130 by Ana Hernandez RN  Outcome: Ongoing     Problem: Skin Integrity:  Goal: Will show no infection signs and symptoms  Description: Will show no infection signs and symptoms  5/16/2021 0130 by Ana Hernandez RN  Outcome: Ongoing     Problem: Skin Integrity:  Goal: Absence of new skin breakdown  Description: Absence of new skin breakdown  5/16/2021 0130 by Ana Hernandez RN  Outcome: Ongoing

## 2021-05-16 NOTE — PROGRESS NOTES
[place] [address] Internal Medicine    Progress Note     5/16/2021    5:10 PM    Name:   Racquel Stanford  MRN:     [id number]     Acct:      [de-identified]   Room:   [address] [place] [address] Day:  6  Admit Date:  5/10/2021  7:13 AM    PCP:   Jennifer Box MD  Code Status:  Full Code    Subjective:     C/C: medical mgmt    Active Problems:    Tobacco abuse    Gastritis    Acute left hemiparesis ([facility]    Acute CVA (cerebrovascular accident) ([facility]    COPD (chronic obstructive pulmonary disease) ([facility]    Essential hypertension    Hyperlipidemia    Pre-diabetes  Resolved Problems:    * No resolved hospital problems. *      Interval History Status: improved. 5/14/21  Patient seen and examined at bedside. No acute overnight events. Afebrile, hemodynamically stable. Continues to work with PT/OT. No acute changes in hemiparesis. No acute complaints. Significant last 24 hr data reviewed ;   Vitals:    05/14/21 1841 05/15/21 0700 05/15/21 1942 05/16/21 0645   BP: 117/74 112/62 129/65 129/67   Pulse: 85 78 80 79   Resp: 18 16 16 19   Temp: 98.3 °F (36.8 °C) 98.6 °F (37 °C) 98 °F (36.7 °C) 98 °F (36.7 °C)   TempSrc: Oral Oral Oral Oral   SpO2: 95% 96% 92% 93%   Weight:       Height:          Recent Results (from the past 24 hour(s))   POC Glucose Fingerstick    Collection Time: 05/15/21  7:44 PM   Result Value Ref Range    POC Glucose 124 (H) 65 - 105 mg/dL   POC Glucose Fingerstick    Collection Time: 05/16/21  6:45 AM   Result Value Ref Range    POC Glucose 109 (H) 65 - 105 mg/dL   POC Glucose Fingerstick    Collection Time: 05/16/21 10:46 AM   Result Value Ref Range    POC Glucose 110 (H) 65 - 105 mg/dL   POC Glucose Fingerstick    Collection Time: 05/16/21  3:53 PM   Result Value Ref Range    POC Glucose 105 65 - 105 mg/dL     Recent Labs     05/15/21  1644 05/15/21  1944 05/16/21  0645 05/16/21  1046 05/16/21  1553   POCGLU 129* 124* 109* 110* 105        No results found.           HPI: See history in H and P      Review of Systems:     Constitutional:  negative for chills, fevers, sweats  Respiratory:  negative for cough, dyspnea on exertion, hemoptysis, shortness of breath, wheezing  Cardiovascular:  negative for chest pain, chest pressure/discomfort, lower extremity edema, palpitations  Gastrointestinal:  negative for abdominal pain, constipation, diarrhea, nausea, vomiting  Neurological: Positive for left-sided weakness. Negative for dizziness, headache  Data:     Past Medical History:  no change     Social History:  no change    Family History: @no change    Vitals:      I/O (24Hr): Intake/Output Summary (Last 24 hours) at 5/16/2021 1710  Last data filed at 5/15/2021 2111  Gross per 24 hour   Intake --   Output 300 ml   Net -300 ml       Labs:    URINE ANALYSIS: No results found for: LABURIN     CBC:  Lab Results   Component Value Date    WBC 11.4 05/12/2021    HGB 13.8 05/12/2021     05/12/2021        BMP:    Lab Results   Component Value Date     05/11/2021    K 4.1 05/11/2021     05/11/2021    CO2 28 05/11/2021    BUN 20 05/11/2021    CREATININE 0.58 05/11/2021    GLUCOSE 163 05/11/2021      LIVER PROFILE:  Lab Results   Component Value Date    ALT 13 10/16/2017    AST 13 10/16/2017    PROT 6.9 10/16/2017    BILITOT 0.22 10/16/2017    BILIDIR 0.11 10/16/2017    LABALBU 4.0 10/16/2017               Radiology:  Medications:      Allergies:      Current Meds:   Scheduled Meds:    FLUoxetine  20 mg Oral Daily    amitriptyline  25 mg Oral Nightly    heparin (porcine)  5,000 Units Subcutaneous 3 times per day    acetaminophen  1,000 mg Oral 3 times per day    amLODIPine  5 mg Oral Daily    aspirin  81 mg Oral Daily    atorvastatin  80 mg Oral Daily    budesonide-formoterol  2 puff Inhalation BID    insulin lispro  0-3 Units Subcutaneous Nightly    insulin lispro  0-6 Units Subcutaneous TID     levothyroxine  25 mcg Oral Daily    nicotine  1 patch Transdermal Daily    tiotropium  2 puff Inhalation Daily    polyethylene glycol  17 g Oral Daily     Continuous Infusions:    sodium chloride       PRN Meds: polyvinyl alcohol, sodium chloride flush, promethazine **OR** ondansetron, sodium chloride, albuterol, dextrose, glucagon (rDNA), glucose, senna, bisacodyl      Physical Examination:        /67   Pulse 79   Temp 98 °F (36.7 °C) (Oral)   Resp 19   Ht 5' 8\" (1.727 m)   Wt 191 lb 8 oz (86.9 kg)   SpO2 93%   BMI 29.12 kg/m²   Temp (24hrs), Av °F (36.7 °C), Min:98 °F (36.7 °C), Max:98 °F (36.7 °C)    Recent Labs     05/15/21  1944 21  0645 21  1046 21  1553   POCGLU 124* 109* 110* 105       Intake/Output Summary (Last 24 hours) at 2021 1710  Last data filed at 5/15/2021 2111  Gross per 24 hour   Intake --   Output 300 ml   Net -300 ml       General Appearance:  alert, well appearing, and in no acute distress  Mental status: oriented to person, place, and time with normal affect  Head:  normocephalic, atraumatic. Eye: no icterus, redness, pupils equal and reactive, extraocular eye movements intact, conjunctiva clear  Ear: normal external ear, no discharge, hearing intact  Nose:  no drainage noted  Mouth: mucous membranes moist  Neck: supple, no carotid bruits, thyroid not palpable  Lungs: Clear to auscultation bilaterally. No accessory muscle use. Cardiovascular: normal rate, regular rhythm, no murmur, gallop, rub. Abdomen: Soft, nontender, nondistended, normal bowel sounds, no hepatomegaly or splenomegaly  Neurologic: Left upper and lower extremity weakness, 0-1/5. No sensory loss.   Skin: No gross lesions, rashes, bruising or bleeding on exposed skin area  Extremities:  peripheral pulses palpable, no pedal edema or calf pain with palpation    Assessment:        Primary Problem  <principal problem not specified>    Active Hospital Problems    Diagnosis Date Noted    [facility] [I63.9] 05/10/2021    COPD (chronic obstructive pulmonary disease) ([facility] [address] [facility] [J44.9] 05/10/2021    Essential hypertension [I10] 05/10/2021    Hyperlipidemia [E78.5] 05/10/2021    Pre-diabetes [R73.03] 05/10/2021    Acute left hemiparesis (HCC) [G81.94]     Gastritis [K29.70]     Tobacco abuse [Z72.0] 07/16/2017     Plan:        Continue current therapy regimen  BP well controlled. Continue norvasc 5 mg daily  Copd stable. continue bronchodilators  Synthroid 25 mcg daily   lipitor 80 mg daily  Asa 81 mg daily  Insulin sliding scale  nicoderm patch  Continue PT/OT  Encourage PO intake  Leukocytosis resolved. Likely reactionary. MD LEONOR Zhang[address], [address] [place] [address].    Phone [phone]   Fax: [phone]  Answering Service: [phone]

## 2021-05-17 LAB
GLUCOSE BLD-MCNC: 102 MG/DL (ref 65–105)
GLUCOSE BLD-MCNC: 116 MG/DL (ref 65–105)
GLUCOSE BLD-MCNC: 122 MG/DL (ref 65–105)
GLUCOSE BLD-MCNC: 98 MG/DL (ref 65–105)

## 2021-05-17 PROCEDURE — 82947 ASSAY GLUCOSE BLOOD QUANT: CPT

## 2021-05-17 PROCEDURE — 6370000000 HC RX 637 (ALT 250 FOR IP): Performed by: STUDENT IN AN ORGANIZED HEALTH CARE EDUCATION/TRAINING PROGRAM

## 2021-05-17 PROCEDURE — 6370000000 HC RX 637 (ALT 250 FOR IP): Performed by: PHYSICAL MEDICINE & REHABILITATION

## 2021-05-17 PROCEDURE — 92507 TX SP LANG VOICE COMM INDIV: CPT

## 2021-05-17 PROCEDURE — 99232 SBSQ HOSP IP/OBS MODERATE 35: CPT | Performed by: PHYSICAL MEDICINE & REHABILITATION

## 2021-05-17 PROCEDURE — 97116 GAIT TRAINING THERAPY: CPT

## 2021-05-17 PROCEDURE — 92526 ORAL FUNCTION THERAPY: CPT

## 2021-05-17 PROCEDURE — 6360000002 HC RX W HCPCS: Performed by: PHYSICAL MEDICINE & REHABILITATION

## 2021-05-17 PROCEDURE — 97535 SELF CARE MNGMENT TRAINING: CPT

## 2021-05-17 PROCEDURE — 97530 THERAPEUTIC ACTIVITIES: CPT

## 2021-05-17 PROCEDURE — 97112 NEUROMUSCULAR REEDUCATION: CPT

## 2021-05-17 PROCEDURE — 99231 SBSQ HOSP IP/OBS SF/LOW 25: CPT | Performed by: INTERNAL MEDICINE

## 2021-05-17 PROCEDURE — 97542 WHEELCHAIR MNGMENT TRAINING: CPT

## 2021-05-17 PROCEDURE — 97110 THERAPEUTIC EXERCISES: CPT

## 2021-05-17 PROCEDURE — 1180000000 HC REHAB R&B

## 2021-05-17 RX ADMIN — Medication 2 PUFF: at 21:45

## 2021-05-17 RX ADMIN — ACETAMINOPHEN 1000 MG: 500 TABLET, FILM COATED ORAL at 15:01

## 2021-05-17 RX ADMIN — AMLODIPINE BESYLATE 5 MG: 5 TABLET ORAL at 07:22

## 2021-05-17 RX ADMIN — Medication 2 PUFF: at 07:22

## 2021-05-17 RX ADMIN — HEPARIN SODIUM 5000 UNITS: 5000 INJECTION INTRAVENOUS; SUBCUTANEOUS at 21:45

## 2021-05-17 RX ADMIN — ACETAMINOPHEN 1000 MG: 500 TABLET, FILM COATED ORAL at 21:44

## 2021-05-17 RX ADMIN — ATORVASTATIN CALCIUM 80 MG: 80 TABLET, FILM COATED ORAL at 07:22

## 2021-05-17 RX ADMIN — ACETAMINOPHEN 1000 MG: 500 TABLET, FILM COATED ORAL at 06:06

## 2021-05-17 RX ADMIN — HEPARIN SODIUM 5000 UNITS: 5000 INJECTION INTRAVENOUS; SUBCUTANEOUS at 06:05

## 2021-05-17 RX ADMIN — LEVOTHYROXINE SODIUM 25 MCG: 0.03 TABLET ORAL at 06:06

## 2021-05-17 RX ADMIN — FLUOXETINE HYDROCHLORIDE 20 MG: 20 CAPSULE ORAL at 07:22

## 2021-05-17 RX ADMIN — ASPIRIN 81 MG CHEWABLE TABLET 81 MG: 81 TABLET CHEWABLE at 07:22

## 2021-05-17 RX ADMIN — HEPARIN SODIUM 5000 UNITS: 5000 INJECTION INTRAVENOUS; SUBCUTANEOUS at 15:01

## 2021-05-17 RX ADMIN — AMITRIPTYLINE HYDROCHLORIDE 25 MG: 25 TABLET, FILM COATED ORAL at 21:45

## 2021-05-17 ASSESSMENT — PAIN SCALES - GENERAL
PAINLEVEL_OUTOF10: 0
PAINLEVEL_OUTOF10: 3
PAINLEVEL_OUTOF10: 3

## 2021-05-17 NOTE — PROGRESS NOTES
[place] [address] Internal Medicine    Progress Note  Chart Reviewed: Yes  Patient assessed for rehabilitation services?: Yes  Referring Practitioner: Dr Imelda Carrasquillo  5/17/2021    12:41 PM    Name:   Rema Allen  MRN:     [id number]     Acct:      [de-identified]   Room:   [facility]2622-01   Day:  7  Admit Date:  5/10/2021  7:13 AM    PCP:   Raúl Box MD  Code Status:  Full Code    Subjective:     C/C: medical mgmt    Active Problems:    Tobacco abuse    Gastritis    Acute left hemiparesis ([address]    Acute CVA (cerebrovascular accident) ([facility]    COPD (chronic obstructive pulmonary disease) ([facility] [facility]    Essential hypertension    Hyperlipidemia    Pre-diabetes  Resolved Problems:    * No resolved hospital problems. *      Interval History Status: improved. 5/14/21  Patient seen and examined at bedside. No acute overnight events. Afebrile, hemodynamically stable. Continues to work with PT/OT. No acute changes in hemiparesis. No acute complaints.      Significant last 24 hr data reviewed ;   Vitals:    05/15/21 1942 05/16/21 0645 05/16/21 1904 05/17/21 0615   BP: 129/65 129/67 (!) 145/78 124/72   Pulse: 80 79 89 77   Resp: 16 19 18 16   Temp: 98 °F (36.7 °C) 98 °F (36.7 °C) 98.7 °F (37.1 °C) 98.9 °F (37.2 °C)   TempSrc: Oral Oral Oral Oral   SpO2: 92% 93% 93% 93%   Weight:       Height:          Recent Results (from the past 24 hour(s))   POC Glucose Fingerstick    Collection Time: 05/16/21  3:53 PM   Result Value Ref Range    POC Glucose 105 65 - 105 mg/dL   POC Glucose Fingerstick    Collection Time: 05/16/21  8:32 PM   Result Value Ref Range    POC Glucose 116 (H) 65 - 105 mg/dL   POC Glucose Fingerstick    Collection Time: 05/17/21  6:16 AM   Result Value Ref Range    POC Glucose 102 65 - 105 mg/dL   POC Glucose Fingerstick    Collection Time: 05/17/21 11:07 AM   Result Value Ref Range    POC Glucose 98 65 - 105 mg/dL     Recent Labs     05/16/21  1046 05/16/21  1553 05/16/21  203 05/17/21  0616 05/17/21  1107   POCGLU 110* 105 116* 102 98        No results found. HPI:   See history in H and P      Review of Systems:     Constitutional:  negative for chills, fevers, sweats  Respiratory:  negative for cough, dyspnea on exertion, hemoptysis, shortness of breath, wheezing  Cardiovascular:  negative for chest pain, chest pressure/discomfort, lower extremity edema, palpitations  Gastrointestinal:  negative for abdominal pain, constipation, diarrhea, nausea, vomiting  Neurological: Positive for left-sided weakness. Negative for dizziness, headache  Data:     Past Medical History:  no change     Social History:  no change    Family History: @no change    Vitals:      I/O (24Hr): Intake/Output Summary (Last 24 hours) at 5/17/2021 1241  Last data filed at 5/17/2021 0720  Gross per 24 hour   Intake 200 ml   Output --   Net 200 ml       Labs:    URINE ANALYSIS: No results found for: LABURIN     CBC:  Lab Results   Component Value Date    WBC 11.4 05/12/2021    HGB 13.8 05/12/2021     05/12/2021        BMP:    Lab Results   Component Value Date     05/11/2021    K 4.1 05/11/2021     05/11/2021    CO2 28 05/11/2021    BUN 20 05/11/2021    CREATININE 0.58 05/11/2021    GLUCOSE 163 05/11/2021      LIVER PROFILE:  Lab Results   Component Value Date    ALT 13 10/16/2017    AST 13 10/16/2017    PROT 6.9 10/16/2017    BILITOT 0.22 10/16/2017    BILIDIR 0.11 10/16/2017    LABALBU 4.0 10/16/2017               Radiology:  Medications:      Allergies:      Current Meds:   Scheduled Meds:    FLUoxetine  20 mg Oral Daily    amitriptyline  25 mg Oral Nightly    heparin (porcine)  5,000 Units Subcutaneous 3 times per day    acetaminophen  1,000 mg Oral 3 times per day    amLODIPine  5 mg Oral Daily    aspirin  81 mg Oral Daily    atorvastatin  80 mg Oral Daily    budesonide-formoterol  2 puff Inhalation BID    insulin lispro  0-3 Units Subcutaneous Nightly    insulin lispro  0-6 Problems    Diagnosis Date Noted    Acute CVA (cerebrovascular accident) ([facility] [I63.9] 05/10/2021    COPD (chronic obstructive pulmonary disease) ([facility] [J44.9] 05/10/2021    Essential hypertension [I10] 05/10/2021    Hyperlipidemia [E78.5] 05/10/2021    Pre-diabetes [R73.03] 05/10/2021    Acute left hemiparesis (HCC) [G81.94]     Gastritis [K29.70]     Tobacco abuse [Z72.0] 07/16/2017     Plan:        Continue current therapy regimen  BP well controlled. Continue norvasc 5 mg daily  Copd stable. continue bronchodilators  Synthroid 25 mcg daily   lipitor 80 mg daily  Asa 81 mg daily  Insulin sliding scale  nicoderm patch  Continue PT/OT  Encourage PO intake  Leukocytosis resolved. Likely reactionary. 05/17/21     Patient reports no new complaints. Engaging with physical therapy. Labs and vitals reviewed. No new issues. Continue with current care. MD LEONOR Gaspar [address], [address] [place] [address].    Phone [phone]   Fax: [phone]  Answering Service: [phone]

## 2021-05-17 NOTE — PLAN OF CARE
Nutrition Problem #1: Inadequate oral intake  Intervention: Food and/or Nutrient Delivery: Continue Current Diet, Continue Oral Nutrition Supplement  Nutritional Goals: PO intake % of meals and supplements

## 2021-05-17 NOTE — PROGRESS NOTES
Speech Language Pathology  Speech Language Pathology  [facility] Acute Rehab Unit at [address]     Dysphagia/SpeechLanguage Treatment Note    Date: 5/17/2021  Patients Name: Florian Kerr  MRN: [id number]  Diagnosis:   Patient Active Problem List   Diagnosis Code    Pneumonia of left lower lobe due to infectious organism J18.9    Tobacco abuse Z72.0    Legionella pneumonia ([facility] A48.1    Dysphagia R13.10    Hyperplastic colon polyp K63.5    Esophageal ring K22.2    Gastritis K29.70    Elbow effusion, right M25.421    Stroke due to occlusion of right middle cerebral artery ([facility]) I63.511    Nihss score 15 R29.715    Acute left hemiparesis ([facility]) G81.94    Cerebrovascular accident (CVA) due to occlusion of right middle cerebral artery ([facility] I63.511    Acute CVA (cerebrovascular accident) ([facility] I63.9    COPD (chronic obstructive pulmonary disease) ([facility]) J44.9    Essential hypertension I10    Hyperlipidemia E78.5    Pre-diabetes R73.03       Pain: 0/10    Cognitive Treatment    Treatment time:  4076-5531    Subjective: [x] Alert [x] Cooperative     [] Confused     [] Agitated    [] Lethargic    Objective/Assessment:  Attention:  Pt. Easily distracted, cues needed to redirect attention. Verbal cues needed for increased attention to midline, pt. c extreme L sided neglect. Pt. Karmen able to locate items on tray table on L side. Pt. Needed max cues to look to L side of picture when answering questions asked re: items on picture. Pt. Unable to sequence 4 step written steps d/t L sided neglect. Recall: n/a      Organization: n/a    Problem Solving/Reasoning: Divergent naming- 15 items in 60 seconds with cues to continue attempting to generate items. Other:  Pt. Completed oral motor exercises x3, 15 reps each with mod cues. Pt. Demonstrates significant L sided weakness. ST utilized vital stim for L sided oral sling (one channel only), 6.0 mA for 25 minutes.   Encouragement given by ST for pt. To leave electrodes on. Plan:  [x] Continue ST services    [] Discharge from ST:      Discharge recommendations: [] Inpatient Rehab   [] [place]   [] Outpatient Therapy  [] Follow up at trauma clinic   [] Other:       Treatment completed by: Stacie aCrpio A.CCC/SLP

## 2021-05-17 NOTE — PATIENT CARE CONFERENCE
[address]   ACUTE REHABILITATION  TEAM CONFERENCE NOTE  Date: 21  Patient Name: Suzi Booth       Room: 4341/2650-09  MRN: [id number]       : 1955  (66 y.o.)     Gender: female   Referring Practitioner: Dr Porsche Pickett CVA (cerebrovascular accident) [facility]) [I63.9]  Diagnosis: ischemic CVA,  subacute right MCA distribution infarct, L lynette, dysphagia, L neglect, loop recorder 21 (Dr. Steven Engle)     NURSING  Bladder  Continent  Bowel   Continent  Date of Last BM: 2021  Intervention    Both Bowel & Bladder Program     Wounds/Incisions/Ulcers: No skin issues identified  Medication Education Program: Patient currently unable to manage medications and family being educated  Pain: Patient's pain is currently controlled with -  Tylenol scheduled    Fall Risk:  Falling star program initiated    PHYSICAL THERAPY  Bed mobility  Bridging: Minimal assistance (to support and facilitate LLE only)  Scooting: Minimal assistance (left hip with instruction )  Bed Mobility  Bridging: Minimal assistance (to support and facilitate LLE only)  Rolling:  Stand by assistance;Rolling Left;Rolling Right  Supine to Sit: Moderate assistance (L LE and trunk)  Sit to Supine: Moderate assistance (x2: bilateral LEs & trunk. Education on scooping L LE c R)  Scooting: Minimal assistance (left hip with instruction )  Comment:dependant L UE/LE. Pt ed to protect her L UE during tansfers and to use her RLE to assist/scoop her LLE in/out of bed    Transfers:  Sit to Stand:  2 Person Assistance;Minimal Assistance (using green walker lift)  Stand to sit:  2 Person Assistance;Minimal Assistance (VC's to reach for chair and control decent)  Squat Pivot Transfers:  Moderate Assistance (to pt's Right)    Ambulation 1  Surface:  level tile  Device:   (walker lift)  Other Apparatus: (P) Left;Slider (blue shoe cover/slide )  Assistance: Maximum assistance;2 Person assistance (3rd person for total assist with LLE)  Gait Deviations: Decreased step length; Slow Juanita;Decreased step height  Distance:  20ft  Comments:  Pt with improved upper body support using walker lift to amb. working on wt shifting and weightbearing into LLE. One person placed on each side of walker lift to provide lateral support and to keep pt from leaning fwd. 3rd person assist on LLE only advancing and placing foot each step and providing L knee stability during stance phase. Other: CTA; pending further progress with mobility    Improved/eased initiation of sit to stand. Goals  Time Frame for Short term goals: 10 days  Short term goal 1: Pt able to roll in bed side to side at min A without bed rail  Short term goal 2: Pt able to perform supine<>sit at mod A  Short term goal 3: Pt able to perform sit<>stand at min/mod A . Short term goal 4: Pt able to progress to pivot transfers at max A   Short term goal 5: Pt able to improve sitting balance at EOB/EOM at SBA for static balance and able to track to left side as well as turn to head to left side to scan her environment with minimal cues. Short term goal 6: Pt able to tolerate standing in // bars with R UE support and assist at mod A  Short term goal 7: Pt able to propel w/c with R UE/R LE distance of 50 to 80 ft, straight path and turn around 180 degrees, at min/mod A     OCCUPATIONAL THERAPY  SELF CARE   Equipment Provided: Long-handled sponge, Reacher  Eating                Oral Hygiene            Stand by assistance (Verbal cues for thoroughness. )   Shower/Bathe Self             UE Bathing: None (Pt denies)  LE Bathing: Dependent/Total (use of LH sponge. Gemini galdamez to complete bottom.)   Upper Body Dressing            None (Pt denies)   Lower Body Dressing            Putting On/Taking Off Footwear             Dependent/Total (Reacher to thread RLE.  Gemini galdamez to pull up over hips.)   Toilet Transfer             Toilet - Technique:  Governor Amina davis )  Equipment Used: Raised toilet seat without rails  Toilet Transfer: Dependent/Total Radha galdamez. Min-mod A)  Toilet Transfers Comments: Completed with use of nilson galdamez with min-mod A to complete. [address]            Dependent/Total Radha galdamez for clothing management and hygiene)    Bed mobility  Supine to Sit: Moderate assistance  Sit to Supine: Moderate assistance        Balance  Sitting Balance: Contact guard assistance  Standing Balance: Dependent/Total (min A in nilson galdamez; assist to stabilize LUE on front bar. )  Standing Balance  Time: 1-2 minutes x 6  Activity: functional transfers, lower body bathing, lower body dressing, toileting  Comment: CGA-Min A while standing in nilson galdamez    Equipment Recommendations  Equipment Needed:  (TBD)  Assessment: severely impaired loss of adl indep post CVA with severe L lynette deficits, dysphagia, L neglect    Short term goals  Time Frame for Short term goals: 1 week  Short term goal 1: mod A UE bathe and dress  Short term goal 2: set up brush teeth  Short term goal 3: min feeding, with cues able to locate L side of tray and scan to locate items on L. Short term goal 4: tolerate 6-8 min sitting edge of bed (static sit)  with min assist and RUE support, head at midline and fair safety awareness  Short term goal 5: pt to initiate reposition LUE with RUE with good safety ie. hold by wrist not by 1 finger  Short term goal 6: demo improved awareness of L side and neuromuscular christian by weight bearing on LUE with physical assist to complete  Short term goal 7: from w/c:  consistently maintain midline trunk control and demo able to lean forward 4 inches away from back of chair without physical assist for adls. Short term goal 8: tolerate 2 min static stand for adls with RUE support with mod of 2 and assist with LLE as needed. SPEECH THERAPY  Supervision level problem solving, min A memory. Pt. Is on pureed solids, thin liquids. Pt. c max cues to redirect attention to L visual field.    Short Term Goal: mod I problem solving, supervision level memory. Diet at least restrictive consistency    NUTRITION  Weight: 191 lb 8 oz (86.9 kg) / Body mass index is 29.12 kg/m². Diet 4 carbohydrate choices, No Added Salt, Pureed diet with Glucerna supplements twice daily- pt is consuming 25-50% of food provided but drinking all of supplements provided suggesting nutrition needs are being met with po. Glu is adequately controlled, Bowels are moving. Please see nutrition note for details. SOCIAL WORK ASSESSMENT  Assessment: Pt currently lives alone and identified her family as supportive. She has 3 children . Pt plans to return home with a goal of out-pt therapy. Her daughter stated they will be able to assist pt with 24 hr support if necessary as they plan on taking \"shifts\". Per daughter Talha Kovacs Pt does not have insurance and they are currently working to address this concern. Contacted Sari at [facility] and vm was left on 5/14/2021. Pre-Admission Status:  Lives With: Alone  Type of Home: House  Home Layout: Two level, Able to Live on Main level with bedroom/bathroom, Laundry in basement  Home Access: Stairs to enter without rails  Entrance Stairs - Number of Steps: 3 (Front entrance)  Bathroom Shower/Tub: Tub/Shower unit, Curtain (tub shower is on main floor)  Bathroom Toilet: Standard  Bathroom Equipment: Hand-held shower  Home Equipment:  (No DME)  ADL Assistance: [facility] [address]: Independent  Homemaking Responsibilities: Yes  Ambulation Assistance: Independent  Transfer Assistance: Independent  Active : Yes  Mode of Transportation: [facility]  Occupation: Retired  Type of occupation: grocery store employee  Leisure & Hobbies: repurposing items, has a dog and fenced in back yard. IADL Comments: dtr, Dean King RN lives in [facility], Talha Kovacs lives 20 min away, son, Sammi Willis lives 4 blocks away. Additional Comments: Pt independent for all mobility, self care and IADL's.      Family Education: Need to make contact with family to initiate education    Percentage Risk for Readmission: Low 0 - 18%   Risk of Unplanned Readmission:  9 %    Critical Items: None       Problem / Barrier Intervention / Plan  Results   dysphagia Oral motor exercises     Impaired cognition Cognitive retraining exercises     Impaired ability to care for self related to CVA  Patient and family training in modified care strategies to increase independence and safety during care tasks      Impaired function related to deficits form CVA Facilitation for muscle contractions-progressing to strengthening, balance activities and functional mobility training, including family training for safe mobility      Steps at entrance of home Depending on pt's progress in motor return, discuss/practise safe technique for enter/exiting home with family. Progress to stair training for pt, if pt able.      Severe left neglect. Work on positioning, having pt scan left environment, pt/family education.                 Total Self Care Score    Total Mobility Score  Admission Score:  12      Admission Score:  17  Goal:  22/42         Goal:  33/90   `  Discharge Plan   Estimated Discharge Date: 6/2/2021  Home evaluation needed? Home Evaluation Indication (NO, Requires ReEval, YES/Date): No home evaluation need indicated for patient at this time  Overnight or Day Pass: No  Factors facilitating achievement of predicted outcomes: Family support, Motivated, Cooperative, Pleasant and Good insight into deficits  Barriers to the achievement of predicted outcomes:  Anxiety, Communication deficit, Upper extremity weakness, Lower extremity weakness, Medical complications, Skin Care and Medication managment    Functional Goals at discharge:  Predicted Outcome: Home with familyPATIENT'S LEVEL OF ASSISTANCE: min/mod assist trans and ADL, w/c level  Discharge therapy goals:  PT: Long term goals  Time Frame for Long term goals : By DC  Long term goal 1: Pt able to

## 2021-05-17 NOTE — PROGRESS NOTES
Comprehensive Nutrition Assessment    Type and Reason for Visit:  Reassess    Nutrition Recommendations/Plan: Will continue to provide Pureed diet with Glucerna supplements twice daily. Nutrition Assessment:  Observed pt with lunch tray 5/17. She consumed around 50%. States she likes Glucerna supplements (chocolate). Malnutrition Assessment:  Malnutrition Status: At risk for malnutrition (Comment)    Context:  Acute Illness     Findings of the 6 clinical characteristics of malnutrition:  Energy Intake:  Mild decrease in energy intake (Comment)  Weight Loss:  Unable to assess     Body Fat Loss:  No significant body fat loss     Muscle Mass Loss:  No significant muscle mass loss    Fluid Accumulation:  No significant fluid accumulation     Strength:  Not Performed    Estimated Daily Nutrient Needs:  Energy (kcal):  1770 based on Conformity with 1.2 factor; Weight Used for Energy Requirements:  Admission     Protein (g):  83-89 based on 1.3-1.4 gm per kg; Weight Used for Protein Requirements:  Ideal       Nutrition Related Findings:  no edema, Labs (5/17) POC Glu , Meds: Reviewed, BM 5/16      Wounds:  Surgical Incision       Current Nutrition Therapies:    DIET CARB CONTROL; Carb Control: 4 carb choices (60 gms)/meal; No Added Salt (3-4 GM);  Dysphagia Pureed  Dietary Nutrition Supplements: Diabetic Oral Supplement    Anthropometric Measures:  · Height: 5' 8\" (172.7 cm)  · Current Body Weight: 191 lb 9.3 oz (86.9 kg)   · Admission Body Weight: 191 lb 9.3 oz (86.9 kg)    · Usual Body Weight: 195 lb (88.5 kg) (per pt)     Ideal Body Weight: 140 lbs;    Nutrition Diagnosis:   · Inadequate oral intake related to swallowing difficulty (acute illness) as evidenced by intake 26-50%, intake 51-75%, poor intake prior to admission    Nutrition Interventions:   Food and/or Nutrient Delivery:  Continue Current Diet, Continue Oral Nutrition Supplement  Nutrition Education/Counseling:  No recommendation at this time   Coordination of Nutrition Care:  Continue to monitor while inpatient    Goals:  PO intake % of meals and supplements       Nutrition Monitoring and Evaluation:   Food/Nutrient Intake Outcomes:  Food and Nutrient Intake, Supplement Intake  Physical Signs/Symptoms Outcomes:  Biochemical Data, GI Status, Fluid Status or Edema, Weight, Skin     Discharge Planning:     Too soon to determine     Electronically signed by Lucy Bender, RD, LD on 5/17/21 at 2:09 PM EDT    Contact: [phone]

## 2021-05-17 NOTE — PROGRESS NOTES
Physical Medicine & Rehabilitation  Progress Note    5/17/2021 1:52 PM     CC: Ambulatory and ADL dysfunction due to left nondominant hemiplegia due to ischemic CVA    Subjective:   No complaints. No pain. Bowels and bladder okay last BM 5/16. Patient with fall last night. Denies hitting head. Notes some mild left wrist discomfort which is better now. Denies any trauma or pain or discomfort. ROS:  Denies fevers, chills, sweats. No chest pain, palpitations, lightheadedness. Denies coughing, wheezing or shortness of breath. Denies abdominal pain, nausea, diarrhea or constipation. No new areas of joint pain. Denies new areas of numbness or weakness. Denies new anxiety or depression issues. No new skin problems. Rehabilitation:   PT:  Restrictions/Precautions: Fall Risk  Implants present? :  (loop recorder)  Other position/activity restrictions: significant L neglect with LUE/LLE deficits   Transfers  Sit to Stand: 2 Person Assistance, Moderate Assistance (Nilson Stedy and WC to  parallel bars )  Stand to sit: Moderate Assistance, 2 Person Assistance (Fair control; D/T for L UE safety)  Bed to Chair: Maximum assistance, 2 Person Assistance (squat pivot to right , no LE advancement left foot blocked)  Stand Pivot Transfers: Dependent/Total Jurgómez Munroe)  Comment: multiple transfers with nilson steady bed <> WC, mat table, and cammode  Ambulation 1  Surface: level tile  Device: Parallel Bars  Other Apparatus: Left (UE sling)  Assistance: Maximum assistance, 2 Person assistance  Quality of Gait: Dependent/total to advance L LE and maintaint L knee extension. Good core stabilization, fair ability to weight shift.   Gait Deviations: Decreased step length, Slow Juanita  Distance: 2 steps each LE  Comments: short distance only: No quad control demonstrated          OT:  ADL  Equipment Provided: Long-handled sponge, Reacher  Feeding: Setup  Grooming: Stand by assistance (Verbal cues for thoroughness. )  UE Bathing: None (Pt denies)  LE Bathing: Dependent/Total (use of LH sponge. Ludivina Revel stedy to complete bottom.)  UE Dressing: None (Pt denies)  LE Dressing: Dependent/Total (Reacher to thread RLE. Ludivina Revel stedy to pull up over hips.)  Toileting: Dependent/Total Celestia Zachariah stedy for clothing management and hygiene)  Additional Comments: OT facilitated pts engagement in self care tasks while sitting in wheelchair at sink side. Pt denies upper body bathing and changing shirt despite max encouragement to participate. Pt provided and educated on use of long handle sponge to assist with lower body bathing. Pt educated on use of reacher to assist with lower body dressing. Pt conitnues to be total dependent for lower body self care tasks due to use of nilson stedy to complete. Balance  Sitting Balance: Contact guard assistance  Standing Balance: Dependent/Total (min A in nilson stedy; assist to stabilize LUE on front bar. )   Standing Balance  Time: 1-2 minutes x 6  Activity: functional transfers, lower body bathing, lower body dressing, toileting  Comment: CGA-Min A while standing in nilson stedy  Functional Mobility  Functional - Mobility Device: Wheelchair  Activity: To/from bathroom  Assist Level: Dependent/Total  Functional Mobility Comments: pt ed and practiced w/c mobility in room using RUE and RLE with LLE on leg rest foward and back 5 feet. Bed mobility  Bridging: Minimal assistance (to support and facilitate LLE only)  Rolling to Left: Minimal assistance  Rolling to Right: Maximum assistance  Supine to Sit: Moderate assistance  Sit to Supine: Moderate assistance  Scooting: Minimal assistance (left hip with instruction )  Comment: Pt request to return to supine position after self care tasks. Pt positioned on Left side with pillows supporting back and legs with pillows placed under LUE for support.    Transfers  Stand Step Transfers: Dependent/Total  Sit to stand: Dependent/Total (Min A)  Stand to sit: Dependent/Total (Min A- Mod A)  Transfer Comments: Use of nilson stedy. Pt required verbal cues for slow and controled stand to sit. Toilet Transfers  Toilet - Technique:  Geralynn Reach steady )  Equipment Used: Raised toilet seat without rails  Toilet Transfer: Dependent/Total Geralynn Reach stedy. Min-mod A)  Toilet Transfers Comments: Completed with use of nilson stedy with min-mod A to complete. Wheelchair Bed Transfers  Wheelchair/Bed - Technique:  Geralynn Reach stedy)  Equipment Used: Bed, Wheelchair, Other (nilson steady )  Level of Asssistance: Dependent/Total (Min A x 2 with nilson stedy)      ST:            Objective:  /72   Pulse 77   Temp 98.9 °F (37.2 °C) (Oral)   Resp 16   Ht 5' 8\" (1.727 m)   Wt 191 lb 8 oz (86.9 kg)   SpO2 93%   BMI 29.12 kg/m²  I Body mass index is 29.12 kg/m². I   Wt Readings from Last 1 Encounters:   05/10/21 191 lb 8 oz (86.9 kg)      Temp (24hrs), Av.8 °F (37.1 °C), Min:98.7 °F (37.1 °C), Max:98.9 °F (37.2 °C)         GEN: well developed, well nourished, no acute distress  HEENT: Normocephalic atraumatic, EOMI, mucous membranes pink and moist  CV: RRR, no murmurs, rubs or gallops  PULM: CTAB, no rales or rhonchi. Respirations WNL and unlabored  ABD: soft, NT, ND, +BS and equal  NEURO: A&O x3. Sensation intact to light touch. Alert and oriented x3 type, new year, president location, follows command, neuro exam no change  MSK: 4+/5 right upper lower extremity, dense left hemiparesis distal left upper lower extremity 0/5 proximal left upper extremity 1/5, left hip flexion 1-2/5, no tone  EXTREMITIES: No calf tenderness to palpation bilaterally. No edema BLEs, range of motion and palpation of joints and spine with no increased pain, no swelling or tenderness  SKIN: warm dry and intact with good turgor  PSYCH: appropriately interactive. Affect WNL.          Medications   Scheduled Meds:   FLUoxetine  20 mg Oral Daily    amitriptyline  25 mg Oral Nightly    heparin (porcine)  5,000 Units Subcutaneous 3 times per Impaired ADLs, gait, and mobility due to:        1. Ischemic R MCA CVA with hemorrhagic conversion and L non-dominant hemiparesis:  PT/OT for gait, mobility, strengthening, endurance, ADLs, and self care. On ASA, atorvastatin. Sling only to be used when patient is ambulating with therapy. -Discussed with patient and therapist.  Handicap placard on discharge  2. Status post fall last night-monitor for residual, neurochecks, follow  3. Headache: Has Tylenol routine TID. Improved on amitriptyline. 4. Dry eyes: has natural tears. 5. Cognitive impairment: SLP treating  6. Dysphagia/aphasia: SLP treating. On pureed diet, thin liquids, continue aspiration precautions  7. HTN/Hyperlipidemia: on amlodipine  8. DM: on insulin sliding scale, as above  9. COPD/asthma: on albuterol nebulizers prn, on symbicort BID, spiriva  10. Adjustment disorder with depressed mood: started fluoxetine 5/15, check BMP a few days  11. Esophageal Ring: Will monitor - on dysphagia diet  12. Hypothyroidism: on levothyroxine  13. Leukocytosis-improving  14. Nicotine dependence: on Nicoderm  15. Bowel Management: Miralax daily, senokot prn, dulcolax prn. 16. DVT Prophylaxis:  heparin, SCD's while in bed and MAXIM's   17. Internal medicine for medical management      Miguelina Barrett. Julita Frey MD       This note is created with the assistance of a speech recognition program.  While intending to generate a document that actually reflects the content of the visit, the document can still have some errors including those of syntax and sound a like substitutions which may escape proof reading.   In such instances, actual meaning can be extrapolated by contextual diversion

## 2021-05-17 NOTE — PLAN OF CARE
Problem: Neurological  Goal: Maximum potential motor/sensory/cognitive function  5/17/2021 0001 by Britton Blair RN  Outcome: Ongoing     Problem: Pain:  Goal: Pain level will decrease  Description: Pain level will decrease  5/17/2021 0001 by Britton Blair RN  Outcome: Ongoing     Problem: Pain:  Goal: Control of acute pain  Description: Control of acute pain  5/17/2021 0001 by Britton Blair RN  Outcome: Ongoing     Problem: Pain:  Goal: Control of chronic pain  Description: Control of chronic pain  5/17/2021 0001 by Britton Blair RN  Outcome: Ongoing     Problem: Neurological  Goal: Maximum potential motor/sensory/cognitive function  5/17/2021 0001 by Britton Blair RN  Outcome: Ongoing     Problem: Skin Integrity:  Goal: Will show no infection signs and symptoms  Description: Will show no infection signs and symptoms  5/17/2021 0001 by Britton Blair RN  Outcome: Ongoing     Problem: Falls - Risk of:  Goal: Will remain free from falls  Description: Will remain free from falls  5/17/2021 0001 by Britton Blair RN  Outcome: Ongoing     Problem: Falls - Risk of:  Goal: Absence of physical injury  Description: Absence of physical injury  5/17/2021 0001 by Britton Blair RN  Outcome: Ongoing

## 2021-05-17 NOTE — PROGRESS NOTES
Physical Therapy  [address] [facility]   Acute Rehabilitation Physical Therapy Progress Note    Date: 21  Patient Name: Brennan Pino       Room: 4419/8436-32  MRN: [id number]   Account: [de-identified]   : 1955  (63 y.o.) Gender: female        Diagnosis: ischemic CVA,  subacute right MCA distribution infarct, L lynette, dysphagia, L neglect, loop recorder 21 (Dr. Zeke Harvey)  Past Medical History:  has a past medical history of Asthma, COPD (chronic obstructive pulmonary disease) ([facility] [address] Diabetes mellitus ([facility] [address] Esophageal ring, Gastritis, Hyperlipidemia, Hyperplastic colon polyp, Hypertension, Osteoarthritis, Patient in clinical research study, and TIA (transient ischemic attack). Past Surgical History:   has a past surgical history that includes Appendectomy; Hysterectomy; Esophagus dilation; Colonoscopy (2017); Endoscopy, colon, diagnostic; pr egd transoral biopsy single/multiple (N/A, 2017); pr colsc flx w/rmvl of tumor polyp lesion snare tq (N/A, 2017); and Upper gastrointestinal endoscopy (2017). Restrictions/Precautions  Restrictions/Precautions: Fall Risk  Required Braces or Orthoses?: No  Implants present? :  (loop recorder)  Position Activity Restriction  Other position/activity restrictions: significant L neglect with LUE/LLE deficits    Subjective: Pt reported she had a fall off the toilet last night. Leaned fwd while reaching and lost her balance falling onto her L side. Comments: per Elsi corrales left UE ambulation and transfers when unable to assist UE for safety and support     Vital Signs  Patient Currently in Pain: Denies                   Bed Mobility:   Bed Mobility  Bridging: Minimal assistance (to support and facilitate LLE only)  Rolling: Stand by assistance;Rolling Left;Rolling Right  Supine to Sit: Moderate assistance (L LE and trunk)  Sit to Supine: Moderate assistance (x2: bilateral LEs & trunk.  Education on scooping L LE c proper wt shifting to advance LLE. Stairs/Curb  Stairs?: No              Wheelchair Activities  Wheelchair Parts Management: No  Propulsion: Yes  Propulsion 1  Propulsion: Manual  Level: Level Tile  Method: RUE;RLE  Level of Assistance: Stand by assistance;Minimal assistance  Description/ Details: verbal cues to avoid object and running into the wall on her L. cues throughout to scan left with good but fleeting return. Pt able to complete 180º as well as straight path. Distance: 200ft     Neuromuscular Education  Neuromuscular education: Yes  Vibration: Vibration applied to LLE to stimulate sensory receptors in muscles for increased return, PROM facilitatied with desired ROM. BALANCE      EXERCISES    Other exercises?: Yes  Other exercises 1: bilateral LE seated EOM x 15 right AROM left AAROM (reciprical pattern tactile cues left target muscle)  Other exercises 2: seated lateral lean to right and left x 5 ea  Other exercises 3: seated L trunk rotation with outstretched arms R assisting L x 10 (LOB x 1 VC's to maintain midline)  Other exercises 4: retro lean back followed by pulling self to sitting upright into midline position x 10  Other exercises 5: standing at EOM with walker lift: 2 min standing gayathri working on posture  Other exercises 6: standing at EOM using walker lift: RLE ex x 10 while therapist blocked and supported L knee. 2nd person on pt's R side to assist with posture and safety  Other exercises 7: NU-step L3 x 7 min- therapist wrapped LUE with extended grab bar to not cause L loni pain in pt. Writer provided tactile support on L ankle to keep L knee in midline during activity (cues to maintian midline posture and head during activity)  Other exercises 8: R sidelying: antigravity table used with vibrator working on stimulating LLE. Was able to elicite min HS and DF for a couple repititions.            Activity Tolerance: Patient limited by endurance  PT Equipment Recommendations  Other: CTA; pending further progress with mobility       Current Treatment Recommendations: Strengthening, Balance Training, Functional Mobility Training, Transfer Training, Endurance Training, Wheelchair Mobility Training, Gait Training, Stair training, Neuromuscular Re-education, Home Exercise Program, Safety Education & Training, Patient/Caregiver Education & Training, Modalities, Positioning    Conditions Requiring Skilled Therapeutic Intervention  Body structures, Functions, Activity limitations: Decreased functional mobility ; Decreased strength;Decreased safe awareness;Decreased endurance;Decreased balance;Decreased posture;Decreased coordination;Decreased vision/visual deficit; Decreased fine motor control  Assessment: Improved/eased initiation of sit to stand. Treatment Diagnosis: Weakness, impaired mobility  Prognosis: Good  REQUIRES PT FOLLOW UP: Yes  Discharge Recommendations: Patient would benefit from continued therapy after discharge;Home with assist PRN    Goals  Short term goals  Time Frame for Short term goals: 10 days  Short term goal 1: Pt able to roll in bed side to side at min A without bed rail  Short term goal 2: Pt able to perform supine<>sit at mod A  Short term goal 3: Pt able to perform sit<>stand at min/mod A . Short term goal 4: Pt able to progress to pivot transfers at max A   Short term goal 5: Pt able to improve sitting balance at EOB/EOM at SBA for static balance and able to track to left side as well as turn to head to left side to scan her environment with minimal cues.    Short term goal 6: Pt able to tolerate standing in // bars with R UE support and assist at mod A  Short term goal 7: Pt able to propel w/c with R UE/R LE distance of 50 to 80 ft, straight path and turn around 180 degrees, at min/mod A   Long term goals  Time Frame for Long term goals : By DC  Long term goal 1: Pt able to roll in bed mod-I  Long term goal 2: Pt able to perform supine <> sit at CGA/min A  Long term goal 3: Pt able to perform pivot transfers min A / mod A and able to scan Left side environment. Long term goal 4: Pt able to ambulate in // bars, or with appropriate assistive device and/or LE bracing, distance of  20 to 30 ft, min/mod A x 2. Long term goal 5: Pt able to propel w/c on level surfaces distance 100 to 150 ft, SBA/CGA. Long term goal 6: Educate pt/family in safe technique for mobility and  to go up and down steps to enter/exit home. Long term goal 7: Improve sitting balance at EOM to good, and standing balance with R UE support to fair- to reduce fall risk. Long term goal 8: Improve PASS score to 20/36 improve overall function.         05/17/21 1001 05/17/21 1438   PT Individual Minutes   Time In 1448 1355   Time Out [id number]   Minutes 56 43       Electronically signed by Noelle Fuentes PTA on 5/17/21 at 4:39 PM EDT

## 2021-05-17 NOTE — PROGRESS NOTES
[address]   ACUTE REHABILITATION OCCUPATIONAL THERAPY  DAILY NOTE    Date: 21  Patient Name: Nadia Bal      Room: 5951/4764-03    MRN: [id number]   : 1955  (63 y.o.)  Gender: female      Diagnosis: ischemic CVA,  subacute right MCA distribution infarct, L lynette, dysphagia, L neglect, loop recorder 21 (Dr. Luis Ceja)  Additional Pertinent Hx: 63-year-old female who was found down, last known well approximately 21 hours before. Patient was found to have a right MCA M1 occlusion. Thrombectomy was not successful, MRI showed patient had a right MCA stroke with hemorrhagic conversion. 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 :  (loop recorder)  Other position/activity restrictions: significant L neglect with LUE/LLE deficits  Required Braces or Orthoses?: No  Equipment Used: Bed, Wheelchair, Other (nilson steady )    Subjective  Subjective: \"Did they tell you about me falling last night? \"   Comments: Pt reports falling off of the toilet during the night after trying to complete toilet hygiene by herself. Patient Currently in Pain: Denies  Restrictions/Precautions: Fall Risk  Overall Orientation Status: Within Functional Limits          Objective  Cognition  Overall Cognitive Status: Impaired  Arousal/Alertness: Appropriate responses to stimuli  Following Directions:  Follows one step commands  Attention Span: Attends with cues to redirect  Safety Judgement: Decreased awareness of need for assistance  Insights: Decreased awareness of deficits  Sequencing and Organization: Assistance required to generate solutions  Perception  Overall Perceptual Status: Impaired  Unilateral Attention: Cues to attend left visual field;Cues to maintain midline in sitting;Cues to maintain midline in standing;Cues to attend to left side of body  Initiation: Hand over fowards with OT providing support. Weight bearing completed to assist with awareness to affected side of body. Additional Activities: Other  Additional Activities: Pt completed activity of turning head to the left and to the right while in wheelchair down the ott. Pt completed activity to increase awareness to left side of the body and to assist with bringing head to midline at rest. Pt demonstrated decreased ROM to left side of the body when turning to the left side of the body. Pt demonstrated better midline alignment with head while sitting up in wheelchair on this date. ADL  Equipment Provided: Long-handled sponge;Reacher  Grooming: Stand by assistance (Verbal cues for thoroughness. )  UE Bathing: None (Pt denies)  LE Bathing: Dependent/Total (use of LH sponge. Mandy Ano stedy to complete bottom.)  UE Dressing: None (Pt denies)  LE Dressing: Dependent/Total (Reacher to thread RLE. Mandy Ano stedy to pull up over hips.)  Toileting: Dependent/Total Cathyann Fulling stedy for clothing management and hygiene)  Additional Comments: OT facilitated pts engagement in self care tasks while sitting in wheelchair at sink side. Pt denies upper body bathing and changing shirt despite max encouragement to participate. Pt provided and educated on use of long handle sponge to assist with lower body bathing. Pt educated on use of reacher to assist with lower body dressing. Pt conitnues to be total dependent for lower body self care tasks due to use of nilson stedy to complete. Assessment  Performance deficits / Impairments: Decreased functional mobility ; Decreased ADL status; Decreased strength;Decreased endurance;Decreased balance;Decreased high-level IADLs;Decreased vision/visual deficit; Decreased cognition;Decreased safe awareness;Decreased ROM; Decreased fine motor control;Decreased coordination;Decreased posture;Decreased sensation  Prognosis: Good  Discharge Recommendations: Patient would benefit from continued therapy after discharge  Activity Tolerance: Patient limited by fatigue  Safety Devices in place: Yes  Type of devices: Call light within reach; Patient at risk for falls; All fall risk precautions in place;Gait belt;Left in bed;Nurse notified          Patient Education: safety with transfers, AE for self care, self PROM  Patient Goals   Patient goals : \"get as close to normal as possible. \"  specifies:\"get in and out of bed on my own, shower by myself. \"  Learner:patient  Method: demonstration, explanation and handout       Outcome: demonstrated understanding and needs reinforcement        Plan  Plan  Times per week: 5-7  Times per day: Twice a day  Current Treatment Recommendations: Strengthening, Positioning, ROM, Safety Education & Training, Balance Training, Patient/Caregiver Education & Training, Self-Care / ADL, Cognitive/Perceptual Training, Functional Mobility Training, Neuromuscular Re-education, Equipment Evaluation, Education, & procurement, Endurance Training, Cognitive Reorientation  Patient Goals   Patient goals : \"get as close to normal as possible. \"  specifies:\"get in and out of bed on my own, shower by myself. \"  Short term goals  Time Frame for Short term goals: 1 week  Short term goal 1: mod A UE bathe and dress  Short term goal 2: set up brush teeth  Short term goal 3: min feeding, with cues able to locate L side of tray and scan to locate items on L.   Short term goal 4: tolerate 6-8 min sitting edge of bed (static sit)  with min assist and RUE support, head at midline and fair safety awareness  Short term goal 5: pt to initiate reposition LUE with RUE with good safety ie. hold by wrist not by 1 finger  Short term goal 6: demo improved awareness of L side and neuromuscular christian by weight bearing on LUE with physical assist to complete  Short term goal 7: from w/c:  consistently maintain midline trunk control and demo able to lean forward 4 inches away from back of chair without physical assist for adls.  Short term goal 8: tolerate 2 min static stand for adls with RUE support with mod of 2 and assist with LLE as needed. Long term goals  Time Frame for Long term goals : by discharge  Long term goal 1: set up and occasional verbal cues ie.  L visual scan for self feeding  Long term goal 2: set up oral care  Long term goal 3: max x 1 toileting and adl transfers  Long term goal 4: min UE bathe and dress (shirt)  Long term goal 5: max x 1 LE bathe and dress, able to cross to reach RLE with min assist and keep trunk balance  Timed Code Treatment Minutes: 91 Minutes    Electronically signed by Ed Medellin OT on 5/17/21 at 1:21 PM EDT

## 2021-05-17 NOTE — PROGRESS NOTES
Physical Therapy  [address] [facility]   Acute Rehabilitation Physical Therapy Progress Note    Date: 21  Patient Name: Tiara Maynard       Room: 6480/8537-22  MRN: [id number]   Account: [de-identified]   : 1955  (63 y.o.) Gender: female        Diagnosis: ischemic CVA,  subacute right MCA distribution infarct, L lynette, dysphagia, L neglect, loop recorder 21 (Dr. Pooja Rachel)  Past Medical History:  has a past medical history of Asthma, COPD (chronic obstructive pulmonary disease) ([facility] [address] Diabetes mellitus ([facility] [address] Esophageal ring, Gastritis, Hyperlipidemia, Hyperplastic colon polyp, Hypertension, Osteoarthritis, Patient in clinical research study, and TIA (transient ischemic attack). Past Surgical History:   has a past surgical history that includes Appendectomy; Hysterectomy; Esophagus dilation; Colonoscopy (2017); Endoscopy, colon, diagnostic; pr egd transoral biopsy single/multiple (N/A, 2017); pr colsc flx w/rmvl of tumor polyp lesion snare tq (N/A, 2017); and Upper gastrointestinal endoscopy (2017). Restrictions/Precautions  Restrictions/Precautions: Fall Risk  Required Braces or Orthoses?: No  Implants present? :  (loop recorder)  Position Activity Restriction  Other position/activity restrictions: significant L neglect with LUE/LLE deficits    Subjective: Pt reported she had a fall off the toilet last night. Leaned fwd while reaching and lost her balance falling onto her L side. Comments: per Elsi corrales left UE ambulation and transfers when unable to assist UE for safety and support     Vital Signs  Patient Currently in Pain: Denies                   Bed Mobility:   Bed Mobility  Bridging: Minimal assistance (to support and facilitate LLE only)  Rolling: Stand by assistance;Rolling Left;Rolling Right  Supine to Sit: Moderate assistance (L LE and trunk)  Sit to Supine: Moderate assistance (x2: bilateral LEs & trunk.  Education on scooping L LE c R) Scooting: Minimal assistance (left hip with instruction )  Comment: dependant L UE/LE. Pt ed to protect her L UE during tansfers and to use her RLE to assist/scoop her LLE in/out of bed  Bed mobility  Bridging: Minimal assistance (to support and facilitate LLE only)  Scooting: Minimal assistance (left hip with instruction )    Transfers:  Sit to Stand: 2 Person Assistance;Minimal Assistance (using green walker lift)  Stand to sit: 2 Person Assistance;Minimal Assistance (VC's to reach for chair and control decent)        Squat Pivot Transfers: Moderate Assistance (to pt's Right)  Lateral Transfers: Minimal Assistance (slide board used mat>w/c to R side)     Ambulation 1  Surface: level tile  Device:  (walker lift)  Other Apparatus: Left;Slider (blue shoe cover/slide )  Assistance: Maximum assistance;2 Person assistance (3rd person for total assist with LLE)  Quality of Gait: Dependent/total to advance L LE and maintaint L knee extension. Good core stabilization, fair ability to weight shift. Gait Deviations: Decreased step length; Slow Juanita;Decreased step height  Distance: 20ft  Comments: Pt with improved upper body support using walker lift to amb. working on wt shifting and weightbearing into LLE. One person placed on each side of walker lift to provide lateral support and to keep pt from leaning fwd. 3rd person assist on LLE only advancing and placing foot each step and providing L knee stability during stance phase.    Ambulation 2  Surface - 2: level tile  Device 2:  (walker lift)  Other Apparatus 2: Left;Slider (blue shoe cover/slider)  Assistance 2: Maximum assistance;2 Person assistance (3rd person assist for LLE only)  Quality of Gait 2: same as above, however more fatigued and having increase difficulty  Gait Deviations: Slow Juanita;Decreased step length;Decreased step height  Distance: 15ft  Comments: Pt more fatigued during afternoon session and having difficulty maintaining upright posture and proper coordination;Decreased vision/visual deficit; Decreased fine motor control  Assessment: Improved/eased initiation of sit to stand. Treatment Diagnosis: Weakness, impaired mobility  Prognosis: Good  REQUIRES PT FOLLOW UP: Yes  Discharge Recommendations: Patient would benefit from continued therapy after discharge;Home with assist PRN    Goals  Short term goals  Time Frame for Short term goals: 10 days  Short term goal 1: Pt able to roll in bed side to side at min A without bed rail  Short term goal 2: Pt able to perform supine<>sit at mod A  Short term goal 3: Pt able to perform sit<>stand at min/mod A . Short term goal 4: Pt able to progress to pivot transfers at max A   Short term goal 5: Pt able to improve sitting balance at EOB/EOM at SBA for static balance and able to track to left side as well as turn to head to left side to scan her environment with minimal cues. Short term goal 6: Pt able to tolerate standing in // bars with R UE support and assist at mod A  Short term goal 7: Pt able to propel w/c with R UE/R LE distance of 50 to 80 ft, straight path and turn around 180 degrees, at min/mod A   Long term goals  Time Frame for Long term goals : By DC  Long term goal 1: Pt able to roll in bed mod-I  Long term goal 2: Pt able to perform supine <> sit at CGA/min A  Long term goal 3: Pt able to perform pivot transfers min A / mod A and able to scan Left side environment. Long term goal 4: Pt able to ambulate in // bars, or with appropriate assistive device and/or LE bracing, distance of  20 to 30 ft, min/mod A x 2. Long term goal 5: Pt able to propel w/c on level surfaces distance 100 to 150 ft, SBA/CGA. Long term goal 6: Educate pt/family in safe technique for mobility and  to go up and down steps to enter/exit home. Long term goal 7: Improve sitting balance at EOM to good, and standing balance with R UE support to fair- to reduce fall risk.    Long term goal 8: Improve PASS score to 20/36 improve overall function.         05/17/21 1001 05/17/21 1438   PT Individual Minutes   Time In 4614 2083   Time Out 9220 9850   Minutes 56 43       Electronically signed by Stacey Mayorga PTA on 5/17/21 at 4:29 PM EDT

## 2021-05-17 NOTE — PLAN OF CARE
Problem: Nutrition  Goal: Optimal nutrition therapy  5/17/2021 1511 by Margret Linares RN  Outcome: Ongoing     Problem: Pain:  Goal: Control of acute pain  Description: Control of acute pain  Outcome: Ongoing   Pt. Pain is adequately controlled. Problem: Skin Integrity:  Goal: Will show no infection signs and symptoms  Description: Will show no infection signs and symptoms  Outcome: Ongoing  PT. Show no new signs of skin breakdown, skin assessment completed. Problem: Falls - Risk of:  Goal: Will remain free from falls  Description: Will remain free from falls  Outcome: Ongoing   Pt. Remains free of falls, appropriate fall precautions in place.

## 2021-05-18 LAB
ANION GAP SERPL CALCULATED.3IONS-SCNC: 11 MMOL/L (ref 9–17)
BUN BLDV-MCNC: 19 MG/DL (ref 8–23)
BUN/CREAT BLD: ABNORMAL (ref 9–20)
CALCIUM SERPL-MCNC: 9.5 MG/DL (ref 8.6–10.4)
CHLORIDE BLD-SCNC: 103 MMOL/L (ref 98–107)
CO2: 29 MMOL/L (ref 20–31)
CREAT SERPL-MCNC: 0.63 MG/DL (ref 0.5–0.9)
GFR AFRICAN AMERICAN: >60 ML/MIN
GFR NON-AFRICAN AMERICAN: >60 ML/MIN
GFR SERPL CREATININE-BSD FRML MDRD: ABNORMAL ML/MIN/{1.73_M2}
GFR SERPL CREATININE-BSD FRML MDRD: ABNORMAL ML/MIN/{1.73_M2}
GLUCOSE BLD-MCNC: 122 MG/DL (ref 65–105)
GLUCOSE BLD-MCNC: 124 MG/DL (ref 65–105)
GLUCOSE BLD-MCNC: 140 MG/DL (ref 70–99)
GLUCOSE BLD-MCNC: 99 MG/DL (ref 65–105)
HCT VFR BLD CALC: 41.1 % (ref 36–46)
HEMOGLOBIN: 13.6 G/DL (ref 12–16)
MCH RBC QN AUTO: 30.4 PG (ref 26–34)
MCHC RBC AUTO-ENTMCNC: 33.1 G/DL (ref 31–37)
MCV RBC AUTO: 92.1 FL (ref 80–100)
NRBC AUTOMATED: NORMAL PER 100 WBC
PDW BLD-RTO: 14.9 % (ref 11.5–14.9)
PLATELET # BLD: 379 K/UL (ref 150–450)
PMV BLD AUTO: 7.2 FL (ref 6–12)
POTASSIUM SERPL-SCNC: 4 MMOL/L (ref 3.7–5.3)
RBC # BLD: 4.46 M/UL (ref 4–5.2)
SODIUM BLD-SCNC: 143 MMOL/L (ref 135–144)
WBC # BLD: 10.9 K/UL (ref 3.5–11)

## 2021-05-18 PROCEDURE — 97112 NEUROMUSCULAR REEDUCATION: CPT

## 2021-05-18 PROCEDURE — 80048 BASIC METABOLIC PNL TOTAL CA: CPT

## 2021-05-18 PROCEDURE — 85027 COMPLETE CBC AUTOMATED: CPT

## 2021-05-18 PROCEDURE — 97530 THERAPEUTIC ACTIVITIES: CPT

## 2021-05-18 PROCEDURE — 97110 THERAPEUTIC EXERCISES: CPT

## 2021-05-18 PROCEDURE — 6360000002 HC RX W HCPCS: Performed by: PHYSICAL MEDICINE & REHABILITATION

## 2021-05-18 PROCEDURE — 6370000000 HC RX 637 (ALT 250 FOR IP): Performed by: PHYSICAL MEDICINE & REHABILITATION

## 2021-05-18 PROCEDURE — 36415 COLL VENOUS BLD VENIPUNCTURE: CPT

## 2021-05-18 PROCEDURE — 99231 SBSQ HOSP IP/OBS SF/LOW 25: CPT | Performed by: INTERNAL MEDICINE

## 2021-05-18 PROCEDURE — 92526 ORAL FUNCTION THERAPY: CPT

## 2021-05-18 PROCEDURE — 97535 SELF CARE MNGMENT TRAINING: CPT

## 2021-05-18 PROCEDURE — 82947 ASSAY GLUCOSE BLOOD QUANT: CPT

## 2021-05-18 PROCEDURE — 97542 WHEELCHAIR MNGMENT TRAINING: CPT

## 2021-05-18 PROCEDURE — 92507 TX SP LANG VOICE COMM INDIV: CPT

## 2021-05-18 PROCEDURE — 99232 SBSQ HOSP IP/OBS MODERATE 35: CPT | Performed by: PHYSICAL MEDICINE & REHABILITATION

## 2021-05-18 PROCEDURE — 1180000000 HC REHAB R&B

## 2021-05-18 PROCEDURE — 6370000000 HC RX 637 (ALT 250 FOR IP): Performed by: STUDENT IN AN ORGANIZED HEALTH CARE EDUCATION/TRAINING PROGRAM

## 2021-05-18 RX ADMIN — LEVOTHYROXINE SODIUM 25 MCG: 0.03 TABLET ORAL at 06:14

## 2021-05-18 RX ADMIN — POLYETHYLENE GLYCOL 3350 17 G: 17 POWDER, FOR SOLUTION ORAL at 07:18

## 2021-05-18 RX ADMIN — ASPIRIN 81 MG CHEWABLE TABLET 81 MG: 81 TABLET CHEWABLE at 07:19

## 2021-05-18 RX ADMIN — AMITRIPTYLINE HYDROCHLORIDE 25 MG: 25 TABLET, FILM COATED ORAL at 22:20

## 2021-05-18 RX ADMIN — HEPARIN SODIUM 5000 UNITS: 5000 INJECTION INTRAVENOUS; SUBCUTANEOUS at 12:54

## 2021-05-18 RX ADMIN — AMLODIPINE BESYLATE 5 MG: 5 TABLET ORAL at 07:19

## 2021-05-18 RX ADMIN — HEPARIN SODIUM 5000 UNITS: 5000 INJECTION INTRAVENOUS; SUBCUTANEOUS at 22:20

## 2021-05-18 RX ADMIN — ACETAMINOPHEN 1000 MG: 500 TABLET, FILM COATED ORAL at 06:14

## 2021-05-18 RX ADMIN — ACETAMINOPHEN 1000 MG: 500 TABLET, FILM COATED ORAL at 12:53

## 2021-05-18 RX ADMIN — FLUOXETINE HYDROCHLORIDE 20 MG: 20 CAPSULE ORAL at 07:18

## 2021-05-18 RX ADMIN — ATORVASTATIN CALCIUM 80 MG: 80 TABLET, FILM COATED ORAL at 07:19

## 2021-05-18 RX ADMIN — Medication 2 PUFF: at 07:19

## 2021-05-18 RX ADMIN — Medication 2 PUFF: at 22:20

## 2021-05-18 RX ADMIN — ACETAMINOPHEN 1000 MG: 500 TABLET, FILM COATED ORAL at 22:20

## 2021-05-18 RX ADMIN — HEPARIN SODIUM 5000 UNITS: 5000 INJECTION INTRAVENOUS; SUBCUTANEOUS at 06:14

## 2021-05-18 ASSESSMENT — PAIN SCALES - GENERAL
PAINLEVEL_OUTOF10: 0

## 2021-05-18 NOTE — PROGRESS NOTES
Physical Therapy  [address]  Acute Rehabilitation Physical Therapy Progress Note    Date: 21  Patient Name: Kareem Mccormick       Room: 9846/9409-01  MRN: [id number]   Account: [de-identified]   : 1955  (63 y.o.) Gender: female      Diagnosis: ischemic CVA,  subacute right MCA distribution infarct, L lynette, dysphagia, L neglect, loop recorder 21 (Dr. Anamika Negrete)  Past Medical History:  has a past medical history of Asthma, COPD (chronic obstructive pulmonary disease) ([facility] [address] Diabetes mellitus ([facility] [address] Esophageal ring, Gastritis, Hyperlipidemia, Hyperplastic colon polyp, Hypertension, Osteoarthritis, Patient in clinical research study, and TIA (transient ischemic attack). Past Surgical History:   has a past surgical history that includes Appendectomy; Hysterectomy; Esophagus dilation; Colonoscopy (2017); Endoscopy, colon, diagnostic; pr egd transoral biopsy single/multiple (N/A, 2017); pr colsc flx w/rmvl of tumor polyp lesion snare tq (N/A, 2017); and Upper gastrointestinal endoscopy (2017). Restrictions/Precautions  Restrictions/Precautions: Fall Risk  Required Braces or Orthoses?: No  Implants present? :  (loop recorder)  Position Activity Restriction  Other position/activity restrictions: significant L neglect with LUE/LLE deficits    Subjective: Pt reported she had a fall off the toilet last night. Leaned fwd while reaching and lost her balance falling onto her L side. Reports vibration used previous day \"hurt like hell. \"     Vital Signs  Patient Currently in Pain: Denies    Bed Mobility   Supine to Sit: Minimal assistance (x2, L LE and trunk)  Scooting: Minimal assistance (positioning hips in chair, edge of mat/bed)  Comment: Dependent/cues for L UE/LE attention (for safety/addressing L neglect), including using R extremities for assist, with poor/fair return.      Transfers  Sit to Stand: 2 Person Assistance;Contact guard assistance;Minimal Assistance (walker lift (Min x2) & Patrizia Osorio (CGA x2) c R UE setup)  Stand to sit: 2 Person Assistance;Minimal Assistance (VC's to reach for chair, control decent c walker lift)  Bed to chair: Dependent/Total Patrizia Osorio)  Squat Pivot Transfers: Moderate Assistance;2 Person Assistance (to pt's Right; impulsive, requires cues)    Stairs/Curb  Stairs?: No    Wheelchair Activities  Wheelchair Parts Management: No  Propulsion: Yes  Propulsion 1  Propulsion: Manual  Level: Level Tile  Method: RUE;RLE  Level of Assistance: Minimal assistance  Description/ Details: verbal cues to avoid object and running into the wall on her L. cues throughout to scan left with fair/fleeting return. Pt able to complete 90º turn with cue for UE sequencing as well as straight path, marjorie LOWRY. Distance: 200'    Ambulation? (not today; Pt states she's too tired.)    Balance   Posture: Poor  Sitting - Static: Poor;+ (Edge of mat, no back or UE support/Min or CGA)  Sitting - Dynamic: Poor (Edge of mat, no back or UE support/Min or CGA)  Standing - Static: Poor (walker lift)     Exercises   Other exercises?: Yes  Other exercises 1: bilateral LE seated exercises, x15 each, R 2#, L active assisted/passive ROM (Edge of mat & in wheelchair; cues for visual, verbal attend)  Other exercises 3: Seated edge of mat outstretched UE movement, up/down, clockwise/counterclockwise, diagonals; R assisting L, 5x each dir.  (posterior loss of balance to mat x1 with fatigue)  Other exercises 5: standing edge of mat c walker lift: 60-90 sec., working on posture  Other exercises 7: NU-step L3 x 10 min, therapist assist c L UE  wrapped & tactile support at L ankle/knee PRN for neutral L hip.  (cues to maintian midline posture and head during activity)    Activity Tolerance: Patient limited by endurance  PT Equipment Recommendations  Other: CTA; pending further progress with mobility    Current Treatment Recommendations: Strengthening, Balance Training, Functional Mobility Training, Transfer Training, Endurance Training, Wheelchair Mobility Training, Gait Training, Stair training, Neuromuscular Re-education, Home Exercise Program, Safety Education & Training, Patient/Caregiver Education & Training, Modalities, Positioning    Conditions Requiring Skilled Therapeutic Intervention  Body structures, Functions, Activity limitations: Decreased functional mobility ; Decreased strength;Decreased safe awareness;Decreased endurance;Decreased balance;Decreased posture;Decreased coordination;Decreased vision/visual deficit; Decreased fine motor control  REQUIRES PT FOLLOW UP: Yes  Discharge Recommendations: Patient would benefit from continued therapy after discharge;Home with assist PRN    Goals  Short term goals  Time Frame for Short term goals: 10 days  Short term goal 1: Pt able to roll in bed side to side at min A without bed rail  Short term goal 2: Pt able to perform supine<>sit at mod A  Short term goal 3: Pt able to perform sit<>stand at min/mod A . Short term goal 4: Pt able to progress to pivot transfers at max A   Short term goal 5: Pt able to improve sitting balance at EOB/EOM at SBA for static balance and able to track to left side as well as turn to head to left side to scan her environment with minimal cues. Short term goal 6: Pt able to tolerate standing in // bars with R UE support and assist at mod A  Short term goal 7: Pt able to propel w/c with R UE/R LE distance of 50 to 80 ft, straight path and turn around 180 degrees, at min/mod A   Long term goals  Time Frame for Long term goals : By DC  Long term goal 1: Pt able to roll in bed mod-I  Long term goal 2: Pt able to perform supine <> sit at CGA/min A  Long term goal 3: Pt able to perform pivot transfers min A / mod A and able to scan Left side environment. Long term goal 4: Pt able to ambulate in // bars, or with appropriate assistive device and/or LE bracing, distance of  20 to 30 ft, min/mod A x 2.   Long term goal 5: Pt

## 2021-05-18 NOTE — PROGRESS NOTES
Speech Language Pathology  Speech Language Pathology  [facility] at [facility]    Dysphagia/SpeechLanguage Treatment Note    Date: 5/18/2021  Patients Name: Wing Hutson  MRN: [id number]  Diagnosis:   Patient Active Problem List   Diagnosis Code    Pneumonia of left lower lobe due to infectious organism J18.9    Tobacco abuse Z72.0    Legionella pneumonia ([facility] [address] A48.1    Dysphagia R13.10    Hyperplastic colon polyp K63.5    Esophageal ring K22.2    Gastritis K29.70    Elbow effusion, right M25.421    Stroke due to occlusion of right middle cerebral artery ([facility]) I63.511    Nihss score 15 R29.715    Acute left hemiparesis ([facility]) G81.94    Cerebrovascular accident (CVA) due to occlusion of right middle cerebral artery ([facility] [address] I63.511    Acute CVA (cerebrovascular accident) ([facility] [address] I63.9    COPD (chronic obstructive pulmonary disease) ([facility]) J44.9    Essential hypertension I10    Hyperlipidemia E78.5    Pre-diabetes R73.03       Pain: 0/10    Speech/Cognitive/Dysphagia Treatment    Treatment time:  4056-5604    Subjective: [x] Alert [x] Cooperative     [] Confused     [] Agitated    [] Lethargic    Objective/Assessment:  Attention:  Pt. Easily distracted, cues needed to redirect attention. Max Verbal cues needed for increased attention to midline, pt. c extreme L sided neglect. Pt. Needed mod to max cues to look to L side of picture when answering questions asked re: items on picture. Pt. Completed visual scanning exercise x2 to locate items on the L side. Recall: n/a      Organization: n/a    Problem Solving/Reasoning:  n/a    Other:  Pt. Completed oral motor exercises x3, 15 reps each with mod cues. Pt. Demonstrates significant L sided weakness. ST utilized vital stim for L sided oral sling (one channel only), 6.5 mA for 25 minutes.       Plan:  [x] Continue ST services    [] Discharge from ST:      Discharge recommendations: [] Inpatient Rehab   [] [place] [] Outpatient Therapy  [] Follow up at trauma clinic   [] Other:       Treatment completed by: Johnson Maldonado A.CCC/SLP

## 2021-05-18 NOTE — PROGRESS NOTES
[place] [address] Internal Medicine    Progress Note  Chart Reviewed: Yes  Patient assessed for rehabilitation services?: Yes  Family / Caregiver Present: No  Referring Practitioner: Dr Darris Soulier  5/18/2021    6:04 PM    Name:   Bea Santa  MRN:     [id number]     Acct:      [de-identified]   Room:   [facility]-01   Day:  8  Admit Date:  5/10/2021  7:13 AM    PCP:   Joanne Box MD  Code Status:  Full Code    Subjective:     C/C: medical mgmt    Active Problems:    Tobacco abuse    Gastritis    Acute left hemiparesis ([address]    Acute CVA (cerebrovascular accident) ([facility] [address]    COPD (chronic obstructive pulmonary disease) ([facility] [address]    Essential hypertension    Hyperlipidemia    Pre-diabetes  Resolved Problems:    * No resolved hospital problems. *      Interval History Status: improved. 5/14/21  Patient seen and examined at bedside. No acute overnight events. Afebrile, hemodynamically stable. Continues to work with PT/OT. No acute changes in hemiparesis. No acute complaints.      Significant last 24 hr data reviewed ;   Vitals:    05/10/21 1230 05/17/21 0615 05/17/21 1829 05/18/21 0724   BP:  124/72 123/74 128/76   Pulse:  77 83 74   Resp:  16 16 16   Temp:  98.9 °F (37.2 °C) 98.4 °F (36.9 °C) 98.4 °F (36.9 °C)   TempSrc:  Oral Oral Oral   SpO2:  93% 93% 94%   Weight: 191 lb 8 oz (86.9 kg)      Height: 5' 8\" (1.727 m)         Recent Results (from the past 24 hour(s))   POC Glucose Fingerstick    Collection Time: 05/17/21  9:29 PM   Result Value Ref Range    POC Glucose 116 (H) 65 - 105 mg/dL   Basic Metabolic Panel w/ Reflex to MG    Collection Time: 05/18/21  6:35 AM   Result Value Ref Range    Glucose 140 (H) 70 - 99 mg/dL    BUN 19 8 - 23 mg/dL    CREATININE 0.63 0.50 - 0.90 mg/dL    Bun/Cre Ratio NOT REPORTED 9 - 20    Calcium 9.5 8.6 - 10.4 mg/dL    Sodium 143 135 - 144 mmol/L    Potassium 4.0 3.7 - 5.3 mmol/L    Chloride 103 98 - 107 mmol/L    CO2 29 20 - 31 mmol/L    Anion Gap 11 9 - 17 mmol/L    GFR Non-African American >60 >60 mL/min    GFR African American >60 >60 mL/min    GFR Comment          GFR Staging NOT REPORTED    CBC    Collection Time: 05/18/21  6:35 AM   Result Value Ref Range    WBC 10.9 3.5 - 11.0 k/uL    RBC 4.46 4.0 - 5.2 m/uL    Hemoglobin 13.6 12.0 - 16.0 g/dL    Hematocrit 41.1 36 - 46 %    MCV 92.1 80 - 100 fL    MCH 30.4 26 - 34 pg    MCHC 33.1 31 - 37 g/dL    RDW 14.9 11.5 - 14.9 %    Platelets 189 942 - 799 k/uL    MPV 7.2 6.0 - 12.0 fL    NRBC Automated NOT REPORTED per 100 WBC   POC Glucose Fingerstick    Collection Time: 05/18/21  7:20 AM   Result Value Ref Range    POC Glucose 122 (H) 65 - 105 mg/dL   POC Glucose Fingerstick    Collection Time: 05/18/21  4:06 PM   Result Value Ref Range    POC Glucose 99 65 - 105 mg/dL     Recent Labs     05/17/21  1107 05/17/21  1616 05/17/21  2129 05/18/21  0720 05/18/21  1606   POCGLU 98 122* 116* 122* 99        No results found. HPI:   See history in H and P      Review of Systems:     Constitutional:  negative for chills, fevers, sweats  Respiratory:  negative for cough, dyspnea on exertion, hemoptysis, shortness of breath, wheezing  Cardiovascular:  negative for chest pain, chest pressure/discomfort, lower extremity edema, palpitations  Gastrointestinal:  negative for abdominal pain, constipation, diarrhea, nausea, vomiting  Neurological: Positive for left-sided weakness. Negative for dizziness, headache  Data:     Past Medical History:  no change     Social History:  no change    Family History: @no change    Vitals:      I/O (24Hr):   No intake or output data in the 24 hours ending 05/18/21 1804    Labs:    URINE ANALYSIS: No results found for: LABURIN     CBC:  Lab Results   Component Value Date    WBC 10.9 05/18/2021    HGB 13.6 05/18/2021     05/18/2021        BMP:    Lab Results   Component Value Date     05/18/2021    K 4.0 05/18/2021     05/18/2021    CO2 29 05/18/2021    BUN 19 2021    CREATININE 0.63 2021    GLUCOSE 140 2021      LIVER PROFILE:  Lab Results   Component Value Date    ALT 13 10/16/2017    AST 13 10/16/2017    PROT 6.9 10/16/2017    BILITOT 0.22 10/16/2017    BILIDIR 0.11 10/16/2017    LABALBU 4.0 10/16/2017               Radiology:  Medications: Allergies:      Current Meds:   Scheduled Meds:    FLUoxetine  20 mg Oral Daily    amitriptyline  25 mg Oral Nightly    heparin (porcine)  5,000 Units Subcutaneous 3 times per day    acetaminophen  1,000 mg Oral 3 times per day    amLODIPine  5 mg Oral Daily    aspirin  81 mg Oral Daily    atorvastatin  80 mg Oral Daily    budesonide-formoterol  2 puff Inhalation BID    insulin lispro  0-3 Units Subcutaneous Nightly    insulin lispro  0-6 Units Subcutaneous TID WC    levothyroxine  25 mcg Oral Daily    nicotine  1 patch Transdermal Daily    tiotropium  2 puff Inhalation Daily    polyethylene glycol  17 g Oral Daily     Continuous Infusions:    sodium chloride       PRN Meds: polyvinyl alcohol, sodium chloride flush, promethazine **OR** ondansetron, sodium chloride, albuterol, dextrose, glucagon (rDNA), glucose, senna, bisacodyl      Physical Examination:        /76   Pulse 74   Temp 98.4 °F (36.9 °C) (Oral)   Resp 16   Ht 5' 8\" (1.727 m)   Wt 191 lb 8 oz (86.9 kg)   SpO2 94%   BMI 29.12 kg/m²   Temp (24hrs), Av.4 °F (36.9 °C), Min:98.4 °F (36.9 °C), Max:98.4 °F (36.9 °C)    Recent Labs     21  1616 21  2129 21  0720 21  1606   POCGLU 122* 116* 122* 99     No intake or output data in the 24 hours ending 21 1804    General Appearance:  alert, well appearing, and in no acute distress  Mental status: oriented to person, place, and time with normal affect  Head:  normocephalic, atraumatic.   Eye: no icterus, redness, pupils equal and reactive, extraocular eye movements intact, conjunctiva clear  Ear: normal external ear, no discharge, hearing intact  Nose:

## 2021-05-18 NOTE — PLAN OF CARE
Pt asks for tylenol for complaints of a headache-but said that she was without a headache early this AM

## 2021-05-18 NOTE — PLAN OF CARE
Problem: Neurological  Goal: Maximum potential motor/sensory/cognitive function  Outcome: Ongoing     Problem: Pain:  Goal: Pain level will decrease  Description: Pain level will decrease  Outcome: Ongoing     Problem: Pain:  Goal: Control of acute pain  Description: Control of acute pain  5/18/2021 0421 by Henrik Alcocer RN  Outcome: Ongoing     Problem: Pain:  Goal: Control of chronic pain  Description: Control of chronic pain  Outcome: Ongoing     Problem: Skin Integrity:  Goal: Will show no infection signs and symptoms  Description: Will show no infection signs and symptoms  5/18/2021 0421 by Henrik Alcocer RN  Outcome: Ongoing     Problem: Skin Integrity:  Goal: Absence of new skin breakdown  Description: Absence of new skin breakdown  Outcome: Ongoing     Problem: Falls - Risk of:  Goal: Will remain free from falls  Description: Will remain free from falls  5/18/2021 0421 by Henrik Alcocer RN  Outcome: Ongoing     Problem: Falls - Risk of:  Goal: Absence of physical injury  Description: Absence of physical injury  Outcome: Ongoing

## 2021-05-18 NOTE — PROGRESS NOTES
Physical Medicine & Rehabilitation  Progress Note    5/18/2021 2:00 PM     CC: Ambulatory and ADL dysfunction due to left nondominant hemiplegia due to ischemic CVA    Subjective:   No complaints. No pain. Bowels and bladder okay last BM 5/17. Patient denies any residual from fall. .  Denies any trauma or pain or discomfort. ROS:  Denies fevers, chills, sweats. No chest pain, palpitations, lightheadedness. Denies coughing, wheezing or shortness of breath. Denies abdominal pain, nausea, diarrhea or constipation. No new areas of joint pain. Denies new areas of numbness or weakness. Denies new anxiety or depression issues. No new skin problems. Rehabilitation:   PT:  Restrictions/Precautions: Fall Risk  Implants present? :  (loop recorder)  Other position/activity restrictions: significant L neglect with LUE/LLE deficits   Transfers  Sit to Stand: 2 Person Assistance, Minimal Assistance (using green walker lift)  Stand to sit: 2 Person Assistance, Minimal Assistance (VC's to reach for chair and control decent)  Bed to Chair: Maximum assistance, 2 Person Assistance (squat pivot to right , no LE advancement left foot blocked)  Stand Pivot Transfers: Dependent/Total Rinda Bread)  Squat Pivot Transfers: Moderate Assistance (to pt's Right)  Lateral Transfers: Minimal Assistance (slide board used mat>w/c to R side)  Comment: multiple transfers with nilson steady bed <> WC, mat table, and cammode  Ambulation 1  Surface: level tile  Device:  (walker lift)  Other Apparatus: Left, Slider (blue shoe cover/slide )  Assistance: Maximum assistance, 2 Person assistance (3rd person for total assist with LLE)  Quality of Gait: Dependent/total to advance L LE and maintaint L knee extension. Good core stabilization, fair ability to weight shift.   Gait Deviations: Decreased step length, Slow Juanita, Decreased step height  Distance: 20ft  Comments: Pt with improved upper body support using walker lift to amb. working on wt Moderate assistance  Sit to Supine: Moderate assistance  Scooting: Minimal assistance (left hip with instruction )  Comment: Pt request to return to supine position after self care tasks. Pt positioned on Left side with pillows supporting back and legs with pillows placed under LUE for support. Transfers  Stand Step Transfers: Dependent/Total  Sit to stand: Dependent/Total (Min A)  Stand to sit: Dependent/Total (Min A- Mod A)  Transfer Comments: Use of nilson stedy. Pt required verbal cues for slow and controled stand to sit. Toilet Transfers  Toilet - Technique:  Yevonne Dill steady )  Equipment Used: Raised toilet seat without rails  Toilet Transfer: Dependent/Total Yevonne Dill stedy. Min-mod A)  Toilet Transfers Comments: Completed with use of nilson stedy with min-mod A to complete. Wheelchair Bed Transfers  Wheelchair/Bed - Technique:  Yevonne Dill stedy)  Equipment Used: Bed, Wheelchair, Other (nilson steady )  Level of Asssistance: Dependent/Total (Min A x 2 with nilson stedy)      ST:            Objective:  /76   Pulse 74   Temp 98.4 °F (36.9 °C) (Oral)   Resp 16   Ht 5' 8\" (1.727 m)   Wt 191 lb 8 oz (86.9 kg)   SpO2 94%   BMI 29.12 kg/m²  I Body mass index is 29.12 kg/m². I   Wt Readings from Last 1 Encounters:   05/10/21 191 lb 8 oz (86.9 kg)      Temp (24hrs), Av.4 °F (36.9 °C), Min:98.4 °F (36.9 °C), Max:98.4 °F (36.9 °C)         GEN: well developed, well nourished, no acute distress  HEENT: Normocephalic atraumatic, EOMI, mucous membranes pink and moist  CV: RRR, no murmurs, rubs or gallops  PULM: CTAB, no rales or rhonchi. Respirations WNL and unlabored  ABD: soft, NT, ND, +BS and equal  NEURO: A&O x3. Sensation intact to light touch.   Alert and oriented x3 type, iknew year, president location, follows command, neuro exam no change  MSK: 4+/5 right upper lower extremity, dense left hemiparesis distal left upper lower extremity 0/5 proximal left upper extremity 1/5, left hip flexion 1-2/5, no tone-no change  EXTREMITIES: No calf tenderness to palpation bilaterally. No edema BLEs, range of motion and palpation of joints and spine with no increased pain, no swelling or tenderness  SKIN: warm dry and intact with good turgor  PSYCH: appropriately interactive. Affect WNL. Medications   Scheduled Meds:   FLUoxetine  20 mg Oral Daily    amitriptyline  25 mg Oral Nightly    heparin (porcine)  5,000 Units Subcutaneous 3 times per day    acetaminophen  1,000 mg Oral 3 times per day    amLODIPine  5 mg Oral Daily    aspirin  81 mg Oral Daily    atorvastatin  80 mg Oral Daily    budesonide-formoterol  2 puff Inhalation BID    insulin lispro  0-3 Units Subcutaneous Nightly    insulin lispro  0-6 Units Subcutaneous TID WC    levothyroxine  25 mcg Oral Daily    nicotine  1 patch Transdermal Daily    tiotropium  2 puff Inhalation Daily    polyethylene glycol  17 g Oral Daily     Continuous Infusions:   sodium chloride       PRN Meds:.polyvinyl alcohol, sodium chloride flush, promethazine **OR** ondansetron, sodium chloride, albuterol, dextrose, glucagon (rDNA), glucose, senna, bisacodyl     Diagnostics:     CBC:   Recent Labs     05/18/21  0635   WBC 10.9   RBC 4.46   HGB 13.6   HCT 41.1   MCV 92.1   RDW 14.9        BMP:   Recent Labs     05/18/21  0635      K 4.0      CO2 29   BUN 19   CREATININE 0.63     BNP: No results for input(s): BNP in the last 72 hours. PT/INR: No results for input(s): PROTIME, INR in the last 72 hours. APTT: No results for input(s): APTT in the last 72 hours. CARDIAC ENZYMES: No results for input(s): CKMB, CKMBINDEX, TROPONINT in the last 72 hours. Invalid input(s): CKTOTAL;3  FASTING LIPID PANEL:  Lab Results   Component Value Date    CHOL 206 (H) 05/05/2021    HDL 29 (L) 05/05/2021    TRIG 185 (H) 05/05/2021     LIVER PROFILE: No results for input(s): AST, ALT, ALB, BILIDIR, BILITOT, ALKPHOS in the last 72 hours. I/O (24Hr):   No intake or output data in the 24 hours ending 05/18/21 1400    Glu last 24 hour  Recent Labs     05/17/21  1107 05/17/21  1616 05/17/21  2129 05/18/21  0720   POCGLU 98 122* 116* 122*       No results for input(s): CLARITYU, COLORU, PHUR, SPECGRAV, PROTEINU, RBCUA, BLOODU, BACTERIA, NITRU, WBCUA, LEUKOCYTESUR, YEAST, GLUCOSEU, BILIRUBINUR in the last 72 hours.         Impression/Plan:   Impaired ADLs, gait, and mobility due to:        1. Ischemic R MCA CVA with hemorrhagic conversion and L non-dominant hemiparesis:  PT/OT for gait, mobility, strengthening, endurance, ADLs, and self care. On ASA, atorvastatin. Sling only to be used when patient is ambulating with therapy. -Discussed with patient and therapist.  Handicap placard on discharge. Noted discharge plan 6/2, family to place ramp. 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 Status post fal 5/16-currently no residual, continue to monitor  3. Headache: Has Tylenol routine TID. Improved on amitriptyline. 4. Dry eyes: has natural tears. 5. Cognitive impairment: SLP treating  6. Dysphagia/aphasia: SLP treating. On pureed diet, thin liquids, continue aspiration precautions  7. HTN/Hyperlipidemia: on amlodipine  8. DM: on insulin sliding scale, as above  9. COPD/asthma: on albuterol nebulizers prn, on symbicort BID, spiriva  10. Adjustment disorder with depressed mood: started fluoxetine 5/15, check BMP a few days  11. Esophageal Ring: Will monitor - on dysphagia diet  12. Hypothyroidism: on levothyroxine  13. Leukocytosis-improving  14. Nicotine dependence: on Nicoderm  15. Bowel Management: Miralax daily, senokot prn, dulcolax prn. 16. DVT Prophylaxis:  heparin, SCD's while in bed and MAXIM's   17. Internal medicine for medical management      Alvino Cui. Grazyna Cummings MD       This note is created with the assistance of a speech recognition program.  While intending to generate a document that actually reflects the content of the visit, the document can still have some errors including those of syntax and sound a like substitutions which may escape proof reading.   In such instances, actual meaning can be extrapolated by contextual diversion

## 2021-05-18 NOTE — PROGRESS NOTES
[address] [facility]    ACUTE REHABILITATION OCCUPATIONAL THERAPY  DAILY NOTE    Date: 21  Patient Name: Chino Rodriguez      Room: 1175/5376-35    MRN: [id number]   : 1955  (63 y.o.)  Gender: female      Diagnosis: ischemic CVA,  subacute right MCA distribution infarct, L lynette, dysphagia, L neglect, loop recorder 21 (Dr. Yumiko Blackwell)  Additional Pertinent Hx: 72-year-old female who was found down, last known well approximately 21 hours before. Patient was found to have a right MCA M1 occlusion. Thrombectomy was not successful, MRI showed patient had a right MCA stroke with hemorrhagic conversion. 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 :  (loop recorder)  Other position/activity restrictions: significant L neglect with LUE/LLE deficits  Required Braces or Orthoses?: No  Equipment Used: Bed, Wheelchair, Other (nilson steady )    Subjective  Subjective: \"I suppose\" pt states regarding engaging in self care tasks this date. Comments: Pt pleasant and cooperative however disengaged with flat affect throughout. Patient Currently in Pain: Denies  Restrictions/Precautions: Fall Risk  Overall Orientation Status: Within Functional Limits        Objective  Cognition  Overall Cognitive Status: Impaired  Arousal/Alertness: Appropriate responses to stimuli  Following Directions:  Follows one step commands  Attention Span: Attends with cues to redirect  Safety Judgement: Decreased awareness of need for assistance  Insights: Decreased awareness of deficits  Sequencing and Organization: Assistance required to generate solutions  Perception  Overall Perceptual Status: Impaired  Unilateral Attention: Cues to attend left visual field;Cues to maintain midline in sitting;Cues to maintain midline in standing;Cues to attend to left side of body  Initiation: Hand over hand to initiate tasks  Balance  Standing Balance: Dependent/Total (Min-mod A x 1 in nilson stedy; L lean with cues to correct. )  Transfers  Sit to stand: Dependent/Total (min-mod A in nilson stedy )  Stand to sit: Dependent/Total (min-mod A)  Transfer Comments: Cues for safety as pt can be impulsive to stand before proper positioning obtained. Use of nilson stedy. Pt required verbal cues for slow and controled stand to sit. Standing Balance  Time: AM: ~1 min PM: <1 min  Activity: AM: LB dressing PM: transfer  Comment: BUE support on front bar of nilson leoniedy, flucuating L lean. Functional Mobility  Functional - Mobility Device: Wheelchair  Activity: To/from bathroom  Assist Level: Dependent/Total     Type of ROM/Therapeutic Exercise  Type of ROM/Therapeutic Exercise: PROM; Self PROM  Comment: OT facilitated PROM with LUE in all planes of motion to increase joint mobility and prevent contractures. Engaged RUE to complete ROM as able to increase feedback with increased awareness of L side of body. Pt was educated on self PROM with hand out provided. Additional Activities: Other  Additional Activities: [place] spatial awareness board used to address pt R<>L scanning/awareness and visual perceptual skills. pt engaged in task for ~15 min; x12 shapes located on R side, x1 at midline and x2 on L side with max cueing. Neuromuscular Education  Neuromuscular education: Yes  Taping: endura tape reapplied to L shoulder to increase stabilization. Other: Icing technique to address stimulation to LUE with no noted reactive return. Pt reports pain with icing technique, limited tolerance           ADL  Feeding: Setup;Pureed diet (cues for location of items on L side. )  Grooming: Setup (oral care, hair combing, face washing)  UE Bathing: Minimal assistance;Verbal cueing;Setup (cues to address LUE, A to complete RUE. )  LE Bathing: None (pt declines)  UE Dressing: Minimal assistance;Verbal cueing;Setup; Increased time to complete good safety ie. hold by wrist not by 1 finger  Short term goal 6: demo improved awareness of L side and neuromuscular christian by weight bearing on LUE with physical assist to complete  Short term goal 7: from w/c:  consistently maintain midline trunk control and demo able to lean forward 4 inches away from back of chair without physical assist for adls. Short term goal 8: tolerate 2 min static stand for adls with RUE support with mod of 2 and assist with LLE as needed. Long term goals  Time Frame for Long term goals : by discharge  Long term goal 1: set up and occasional verbal cues ie.  L visual scan for self feeding  Long term goal 2: set up oral care  Long term goal 3: max x 1 toileting and adl transfers  Long term goal 4: min UE bathe and dress (shirt)  Long term goal 5: max x 1 LE bathe and dress, able to cross to reach RLE with min assist and keep trunk balance        05/18/21 1247 05/18/21 1528   OT Individual Minutes   Time In 4141 1245   Time Out 1128 1409   Minutes 49 21     Electronically signed by ERIN Teixeira on 5/18/21 at 4:16 PM EDT

## 2021-05-19 LAB
GLUCOSE BLD-MCNC: 104 MG/DL (ref 65–105)
GLUCOSE BLD-MCNC: 112 MG/DL (ref 65–105)
GLUCOSE BLD-MCNC: 113 MG/DL (ref 65–105)
GLUCOSE BLD-MCNC: 96 MG/DL (ref 65–105)

## 2021-05-19 PROCEDURE — 6370000000 HC RX 637 (ALT 250 FOR IP): Performed by: STUDENT IN AN ORGANIZED HEALTH CARE EDUCATION/TRAINING PROGRAM

## 2021-05-19 PROCEDURE — 97535 SELF CARE MNGMENT TRAINING: CPT

## 2021-05-19 PROCEDURE — 97542 WHEELCHAIR MNGMENT TRAINING: CPT

## 2021-05-19 PROCEDURE — 99231 SBSQ HOSP IP/OBS SF/LOW 25: CPT | Performed by: PHYSICAL MEDICINE & REHABILITATION

## 2021-05-19 PROCEDURE — 97112 NEUROMUSCULAR REEDUCATION: CPT

## 2021-05-19 PROCEDURE — 97110 THERAPEUTIC EXERCISES: CPT

## 2021-05-19 PROCEDURE — 97530 THERAPEUTIC ACTIVITIES: CPT

## 2021-05-19 PROCEDURE — 6370000000 HC RX 637 (ALT 250 FOR IP): Performed by: PHYSICAL MEDICINE & REHABILITATION

## 2021-05-19 PROCEDURE — 92526 ORAL FUNCTION THERAPY: CPT

## 2021-05-19 PROCEDURE — 6360000002 HC RX W HCPCS: Performed by: PHYSICAL MEDICINE & REHABILITATION

## 2021-05-19 PROCEDURE — 92507 TX SP LANG VOICE COMM INDIV: CPT

## 2021-05-19 PROCEDURE — 97116 GAIT TRAINING THERAPY: CPT

## 2021-05-19 PROCEDURE — 82947 ASSAY GLUCOSE BLOOD QUANT: CPT

## 2021-05-19 PROCEDURE — 1180000000 HC REHAB R&B

## 2021-05-19 PROCEDURE — 99231 SBSQ HOSP IP/OBS SF/LOW 25: CPT | Performed by: INTERNAL MEDICINE

## 2021-05-19 RX ADMIN — LEVOTHYROXINE SODIUM 25 MCG: 0.03 TABLET ORAL at 06:40

## 2021-05-19 RX ADMIN — AMLODIPINE BESYLATE 5 MG: 5 TABLET ORAL at 08:45

## 2021-05-19 RX ADMIN — POLYETHYLENE GLYCOL 3350 17 G: 17 POWDER, FOR SOLUTION ORAL at 08:46

## 2021-05-19 RX ADMIN — ACETAMINOPHEN 1000 MG: 500 TABLET, FILM COATED ORAL at 22:40

## 2021-05-19 RX ADMIN — Medication 2 PUFF: at 08:46

## 2021-05-19 RX ADMIN — ASPIRIN 81 MG CHEWABLE TABLET 81 MG: 81 TABLET CHEWABLE at 08:45

## 2021-05-19 RX ADMIN — ATORVASTATIN CALCIUM 80 MG: 80 TABLET, FILM COATED ORAL at 08:45

## 2021-05-19 RX ADMIN — Medication 2 PUFF: at 08:50

## 2021-05-19 RX ADMIN — ACETAMINOPHEN 1000 MG: 500 TABLET, FILM COATED ORAL at 14:38

## 2021-05-19 RX ADMIN — AMITRIPTYLINE HYDROCHLORIDE 25 MG: 25 TABLET, FILM COATED ORAL at 22:42

## 2021-05-19 RX ADMIN — FLUOXETINE HYDROCHLORIDE 20 MG: 20 CAPSULE ORAL at 08:45

## 2021-05-19 RX ADMIN — HEPARIN SODIUM 5000 UNITS: 5000 INJECTION INTRAVENOUS; SUBCUTANEOUS at 14:39

## 2021-05-19 RX ADMIN — Medication 2 PUFF: at 22:40

## 2021-05-19 RX ADMIN — ACETAMINOPHEN 1000 MG: 500 TABLET, FILM COATED ORAL at 06:40

## 2021-05-19 RX ADMIN — HEPARIN SODIUM 5000 UNITS: 5000 INJECTION INTRAVENOUS; SUBCUTANEOUS at 06:40

## 2021-05-19 RX ADMIN — HEPARIN SODIUM 5000 UNITS: 5000 INJECTION INTRAVENOUS; SUBCUTANEOUS at 22:42

## 2021-05-19 ASSESSMENT — PAIN SCALES - GENERAL
PAINLEVEL_OUTOF10: 0
PAINLEVEL_OUTOF10: 2

## 2021-05-19 NOTE — PROGRESS NOTES
[address] [facility]    ACUTE REHABILITATION OCCUPATIONAL THERAPY  DAILY NOTE    Date: 21  Patient Name: Kayden Fisher      Room: 1154/7371-96    MRN: [id number]   : 1955  (63 y.o.)  Gender: female      Diagnosis: ischemic CVA,  subacute right MCA distribution infarct, L lynette, dysphagia, L neglect, loop recorder 21 (Dr. Kayden Cantu)  Additional Pertinent Hx: 53-year-old female who was found down, last known well approximately 21 hours before. Patient was found to have a right MCA M1 occlusion. Thrombectomy was not successful, MRI showed patient had a right MCA stroke with hemorrhagic conversion. 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 :  (loop recorder)  Other position/activity restrictions: significant L neglect with LUE/LLE deficits  Required Braces or Orthoses?: No  Equipment Used: Bed, Wheelchair, Other (nilson steady )    Subjective  Subjective: \"Its hospital food\" pt stated in response to how was breakfast. \"I dont like the vibration. It hurts my arm. \"  Comments: Pt tired but agreeable to occupational therapy on this date. Pt continues to demonstrate flat affect throughout treatment. Patient Currently in Pain: Denies  Restrictions/Precautions: Fall Risk             Objective     Balance  Sitting Balance: Contact guard assistance  Standing Balance: Dependent/Total (Min-mod A x 1 in nilson stedy; L lean with cues to correct. )  Bed mobility  Supine to Sit: Moderate assistance  Sit to Supine: Moderate assistance  Scooting: Minimal assistance  Comment: Pt request to return to supine position after self care tasks. Pt positioned on Left side with pillows placed under LUE and between legs for support.   Transfers  Sit to stand: Dependent/Total (Min A in nilson stedy)  Stand to sit: Dependent/Total (Min -Mod A in nilson stedy)  Transfer Comments: Verbal cues for hand placement and safety. Pt requires increased assistance and verbal cues to sit for controlled sit  Standing Balance  Time: AM: 1 min x 3; PM: 1 min x 3  Activity: Functional transfers, lower body dressing  Comment: BUE support on front bar of nilson galdamezmaríaradamesting L lean. Functional Mobility  Functional - Mobility Device: Wheelchair  Activity: To/from bathroom  Assist Level: Dependent/Total     Type of ROM/Therapeutic Exercise  Type of ROM/Therapeutic Exercise: Self PROM  Comment: AM: OT facilitated pt in self PROM with LUE to increase joint mobility, prevent stiffness, pain, and contractures, and increase awareness to affected side. Pt required verbal cues for slow smooth motions to assist with protecting LUE. Pt completed each exercises for 15 reps. Pt verbalized no increased discomfort in Left shoulder on this date. Exercises  Shoulder Flexion: x  Shoulder ABduction: x  Elbow Flexion: x  Elbow Extension: x  Supination: x  Pronation: x  Wrist Extension: x  Finger Flexion: x  Finger Extension: x  Other: Thumb opposition and thumb abduction self PROM completed 15 reps with verbal cues for slow smooth motion. OT provided support at wrist thrroughout. Weight Bearing  Weight Bearing Technique: Yes  LUE Weight Bearing: Extended arm seated  Response To Weight Bearing Technique: OT facilitated pt in weight bearing through LUE on this date. OT positioned LUE in extended position while sitting on mat table with support provided through wrist and elbow by therapist. Pt required verbal cues to maintain weight bearing through LUE. Pt tolerated WB well on this date. Additional Activities: PM: Pt instructed in finding objects on the left side of body while sitting on mat table on this date. Pt required increased verbal cues to complete activity. Pt completed to assit with visual scanning and to address left side neglect.                ADL  Grooming: Setup (Oral hygiene)  UE Bathing: None (Pt denies)  LE Bathing: procurement, Endurance Training, Cognitive Reorientation  Patient Goals   Patient goals : \"get as close to normal as possible. \"  specifies:\"get in and out of bed on my own, shower by myself. \"  Short term goals  Time Frame for Short term goals: 1 week  Short term goal 1: mod A UE bathe and dress  Short term goal 2: set up brush teeth  Short term goal 3: min feeding, with cues able to locate L side of tray and scan to locate items on L. Short term goal 4: tolerate 6-8 min sitting edge of bed (static sit)  with min assist and RUE support, head at midline and fair safety awareness  Short term goal 5: pt to initiate reposition LUE with RUE with good safety ie. hold by wrist not by 1 finger  Short term goal 6: demo improved awareness of L side and neuromuscular christian by weight bearing on LUE with physical assist to complete  Short term goal 7: from w/c:  consistently maintain midline trunk control and demo able to lean forward 4 inches away from back of chair without physical assist for adls. Short term goal 8: tolerate 2 min static stand for adls with RUE support with mod of 2 and assist with LLE as needed. Long term goals  Time Frame for Long term goals : by discharge  Long term goal 1: set up and occasional verbal cues ie.  L visual scan for self feeding  Long term goal 2: set up oral care  Long term goal 3: max x 1 toileting and adl transfers  Long term goal 4: min UE bathe and dress (shirt)  Long term goal 5: max x 1 LE bathe and dress, able to cross to reach RLE with min assist and keep trunk balance     05/19/21 0734 05/19/21 1236   OT Individual Minutes   Time In 9643 1236   Time Out 0830 1300   Minutes 56 24   Time Code Minutes    Timed Code Treatment Minutes 56 Minutes 24 Minutes     Electronically signed by Aylin Martin OT on 5/19/21 at 2:16 PM EDT

## 2021-05-19 NOTE — FLOWSHEET NOTE
Patient alone in room, looking up at the ceiling. Writer unable to get patient to engage in conversation, patient stated that she was alright. Chaplains will remain available to offer spiritual and emotional support as needed.      05/18/21 5829   Encounter Summary   Services provided to: Patient   Referral/Consult From: [facility]   Continue Visiting   (5/18/21)   Complexity of Encounter Low   Length of Encounter 15 minutes   Spiritual Assessment Completed Yes   Routine   Type Initial   Assessment Approachable   Intervention Active listening;Explored feelings, thoughts, concerns;[place];Sustaining presence/ Ministry of presence  (Patient didnot engage in conversation.)   Outcome Coping;Refused/declined   Visited by Carlos Bower

## 2021-05-19 NOTE — PLAN OF CARE
Problem: Neurological  Goal: Maximum potential motor/sensory/cognitive function  Outcome: Ongoing     Problem: Nutrition  Goal: Optimal nutrition therapy  Outcome: Ongoing     Problem: Pain:  Goal: Control of acute pain  5/19/2021 1615 by Sheila Oliva RN  Outcome: Ongoing     Problem: Skin Integrity:  Goal: Will show no infection signs and symptoms  5/19/2021 1615 by Sheila Oliva RN  Outcome: Ongoing     Problem: Skin Integrity:  Goal: Absence of new skin breakdown  5/19/2021 1615 by Sheila Oliva RN  Outcome: Ongoing     Problem: Falls - Risk of:  Goal: Will remain free from falls  5/19/2021 1615 by Sheila Oliva RN  Outcome: Ongoing     Problem: Falls - Risk of:  Goal: Absence of physical injury  5/19/2021 1615 by Sheila Oliva RN  Outcome: Ongoing

## 2021-05-19 NOTE — PROGRESS NOTES
Speech Language Pathology  Speech Language Pathology  [facility]    Dysphagia Treatment Note    Date: 5/19/2021  Patients Name: Wing Hutson  MRN: [id number]  Diagnosis: dysphagia  Patient Active Problem List   Diagnosis Code    Pneumonia of left lower lobe due to infectious organism J18.9    Tobacco abuse Z72.0    Legionella pneumonia ([facility] A48.1    Dysphagia R13.10    Hyperplastic colon polyp K63.5    Esophageal ring K22.2    Gastritis K29.70    Elbow effusion, right M25.421    Stroke due to occlusion of right middle cerebral artery ([facility]) I63.511    Nihss score 15 R29.715    Acute left hemiparesis ([facility]) G81.94    Cerebrovascular accident (CVA) due to occlusion of right middle cerebral artery ([facility] I63.511    Acute CVA (cerebrovascular accident) ([facility] I63.9    COPD (chronic obstructive pulmonary disease) ([facility]) J44.9    Essential hypertension I10    Hyperlipidemia E78.5    Pre-diabetes R73.03       Pain: Pt. Denies    Dysphagia Treatment  Treatment time:  8637-7517    Subjective: [x] Alert [x] Cooperative     [] Confused     [] Agitated    [] Lethargic    Objective/Assessment:    Pt. Seen for diet tolerance monitoring during lunchtime. Pt. Diet has been advanced to minced and moist, however, pt. Only ordered mashed potatoes, yogurt and pureed pears for lunch. Unable to monitor diet tolerance for minced and moist diet at this time. Pt. Demonstrated functional oral clearing of mashed potatoes and no overt s/s aspiration of thin liquids via straw noted. Plan:  [x] Continue  services    [] Discharge from :        Discharge recommendations: [] Inpatient Rehab   [] [place]   [] Outpatient Therapy  [] Follow up at trauma clinic   [] Other:         Treatment completed by: Kala Dalton A.CCC/SLP

## 2021-05-19 NOTE — PROGRESS NOTES
Physical Therapy  [facility]  Acute Rehabilitation Physical Therapy Progress Note    Date: 21  Patient Name: Chino Rodriguez       Room: 8948/1284-80  MRN: [id number]   Account: [de-identified]   : 1955  (63 y.o.) Gender: female      Diagnosis: ischemic CVA,  subacute right MCA distribution infarct, L lynette, dysphagia, L neglect, loop recorder 21 (Dr. Yumiko Blackwell)  Past Medical History:  has a past medical history of Asthma, COPD (chronic obstructive pulmonary disease) ([facility] Diabetes mellitus ([facility] Esophageal ring, Gastritis, Hyperlipidemia, Hyperplastic colon polyp, Hypertension, Osteoarthritis, Patient in clinical research study, and TIA (transient ischemic attack). Past Surgical History:   has a past surgical history that includes Appendectomy; Hysterectomy; Esophagus dilation; Colonoscopy (2017); Endoscopy, colon, diagnostic; pr egd transoral biopsy single/multiple (N/A, 2017); pr colsc flx w/rmvl of tumor polyp lesion snare tq (N/A, 2017); and Upper gastrointestinal endoscopy (2017). Restrictions/Precautions  Restrictions/Precautions: Fall Risk  Required Braces or Orthoses?: No  Implants present? :  (loop recorder)  Position Activity Restriction  Other position/activity restrictions: significant L neglect with LUE/LLE deficits    Subjective: Pt c/o exhaustion this AM, states she didn't sleep well. Comments: Pt placed in bed after AM, PM sessions at her request. Rooke boot donned, air mattress inflated, L UE & LE elevated on pillows.      Vital Signs  Patient Currently in Pain: Denies    Bed Mobility   Bridging: Minimal assistance (positioning L LE)  Supine to Sit: Moderate assistance (L LE and trunk, HOB >45º, rail, 2 pillows)  Sit to Supine: Maximal assistance (bilateral LEs c Pt scooping L c R + trunk assist. Flat bed)  Scooting: Minimal assistance (positioning hips in chair, edge of mat/bed)    Transfers  Sit to Stand: 2 Person Assistance;Contact guard assistance;Minimal Assistance (walker lift (Min x2) & Maged Fisher (CGA x1) c R UE setup)  Stand to sit: 2 Person Assistance;Minimal Assistance (VC's to reach for chair, control decent c walker lift)  Bed to Chair: Dependent/Total Maged Raider)  Squat Pivot Transfers: Moderate Assistance;2 Person Assistance (to pt's Right; G performance; assist at L LE)  Lateral Transfers: Minimal Assistance (slide board, A with positioning, set-up, L LE)    Ambulation 1  Surface: level tile  Device:  (walker lift)  Other Apparatus: Left;Slider  Assistance: Maximum assistance;2 Person assistance (+ 3rd assist to steer walker)  Quality of Gait: Dependent/total to advance L LE and maintain L knee extension. Good core stabilization, fair ability to weight shift. Gait Deviations: Decreased step length; Slow Juanita;Decreased step height  Distance: 21' and 25'   Comments: Working on wt shifting and weightbearing into L LE. Placing L foot each step, providing L knee stability and L hip extension during stance phase. Pt demonstrates Fair but fleeting control of hip rotation/extension. Wheelchair Activities  Wheelchair Parts Management: No  Propulsion: Yes  Propulsion 1  Propulsion: Manual  Level: Level Tile  Method: RUE;RLE  Level of Assistance: Contact guard assistance  Description/ Details: verbal cues to avoid object and running into the wall on her L. cues throughout to scan left with fair/fleeting return. Pt able to complete 90º turn with cue for UE sequencing as well as straight path, veers L.   Distance: 290'    Balance   Posture: Poor  Sitting - Static: Poor;+ (Edge of mat, no back or UE support/Min or CGA)  Sitting - Dynamic: Poor (Edge of mat, no back or UE support/Min or CGA)  Standing - Static: Poor (walker lift)     Exercises   Other exercises?: Yes  Other exercises 1: Seated bilateral LE exercises, x15 each, R 2#, L active assisted/passive ROM (cues for visual, verbal attention to address L neglect)  Other exercises 9: Squat pivot transfers x2, slide board transfer x1  Other exercises 10: Standing with walker lift: R UE crossbody activity, 8x, with assist to maintain L knee extension    Activity Tolerance: Patient limited by endurance     PT Equipment Recommendations  Other: CTA; pending further progress with mobility     Current Treatment Recommendations: Strengthening, Balance Training, Functional Mobility Training, Transfer Training, Endurance Training, Wheelchair Mobility Training, Gait Training, Stair training, Neuromuscular Re-education, Home Exercise Program, Safety Education & Training, Patient/Caregiver Education & Training, Modalities, Positioning    Conditions Requiring Skilled Therapeutic Intervention  Body structures, Functions, Activity limitations: Decreased functional mobility ; Decreased strength;Decreased safe awareness;Decreased endurance;Decreased balance;Decreased posture;Decreased coordination;Decreased vision/visual deficit; Decreased fine motor control  REQUIRES PT FOLLOW UP: Yes  Discharge Recommendations: Patient would benefit from continued therapy after discharge;Home with assist PRN    Goals  Short term goals  Time Frame for Short term goals: 10 days  Short term goal 1: Pt able to roll in bed side to side at min A without bed rail  Short term goal 2: Pt able to perform supine<>sit at mod A  Short term goal 3: Pt able to perform sit<>stand at min/mod A . Short term goal 4: Pt able to progress to pivot transfers at max A   Short term goal 5: Pt able to improve sitting balance at EOB/EOM at SBA for static balance and able to track to left side as well as turn to head to left side to scan her environment with minimal cues.    Short term goal 6: Pt able to tolerate standing in // bars with R UE support and assist at mod A  Short term goal 7: Pt able to propel w/c with R UE/R LE distance of 50 to 80 ft, straight path and turn around 180 degrees, at min/mod A   Long term goals  Time Frame for Long term goals : By DC  Long term goal 1: Pt able to roll in bed mod-I  Long term goal 2: Pt able to perform supine <> sit at CGA/min A  Long term goal 3: Pt able to perform pivot transfers min A / mod A and able to scan Left side environment. Long term goal 4: Pt able to ambulate in // bars, or with appropriate assistive device and/or LE bracing, distance of  20 to 30 ft, min/mod A x 2. Long term goal 5: Pt able to propel w/c on level surfaces distance 100 to 150 ft, SBA/CGA. Long term goal 6: Educate pt/family in safe technique for mobility and  to go up and down steps to enter/exit home. Long term goal 7: Improve sitting balance at EOM to good, and standing balance with R UE support to fair- to reduce fall risk. Long term goal 8: Improve PASS score to 20/36 improve overall function.         05/19/21 0947 05/19/21 1352   PT Individual Minutes   Time In 0932 1310   Time Out 1040 1342   Minutes 68 32     Electronically signed by Lan Schulte PTA on 5/19/21 at 4:59 PM EDT

## 2021-05-19 NOTE — PROGRESS NOTES
services    [] Discharge from ST:      Discharge recommendations: [] Inpatient Rehab   [] [place]   [] Outpatient Therapy  [] Follow up at trauma clinic   [] Other:       Treatment completed by: Hugh Nur A.CCC/SLP

## 2021-05-19 NOTE — PROGRESS NOTES
[place] [id number] Internal Medicine    Progress Note  Chart Reviewed: Yes  Patient assessed for rehabilitation services?: Yes  Family / Caregiver Present: No  Referring Practitioner: Dr Key Arthur  5/19/2021    12:56 PM    Name:   Cathleen Adame  MRN:     [id number]     Acct:      [de-identified]   Room:   [address] [place] [address] Day:  9  Admit Date:  5/10/2021  7:13 AM    PCP:   Dario Box MD  Code Status:  Full Code    Subjective:     C/C: medical mgmt    Active Problems:    Tobacco abuse    Gastritis    Acute left hemiparesis ([address]    Acute CVA (cerebrovascular accident) ([facility]    COPD (chronic obstructive pulmonary disease) ([facility]    Essential hypertension    Hyperlipidemia    Pre-diabetes  Resolved Problems:    * No resolved hospital problems. *      Interval History Status: improved. 5/14/21  Patient seen and examined at bedside. No acute overnight events. Afebrile, hemodynamically stable. Continues to work with PT/OT. No acute changes in hemiparesis. No acute complaints.      Significant last 24 hr data reviewed ;   Vitals:    05/17/21 1829 05/18/21 0724 05/18/21 1844 05/19/21 0700   BP:  128/76 128/67 137/80   Pulse:  74 79 75   Resp:  16 18 18   Temp:  98.4 °F (36.9 °C) 98.4 °F (36.9 °C) 98 °F (36.7 °C)   TempSrc: Oral Oral Oral    SpO2:  94% 94% 95%   Weight:       Height:          Recent Results (from the past 24 hour(s))   POC Glucose Fingerstick    Collection Time: 05/18/21  4:06 PM   Result Value Ref Range    POC Glucose 99 65 - 105 mg/dL   POC Glucose Fingerstick    Collection Time: 05/18/21  8:30 PM   Result Value Ref Range    POC Glucose 124 (H) 65 - 105 mg/dL   POC Glucose Fingerstick    Collection Time: 05/19/21  6:38 AM   Result Value Ref Range    POC Glucose 104 65 - 105 mg/dL   POC Glucose Fingerstick    Collection Time: 05/19/21 10:32 AM   Result Value Ref Range    POC Glucose 96 65 - 105 mg/dL     Recent Labs     05/18/21  0720 05/18/21  1606 05/18/21  2030 Transdermal Daily    tiotropium  2 puff Inhalation Daily    polyethylene glycol  17 g Oral Daily     Continuous Infusions:    sodium chloride       PRN Meds: polyvinyl alcohol, sodium chloride flush, promethazine **OR** ondansetron, sodium chloride, albuterol, dextrose, glucagon (rDNA), glucose, senna, bisacodyl      Physical Examination:        /80   Pulse 75   Temp 98 °F (36.7 °C)   Resp 18   Ht 5' 8\" (1.727 m)   Wt 191 lb 8 oz (86.9 kg)   SpO2 95%   BMI 29.12 kg/m²   Temp (24hrs), Av.2 °F (36.8 °C), Min:98 °F (36.7 °C), Max:98.4 °F (36.9 °C)    Recent Labs     21  1606 21  2030 21  0638 21  1032   POCGLU 99 124* 104 96     No intake or output data in the 24 hours ending 21 1256    General Appearance:  alert, well appearing, and in no acute distress  Mental status: oriented to person, place, and time with normal affect  Head:  normocephalic, atraumatic. Eye: no icterus, redness, pupils equal and reactive, extraocular eye movements intact, conjunctiva clear  Ear: normal external ear, no discharge, hearing intact  Nose:  no drainage noted  Mouth: mucous membranes moist  Neck: supple, no carotid bruits, thyroid not palpable  Lungs: Clear to auscultation bilaterally. No accessory muscle use. Cardiovascular: normal rate, regular rhythm, no murmur, gallop, rub. Abdomen: Soft, nontender, nondistended, normal bowel sounds, no hepatomegaly or splenomegaly  Neurologic: Left upper and lower extremity weakness, 0-1/5. No sensory loss.   Skin: No gross lesions, rashes, bruising or bleeding on exposed skin area  Extremities:  peripheral pulses palpable, no pedal edema or calf pain with palpation    Assessment:        Primary Problem  <principal problem not specified>    Active Hospital Problems    Diagnosis Date Noted    Acute [facility]A [facility]cerebrovascular accident) ([facility] [I63.9] 05/10/2021    [facility] [J44.9] 05/10/2021    Essential hypertension [I10] 05/10/2021    Hyperlipidemia [E78.5] 05/10/2021    Pre-diabetes [R73.03] 05/10/2021    Acute left hemiparesis (HCC) [G81.94]     Gastritis [K29.70]     Tobacco abuse [Z72.0] 07/16/2017     Plan:        Continue current therapy regimen  BP well controlled. Continue norvasc 5 mg daily  Copd stable. continue bronchodilators  Synthroid 25 mcg daily   lipitor 80 mg daily  Asa 81 mg daily  Insulin sliding scale  nicoderm patch  Continue PT/OT  Encourage PO intake  Leukocytosis resolved. Likely reactionary. 5/19  Patient reports no new complaints. Engaging with physical therapy. Labs and vitals reviewed. No new issues. Continue with current care. MD LEONOR Dobson[address], [address] [facility].    Phone [phone]   Fax: [phone]  Answering Service: [phone]

## 2021-05-19 NOTE — PROGRESS NOTES
Physical Medicine & Rehabilitation  Progress Note    5/19/2021 10:52 AM     CC: Ambulatory and ADL dysfunction due to left nondominant hemiplegia due to ischemic CVA    Subjective:   No complaints. No pain. Bowels and bladder okay last BM 5/17. Patient denies any residual from fall. .  Denies any trauma or pain or discomfort. ROS:  Denies fevers, chills, sweats. No chest pain, palpitations, lightheadedness. Denies coughing, wheezing or shortness of breath. Denies abdominal pain, nausea, diarrhea or constipation. No new areas of joint pain. Denies new areas of numbness or weakness. Denies new anxiety or depression issues. No new skin problems. Rehabilitation:   PT:  Restrictions/Precautions: Fall Risk  Implants present? :  (loop recorder)  Other position/activity restrictions: significant L neglect with LUE/LLE deficits   Transfers  Sit to Stand: 2 Person Assistance, Contact guard assistance, Minimal Assistance (walker lift (Min x2) & Casi Cliche (CGA x2) c R UE setup)  Stand to sit: 2 Person Assistance, Minimal Assistance (VC's to reach for chair, control decent c walker lift)  Bed to Chair: Dependent/Total Casi Cliche)  Stand Pivot Transfers: Dependent/Total Casi Cliche)  Squat Pivot Transfers: Moderate Assistance, 2 Person Assistance (to pt's Right; impulsive, requires cues)  Lateral Transfers: Minimal Assistance (slide board used mat>w/c to R side)  Comment: multiple transfers with nilson steady bed <> WC, mat table, and cammode  Ambulation 1  Surface: level tile  Device:  (walker lift)  Other Apparatus: Left, Slider (blue shoe cover/slide )  Assistance: Maximum assistance, 2 Person assistance (3rd person for total assist with LLE)  Quality of Gait: Dependent/total to advance L LE and maintaint L knee extension. Good core stabilization, fair ability to weight shift.   Gait Deviations: Decreased step length, Slow Juanita, Decreased step height  Distance: 20ft  Comments: Pt with improved upper body support using walker lift to amb. working on wt shifting and weightbearing into LLE. One person placed on each side of walker lift to provide lateral support and to keep pt from leaning fwd. 3rd person assist on LLE only advancing and placing foot each step and providing L knee stability during stance phase. OT:  ADL  Equipment Provided: Long-handled sponge, Reacher  Feeding: Setup, Pureed diet (cues for location of items on L side. )  Grooming: Setup (oral care, hair combing, face washing)  UE Bathing: Minimal assistance, Verbal cueing, Setup (cues to address LUE, A to complete RUE. )  LE Bathing: None (pt declines)  UE Dressing: Minimal assistance  LE Dressing: Dependent/Total (with nilson stedy)  Toileting: None  Additional Comments: OT facilitated pts engagement in self care tasks while sitting in wheelchair at sink side. Pt denies upper body bathing and changing shirt despite max encouragement to participate. Pt provided and educated on use of long handle sponge to assist with lower body bathing. Pt educated on use of reacher to assist with lower body dressing. Pt conitnues to be total dependent for lower body self care tasks due to use of nilson stedy to complete. Balance  Sitting Balance: Contact guard assistance  Standing Balance: Dependent/Total (Min-mod A x 1 in nilson stedy; L lean with cues to correct. )   Standing Balance  Time: AM: ~1 min PM: <1 min  Activity: AM: LB dressing PM: transfer  Comment: BUE support on front bar of nilson galdamez, flucuating L lean. Functional Mobility  Functional - Mobility Device: Wheelchair  Activity: To/from bathroom  Assist Level: Dependent/Total  Functional Mobility Comments: pt ed and practiced w/c mobility in room using RUE and RLE with LLE on leg rest foward and back 5 feet.      Bed mobility  Bridging: Minimal assistance (to support and facilitate LLE only)  Rolling to Left: Minimal assistance  Rolling to Right: Maximum assistance  Supine to Sit: Moderate assistance  Sit to Supine: Moderate assistance  Scooting: Minimal assistance (positioning hips in chair, edge of mat/bed)  Comment: Pt request to return to supine position after self care tasks. Pt positioned on Left side with pillows supporting back and legs with pillows placed under LUE for support. Transfers  Stand Step Transfers: Dependent/Total  Sit to stand: Dependent/Total (min-mod A in nilson stedy )  Stand to sit: Dependent/Total (min-mod A)  Transfer Comments: Cues for safety as pt can be impulsive to stand before proper positioning obtained. Use of nilson stedy. Pt required verbal cues for slow and controled stand to sit. Toilet Transfers  Toilet - Technique:  Theta Blare steady )  Equipment Used: Raised toilet seat without rails  Toilet Transfer: Dependent/Total Theta Blare stedy. Min-mod A)  Toilet Transfers Comments: Completed with use of nilson stedy with min-mod A to complete. Wheelchair Bed Transfers  Wheelchair/Bed - Technique:  Theta Blare stedy)  Equipment Used: Bed, Wheelchair, Other (nilson steady )  Level of Asssistance: Dependent/Total (Min A x 2 with nilson stedy)      ST:            Objective:  /80   Pulse 75   Temp 98 °F (36.7 °C)   Resp 18   Ht 5' 8\" (1.727 m)   Wt 191 lb 8 oz (86.9 kg)   SpO2 95%   BMI 29.12 kg/m²  I Body mass index is 29.12 kg/m². I   Wt Readings from Last 1 Encounters:   05/10/21 191 lb 8 oz (86.9 kg)      Temp (24hrs), Av.2 °F (36.8 °C), Min:98 °F (36.7 °C), Max:98.4 °F (36.9 °C)         GEN: well developed, well nourished, no acute distress  HEENT: Normocephalic atraumatic, EOMI, mucous membranes pink and moist  CV: RRR, no murmurs, rubs or gallops  PULM: CTAB, no rales or rhonchi. Respirations WNL and unlabored  ABD: soft, NT, ND, +BS and equal  NEURO: A&O x3. Sensation intact to light touch.   Alert and oriented x3 type, iknew year, president location, follows command, - neuro exam no change  MSK: 4+/5 right upper lower extremity, dense left hemiparesis distal left upper lower extremity 0/5 proximal left upper extremity 1/5, left hip flexion 1-2/5, no tone-no change  EXTREMITIES: No calf tenderness to palpation bilaterally. No edema BLEs, range of motion and palpation of joints and spine with no increased pain, no swelling or tenderness  SKIN: warm dry and intact with good turgor  PSYCH: appropriately interactive. Affect WNL. Medications   Scheduled Meds:   FLUoxetine  20 mg Oral Daily    amitriptyline  25 mg Oral Nightly    heparin (porcine)  5,000 Units Subcutaneous 3 times per day    acetaminophen  1,000 mg Oral 3 times per day    amLODIPine  5 mg Oral Daily    aspirin  81 mg Oral Daily    atorvastatin  80 mg Oral Daily    budesonide-formoterol  2 puff Inhalation BID    insulin lispro  0-3 Units Subcutaneous Nightly    insulin lispro  0-6 Units Subcutaneous TID WC    levothyroxine  25 mcg Oral Daily    nicotine  1 patch Transdermal Daily    tiotropium  2 puff Inhalation Daily    polyethylene glycol  17 g Oral Daily     Continuous Infusions:   sodium chloride       PRN Meds:.polyvinyl alcohol, sodium chloride flush, promethazine **OR** ondansetron, sodium chloride, albuterol, dextrose, glucagon (rDNA), glucose, senna, bisacodyl     Diagnostics:     CBC:   Recent Labs     05/18/21  0635   WBC 10.9   RBC 4.46   HGB 13.6   HCT 41.1   MCV 92.1   RDW 14.9        BMP:   Recent Labs     05/18/21  0635      K 4.0      CO2 29   BUN 19   CREATININE 0.63     BNP: No results for input(s): BNP in the last 72 hours. PT/INR: No results for input(s): PROTIME, INR in the last 72 hours. APTT: No results for input(s): APTT in the last 72 hours. CARDIAC ENZYMES: No results for input(s): CKMB, CKMBINDEX, TROPONINT in the last 72 hours.     Invalid input(s): CKTOTAL;3  FASTING LIPID PANEL:  Lab Results   Component Value Date    CHOL 206 (H) 05/05/2021    HDL 29 (L) 05/05/2021    TRIG 185 (H) 05/05/2021     LIVER PROFILE: No results for input(s): AST, recognition program.  While intending to generate a document that actually reflects the content of the visit, the document can still have some errors including those of syntax and sound a like substitutions which may escape proof reading.   In such instances, actual meaning can be extrapolated by contextual diversion

## 2021-05-19 NOTE — PLAN OF CARE
Problem: Pain:  Goal: Pain level will decrease  Description: Pain level will decrease  5/19/2021 0412 by Lillian Wood RN  Outcome: Ongoing     Problem: Pain:  Goal: Control of acute pain  Description: Control of acute pain  5/19/2021 0412 by Lillian Wood RN  Outcome: Ongoing     Problem: Pain:  Goal: Control of chronic pain  Description: Control of chronic pain  5/19/2021 0412 by Lillian Wood RN  Outcome: Ongoing     Problem: Skin Integrity:  Goal: Will show no infection signs and symptoms  Description: Will show no infection signs and symptoms  5/19/2021 0412 by Lillian Wood RN  Outcome: Ongoing     Problem: Skin Integrity:  Goal: Absence of new skin breakdown  Description: Absence of new skin breakdown  5/19/2021 0412 by Lillian Wood RN  Outcome: Ongoing     Problem: Falls - Risk of:  Goal: Will remain free from falls  Description: Will remain free from falls  5/19/2021 0412 by Lillian Wood RN  Outcome: Ongoing     Problem: Falls - Risk of:  Goal: Absence of physical injury  Description: Absence of physical injury  5/19/2021 0412 by Lillian Wood RN  Outcome: Ongoing

## 2021-05-20 LAB
GLUCOSE BLD-MCNC: 116 MG/DL (ref 65–105)
GLUCOSE BLD-MCNC: 118 MG/DL (ref 65–105)
GLUCOSE BLD-MCNC: 121 MG/DL (ref 65–105)
GLUCOSE BLD-MCNC: 136 MG/DL (ref 65–105)

## 2021-05-20 PROCEDURE — 6370000000 HC RX 637 (ALT 250 FOR IP): Performed by: PHYSICAL MEDICINE & REHABILITATION

## 2021-05-20 PROCEDURE — 6360000002 HC RX W HCPCS: Performed by: PHYSICAL MEDICINE & REHABILITATION

## 2021-05-20 PROCEDURE — 97112 NEUROMUSCULAR REEDUCATION: CPT

## 2021-05-20 PROCEDURE — 97110 THERAPEUTIC EXERCISES: CPT

## 2021-05-20 PROCEDURE — 97530 THERAPEUTIC ACTIVITIES: CPT

## 2021-05-20 PROCEDURE — 6370000000 HC RX 637 (ALT 250 FOR IP): Performed by: STUDENT IN AN ORGANIZED HEALTH CARE EDUCATION/TRAINING PROGRAM

## 2021-05-20 PROCEDURE — 82947 ASSAY GLUCOSE BLOOD QUANT: CPT

## 2021-05-20 PROCEDURE — 97116 GAIT TRAINING THERAPY: CPT

## 2021-05-20 PROCEDURE — 97542 WHEELCHAIR MNGMENT TRAINING: CPT

## 2021-05-20 PROCEDURE — 99231 SBSQ HOSP IP/OBS SF/LOW 25: CPT | Performed by: INTERNAL MEDICINE

## 2021-05-20 PROCEDURE — 99231 SBSQ HOSP IP/OBS SF/LOW 25: CPT | Performed by: PHYSICAL MEDICINE & REHABILITATION

## 2021-05-20 PROCEDURE — 97535 SELF CARE MNGMENT TRAINING: CPT

## 2021-05-20 PROCEDURE — 92526 ORAL FUNCTION THERAPY: CPT

## 2021-05-20 PROCEDURE — 92507 TX SP LANG VOICE COMM INDIV: CPT

## 2021-05-20 PROCEDURE — 1180000000 HC REHAB R&B

## 2021-05-20 RX ADMIN — Medication 2 PUFF: at 22:16

## 2021-05-20 RX ADMIN — HEPARIN SODIUM 5000 UNITS: 5000 INJECTION INTRAVENOUS; SUBCUTANEOUS at 05:16

## 2021-05-20 RX ADMIN — ATORVASTATIN CALCIUM 80 MG: 80 TABLET, FILM COATED ORAL at 10:47

## 2021-05-20 RX ADMIN — FLUOXETINE HYDROCHLORIDE 20 MG: 20 CAPSULE ORAL at 10:48

## 2021-05-20 RX ADMIN — AMLODIPINE BESYLATE 5 MG: 5 TABLET ORAL at 10:46

## 2021-05-20 RX ADMIN — ACETAMINOPHEN 1000 MG: 500 TABLET, FILM COATED ORAL at 22:17

## 2021-05-20 RX ADMIN — Medication 2 PUFF: at 10:47

## 2021-05-20 RX ADMIN — HEPARIN SODIUM 5000 UNITS: 5000 INJECTION INTRAVENOUS; SUBCUTANEOUS at 22:15

## 2021-05-20 RX ADMIN — HEPARIN SODIUM 5000 UNITS: 5000 INJECTION INTRAVENOUS; SUBCUTANEOUS at 14:22

## 2021-05-20 RX ADMIN — AMITRIPTYLINE HYDROCHLORIDE 25 MG: 25 TABLET, FILM COATED ORAL at 22:16

## 2021-05-20 RX ADMIN — LEVOTHYROXINE SODIUM 25 MCG: 0.03 TABLET ORAL at 05:16

## 2021-05-20 RX ADMIN — ACETAMINOPHEN 1000 MG: 500 TABLET, FILM COATED ORAL at 05:16

## 2021-05-20 RX ADMIN — ASPIRIN 81 MG CHEWABLE TABLET 81 MG: 81 TABLET CHEWABLE at 10:47

## 2021-05-20 RX ADMIN — Medication 2 PUFF: at 08:51

## 2021-05-20 RX ADMIN — ACETAMINOPHEN 1000 MG: 500 TABLET, FILM COATED ORAL at 14:22

## 2021-05-20 RX ADMIN — POLYETHYLENE GLYCOL 3350 17 G: 17 POWDER, FOR SOLUTION ORAL at 10:50

## 2021-05-20 ASSESSMENT — PAIN SCALES - GENERAL
PAINLEVEL_OUTOF10: 1
PAINLEVEL_OUTOF10: 0
PAINLEVEL_OUTOF10: 2

## 2021-05-20 ASSESSMENT — PAIN DESCRIPTION - PAIN TYPE: TYPE: ACUTE PAIN

## 2021-05-20 ASSESSMENT — PAIN DESCRIPTION - ORIENTATION: ORIENTATION: LEFT

## 2021-05-20 ASSESSMENT — PAIN DESCRIPTION - LOCATION: LOCATION: SHOULDER

## 2021-05-20 NOTE — PROGRESS NOTES
[address] [facility]    ACUTE REHABILITATION OCCUPATIONAL THERAPY  DAILY NOTE    Date: 21  Patient Name: Kayden Fisher      Room: 4160/2533-78    MRN: [id number]   : 1955  (63 y.o.)  Gender: female      Diagnosis: ischemic CVA,  subacute right MCA distribution infarct, L lynette, dysphagia, L neglect, loop recorder 21 (Dr. Kayden Cantu)  Additional Pertinent Hx: 62-year-old female who was found down, last known well approximately 21 hours before. Patient was found to have a right MCA M1 occlusion. Thrombectomy was not successful, MRI showed patient had a right MCA stroke with hemorrhagic conversion. 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 :  (loop recorder)  Other position/activity restrictions: significant L neglect with LUE/LLE deficits  Required Braces or Orthoses?: No  Equipment Used: Bed, Wheelchair, Other (nilson steady )    Subjective  Comments: Pt cooperative, pt continues to demonstrate flat affect throughout treatment. Patient Currently in Pain: Denies  Restrictions/Precautions: Fall Risk  Overall Orientation Status: Within Functional Limits        Objective  Cognition  Overall Cognitive Status: Impaired  Arousal/Alertness: Appropriate responses to stimuli  Following Directions:  Follows one step commands  Attention Span: Attends with cues to redirect  Safety Judgement: Decreased awareness of need for assistance  Insights: Decreased awareness of deficits  Sequencing and Organization: Assistance required to generate solutions  Perception  Overall Perceptual Status: Impaired  Unilateral Attention: Cues to attend left visual field;Cues to maintain midline in sitting;Cues to maintain midline in standing;Cues to attend to left side of body  Initiation: Hand over hand to initiate tasks  Balance  Sitting Balance: Contact guard assistance (SBA-CGA static sitting EOB with RUE support. Trialed unsupported sitting; pt leaning L with just static sitting, major L side LOB when trialed dynamic reaching at EOB)  Standing Balance: Dependent/Total (min A in nilson stedy with BUE; L side lean)  Bed mobility  Supine to Sit: Minimal assistance (assist to bring LLE over edge of bed. Completed AM/PM )  Scooting: Minimal assistance  Comment: HOB elevated, use of bed rails for UE support. Transfers  Sit to stand: Dependent/Total (min A to stand from EOB/wheelchair)  Stand to sit: Dependent/Total (mod A from nilson [facility] for controlled sit. )  Transfer Comments: Pt impulsive to want to stand, verbal cues and assist for placement/support of LLE/UE  Standing Balance  Time: Am: 1-2 min, <1 min x 2 PM: <1 min   Activity: Am: functional transfers, static standing in nilson galdamez to address overall standing tolerance, LB care tasks. PM: transfer   Comment: Pt with flucuating L lean based on UE support, assist to maintain LUE support on bar. Pt becomes heavy max A in standing with unilateral support while participating in LB dressing task. Functional Mobility  Functional - Mobility Device: Wheelchair  Activity: To/from bathroom  Assist Level: Minimal assistance  Functional Mobility Comments: pt navigates herself from bathroom at wheelchair level  Toilet Transfers  Toilet - Technique:  (nilson galdamez )  Equipment Used: Raised toilet seat without rails  Toilet Transfer: Dependent/Total (nilson hadleyjonn, min-mod A)     Type of ROM/Therapeutic Exercise  Type of ROM/Therapeutic Exercise: Cane/Wand (2#, x10 reps. )  Comment: Theracane utilized to address BUE integrated movement for ROM; L hand supported on bar. Writer placed in front of mirror for increased visual feedback on LUE mvmt as well as trunk and head positioning. Writer provided moderate cues throughout.    Exercises  Scapular Protraction: x  Scapular Retraction: x  Shoulder Flexion: x (to tolerance; pt reports discomfort with increased ROM L sh)  Shoulder Extension: x  Elbow Flexion: x  Elbow Extension: x                       ADL  Equipment Provided: Long-handled sponge;Reacher  Feeding: Setup;Pureed diet  Grooming: Setup;Verbal cueing (assist to stabilize toothbrush to marietta toothpaste,)  UE Bathing: Minimal assistance (Assist to address RUE)  LE Bathing: Moderate assistance (Assist to bath buttocks/back of thighs, L lower leg/foot. )  UE Dressing: Minimal assistance; Increased time to complete;Verbal cueing (cueing for improved lynette technique, Assist to thread LUE)  LE Dressing: Maximum assistance (Assist to thread LLE, pull up over hips, marietta TEDs/shoes. )  Toileting: Dependent/Total (Assist with all tasks. )  Additional Comments: Grooming: moderate cues to locate toothpaste on L side of shelf, LBB: pt addresses LLE as able without cues this date, LBD: writer reinforced education on use of reacher to initiate/thread LEs, education to utilize RLE to lift LLE, increased cues and time to complete this technique. Pt participates in pulling up brief/pants over hips however requires assist for completion, pt with increased left lean during this task. Pt hesitant to participate however agreeable with encouragement. Assessment  Performance deficits / Impairments: Decreased functional mobility ; Decreased ADL status; Decreased strength;Decreased endurance;Decreased balance;Decreased high-level IADLs;Decreased vision/visual deficit; Decreased cognition;Decreased safe awareness;Decreased ROM; Decreased fine motor control;Decreased coordination;Decreased posture;Decreased sensation  Prognosis: Good  Discharge Recommendations: Patient would benefit from continued therapy after discharge  Activity Tolerance: Patient Tolerated treatment well  Safety Devices in place: Yes  Type of devices: All fall risk precautions in place;Gait belt;Call light within reach; Patient at risk for falls; Left in bed;Left in chair  Equipment Recommendations  Equipment Needed: (TBD)        Plan  Plan  Times per week: 5-7  Times per day: Twice a day  Current Treatment Recommendations: Strengthening, Positioning, ROM, Safety Education & Training, Balance Training, Patient/Caregiver Education & Training, Self-Care / ADL, Cognitive/Perceptual Training, Functional Mobility Training, Neuromuscular Re-education, Equipment Evaluation, Education, & procurement, Endurance Training, Cognitive Reorientation  Patient Goals   Patient goals : \"get as close to normal as possible. \"  specifies:\"get in and out of bed on my own, shower by myself. \"  Short term goals  Time Frame for Short term goals: 1 week  Short term goal 1: mod A UE bathe and dress  Short term goal 2: set up brush teeth  Short term goal 3: min feeding, with cues able to locate L side of tray and scan to locate items on L. Short term goal 4: tolerate 6-8 min sitting edge of bed (static sit)  with min assist and RUE support, head at midline and fair safety awareness  Short term goal 5: pt to initiate reposition LUE with RUE with good safety ie. hold by wrist not by 1 finger  Short term goal 6: demo improved awareness of L side and neuromuscular christian by weight bearing on LUE with physical assist to complete  Short term goal 7: from w/c:  consistently maintain midline trunk control and demo able to lean forward 4 inches away from back of chair without physical assist for adls. Short term goal 8: tolerate 2 min static stand for adls with RUE support with mod of 2 and assist with LLE as needed. Long term goals  Time Frame for Long term goals : by discharge  Long term goal 1: set up and occasional verbal cues ie.  L visual scan for self feeding  Long term goal 2: set up oral care  Long term goal 3: max x 1 toileting and adl transfers  Long term goal 4: min UE bathe and dress (shirt)  Long term goal 5: max x 1 LE bathe and dress, able to cross to reach RLE with min assist and keep trunk balance        05/20/21 0827 05/20/21 1503   OT Individual Minutes   Time In 0730 1415   Time Out [id number]   Minutes 55 30     Electronically signed by ERIN Aquino on 5/20/21 at 3:14 PM EDT

## 2021-05-20 NOTE — PROGRESS NOTES
Formerly [facility], [facility]1983 Internal Medicine    Progress Note  Patient assessed for rehabilitation services?: Yes  Family / Caregiver Present: Yes (daughters Jenna Maher in [place])  Referring Practitioner: Dr Paola Valera: Sling used in PM at Pt request.   5/20/2021    6:26 PM    Name:   Della Sims  MRN:     [id number]     Acct:      [de-identified]   Room:   [address] [place] [address] Day:  8  Admit Date:  5/10/2021  7:13 AM    PCP:   Rosie Box MD  Code Status:  Full Code    Subjective:     C/C: medical mgmt    Active Problems:    Tobacco abuse    Gastritis    Acute left hemiparesis ([address]    Acute CVA (cerebrovascular accident) ([facility]    COPD (chronic obstructive pulmonary disease) ([facility]    Essential hypertension    Hyperlipidemia    Pre-diabetes  Resolved Problems:    * No resolved hospital problems. *      Interval History Status: improved. 5/14/21  Patient seen and examined at bedside. No acute overnight events. Afebrile, hemodynamically stable. Continues to work with PT/OT. No acute changes in hemiparesis. No acute complaints.      Significant last 24 hr data reviewed ;   Vitals:    05/18/21 1844 05/19/21 0700 05/19/21 1912 05/20/21 0730   BP: 128/67 137/80 126/76 118/77   Pulse: 79 75 79 84   Resp: 18 18 18 16   Temp: 98.4 °F (36.9 °C) 98 °F (36.7 °C) 97.6 °F (36.4 °C) 97.9 °F (36.6 °C)   TempSrc: Oral  Oral Oral   SpO2: 94% 95% 95%    Weight:       Height:          Recent Results (from the past 24 hour(s))   POC Glucose Fingerstick    Collection Time: 05/19/21  8:30 PM   Result Value Ref Range    POC Glucose 113 (H) 65 - 105 mg/dL   POC Glucose Fingerstick    Collection Time: 05/20/21  7:08 AM   Result Value Ref Range    POC Glucose 116 (H) 65 - 105 mg/dL   POC Glucose Fingerstick    Collection Time: 05/20/21 12:30 PM   Result Value Ref Range    POC Glucose 118 (H) 65 - 105 mg/dL   POC Glucose Fingerstick    Collection Time: 05/20/21  4:09 PM   Result Value Ref Range    POC Glucose 121 (H) 65 - 105 mg/dL     Recent Labs     05/19/21  1624 05/19/21  2030 05/20/21  0708 05/20/21  1230 05/20/21  1609   POCGLU 112* 113* 116* 118* 121*        No results found. HPI:   See history in H and P      Review of Systems:     Constitutional:  negative for chills, fevers, sweats  Respiratory:  negative for cough, dyspnea on exertion, hemoptysis, shortness of breath, wheezing  Cardiovascular:  negative for chest pain, chest pressure/discomfort, lower extremity edema, palpitations  Gastrointestinal:  negative for abdominal pain, constipation, diarrhea, nausea, vomiting  Neurological: Positive for left-sided weakness. Negative for dizziness, headache  Data:     Past Medical History:  no change     Social History:  no change    Family History: @no change    Vitals:      I/O (24Hr): Intake/Output Summary (Last 24 hours) at 5/20/2021 1826  Last data filed at 5/20/2021 1609  Gross per 24 hour   Intake 1255 ml   Output --   Net 1255 ml       Labs:    URINE ANALYSIS: No results found for: LABURIN     CBC:  Lab Results   Component Value Date    WBC 10.9 05/18/2021    HGB 13.6 05/18/2021     05/18/2021        BMP:    Lab Results   Component Value Date     05/18/2021    K 4.0 05/18/2021     05/18/2021    CO2 29 05/18/2021    BUN 19 05/18/2021    CREATININE 0.63 05/18/2021    GLUCOSE 140 05/18/2021      LIVER PROFILE:  Lab Results   Component Value Date    ALT 13 10/16/2017    AST 13 10/16/2017    PROT 6.9 10/16/2017    BILITOT 0.22 10/16/2017    BILIDIR 0.11 10/16/2017    LABALBU 4.0 10/16/2017               Radiology:  Medications:      Allergies:      Current Meds:   Scheduled Meds:    FLUoxetine  20 mg Oral Daily    amitriptyline  25 mg Oral Nightly    heparin (porcine)  5,000 Units Subcutaneous 3 times per day    acetaminophen  1,000 mg Oral 3 times per day    amLODIPine  5 mg Oral Daily    aspirin  81 mg Oral Daily    atorvastatin  80 mg Oral Daily    budesonide-formoterol  2 puff Inhalation BID    insulin lispro  0-3 Units Subcutaneous Nightly    insulin lispro  0-6 Units Subcutaneous TID WC    levothyroxine  25 mcg Oral Daily    nicotine  1 patch Transdermal Daily    tiotropium  2 puff Inhalation Daily    polyethylene glycol  17 g Oral Daily     Continuous Infusions:    sodium chloride       PRN Meds: polyvinyl alcohol, sodium chloride flush, promethazine **OR** ondansetron, sodium chloride, albuterol, dextrose, glucagon (rDNA), glucose, senna, bisacodyl      Physical Examination:        /77   Pulse 84   Temp 97.9 °F (36.6 °C) (Oral)   Resp 16   Ht 5' 8\" (1.727 m)   Wt 191 lb 8 oz (86.9 kg)   SpO2 95%   BMI 29.12 kg/m²   Temp (24hrs), Av.8 °F (36.6 °C), Min:97.6 °F (36.4 °C), Max:97.9 °F (36.6 °C)    Recent Labs     21  2030 21  0708 21  1230 21  1609   POCGLU 113* 116* 118* 121*       Intake/Output Summary (Last 24 hours) at 2021 1826  Last data filed at 2021 1609  Gross per 24 hour   Intake 1255 ml   Output --   Net 1255 ml       General Appearance:  alert, well appearing, and in no acute distress  Mental status: oriented to person, place, and time with normal affect  Head:  normocephalic, atraumatic. Eye: no icterus, redness, pupils equal and reactive, extraocular eye movements intact, conjunctiva clear  Ear: normal external ear, no discharge, hearing intact  Nose:  no drainage noted  Mouth: mucous membranes moist  Neck: supple, no carotid bruits, thyroid not palpable  Lungs: Clear to auscultation bilaterally. No accessory muscle use. Cardiovascular: normal rate, regular rhythm, no murmur, gallop, rub. Abdomen: Soft, nontender, nondistended, normal bowel sounds, no hepatomegaly or splenomegaly  Neurologic: Left upper and lower extremity weakness, 0-1/5. No sensory loss.   Skin: No gross lesions, rashes, bruising or bleeding on exposed skin area  Extremities:  peripheral pulses palpable, no pedal edema or calf pain with palpation    Assessment:        Primary Problem  <principal problem not specified>    Active Hospital Problems    Diagnosis Date Noted    Acute CVA (cerebrovascular accident) ([facility] [I63.9] 05/10/2021    COPD (chronic obstructive pulmonary disease) ([facility] [J44.9] 05/10/2021    Essential hypertension [I10] 05/10/2021    Hyperlipidemia [E78.5] 05/10/2021    Pre-diabetes [R73.03] 05/10/2021    Acute left hemiparesis (HCC) [G81.94]     Gastritis [K29.70]     Tobacco abuse [Z72.0] 07/16/2017     Plan:        Continue current therapy regimen  BP well controlled. Continue norvasc 5 mg daily  Copd stable. continue bronchodilators  Synthroid 25 mcg daily   lipitor 80 mg daily  Asa 81 mg daily  Insulin sliding scale  nicoderm patch  Continue PT/OT  Encourage PO intake  Leukocytosis resolved. Likely reactionary. 5/20  Patient reports no new complaints. Engaging with physical therapy. Labs and vitals reviewed. No new issues. Continue with current care. MD LEONOR Cordova [address], [address] [place] [address].    Phone [phone]   Fax: [phone]  Answering Service: [phone]

## 2021-05-20 NOTE — PROGRESS NOTES
Physical Medicine & Rehabilitation  Progress Note    5/20/2021 6:12 PM     CC: Ambulatory and ADL dysfunction due to left nondominant hemiplegia due to ischemic CVA    Subjective:   No complaints. No pain. Bowels and bladder okay last BM 5/17. Patient denies any residual from fall. .  Denies any trauma or pain or discomfort. ROS:  Denies fevers, chills, sweats. No chest pain, palpitations, lightheadedness. Denies coughing, wheezing or shortness of breath. Denies abdominal pain, nausea, diarrhea or constipation. No new areas of joint pain. Denies new areas of numbness or weakness. Denies new anxiety or depression issues. No new skin problems. Rehabilitation:   PT:  Restrictions/Precautions: Fall Risk  Implants present? :  (loop recorder)  Other position/activity restrictions: significant L neglect with LUE/LLE deficits   Transfers  Sit to Stand: 2 Person Assistance, Minimal Assistance (walker lift & Shahram Sees c R UE setup)  Stand to sit: 2 Person Assistance, Minimal Assistance (VC's to reach for chair, control decent c walker lift; G ret)  Bed to Chair: Dependent/Total Shahram Sees)  Stand Pivot Transfers: Dependent/Total Shahram Sees)  Squat Pivot Transfers: Moderate Assistance, 2 Person Assistance (to pt's Right; G performance; assist at L LE)  Lateral Transfers: Minimal Assistance (slide board, A with positioning, set-up, L LE, safety cues)  Comment: multiple transfers with nilson steady bed <> WC, mat table, and cammode  Ambulation 1  Surface: level tile  Device:  (walker lift)  Other Apparatus: Left, Slider, AFO  Assistance: Maximum assistance, 2 Person assistance (+ 3rd assist to steer walker)  Quality of Gait: Demonstrated some hip flexion today during L LE swing, but largely dependent to advance L LE and total A to maintain L knee extension. Good core stabilization, fair ability to weight shift. Pt states she felt the walk was easier than previous day, felt AFO was helpful.    Gait Deviations: Decreased step length, Slow Juanita, Decreased step height  Distance: 48'  Comments: Working on wt shifting and weightbearing into L LE. Placing L foot each step, providing L knee stability and L hip extension during stance phase. Pt demonstrates Fair but fleeting control of hip rotation/extension. OT:  ADL  Equipment Provided: Long-handled sponge, Reacher  Feeding: Setup, Pureed diet  Grooming: Setup, Verbal cueing (assist to stabilize toothbrush to marietta toothpaste,)  UE Bathing: Minimal assistance (Assist to address RUE)  LE Bathing: Moderate assistance (Assist to bath buttocks/back of thighs, L lower leg/foot. )  UE Dressing: Minimal assistance, Increased time to complete, Verbal cueing (cueing for improved lynette technique, Assist to thread LUE)  LE Dressing: Maximum assistance (Assist to thread LLE, pull up over hips, marietta TEDs/shoes. )  Toileting: Dependent/Total (Assist with all tasks. )  Additional Comments: Grooming: moderate cues to locate toothpaste on L side of shelf, LBB: pt addresses LLE as able without cues this date, LBD: writer reinforced education on use of reacher to initiate/thread LEs, education to utilize RLE to lift LLE, increased cues and time to complete this technique. Pt participates in pulling up brief/pants over hips however requires assist for completion, pt with increased left lean during this task. Pt hesitant to participate however agreeable with encouragement. Balance  Sitting Balance: Contact guard assistance (SBA-CGA static sitting EOB with RUE support. Trialed unsuppo)  Standing Balance: Dependent/Total (min A in nilson stedy with BUE; L side lean)   Standing Balance  Time: Am: 1-2 min, <1 min x 2 PM: <1 min   Activity: Am: functional transfers, static standing in nilson stedy to address overall standing tolerance, LB care tasks. PM: transfer   Comment: Pt with flucuating L lean based on UE support, assist to maintain LUE support on bar.  Pt becomes heavy max A in standing with unilateral support while participating in LB dressing task. Functional Mobility  Functional - Mobility Device: Wheelchair  Activity: To/from bathroom  Assist Level: Minimal assistance  Functional Mobility Comments: pt navigates herself from bathroom at wheelchair level     Bed mobility  Bridging: Minimal assistance (positioning L LE)  Rolling to Left: Minimal assistance  Rolling to Right: Maximum assistance  Supine to Sit: Minimal assistance (assist to bring LLE over edge of bed. )  Sit to Supine: Moderate assistance  Scooting: Minimal assistance (positioning hips in chair, edge of mat/bed)  Comment: HOB elevated, use of bed rails for UE support. Transfers  Stand Step Transfers: Dependent/Total  Sit to stand: Dependent/Total (min A to stand from EOB/wheelchair)  Stand to sit: Dependent/Total (mod A from nilson stedy for controlled sit. )  Transfer Comments: Pt impulsive to want to stand, verbal cues and assist for placement/support of LLE/UE   Toilet Transfers  Toilet - Technique:  (nilson stedy )  Equipment Used: Raised toilet seat without rails  Toilet Transfer: Dependent/Total (nilson stedy, min-mod A)  Toilet Transfers Comments: Completed with use of nilson stedy with min-mod A to complete. Wheelchair Bed Transfers  Wheelchair/Bed - Technique:  Elodia Khalilk stedy)  Equipment Used: Bed, Wheelchair, Other (nilson steady )  Level of Asssistance: Dependent/Total (Min A x 2 with nilson stedy)      ST:            Objective:  /77   Pulse 84   Temp 97.9 °F (36.6 °C) (Oral)   Resp 16   Ht 5' 8\" (1.727 m)   Wt 191 lb 8 oz (86.9 kg)   SpO2 95%   BMI 29.12 kg/m²  I Body mass index is 29.12 kg/m².  I   Wt Readings from Last 1 Encounters:   05/10/21 191 lb 8 oz (86.9 kg)      Temp (24hrs), Av.8 °F (36.6 °C), Min:97.6 °F (36.4 °C), Max:97.9 °F (36.6 °C)         GEN: well developed, well nourished, no acute distress  HEENT: Normocephalic atraumatic, EOMI, mucous membranes pink and moist  CV: RRR, no murmurs, rubs or gallops  PULM: CTAB, no rales or rhonchi. Respirations WNL and unlabored  ABD: soft, NT, ND, +BS and equal  NEURO: A&O x3. Sensation intact to light touch. Alert and oriented x3 knew year, president location, follows command, - neuro exam no change  MSK: 4+/5 right upper lower extremity, dense left hemiparesis distal left upper lower extremity 0/5 proximal left upper extremity 1/5, left hip flexion 1-2/5, no tone-no change  EXTREMITIES: No calf tenderness to palpation bilaterally. No edema BLEs, range of motion and palpation of joints and spine with no increased pain, no swelling or tenderness  SKIN: warm dry and intact with good turgor  PSYCH: appropriately interactive. Affect WNL. Medications   Scheduled Meds:   FLUoxetine  20 mg Oral Daily    amitriptyline  25 mg Oral Nightly    heparin (porcine)  5,000 Units Subcutaneous 3 times per day    acetaminophen  1,000 mg Oral 3 times per day    amLODIPine  5 mg Oral Daily    aspirin  81 mg Oral Daily    atorvastatin  80 mg Oral Daily    budesonide-formoterol  2 puff Inhalation BID    insulin lispro  0-3 Units Subcutaneous Nightly    insulin lispro  0-6 Units Subcutaneous TID WC    levothyroxine  25 mcg Oral Daily    nicotine  1 patch Transdermal Daily    tiotropium  2 puff Inhalation Daily    polyethylene glycol  17 g Oral Daily     Continuous Infusions:   sodium chloride       PRN Meds:.polyvinyl alcohol, sodium chloride flush, promethazine **OR** ondansetron, sodium chloride, albuterol, dextrose, glucagon (rDNA), glucose, senna, bisacodyl     Diagnostics:     CBC:   Recent Labs     05/18/21  0635   WBC 10.9   RBC 4.46   HGB 13.6   HCT 41.1   MCV 92.1   RDW 14.9        BMP:   Recent Labs     05/18/21  0635      K 4.0      CO2 29   BUN 19   CREATININE 0.63     BNP: No results for input(s): BNP in the last 72 hours. PT/INR: No results for input(s): PROTIME, INR in the last 72 hours.   APTT: No results for 5/15, [facility] okay  11. Esophageal Ring: Will monitor - on dysphagia diet  12. Hypothyroidism: on levothyroxine  13. Leukocytosis-improving  14. Nicotine dependence: on Nicoderm  15. Bowel Management: Miralax daily, senokot prn, dulcolax prn. 16. DVT Prophylaxis:  heparin, SCD's while in bed and MAXIM's   17. Internal medicine for medical management      Son Pang. Alia Hill MD       This note is created with the assistance of a speech recognition program.  While intending to generate a document that actually reflects the content of the visit, the document can still have some errors including those of syntax and sound a like substitutions which may escape proof reading.   In such instances, actual meaning can be extrapolated by contextual diversion

## 2021-05-20 NOTE — PROGRESS NOTES
Speech Language Pathology  Speech Language Pathology  [facility]    Dysphagia Treatment Note    Date: 5/20/2021  Patients Name: Brennan Pino  MRN: [id number]  Diagnosis: dysphagia  Patient Active Problem List   Diagnosis Code    Pneumonia of left lower lobe due to infectious organism J18.9    Tobacco abuse Z72.0    Legionella pneumonia ([facility] [address] A48.1    Dysphagia R13.10    Hyperplastic colon polyp K63.5    Esophageal ring K22.2    Gastritis K29.70    Elbow effusion, right M25.421    Stroke due to occlusion of right middle cerebral artery ([facility]) I63.511    Nihss score 15 R29.715    Acute left hemiparesis ([facility]) G81.94    Cerebrovascular accident (CVA) due to occlusion of right middle cerebral artery ([facility] [address] I63.511    Acute CVA (cerebrovascular accident) ([facility] [address] I63.9    COPD (chronic obstructive pulmonary disease) ([facility]) J44.9    Essential hypertension I10    Hyperlipidemia E78.5    Pre-diabetes R73.03       Pain: Pt. Denies    Dysphagia Treatment  Treatment time:  0218-8442    Subjective: [x] Alert [x] Cooperative     [] Confused     [] Agitated    [] Lethargic    Objective/Assessment:    Pt. Seen for diet tolerance monitoring during lunchtime. Pt. Demonstrating functional oral mastication and clearing of minced and moist diet. No overt s/s aspiration of thin liquids via straw noted. Pt. Daughters x2 present and were educ. Re: pt. Progress and advanced diet consistency as they were asking about bringing food in for pt. They verbalized understanding of information given. Plan:  [x] Continue ST services    [] Discharge from ST:        Discharge recommendations: [] Inpatient Rehab   [] [place]   [] Outpatient Therapy  [] Follow up at trauma clinic   [] Other:         Treatment completed by: Pepe Bardales A.CCC/SLP

## 2021-05-20 NOTE — PROGRESS NOTES
Speech Language Pathology  Speech Language Pathology  [facility] at [facility]    Dysphagia/SpeechLanguage Treatment Note    Date: 5/20/2021  Patients Name: Kayden Fisher  MRN: [id number]  Diagnosis:   Patient Active Problem List   Diagnosis Code    Pneumonia of left lower lobe due to infectious organism J18.9    Tobacco abuse Z72.0    Legionella pneumonia ([address] A48.1    Dysphagia R13.10    Hyperplastic colon polyp K63.5    Esophageal ring K22.2    Gastritis K29.70    Elbow effusion, right M25.421    Stroke due to occlusion of right middle cerebral artery ([facility]) I63.511    Nihss score 15 R29.715    Acute left hemiparesis ([facility]) G81.94    Cerebrovascular accident (CVA) due to occlusion of right middle cerebral artery ([facility] [address] I63.511    Acute CVA (cerebrovascular accident) ([facility] [address] I63.9    COPD (chronic obstructive pulmonary disease) ([facility]) J44.9    Essential hypertension I10    Hyperlipidemia E78.5    Pre-diabetes R73.03       Pain: 0/10    Speech/Cognitive/Dysphagia Treatment    Treatment time:  8969-9226    Subjective: [x] Alert [x] Cooperative     [] Confused     [] Agitated    [] Lethargic    Objective/Assessment:  Attention: Mod  Verbal cues needed for increased attention to midline, pt. c extreme L sided neglect and mod A to redirect to task overall. Pt. Completed visual scanning exercise x2 to locate items on the L side c min A. Pt. Answered ?s re: grocery ad she was looking at c 71%. Recall: n/a      Organization: n/a    Problem Solving/Reasoning:  n/a    Other:  Pt. Completed oral motor exercises x3, 10 reps each with min cues. Pt. Demonstrates increased strength and ROM  On L side. ST utilized vital stim for L sided oral sling (one channel only), 6.0 mA for 21 minutes. Pt. Reported she had  Minced chicken for dinner last night and tolerated well. ST to monitor diet tolerance when pt. Eating lunch today.     Pt. Daughter present and reported understanding of information given re: diet consistency and pt. Progress. Plan:  [x] Continue ST services    [] Discharge from ST:      Discharge recommendations: [] Inpatient Rehab   [] [place]   [] Outpatient Therapy  [] Follow up at trauma clinic   [] Other:       Treatment completed by: David Peguero A.CCC/SLP

## 2021-05-20 NOTE — PROGRESS NOTES
Physical Therapy  [address] [facility]   Acute Rehabilitation Physical Therapy Progress Note    Date: 21  Patient Name: Della Sims       Room: 1575/3497-78  MRN: [id number]   Account: [de-identified]   : 1955  (63 y.o.) Gender: female      Diagnosis: ischemic CVA,  subacute right MCA distribution infarct, L lynette, dysphagia, L neglect, loop recorder 21 (Dr. Ana Gross)  Past Medical History:  has a past medical history of Asthma, COPD (chronic obstructive pulmonary disease) ([facility] [address] Diabetes mellitus ([facility] [address] Esophageal ring, Gastritis, Hyperlipidemia, Hyperplastic colon polyp, Hypertension, Osteoarthritis, Patient in clinical research study, and TIA (transient ischemic attack). Past Surgical History:   has a past surgical history that includes Appendectomy; Hysterectomy; Esophagus dilation; Colonoscopy (2017); Endoscopy, colon, diagnostic; pr egd transoral biopsy single/multiple (N/A, 2017); pr colsc flx w/rmvl of tumor polyp lesion snare tq (N/A, 2017); and Upper gastrointestinal endoscopy (2017). Restrictions/Precautions  Restrictions/Precautions: Fall Risk  Required Braces or Orthoses?: No  Implants present? :  (loop recorder)  Position Activity Restriction  Other position/activity restrictions: significant L neglect with LUE/LLE deficits    Subjective: Daughter reports Pt fatigued in AM, that they brought new shoes to accomodate AFO. Pt reports L shoulder feels \"heavy,\" painful in PM.  Comments: Sling used in PM at Pt request.     Vital Signs  Patient Currently in Pain: Yes  Pain Assessment: Faces  Pain Type: Acute pain  Pain Location: Shoulder  Pain Orientation: Left  Non-Pharmaceutical Pain Intervention(s): Repositioned; Rest  Response to Pain Intervention: Patient Satisfied;None    Patient Observation  Observations: Pt is motivated.      Bed Mobility   Bridging: Minimal assistance (positioning L LE)  Sit to Supine: Maximal assistance (bilateral LEs c Pt scooping L c R + trunk assist. Flat bed)  Scooting: Minimal assistance (positioning hips in chair, edge of mat/bed)    Transfers  Sit to Stand: 2 Person Assistance;Minimal Assistance (walker lift & Stephane Aragon c R UE setup)  Stand to sit: 2 Person Assistance;Minimal Assistance (VC's to reach for chair, control decent c walker lift; G ret)  Bed to Chair: Dependent/Total Stephane Aragon)  Lateral Transfers: Minimal Assistance (slide board, A with positioning, set-up, L LE, safety cues)    Ambulation 1  Surface: level tile  Device:  (walker lift)  Other Apparatus: Left;Slider;AFO  Assistance: Maximum assistance;2 Person assistance (+ 3rd assist to steer walker)  Quality of Gait: Demonstrated some hip flexion today during L LE swing, but largely dependent to advance L LE and total A to maintain L knee extension. Good core stabilization, fair ability to weight shift. Gait Deviations: Decreased step length; Slow Juanita;Decreased step height  Distance: 48'  Comments: Working on wt shifting and weightbearing into L LE. Placing L foot each step, providing L knee stability and L hip extension during stance phase. Pt demonstrates Fair but fleeting control of hip rotation/extension. Pt states she felt the walk was easier than previous day, felt AFO was helpful. Stairs/Curb  Stairs?: No    Wheelchair Activities  Wheelchair Parts Management: No (Brakes on back of wheelchair; Pt cannot reach)  Propulsion: Yes  Propulsion 1  Propulsion: Manual  Level: Level Tile  Method: RUE;RLE  Level of Assistance: Contact guard assistance  Description/ Details: verbal cues to avoid objects and running into the wall on her L with poor return, Pt offering excuse for neglect. Pt able to complete 90º turns without technique cue. Uncoordinated use of UE/LE; Pt uses one or other, requires cue to use both. Improved straight path today in uncluttered surroundings. Short distance backing up.    Distance: 290'    Balance   Posture: Poor (Pt largely unaware of posture/requires cues to attend)  Sitting - Static: Poor;+ (Edge of mat, no back support, R UE support. Min A)  Sitting - Dynamic: Poor (Edge of mat, no back support, R UE support. Min A)  Standing - Static: Poor (walker lift)  Standing - Dynamic: Poor (3 assist with walker lift)     Exercises   Other exercises?: Yes  Other exercises 1: Seated bilateral LE exercises, x15 each, R active ROM, L passive ROM  Other exercises 6: Toilet transfer x2    Activity Tolerance: Patient limited by endurance     PT Equipment Recommendations  Other: CTA; pending further progress with mobility    Current Treatment Recommendations: Strengthening, Balance Training, Functional Mobility Training, Transfer Training, Endurance Training, Wheelchair Mobility Training, Gait Training, Stair training, Neuromuscular Re-education, Home Exercise Program, Safety Education & Training, Patient/Caregiver Education & Training, Modalities, Positioning    Conditions Requiring Skilled Therapeutic Intervention  Body structures, Functions, Activity limitations: Decreased functional mobility ; Decreased strength;Decreased safe awareness;Decreased endurance;Decreased balance;Decreased posture;Decreased coordination;Decreased vision/visual deficit; Decreased fine motor control  Assessment: Trace hip flexion noted today during ambulation resulting in fair initiation of swing phase; continues to require total assist for L knee extension. REQUIRES PT FOLLOW UP: Yes  Discharge Recommendations: Patient would benefit from continued therapy after discharge;Home with assist PRN    Goals  Short term goals  Time Frame for Short term goals: 10 days  Short term goal 1: Pt able to roll in bed side to side at min A without bed rail  Short term goal 2: Pt able to perform supine<>sit at mod A  Short term goal 3: Pt able to perform sit<>stand at min/mod A .   Short term goal 4: Pt able to progress to pivot transfers at max A   Short term goal 5: Pt able to improve sitting

## 2021-05-20 NOTE — PLAN OF CARE
Problem: Neurological  Goal: Maximum potential motor/sensory/cognitive function  Outcome: Ongoing     Problem: Nutrition  Goal: Optimal nutrition therapy  Outcome: Ongoing     Problem: Skin Integrity:  Goal: Will show no infection signs and symptoms  Outcome: Ongoing     Problem: Skin Integrity:  Goal: Absence of new skin breakdown  Outcome: Ongoing     Problem: Falls - Risk of:  Goal: Will remain free from falls  Outcome: Ongoing   No falls or injuries sustained at this time. No attempts to get out of bed without nursing assistance. Call light within reach and pt. uses appropriately for assistance. Siderails up x 2. Nonskid footwear remains on. Bed in low and locked position. Hourly nursing rounds made. Pt. Alert and oriented, aware of limitations, and exhibits good safety judgement. Pt. uses assistive devices appropriately. Pt. understands individual fall risk factors.    Problem: Falls - Risk of:  Goal: Absence of physical injury  Outcome: Ongoing

## 2021-05-21 LAB
GLUCOSE BLD-MCNC: 119 MG/DL (ref 65–105)
GLUCOSE BLD-MCNC: 119 MG/DL (ref 65–105)
GLUCOSE BLD-MCNC: 120 MG/DL (ref 65–105)
GLUCOSE BLD-MCNC: 124 MG/DL (ref 65–105)

## 2021-05-21 PROCEDURE — 97110 THERAPEUTIC EXERCISES: CPT

## 2021-05-21 PROCEDURE — 97530 THERAPEUTIC ACTIVITIES: CPT

## 2021-05-21 PROCEDURE — 1180000000 HC REHAB R&B

## 2021-05-21 PROCEDURE — 97116 GAIT TRAINING THERAPY: CPT

## 2021-05-21 PROCEDURE — 6360000002 HC RX W HCPCS: Performed by: PHYSICAL MEDICINE & REHABILITATION

## 2021-05-21 PROCEDURE — 99231 SBSQ HOSP IP/OBS SF/LOW 25: CPT | Performed by: PHYSICAL MEDICINE & REHABILITATION

## 2021-05-21 PROCEDURE — 97542 WHEELCHAIR MNGMENT TRAINING: CPT

## 2021-05-21 PROCEDURE — 97112 NEUROMUSCULAR REEDUCATION: CPT

## 2021-05-21 PROCEDURE — 92507 TX SP LANG VOICE COMM INDIV: CPT

## 2021-05-21 PROCEDURE — 6370000000 HC RX 637 (ALT 250 FOR IP): Performed by: PHYSICAL MEDICINE & REHABILITATION

## 2021-05-21 PROCEDURE — 82947 ASSAY GLUCOSE BLOOD QUANT: CPT

## 2021-05-21 PROCEDURE — 99231 SBSQ HOSP IP/OBS SF/LOW 25: CPT | Performed by: INTERNAL MEDICINE

## 2021-05-21 PROCEDURE — 97535 SELF CARE MNGMENT TRAINING: CPT

## 2021-05-21 PROCEDURE — 6370000000 HC RX 637 (ALT 250 FOR IP): Performed by: STUDENT IN AN ORGANIZED HEALTH CARE EDUCATION/TRAINING PROGRAM

## 2021-05-21 PROCEDURE — 92526 ORAL FUNCTION THERAPY: CPT

## 2021-05-21 RX ADMIN — Medication 2 PUFF: at 22:13

## 2021-05-21 RX ADMIN — ACETAMINOPHEN 1000 MG: 500 TABLET, FILM COATED ORAL at 16:01

## 2021-05-21 RX ADMIN — AMLODIPINE BESYLATE 5 MG: 5 TABLET ORAL at 08:31

## 2021-05-21 RX ADMIN — HEPARIN SODIUM 5000 UNITS: 5000 INJECTION INTRAVENOUS; SUBCUTANEOUS at 16:01

## 2021-05-21 RX ADMIN — Medication 2 PUFF: at 08:32

## 2021-05-21 RX ADMIN — AMITRIPTYLINE HYDROCHLORIDE 25 MG: 25 TABLET, FILM COATED ORAL at 22:13

## 2021-05-21 RX ADMIN — ASPIRIN 81 MG CHEWABLE TABLET 81 MG: 81 TABLET CHEWABLE at 08:31

## 2021-05-21 RX ADMIN — ATORVASTATIN CALCIUM 80 MG: 80 TABLET, FILM COATED ORAL at 08:31

## 2021-05-21 RX ADMIN — Medication 2 PUFF: at 08:33

## 2021-05-21 RX ADMIN — POLYETHYLENE GLYCOL 3350 17 G: 17 POWDER, FOR SOLUTION ORAL at 08:32

## 2021-05-21 RX ADMIN — HEPARIN SODIUM 5000 UNITS: 5000 INJECTION INTRAVENOUS; SUBCUTANEOUS at 22:13

## 2021-05-21 RX ADMIN — ACETAMINOPHEN 1000 MG: 500 TABLET, FILM COATED ORAL at 06:08

## 2021-05-21 RX ADMIN — HEPARIN SODIUM 5000 UNITS: 5000 INJECTION INTRAVENOUS; SUBCUTANEOUS at 06:09

## 2021-05-21 RX ADMIN — LEVOTHYROXINE SODIUM 25 MCG: 0.03 TABLET ORAL at 06:08

## 2021-05-21 RX ADMIN — FLUOXETINE HYDROCHLORIDE 20 MG: 20 CAPSULE ORAL at 08:31

## 2021-05-21 RX ADMIN — ACETAMINOPHEN 1000 MG: 500 TABLET, FILM COATED ORAL at 22:13

## 2021-05-21 ASSESSMENT — PAIN DESCRIPTION - ORIENTATION: ORIENTATION: LEFT

## 2021-05-21 ASSESSMENT — PAIN SCALES - GENERAL
PAINLEVEL_OUTOF10: 2
PAINLEVEL_OUTOF10: 2
PAINLEVEL_OUTOF10: 0

## 2021-05-21 ASSESSMENT — PAIN DESCRIPTION - DESCRIPTORS: DESCRIPTORS: HEAVINESS

## 2021-05-21 ASSESSMENT — PAIN SCALES - WONG BAKER: WONGBAKER_NUMERICALRESPONSE: 4

## 2021-05-21 ASSESSMENT — PAIN DESCRIPTION - LOCATION: LOCATION: SHOULDER

## 2021-05-21 ASSESSMENT — PAIN DESCRIPTION - PAIN TYPE: TYPE: ACUTE PAIN

## 2021-05-21 NOTE — PROGRESS NOTES
Pt assisted to bathroom using SaraSteady. Pt left alone for privacy and was found on floor. Pt was alert and stated she fell from the seated position, onto her left arm, and did not believe she hit her head. Assisted patient back on to the toilet and then back to bed. Assessed patient and obtained vitals. Called to notify house supervisor and Dr. Jasvir Carroll.  Neurochecks to be done every 4 hours

## 2021-05-21 NOTE — PROGRESS NOTES
[address]   ACUTE REHABILITATION OCCUPATIONAL THERAPY  DAILY NOTE    Date: 21  Patient Name: Omar Deluna      Room: 3434/0313-60    MRN: [id number]   : 1955  (63 y.o.)  Gender: female      Diagnosis: ischemic CVA,  subacute right MCA distribution infarct, L lynette, dysphagia, L neglect, loop recorder 21 (Dr. Leni Funk)  Additional Pertinent Hx: 37-year-old female who was found down, last known well approximately 21 hours before. Patient was found to have a right MCA M1 occlusion. Thrombectomy was not successful, MRI showed patient had a right MCA stroke with hemorrhagic conversion. 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 :  (loop recorder)  Other position/activity restrictions: significant L neglect with LUE/LLE deficits  Required Braces or Orthoses?: No  Equipment Used: Bed, Wheelchair, Other (nilson steady )    Subjective  Comments: Pt cooperative, pt continues to demonstrate flat affect throughout treatment. Patient Currently in Pain: Denies  Restrictions/Precautions: Fall Risk  Overall Orientation Status: Within Functional Limits        Objective  Cognition  Overall Cognitive Status: Impaired  Arousal/Alertness: Appropriate responses to stimuli  Following Directions:  Follows one step commands  Attention Span: Attends with cues to redirect  Safety Judgement: Decreased awareness of need for assistance  Insights: Decreased awareness of deficits  Sequencing and Organization: Assistance required to generate solutions  Perception  Overall Perceptual Status: Impaired  Unilateral Attention: Cues to attend left visual field;Cues to maintain midline in sitting;Cues to maintain midline in standing;Cues to attend to left side of body  Initiation: Hand over hand to initiate tasks  Balance  Sitting Balance: Stand by assistance (Pt with unilateral support on bed rail. )  Standing Balance: Dependent/Total (CGA/min A in nilson ste with static standing, max A with no UE support during task participation)  Bed mobility  Supine to Sit: Moderate assistance (pt engaged LLE to assist RLE to edge of bed, assist for completion and trunk support)  Transfers  Sit to stand: Dependent/Total (min A in nilson stedy )  Stand to sit: Dependent/Total (mod A in [facility] for controlled sit. )  Transfer Comments: Pt impulsive to want to stand, verbal cues and assist for placement/support of LLE/UE  Standing Balance  Time: AM: <1 min x 3  Activity: Am: transfers, LB dressing  Comment: Pt with flucuating L lean based on UE support, assist to maintain LUE support on bar. Pt becomes heavy max A in standing with unilateral support while participating in LB dressing task. Type of ROM/Therapeutic Exercise  Type of ROM/Therapeutic Exercise: Cane/Wand (2#, x15 reps. )  Comment: Theracane utilized to address BUE integrated movement for ROM; L hand supported on bar. Writer placed in front of mirror for increased visual feedback on LUE mvmt as well as trunk and head positioning. Writer provided moderate cues throughout. Exercises  Scapular Protraction: x  Scapular Retraction: x  Shoulder Flexion: x (to tolerance)  Shoulder Extension: x  Elbow Flexion: x  Elbow Extension: x           Neuromuscular Education  Neuromuscular education: Yes  Vibration: Vibration applied to LUE to stimulate sensory receptors in muscles for increased return, PROM facilitatied with desired ROM. (applied while on skateboard. )  Functional Movement Patterns: on arm skateboard; passive ROM           ADL  Equipment Provided: Reacher  Feeding: Setup;Pureed diet  Grooming: Setup;Verbal cueing (A to stabilize toothpaste, squeeze toothpaste d/t low supply)  UE Bathing: None (pt declines)  LE Bathing: None (pt declines)  UE Dressing: Minimal assistance; Increased time to complete;Verbal cueing (orientation assist, assist to properly thread LUE)  LE Dressing: Maximum assistance (min A to thread LLE, assist to pull up over hips, marietta B MAXIM)  Toileting: None (pt declines)  Positioning  Wheelchair Position Type: 1/2 lap tray  Wheelchair Postion Comment: good LUE positioning in w/c with L arm tray in place       Assessment  Performance deficits / Impairments: Decreased functional mobility ; Decreased ADL status; Decreased strength;Decreased endurance;Decreased balance;Decreased high-level IADLs;Decreased vision/visual deficit; Decreased cognition;Decreased safe awareness;Decreased ROM; Decreased fine motor control;Decreased coordination;Decreased posture;Decreased sensation  Prognosis: Good  Discharge Recommendations: Patient would benefit from continued therapy after discharge     Safety Devices in place: Yes  Type of devices: All fall risk precautions in place;Gait belt;Call light within reach; Patient at risk for falls; Left in bed;Left in chair  Equipment Recommendations  Equipment Needed:  (TBD)        Plan  Plan  Times per week: 5-7  Times per day: Twice a day  Current Treatment Recommendations: Strengthening, Positioning, ROM, Safety Education & Training, Balance Training, Patient/Caregiver Education & Training, Self-Care / ADL, Cognitive/Perceptual Training, Functional Mobility Training, Neuromuscular Re-education, Equipment Evaluation, Education, & procurement, Endurance Training, Cognitive Reorientation  Patient Goals   Patient goals : \"get as close to normal as possible. \"  specifies:\"get in and out of bed on my own, shower by myself. \"  Short term goals  Time Frame for Short term goals: 1 week  Short term goal 1: mod A UE bathe and dress  Short term goal 2: set up brush teeth (goal met)  Short term goal 3: min feeding, with cues able to locate L side of tray and scan to locate items on L.   Short term goal 4: tolerate 6-8 min sitting edge of bed (static sit)  with min assist and RUE support, head at midline and fair safety awareness  Short term goal 5: pt to initiate reposition LUE with RUE with good safety ie. hold by wrist not by 1 finger  Short term goal 6: demo improved awareness of L side and neuromuscular christian by weight bearing on LUE with physical assist to complete  Short term goal 7: from w/c:  consistently maintain midline trunk control and demo able to lean forward 4 inches away from back of chair without physical assist for adls. Short term goal 8: tolerate 2 min static stand for adls with RUE support with mod of 2 and assist with LLE as needed. Long term goals  Time Frame for Long term goals : by discharge  Long term goal 1: set up and occasional verbal cues ie.  L visual scan for self feeding  Long term goal 2: set up oral care (goal met)  Long term goal 3: max x 1 toileting and adl transfers  Long term goal 4: min UE bathe and dress (shirt)  Long term goal 5: max x 1 LE bathe and dress, able to cross to reach RLE with min assist and keep trunk balance        05/21/21 0822 05/21/21 1556   OT Individual Minutes   Time In 0740 1408   Time Out 0820 1431   Minutes 40 23     Electronically signed by ERIN Linda on 5/21/21 at 4:11 PM EDT

## 2021-05-21 NOTE — PROGRESS NOTES
Physical Therapy  [facility] [address]  Acute Rehabilitation Physical Therapy Progress Note    Date: 21  Patient Name: Ilana Berger       Room: 2747/0199-10  MRN: [id number]   Account: [de-identified]   : 1955  (63 y.o.) Gender: female      Diagnosis: ischemic CVA,  subacute right MCA distribution infarct, L lynette, dysphagia, L neglect, loop recorder 21 (Dr. Maddy Pollack)  Past Medical History:  has a past medical history of Asthma, COPD (chronic obstructive pulmonary disease) ([facility] [address] Diabetes mellitus ([facility] [address] Esophageal ring, Gastritis, Hyperlipidemia, Hyperplastic colon polyp, Hypertension, Osteoarthritis, Patient in clinical research study, and TIA (transient ischemic attack). Past Surgical History:   has a past surgical history that includes Appendectomy; Hysterectomy; Esophagus dilation; Colonoscopy (2017); Endoscopy, colon, diagnostic; pr egd transoral biopsy single/multiple (N/A, 2017); pr colsc flx w/rmvl of tumor polyp lesion snare tq (N/A, 2017); and Upper gastrointestinal endoscopy (2017). Restrictions/Precautions  Restrictions/Precautions: Fall Risk  Required Braces or Orthoses?: No  Implants present? :  (loop recorder)  Position Activity Restriction  Other position/activity restrictions: significant L neglect with LUE/LLE deficits    Subjective: Pt reports L shoulder feeling heavy in PM, sling applied with Pt indicating relief. States ambulation is \"kicking my butt,\" but voicing increasing understanding of link between cues and movement.    Comments: No skin breakdown, redness or swelling noted around possible friction points with AFO, continues with large contusion at anterior L lower leg (family states from use of Mamie Ebbs without cushioning)    Vital Signs  Patient Currently in Pain: Yes  Pain Assessment: Faces  Lara-Baker Pain Rating: Hurts little more  Pain Type: Acute pain  Pain Location: Shoulder  Pain Orientation: Left  Pain Descriptors: cannot reach)  Propulsion: Yes  Propulsion 1  Propulsion: Manual  Level: Level Tile  Method: RUE;RLE  Level of Assistance: Contact guard assistance  Description/ Details: Repeated verbal cues to avoid objects, wall, rail on her L (fair return); fails to scan environment without cues. Pt able to complete 90º turns without technique cue. Uncoordinated/sporadic use of UE + LE. Short distance backing up. Distance: 290'    Neuromuscular Education  Vibration: Vibration applied to L dorsiflexors with good initial return x5, fatiguing quickly with fleeting/trace reaction thereafter; applied x10 to L quads with no return     Balance   Posture: Poor (Pt largely unaware of posture/requires cues to attend)  Sitting - Static: Fair;- (Edge of bed, no back support, R UE support. Mattress inflate)  Sitting - Dynamic: Poor;+ (Edge of mat, no back support, R UE support. Min A)  Standing - Static: Poor (walker lift)  Standing - Dynamic: Poor (3 assist with walker lift)     Exercises   Other exercises?: Yes  Other exercises 2: Bed mobility x3  Other exercises 5: Standing with rolling walker c L hand splint, 90 sec. with mirror feedback for self-postural adjustment & to address L neglect. Standing tolerance in walker lift with daughter assisting to cue Pt for postural adjustment, >2 min.    Other exercises 6: Toilet transfer x1  Other exercises 9: Squat pivot transfers x2 (Impulsive, requires cues to attend positioning, sequencing)  Other exercises 11: Supine bilateral LE exercises, 15-20x each, 2# R LE, active/assisted/passive ROM L LE    Activity Tolerance: Patient limited by endurance     PT Equipment Recommendations  Other: CTA; pending further progress with mobility    Current Treatment Recommendations: Strengthening, Balance Training, Functional Mobility Training, Transfer Training, Endurance Training, Wheelchair Mobility Training, Gait Training, Stair training, Neuromuscular Re-education, Home Exercise Program, Safety Education & Training, Patient/Caregiver Education & Training, Modalities, Positioning    Conditions Requiring Skilled Therapeutic Intervention  Body structures, Functions, Activity limitations: Decreased functional mobility ; Decreased strength;Decreased safe awareness;Decreased endurance;Decreased balance;Decreased posture;Decreased coordination;Decreased vision/visual deficit; Decreased fine motor control  Assessment: Increased hip flexion noted today during ambulation resulting in good initiation of swing (about 2/3 of R LE stride) particularly if in a good walking rhythm; continues to require total assist for L knee extension. REQUIRES PT FOLLOW UP: Yes  Discharge Recommendations: Patient would benefit from continued therapy after discharge;Home with assist PRN    Goals  Short term goals  Time Frame for Short term goals: 10 days  Short term goal 1: Pt able to roll in bed side to side at min A without bed rail  Short term goal 2: Pt able to perform supine<>sit at mod A  Short term goal 3: Pt able to perform sit<>stand at min/mod A . Short term goal 4: Pt able to progress to pivot transfers at max A   Short term goal 5: Pt able to improve sitting balance at EOB/EOM at SBA for static balance and able to track to left side as well as turn to head to left side to scan her environment with minimal cues. Short term goal 6: Pt able to tolerate standing in // bars with R UE support and assist at mod A  Short term goal 7: Pt able to propel w/c with R UE/R LE distance of 50 to 80 ft, straight path and turn around 180 degrees, at min/mod A   Long term goals  Time Frame for Long term goals : By DC  Long term goal 1: Pt able to roll in bed mod-I  Long term goal 2: Pt able to perform supine <> sit at CGA/min A  Long term goal 3: Pt able to perform pivot transfers min A / mod A and able to scan Left side environment.   Long term goal 4: Pt able to ambulate in // bars, or with appropriate assistive device and/or LE bracing, distance of

## 2021-05-21 NOTE — PLAN OF CARE
Problem: Neurological  Goal: Maximum potential motor/sensory/cognitive function  5/21/2021 0456 by Marianela Charlton RN  Outcome: Ongoing    Problem: Neurological  Goal: Maximum potential motor/sensory/cognitive function  Outcome: Ongoing     Problem: Nutrition  Goal: Optimal nutrition therapy  5/21/2021 0456 by Marianela Charlton RN  Outcome: Ongoing    Problem: Pain:  Goal: Pain level will decrease  Description: Pain level will decrease  Outcome: Ongoing  Patient reports relief with tylenol, denies the need for anything else at this time. Patient states the only pain she has at this time is in her left arm and shoulder which is better during the day due to the use of the sling. Problem: Pain:  Goal: Control of acute pain  Description: Control of acute pain  Outcome: Ongoing    Problem: Skin Integrity:  Goal: Will show no infection signs and symptoms  Description: Will show no infection signs and symptoms  5/21/2021 0456 by Marianela Charlton RN  Outcome: Ongoing    Problem: Skin Integrity:  Goal: Absence of new skin breakdown  Description: Absence of new skin breakdown  5/21/2021 0456 by Marianela Charlton RN  Outcome: Ongoing  Skin assessment completed this shift. Nutrition and Hydration status assessed with adequate intake. Ruy Score as charted. Pressure Relief Overlay remains intact and inflated to patient's bed throughout the shift. Bilateral heels remain elevated on pillows throughout the shift. Patient tolerates repositioning by staff at least every 2 hours. Patient verbalizes understanding of pressure ulcer prevention measures. Skin integrity maintained. No new skin breakdown noted. Skin to high risk pressure areas including coccyx and heels are clear. Ryann / Incontinence care provided as needed throughout the shift. Aloe Vesta Moisture Barrier ointment applied to buttocks as a preventative measure.

## 2021-05-21 NOTE — PROGRESS NOTES
Speech Language Pathology  Speech Language Pathology  [facility] Acute Rehab Unit at [facility]    Dysphagia/SpeechLanguage Treatment Note    Date: 5/21/2021  Patients Name: Brennan Pino  MRN: [id number]  Diagnosis:   Patient Active Problem List   Diagnosis Code    Pneumonia of left lower lobe due to infectious organism J18.9    Tobacco abuse Z72.0    Legionella pneumonia ([address] A48.1    Dysphagia R13.10    Hyperplastic colon polyp K63.5    Esophageal ring K22.2    Gastritis K29.70    Elbow effusion, right M25.421    Stroke due to occlusion of right middle cerebral artery ([facility]) I63.511    Nihss score 15 R29.715    Acute left hemiparesis ([facility]) G81.94    Cerebrovascular accident (CVA) due to occlusion of right middle cerebral artery ([facility] [address] I63.511    Acute CVA (cerebrovascular accident) ([facility] [address] I63.9    COPD (chronic obstructive pulmonary disease) ([facility]) J44.9    Essential hypertension I10    Hyperlipidemia E78.5    Pre-diabetes R73.03       Pain: 0/10    Speech/Cognitive/Dysphagia Treatment    Treatment time:  6897-0099    Subjective: [x] Alert [x] Cooperative     [] Confused     [] Agitated    [] Lethargic    Objective/Assessment:  Attention: Mod verbal cues needed for increased attention to midline, pt. c extreme L sided neglect and mod A to redirect to task overall -- looking at TV on R side frequently during session. Pt. completed visual scanning exercise x2 to locate items on the L side c min A. Recall: n/a      Organization: n/a    Problem Solving/Reasoning:  n/a    Other:  ST utilized vital stim for L sided oral sling (one channel only), 5 mA for 20 minutes. Pt. Completed oral motor exercises x6, 10-15 reps each with min cues with NMES in place. Therapeutic feeding trials of advanced texture (regular solid -- toast and alexis cracker) presented. Mildly prolonged mastication noted with min amount lingual residue, cleared with double swallow and liquid wash.   No overt s/s of aspiration noted. Pt. Reported tolerating advanced diet (minced and moist) well. Plan:  [x] Continue ST services    [] Discharge from ST:      Discharge recommendations: [] Inpatient Rehab   [] [place]   [] Outpatient Therapy  [] Follow up at trauma clinic   [] Other:       Treatment completed by:  Nellie Enciso M.A., CCC-SLP

## 2021-05-22 LAB
GLUCOSE BLD-MCNC: 105 MG/DL (ref 65–105)
GLUCOSE BLD-MCNC: 128 MG/DL (ref 65–105)
GLUCOSE BLD-MCNC: 134 MG/DL (ref 65–105)
GLUCOSE BLD-MCNC: 156 MG/DL (ref 65–105)

## 2021-05-22 PROCEDURE — 6370000000 HC RX 637 (ALT 250 FOR IP): Performed by: STUDENT IN AN ORGANIZED HEALTH CARE EDUCATION/TRAINING PROGRAM

## 2021-05-22 PROCEDURE — 99232 SBSQ HOSP IP/OBS MODERATE 35: CPT | Performed by: PHYSICAL MEDICINE & REHABILITATION

## 2021-05-22 PROCEDURE — 97129 THER IVNTJ 1ST 15 MIN: CPT

## 2021-05-22 PROCEDURE — 6370000000 HC RX 637 (ALT 250 FOR IP): Performed by: PHYSICAL MEDICINE & REHABILITATION

## 2021-05-22 PROCEDURE — 97530 THERAPEUTIC ACTIVITIES: CPT

## 2021-05-22 PROCEDURE — 6360000002 HC RX W HCPCS: Performed by: PHYSICAL MEDICINE & REHABILITATION

## 2021-05-22 PROCEDURE — 97116 GAIT TRAINING THERAPY: CPT

## 2021-05-22 PROCEDURE — 82947 ASSAY GLUCOSE BLOOD QUANT: CPT

## 2021-05-22 PROCEDURE — 1180000000 HC REHAB R&B

## 2021-05-22 PROCEDURE — 92526 ORAL FUNCTION THERAPY: CPT

## 2021-05-22 PROCEDURE — 99231 SBSQ HOSP IP/OBS SF/LOW 25: CPT | Performed by: INTERNAL MEDICINE

## 2021-05-22 PROCEDURE — 97535 SELF CARE MNGMENT TRAINING: CPT

## 2021-05-22 PROCEDURE — 97110 THERAPEUTIC EXERCISES: CPT

## 2021-05-22 PROCEDURE — 97112 NEUROMUSCULAR REEDUCATION: CPT

## 2021-05-22 RX ADMIN — AMLODIPINE BESYLATE 5 MG: 5 TABLET ORAL at 09:06

## 2021-05-22 RX ADMIN — ACETAMINOPHEN 1000 MG: 500 TABLET, FILM COATED ORAL at 14:39

## 2021-05-22 RX ADMIN — ASPIRIN 81 MG CHEWABLE TABLET 81 MG: 81 TABLET CHEWABLE at 09:06

## 2021-05-22 RX ADMIN — HEPARIN SODIUM 5000 UNITS: 5000 INJECTION INTRAVENOUS; SUBCUTANEOUS at 06:25

## 2021-05-22 RX ADMIN — Medication 2 PUFF: at 21:41

## 2021-05-22 RX ADMIN — INSULIN LISPRO 1 UNITS: 100 INJECTION, SOLUTION INTRAVENOUS; SUBCUTANEOUS at 17:44

## 2021-05-22 RX ADMIN — HEPARIN SODIUM 5000 UNITS: 5000 INJECTION INTRAVENOUS; SUBCUTANEOUS at 21:42

## 2021-05-22 RX ADMIN — LEVOTHYROXINE SODIUM 25 MCG: 0.03 TABLET ORAL at 06:24

## 2021-05-22 RX ADMIN — ATORVASTATIN CALCIUM 80 MG: 80 TABLET, FILM COATED ORAL at 09:06

## 2021-05-22 RX ADMIN — Medication 2 PUFF: at 09:11

## 2021-05-22 RX ADMIN — FLUOXETINE HYDROCHLORIDE 20 MG: 20 CAPSULE ORAL at 09:05

## 2021-05-22 RX ADMIN — AMITRIPTYLINE HYDROCHLORIDE 25 MG: 25 TABLET, FILM COATED ORAL at 21:42

## 2021-05-22 RX ADMIN — ACETAMINOPHEN 1000 MG: 500 TABLET, FILM COATED ORAL at 06:24

## 2021-05-22 RX ADMIN — Medication 2 PUFF: at 09:06

## 2021-05-22 RX ADMIN — ACETAMINOPHEN 1000 MG: 500 TABLET, FILM COATED ORAL at 21:42

## 2021-05-22 RX ADMIN — HEPARIN SODIUM 5000 UNITS: 5000 INJECTION INTRAVENOUS; SUBCUTANEOUS at 14:39

## 2021-05-22 ASSESSMENT — PAIN SCALES - GENERAL
PAINLEVEL_OUTOF10: 0
PAINLEVEL_OUTOF10: 2
PAINLEVEL_OUTOF10: 2
PAINLEVEL_OUTOF10: 1

## 2021-05-22 ASSESSMENT — PAIN SCALES - WONG BAKER: WONGBAKER_NUMERICALRESPONSE: 2;0

## 2021-05-22 ASSESSMENT — PAIN DESCRIPTION - PROGRESSION
CLINICAL_PROGRESSION: NOT CHANGED
CLINICAL_PROGRESSION: NOT CHANGED

## 2021-05-22 ASSESSMENT — PAIN DESCRIPTION - LOCATION: LOCATION: HEAD

## 2021-05-22 ASSESSMENT — PAIN DESCRIPTION - DESCRIPTORS: DESCRIPTORS: HEADACHE

## 2021-05-22 ASSESSMENT — PAIN DESCRIPTION - PAIN TYPE: TYPE: ACUTE PAIN

## 2021-05-22 NOTE — PROGRESS NOTES
[facility] Internal Medicine    Progress Note  Family / Caregiver Present: Yes (daughter Umair Leggett in [place])  Referring Practitioner: Dr Melonie Dahl  Comments: No skin breakdown, redness or swelling noted around possible friction points with AFO, continues with large contusion at anterior L lower leg (family states from use of Shahram Sees without cushioning)  5/21/2021    10:44 PM    Name:   Billie Espinoza  MRN:     [id number]     Acct:      [de-identified]   Room:   [address] [place] [address]  IP Day:  6  Admit Date:  5/10/2021  7:13 AM    PCP:   Betty Box MD  Code Status:  Full Code    Subjective:     C/C: medical mgmt    Active Problems:    Tobacco abuse    Gastritis    Acute left hemiparesis ([address]    Acute CVA (cerebrovascular accident) ([facility] [address]    COPD (chronic obstructive pulmonary disease) ([facility] [address]    Essential hypertension    Hyperlipidemia    Pre-diabetes  Resolved Problems:    * No resolved hospital problems. *      Interval History Status: improved. 5/14/21  Patient seen and examined at bedside. No acute overnight events. Afebrile, hemodynamically stable. Continues to work with PT/OT. No acute changes in hemiparesis. No acute complaints.      Significant last 24 hr data reviewed ;   Vitals:    05/20/21 0730 05/20/21 1834 05/21/21 0815 05/21/21 1811   BP: 118/77 120/71 127/73 117/62   Pulse: 84 87 84 83   Resp: 16 18 19 18   Temp: 97.9 °F (36.6 °C) 99.2 °F (37.3 °C) 98.9 °F (37.2 °C) 98.6 °F (37 °C)   TempSrc: Oral Oral  Oral   SpO2:  94% 95% 94%   Weight:       Height:          Recent Results (from the past 24 hour(s))   POC Glucose Fingerstick    Collection Time: 05/21/21  6:30 AM   Result Value Ref Range    POC Glucose 120 (H) 65 - 105 mg/dL   POC Glucose Fingerstick    Collection Time: 05/21/21 11:22 AM   Result Value Ref Range    POC Glucose 124 (H) 65 - 105 mg/dL   POC Glucose Fingerstick    Collection Time: 05/21/21  5:01 PM   Result Value Ref Range    POC Glucose Daily    budesonide-formoterol  2 puff Inhalation BID    insulin lispro  0-3 Units Subcutaneous Nightly    insulin lispro  0-6 Units Subcutaneous TID WC    levothyroxine  25 mcg Oral Daily    nicotine  1 patch Transdermal Daily    tiotropium  2 puff Inhalation Daily    polyethylene glycol  17 g Oral Daily     Continuous Infusions:    sodium chloride       PRN Meds: polyvinyl alcohol, sodium chloride flush, promethazine **OR** ondansetron, sodium chloride, albuterol, dextrose, glucagon (rDNA), glucose, senna, bisacodyl      Physical Examination:        /62   Pulse 83   Temp 98.6 °F (37 °C) (Oral)   Resp 18   Ht 5' 8\" (1.727 m)   Wt 191 lb 8 oz (86.9 kg)   SpO2 94%   BMI 29.12 kg/m²   Temp (24hrs), Av.8 °F (37.1 °C), Min:98.6 °F (37 °C), Max:98.9 °F (37.2 °C)    Recent Labs     21  0630 21  1122 21  1701 21  2051   POCGLU 120* 124* 119* 119*     No intake or output data in the 24 hours ending 21 2244    General Appearance:  alert, well appearing, and in no acute distress  Mental status: oriented to person, place, and time with normal affect  Head:  normocephalic, atraumatic. Eye: no icterus, redness, pupils equal and reactive, extraocular eye movements intact, conjunctiva clear  Ear: normal external ear, no discharge, hearing intact  Nose:  no drainage noted  Mouth: mucous membranes moist  Neck: supple, no carotid bruits, thyroid not palpable  Lungs: Clear to auscultation bilaterally. No accessory muscle use. Cardiovascular: normal rate, regular rhythm, no murmur, gallop, rub. Abdomen: Soft, nontender, nondistended, normal bowel sounds, no hepatomegaly or splenomegaly  Neurologic: Left upper and lower extremity weakness, 0-1/5. No sensory loss.   Skin: No gross lesions, rashes, bruising or bleeding on exposed skin area  Extremities:  peripheral pulses palpable, no pedal edema or calf pain with palpation    Assessment:        Primary Problem  <principal problem not specified>    Active Hospital Problems    Diagnosis Date Noted    Acute CVA (cerebrovascular accident) ([facility] [I63.9] 05/10/2021    COPD (chronic obstructive pulmonary disease) ([facility] [J44.9] 05/10/2021    Essential hypertension [I10] 05/10/2021    Hyperlipidemia [E78.5] 05/10/2021    Pre-diabetes [R73.03] 05/10/2021    Acute left hemiparesis ([facility]) [G81.94]     Gastritis [K29.70]     Tobacco abuse [Z72.0] 07/16/2017     Plan:        Continue current therapy regimen  BP well controlled. Continue norvasc 5 mg daily  Copd stable. continue bronchodilators  Synthroid 25 mcg daily   lipitor 80 mg daily  Asa 81 mg daily  Insulin sliding scale  nicoderm patch  Continue PT/OT  Encourage PO intake                MD LEONOR Brewster [address], [address] [place] [address].    Phone [phone]   Fax: [phone]  Answering Service: [phone]

## 2021-05-22 NOTE — PLAN OF CARE
Show:Clear all  [x]Manual[x]Template[]Copied    Added by:  Wandy Sutton RN    []Shaileshver for details     Problem: Neurological  Goal: Maximum potential motor/sensory/cognitive function  5/21/2021 0456 by Deejay Reynoso RN  Outcome: Ongoing     Problem: Neurological  Goal: Maximum potential motor/sensory/cognitive function  Outcome: Ongoing     Problem: Nutrition  Goal: Optimal nutrition therapy  5/21/2021 0456 by Deejay Reynoso RN  Outcome: Ongoing     Problem: Pain:  Goal: Pain level will decrease  Description: Pain level will decrease  Outcome: Ongoing  Patient reports relief with tylenol, denies the need for anything else at this time. Patient states the only pain she has at this time is in her left arm and shoulder which is better during the day due to the use of the sling.     Problem: Pain:  Goal: Control of acute pain  Description: Control of acute pain  Outcome: Ongoing     Problem: Skin Integrity:  Goal: Will show no infection signs and symptoms  Description: Will show no infection signs and symptoms  5/21/2021 0456 by Deejay Reynoso RN  Outcome: Ongoing     Problem: Skin Integrity:  Goal: Absence of new skin breakdown  Description: Absence of new skin breakdown  5/21/2021 0456 by Deejay Reynoso RN  Outcome: Ongoing  Skin assessment completed this shift. Nutrition and Hydration status assessed with adequate intake. Ruy Score as charted. Pressure Relief Overlay remains intact and inflated to patient's bed throughout the shift. Bilateral heels remain elevated on pillows throughout the shift. Patient tolerates repositioning by staff at least every 2 hours. Patient verbalizes understanding of pressure ulcer prevention measures. Skin integrity maintained. No new skin breakdown noted. Skin to high risk pressure areas including coccyx and heels are clear.  Ryann / Incontinence care provided as needed throughout the shift.

## 2021-05-22 NOTE — PROGRESS NOTES
Physical Therapy  [address]  Acute Rehabilitation Physical Therapy Progress Note    Date: 21  Patient Name: Kayden Fisher       Room: 0854/8222-58  MRN: [id number]   Account: [de-identified]   : 1955  (63 y.o.) Gender: female        Diagnosis: ischemic CVA,  subacute right MCA distribution infarct, L lynette, dysphagia, L neglect, loop recorder 21 (Dr. Kayden Cantu)  Past Medical History:  has a past medical history of Asthma, COPD (chronic obstructive pulmonary disease) ([facility] [address] Diabetes mellitus ([facility] [address] Esophageal ring, Gastritis, Hyperlipidemia, Hyperplastic colon polyp, Hypertension, Osteoarthritis, Patient in clinical research study, and TIA (transient ischemic attack). Past Surgical History:   has a past surgical history that includes Appendectomy; Hysterectomy; Esophagus dilation; Colonoscopy (2017); Endoscopy, colon, diagnostic; pr egd transoral biopsy single/multiple (N/A, 2017); pr colsc flx w/rmvl of tumor polyp lesion snare tq (N/A, 2017); and Upper gastrointestinal endoscopy (2017). Overall Orientation Status: Within Functional Limits  Restrictions/Precautions  Restrictions/Precautions: Fall Risk  Required Braces or Orthoses?: No  Implants present? :  (loop recorder)  Position Activity Restriction  Other position/activity restrictions: significant L neglect with LUE/LLE deficits    Subjective: Writer arrives to PCT assisting pt back to bed with SS. Writer able to assist. No family present during PM tx. Edu provided to pt on bed mobility/ex's to complete to help improve pt's tolerance for functional mobility. Will continue to attempt family edu as able. Vital Signs  BP Location: Right upper arm  Level of Consciousness: Alert (0)  Patient Currently in Pain: Yes  Pain Assessment: Faces  Lara-Baker Pain Rating: Hurts a little bit; No hurt  Clinical Progression: Not changed  Response to Pain Intervention: Patient Satisfied;None     Oxygen Therapy  O2 Device: None (Room air)  Patient Observation  Observations: flat affect, declines most tasks, encouragement provided unmotivated this AM       Bed Mobility:   Bed Mobility  Bridging: Minimal assistance (positioning L LE)  Rolling: Rolling Left;Rolling Right;Minimal assistance (Cues for L UE safety/movement, facilitating L LE placement)  Sit to Supine: Minimal assistance (L LE assist)  Scooting: Minimal assistance (hips EOB)      Transfers:  Sit to Stand: 2 Person Assistance;Minimal Assistance  Stand to sit: 2 Person Assistance;Minimal Assistance  Bed to Chair: Dependent/Total Stephane Colón   Comments: L neglect improving: Pt reponds to cues for L UE safety, positioning >50%; Pt demos a left lateral lean, cues with improvements to fix. Min A for safety. Stairs/Curb  Stairs?: No                                              BALANCE Posture: Poor (Pt largely unaware of posture/requires cues to attend)  Sitting - Static: Fair;- (Edge of bed, no back support, R UE support. Mattress inflate)  Sitting - Dynamic: Poor;+ (Edge of mat, no back support, R UE support. Min A)  Standing - Static: Poor  Comments: Standing in SS, left lateral lean, Min A for safety. EXERCISES Exercises  Straight Leg Raise: PROM L LE; AROM R LE x 10  Other exercises?: Yes  Other exercises 11: Supine bilateral LE exercises, 15-20x each, 2# R LE, active/assisted/passive ROM L LE  Other exercises 12: Rolling L<>R with Total A with LLE. Bridges completed with edu on importance of repositioning. Sidelying RLE hip abd x10  Other Activities  Comment: rest breaks PRN. Activity Tolerance: Patient limited by endurance  PT Equipment Recommendations  Equipment Needed: No  Other: CTA; pending further progress with mobility  Other Comments  Comments: Left pt in a recliner chair with piloow used for L UE postioning.     Patient Education  New Education Provided:    Learner:patient  Method: demonstration and explanation Outcome: needs reinforcement     Current Treatment Recommendations: Strengthening, Balance Training, Functional Mobility Training, Transfer Training, Endurance Training, Wheelchair Mobility Training, Gait Training, Stair training, Neuromuscular Re-education, Home Exercise Program, Safety Education & Training, Patient/Caregiver Education & Training, Modalities, Positioning    Conditions Requiring Skilled Therapeutic Intervention  Body structures, Functions, Activity limitations: Decreased functional mobility ; Decreased strength;Decreased safe awareness;Decreased endurance;Decreased balance;Decreased posture;Decreased coordination;Decreased vision/visual deficit; Decreased fine motor control  Assessment: Increased hip flexion noted today during ambulation resulting in good initiation of swing (about 2/3 of R LE stride) particularly if in a good walking rhythm; continues to require total assist for L knee extension. Treatment Diagnosis: Weakness, impaired mobility  Prognosis: Good  Decision Making: Medium Complexity  Exam: ROM, MMT, fucntion, PASS score  REQUIRES PT FOLLOW UP: Yes  Discharge Recommendations: Patient would benefit from continued therapy after discharge;Home with assist PRN    Goals  Short term goals  Time Frame for Short term goals: 10 days  Short term goal 1: Pt able to roll in bed side to side at min A without bed rail  Short term goal 2: Pt able to perform supine<>sit at mod A  Short term goal 3: Pt able to perform sit<>stand at min/mod A . Short term goal 4: Pt able to progress to pivot transfers at max A   Short term goal 5: Pt able to improve sitting balance at EOB/EOM at SBA for static balance and able to track to left side as well as turn to head to left side to scan her environment with minimal cues.    Short term goal 6: Pt able to tolerate standing in // bars with R UE support and assist at mod A  Short term goal 7: Pt able to propel w/c with R UE/R LE distance of 50 to 80 ft, straight path and turn around 180 degrees, at min/mod A   Long term goals  Time Frame for Long term goals : By DC  Long term goal 1: Pt able to roll in bed mod-I  Long term goal 2: Pt able to perform supine <> sit at CGA/min A  Long term goal 3: Pt able to perform pivot transfers min A / mod A and able to scan Left side environment. Long term goal 4: Pt able to ambulate in // bars, or with appropriate assistive device and/or LE bracing, distance of  20 to 30 ft, min/mod A x 2. Long term goal 5: Pt able to propel w/c on level surfaces distance 100 to 150 ft, SBA/CGA. Long term goal 6: Educate pt/family in safe technique for mobility and  to go up and down steps to enter/exit home. Long term goal 7: Improve sitting balance at EOM to good, and standing balance with R UE support to fair- to reduce fall risk. Long term goal 8: Improve PASS score to 20/36 improve overall function.         05/22/21 1337   PT Individual Minutes   Time In 9248   Time Out 1402   Minutes 25       Electronically signed by Azam Craft PTA on 5/22/21 at 3:51 PM EDT

## 2021-05-22 NOTE — PLAN OF CARE
Problem: Pain:  Goal: Control of acute pain  Description: Control of acute pain  Outcome: Ongoing     Problem: Skin Integrity:  Goal: Will show no infection signs and symptoms  Description: Will show no infection signs and symptoms  Outcome: Ongoing     Problem: Skin Integrity:  Goal: Absence of new skin breakdown  Description: Absence of new skin breakdown  Outcome: Ongoing     Problem: Falls - Risk of:  Goal: Will remain free from falls  Description: Will remain free from falls  Outcome: Ongoing

## 2021-05-22 NOTE — PROGRESS NOTES
Physical Medicine & Rehabilitation  Progress Note    5/22/2021 11:42 AM     CC: Ambulatory and ADL dysfunction due to left nondominant hemiplegia due to ischemic CVA    Subjective:   No complaints. No pain. Bowels and bladder okay last BM 5/22. Daughter present-question some cognitive deficits    ROS:  Denies fevers, chills, sweats. No chest pain, palpitations, lightheadedness. Denies coughing, wheezing or shortness of breath. Denies abdominal pain, nausea, diarrhea or constipation. No new areas of joint pain. Denies new areas of numbness or weakness. Denies new anxiety or depression issues. No new skin problems. Rehabilitation:   PT:  Restrictions/Precautions: Fall Risk  Implants present? :  (loop recorder)  Other position/activity restrictions: significant L neglect with LUE/LLE deficits   Transfers  Sit to Stand: 2 Person Assistance, Minimal Assistance (walker lift (x2) & Varsha Willson (x1) c R UE setup)  Stand to sit: 2 Person Assistance, Minimal Assistance (VC's to reach for chair, control decent c walker lift; G ret)  Bed to Chair: Dependent/Total Varsha Willson)  Stand Pivot Transfers: Dependent/Total Varsha Willson)  Squat Pivot Transfers: Maximum Assistance (Impulsive. Assist c positioning, set-up; safety cues)  Lateral Transfers: Minimal Assistance (slide board, A with positioning, set-up, L LE, safety cues)  Comment: L neglect improving: Pt reponds to cues for L UE safety, positioning >50%; Pt able to locate utensils on L side of lunch tray today without cue. Ambulation 1  Surface: level tile  Device:  (walker lift)  Other Apparatus: Left, Slider, AFO, Wheelchair follow  Assistance: 2 Person assistance, Maximum assistance (+ 3rd assist to steer walker)  Quality of Gait: Demonstrated increased hip flexion today during L LE swing, more so with improved rhythm of ambulation, but largely dependent to maintain L knee extension. Fair core stabilization & weight shift.  Improved return to cues for postural adjustments, particularly at hips and shoulders. Gait Deviations: Decreased step length, Slow Juanita, Decreased step height  Distance: 48' & 54'  Comments: Working on wt shifting and weightbearing into L LE. Placing L foot each step, providing L knee stability and L hip extension during stance phase. Pt demonstrates Fair but fleeting control of hip rotation/extension. OT:  ADL  Equipment Provided: Reacher  Feeding: Setup, Pureed diet  Grooming: Setup, Verbal cueing (A to stabilize toothpaste, squeeze toothpaste d/t low supply)  UE Bathing: None (pt declines)  LE Bathing: None (pt declines)  UE Dressing: Minimal assistance, Increased time to complete, Verbal cueing (orientation assist, assist to properly thread LUE)  LE Dressing: Maximum assistance (min A to thread LLE, assist to pull up over hips, marietta JAM MAXIM)  Toileting: None (pt declines)  Additional Comments: Grooming: moderate cues to locate toothpaste on L side of shelf, LBB: pt addresses LLE as able without cues this date, LBD: writer reinforced education on use of reacher to initiate/thread LEs, education to utilize RLE to lift LLE, increased cues and time to complete this technique. Pt participates in pulling up brief/pants over hips however requires assist for completion, pt with increased left lean during this task. Pt hesitant to participate however agreeable with encouragement. Balance  Sitting Balance: Stand by assistance (Pt with unilateral support on bed rail. )  Standing Balance: Dependent/Total (CGA/min A in nilson stedy with static standing, max A with uns)   Standing Balance  Time: AM: <1 min x 3  Activity: Am: transfers, LB dressing  Comment: Pt with flucuating L lean based on UE support, assist to maintain LUE support on bar. Pt becomes heavy max A in standing with unilateral support while participating in LB dressing task.     Functional Mobility  Functional - Mobility Device: Wheelchair  Activity: To/from bathroom  Assist Level: Minimal assistance  Functional Mobility Comments: pt navigates herself from bathroom at wheelchair level     Bed mobility  Bridging: Minimal assistance (positioning L LE)  Rolling to Left: Minimal assistance  Rolling to Right: Maximum assistance  Supine to Sit: Minimal assistance, Moderate assistance (pt engaged LLE to assist RLE to edge of bed, Assist for comp)  Sit to Supine: Moderate assistance  Scooting: Minimal assistance (positioning hips in chair, edge of mat/bed)  Comment: HOB elevated, use of bed rails for UE support. Transfers  Stand Step Transfers: Dependent/Total  Sit to stand: Dependent/Total (min A in nilson stedy )  Stand to sit: Dependent/Total (mod A in nilson stedy for controlled sit. )  Transfer Comments: Pt impulsive to want to stand, verbal cues and assist for placement/support of LLE/UE   Toilet Transfers  Toilet - Technique:  (nilson stedy )  Equipment Used: Raised toilet seat without rails  Toilet Transfer: Dependent/Total (nilson stedy, min-mod A)  Toilet Transfers Comments: Completed with use of nilson stedy with min-mod A to complete. Wheelchair Bed Transfers  Wheelchair/Bed - Technique:  [place] stedy)  Equipment Used: Bed, Wheelchair, Other (nilson steady )  Level of Asssistance: Dependent/Total (Min A x 2 with nilson stedy)      ST:    Attention: Mod  Verbal cues needed for increased attention to midline, pt. c extreme L sided neglect and mod A to redirect to task overall. Pt. Completed visual scanning exercise x2 to locate items on the L side c min A. Pt. Answered ?s re: grocery ad she was looking at c 71%.     Recall: n/a       Organization: n/a     Problem Solving/Reasoning:  n/a     Other:  Pt. Completed oral motor exercises x3, 10 reps each with min cues. Pt. Demonstrates increased strength and ROM  On L side. ST utilized vital stim for L sided oral sling (one channel only), 6.0 mA for 21 minutes.     Pt. Reported she had  Minced chicken for dinner last night and tolerated well. ST to monitor diet tolerance when pt. Eating lunch today. Pt. Daughter present and reported understanding of information given re: diet consistency and pt. Progress. Pt. Seen for diet tolerance monitoring during lunchtime. Pt. Demonstrating functional oral mastication and clearing of minced and moist diet. No overt s/s aspiration of thin liquids via straw noted. Pt. Daughters x2 present and were educ. Re: pt. Progress and advanced diet consistency as they were asking about bringing food in for pt. They verbalized understanding of information given. Objective:  /65   Pulse 83   Temp 98.6 °F (37 °C) (Oral)   Resp 16   Ht 5' 8\" (1.727 m)   Wt 191 lb 8 oz (86.9 kg)   SpO2 93%   BMI 29.12 kg/m²  I Body mass index is 29.12 kg/m². I   Wt Readings from Last 1 Encounters:   05/10/21 191 lb 8 oz (86.9 kg)      Temp (24hrs), Av.6 °F (37 °C), Min:98.6 °F (37 °C), Max:98.6 °F (37 °C)         GEN: well developed, well nourished, no acute distress  HEENT: Normocephalic atraumatic, EOMI, mucous membranes pink and moist  CV: RRR, no murmurs, rubs or gallops  PULM: CTAB, no rales or rhonchi. Respirations WNL and unlabored  ABD: soft, NT, ND, +BS and equal  NEURO: A&O x3. Sensation intact to light touch. Alert and oriented x3 knew year, president location, follows command, - neuro exam no change, follows commands, names objects  MSK: 4+/5 right upper lower extremity, dense left hemiparesis distal left upper lower extremity 0/5 proximal left upper extremity 1/5, left hip flexion 1-2/5, no tone-no change  EXTREMITIES: No calf tenderness to palpation bilaterally. No edema BLEs, range of motion and palpation of joints and spine with no increased pain, no swelling or tenderness  SKIN: warm dry and intact with good turgor  PSYCH: appropriately interactive. Affect WNL.          Medications   Scheduled Meds:   FLUoxetine  20 mg Oral Daily    amitriptyline  25 mg Oral Nightly    heparin (porcine)  5,000 Units Subcutaneous 3 times per day    acetaminophen  1,000 mg Oral 3 times per day    amLODIPine  5 mg Oral Daily    aspirin  81 mg Oral Daily    atorvastatin  80 mg Oral Daily    budesonide-formoterol  2 puff Inhalation BID    insulin lispro  0-3 Units Subcutaneous Nightly    insulin lispro  0-6 Units Subcutaneous TID WC    levothyroxine  25 mcg Oral Daily    nicotine  1 patch Transdermal Daily    tiotropium  2 puff Inhalation Daily    polyethylene glycol  17 g Oral Daily     Continuous Infusions:   sodium chloride       PRN Meds:.polyvinyl alcohol, sodium chloride flush, promethazine **OR** ondansetron, sodium chloride, albuterol, dextrose, glucagon (rDNA), glucose, senna, bisacodyl     Diagnostics:     CBC:   No results for input(s): WBC, RBC, HGB, HCT, MCV, RDW, PLT in the last 72 hours. BMP:   No results for input(s): NA, K, CL, CO2, PHOS, BUN, CREATININE in the last 72 hours. Invalid input(s): CA  BNP: No results for input(s): BNP in the last 72 hours. PT/INR: No results for input(s): PROTIME, INR in the last 72 hours. APTT: No results for input(s): APTT in the last 72 hours. CARDIAC ENZYMES: No results for input(s): CKMB, CKMBINDEX, TROPONINT in the last 72 hours. Invalid input(s): CKTOTAL;3  FASTING LIPID PANEL:  Lab Results   Component Value Date    CHOL 206 (H) 05/05/2021    HDL 29 (L) 05/05/2021    TRIG 185 (H) 05/05/2021     LIVER PROFILE: No results for input(s): AST, ALT, ALB, BILIDIR, BILITOT, ALKPHOS in the last 72 hours. I/O (24Hr):   No intake or output data in the 24 hours ending 05/22/21 1142    Glu last 24 hour  Recent Labs     05/21/21  1122 05/21/21  1701 05/21/21 2051 05/22/21  0628   POCGLU 124* 119* 119* 134*       No results for input(s): CLARITYU, COLORU, PHUR, SPECGRAV, PROTEINU, RBCUA, BLOODU, BACTERIA, NITRU, WBCUA, LEUKOCYTESUR, YEAST, GLUCOSEU, BILIRUBINUR in the last 72 hours.         Impression/Plan:   Impaired ADLs, gait, and mobility due to:        1. Ischemic R MCA CVA with hemorrhagic conversion and L non-dominant hemiparesis:  PT/OT for gait, mobility, strengthening, endurance, ADLs, and self care. On ASA, atorvastatin. Sling only to be used when patient is ambulating with therapy. -Discussed with patient and therapist.  Handicap placard on discharge. Noted discharge plan 6/2, family to place ramp. 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 Status post fall 5/16-currently no residual, continue to monitor  3. Headache: Has Tylenol routine TID. Improved on amitriptyline. 4. Dry eyes: has natural tears. 5. Cognitive impairment: SLP treating, discussed with daughter  10. Dysphagia/aphasia: SLP treating. Diet upgraded, continue aspiration precautions  7. HTN/Hyperlipidemia: on amlodipine  8. DM: on insulin sliding scale, as above  9. COPD/asthma: on albuterol nebulizers prn, on symbicort BID, spiriva  10. Adjustment disorder with depressed mood: started fluoxetine 5/15, BMP okay  11. Esophageal Ring: Will monitor - on dysphagia diet  12. Hypothyroidism: on levothyroxine  13. Leukocytosis-improving  14. Nicotine dependence: on Nicoderm  15. Bowel Management: Miralax daily, senokot prn, dulcolax prn. 16. DVT Prophylaxis:  heparin, SCD's while in bed and MAXIM's   17. Internal medicine for medical management      Iva Ojeda. Freddie Beckham MD       This note is created with the assistance of a speech recognition program.  While intending to generate a document that actually reflects the content of the visit, the document can still have some errors including those of syntax and sound a like substitutions which may escape proof reading.   In such instances, actual meaning can be extrapolated by contextual diversion

## 2021-05-22 NOTE — PROGRESS NOTES
Physical Therapy  Facility/Department: [facility] ACUTE REHAB  Daily Treatment Note  NAME: Jessica Malave  : 1955  MRN: [id number]    Date of Service: 2021    Discharge Recommendations:  Patient would benefit from continued therapy after discharge, Home with assist PRN   PT Equipment Recommendations  Other: CTA; pending further progress with mobility    Assessment   Body structures, Functions, Activity limitations: Decreased functional mobility ; Decreased strength;Decreased safe awareness;Decreased endurance;Decreased balance;Decreased posture;Decreased coordination;Decreased vision/visual deficit; Decreased fine motor control  Assessment: Increased hip flexion initiation noted today during ambulation resulting in good initiation of swing (about 2/3 of R LE stride) particularly if in a good walking rhythm; continues to require total assist for L knee extension. Treatment Diagnosis: Weakness, impaired mobility  Prognosis: Good  Decision Making: Medium Complexity  Exam: ROM, MMT, fucntion, PASS score  REQUIRES PT FOLLOW UP: Yes  Activity Tolerance  Activity Tolerance: Patient limited by endurance     Patient Diagnosis(es): There were no encounter diagnoses. has a past medical history of Asthma, COPD (chronic obstructive pulmonary disease) ([address] Diabetes mellitus ([address] Esophageal ring, Gastritis, Hyperlipidemia, Hyperplastic colon polyp, Hypertension, Osteoarthritis, Patient in clinical research study, and TIA (transient ischemic attack). has a past surgical history that includes Appendectomy; Hysterectomy; Esophagus dilation; Colonoscopy (2017); Endoscopy, colon, diagnostic; pr egd transoral biopsy single/multiple (N/A, 2017); pr colsc flx w/rmvl of tumor polyp lesion snare tq (N/A, 2017); and Upper gastrointestinal endoscopy (2017).     Restrictions  Restrictions/Precautions  Restrictions/Precautions: Fall Risk  Required Braces or Orthoses?: No  Implants present? :  (loop recorder)  Position Activity Restriction  Other position/activity restrictions: significant L neglect with LUE/LLE deficits  Subjective   General  Chart Reviewed: Yes  Response To Previous Treatment: Patient reporting fatigue but able to participate. Family / Caregiver Present: Yes (daughter Yecenia Bunch and Tahira Preston present during a:m session.)  Referring Practitioner: Dr Marjorie Johnson: Discussed pt's daughter, regarding entering/exiting home, and pt will not be nate to perform steps safely prior to discharge, and that ramp wiliam lbe beneficial. Daughters acknowledges that they have the information regarding building lonny ramp, but has not prseud on it yet. Daughter's are asking if pt will be allowed to stay longer here as she is making gains. Encouraged to discuss with physican and socail worker. General Comment  Comments: No skin breakdown, redness or swelling noted around possible friction points with AFO, continues with large contusion at anterior L lower leg (family states from use of Jeffrie Sacks without cushioning)  Pain Screening  Patient Currently in Pain: Denies  Pain Assessment  Clinical Progression: Not changed  Response to Pain Intervention: Patient Satisfied;None  Vital Signs  BP Location: Right upper arm  Level of Consciousness: Alert (0)  Patient Currently in Pain: Denies  Oxygen Therapy  O2 Device: None (Room air)       Orientation  Orientation  Overall Orientation Status: Within Functional Limits  Cognition      Objective      Transfers  Sit to Stand: 2 Person Assistance;Minimal Assistance (walker lift (x2) & Jeffrie Sacks (x1) c R UE setup)  Stand to sit: 2 Person Assistance;Minimal Assistance (VC's to reach for chair/control decen ; fair return)  Bed to Chair: Dependent/Total;Maximum assistance (Max A on strong side, 2 assist on weaker side.)  Squat Pivot Transfers: Maximum Assistance (Impulsive.  Assist c positioning, set-up; safety cues)  Comment: L neglect improving: Pt reponds to cues for contraction, stood for 1 minutes  Other exercises 2: Seated L LE active assisted Hip ex's, PROM at knee and ankle. 15 reps. Comment: rest breaks PRN. G-Code     OutComes Score                                                     AM-PAC Score             Goals  Short term goals  Time Frame for Short term goals: 10 days  Short term goal 1: Pt able to roll in bed side to side at min A without bed rail  Short term goal 2: Pt able to perform supine<>sit at mod A  Short term goal 3: Pt able to perform sit<>stand at min/mod A . Short term goal 4: Pt able to progress to pivot transfers at max A   Short term goal 5: Pt able to improve sitting balance at EOB/EOM at SBA for static balance and able to track to left side as well as turn to head to left side to scan her environment with minimal cues. Short term goal 6: Pt able to tolerate standing in // bars with R UE support and assist at mod A  Short term goal 7: Pt able to propel w/c with R UE/R LE distance of 50 to 80 ft, straight path and turn around 180 degrees, at min/mod A   Long term goals  Time Frame for Long term goals : By DC  Long term goal 1: Pt able to roll in bed mod-I  Long term goal 2: Pt able to perform supine <> sit at CGA/min A  Long term goal 3: Pt able to perform pivot transfers min A / mod A and able to scan Left side environment. Long term goal 4: Pt able to ambulate in // bars, or with appropriate assistive device and/or LE bracing, distance of  20 to 30 ft, min/mod A x 2. Long term goal 5: Pt able to propel w/c on level surfaces distance 100 to 150 ft, SBA/CGA. Long term goal 6: Educate pt/family in safe technique for mobility and  to go up and down steps to enter/exit home. Long term goal 7: Improve sitting balance at EOM to good, and standing balance with R UE support to fair- to reduce fall risk. Long term goal 8: Improve PASS score to 20/36 improve overall function.    Patient Goals   Patient goals : Get better and go

## 2021-05-22 NOTE — PROGRESS NOTES
evaluation from 05/10, Pt. demonstrated moderately impaired recall and currently has ST goals targeting recall --      Goal 1: Recall 3-5 units with and without distraction with 90% accuracy  Goal 2: Provide comp. recall strategeis to aide in recall    ST to continue to address memory deficits in POC. Pt. And Pt's daughter verbalized understanding and agreeable. Pt. Education provided re: compensatory strategies to facilitate recall (e.g., ID key words, repetition and rehearsal). Pt. Verbalized understanding and facilitating strategy to complete the following task:    Paragraph recall (3-6 sentences): 100% accuracy praveen    Organization: n/a    Problem Solving/Reasoning:  n/a    Other:  Pt. Completed oral motor exercises x6, 10-15 reps each with min cues. Therapeutic feeding trials of advanced texture (regular solid -- toast and alexis cracker) presented. Mildly prolonged mastication noted with min amount lingual residue, cleared with double swallow and liquid wash. No overt s/s of aspiration noted. OK to advance to Soft and bite-sized diet with thin liquids. Writer updated diet order in Epic. ST to continue to follow and monitor diet tolerance. Plan:  [x] Continue ST services    [] Discharge from ST:      Discharge recommendations: [] Inpatient Rehab   [] [place]   [] Outpatient Therapy  [] Follow up at trauma clinic   [] Other:       Treatment completed by:  Eda Estrada M.A., CCC-SLP

## 2021-05-23 LAB
GLUCOSE BLD-MCNC: 107 MG/DL (ref 65–105)
GLUCOSE BLD-MCNC: 135 MG/DL (ref 65–105)
GLUCOSE BLD-MCNC: 141 MG/DL (ref 65–105)
GLUCOSE BLD-MCNC: 144 MG/DL (ref 65–105)

## 2021-05-23 PROCEDURE — 6370000000 HC RX 637 (ALT 250 FOR IP): Performed by: STUDENT IN AN ORGANIZED HEALTH CARE EDUCATION/TRAINING PROGRAM

## 2021-05-23 PROCEDURE — 6360000002 HC RX W HCPCS: Performed by: PHYSICAL MEDICINE & REHABILITATION

## 2021-05-23 PROCEDURE — 99231 SBSQ HOSP IP/OBS SF/LOW 25: CPT | Performed by: PHYSICAL MEDICINE & REHABILITATION

## 2021-05-23 PROCEDURE — 6370000000 HC RX 637 (ALT 250 FOR IP): Performed by: PHYSICAL MEDICINE & REHABILITATION

## 2021-05-23 PROCEDURE — 1180000000 HC REHAB R&B

## 2021-05-23 PROCEDURE — 82947 ASSAY GLUCOSE BLOOD QUANT: CPT

## 2021-05-23 PROCEDURE — 99231 SBSQ HOSP IP/OBS SF/LOW 25: CPT | Performed by: INTERNAL MEDICINE

## 2021-05-23 RX ADMIN — FLUOXETINE HYDROCHLORIDE 20 MG: 20 CAPSULE ORAL at 08:06

## 2021-05-23 RX ADMIN — ACETAMINOPHEN 1000 MG: 500 TABLET, FILM COATED ORAL at 21:36

## 2021-05-23 RX ADMIN — ATORVASTATIN CALCIUM 80 MG: 80 TABLET, FILM COATED ORAL at 08:06

## 2021-05-23 RX ADMIN — Medication 2 PUFF: at 08:06

## 2021-05-23 RX ADMIN — Medication 2 PUFF: at 21:36

## 2021-05-23 RX ADMIN — ACETAMINOPHEN 1000 MG: 500 TABLET, FILM COATED ORAL at 06:22

## 2021-05-23 RX ADMIN — ASPIRIN 81 MG CHEWABLE TABLET 81 MG: 81 TABLET CHEWABLE at 08:06

## 2021-05-23 RX ADMIN — INSULIN LISPRO 1 UNITS: 100 INJECTION, SOLUTION INTRAVENOUS; SUBCUTANEOUS at 16:53

## 2021-05-23 RX ADMIN — LEVOTHYROXINE SODIUM 25 MCG: 0.03 TABLET ORAL at 06:23

## 2021-05-23 RX ADMIN — HEPARIN SODIUM 5000 UNITS: 5000 INJECTION INTRAVENOUS; SUBCUTANEOUS at 13:16

## 2021-05-23 RX ADMIN — AMLODIPINE BESYLATE 5 MG: 5 TABLET ORAL at 08:06

## 2021-05-23 RX ADMIN — AMITRIPTYLINE HYDROCHLORIDE 25 MG: 25 TABLET, FILM COATED ORAL at 21:36

## 2021-05-23 RX ADMIN — ACETAMINOPHEN 1000 MG: 500 TABLET, FILM COATED ORAL at 13:17

## 2021-05-23 RX ADMIN — INSULIN LISPRO 1 UNITS: 100 INJECTION, SOLUTION INTRAVENOUS; SUBCUTANEOUS at 12:29

## 2021-05-23 RX ADMIN — HEPARIN SODIUM 5000 UNITS: 5000 INJECTION INTRAVENOUS; SUBCUTANEOUS at 21:37

## 2021-05-23 RX ADMIN — HEPARIN SODIUM 5000 UNITS: 5000 INJECTION INTRAVENOUS; SUBCUTANEOUS at 06:23

## 2021-05-23 ASSESSMENT — PAIN SCALES - GENERAL
PAINLEVEL_OUTOF10: 5
PAINLEVEL_OUTOF10: 2
PAINLEVEL_OUTOF10: 0

## 2021-05-23 NOTE — PROGRESS NOTES
Inhalation Daily    polyethylene glycol  17 g Oral Daily     Continuous Infusions:    sodium chloride       PRN Meds: polyvinyl alcohol, sodium chloride flush, promethazine **OR** ondansetron, sodium chloride, albuterol, dextrose, glucagon (rDNA), glucose, senna, bisacodyl      Physical Examination:        /66   Pulse 89   Temp 98.7 °F (37.1 °C) (Oral)   Resp 16   Ht 5' 8\" (1.727 m)   Wt 191 lb 8 oz (86.9 kg)   SpO2 91%   BMI 29.12 kg/m²   Temp (24hrs), Av.7 °F (37.1 °C), Min:98.6 °F (37 °C), Max:98.7 °F (37.1 °C)    Recent Labs     21  1926 21  0619 21  1109 21  1638   POCGLU 128* 107* 141* 144*     No intake or output data in the 24 hours ending 21 1800    General Appearance:  alert, well appearing, and in no acute distress  Mental status: oriented to person, place, and time with normal affect  Head:  normocephalic, atraumatic. Eye: no icterus, redness, pupils equal and reactive, extraocular eye movements intact, conjunctiva clear  Ear: normal external ear, no discharge, hearing intact  Nose:  no drainage noted  Mouth: mucous membranes moist  Neck: supple, no carotid bruits, thyroid not palpable  Lungs: Clear to auscultation bilaterally. No accessory muscle use. Cardiovascular: normal rate, regular rhythm, no murmur, gallop, rub. Abdomen: Soft, nontender, nondistended, normal bowel sounds, no hepatomegaly or splenomegaly  Neurologic: Left upper and lower extremity weakness, 0-1/5. No sensory loss.   Skin: No gross lesions, rashes, bruising or bleeding on exposed skin area  Extremities:  peripheral pulses palpable, no pedal edema or calf pain with palpation    Assessment:        Primary Problem  <principal problem not specified>    Active Hospital Problems    Diagnosis Date Noted    [facility] [I63.9] 05/10/2021    COPD (chronic obstructive pulmonary disease) ([facility] [J44.9] 05/10/2021    Essential hypertension [I10] 05/10/2021  Hyperlipidemia [E78.5] 05/10/2021    Pre-diabetes [R73.03] 05/10/2021    Acute left hemiparesis (HCC) [G81.94]     Gastritis [K29.70]     Tobacco abuse [Z72.0] 07/16/2017     Plan:        Continue current therapy regimen  BP well controlled. Continue norvasc 5 mg daily  Copd stable. continue bronchodilators  Synthroid 25 mcg daily   lipitor 80 mg daily  Asa 81 mg daily  Insulin sliding scale  nicoderm patch  Continue PT/OT  Encourage PO intake                MD LEONOR Champion [address], [address] [place] [address].    Phone [phone]   Fax: [phone]  Answering Service: [phone]

## 2021-05-23 NOTE — PLAN OF CARE
Problem: Pain:  Goal: Pain level will decrease  Description: Pain level will decrease  5/23/2021 1535 by Matt Davis RN  Outcome: Ongoing     Problem: Pain:  Goal: Control of acute pain  Description: Control of acute pain  5/23/2021 1535 by Matt Davis RN  Outcome: Ongoing     Problem: Skin Integrity:  Goal: Will show no infection signs and symptoms  Description: Will show no infection signs and symptoms  5/23/2021 1535 by Matt Davis RN  Outcome: Ongoing     Problem: Skin Integrity:  Goal: Absence of new skin breakdown  Description: Absence of new skin breakdown  5/23/2021 1535 by Matt Davis RN  Outcome: Ongoing     Problem: Falls - Risk of:  Goal: Will remain free from falls  Description: Will remain free from falls  5/23/2021 1535 by Matt Davis RN  Outcome: Ongoing

## 2021-05-23 NOTE — PROGRESS NOTES
[place] [address] Internal Medicine    Progress Note  Chart Reviewed: Yes  Patient assessed for rehabilitation services?: Yes  Family / Caregiver Present: Yes (daughter Umair Leggett in [place])  Referring Practitioner: Dr Melonie Dahl  Comments: No skin breakdown, redness or swelling noted around possible friction points with AFO, continues with large contusion at anterior L lower leg (family states from use of Shahram Sees without cushioning)  5/22/2021    8:30 PM    Name:   Billie Espinoza  MRN:     [id number]     Acct:      [de-identified]   Room:   [address] [place] [address] Day:  12  Admit Date:  5/10/2021  7:13 AM    PCP:   Betty Box MD  Code Status:  Full Code    Subjective:     C/C: medical mgmt    Active Problems:    Tobacco abuse    Gastritis    Acute left hemiparesis ([address]    Acute [facility]A [facility]ce[facility]bro[facility] accident) [facility]    COPD (chronic obstructive pulmonary disease) ([facility]    Essential hypertension    Hyperlipidemia    Pre-diabetes  Resolved Problems:    * No resolved hospital problems. *      Interval History Status: improved. 5/14/21  Patient seen and examined at bedside. No acute overnight events. Afebrile, hemodynamically stable. Continues to work with PT/OT. No acute changes in hemiparesis. No acute complaints. 5/22  Patient is doing much better  No new complaints.      Significant last 24 hr data reviewed ;   Vitals:    05/21/21 0815 05/21/21 1811 05/22/21 0736 05/22/21 1846   BP: 127/73 117/62 111/65 129/78   Pulse: 84 83 83 83   Resp: 19 18 16 16   Temp: 98.9 °F (37.2 °C) 98.6 °F (37 °C) 98.6 °F (37 °C) 98.6 °F (37 °C)   TempSrc:  Oral Oral Oral   SpO2: 95% 94% 93% 94%   Weight:       Height:          Recent Results (from the past 24 hour(s))   POC Glucose Fingerstick    Collection Time: 05/21/21  8:51 PM   Result Value Ref Range    POC Glucose 119 (H) 65 - 105 mg/dL   POC Glucose Fingerstick    Collection Time: 05/22/21  6:28 AM   Result Value Ref Range    POC Glucose Daily    amitriptyline  25 mg Oral Nightly    heparin (porcine)  5,000 Units Subcutaneous 3 times per day    acetaminophen  1,000 mg Oral 3 times per day    amLODIPine  5 mg Oral Daily    aspirin  81 mg Oral Daily    atorvastatin  80 mg Oral Daily    budesonide-formoterol  2 puff Inhalation BID    insulin lispro  0-3 Units Subcutaneous Nightly    insulin lispro  0-6 Units Subcutaneous TID WC    levothyroxine  25 mcg Oral Daily    nicotine  1 patch Transdermal Daily    tiotropium  2 puff Inhalation Daily    polyethylene glycol  17 g Oral Daily     Continuous Infusions:    sodium chloride       PRN Meds: polyvinyl alcohol, sodium chloride flush, promethazine **OR** ondansetron, sodium chloride, albuterol, dextrose, glucagon (rDNA), glucose, senna, bisacodyl      Physical Examination:        /78   Pulse 83   Temp 98.6 °F (37 °C) (Oral)   Resp 16   Ht 5' 8\" (1.727 m)   Wt 191 lb 8 oz (86.9 kg)   SpO2 94%   BMI 29.12 kg/m²   Temp (24hrs), Av.6 °F (37 °C), Min:98.6 °F (37 °C), Max:98.6 °F (37 °C)    Recent Labs     21  0628 21  1135 21  1609 21  1926   POCGLU 134* 105 156* 128*     No intake or output data in the 24 hours ending 21    General Appearance:  alert, well appearing, and in no acute distress  Mental status: oriented to person, place, and time with normal affect  Head:  normocephalic, atraumatic. Eye: no icterus, redness, pupils equal and reactive, extraocular eye movements intact, conjunctiva clear  Ear: normal external ear, no discharge, hearing intact  Nose:  no drainage noted  Mouth: mucous membranes moist  Neck: supple, no carotid bruits, thyroid not palpable  Lungs: Clear to auscultation bilaterally. No accessory muscle use. Cardiovascular: normal rate, regular rhythm, no murmur, gallop, rub.   Abdomen: Soft, nontender, nondistended, normal bowel sounds, no hepatomegaly or splenomegaly  Neurologic: Left upper and lower extremity weakness, 0-1/5. No sensory loss. Skin: No gross lesions, rashes, bruising or bleeding on exposed skin area  Extremities:  peripheral pulses palpable, no pedal edema or calf pain with palpation    Assessment:        Primary Problem  <principal problem not specified>    Active Hospital Problems    Diagnosis Date Noted    Acute CVA (cerebrovascular accident) ([facility] [I63.9] 05/10/2021    COPD (chronic obstructive pulmonary disease) ([facility] [J44.9] 05/10/2021    Essential hypertension [I10] 05/10/2021    Hyperlipidemia [E78.5] 05/10/2021    Pre-diabetes [R73.03] 05/10/2021    Acute left hemiparesis ([facility] [G81.94]     Gastritis [K29.70]     Tobacco abuse [Z72.0] 07/16/2017     Plan:        Continue current therapy regimen  BP well controlled. Continue norvasc 5 mg daily  Copd stable. continue bronchodilators  Synthroid 25 mcg daily   lipitor 80 mg daily  Asa 81 mg daily  Insulin sliding scale  nicoderm patch  Continue PT/OT  Encourage PO intake                MD LEONOR David [address], [address] [place] [address].    Phone [phone]   Fax: [phone]  Answering Service: [phone]

## 2021-05-24 LAB
GLUCOSE BLD-MCNC: 111 MG/DL (ref 65–105)
GLUCOSE BLD-MCNC: 113 MG/DL (ref 65–105)
GLUCOSE BLD-MCNC: 117 MG/DL (ref 65–105)
GLUCOSE BLD-MCNC: 161 MG/DL (ref 65–105)

## 2021-05-24 PROCEDURE — 6360000002 HC RX W HCPCS: Performed by: PHYSICAL MEDICINE & REHABILITATION

## 2021-05-24 PROCEDURE — 97110 THERAPEUTIC EXERCISES: CPT

## 2021-05-24 PROCEDURE — 97112 NEUROMUSCULAR REEDUCATION: CPT

## 2021-05-24 PROCEDURE — 82947 ASSAY GLUCOSE BLOOD QUANT: CPT

## 2021-05-24 PROCEDURE — 1180000000 HC REHAB R&B

## 2021-05-24 PROCEDURE — 97530 THERAPEUTIC ACTIVITIES: CPT

## 2021-05-24 PROCEDURE — 6370000000 HC RX 637 (ALT 250 FOR IP): Performed by: STUDENT IN AN ORGANIZED HEALTH CARE EDUCATION/TRAINING PROGRAM

## 2021-05-24 PROCEDURE — 6370000000 HC RX 637 (ALT 250 FOR IP): Performed by: PHYSICAL MEDICINE & REHABILITATION

## 2021-05-24 PROCEDURE — 92526 ORAL FUNCTION THERAPY: CPT

## 2021-05-24 PROCEDURE — 99231 SBSQ HOSP IP/OBS SF/LOW 25: CPT | Performed by: INTERNAL MEDICINE

## 2021-05-24 PROCEDURE — 92507 TX SP LANG VOICE COMM INDIV: CPT

## 2021-05-24 PROCEDURE — 99231 SBSQ HOSP IP/OBS SF/LOW 25: CPT | Performed by: PHYSICAL MEDICINE & REHABILITATION

## 2021-05-24 PROCEDURE — 97116 GAIT TRAINING THERAPY: CPT

## 2021-05-24 PROCEDURE — 97535 SELF CARE MNGMENT TRAINING: CPT

## 2021-05-24 RX ADMIN — ACETAMINOPHEN 1000 MG: 500 TABLET, FILM COATED ORAL at 06:00

## 2021-05-24 RX ADMIN — ATORVASTATIN CALCIUM 80 MG: 80 TABLET, FILM COATED ORAL at 08:59

## 2021-05-24 RX ADMIN — Medication 2 PUFF: at 08:59

## 2021-05-24 RX ADMIN — ASPIRIN 81 MG CHEWABLE TABLET 81 MG: 81 TABLET CHEWABLE at 08:59

## 2021-05-24 RX ADMIN — HEPARIN SODIUM 5000 UNITS: 5000 INJECTION INTRAVENOUS; SUBCUTANEOUS at 21:27

## 2021-05-24 RX ADMIN — POLYETHYLENE GLYCOL 3350 17 G: 17 POWDER, FOR SOLUTION ORAL at 08:58

## 2021-05-24 RX ADMIN — HEPARIN SODIUM 5000 UNITS: 5000 INJECTION INTRAVENOUS; SUBCUTANEOUS at 15:21

## 2021-05-24 RX ADMIN — HEPARIN SODIUM 5000 UNITS: 5000 INJECTION INTRAVENOUS; SUBCUTANEOUS at 06:00

## 2021-05-24 RX ADMIN — LEVOTHYROXINE SODIUM 25 MCG: 0.03 TABLET ORAL at 06:00

## 2021-05-24 RX ADMIN — ACETAMINOPHEN 1000 MG: 500 TABLET, FILM COATED ORAL at 15:20

## 2021-05-24 RX ADMIN — AMLODIPINE BESYLATE 5 MG: 5 TABLET ORAL at 08:59

## 2021-05-24 RX ADMIN — AMITRIPTYLINE HYDROCHLORIDE 25 MG: 25 TABLET, FILM COATED ORAL at 23:31

## 2021-05-24 RX ADMIN — ACETAMINOPHEN 1000 MG: 500 TABLET, FILM COATED ORAL at 21:24

## 2021-05-24 RX ADMIN — Medication 2 PUFF: at 21:24

## 2021-05-24 RX ADMIN — FLUOXETINE HYDROCHLORIDE 20 MG: 20 CAPSULE ORAL at 08:59

## 2021-05-24 ASSESSMENT — PAIN SCALES - GENERAL
PAINLEVEL_OUTOF10: 2
PAINLEVEL_OUTOF10: 2
PAINLEVEL_OUTOF10: 1

## 2021-05-24 ASSESSMENT — PAIN DESCRIPTION - PROGRESSION: CLINICAL_PROGRESSION: NOT CHANGED

## 2021-05-24 NOTE — PROGRESS NOTES
[place] [address] Internal Medicine    Progress Note  Patient assessed for rehabilitation services?: Yes  5/24/2021    1:08 PM    Name:   Daiana Jarrell  MRN:     [id number]     Acct:      [de-identified]   Room:   [address] [place] [address] Day:  15  Admit Date:  5/10/2021  7:13 AM    PCP:   Marii Box MD  Code Status:  Full Code    Subjective:     C/C: medical mgmt    Active Problems:    Tobacco abuse    Gastritis    Acute left hemiparesis ([facility]    Acute CVA (cerebrovascular accident) ([facility]    COPD (chronic obstructive pulmonary disease) ([facility]    Essential hypertension    Hyperlipidemia    Pre-diabetes  Resolved Problems:    * No resolved hospital problems. *      Interval History Status: improved. 5/14/21  Patient seen and examined at bedside. No acute overnight events. Afebrile, hemodynamically stable. Continues to work with PT/OT. No acute changes in hemiparesis. No acute complaints. 5/22  Patient is doing much better  No new complaints.      Significant last 24 hr data reviewed ;   Vitals:    05/22/21 1846 05/23/21 0719 05/23/21 1836 05/24/21 0545   BP: 129/78 125/66 117/62 134/78   Pulse: 83 89 98 86   Resp: 16 16 16 18   Temp: 98.6 °F (37 °C) 98.7 °F (37.1 °C) 98.2 °F (36.8 °C) 98.1 °F (36.7 °C)   TempSrc: Oral Oral Oral Oral   SpO2: 94% 91% 93% 92%   Weight:       Height:          Recent Results (from the past 24 hour(s))   POC Glucose Fingerstick    Collection Time: 05/23/21  4:38 PM   Result Value Ref Range    POC Glucose 144 (H) 65 - 105 mg/dL   POC Glucose Fingerstick    Collection Time: 05/23/21  8:12 PM   Result Value Ref Range    POC Glucose 135 (H) 65 - 105 mg/dL   POC Glucose Fingerstick    Collection Time: 05/24/21  6:34 AM   Result Value Ref Range    POC Glucose 161 (H) 65 - 105 mg/dL   POC Glucose Fingerstick    Collection Time: 05/24/21 11:31 AM   Result Value Ref Range    POC Glucose 117 (H) 65 - 105 mg/dL     Recent Labs     05/23/21  1109 05/23/21  0081 05/23/21 2012 05/24/21  0634 05/24/21  1131   POCGLU 141* 144* 135* 161* 117*        No results found. HPI:   See history in H and P      Review of Systems:     Constitutional:  negative for chills, fevers, sweats  Respiratory:  negative for cough, dyspnea on exertion, hemoptysis, shortness of breath, wheezing  Cardiovascular:  negative for chest pain, chest pressure/discomfort, lower extremity edema, palpitations  Gastrointestinal:  negative for abdominal pain, constipation, diarrhea, nausea, vomiting  Neurological: Positive for left-sided weakness. Negative for dizziness, headache  Data:     Past Medical History:  no change     Social History:  no change    Family History: @no change    Vitals:      I/O (24Hr): No intake or output data in the 24 hours ending 05/24/21 1308    Labs:    URINE ANALYSIS: No results found for: LABURIN     CBC:  Lab Results   Component Value Date    WBC 10.9 05/18/2021    HGB 13.6 05/18/2021     05/18/2021        BMP:    Lab Results   Component Value Date     05/18/2021    K 4.0 05/18/2021     05/18/2021    CO2 29 05/18/2021    BUN 19 05/18/2021    CREATININE 0.63 05/18/2021    GLUCOSE 140 05/18/2021      LIVER PROFILE:  Lab Results   Component Value Date    ALT 13 10/16/2017    AST 13 10/16/2017    PROT 6.9 10/16/2017    BILITOT 0.22 10/16/2017    BILIDIR 0.11 10/16/2017    LABALBU 4.0 10/16/2017               Radiology:  Medications:      Allergies:      Current Meds:   Scheduled Meds:    FLUoxetine  20 mg Oral Daily    amitriptyline  25 mg Oral Nightly    heparin (porcine)  5,000 Units Subcutaneous 3 times per day    acetaminophen  1,000 mg Oral 3 times per day    amLODIPine  5 mg Oral Daily    aspirin  81 mg Oral Daily    atorvastatin  80 mg Oral Daily    budesonide-formoterol  2 puff Inhalation BID    insulin lispro  0-3 Units Subcutaneous Nightly    insulin lispro  0-6 Units Subcutaneous TID WC    levothyroxine  25 mcg Oral Daily    nicotine  1 patch Transdermal Daily    tiotropium  2 puff Inhalation Daily    polyethylene glycol  17 g Oral Daily     Continuous Infusions:    sodium chloride       PRN Meds: polyvinyl alcohol, sodium chloride flush, promethazine **OR** ondansetron, sodium chloride, albuterol, dextrose, glucagon (rDNA), glucose, senna, bisacodyl      Physical Examination:        /78   Pulse 86   Temp 98.1 °F (36.7 °C) (Oral)   Resp 18   Ht 5' 8\" (1.727 m)   Wt 191 lb 8 oz (86.9 kg)   SpO2 92%   BMI 29.12 kg/m²   Temp (24hrs), Av.2 °F (36.8 °C), Min:98.1 °F (36.7 °C), Max:98.2 °F (36.8 °C)    Recent Labs     21  1638 21  0634 21  1131   POCGLU 144* 135* 161* 117*     No intake or output data in the 24 hours ending 21 1308    General Appearance:  alert, well appearing, and in no acute distress  Mental status: oriented to person, place, and time with normal affect  Head:  normocephalic, atraumatic. Eye: no icterus, redness, pupils equal and reactive, extraocular eye movements intact, conjunctiva clear  Ear: normal external ear, no discharge, hearing intact  Nose:  no drainage noted  Mouth: mucous membranes moist  Neck: supple, no carotid bruits, thyroid not palpable  Lungs: Clear to auscultation bilaterally. No accessory muscle use. Cardiovascular: normal rate, regular rhythm, no murmur, gallop, rub. Abdomen: Soft, nontender, nondistended, normal bowel sounds, no hepatomegaly or splenomegaly  Neurologic: Left upper and lower extremity weakness, 0-1/5. No sensory loss.   Skin: No gross lesions, rashes, bruising or bleeding on exposed skin area  Extremities:  peripheral pulses palpable, no pedal edema or calf pain with palpation    Assessment:        Primary Problem  <principal problem not specified>    Active Hospital Problems    Diagnosis Date Noted    Acute [facility]) [facility] [I63.9] 05/10/2021    COPD (chronic obstructive pulmonary disease) ([facility]

## 2021-05-24 NOTE — CARE COORDINATION
Met with pt and daughter Flo Bautista with [facility] on speaker phone. They shared concerns with pt discharge as they are not able to provide 24 hr care for pt. They stated they need for pt to be ambulatory and take herself to the bathroom. They explored options of more time at ARU or going to another acute rehab facility. Informed them of the team meeting tomorrow and the team would review. [facility] provided information of pt's new insurance which goes into effect on  06/01/2021;  Noe phone number

## 2021-05-24 NOTE — PROGRESS NOTES
[address]   ACUTE REHABILITATION OCCUPATIONAL THERAPY  DAILY NOTE    Date: 21  Patient Name: Morales Martines      Room: 4181/6300-98    MRN: [id number]   : 1955  (63 y.o.)  Gender: female      Diagnosis: ischemic CVA,  subacute right MCA distribution infarct, L lynette, dysphagia, L neglect, loop recorder 21 (Dr. Dell Ryan)  Additional Pertinent Hx: 82-year-old female who was found down, last known well approximately 21 hours before. Patient was found to have a right MCA M1 occlusion. Thrombectomy was not successful, MRI showed patient had a right MCA stroke with hemorrhagic conversion. 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 :  (loop recorder)  Other position/activity restrictions: significant L neglect with LUE/LLE deficits  Required Braces or Orthoses?: No  Equipment Used: Bed, Wheelchair, Other (nilson steady )    Subjective  Comments: Pt with flat affect throughout treatment, cooperative with minimal encouragement however declines many self care tasks. Patient Currently in Pain: Denies  Restrictions/Precautions: Fall Risk  Overall Orientation Status: Within Functional Limits          Objective  Cognition  Overall Cognitive Status: Impaired  Arousal/Alertness: Appropriate responses to stimuli  Following Directions:  Follows one step commands  Attention Span: Attends with cues to redirect  Safety Judgement: Decreased awareness of need for assistance  Insights: Decreased awareness of deficits  Sequencing and Organization: Assistance required to generate solutions  Perception  Overall Perceptual Status: Impaired  Unilateral Attention: Cues to attend left visual field;Cues to maintain midline in sitting;Cues to maintain midline in standing;Cues to attend to left side of body  Initiation: Hand over hand to initiate tasks  Balance  Sitting Balance: Stand by assistance (short trial of static sitting EOB)  Bed mobility  Supine to Sit: Minimal assistance; Moderate assistance  Transfers  Sit to stand: Dependent/Total (min A EOB>nilson stedy )  Stand to sit: Dependent/Total (mod A nilson stedy >w/c )     Functional Mobility  Functional - Mobility Device: Wheelchair  Activity: To/from bathroom  Assist Level: Moderate assistance     Type of ROM/Therapeutic Exercise  Type of ROM/Therapeutic Exercise: PROM; Free weights (RUE: 3# LUE: PROM )  Comment: Pt engaged in dumbell exercises with RUE to improve overall strength and endurance for funtional trasnsfers and tasks. Verbal and visual cues for proper technique, 2x15 reps completed with short breaks. Exercises  Scapular Protraction: x  Scapular Retraction: x  Shoulder Flexion: x  Shoulder Extension: x  Horizontal ABduction: x  Horizontal ADduction: x  Elbow Flexion: x  Elbow Extension: x     Additional Activities: Other  Additional Activities: Tabletop card task to address visual scanning and memory, moderate repetive cues to locate appropriate cards on L side, minimal cues for memory on which card to locate, good sequencing of cards once located. ADL  Equipment Provided: Reacher  Grooming: Setup  UE Bathing: None (pt declines)  LE Bathing: None (pt declines)  UE Dressing: Minimal assistance; Increased time to complete;Verbal cueing  LE Dressing: Moderate assistance (pt removes socks with reacher, A to [place] B TEDs/shoes/L AFO)  Toileting: None (pt declines)        Assessment  Performance deficits / Impairments: Decreased functional mobility ; Decreased ADL status; Decreased strength;Decreased endurance;Decreased balance;Decreased high-level IADLs;Decreased vision/visual deficit; Decreased cognition;Decreased safe awareness;Decreased ROM; Decreased fine motor control;Decreased coordination;Decreased posture;Decreased sensation  Prognosis: Good  Discharge Recommendations: Patient would benefit from continued therapy after discharge        Equipment Recommendations  Equipment Needed:  (TBD)        Plan  Plan  Times per week: 5-7  Times per day: Twice a day  Current Treatment Recommendations: Strengthening, Positioning, ROM, Safety Education & Training, Balance Training, Patient/Caregiver Education & Training, Self-Care / ADL, Cognitive/Perceptual Training, Functional Mobility Training, Neuromuscular Re-education, Equipment Evaluation, Education, & procurement, Endurance Training, Cognitive Reorientation  Patient Goals   Patient goals : \"get as close to normal as possible. \"  specifies:\"get in and out of bed on my own, shower by myself. \"  Short term goals  Time Frame for Short term goals: 1 week  Short term goal 1: mod A UE bathe and dress  Short term goal 2: set up brush teeth (goal met)  Short term goal 3: min feeding, with cues able to locate L side of tray and scan to locate items on L. Short term goal 4: tolerate 6-8 min sitting edge of bed (static sit)  with min assist and RUE support, head at midline and fair safety awareness  Short term goal 5: pt to initiate reposition LUE with RUE with good safety ie. hold by wrist not by 1 finger  Short term goal 6: demo improved awareness of L side and neuromuscular christian by weight bearing on LUE with physical assist to complete  Short term goal 7: from w/c:  consistently maintain midline trunk control and demo able to lean forward 4 inches away from back of chair without physical assist for adls. Short term goal 8: tolerate 2 min static stand for adls with RUE support with mod of 2 and assist with LLE as needed. Long term goals  Time Frame for Long term goals : by discharge  Long term goal 1: set up and occasional verbal cues ie.  L visual scan for self feeding  Long term goal 2: set up oral care (goal met)  Long term goal 3: max x 1 toileting and adl transfers  Long term goal 4: min UE bathe and dress (shirt)  Long term goal 5: max x 1 LE bathe and dress, able to cross to reach RLE with min assist and keep trunk balance        05/24/21 0824 05/24/21 1447   OT Individual Minutes   Time In 3506 1357   Time Out 0821 1424   Minutes 47 27     Electronically signed by ERIN Aquino on 5/24/21 at 3:00 PM EDT

## 2021-05-24 NOTE — PROGRESS NOTES
Speech Language Pathology  Speech Language Pathology  [address] [place] [address]    Dysphagia Treatment Note    Date: 5/24/2021  Patients Name: Chino Rodriguez  MRN: [id number]  Diagnosis: dysphagia  Patient Active Problem List   Diagnosis Code    Pneumonia of left lower lobe due to infectious organism J18.9    Tobacco abuse Z72.0    Legionella pneumonia ([facility] [address] A48.1    Dysphagia R13.10    Hyperplastic colon polyp K63.5    Esophageal ring K22.2    Gastritis K29.70    Elbow effusion, right M25.421    Stroke due to occlusion of right middle cerebral artery ([facility]) I63.511    Nihss score 15 R29.715    Acute left hemiparesis ([facility]) G81.94    Cerebrovascular accident (CVA) due to occlusion of right middle cerebral artery ([facility]) I63.511    Acute CVA (cerebrovascular accident) ([facility] [address] I63.9    COPD (chronic obstructive pulmonary disease) ([facility]) J44.9    Essential hypertension I10    Hyperlipidemia E78.5    Pre-diabetes R73.03       Pain: Pt. Denies    Dysphagia Treatment  Treatment time:  0514-1461    Subjective: [x] Alert [x] Cooperative     [] Confused     [] Agitated    [] Lethargic    Objective/Assessment:    Pt. Seen for diet tolerance monitoring during lunchtime. Pt. Demonstrating prolonged mastication and mod cues for  clearing of soft and bite sized diet d/t visible pocketing on L side. Pt. Had large mushroom slices, cut up chicken tenders, slightly cooked green beans, did not seem to fit into soft and bite sized diet standardization (IDDSI diet level 6). ST to notify dietary. No overt s/s aspiration of thin liquids via straw noted. Pt. Daughter present and pt. And daughter were educ. Re: reminding pt. To clear L side of mouth when eating.     Plan:  [x] Continue ST services    [] Discharge from ST:        Discharge recommendations: [] Inpatient Rehab   [] [place]   [] Outpatient Therapy  [] Follow up at trauma clinic   [] Other:         Treatment completed by: Noé Kauffman M. A. CARLA/SLP

## 2021-05-24 NOTE — PROGRESS NOTES
Physical Therapy  Facility/Department: [facility] ACUTE REHAB  Daily Treatment Note  NAME: Felicity Apley  : 1955  MRN: [id number]    Date of Service: 2021    Discharge Recommendations:  Patient would benefit from continued therapy after discharge, Home with assist PRN        Assessment   Body structures, Functions, Activity limitations: Decreased functional mobility ; Decreased strength;Decreased safe awareness;Decreased endurance;Decreased balance;Decreased posture;Decreased coordination;Decreased vision/visual deficit; Decreased fine motor control  Assessment: Increased hip flexion noted today during ambulation resulting in fair initiation of swing , particularly if in a good walking rhythm; continues to require total assist for L knee extension. Discussed with daughter, therapy progress, needs 2 person assist for safety for transfers on weak side. Pt will need a ramp at [facility] entrance to enter/exit at home. Treatment Diagnosis: Weakness, impaired mobility  Prognosis: Good  Decision Making: Medium Complexity  Exam: ROM, MMT, fucntion, PASS score  REQUIRES PT FOLLOW UP: Yes  Activity Tolerance  Activity Tolerance: Patient limited by endurance     Patient Diagnosis(es): There were no encounter diagnoses. has a past medical history of Asthma, COPD (chronic obstructive pulmonary disease) ([facility] [address] Diabetes mellitus ([facility] [address] Esophageal ring, Gastritis, Hyperlipidemia, Hyperplastic colon polyp, Hypertension, Osteoarthritis, Patient in clinical research study, and TIA (transient ischemic attack). has a past surgical history that includes Appendectomy; Hysterectomy; Esophagus dilation; Colonoscopy (2017); Endoscopy, colon, diagnostic; pr egd transoral biopsy single/multiple (N/A, 2017); pr colsc flx w/rmvl of tumor polyp lesion snare tq (N/A, 2017); and Upper gastrointestinal endoscopy (2017).     Restrictions  Restrictions/Precautions  Restrictions/Precautions: Fall Risk  Required Braces or lift)  Other Apparatus: Left;Slider;AFO;Wheelchair follow  Assistance: 2 Person assistance;Maximum assistance (+ 3rd assist to steer walker)  Quality of Gait: Demonstrated increased hip flexion today during L LE swing,  but continues to need assist to advance L LE , but largely dependent to maintain L knee extension. Fair core stabilization & weight shift. Pt ambulates with R LE too close to midline, max cues to widen NIGHAT with little carry over. Gait Deviations: Decreased step length; Slow Juanita;Decreased step height  Distance: 60 ft  Comments: Working on wt shifting and weightbearing into L LE. Placing L foot each step, providing L knee stability and L hip extension during stance phase. Pt demonstrates Fair but fleeting control of hip rotation/extension. Ambulation 2  Surface - 2: level tile  Device 2: [place] rail (R UE)  Other Apparatus 2: Left;Slider;AFO;Wheelchair follow (blue shoe cover/slider)  Assistance 2: 2 Person assistance; Moderate assistance  Quality of Gait 2: same as above, however more fatigued and having increase difficulty  Gait Deviations: Slow Juanita;Decreased step length;Decreased step height  Distance: 18 ft x 2  Comments: Therapist locking Left knee all the time, not much quad contractions felt in stance phase. Stairs/Curb  Stairs?: No  Wheelchair Activities  Wheelchair Parts Management: No (Brakes on back of wheelchair; Pt cannot reach)  Propulsion: Yes  Propulsion 1  Propulsion: Manual  Level: Level Tile  Method: RUE;RLE  Level of Assistance: Contact guard assistance  Description/ Details: Repeated verbal cues to avoid objects, wall, rail on her L (fair return); fails to scan environment without cues. Pt able to complete 90º turns without technique cue. Uncoordinated/sporadic use of UE + LE. Short distance backing up.    Distance: 200'  Neuromuscular Education  Neuromuscular education: Yes  Vibration: vibration applied to L LE to stimuate sensory receptors in muscles ( quads aand anterior tibalis) to increase functional return, PROM facilitated with desired ROM, pt supine/seated in bed with fair tolerance to vibrations. Very minimal response noted with vibration. Pt hypersensitive and has pain with vibration/touch, but tolertes slight better today than last week. Balance  Posture: Poor (Pt largely unaware of posture/requires cues to attend)  Sitting - Static: Fair (Edge of bed, no back support, R UE support. Mattress inflate)  Sitting - Dynamic: Fair;- (Edge of mat, no back support, R UE support. Min A)  Standing - Static: Poor;+  Standing - Dynamic: Poor (3 assist with walker lift)  Comments: Standing in SS, left lateral lean, Min A for safety. Other exercises  Other exercises?: Yes  Other exercises 1: Standing [facility]- working on L knee extension/quad contraction, stood for 1 minutes  Other exercises 2: Seated L LE active assisted Hip ex's, PROM at knee and ankle. 15 reps. R LE 3 lbs, 20 reps  Other exercises 3: Supine LLE- PROM to AAROM to faciliate muscle contrations/strengthening. Other exercises 8: R sidelying: antigravity table used with vibrator working on stimulating LLE. Was able to elicite min HS and DF for a couple repititions. Other exercises 9: Squat pivot transfers x 4 (Impulsive, requires cues to attend positioning, sequencing)  Other exercises 12:  Bridges completed x 20,  with edu on importance of repositioning. Comment: rest breaks PRN. G-Code     OutComes Score                                                     AM-PAC Score             Goals  Short term goals  Time Frame for Short term goals: 10 days  Short term goal 1: Pt able to roll in bed side to side at min A without bed rail  Short term goal 2: Pt able to perform supine<>sit at mod A  Short term goal 3: Pt able to perform sit<>stand at min/mod A .   Short term goal 4: Pt able to progress to pivot transfers at max A   Short term goal 5: Pt able to improve sitting balance at EOB/EOM at [facility] for static balance and able to track to left side as well as turn to head to left side to scan her environment with minimal cues. Short term goal 6: Pt able to tolerate standing in // bars with R UE support and assist at mod A  Short term goal 7: Pt able to propel w/c with R UE/R LE distance of 50 to 80 ft, straight path and turn around 180 degrees, at min/mod A   Long term goals  Time Frame for Long term goals : By DC  Long term goal 1: Pt able to roll in bed mod-I  Long term goal 2: Pt able to perform supine <> sit at CGA/min A  Long term goal 3: Pt able to perform pivot transfers min A / mod A and able to scan Left side environment. Long term goal 4: Pt able to ambulate in // bars, or with appropriate assistive device and/or LE bracing, distance of  20 to 30 ft, min/mod A x 2. Long term goal 5: Pt able to propel w/c on level surfaces distance 100 to 150 ft, SBA/CGA. Long term goal 6: Educate pt/family in safe technique for mobility and  to go up and down steps to enter/exit home. Long term goal 7: Improve sitting balance at EOM to good, and standing balance with R UE support to fair- to reduce fall risk. Long term goal 8: Improve PASS score to 20/36 improve overall function. Patient Goals   Patient goals : Get better and go home    Plan    Plan  Times per week: 1.5 hr/day, 5 to 7 days/week  Specific instructions for Next Treatment: Assess comfort of new cushion.    Current Treatment Recommendations: Strengthening, Balance Training, Functional Mobility Training, Transfer Training, Endurance Training, Wheelchair Mobility Training, Gait Training, Stair training, Neuromuscular Re-education, Home Exercise Program, Safety Education & Training, Patient/Caregiver Education & Training, Modalities, Positioning, ROM, Equipment Evaluation, Education, & procurement  Safety Devices  Type of devices: Gait belt, Patient at risk for falls, Call light within reach, All fall risk precautions in place, Left in chair  Restraints  Initially in place: No     Therapy Time           05/24/21 1031 05/24/21 1426   PT Individual Minutes   Time In 1031 1426   Time Out 1126 1501   Minutes [address] [place], PT

## 2021-05-24 NOTE — PROGRESS NOTES
Speech Language Pathology  Speech Language Pathology  [facility] Acute Rehab Unit at [facility]    Dysphagia/SpeechLanguage Treatment Note    Date: 5/24/2021  Patients Name: Felicity Apley  MRN: [id number]  Diagnosis:   Patient Active Problem List   Diagnosis Code    Pneumonia of left lower lobe due to infectious organism J18.9    Tobacco abuse Z72.0    Legionella pneumonia ([facility] [address] A48.1    Dysphagia R13.10    Hyperplastic colon polyp K63.5    Esophageal ring K22.2    Gastritis K29.70    Elbow effusion, right M25.421    Stroke due to occlusion of right middle cerebral artery ([facility]) I63.511    Nihss score 15 R29.715    Acute left hemiparesis ([facility]) G81.94    Cerebrovascular accident (CVA) due to occlusion of right middle cerebral artery ([facility] [address] I63.511    Acute CVA (cerebrovascular accident) ([facility] [address] I63.9    COPD (chronic obstructive pulmonary disease) ([facility]) J44.9    Essential hypertension I10    Hyperlipidemia E78.5    Pre-diabetes R73.03       Pain: 0/10    Speech/Cognitive/Dysphagia Treatment    Treatment time:  3684-4048    Subjective: [x] Alert [x] Cooperative     [] Confused     [] Agitated    [] Lethargic    Objective/Assessment:  Attention: Mod verbal cues needed for increased attention to midline, pt. c extreme L sided neglect and mod A to redirect to task overall. Pt. completed visual scanning exercise x1 to locate items on the L side c very min A, x1 c mod A. Pt. Answered ?s re: appt card she is looking at c 57%, 100% c cues to look to L. Recall: n/a      Organization: n/a    Problem Solving/Reasoning:  n/a    Other:  ST utilized vital stim for L sided oral sling (one channel only), 6.0 mA for 25 minutes. Pt. Completed oral motor exercises x3, 10 reps each  with NMES in place. Pt. Reported tolerating advanced diet (soft and bite sized) well. ST to monitor diet tolerance as able during lunch time today.      Plan:  [x] Continue ST services    [] Discharge from ST:      Discharge recommendations: [] Inpatient Rehab   [] [place]   [] Outpatient Therapy  [] Follow up at trauma clinic   [] Other:       Treatment completed by: Jyothi Dawkins A.CCC/SLP

## 2021-05-24 NOTE — PLAN OF CARE
Problem: Neurological  Goal: Maximum potential motor/sensory/cognitive function  5/21/2021 0456 by Georges White RN  Outcome: Ongoing     Problem: Neurological  Goal: Maximum potential motor/sensory/cognitive function  Outcome: Ongoing     Problem: Nutrition  Goal: Optimal nutrition therapy  5/21/2021 0456 by Georges White RN  Outcome: Ongoing     Problem: Pain:  Goal: Pain level will decrease  Description: Pain level will decrease  Outcome: Ongoing  Patient reports relief with tylenol, denies the need for anything else at this time. Patient states the only pain she has at this time is in her left arm and shoulder which is better during the day due to the use of the sling.     Problem: Pain:  Goal: Control of acute pain  Description: Control of acute pain  Outcome: Ongoing     Problem: Skin Integrity:  Goal: Will show no infection signs and symptoms  Description: Will show no infection signs and symptoms  5/21/2021 0456 by Georges White RN  Outcome: Ongoing     Problem: Skin Integrity:  Goal: Absence of new skin breakdown  Description: Absence of new skin breakdown  5/21/2021 0456 by Georges White RN  Outcome: Ongoing  Skin assessment completed this shift. Nutrition and Hydration status assessed with adequate intake. Ruy Score as charted. Pressure Relief Overlay remains intact and inflated to patient's bed throughout the shift. Bilateral heels remain elevated on pillows throughout the shift. Patient tolerates repositioning by staff at least every 2 hours. Patient verbalizes understanding of pressure ulcer prevention measures. Skin integrity maintained. No new skin breakdown noted. Skin to high risk pressure areas including coccyx and heels are clear.  Ryann / Incontinence care provided as needed throughout the shift.

## 2021-05-24 NOTE — PATIENT CARE CONFERENCE
[address]   ACUTE REHABILITATION  TEAM CONFERENCE NOTE  Date: 21  Patient Name: Kevin Camacho       Room: 0297/0605-40  MRN: [id number]       : 1955  (66 y.o.)     Gender: female   Referring Practitioner: Dr Lucia Valentin CVA (cerebrovascular accident) [facility]) [I63.9]  Diagnosis: ischemic CVA,  subacute right MCA distribution infarct, L lynette, dysphagia, L neglect, loop recorder 21 (Dr. Shaina Thomson)     NURSING  Bladder  Incontinent Less Than Daily  Bowel   Always Continent  Date of Last BM:   Intervention    Both Bowel & Bladder Program     Wounds/Incisions/Ulcers: No skin issues identified  Medication Education Program: Patient currently unable to manage medications and family being educated  Pain: Patient's pain is currently controlled with -  tylenol    Fall Risk:  Falling star program initiated    PHYSICAL THERAPY  Bed mobility  Rolling to Left: Minimal assistance  Rolling to Right: Maximum assistance  Supine to Sit: Moderate assistance;Minimal assistance  Sit to Supine: Minimal assistance  Comment: Continued education for L UE protection, poor to fait carry over due to impulsive and left neglect. Transfers:  Sit to Stand: Moderate Assistance  Stand to sit: Moderate Assistance  Bed to Chair: Dependent/Total (max a to strong side, 2 assist to weak side. )  Squat Pivot Transfers: Maximum Assistance (Impulsive. Assist c positioning, set-up; safety cues)  Comment: rest breaks PRN. Ambulation 1  Surface: level tile  Device:  (walker lift)  Other Apparatus: Left;Slider;AFO;Wheelchair follow  Assistance: 2 Person assistance;Maximum assistance (+ 3rd assist to steer walker)  Quality of Gait: Demonstrated increased hip flexion today during L LE swing,  but continues to need assist to advance L LE , but largely dependent to maintain L knee extension. Fair core stabilization & weight shift.  Pt ambulates with R LE too close to midline, max cues to widen NIGHAT with little distance of 50 to 80 ft, straight path and turn around 180 degrees, at min/mod [facility] Provided: Reacher  Eating            Mod A     Oral Hygiene            Setup;Supervision   Shower/Bathe Self             UE Bathing: None (pt declines to bathe)  LE Bathing: None (pt declined to bathe)   Upper Body Dressing            Minimal assistance; Increased time to complete (pt demonstrates fair- lynette technique for OH shirt.  )   Lower Body Dressing            Putting On/Taking Off Footwear             Maximum assistance; Increased time to complete (A to thread/pull up on L side/tie pants, [place] TEDs/shoes/AFO)   Toilet Transfer               Did not Toilet during OT session   [address]            None (pt declined need to toilet during OT session)    Bed mobility  Supine to Sit: Minimal assistance; Moderate assistance  Balance  Sitting Balance: Stand by assistance (short trial of static sitting EOB)    Equipment Recommendations  Equipment Needed:  (TBD)  Assessment: severely impaired loss of adl indep post CVA with severe L lynette deficits, dysphagia, L neglect    Short term goals  Time Frame for Short term goals: 1 week  Short term goal 1: mod A UE bathe and dress  Short term goal 2: set up brush teeth (goal met)  Short term goal 3: min feeding, with cues able to locate L side of tray and scan to locate items on L. Short term goal 4: tolerate 6-8 min sitting edge of bed (static sit)  with min assist and RUE support, head at midline and fair safety awareness  Short term goal 5: pt to initiate reposition LUE with RUE with good safety ie. hold by wrist not by 1 finger  Short term goal 6: demo improved awareness of L side and neuromuscular christian by weight bearing on LUE with physical assist to complete  Short term goal 7: from w/c:  consistently maintain midline trunk control and demo able to lean forward 4 inches away from back of chair without physical assist for adls.   Short term goal 8: tolerate 2 min static stand for adls with RUE support with mod of 2 and assist with LLE as needed. SPEECH THERAPY  Pt. On soft and bite sized, thin liquids. Significant L visual neglect, supervision level memory and problem solving  Short Term Goal: diet at least restrictive consistency, mod I memory and problem solving    NUTRITION  Weight: 191 lb 8 oz (86.9 kg) / Body mass index is 29.12 kg/m². Diet Rx: Carbohydrate Control, 3-4 gm Na, Dysphagia Soft and Bite-Sized. Glucerna twice daily. PO intake appears fair with intake of 35-50% of meals and taking supplements. Ref. Range 5/24/2021 06:34 5/24/2021 11:31 5/24/2021 16:03 5/24/2021 21:23 5/25/2021 06:47   POC Glucose Latest Ref Range: 65 - 105 mg/dL 161 (H) 117 (H) 111 (H) 113 (H) 128 (H)   Please see nutrition note for details. SOCIAL WORK ASSESSMENT  Assessment: Pt lives alone and will not have 24 hr support. Family is requesting additional time. She has new insurance per daughter Kevin Peña; Romaine Blair, which goes into affect on 6/1/2021. Pre-Admission Status:  Lives With: Alone  Type of Home: House  Home Layout: Two level, Able to Live on Main level with bedroom/bathroom, Laundry in basement  Home Access: Stairs to enter without rails  Entrance Stairs - Number of Steps: 3 (Front entrance)  Bathroom Shower/Tub: Tub/Shower unit, Curtain (tub shower is on main floor)  Bathroom Toilet: Standard  Bathroom Equipment: Hand-held shower  Home Equipment:  (No DME)  ADL Assistance: [address]: Independent  Homemaking Responsibilities: Yes  Ambulation Assistance: Independent  Transfer Assistance: Independent  Active : Yes  Mode of Transportation: [facility]  Occupation: Retired  Type of occupation: grocery store employee  Leisure & Hobbies: repurposing items, has a dog and fenced in back yard. IADL Comments: dtr, Savage Daley RN lives in [facility], Kevin Peña lives 20 min away, sonSimi lives 4 blocks away.   Additional Comments: Pt

## 2021-05-25 LAB
ANION GAP SERPL CALCULATED.3IONS-SCNC: 11 MMOL/L (ref 9–17)
BUN BLDV-MCNC: 13 MG/DL (ref 8–23)
BUN/CREAT BLD: ABNORMAL (ref 9–20)
CALCIUM SERPL-MCNC: 9.3 MG/DL (ref 8.6–10.4)
CHLORIDE BLD-SCNC: 106 MMOL/L (ref 98–107)
CO2: 24 MMOL/L (ref 20–31)
CREAT SERPL-MCNC: 0.48 MG/DL (ref 0.5–0.9)
GFR AFRICAN AMERICAN: >60 ML/MIN
GFR NON-AFRICAN AMERICAN: >60 ML/MIN
GFR SERPL CREATININE-BSD FRML MDRD: ABNORMAL ML/MIN/{1.73_M2}
GFR SERPL CREATININE-BSD FRML MDRD: ABNORMAL ML/MIN/{1.73_M2}
GLUCOSE BLD-MCNC: 111 MG/DL (ref 70–99)
GLUCOSE BLD-MCNC: 123 MG/DL (ref 65–105)
GLUCOSE BLD-MCNC: 128 MG/DL (ref 65–105)
GLUCOSE BLD-MCNC: 144 MG/DL (ref 65–105)
GLUCOSE BLD-MCNC: 180 MG/DL (ref 65–105)
HCT VFR BLD CALC: 41.4 % (ref 36–46)
HEMOGLOBIN: 13.8 G/DL (ref 12–16)
MCH RBC QN AUTO: 30.5 PG (ref 26–34)
MCHC RBC AUTO-ENTMCNC: 33.3 G/DL (ref 31–37)
MCV RBC AUTO: 91.7 FL (ref 80–100)
NRBC AUTOMATED: ABNORMAL PER 100 WBC
PDW BLD-RTO: 15 % (ref 11.5–14.9)
PLATELET # BLD: 343 K/UL (ref 150–450)
PMV BLD AUTO: 7.5 FL (ref 6–12)
POTASSIUM SERPL-SCNC: 4.3 MMOL/L (ref 3.7–5.3)
RBC # BLD: 4.51 M/UL (ref 4–5.2)
SODIUM BLD-SCNC: 141 MMOL/L (ref 135–144)
WBC # BLD: 9.8 K/UL (ref 3.5–11)

## 2021-05-25 PROCEDURE — 6370000000 HC RX 637 (ALT 250 FOR IP): Performed by: STUDENT IN AN ORGANIZED HEALTH CARE EDUCATION/TRAINING PROGRAM

## 2021-05-25 PROCEDURE — 97112 NEUROMUSCULAR REEDUCATION: CPT

## 2021-05-25 PROCEDURE — 6360000002 HC RX W HCPCS: Performed by: PHYSICAL MEDICINE & REHABILITATION

## 2021-05-25 PROCEDURE — 1180000000 HC REHAB R&B

## 2021-05-25 PROCEDURE — 92507 TX SP LANG VOICE COMM INDIV: CPT

## 2021-05-25 PROCEDURE — 99232 SBSQ HOSP IP/OBS MODERATE 35: CPT | Performed by: PHYSICAL MEDICINE & REHABILITATION

## 2021-05-25 PROCEDURE — 92526 ORAL FUNCTION THERAPY: CPT

## 2021-05-25 PROCEDURE — 82947 ASSAY GLUCOSE BLOOD QUANT: CPT

## 2021-05-25 PROCEDURE — 80048 BASIC METABOLIC PNL TOTAL CA: CPT

## 2021-05-25 PROCEDURE — 97530 THERAPEUTIC ACTIVITIES: CPT

## 2021-05-25 PROCEDURE — 6370000000 HC RX 637 (ALT 250 FOR IP): Performed by: PHYSICAL MEDICINE & REHABILITATION

## 2021-05-25 PROCEDURE — 97535 SELF CARE MNGMENT TRAINING: CPT

## 2021-05-25 PROCEDURE — 36415 COLL VENOUS BLD VENIPUNCTURE: CPT

## 2021-05-25 PROCEDURE — 99231 SBSQ HOSP IP/OBS SF/LOW 25: CPT | Performed by: INTERNAL MEDICINE

## 2021-05-25 PROCEDURE — 97542 WHEELCHAIR MNGMENT TRAINING: CPT

## 2021-05-25 PROCEDURE — 85027 COMPLETE CBC AUTOMATED: CPT

## 2021-05-25 PROCEDURE — 97116 GAIT TRAINING THERAPY: CPT

## 2021-05-25 PROCEDURE — 97110 THERAPEUTIC EXERCISES: CPT

## 2021-05-25 RX ADMIN — AMITRIPTYLINE HYDROCHLORIDE 25 MG: 25 TABLET, FILM COATED ORAL at 21:23

## 2021-05-25 RX ADMIN — ACETAMINOPHEN 1000 MG: 500 TABLET, FILM COATED ORAL at 21:18

## 2021-05-25 RX ADMIN — ASPIRIN 81 MG CHEWABLE TABLET 81 MG: 81 TABLET CHEWABLE at 08:42

## 2021-05-25 RX ADMIN — HEPARIN SODIUM 5000 UNITS: 5000 INJECTION INTRAVENOUS; SUBCUTANEOUS at 06:48

## 2021-05-25 RX ADMIN — HEPARIN SODIUM 5000 UNITS: 5000 INJECTION INTRAVENOUS; SUBCUTANEOUS at 15:10

## 2021-05-25 RX ADMIN — ACETAMINOPHEN 1000 MG: 500 TABLET, FILM COATED ORAL at 15:10

## 2021-05-25 RX ADMIN — Medication 2 PUFF: at 08:43

## 2021-05-25 RX ADMIN — AMLODIPINE BESYLATE 5 MG: 5 TABLET ORAL at 08:42

## 2021-05-25 RX ADMIN — FLUOXETINE HYDROCHLORIDE 20 MG: 20 CAPSULE ORAL at 08:42

## 2021-05-25 RX ADMIN — LEVOTHYROXINE SODIUM 25 MCG: 0.03 TABLET ORAL at 06:48

## 2021-05-25 RX ADMIN — Medication 2 PUFF: at 21:18

## 2021-05-25 RX ADMIN — ACETAMINOPHEN 1000 MG: 500 TABLET, FILM COATED ORAL at 06:48

## 2021-05-25 RX ADMIN — ATORVASTATIN CALCIUM 80 MG: 80 TABLET, FILM COATED ORAL at 08:42

## 2021-05-25 RX ADMIN — INSULIN LISPRO 1 UNITS: 100 INJECTION, SOLUTION INTRAVENOUS; SUBCUTANEOUS at 21:18

## 2021-05-25 RX ADMIN — HEPARIN SODIUM 5000 UNITS: 5000 INJECTION INTRAVENOUS; SUBCUTANEOUS at 21:19

## 2021-05-25 ASSESSMENT — PAIN SCALES - GENERAL
PAINLEVEL_OUTOF10: 0
PAINLEVEL_OUTOF10: 4

## 2021-05-25 NOTE — PROGRESS NOTES
Physical Medicine & Rehabilitation  Progress Note    5/25/2021 6:11 PM     CC: Ambulatory and ADL dysfunction due to left nondominant hemiplegia due to ischemic CVA    Subjective:   No complaints. No pain. Bowels and bladder okay last BM 5/22. ROS:  Denies fevers, chills, sweats. No chest pain, palpitations, lightheadedness. Denies coughing, wheezing or shortness of breath. Denies abdominal pain, nausea, diarrhea or constipation. No new areas of joint pain. Denies new areas of numbness or weakness. Denies new anxiety or depression issues. No new skin problems. Rehabilitation:   PT:  Restrictions/Precautions: Fall Risk  Implants present? :  (loop recorder)  Other position/activity restrictions:  L neglect with LUE/LLE deficits, neglect improving. Transfers  Sit to Stand: Minimal Assistance (L UE assist)  Stand to sit: Moderate Assistance (R UE assist, lacks L eccentric control)  Bed to Chair: Maximum assistance (max a c squat pivot; cues for safety with fair return. )  Stand Pivot Transfers: Dependent/Total Canales Grise)  Squat Pivot Transfers: Maximum Assistance (Impulsive. Assist c positioning, set-up; safety cues)  Lateral Transfers: Minimal Assistance  Comment: L neglect improving: Pt reponds to cues for L UE safety, positioning >50%; Pt demos a left lateral lean, cues with improvements to fix. Min A for safety. Ambulation 1  Surface: level tile  Device:  (walker lift)  Other Apparatus: Left, Slider, AFO, Wheelchair follow  Assistance: 2 Person assistance, Maximum assistance (+ 3rd assist to steer walker)  Quality of Gait: L hip flexion continuing, but continues to need assist to fully advance L LE and largely dependent to extend L knee & hip. Requires repeated cues to increase R step length initially, improves with better ambulatory rhythm.    Gait Deviations: Decreased step length, Slow Juanita, Decreased step height  Distance: 60 ft  Comments: Working on wt shifting and weightbearing into L LE. Placing L foot each step, providing L knee stability and L hip extension during stance phase. Pt demonstrates Fair but fleeting control of hip rotation/extension. OT:  ADL  Equipment Provided: Reacher  Feeding: Verbal cueing, Setup (cues to locate silverware on L side. )  Grooming: Setup, Supervision  UE Bathing: None (pt declines. )  LE Bathing: None (pt declined)  UE Dressing: Minimal assistance, Increased time to complete (pt demonstrates fair- lynette technique for OH shirt.  )  LE Dressing: Maximum assistance, Increased time to complete (A to thread/pull up on L side/tie pants, [place] TEDs/shoes/AFO)  Toileting: None (pt declined)  Additional Comments: assist with orientation of shirt; noted backward however pt declined to correct. Balance  Sitting Balance: Minimal assistance (SBA-static, CGA/min A-dynamic. x2 lateral LOBs. )  Standing Balance: Dependent/Total (CGA in nilson galdamez with BUE, min-mod while using RUE for task)   Standing Balance  Time: <1 min x 2  Activity: AM: transfers, LB dressing  Comment: nilson galdamez utilized for standing this date, decreased note of L side lean this date. Functional Mobility  Functional - Mobility Device: Wheelchair  Activity: To/from bathroom  Assist Level: Moderate assistance  Functional Mobility Comments: pt navigates herself from bathroom at wheelchair level     Bed mobility  Bridging: Minimal assistance (positioning L LE)  Rolling to Left: Minimal assistance  Rolling to Right: Maximum assistance  Supine to Sit: Minimal assistance, Moderate assistance (HOB elevated. Assist with LLE over EOB)  Sit to Supine: Moderate assistance (Assist with BLEs over EOB. )  Scooting: Minimal assistance (L LE positioning to scoot back in chair)  Comment: use of bed rail. Moderate assist to bring trunk upright following LOB.    Transfers  Stand Step Transfers: Dependent/Total  Stand Pivot Transfers: Maximum assistance (w/c>EOB towards R side. )  Sit to stand: Dependent/Total (min A in nilson stedy)  Stand to sit: Dependent/Total (CGA to nilson stedy paddles, mod A to w/c. )  Transfer Comments: pt impulsive req cuing to wait for assist with proper LLE/UE placement/positioning. Toilet Transfers  Toilet - Technique:  (nilson stedy )  Equipment Used: Raised toilet seat without rails  Toilet Transfer: Dependent/Total (nilson stedy, min-mod A)  Toilet Transfers Comments: Completed with use of nilson stedy with min-mod A to complete. Wheelchair Bed Transfers  Wheelchair/Bed - Technique:  Martinaea Alba stejonn)  Equipment Used: Bed, Wheelchair, Other (nilson steady )  Level of Asssistance: Dependent/Total (Min A x 2 with nilson stedy)      ST:    Objective/Assessment:  Attention: Mod verbal cues needed for increased attention to midline, pt. c extreme L sided neglect and mod A to redirect to task overall.       Recall: n/a       Organization: n/a     Problem Solving/Reasoning:  n/a     Other:  ST utilized vital stim for L sided oral sling (one channel only), 7.0 mA for 9 minutes. Pt. Completed oral motor exercises x3, 15 reps each  with NMES in place. Treatment discontinued d/t pt. To have scheduled medicare rep phone call, Mynor Verde was not aware of appt. Objective:  /69   Pulse 81   Temp 98.1 °F (36.7 °C) (Oral)   Resp 18   Ht 5' 8\" (1.727 m)   Wt 191 lb 8 oz (86.9 kg)   SpO2 93%   BMI 29.12 kg/m²  I Body mass index is 29.12 kg/m². I   Wt Readings from Last 1 Encounters:   05/10/21 191 lb 8 oz (86.9 kg)      Temp (24hrs), Av.1 °F (36.7 °C), Min:98.1 °F (36.7 °C), Max:98.1 °F (36.7 °C)         GEN: well developed, well nourished, no acute distress  HEENT: Normocephalic atraumatic, EOMI, mucous membranes pink and moist  CV: RRR, no murmurs, rubs or gallops  PULM: CTAB, no rales or rhonchi. Respirations WNL and unlabored  ABD: soft, NT, ND, +BS and equal  NEURO: A&O x3. Sensation intact to light touch.   Alert and oriented x3 knew year, president location, follows command, - neuro exam no change, follows commands, names objects  MSK: 4+/5 right upper lower extremity, dense left hemiparesis distal left upper lower extremity 0/5 proximal left upper extremity 1/5, left hip flexion 1-2/5, no tone-no change  EXTREMITIES: No calf tenderness to palpation bilaterally. No edema BLEs, range of motion and palpation of joints and spine with no increased pain, no swelling or tenderness  SKIN: warm dry and intact with good turgor  PSYCH: appropriately interactive. Affect WNL. Medications   Scheduled Meds:   FLUoxetine  20 mg Oral Daily    amitriptyline  25 mg Oral Nightly    heparin (porcine)  5,000 Units Subcutaneous 3 times per day    acetaminophen  1,000 mg Oral 3 times per day    amLODIPine  5 mg Oral Daily    aspirin  81 mg Oral Daily    atorvastatin  80 mg Oral Daily    budesonide-formoterol  2 puff Inhalation BID    insulin lispro  0-3 Units Subcutaneous Nightly    insulin lispro  0-6 Units Subcutaneous TID WC    levothyroxine  25 mcg Oral Daily    nicotine  1 patch Transdermal Daily    tiotropium  2 puff Inhalation Daily    polyethylene glycol  17 g Oral Daily     Continuous Infusions:   sodium chloride       PRN Meds:.polyvinyl alcohol, sodium chloride flush, promethazine **OR** ondansetron, sodium chloride, albuterol, dextrose, glucagon (rDNA), glucose, senna, bisacodyl     Diagnostics:     CBC:   Recent Labs     05/25/21  0654   WBC 9.8   RBC 4.51   HGB 13.8   HCT 41.4   MCV 91.7   RDW 15.0*        BMP:   Recent Labs     05/25/21  0654      K 4.3      CO2 24   BUN 13   CREATININE 0.48*     BNP: No results for input(s): BNP in the last 72 hours. PT/INR: No results for input(s): PROTIME, INR in the last 72 hours. APTT: No results for input(s): APTT in the last 72 hours. CARDIAC ENZYMES: No results for input(s): CKMB, CKMBINDEX, TROPONINT in the last 72 hours.     Invalid input(s): CKTOTAL;3  FASTING LIPID PANEL:  Lab Results   Component Value Date

## 2021-05-25 NOTE — PROGRESS NOTES
Diet, Continue Oral Nutrition Supplement  Nutrition Education/Counseling:  No recommendation at this time   Coordination of Nutrition Care:  Continue to monitor while inpatient    Goals:  PO intake % of meals and supplements       Nutrition Monitoring and Evaluation:   Behavioral-Environmental Outcomes:  None Identified   Food/Nutrient Intake Outcomes:  Food and Nutrient Intake, Supplement Intake  Physical Signs/Symptoms Outcomes:  Biochemical Data, GI Status, Fluid Status or Edema, Weight, Skin     Discharge Planning: Too soon to determine     Some areas of assessment may be incomplete due to standard COVID-19 Precautions. Senia Baca R.D., L.D.   Phone: [phone]

## 2021-05-25 NOTE — PROGRESS NOTES
Speech Language Pathology  Speech Language Pathology  [facility] Acute Rehab Unit at [address]     Dysphagia/SpeechLanguage Treatment Note    Date: 5/25/2021  Patients Name: Daiana Jarrell  MRN: [id number]  Diagnosis:   Patient Active Problem List   Diagnosis Code    Pneumonia of left lower lobe due to infectious organism J18.9    Tobacco abuse Z72.0    Legionella pneumonia ([facility] [address] A48.1    Dysphagia R13.10    Hyperplastic colon polyp K63.5    Esophageal ring K22.2    Gastritis K29.70    Elbow effusion, right M25.421    Stroke due to occlusion of right middle cerebral artery ([facility]) I63.511    Nihss score 15 R29.715    Acute left hemiparesis ([facility]) G81.94    Cerebrovascular accident (CVA) due to occlusion of right middle cerebral artery ([facility] [address] I63.511    Acute CVA (cerebrovascular accident) ([facility] [address] I63.9    COPD (chronic obstructive pulmonary disease) ([facility]) J44.9    Essential hypertension I10    Hyperlipidemia E78.5    Pre-diabetes R73.03       Pain: 0/10    Speech/Cognitive/Dysphagia Treatment    Treatment time:  1742-3872    (short session d/t daughter arriving with phone for pt. To have scheduled  medicare rep phone call)    Subjective: [x] Alert [x] Cooperative     [] Confused     [] Agitated    [] Lethargic    Objective/Assessment:  Attention: Mod verbal cues needed for increased attention to midline, pt. c extreme L sided neglect and mod A to redirect to task overall. Recall: n/a      Organization: n/a    Problem Solving/Reasoning:  n/a    Other:  ST utilized vital stim for L sided oral sling (one channel only), 7.0 mA for 9 minutes. Pt. Completed oral motor exercises x3, 15 reps each  with NMES in place. Treatment discontinued d/t pt. To have scheduled medicare rep phone call, Jose Cruz Lopez was not aware of appt.      Plan:  [x] Continue  services    [] Discharge from ST:      Discharge recommendations: [] Inpatient Rehab   [] [place]   [] Outpatient Therapy  [] Follow up at trauma clinic   [] Other:       Treatment completed by: Carley Edwards A.CCC/SLP

## 2021-05-25 NOTE — PROGRESS NOTES
change    Family History: @no change    Vitals:      I/O (24Hr): No intake or output data in the 24 hours ending 21 1626    Labs:    URINE ANALYSIS: No results found for: LABURIN     CBC:  Lab Results   Component Value Date    WBC 9.8 2021    HGB 13.8 2021     2021        BMP:    Lab Results   Component Value Date     2021    K 4.3 2021     2021    CO2 24 2021    BUN 13 2021    CREATININE 0.48 2021    GLUCOSE 111 2021      LIVER PROFILE:  Lab Results   Component Value Date    ALT 13 10/16/2017    AST 13 10/16/2017    PROT 6.9 10/16/2017    BILITOT 0.22 10/16/2017    BILIDIR 0.11 10/16/2017    LABALBU 4.0 10/16/2017               Radiology:  Medications:      Allergies:      Current Meds:   Scheduled Meds:    FLUoxetine  20 mg Oral Daily    amitriptyline  25 mg Oral Nightly    heparin (porcine)  5,000 Units Subcutaneous 3 times per day    acetaminophen  1,000 mg Oral 3 times per day    amLODIPine  5 mg Oral Daily    aspirin  81 mg Oral Daily    atorvastatin  80 mg Oral Daily    budesonide-formoterol  2 puff Inhalation BID    insulin lispro  0-3 Units Subcutaneous Nightly    insulin lispro  0-6 Units Subcutaneous TID WC    levothyroxine  25 mcg Oral Daily    nicotine  1 patch Transdermal Daily    tiotropium  2 puff Inhalation Daily    polyethylene glycol  17 g Oral Daily     Continuous Infusions:    sodium chloride       PRN Meds: polyvinyl alcohol, sodium chloride flush, promethazine **OR** ondansetron, sodium chloride, albuterol, dextrose, glucagon (rDNA), glucose, senna, bisacodyl      Physical Examination:        /69   Pulse 81   Temp 98.1 °F (36.7 °C) (Oral)   Resp 18   Ht 5' 8\" (1.727 m)   Wt 191 lb 8 oz (86.9 kg)   SpO2 93%   BMI 29.12 kg/m²   Temp (24hrs), Av.1 °F (36.7 °C), Min:98.1 °F (36.7 °C), Max:98.1 °F (36.7 °C)    Recent Labs     21  2123 21  0647 21  [phone]  Answering Service: [phone]

## 2021-05-25 NOTE — PROGRESS NOTES
maintain midline in sitting;Cues to maintain midline in standing;Cues to attend to left side of body  Initiation: Cues to initiate tasks (occ hand over hand)  Motor Planning: Cues to use objects appropriately (appropriate lynette technique assist. )  Balance  Sitting Balance: Minimal assistance (SBA-static, CGA/min A-dynamic. x2 lateral LOBs. )  Standing Balance: Dependent/Total (CGA in nilson galdamez with BUE, min-mod while using RUE for task)  Bed mobility  Supine to Sit: Minimal assistance; Moderate assistance (HOB elevated. Assist with LLE over EOB)  Sit to Supine: Moderate assistance (Assist with BLEs over EOB. )  Scooting: Minimal assistance  Comment: use of bed rail. Moderate assist to bring trunk upright following LOB. Transfers  Stand Pivot Transfers: Maximum assistance (w/c>EOB towards R side. )  Sit to stand: Dependent/Total (min A in nilson galdamez)  Stand to sit: Dependent/Total (CGA to nilson galdamez paddles, mod A to w/c. )  Transfer Comments: pt impulsive req cuing to wait for assist with proper LLE/UE placement/positioning. Standing Balance  Time: <1 min x 3  Activity: AM: transfers, LB dressing  Comment: nilson galdamez utilized for standing this date, decreased note of L side lean this date. Weight Bearing  Weight Bearing Technique: Yes  LUE Weight Bearing: Extended arm seated  Response To Weight Bearing Technique: WB to LUE engaged during dynamic sitting tasks while EOB this date. Writer provided support at wrist and elbow, assist to maintin WB throughout. Electrical Stimulation  Electrical Stimulation Location: Left; Shoulder;Elbow  Electrical Stimulation Action: to stimulate muscle for return in movement and engage attention to LUE through stimulation. Electrodes placed to address elbow flexion/extension, shoulder flexion/extenion, elevation/depression. Initial return noted for shoulder elevation (shrug) however did not continue as treatment progressed. No other return noted.  PROM applied within ROM goal of stimulation. Electrical Stimulation Parameters: 35 Hz, reciprocal NMS pattern, x8 min tolerated; pt insisted on writer removing electrodes due to reported pain. (Estim on from [id number].)  Electrical Stimulation Goals: Neuromuscular Facilitation  Neuromuscular Education  Neuromuscular education: Yes  Taping: endura tape reapplied to L shoulder to increase stabilization. Weight Bearing  Weight Bearing Technique: Yes  LUE Weight Bearing: Extended arm seated  Response To Weight Bearing Technique: WB to LUE engaged during dynamic sitting tasks while EOB this date. Writer provided support at wrist and elbow, assist to maintin WB throughout. ADL  Equipment Provided: Reacher  Feeding: Verbal cueing;Setup (cues to locate silverware on L side. )  Grooming: Setup;Supervision  UE Bathing: None (pt declines. )  LE Bathing: None (pt declined)  UE Dressing: Minimal assistance; Increased time to complete (pt demonstrates fair- lynette technique for [place] shirt.  )  LE Dressing: Maximum assistance; Increased time to complete (A to thread/pull up on L side/tie pants, [place] TEDs/shoes/AFO)  Toileting: None (pt declined)  Additional Comments: assist with orientation of shirt; noted backward however pt declined to correct. Assessment  Performance deficits / Impairments: Decreased functional mobility ; Decreased ADL status; Decreased strength;Decreased endurance;Decreased balance;Decreased high-level IADLs;Decreased vision/visual deficit; Decreased cognition;Decreased safe awareness;Decreased ROM; Decreased fine motor control;Decreased coordination;Decreased posture;Decreased sensation  Prognosis: Good  Discharge Recommendations: Patient would benefit from continued therapy after discharge  Activity Tolerance: Patient limited by fatigue  Safety Devices in place: Yes  Type of devices: Nurse notified; Left in bed;Patient at risk for falls;Call light within reach  Equipment Recommendations  Equipment Needed: (TBD)        Plan  Plan  Times per week: 5-7  Times per day: Twice a day  Current Treatment Recommendations: Strengthening, Positioning, ROM, Safety Education & Training, Balance Training, Patient/Caregiver Education & Training, Self-Care / ADL, Cognitive/Perceptual Training, Functional Mobility Training, Neuromuscular Re-education, Equipment Evaluation, Education, & procurement, Endurance Training, Cognitive Reorientation  Patient Goals   Patient goals : \"get as close to normal as possible. \"  specifies:\"get in and out of bed on my own, shower by myself. \"  Short term goals  Time Frame for Short term goals: 1 week  Short term goal 1: mod A UE bathe and dress  Short term goal 2: set up brush teeth (goal met)  Short term goal 3: min feeding, with cues able to locate L side of tray and scan to locate items on L. Short term goal 4: tolerate 6-8 min sitting edge of bed (static sit)  with min assist and RUE support, head at midline and fair safety awareness  Short term goal 5: pt to initiate reposition LUE with RUE with good safety ie. hold by wrist not by 1 finger  Short term goal 6: demo improved awareness of L side and neuromuscular christian by weight bearing on LUE with physical assist to complete  Short term goal 7: from w/c:  consistently maintain midline trunk control and demo able to lean forward 4 inches away from back of chair without physical assist for adls. Short term goal 8: tolerate 2 min static stand for adls with RUE support with mod of 2 and assist with LLE as needed. Long term goals  Time Frame for Long term goals : by discharge  Long term goal 1: set up and occasional verbal cues ie.  L visual scan for self feeding  Long term goal 2: set up oral care (goal met)  Long term goal 3: max x 1 toileting and adl transfers  Long term goal 4: min UE bathe and dress (shirt)  Long term goal 5: max x 1 LE bathe and dress, able to cross to reach RLE with min assist and keep trunk balance        05/25/21 0842 05/25/21 9542 [facility] Camron Borrero Minutes   Time In [id number]   Time Out 0826 [id number]   Minutes 51 22     Electronically signed by ERIN Robbins on 5/25/21 at 4:41 PM EDT

## 2021-05-25 NOTE — PROGRESS NOTES
Physical Therapy  [address] [facility]   Acute Rehabilitation Physical Therapy Progress Note    Date: 21  Patient Name: Florian Kerr       Room: 0219/8579-72  MRN: [id number]   Account: [de-identified]   : 1955  (63 y.o.) Gender: female      Diagnosis: ischemic CVA,  subacute right MCA distribution infarct, L lynette, dysphagia, L neglect, loop recorder 21 (Dr. Lizy Mayo)  Past Medical History:  has a past medical history of Asthma, COPD (chronic obstructive pulmonary disease) ([facility] [address] Diabetes mellitus ([facility] [address] Esophageal ring, Gastritis, Hyperlipidemia, Hyperplastic colon polyp, Hypertension, Osteoarthritis, Patient in clinical research study, and TIA (transient ischemic attack). Past Surgical History:   has a past surgical history that includes Appendectomy; Hysterectomy; Esophagus dilation; Colonoscopy (2017); Endoscopy, colon, diagnostic; pr egd transoral biopsy single/multiple (N/A, 2017); pr colsc flx w/rmvl of tumor polyp lesion snare tq (N/A, 2017); and Upper gastrointestinal endoscopy (2017). Restrictions/Precautions  Restrictions/Precautions: Fall Risk  Required Braces or Orthoses?: No  Implants present? :  (loop recorder)  Position Activity Restriction  Other position/activity restrictions:  L neglect with LUE/LLE deficits, neglect improving. Subjective: Pt expressing happiness with family witnessing ambulation. Comments: Continues with impulsive behavior, but can be redirected to note which things need to happen before she attempts transfers, for instance. Vital Signs  Patient Currently in Pain: Denies    Bed Mobility   Supine to Sit: Moderate assistance (Trunk & LE assist)  Sit to Supine:  Moderate assistance (bilateral LE assist (R under L))  Scooting: Minimal assistance (L LE positioning to scoot back in chair)    Transfers  Sit to Stand: Minimal Assistance (L UE assist)  Stand to sit: Moderate Assistance (R UE assist, lacks L eccentric control)  Bed to Chair: Maximum assistance (max a c squat pivot; cues for safety with fair return. )  Squat Pivot Transfers: Maximum Assistance (Impulsive. Assist c positioning, set-up; safety cues)    Ambulation 1  Surface: level tile  Device:  (walker lift)  Other Apparatus: Left;Slider;AFO;Wheelchair follow  Assistance: 2 Person assistance;Maximum assistance (+ 3rd assist to steer walker)  Quality of Gait: L hip flexion continuing, but continues to need assist to fully advance L LE and largely dependent to extend L knee & hip. Requires repeated cues to increase R step length initially, improves with better ambulatory rhythm. Gait Deviations: Decreased step length; Slow Juanita;Decreased step height  Distance: 60 ft  Comments: Working on wt shifting and weightbearing into L LE. Placing L foot each step, providing L knee stability and L hip extension during stance phase. Pt demonstrates Fair but fleeting control of hip rotation/extension. Ambulation 2  Surface - 2: level tile  Device 2: [place] rail (R UE)  Other Apparatus 2: Left;Slider;AFO;Wheelchair follow  Assistance 2: 2 Person assistance; Moderate assistance  Quality of Gait 2: same as above; required more cues for increased R step length. Gait Deviations: Slow Juanita;Decreased step length;Decreased step height  Distance: 20' x2  Comments: No quad contraction noted today during ambulation. Stairs/Curb  Stairs?: No    Wheelchair Activities  Wheelchair Parts Management: No (Brakes on back of wheelchair; Pt cannot reach)  Propulsion: Yes  Propulsion 1  Propulsion: Manual  Level: Level Tile  Method: RUE;RLE  Level of Assistance: Contact guard assistance  Description/ Details: Repeated verbal cues to avoid objects, wall, rail on her L (fair return); fails to scan environment without cues. Pt able to complete 90º turns without technique cue. Uncoordinated/sporadic use of UE + LE.    Distance: 290'    Balance   Posture: Poor (Pt largely unaware of

## 2021-05-26 LAB
GLUCOSE BLD-MCNC: 102 MG/DL (ref 65–105)
GLUCOSE BLD-MCNC: 109 MG/DL (ref 65–105)
GLUCOSE BLD-MCNC: 121 MG/DL (ref 65–105)
GLUCOSE BLD-MCNC: 98 MG/DL (ref 65–105)

## 2021-05-26 PROCEDURE — 97116 GAIT TRAINING THERAPY: CPT

## 2021-05-26 PROCEDURE — 97535 SELF CARE MNGMENT TRAINING: CPT

## 2021-05-26 PROCEDURE — 6360000002 HC RX W HCPCS: Performed by: PHYSICAL MEDICINE & REHABILITATION

## 2021-05-26 PROCEDURE — 99232 SBSQ HOSP IP/OBS MODERATE 35: CPT | Performed by: PHYSICAL MEDICINE & REHABILITATION

## 2021-05-26 PROCEDURE — 6370000000 HC RX 637 (ALT 250 FOR IP): Performed by: STUDENT IN AN ORGANIZED HEALTH CARE EDUCATION/TRAINING PROGRAM

## 2021-05-26 PROCEDURE — 6370000000 HC RX 637 (ALT 250 FOR IP): Performed by: PHYSICAL MEDICINE & REHABILITATION

## 2021-05-26 PROCEDURE — 97112 NEUROMUSCULAR REEDUCATION: CPT

## 2021-05-26 PROCEDURE — 99231 SBSQ HOSP IP/OBS SF/LOW 25: CPT | Performed by: INTERNAL MEDICINE

## 2021-05-26 PROCEDURE — 97110 THERAPEUTIC EXERCISES: CPT

## 2021-05-26 PROCEDURE — 92526 ORAL FUNCTION THERAPY: CPT

## 2021-05-26 PROCEDURE — 97530 THERAPEUTIC ACTIVITIES: CPT

## 2021-05-26 PROCEDURE — 97542 WHEELCHAIR MNGMENT TRAINING: CPT

## 2021-05-26 PROCEDURE — 1180000000 HC REHAB R&B

## 2021-05-26 PROCEDURE — 82947 ASSAY GLUCOSE BLOOD QUANT: CPT

## 2021-05-26 PROCEDURE — 92507 TX SP LANG VOICE COMM INDIV: CPT

## 2021-05-26 RX ADMIN — Medication 2 PUFF: at 08:00

## 2021-05-26 RX ADMIN — FLUOXETINE HYDROCHLORIDE 20 MG: 20 CAPSULE ORAL at 08:00

## 2021-05-26 RX ADMIN — AMLODIPINE BESYLATE 5 MG: 5 TABLET ORAL at 07:59

## 2021-05-26 RX ADMIN — Medication 2 PUFF: at 20:30

## 2021-05-26 RX ADMIN — ACETAMINOPHEN 1000 MG: 500 TABLET, FILM COATED ORAL at 22:13

## 2021-05-26 RX ADMIN — ACETAMINOPHEN 1000 MG: 500 TABLET, FILM COATED ORAL at 06:15

## 2021-05-26 RX ADMIN — ACETAMINOPHEN 1000 MG: 500 TABLET, FILM COATED ORAL at 14:35

## 2021-05-26 RX ADMIN — ATORVASTATIN CALCIUM 80 MG: 80 TABLET, FILM COATED ORAL at 07:59

## 2021-05-26 RX ADMIN — AMITRIPTYLINE HYDROCHLORIDE 25 MG: 25 TABLET, FILM COATED ORAL at 20:30

## 2021-05-26 RX ADMIN — POLYETHYLENE GLYCOL 3350 17 G: 17 POWDER, FOR SOLUTION ORAL at 08:00

## 2021-05-26 RX ADMIN — HEPARIN SODIUM 5000 UNITS: 5000 INJECTION INTRAVENOUS; SUBCUTANEOUS at 06:15

## 2021-05-26 RX ADMIN — ASPIRIN 81 MG CHEWABLE TABLET 81 MG: 81 TABLET CHEWABLE at 07:59

## 2021-05-26 RX ADMIN — HEPARIN SODIUM 5000 UNITS: 5000 INJECTION INTRAVENOUS; SUBCUTANEOUS at 22:13

## 2021-05-26 ASSESSMENT — PAIN SCALES - GENERAL
PAINLEVEL_OUTOF10: 2
PAINLEVEL_OUTOF10: 2
PAINLEVEL_OUTOF10: 0
PAINLEVEL_OUTOF10: 2

## 2021-05-26 ASSESSMENT — PAIN DESCRIPTION - PAIN TYPE: TYPE: ACUTE PAIN

## 2021-05-26 NOTE — FLOWSHEET NOTE
Patient alone in room watching television. Patient stated that everyone feels that she is improving. Patient has her family as her support system. SC will continued to offer spiritual support. 05/26/21 4718   Encounter Summary   Services provided to: Patient   Referral/Consult From: [facility] [address] [facility]   (5/26/21)   Complexity of Encounter Low   Length of Encounter 15 minutes   Spiritual Assessment Completed Yes   Routine   Type Follow up   Assessment Calm; Approachable   Intervention Active listening;Explored feelings, thoughts, concerns;Nurtured hope;Sustaining presence/ Ministry of presence; Discussed illness/injury and it's impact   Outcome Expressed gratitude;Engaged in conversation;Coping; Hopeful;Receptive   Visited by Radha Castillo

## 2021-05-26 NOTE — PROGRESS NOTES
Physical Medicine & Rehabilitation  Progress Note    5/26/2021 11:55 AM     CC: Ambulatory and ADL dysfunction due to left nondominant hemiplegia due to ischemic CVA    Subjective:   No complaints. No pain. Bowels and bladder okay last BM 5/22. ROS:  Denies fevers, chills, sweats. No chest pain, palpitations, lightheadedness. Denies coughing, wheezing or shortness of breath. Denies abdominal pain, nausea, diarrhea or constipation. No new areas of joint pain. Denies new areas of numbness or weakness. Denies new anxiety or depression issues. No new skin problems. Rehabilitation:   PT:  Restrictions/Precautions: Fall Risk  Implants present? :  (loop recorder)  Other position/activity restrictions:  L neglect with LUE/LLE deficits, neglect improving. Transfers  Sit to Stand: Minimal Assistance (L UE assist)  Stand to sit: Moderate Assistance (R UE assist, lacks L eccentric control)  Bed to Chair: Maximum assistance (max a c squat pivot; cues for safety with fair return. )  Stand Pivot Transfers: Dependent/Total Kathy Mallsalazar)  Squat Pivot Transfers: Maximum Assistance (Impulsive. Assist c positioning, set-up; safety cues)  Lateral Transfers: Minimal Assistance  Comment: L neglect improving: Pt reponds to cues for L UE safety, positioning >50%; Pt demos a left lateral lean, cues with improvements to fix. Min A for safety. Ambulation 1  Surface: level tile  Device:  (walker lift)  Other Apparatus: Left, Slider, AFO, Wheelchair follow  Assistance: 2 Person assistance, Maximum assistance (+ 3rd assist to steer walker)  Quality of Gait: L hip flexion continuing, but continues to need assist to fully advance L LE and largely dependent to extend L knee & hip. Requires repeated cues to increase R step length initially, improves with better ambulatory rhythm.    Gait Deviations: Decreased step length, Slow Juanita, Decreased step height  Distance: 60 ft  Comments: Working on wt shifting and weightbearing into L LE. Placing L foot each step, providing L knee stability and L hip extension during stance phase. Pt demonstrates Fair but fleeting control of hip rotation/extension. OT:  ADL  Equipment Provided: Reacher  Feeding: Setup  Grooming: Supervision  UE Bathing: None (Pt decline)  LE Bathing: None (Pt declines)  UE Dressing: None (Pt declines)  LE Dressing: Maximum assistance (Pt declines pants. A with TEDs, AFO, and shoes)  Toileting: None (Pt declines)  Additional Comments: Pt completed oral hygiene while sitting in wheelchair at sink side. Pt completed with use of flip top toothpaste to increase independence with task. Pt completed grooming task of brushing hair with verbal cues to participate. Pt declines bathing and dressing tasks at this time. Pt required set-up assistance during feeding to open containiners on this date. Balance  Sitting Balance: Minimal assistance (1 left side LOB)  Standing Balance: Dependent/Total (CGA with nilson galdamez)   Standing Balance  Time: <1 min x 1  Activity: Functional transfers  Comment: with nilson galdamez. Pt declines stand pivot transfer due to fatigue  Functional Mobility  Functional - Mobility Device: Wheelchair  Activity: To/from bathroom  Assist Level: Moderate assistance  Functional Mobility Comments: pt navigates herself from bathroom at wheelchair level     Bed mobility  Bridging: Minimal assistance (positioning L LE)  Rolling to Left: Minimal assistance  Rolling to Right: Maximum assistance  Supine to Sit: Minimal assistance (HOB elevated. Assist at trunk. RLE hooks LUE)  Sit to Supine: Moderate assistance (Assist with BLEs over EOB. )  Scooting: Minimal assistance (L LE positioning to scoot back in chair)  Comment: Use of bed rail to assist with tunk support and to maintain upright position.   Transfers  Stand Step Transfers: Dependent/Total  Stand Pivot Transfers: Maximum assistance (w/c>EOB towards R side. )  Sit to stand: Dependent/Total (CGA with nilson stedy)  Stand to sit: Dependent/Total (with nilson stedy; Min A from [address] [facility] stedy to wheelchair )  Transfer Comments: Pt declines stand pivot transfer from bed to wheelchair at this time due to reported fatigue and weakness in BLE. Pt requesting use of nilson stedy. Pt required verbal cues for safety with nilson stedy   Toilet Transfers  Toilet - Technique:  (nilson stedy )  Equipment Used: Raised toilet seat without rails  Toilet Transfer: Dependent/Total (nilson stedy, min-mod A)  Toilet Transfers Comments: Completed with use of nilson stedy with min-mod A to complete. Wheelchair Bed Transfers  Wheelchair/Bed - Technique:  Lamont Rolls stedy)  Equipment Used: Bed, Wheelchair, Other (nilson steady )  Level of Asssistance: Dependent/Total (Min A x 2 with nilson stedy)      ST:    Attention: Mod verbal cues needed for increased attention to midline, pt. c extreme L sided neglect and mod A to redirect to task overall. Pt. Completed visual scanning task with min to mod cues to locate items to the L.   Pt. Answered ?s re: grocery ad she was looking at-  38%, 88% c cues, recepie- 58%, 100% c cues to look to L.      Recall: n/a       Organization: n/a     Problem Solving/Reasoning:  n/a     Other:  ST utilized vital stim for L sided oral sling (one channel only), 6.5 mA for 22 minutes. Pt. Completed oral motor exercises x3, 10 reps each  with NMES in place.          Objective:  /68   Pulse 77   Temp 98.1 °F (36.7 °C) (Oral)   Resp 16   Ht 5' 8\" (1.727 m)   Wt 191 lb 8 oz (86.9 kg)   SpO2 92%   BMI 29.12 kg/m²  I Body mass index is 29.12 kg/m². I   Wt Readings from Last 1 Encounters:   05/10/21 191 lb 8 oz (86.9 kg)      Temp (24hrs), Av.4 °F (36.9 °C), Min:98.1 °F (36.7 °C), Max:98.6 °F (37 °C)         GEN: well developed, well nourished, no acute distress  HEENT: Normocephalic atraumatic, EOMI, mucous membranes pink and moist  CV: RRR, no murmurs, rubs or gallops  PULM: CTAB, no rales or rhonchi.  Respirations WNL and CKTOTAL;3  FASTING LIPID PANEL:  Lab Results   Component Value Date    CHOL 206 (H) 05/05/2021    HDL 29 (L) 05/05/2021    TRIG 185 (H) 05/05/2021     LIVER PROFILE: No results for input(s): AST, ALT, ALB, BILIDIR, BILITOT, ALKPHOS in the last 72 hours. I/O (24Hr): Intake/Output Summary (Last 24 hours) at 5/26/2021 1155  Last data filed at 5/26/2021 0744  Gross per 24 hour   Intake 240 ml   Output --   Net 240 ml       Glu last 24 hour  Recent Labs     05/25/21  1605 05/25/21  2019 05/26/21  0611 05/26/21  1112   POCGLU 123* 180* 109* 98       No results for input(s): CLARITYU, COLORU, PHUR, SPECGRAV, PROTEINU, RBCUA, BLOODU, BACTERIA, NITRU, WBCUA, LEUKOCYTESUR, YEAST, GLUCOSEU, BILIRUBINUR in the last 72 hours.         Impression/Plan:   Impaired ADLs, gait, and mobility due to:        1. Ischemic R MCA CVA with hemorrhagic conversion and L non-dominant hemiparesis: Continue rehab efforts, on ASA, atorvastatin. Sling only to be used when patient is ambulating with therapy. -Discussed with patient and therapist.  Handicap placard on discharge. Noted discharge plan 6/2, family to place ramp. 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 Status post fall 5/16-currently no residual, continue to monitor  3. Headache: Has Tylenol routine TID. Improved on amitriptyline. 4. Dry eyes: has natural tears. 5. Cognitive impairment: SLP treating, discussed with daughter 5/22  6. Dysphagia/aphasia: SLP treating. Diet upgraded, continue aspiration precautions  7. HTN/Hyperlipidemia: on amlodipine  8. DM: on insulin sliding scale, as above  9. COPD/asthma: on albuterol nebulizers prn, on symbicort BID, spiriva  10. Adjustment disorder with depressed mood: started fluoxetine 5/15, BMP okay  11. Esophageal Ring: Will monitor - on dysphagia diet  12. Hypothyroidism: on levothyroxine  13. Leukocytosis-improving  14.  Nicotine dependence: on

## 2021-05-26 NOTE — PROGRESS NOTES
Physical Therapy  [address]  Acute Rehabilitation Physical Therapy Progress Note    Date: 21  Patient Name: Nadia Bal       Room: 3041/1423-58  MRN: [id number]   Account: [de-identified]   : 1955  (63 y.o.) Gender: female      Diagnosis: ischemic CVA,  subacute right MCA distribution infarct, L lynette, dysphagia, L neglect, loop recorder 21 (Dr. Luis Ceja)  Past Medical History:  has a past medical history of Asthma, COPD (chronic obstructive pulmonary disease) ([facility] Diabetes mellitus ([facility] [facility] Esophageal ring, Gastritis, Hyperlipidemia, Hyperplastic colon polyp, Hypertension, Osteoarthritis, Patient in clinical research study, and TIA (transient ischemic attack). Past Surgical History:   has a past surgical history that includes Appendectomy; Hysterectomy; Esophagus dilation; Colonoscopy (2017); Endoscopy, colon, diagnostic; pr egd transoral biopsy single/multiple (N/A, 2017); pr colsc flx w/rmvl of tumor polyp lesion snare tq (N/A, 2017); and Upper gastrointestinal endoscopy (2017). Restrictions/Precautions  Restrictions/Precautions: Fall Risk  Required Braces or Orthoses?: No  Implants present? :  (loop recorder)  Position Activity Restriction  Other position/activity restrictions:  L neglect with LUE/LLE deficits, neglect improving. Subjective: Pt expressing happiness with family witnessing ambulation. Comments: Continues with impulsive behavior, but can be redirected to note which things need to happen before she attempts transfers, for instance. Vital Signs  Patient Currently in Pain: Denies    Patient Observation  Observations: Deadpan, sarcastic sense of humor; largely flat affect. Bed Mobility   Supine to Sit: Minimal assistance (to Pt's R: L LE assist & momentum for trunk)  Sit to Supine:  Moderate assistance (Bilateral LEs, assist to position L LE for bridge to align)  Scooting: Minimal assistance (L LE positioning to scoot back in chair)  Comment: HOB <30º, rail, 1 pillow    Transfers  Sit to Stand: Minimal Assistance (L UE assist)  Stand to sit: Minimal Assistance (L UE assist, improved control today, G reach back c R UE)  Bed to Chair: Moderate assistance (Squat pivot to R; cues for safety c some return, but impulsive.)  Squat Pivot Transfers: Moderate Assistance (Impulsive. Assist c L UE/LE positioning, set-up; safety cues. To Pt's R.)   Comment: Safety concerns with squat pivots: Cues for safety c some return, but impulsive. Attempting to use teach-back techniques to encourage slower pace, more thoughtfulness with preparation. Does not always have enough momentum to complete transfer in one push; requires cues to focus on L UE/LE position. Ambulation 1  Surface: level tile  Device:  (walker lift)  Other Apparatus: Left;Slider;AFO;Wheelchair follow  Assistance: 2 Person assistance;Maximum assistance (+ 3rd assist to steer walker)  Quality of Gait: Some L knee extension elicited today on second leg of ambulation. Increased focus today on more independent L hip flexion with fair return, but continues to need assist to fully advance L LE upon fatigue. Fewer cues to increase step length today. Cues required ~10% for longer L step length due to fatigue/patient attempts to step with R LE before L has been well placed/extended. Gait Deviations: Decreased step length; Slow Juanita;Decreased step height  Distance: 48' x2 with seated rest between, PM.   Comments: Pt states she could feel L knee \"working. \"    Wheelchair Activities  Wheelchair Size: 20\"  Wheelchair Type: Standard  Wheelchair Cushion: Pressure Relieving  Pressure Relief Type: Lateral lean;Self Adjusts  Wheelchair Parts Management: Yes (Wheelchair exchanged today)  Left Brakes Level of Assistance: Minimal assistance (extender added; brake requires max force to fully engage)  Right Brakes Level of Assistance: Stand by Assist  Propulsion: Yes  Propulsion 1  Propulsion: Manual  Level: Recommendations: Patient would benefit from continued therapy after discharge;Home with assist PRN    Goals  Short term goals  Time Frame for Short term goals: 10 days  Short term goal 1: Pt able to roll in bed side to side at min A without bed rail  Short term goal 2: Pt able to perform supine<>sit at mod A  Short term goal 3: Pt able to perform sit<>stand at min/mod A . Short term goal 4: Pt able to progress to pivot transfers at max A   Short term goal 5: Pt able to improve sitting balance at EOB/EOM at SBA for static balance and able to track to left side as well as turn to head to left side to scan her environment with minimal cues. Short term goal 6: Pt able to tolerate standing in // bars with R UE support and assist at mod A  Short term goal 7: Pt able to propel w/c with R UE/R LE distance of 50 to 80 ft, straight path and turn around 180 degrees, at min/mod A   Long term goals  Time Frame for Long term goals : By DC  Long term goal 1: Pt able to roll in bed mod-I  Long term goal 2: Pt able to perform supine <> sit at CGA/min A  Long term goal 3: Pt able to perform pivot transfers min A / mod A and able to scan Left side environment. Long term goal 4: Pt able to ambulate in // bars, or with appropriate assistive device and/or LE bracing, distance of  20 to 30 ft, min/mod A x 2. Long term goal 5: Pt able to propel w/c on level surfaces distance 100 to 150 ft, SBA/CGA. Long term goal 6: Educate pt/family in safe technique for mobility and  to go up and down steps to enter/exit home. Long term goal 7: Improve sitting balance at EOM to good, and standing balance with R UE support to fair- to reduce fall risk. Long term goal 8: Improve PASS score to 20/36 improve overall function.       05/26/21 0842 05/26/21 1456   PT Individual Minutes   Time In 0830 1217   Time Out 0940 1300   Minutes 70 43     Electronically signed by Amador Robbins PTA on 5/26/21 at 4:43 PM EDT

## 2021-05-26 NOTE — PLAN OF CARE
Problem: Neurological  Goal: Maximum potential motor/sensory/cognitive function  Outcome: Ongoing     Problem: Neurological  Goal: Maximum potential motor/sensory/cognitive function  Outcome: Ongoing     Problem: Nutrition  Goal: Optimal nutrition therapy  Outcome: Ongoing     Problem: Skin Integrity:  Goal: Will show no infection signs and symptoms  Outcome: Ongoing     Problem: Skin Integrity:  Goal: Absence of new skin breakdown  Outcome: Ongoing

## 2021-05-26 NOTE — PROGRESS NOTES
[place] [address] Internal Medicine    Progress Note  Patient assessed for rehabilitation services?: Yes  Family / Caregiver Present: Yes (bro- and sis-in-law)  Referring Practitioner: Dr Jose Luis Santiago: Rachell Guido with impulsive behavior, but can be redirected to note which things need to happen before she attempts transfers, for instance. 5/26/2021    2:56 PM    Name:   Lanie Vigil  MRN:     [id number]     Acct:      [de-identified]   Room:   [address] [place] [address] Day:  12  Admit Date:  5/10/2021  7:13 AM    PCP:   Aurora Box MD  Code Status:  Full Code    Subjective:     C/C: medical mgmt    Active Problems:    Tobacco abuse    Gastritis    Acute left hemiparesis ([facility] [address]    Acute CVA (cerebrovascular accident) ([facility]    COPD (chronic obstructive pulmonary disease) ([facility] [address]    Essential hypertension    Hyperlipidemia    Pre-diabetes  Resolved Problems:    * No resolved hospital problems. *      Interval History Status: improved. 5/14/21  Patient seen and examined at bedside. No acute overnight events. Afebrile, hemodynamically stable. Continues to work with PT/OT. No acute changes in hemiparesis. No acute complaints. 5/22  Patient is doing much better  No new complaints.      Significant last 24 hr data reviewed ;   Vitals:    05/24/21 1834 05/25/21 1814 05/26/21 0600 05/26/21 0744   BP: 138/69 131/71 139/81 136/68   Pulse: 81 84 87 77   Resp: 18 18 18 16   Temp: 98.1 °F (36.7 °C) 98.4 °F (36.9 °C) 98.6 °F (37 °C) 98.1 °F (36.7 °C)   TempSrc: Oral Oral Oral Oral   SpO2: 93% 94% 92%    Weight:       Height:          Recent Results (from the past 24 hour(s))   POC Glucose Fingerstick    Collection Time: 05/25/21  4:05 PM   Result Value Ref Range    POC Glucose 123 (H) 65 - 105 mg/dL   POC Glucose Fingerstick    Collection Time: 05/25/21  8:19 PM   Result Value Ref Range    POC Glucose 180 (H) 65 - 105 mg/dL   POC Glucose Fingerstick    Collection Time: 05/26/21  6:11 AM   Result Value Ref Range    POC Glucose 109 (H) 65 - 105 mg/dL   POC Glucose Fingerstick    Collection Time: 05/26/21 11:12 AM   Result Value Ref Range    POC Glucose 98 65 - 105 mg/dL     Recent Labs     05/25/21  1035 05/25/21  1605 05/25/21 2019 05/26/21  0611 05/26/21  1112   POCGLU 144* 123* 180* 109* 98        No results found. HPI:   See history in H and P      Review of Systems:     Constitutional:  negative for chills, fevers, sweats  Respiratory:  negative for cough, dyspnea on exertion, hemoptysis, shortness of breath, wheezing  Cardiovascular:  negative for chest pain, chest pressure/discomfort, lower extremity edema, palpitations  Gastrointestinal:  negative for abdominal pain, constipation, diarrhea, nausea, vomiting  Neurological: Positive for left-sided weakness. Negative for dizziness, headache  Data:     Past Medical History:  no change     Social History:  no change    Family History: @no change    Vitals:      I/O (24Hr): Intake/Output Summary (Last 24 hours) at 5/26/2021 1456  Last data filed at 5/26/2021 0744  Gross per 24 hour   Intake 240 ml   Output --   Net 240 ml       Labs:    URINE ANALYSIS: No results found for: LABURIN     CBC:  Lab Results   Component Value Date    WBC 9.8 05/25/2021    HGB 13.8 05/25/2021     05/25/2021        BMP:    Lab Results   Component Value Date     05/25/2021    K 4.3 05/25/2021     05/25/2021    CO2 24 05/25/2021    BUN 13 05/25/2021    CREATININE 0.48 05/25/2021    GLUCOSE 111 05/25/2021      LIVER PROFILE:  Lab Results   Component Value Date    ALT 13 10/16/2017    AST 13 10/16/2017    PROT 6.9 10/16/2017    BILITOT 0.22 10/16/2017    BILIDIR 0.11 10/16/2017    LABALBU 4.0 10/16/2017               Radiology:  Medications:      Allergies:      Current Meds:   Scheduled Meds:    FLUoxetine  20 mg Oral Daily    amitriptyline  25 mg Oral Nightly    heparin (porcine)  5,000 Units Subcutaneous 3 times per day  acetaminophen  1,000 mg Oral 3 times per day    amLODIPine  5 mg Oral Daily    aspirin  81 mg Oral Daily    atorvastatin  80 mg Oral Daily    budesonide-formoterol  2 puff Inhalation BID    insulin lispro  0-3 Units Subcutaneous Nightly    insulin lispro  0-6 Units Subcutaneous TID WC    levothyroxine  25 mcg Oral Daily    nicotine  1 patch Transdermal Daily    tiotropium  2 puff Inhalation Daily    polyethylene glycol  17 g Oral Daily     Continuous Infusions:    sodium chloride       PRN Meds: polyvinyl alcohol, sodium chloride flush, promethazine **OR** ondansetron, sodium chloride, albuterol, dextrose, glucagon (rDNA), glucose, senna, bisacodyl      Physical Examination:        /68   Pulse 77   Temp 98.1 °F (36.7 °C) (Oral)   Resp 16   Ht 5' 8\" (1.727 m)   Wt 191 lb 8 oz (86.9 kg)   SpO2 92%   BMI 29.12 kg/m²   Temp (24hrs), Av.4 °F (36.9 °C), Min:98.1 °F (36.7 °C), Max:98.6 °F (37 °C)    Recent Labs     21  1605 21  2019 21  0611 21  1112   POCGLU 123* 180* 109* 98       Intake/Output Summary (Last 24 hours) at 2021 1456  Last data filed at 2021 0744  Gross per 24 hour   Intake 240 ml   Output --   Net 240 ml       General Appearance:  alert, well appearing, and in no acute distress  Mental status: oriented to person, place, and time with normal affect  Head:  normocephalic, atraumatic. Eye: no icterus, redness, pupils equal and reactive, extraocular eye movements intact, conjunctiva clear  Ear: normal external ear, no discharge, hearing intact  Nose:  no drainage noted  Mouth: mucous membranes moist  Neck: supple, no carotid bruits, thyroid not palpable  Lungs: Clear to auscultation bilaterally. No accessory muscle use. Cardiovascular: normal rate, regular rhythm, no murmur, gallop, rub. Abdomen: Soft, nontender, nondistended, normal bowel sounds, no hepatomegaly or splenomegaly  Neurologic: Left upper and lower extremity weakness, 0-1/5. No sensory loss. Skin: No gross lesions, rashes, bruising or bleeding on exposed skin area  Extremities:  peripheral pulses palpable, no pedal edema or calf pain with palpation    Assessment:        Primary Problem  <principal problem not specified>    Active Hospital Problems    Diagnosis Date Noted    Acute CV[facility]cerebrovascular [facility]) [facility] [I63.9] 05/10/2021    COPD (chronic obstructive pulmonary disease) ([facility] [J44.9] 05/10/2021    Essential hypertension [I10] 05/10/2021    Hyperlipidemia [E78.5] 05/10/2021    Pre-diabetes [R73.03] 05/10/2021    Acute left hemiparesis ([facility] [G81.94]     Gastritis [K29.70]     Tobacco abuse [Z72.0] 07/16/2017     Plan:        Continue current therapy regimen  BP well controlled. Continue norvasc 5 mg daily  Copd stable. continue bronchodilators  Synthroid 25 mcg daily   lipitor 80 mg daily  Asa 81 mg daily  Insulin sliding scale  nicoderm patch  Continue PT/OT  Encourage PO intake                MD LEONOR Maloney [address], [address] [place] [address].    Phone [phone]   Fax: [phone]  Answering Service: [phone]

## 2021-05-26 NOTE — PROGRESS NOTES
[address]   ACUTE REHABILITATION OCCUPATIONAL THERAPY  DAILY NOTE    Date: 21  Patient Name: Wing Hutson      Room: 6956/8485-07    MRN: [id number]   : 1955  (63 y.o.)  Gender: female      Diagnosis: ischemic CVA,  subacute right MCA distribution infarct, L lynette, dysphagia, L neglect, loop recorder 21 (Dr. Faheem Frost)  Additional Pertinent Hx: 62-year-old female who was found down, last known well approximately 21 hours before. Patient was found to have a right MCA M1 occlusion. Thrombectomy was not successful, MRI showed patient had a right MCA stroke with hemorrhagic conversion. 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 :  (loop recorder)  Other position/activity restrictions:  L neglect with LUE/LLE deficits, neglect improving. Required Braces or Orthoses?: No  Equipment Used: Bed, Wheelchair    Subjective  Subjective: \"I have been up since 3 there was just to much noise\" \"I dont know why I have to do this at 7am\"  Comments: Pt tired on this date but willing to participate with encouragement. Patient Currently in Pain: Denies  Restrictions/Precautions: Fall Risk  Overall Orientation Status: Within Functional Limits          Objective  Cognition  Overall Cognitive Status: Impaired  Arousal/Alertness: Appropriate responses to stimuli  Following Directions:  Follows one step commands  Attention Span: Attends with cues to redirect  Safety Judgement: Decreased awareness of need for assistance  Insights: Decreased awareness of deficits  Sequencing and Organization: Assistance required to generate solutions  Perception  Overall Perceptual Status: Impaired  Unilateral Attention: Cues to attend left visual field;Cues to maintain midline in sitting;Cues to maintain midline in standing;Cues to attend to left side of body  Initiation: Cues to initiate tasks  Balance  Sitting Balance: Minimal assistance (1 left side LOB)  Standing Balance: Dependent/Total (CGA with nilson stedy)  Bed mobility  Supine to Sit: Minimal assistance (HOB elevated. Assist at trunk. MARIOE neto STALLWORTH)  Comment: Use of bed rail to assist with tunk support and to maintain upright position. Transfers  Stand Pivot Transfers: Maximum assistance (Bed to wheelchair. Blocking L knee)  Sit to stand: Dependent/Total (CGA with nilson stedy)  Stand to sit: Dependent/Total (with nilson stedy; Min A from [address] [place] [address] Street stedy to wheelchair )  Transfer Comments: AM: Pt declines stand pivot transfer from bed to wheelchair at this time due to reported fatigue and weakness in BLE. Pt requesting use of nilosn stedy. Pt required verbal cues for safety with nilson stedy. PM: Pt willing to participate in stand pivot transfer from bed to wheelchair at this time. Pt required assistance to block L knee during transfers. Standing Balance  Time: <1 min x 1  Activity: Functional transfers  Comment: with nilson stedy. Pt declines stand pivot transfer due to fatigue        Type of ROM/Therapeutic Exercise  Type of ROM/Therapeutic Exercise: Self PROM  Comment: Pt engaged in self PROM with LUE to increase joint mobility and assist with pain. Pt completed 15 reps of each with rest breaks. Pt required verbal and visual cues for technique. Exercises  Shoulder Elevation: x  Shoulder Flexion: x  Horizontal ABduction: x  Horizontal ADduction: x  Elbow Flexion: x  Elbow Extension: x  Supination: x  Pronation: x  Wrist Flexion: x  Wrist Extension: x  Finger Flexion: x  Finger Extension: x  Other: Thumb opposition and thumb abduction self PROM completed 15 reps with verbal cues for slow smooth motion. OT provided support at wrist thrroughout. Weight Bearing  Weight Bearing Technique: Yes  LUE Weight Bearing: Forearm seated  Response To Weight Bearing Technique: WB through LUE while sitting in wheelchair while complating visual scanning activity.  OT posture;Decreased sensation  Prognosis: Good  Discharge Recommendations: Patient would benefit from continued therapy after discharge  Activity Tolerance: Patient limited by fatigue  Activity Tolerance: Pt continues to demonstrate decreased motivation to participate in self care tasks with encouragement  Safety Devices in place: Yes  Type of devices: All fall risk precautions in place;Gait belt;Patient at risk for falls; Left in chair;Nurse notified (Daughter took patient in PM)          Patient Education: benefit of participating in self care tasks, weight bearing through LUE, self PROM  Patient Goals   Patient goals : \"get as close to normal as possible. \"  specifies:\"get in and out of bed on my own, shower by myself. \"  Learner:patient  Method: demonstration, explanation and handout       Outcome: demonstrated understanding and needs reinforcement        Plan  Plan  Times per week: 5-7  Times per day: Twice a day  Current Treatment Recommendations: Strengthening, Positioning, ROM, Safety Education & Training, Balance Training, Patient/Caregiver Education & Training, Self-Care / ADL, Cognitive/Perceptual Training, Functional Mobility Training, Neuromuscular Re-education, Equipment Evaluation, Education, & procurement, Endurance Training, Cognitive Reorientation  Patient Goals   Patient goals : \"get as close to normal as possible. \"  specifies:\"get in and out of bed on my own, shower by myself. \"  Short term goals  Time Frame for Short term goals: 1 week  Short term goal 1: mod A UE bathe and dress  Short term goal 2: set up brush teeth (goal met)  Short term goal 3: min feeding, with cues able to locate L side of tray and scan to locate items on L.   Short term goal 4: tolerate 6-8 min sitting edge of bed (static sit)  with min assist and RUE support, head at midline and fair safety awareness  Short term goal 5: pt to initiate reposition LUE with RUE with good safety ie. hold by wrist not by 1 finger  Short term goal 6: demo improved awareness of L side and neuromuscular christian by weight bearing on LUE with physical assist to complete  Short term goal 7: from w/c:  consistently maintain midline trunk control and demo able to lean forward 4 inches away from back of chair without physical assist for adls. Short term goal 8: tolerate 2 min static stand for adls with RUE support with mod of 2 and assist with LLE as needed. Long term goals  Time Frame for Long term goals : by discharge  Long term goal 1: set up and occasional verbal cues ie.  L visual scan for self feeding  Long term goal 2: set up oral care (goal met)  Long term goal 3: max x 1 toileting and adl transfers  Long term goal 4: min UE bathe and dress (shirt)  Long term goal 5: max x 1 LE bathe and dress, able to cross to reach RLE with min assist and keep trunk balance     05/26/21 0734 05/26/21 1334   OT Individual Minutes   Time In 2203 1334   Time Out 0831 1400   Minutes 57 26   Time Code Minutes    Timed Code Treatment Minutes 57 Minutes 26 Minutes     Electronically signed by Brian Abraham OT on 5/26/21 at 3:21 PM EDT

## 2021-05-26 NOTE — PROGRESS NOTES
Speech Language Pathology  Speech Language Pathology  [facility] Acute Rehab Unit at [facility]    Dysphagia/SpeechLanguage Treatment Note    Date: 5/26/2021  Patients Name: Billie Espinoza  MRN: [id number]  Diagnosis:   Patient Active Problem List   Diagnosis Code    Pneumonia of left lower lobe due to infectious organism J18.9    Tobacco abuse Z72.0    Legionella pneumonia ([facility] A48.1    Dysphagia R13.10    Hyperplastic colon polyp K63.5    Esophageal ring K22.2    Gastritis K29.70    Elbow effusion, right M25.421    Stroke due to occlusion of right middle cerebral artery ([facility]) I63.511    Nihss score 15 R29.715    Acute left hemiparesis ([facility]) G81.94    Cerebrovascular accident (CVA) due to occlusion of right middle cerebral artery ([facility] I63.511    Acute CVA (cerebrovascular accident) ([facility] I63.9    COPD (chronic obstructive pulmonary disease) ([facility]) J44.9    Essential hypertension I10    Hyperlipidemia E78.5    Pre-diabetes R73.03       Pain: 0/10    Speech/Cognitive/Dysphagia Treatment    Treatment time:  3616-8599    Subjective: [x] Alert [x] Cooperative     [] Confused     [] Agitated    [] Lethargic    Objective/Assessment:  Attention: Mod verbal cues needed for increased attention to midline, pt. c extreme L sided neglect and mod A to redirect to task overall. Pt. Completed visual scanning task with min to mod cues to locate items to the L.   Pt. Answered ?s re: grocery ad she was looking at-  38%, 88% c cues, recepie- 58%, 100% c cues to look to L. Recall: n/a      Organization: n/a    Problem Solving/Reasoning:  n/a    Other:  ST utilized vital stim for L sided oral sling (one channel only), 6.5 mA for 22 minutes. Pt. Completed oral motor exercises x3, 10 reps each  with NMES in place.        Plan:  [x] Continue ST services    [] Discharge from ST:      Discharge recommendations: [] Inpatient Rehab   [] [place]   [] Outpatient Therapy  [] Follow up at trauma

## 2021-05-27 LAB
GLUCOSE BLD-MCNC: 113 MG/DL (ref 65–105)
GLUCOSE BLD-MCNC: 126 MG/DL (ref 65–105)

## 2021-05-27 PROCEDURE — 6360000002 HC RX W HCPCS: Performed by: PHYSICAL MEDICINE & REHABILITATION

## 2021-05-27 PROCEDURE — 97542 WHEELCHAIR MNGMENT TRAINING: CPT

## 2021-05-27 PROCEDURE — 6370000000 HC RX 637 (ALT 250 FOR IP): Performed by: STUDENT IN AN ORGANIZED HEALTH CARE EDUCATION/TRAINING PROGRAM

## 2021-05-27 PROCEDURE — 97112 NEUROMUSCULAR REEDUCATION: CPT

## 2021-05-27 PROCEDURE — 97535 SELF CARE MNGMENT TRAINING: CPT

## 2021-05-27 PROCEDURE — 82947 ASSAY GLUCOSE BLOOD QUANT: CPT

## 2021-05-27 PROCEDURE — 97116 GAIT TRAINING THERAPY: CPT

## 2021-05-27 PROCEDURE — 99231 SBSQ HOSP IP/OBS SF/LOW 25: CPT | Performed by: INTERNAL MEDICINE

## 2021-05-27 PROCEDURE — 1180000000 HC REHAB R&B

## 2021-05-27 PROCEDURE — 6370000000 HC RX 637 (ALT 250 FOR IP): Performed by: PHYSICAL MEDICINE & REHABILITATION

## 2021-05-27 PROCEDURE — 92507 TX SP LANG VOICE COMM INDIV: CPT

## 2021-05-27 PROCEDURE — 92526 ORAL FUNCTION THERAPY: CPT

## 2021-05-27 PROCEDURE — 97530 THERAPEUTIC ACTIVITIES: CPT

## 2021-05-27 RX ADMIN — AMITRIPTYLINE HYDROCHLORIDE 25 MG: 25 TABLET, FILM COATED ORAL at 21:09

## 2021-05-27 RX ADMIN — ATORVASTATIN CALCIUM 80 MG: 80 TABLET, FILM COATED ORAL at 10:50

## 2021-05-27 RX ADMIN — AMLODIPINE BESYLATE 5 MG: 5 TABLET ORAL at 10:50

## 2021-05-27 RX ADMIN — ACETAMINOPHEN 1000 MG: 500 TABLET, FILM COATED ORAL at 05:40

## 2021-05-27 RX ADMIN — HEPARIN SODIUM 5000 UNITS: 5000 INJECTION INTRAVENOUS; SUBCUTANEOUS at 05:40

## 2021-05-27 RX ADMIN — ACETAMINOPHEN 1000 MG: 500 TABLET, FILM COATED ORAL at 13:27

## 2021-05-27 RX ADMIN — LEVOTHYROXINE SODIUM 25 MCG: 0.03 TABLET ORAL at 05:40

## 2021-05-27 RX ADMIN — HEPARIN SODIUM 5000 UNITS: 5000 INJECTION INTRAVENOUS; SUBCUTANEOUS at 13:27

## 2021-05-27 RX ADMIN — Medication 2 PUFF: at 10:50

## 2021-05-27 RX ADMIN — Medication 2 PUFF: at 21:09

## 2021-05-27 RX ADMIN — ACETAMINOPHEN 1000 MG: 500 TABLET, FILM COATED ORAL at 21:09

## 2021-05-27 RX ADMIN — ASPIRIN 81 MG CHEWABLE TABLET 81 MG: 81 TABLET CHEWABLE at 10:50

## 2021-05-27 RX ADMIN — HEPARIN SODIUM 5000 UNITS: 5000 INJECTION INTRAVENOUS; SUBCUTANEOUS at 21:10

## 2021-05-27 RX ADMIN — FLUOXETINE HYDROCHLORIDE 20 MG: 20 CAPSULE ORAL at 10:50

## 2021-05-27 ASSESSMENT — PAIN DESCRIPTION - DESCRIPTORS: DESCRIPTORS: SORE

## 2021-05-27 ASSESSMENT — PAIN SCALES - GENERAL
PAINLEVEL_OUTOF10: 0
PAINLEVEL_OUTOF10: 0
PAINLEVEL_OUTOF10: 2
PAINLEVEL_OUTOF10: 1
PAINLEVEL_OUTOF10: 0

## 2021-05-27 ASSESSMENT — PAIN DESCRIPTION - LOCATION: LOCATION: GENERALIZED

## 2021-05-27 ASSESSMENT — PAIN DESCRIPTION - FREQUENCY: FREQUENCY: INTERMITTENT

## 2021-05-27 ASSESSMENT — PAIN DESCRIPTION - ONSET: ONSET: ON-GOING

## 2021-05-27 ASSESSMENT — PAIN DESCRIPTION - PROGRESSION: CLINICAL_PROGRESSION: GRADUALLY IMPROVING

## 2021-05-27 ASSESSMENT — PAIN DESCRIPTION - PAIN TYPE: TYPE: ACUTE PAIN

## 2021-05-27 NOTE — PROGRESS NOTES
[place] [address] Internal Medicine    Progress Note  Chart Reviewed: Yes  Patient assessed for rehabilitation services?: Yes  Family / Caregiver Present: No  Referring Practitioner: Dr Shalonda Hermosillo  5/27/2021    2:25 PM    Name:   Frieda Carlos  MRN:     [id number]     Acct:      [de-identified]   Room:   [address] [place] [address] Day:  16  Admit Date:  5/10/2021  7:13 AM    PCP:   Elias Box MD  Code Status:  Full Code    Subjective:     C/C: medical mgmt    Active Problems:    Tobacco abuse    Gastritis    Acute left hemiparesis ([facility]    Acute CVA (cerebrovascular accident) ([facility]    COPD (chronic obstructive pulmonary disease) ([facility]    Essential hypertension    Hyperlipidemia    Pre-diabetes  Resolved Problems:    * No resolved hospital problems. *      Interval History Status: improved. 5/14/21  Patient seen and examined at bedside. No acute overnight events. Afebrile, hemodynamically stable. Continues to work with PT/OT. No acute changes in hemiparesis. No acute complaints. 5/22  Patient is doing much better  No new complaints.      Significant last 24 hr data reviewed ;   Vitals:    05/26/21 0600 05/26/21 0744 05/26/21 1931 05/27/21 0736   BP: 139/81 136/68 138/77 132/72   Pulse: 87 77 86 81   Resp: 18 16 18 18   Temp: 98.6 °F (37 °C) 98.1 °F (36.7 °C) 98.5 °F (36.9 °C) 97.8 °F (36.6 °C)   TempSrc: Oral Oral Oral Oral   SpO2: 92%  94% 92%   Weight:       Height:          Recent Results (from the past 24 hour(s))   POC Glucose Fingerstick    Collection Time: 05/26/21  4:26 PM   Result Value Ref Range    POC Glucose 102 65 - 105 mg/dL   POC Glucose Fingerstick    Collection Time: 05/26/21  8:29 PM   Result Value Ref Range    POC Glucose 121 (H) 65 - 105 mg/dL   POC Glucose Fingerstick    Collection Time: 05/27/21  7:04 AM   Result Value Ref Range    POC Glucose 113 (H) 65 - 105 mg/dL     Recent Labs     05/26/21  0611 05/26/21  1112 05/26/21  1626 05/26/21 2029 05/27/21  7114 POCGLU 109* 98 102 121* 113*        No results found. HPI:   See history in H and P      Review of Systems:     Constitutional:  negative for chills, fevers, sweats  Respiratory:  negative for cough, dyspnea on exertion, hemoptysis, shortness of breath, wheezing  Cardiovascular:  negative for chest pain, chest pressure/discomfort, lower extremity edema, palpitations  Gastrointestinal:  negative for abdominal pain, constipation, diarrhea, nausea, vomiting  Neurological: Positive for left-sided weakness. Negative for dizziness, headache  Data:     Past Medical History:  no change     Social History:  no change    Family History: @no change    Vitals:      I/O (24Hr): No intake or output data in the 24 hours ending 05/27/21 1425    Labs:    URINE ANALYSIS: No results found for: LABURIN     CBC:  Lab Results   Component Value Date    WBC 9.8 05/25/2021    HGB 13.8 05/25/2021     05/25/2021        BMP:    Lab Results   Component Value Date     05/25/2021    K 4.3 05/25/2021     05/25/2021    CO2 24 05/25/2021    BUN 13 05/25/2021    CREATININE 0.48 05/25/2021    GLUCOSE 111 05/25/2021      LIVER PROFILE:  Lab Results   Component Value Date    ALT 13 10/16/2017    AST 13 10/16/2017    PROT 6.9 10/16/2017    BILITOT 0.22 10/16/2017    BILIDIR 0.11 10/16/2017    LABALBU 4.0 10/16/2017               Radiology:  Medications:      Allergies:      Current Meds:   Scheduled Meds:    FLUoxetine  20 mg Oral Daily    amitriptyline  25 mg Oral Nightly    heparin (porcine)  5,000 Units Subcutaneous 3 times per day    acetaminophen  1,000 mg Oral 3 times per day    amLODIPine  5 mg Oral Daily    aspirin  81 mg Oral Daily    atorvastatin  80 mg Oral Daily    budesonide-formoterol  2 puff Inhalation BID    insulin lispro  0-3 Units Subcutaneous Nightly    insulin lispro  0-6 Units Subcutaneous TID WC    levothyroxine  25 mcg Oral Daily    nicotine  1 patch Transdermal Daily    tiotropium  2 puff Inhalation Daily    polyethylene glycol  17 g Oral Daily     Continuous Infusions:    sodium chloride       PRN Meds: polyvinyl alcohol, sodium chloride flush, promethazine **OR** ondansetron, sodium chloride, albuterol, dextrose, glucagon (rDNA), glucose, senna, bisacodyl      Physical Examination:        /72   Pulse 81   Temp 97.8 °F (36.6 °C) (Oral)   Resp 18   Ht 5' 8\" (1.727 m)   Wt 191 lb 8 oz (86.9 kg)   SpO2 92%   BMI 29.12 kg/m²   Temp (24hrs), Av.2 °F (36.8 °C), Min:97.8 °F (36.6 °C), Max:98.5 °F (36.9 °C)    Recent Labs     21  1112 21  1626 21  0704   POCGLU 98 102 121* 113*     No intake or output data in the 24 hours ending 21 1425    General Appearance:  alert, well appearing, and in no acute distress  Mental status: oriented to person, place, and time with normal affect  Head:  normocephalic, atraumatic. Eye: no icterus, redness, pupils equal and reactive, extraocular eye movements intact, conjunctiva clear  Ear: normal external ear, no discharge, hearing intact  Nose:  no drainage noted  Mouth: mucous membranes moist  Neck: supple, no carotid bruits, thyroid not palpable  Lungs: Clear to auscultation bilaterally. No accessory muscle use. Cardiovascular: normal rate, regular rhythm, no murmur, gallop, rub. Abdomen: Soft, nontender, nondistended, normal bowel sounds, no hepatomegaly or splenomegaly  Neurologic: Left upper and lower extremity weakness, 0-1/5. No sensory loss.   Skin: No gross lesions, rashes, bruising or bleeding on exposed skin area  Extremities:  peripheral pulses palpable, no pedal edema or calf pain with palpation    Assessment:        Primary Problem  <principal problem not specified>    Active Hospital Problems    Diagnosis Date Noted    [facility] [I63.9] 05/10/2021    COPD (chronic obstructive pulmonary disease) ([facility] [J44.9] 05/10/2021    Essential hypertension [I10]

## 2021-05-27 NOTE — PLAN OF CARE
Problem: Neurological  Goal: Maximum potential motor/sensory/cognitive function  5/27/2021 1520 by David Mendoza RN  Outcome: Ongoing  5/27/2021 0518 by Donnie Donohue RN  Outcome: Ongoing     Problem: Pain:  Goal: Pain level will decrease  Description: Pain level will decrease  5/27/2021 1520 by David Mendoza RN  Outcome: Ongoing  5/27/2021 0518 by Donnie Donohue RN  Outcome: Ongoing  Goal: Control of acute pain  Description: Control of acute pain  5/27/2021 1520 by David Mendoza RN  Outcome: Ongoing  5/27/2021 0518 by Donnie Donohue RN  Outcome: Ongoing  Goal: Control of chronic pain  Description: Control of chronic pain  5/27/2021 1520 by David Mendoza RN  Outcome: Ongoing  5/27/2021 0518 by Donnie Donohue RN  Outcome: Ongoing     Problem: Neurological  Goal: Maximum potential motor/sensory/cognitive function  5/27/2021 1520 by David Mendoza RN  Outcome: Ongoing  5/27/2021 0518 by Donnie Donohue RN  Outcome: Ongoing     Problem: Skin Integrity:  Goal: Will show no infection signs and symptoms  Description: Will show no infection signs and symptoms  5/27/2021 1520 by David Mendoza RN  Outcome: Ongoing  5/27/2021 0518 by Donnie Donohue RN  Outcome: Ongoing  Goal: Absence of new skin breakdown  Description: Absence of new skin breakdown  5/27/2021 1520 by David Mendoza RN  Outcome: Ongoing  5/27/2021 0518 by Donnie Donohue RN  Outcome: Ongoing     Problem: Falls - Risk of:  Goal: Will remain free from falls  Description: Will remain free from falls  5/27/2021 1520 by David Mendoza RN  Outcome: Ongoing  5/27/2021 0518 by Donnie Donohue RN  Outcome: Ongoing  Note: Patient remained free from falls this shift. Call light and bed side table are within reach, bed is in lowest position, and side rails are up x2. Will continue to monitor.    Goal: Absence of physical injury  Description: Absence of physical injury  5/27/2021 1520 by David Mendoza RN  Outcome: Ongoing  5/27/2021 0518 by Linda Blank Anne Lance RN  Outcome: Ongoing     Problem: Nutrition  Goal: Optimal nutrition therapy  5/27/2021 1520 by Melia Wagoner RN  Outcome: Ongoing  5/27/2021 0518 by Mauricio Camejo RN  Outcome: Ongoing

## 2021-05-27 NOTE — CARE COORDINATION
Contacted [phone] and spoke with Emperatriz Leyva. Pt will be active with them on 6/1/2021 with member ID # [id number]. Ref# [id number].  List of SNF and alternate ARU options given per family and pt request.

## 2021-05-27 NOTE — PROGRESS NOTES
[facility]: REJI ARELLANO   ACUTE REHABILITATION OCCUPATIONAL THERAPY  DAILY NOTE    Date: 21  Patient Name: Jeferson Kang      Room: 5759/2609-19    MRN: [id number]   : 1955  (63 y.o.)  Gender: female      Diagnosis: ischemic CVA,  subacute right MCA distribution infarct, L lynette, dysphagia, L neglect, loop recorder 21 (Dr. Elliot Phan)  Additional Pertinent Hx: 79-year-old female who was found down, last known well approximately 21 hours before. Patient was found to have a right MCA M1 occlusion. Thrombectomy was not successful, MRI showed patient had a right MCA stroke with hemorrhagic conversion. 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 :  (loop recorder)  Other position/activity restrictions:  L neglect with LUE/LLE deficits, neglect improving. Required Braces or Orthoses?: No  Equipment Used: Bed, Wheelchair    Subjective  Subjective: \"lety\" pt reports when asked how she is feeling today. Comments: Pt sleeping upon arrival, easily awoke however continues to return to sleep when not engaged. Pt continues to demonstrate flat affect, requires encouragement for participation in self care tasks. Patient Currently in Pain: Denies  Restrictions/Precautions: Fall Risk  Overall Orientation Status: Within Functional Limits          Objective     Balance  Sitting Balance: Contact guard assistance (SBA-CGA based on task, multiple LOB sitting on shower bench.)  Standing Balance: Moderate assistance (stabilizing UE support)  Bed mobility  Supine to Sit: Minimal assistance (cues to hook LLE with RLE, assist to bring trunk support)  Comment: HOB elevated, use of bed rail. Transfers  Stand Pivot Transfers: Maximum assistance (bed>w/c towards strong side. A to block L knee. )  Sit to stand:  Moderate assistance  Stand to sit: Moderate assistance  Transfer Comments: Pt impulsive, ready to stand before safety techniques implemented. Standing Balance  Time: <1 min x3, ~1 min x 2   Activity: functional transfers, lower body care tasks. Shower Transfers  Shower - Transfer From: Wheelchair  Shower - Transfer Type: To and From  Shower - Transfer To: Transfer tub bench  Shower - Technique: Stand pivot  Shower Transfers: Moderate assistance;Maximal assistance  Shower Transfers Comments: R<>L. Grab bars for support. Electrical Stimulation  Electrical Stimulation Location: Left;Wrist;Forearm  Electrical Stimulation Action: to stimulate muscle for return in movement and engage attention to LUE through stimulation. Electrodes placed to address wrist and digit flexion/extension. Return noted for wrist flexion only. PROM applied within ROM goal of stimulation. Electrical Stimulation Parameters: 35 Hz, reciprocal NMS pattern, 4th settings initially to get return however as treatment progressed pt requested settings to be turned down, writer turned to 3rd setting. x15 min tolerated; pt reported some pain however tolerated more than shoulder previous date. (Estim on from [phone])  Electrical Stimulation Goals: Neuromuscular Facilitation              ADL  Equipment Provided: Reacher;Long-handled sponge  Feeding: Setup  Grooming: Setup  UE Bathing: Moderate assistance;Verbal cueing (VC's for throughness, Assist to wash RUE)  LE Bathing: Minimal assistance (Assist to bathe buttocks, bottom of feet. Use of LHS. )  UE Dressing: Minimal assistance (fair return on lynette tech, assist to thread LUE. )  LE Dressing: Maximum assistance; Increased time to complete;Verbal cueing (A to thread LLE, pull up over hips, mari[place] B TEDs/shoes/AFO)  Toileting: None (pt declined need. )  Additional Comments: Pt continues to demonstrate fair return with lynette techniques and awareness to L side. Pt does show L side awareness when bathing lower leg.  Continued education on use of AE for increased independence. Assessment  Performance deficits / Impairments: Decreased functional mobility ; Decreased ADL status; Decreased strength;Decreased endurance;Decreased balance;Decreased high-level IADLs;Decreased vision/visual deficit; Decreased cognition;Decreased safe awareness;Decreased ROM; Decreased fine motor control;Decreased coordination;Decreased posture;Decreased sensation  Prognosis: Good  Discharge Recommendations: Patient would benefit from continued therapy after discharge  Activity Tolerance: Patient limited by fatigue  Safety Devices in place: Yes  Type of devices: All fall risk precautions in place;Gait belt;Patient at risk for falls; Left in chair;Nurse notified           Plan  Plan  Times per week: 5-7  Times per day: Twice a day  Current Treatment Recommendations: Strengthening, Positioning, ROM, Safety Education & Training, Balance Training, Patient/Caregiver Education & Training, Self-Care / ADL, Cognitive/Perceptual Training, Functional Mobility Training, Neuromuscular Re-education, Equipment Evaluation, Education, & procurement, Endurance Training, Cognitive Reorientation  Patient Goals   Patient goals : \"get as close to normal as possible. \"  specifies:\"get in and out of bed on my own, shower by myself. \"  Short term goals  Time Frame for Short term goals: 1 week  Short term goal 1: mod A UE bathe and dress  Short term goal 2: set up brush teeth (goal met)  Short term goal 3: min feeding, with cues able to locate L side of tray and scan to locate items on L.   Short term goal 4: tolerate 6-8 min sitting edge of bed (static sit)  with min assist and RUE support, head at midline and fair safety awareness  Short term goal 5: pt to initiate reposition LUE with RUE with good safety ie. hold by wrist not by 1 finger  Short term goal 6: demo improved awareness of L side and neuromuscular christian by weight bearing on LUE with physical assist to complete  Short term goal 7: from w/c:  consistently maintain midline trunk control and demo able to lean forward 4 inches away from back of chair without physical assist for adls. Short term goal 8: tolerate 2 min static stand for adls with RUE support with mod of 2 and assist with LLE as needed. Long term goals  Time Frame for Long term goals : by discharge  Long term goal 1: set up and occasional verbal cues ie.  L visual scan for self feeding  Long term goal 2: set up oral care (goal met)  Long term goal 3: max x 1 toileting and adl transfers  Long term goal 4: min UE bathe and dress (shirt)  Long term goal 5: max x 1 LE bathe and dress, able to cross to reach RLE with min assist and keep trunk balance        05/27/21 0851 05/27/21 1511   OT Individual Minutes   Time In [address] 1230   Time Out 0835 1306   Minutes 59 36     Electronically signed by ERIN Nicholson on 5/27/21 at 4:03 PM EDT

## 2021-05-27 NOTE — PROGRESS NOTES
Physical Therapy  [address]  Acute Rehabilitation Physical Therapy Progress Note    Date: 21  Patient Name: Flaco Pinzon       Room: 6432/8491-23  MRN: [id number]   Account: [de-identified]   : 1955  (63 y.o.) Gender: female        Diagnosis: ischemic CVA,  subacute right MCA distribution infarct, L lynette, dysphagia, L neglect, loop recorder 21 (Dr. Edmund Nova)  Past Medical History:  has a past medical history of Asthma, COPD (chronic obstructive pulmonary disease) ([facility] Diabetes mellitus ([facility] Esophageal ring, Gastritis, Hyperlipidemia, Hyperplastic colon polyp, Hypertension, Osteoarthritis, Patient in clinical research study, and TIA (transient ischemic attack). Past Surgical History:   has a past surgical history that includes Appendectomy; Hysterectomy; Esophagus dilation; Colonoscopy (2017); Endoscopy, colon, diagnostic; pr egd transoral biopsy single/multiple (N/A, 2017); pr colsc flx w/rmvl of tumor polyp lesion snare tq (N/A, 2017); and Upper gastrointestinal endoscopy (2017). Restrictions/Precautions  Restrictions/Precautions: Fall Risk  Required Braces or Orthoses?: No  Implants present? :  (loop recorder)  Position Activity Restriction  Other position/activity restrictions:  L neglect with LUE/LLE deficits, neglect improving. Vital Signs  Patient Currently in Pain: Denies    Bed Mobility:   Bed Mobility  Bridging: Minimal assistance (positioning L LE)  Rolling: Rolling Left;Rolling Right;Minimal assistance (Cues for L UE safety/movement, facilitating L LE placement)  Supine to Sit: Minimal assistance (to Pt's R: L LE assist & momentum for trunk)  Sit to Supine:  Moderate assistance (Assist with trunk control and LLE)  Comment: completed on mat table and hospital bed  Bed mobility  Bridging: Minimal assistance (positioning L LE)    Transfers:  Sit to Stand: Minimal Assistance (L UE assist)  Stand to sit: Minimal Assistance (L UE assist, improved control today, G reach back c R UE)        Squat Pivot Transfers: Moderate Assistance (Impulsive. Assist c L UE/LE positioning, set-up; safety cues. To Pt's R.)        Ambulation 1  Surface: level tile  Device:  (walker lift)  Other Apparatus: Left;Slider;AFO;Wheelchair follow  Assistance: 2 Person assistance;Maximum assistance (+ 3rd assist to steer walker)  Quality of Gait: Some quadricep contraction noted during amb for L knee extension. Pt is able to initiate L hip flexion, but continues to need assist to fully advance L LE upon fatigue. Fewer cues to increase step length today. Pt attempts to step with R LE before L has been well placed/extended. Gait Deviations: Decreased step length; Slow Juanita;Decreased step height  Distance: 30ftx 1 60ft x 1  Comments: Pt c/o feeling light headed this morning. /89 YFR170% with HR 94bpm post amb  Ambulation 2  Surface - 2: level tile  Device 2:  (walker lift)  Other Apparatus 2: Left;Slider;AFO;Wheelchair follow  Assistance 2: 2 Person assistance;Maximum assistance (3 person assist to steer walker)  Quality of Gait 2: same as above; required more cues for increased R step length and to keep wider NIGHAT. Gait Deviations: Slow Juanita;Decreased step length;Decreased step height  Distance: 45ft     Stairs/Curb  Stairs?: No                 Propulsion 1  Propulsion: Manual  Level: Level Tile  Method: RUE;RLE  Level of Assistance: Minimal assistance  Description/ Details: Repeated verbal cues to avoid objects, wall, rail on her L (collision 3x despite cues); fails to scan environment without cues. 90º turns with cues; fair straight path with uncluttered environment. Uncoordinated/sporadic use of UE + LE. Distance: 75ft     Neuromuscular Education  Neuromuscular education: Yes  NDT Treatment: Lower extremity  Other: e-stim completed to L quad for muscle re-ed working on quad strengthening while performing SAQ's with therapist assist x 8mins.  [employer] ambulation resulting in fair initiation of swing , particularly if in a good walking rhythm; continues to require total assist for L knee extension. Discussed with daughter, therapy progress, needs 2 person assist for safety for transfers on weak side. Pt will need a ramp at the entrance to enter/exit at home. Treatment Diagnosis: Weakness, impaired mobility  Prognosis: Good  REQUIRES PT FOLLOW UP: Yes  Discharge Recommendations: Patient would benefit from continued therapy after discharge;Home with assist PRN    Goals  Short term goals  Time Frame for Short term goals: 10 days  Short term goal 1: Pt able to roll in bed side to side at min A without bed rail  Short term goal 2: Pt able to perform supine<>sit at mod A  Short term goal 3: Pt able to perform sit<>stand at min/mod A . Short term goal 4: Pt able to progress to pivot transfers at max A   Short term goal 5: Pt able to improve sitting balance at EOB/EOM at SBA for static balance and able to track to left side as well as turn to head to left side to scan her environment with minimal cues. Short term goal 6: Pt able to tolerate standing in // bars with R UE support and assist at mod A  Short term goal 7: Pt able to propel w/c with R UE/R LE distance of 50 to 80 ft, straight path and turn around 180 degrees, at min/mod A   Long term goals  Time Frame for Long term goals : By DC  Long term goal 1: Pt able to roll in bed mod-I  Long term goal 2: Pt able to perform supine <> sit at CGA/min A  Long term goal 3: Pt able to perform pivot transfers min A / mod A and able to scan Left side environment. Long term goal 4: Pt able to ambulate in // bars, or with appropriate assistive device and/or LE bracing, distance of  20 to 30 ft, min/mod A x 2. Long term goal 5: Pt able to propel w/c on level surfaces distance 100 to 150 ft, SBA/CGA. Long term goal 6: Educate pt/family in safe technique for mobility and  to go up and down steps to enter/exit home.    Long term goal 7: Improve sitting balance at EOM to good, and standing balance with R UE support to fair- to reduce fall risk. Long term goal 8: Improve PASS score to 20/36 improve overall function.         05/27/21 0836 05/27/21 1530   PT Individual Minutes   Time In 0835 1355   Time Out 0930 1435   Minutes 55 40       Electronically signed by Sandra Johnston PTA on 5/27/21 at 3:53 PM EDT

## 2021-05-27 NOTE — PLAN OF CARE
Problem: Neurological  Goal: Maximum potential motor/sensory/cognitive function  5/27/2021 0518 by Hafsa Tim RN  Outcome: Ongoing  5/27/2021 0052 by Hafsa Tim RN  Outcome: Ongoing  5/26/2021 1614 by Lenny Kramer RN  Outcome: Ongoing     Problem: Pain:  Goal: Pain level will decrease  Description: Pain level will decrease  5/27/2021 0518 by Hafsa Tim RN  Outcome: Ongoing  5/27/2021 0052 by Hafsa Tim RN  Outcome: Ongoing  5/26/2021 1614 by Lenny Kramer RN  Outcome: Ongoing  Note: Pain was tolerable with PRN pain meds. Pain was decreased with pain meds. Goal: Control of acute pain  Description: Control of acute pain  5/27/2021 0518 by Hafsa Tim RN  Outcome: Ongoing  5/27/2021 0052 by Hafsa Tim RN  Outcome: Ongoing  5/26/2021 1614 by Lenny Kramer RN  Outcome: Ongoing  Note: Pain was tolerable with PRN pain meds. Pain was decreased with pain meds.     Goal: Control of chronic pain  Description: Control of chronic pain  5/27/2021 0518 by Hafsa Tim RN  Outcome: Ongoing  5/27/2021 0052 by Hafsa Tim RN  Outcome: Ongoing  5/26/2021 1614 by Lenny Kramer RN  Outcome: Ongoing     Problem: Neurological  Goal: Maximum potential motor/sensory/cognitive function  5/27/2021 0518 by Hafsa Tim RN  Outcome: Ongoing  5/27/2021 0052 by Hafsa Tim RN  Outcome: Ongoing  5/26/2021 1614 by Lenny Kramer RN  Outcome: Ongoing     Problem: Skin Integrity:  Goal: Will show no infection signs and symptoms  Description: Will show no infection signs and symptoms  5/27/2021 0518 by Hafsa Tim RN  Outcome: Ongoing  5/27/2021 0052 by Hafsa Tim RN  Outcome: Ongoing  5/26/2021 1614 by Lenny Kramer RN  Outcome: Ongoing  Goal: Absence of new skin breakdown  Description: Absence of new skin breakdown  5/27/2021 0518 by Hafsa Squire, RN  Outcome: Ongoing  5/27/2021 0052 by Hafsa Tim RN  Outcome: Ongoing  5/26/2021 1614 by Lenny Kramer RN  Outcome: Ongoing     Problem: Falls - Risk of:  Goal: Will remain free from falls  Description: Will remain free from falls  5/27/2021 0518 by Natali Alvarez RN  Outcome: Ongoing  Note: Patient remained free from falls this shift. Call light and bed side table are within reach, bed is in lowest position, and side rails are up x2. Will continue to monitor.    5/27/2021 0052 by Natali Alvarez RN  Outcome: Ongoing  5/26/2021 1614 by Cheo Torres RN  Outcome: Ongoing  Goal: Absence of physical injury  Description: Absence of physical injury  5/27/2021 0518 by Natali Alvarez RN  Outcome: Ongoing  5/27/2021 0052 by Natali Alvarez RN  Outcome: Ongoing  5/26/2021 1614 by Cheo Torres RN  Outcome: Ongoing     Problem: Nutrition  Goal: Optimal nutrition therapy  5/27/2021 0518 by Natali Alvarez RN  Outcome: Ongoing  5/27/2021 0052 by Natali Alvarez RN  Outcome: Ongoing  5/26/2021 1614 by Cheo Torres RN  Outcome: Ongoing

## 2021-05-27 NOTE — PROGRESS NOTES
assistance (L LE positioning to scoot back in chair)  Comment: HOB elevated, use of bed rail. Transfers  Stand Step Transfers: Dependent/Total  Stand Pivot Transfers: Maximum assistance (bed>w/c towards strong side. A to block L knee. )  Sit to stand: Moderate assistance  Stand to sit: Moderate assistance  Transfer Comments: Pt impulsive, ready to stand before safety techniques implemented. Toilet Transfers  Toilet - Technique:  (nilson stedy )  Equipment Used: Raised toilet seat without rails  Toilet Transfer: Dependent/Total (nilson stedy, min-mod A)  Toilet Transfers Comments: Completed with use of nilson stedy with min-mod A to complete. Shower Transfers  Shower - Transfer From: Wheelchair  Shower - Transfer Type: To and From  Shower - Transfer To: Transfer tub bench  Shower - Technique: Stand pivot  Shower Transfers: Moderate assistance, Maximal assistance  Shower Transfers Comments: R<>L. Grab bars for support. Wheelchair Bed Transfers  Wheelchair/Bed - Technique: Stand pivot  Equipment Used: Bed, Wheelchair  Level of Asssistance: Maximum assistance (Block L knee)      ST:        Subjective: [x]? Alert     [x]? Cooperative     []? Confused     []? Agitated    []? Lethargic     Objective/Assessment:  Attention:  Min verbal cues needed for increased attention to midline, pt. c extreme L sided neglect. Pt. Completed visual scanning task with  min to mod cues to locate items to the L.   Pt. Answered ?s re: picture she is looking at c 90% accuracy, 100% c cues. Pt. Easily distracted, multiple distractions in room, ST attempting to redirect pt. Attention often.       Recall: n/a       Organization: n/a     Problem Solving/Reasoning:  n/a     Other:  ST utilized vital stim for L sided oral sling (one channel only), 6.0 mA for 25 minutes. Pt. Completed oral motor exercises x3, 15 reps each  with NMES in place. Pt. Daughter present, reports pt.  Seems to be tolerating her diet of soft and bite sized without difficulty.             Objective:  /72   Pulse 81   Temp 97.8 °F (36.6 °C) (Oral)   Resp 18   Ht 5' 8\" (1.727 m)   Wt 191 lb 8 oz (86.9 kg)   SpO2 92%   BMI 29.12 kg/m²  I Body mass index is 29.12 kg/m². I   Wt Readings from Last 1 Encounters:   05/10/21 191 lb 8 oz (86.9 kg)      Temp (24hrs), Av.2 °F (36.8 °C), Min:97.8 °F (36.6 °C), Max:98.5 °F (36.9 °C)         GEN: well developed, well nourished, no acute distress  HEENT: Normocephalic atraumatic, EOMI, mucous membranes pink and moist  CV: RRR, no murmurs, rubs or gallops  PULM: CTAB, no rales or rhonchi. Respirations WNL and unlabored  ABD: soft, NT, ND, +BS and equal  NEURO: A&O x3. Sensation intact to light touch. MSK: 4+/5 right upper/ lower extremity, dense left hemiparesis, distal left upper lower extremity 0/5 proximal left upper extremity 1/5, left hip flexion 1-2/5, no toneno change  EXTREMITIES: No calf tenderness to palpation bilaterally. No edema BLEs,   SKIN: warm dry and intact with good turgor  PSYCH: appropriately interactive. Affect WNL.            Medications   Scheduled Meds:   FLUoxetine  20 mg Oral Daily    amitriptyline  25 mg Oral Nightly    heparin (porcine)  5,000 Units Subcutaneous 3 times per day    acetaminophen  1,000 mg Oral 3 times per day    amLODIPine  5 mg Oral Daily    aspirin  81 mg Oral Daily    atorvastatin  80 mg Oral Daily    budesonide-formoterol  2 puff Inhalation BID    insulin lispro  0-3 Units Subcutaneous Nightly    insulin lispro  0-6 Units Subcutaneous TID WC    levothyroxine  25 mcg Oral Daily    nicotine  1 patch Transdermal Daily    tiotropium  2 puff Inhalation Daily    polyethylene glycol  17 g Oral Daily     Continuous Infusions:   sodium chloride       PRN Meds:.polyvinyl alcohol, sodium chloride flush, promethazine **OR** ondansetron, sodium chloride, albuterol, dextrose, glucagon (rDNA), glucose, senna, bisacodyl     Diagnostics:     CBC:   Recent Labs 05/25/21  0654   WBC 9.8   RBC 4.51   HGB 13.8   HCT 41.4   MCV 91.7   RDW 15.0*        BMP:   Recent Labs     05/25/21  0654      K 4.3      CO2 24   BUN 13   CREATININE 0.48*     BNP: No results for input(s): BNP in the last 72 hours. PT/INR: No results for input(s): PROTIME, INR in the last 72 hours. APTT: No results for input(s): APTT in the last 72 hours. CARDIAC ENZYMES: No results for input(s): CKMB, CKMBINDEX, TROPONINT in the last 72 hours. Invalid input(s): CKTOTAL;3  FASTING LIPID PANEL:  Lab Results   Component Value Date    CHOL 206 (H) 05/05/2021    HDL 29 (L) 05/05/2021    TRIG 185 (H) 05/05/2021     LIVER PROFILE: No results for input(s): AST, ALT, ALB, BILIDIR, BILITOT, ALKPHOS in the last 72 hours. I/O (24Hr): No intake or output data in the 24 hours ending 05/27/21 1732    Glu last 24 hour  Recent Labs     05/26/21  1112 05/26/21  1626 05/26/21 2029 05/27/21  0704   POCGLU 98 102 121* 113*       No results for input(s): CLARITYU, COLORU, PHUR, SPECGRAV, PROTEINU, RBCUA, BLOODU, BACTERIA, NITRU, WBCUA, LEUKOCYTESUR, YEAST, GLUCOSEU, BILIRUBINUR in the last 72 hours.         Impression/Plan:   Impaired ADLs, gait, and mobility due to:        1. Ischemic R MCA CVA with hemorrhagic conversion and L non-dominant hemiparesis: Continue rehab efforts, on ASA, atorvastatin. Sling only to be used when patient is ambulating with therapy. Discussed with patient and therapist.  Handicap placard on discharge. Noted discharge plan 6/2, family to place ramp. Patient get insurance 6/1family requesting extension, or another acute rehab facility. Discussed with teamcontinues to require significant assistance will need 24/7 assistancefamily unable to provide? May need skilled facility. Discussed with daughter present today. 2. Status post fall 5/16currently no residual, continue to monitor  3. Headache: Has Tylenol routine TID. Improved on amitriptyline.    4. Dry eyes: has natural tears. 5. Cognitive impairment: SLP treating, discussed with daughter 5/22  6. Dysphagia/aphasia: SLP treating. Diet upgraded, continue aspiration precautions  7. HTN/Hyperlipidemia: on amlodipine  8. DM: on insulin sliding scale, as above  9. COPD/asthma: on albuterol nebulizers prn, on symbicort BID, spiriva  10. Adjustment disorder with depressed mood: started fluoxetine 5/15, BMP okay  11. Esophageal Ring: Will monitor - on dysphagia diet  12. Hypothyroidism: on levothyroxine  13. Leukocytosisimproving  14. Nicotine dependence: on Nicoderm  15. Bowel Management: Miralax daily, senokot prn, dulcolax prn. 16. DVT Prophylaxis:  heparin, SCD's while in bed and MAXIM's   17. Internal medicine for medical management      Iva Ojeda. Freddie Beckham MD       This note is created with the assistance of a speech recognition program.  While intending to generate a document that actually reflects the content of the visit, the document can still have some errors including those of syntax and sound a like substitutions which may escape proof reading.   In such instances, actual meaning can be extrapolated by contextual diversion

## 2021-05-28 LAB
GLUCOSE BLD-MCNC: 126 MG/DL (ref 65–105)
GLUCOSE BLD-MCNC: 130 MG/DL (ref 65–105)
GLUCOSE BLD-MCNC: 148 MG/DL (ref 65–105)
GLUCOSE BLD-MCNC: 157 MG/DL (ref 65–105)

## 2021-05-28 PROCEDURE — 92507 TX SP LANG VOICE COMM INDIV: CPT

## 2021-05-28 PROCEDURE — 92526 ORAL FUNCTION THERAPY: CPT

## 2021-05-28 PROCEDURE — 97530 THERAPEUTIC ACTIVITIES: CPT

## 2021-05-28 PROCEDURE — 97542 WHEELCHAIR MNGMENT TRAINING: CPT

## 2021-05-28 PROCEDURE — 97116 GAIT TRAINING THERAPY: CPT

## 2021-05-28 PROCEDURE — 6360000002 HC RX W HCPCS: Performed by: PHYSICAL MEDICINE & REHABILITATION

## 2021-05-28 PROCEDURE — 97112 NEUROMUSCULAR REEDUCATION: CPT

## 2021-05-28 PROCEDURE — 6370000000 HC RX 637 (ALT 250 FOR IP): Performed by: STUDENT IN AN ORGANIZED HEALTH CARE EDUCATION/TRAINING PROGRAM

## 2021-05-28 PROCEDURE — 1180000000 HC REHAB R&B

## 2021-05-28 PROCEDURE — 97110 THERAPEUTIC EXERCISES: CPT

## 2021-05-28 PROCEDURE — 99231 SBSQ HOSP IP/OBS SF/LOW 25: CPT | Performed by: INTERNAL MEDICINE

## 2021-05-28 PROCEDURE — 82947 ASSAY GLUCOSE BLOOD QUANT: CPT

## 2021-05-28 PROCEDURE — 99233 SBSQ HOSP IP/OBS HIGH 50: CPT | Performed by: PHYSICAL MEDICINE & REHABILITATION

## 2021-05-28 PROCEDURE — 6370000000 HC RX 637 (ALT 250 FOR IP): Performed by: PHYSICAL MEDICINE & REHABILITATION

## 2021-05-28 PROCEDURE — 97535 SELF CARE MNGMENT TRAINING: CPT

## 2021-05-28 RX ADMIN — Medication 2 PUFF: at 07:47

## 2021-05-28 RX ADMIN — INSULIN LISPRO 1 UNITS: 100 INJECTION, SOLUTION INTRAVENOUS; SUBCUTANEOUS at 16:59

## 2021-05-28 RX ADMIN — ATORVASTATIN CALCIUM 80 MG: 80 TABLET, FILM COATED ORAL at 07:35

## 2021-05-28 RX ADMIN — ACETAMINOPHEN 1000 MG: 500 TABLET, FILM COATED ORAL at 21:55

## 2021-05-28 RX ADMIN — FLUOXETINE HYDROCHLORIDE 20 MG: 20 CAPSULE ORAL at 07:35

## 2021-05-28 RX ADMIN — HEPARIN SODIUM 5000 UNITS: 5000 INJECTION INTRAVENOUS; SUBCUTANEOUS at 06:26

## 2021-05-28 RX ADMIN — HEPARIN SODIUM 5000 UNITS: 5000 INJECTION INTRAVENOUS; SUBCUTANEOUS at 14:26

## 2021-05-28 RX ADMIN — Medication 2 PUFF: at 07:35

## 2021-05-28 RX ADMIN — Medication 2 PUFF: at 21:58

## 2021-05-28 RX ADMIN — LEVOTHYROXINE SODIUM 25 MCG: 0.03 TABLET ORAL at 06:24

## 2021-05-28 RX ADMIN — ACETAMINOPHEN 1000 MG: 500 TABLET, FILM COATED ORAL at 14:26

## 2021-05-28 RX ADMIN — ACETAMINOPHEN 1000 MG: 500 TABLET, FILM COATED ORAL at 06:24

## 2021-05-28 RX ADMIN — HEPARIN SODIUM 5000 UNITS: 5000 INJECTION INTRAVENOUS; SUBCUTANEOUS at 21:58

## 2021-05-28 RX ADMIN — AMLODIPINE BESYLATE 5 MG: 5 TABLET ORAL at 07:35

## 2021-05-28 RX ADMIN — ASPIRIN 81 MG CHEWABLE TABLET 81 MG: 81 TABLET CHEWABLE at 07:35

## 2021-05-28 RX ADMIN — AMITRIPTYLINE HYDROCHLORIDE 25 MG: 25 TABLET, FILM COATED ORAL at 21:55

## 2021-05-28 ASSESSMENT — PAIN DESCRIPTION - ORIENTATION: ORIENTATION: LEFT

## 2021-05-28 ASSESSMENT — PAIN DESCRIPTION - PAIN TYPE: TYPE: ACUTE PAIN

## 2021-05-28 ASSESSMENT — PAIN SCALES - GENERAL
PAINLEVEL_OUTOF10: 0
PAINLEVEL_OUTOF10: 0
PAINLEVEL_OUTOF10: 1
PAINLEVEL_OUTOF10: 0

## 2021-05-28 ASSESSMENT — PAIN SCALES - WONG BAKER
WONGBAKER_NUMERICALRESPONSE: 0
WONGBAKER_NUMERICALRESPONSE: 0
WONGBAKER_NUMERICALRESPONSE: 6
WONGBAKER_NUMERICALRESPONSE: 0
WONGBAKER_NUMERICALRESPONSE: 0

## 2021-05-28 ASSESSMENT — PAIN DESCRIPTION - LOCATION: LOCATION: SHOULDER

## 2021-05-28 NOTE — PLAN OF CARE
Problem: Pain:  Goal: Pain level will decrease  Description: Pain level will decrease  5/28/2021 0511 by Angle Friedman RN  Outcome: Ongoing   Pain assessment and reassessment completed so far this shift. Pt. able to rest after the use of pain medication. Patient medicated with 1000mg of Scheduled tylenol Q8hrs for c/o generalized soreness from therapy. Pt. Repositions per self for comfort. Nonverbal cues indicate pain relief. Pt. Rests quietly with eyes closed after pain medication administration. Respirations easy and unlabored. Appears free from distress. Problem: Falls - Risk of:  Goal: Will remain free from falls  Description: Will remain free from falls  5/28/2021 0511 by Angle Friedman RN  Outcome: Met This Shift  No falls or injuries sustained at this time. No attempts to get out of bed without nursing assistance. Call light within reach and pt. uses appropriately for assistance. Siderails up x 2. Nonskid footwear remains on. Bed in low and locked position. Hourly nursing rounds made. Pt. Alert and oriented, aware of limitations, and exhibits good safety judgement. Pt. uses assistive devices appropriately. Pt. understands individual fall risk factors. Pt. reminded to use call light with each nurse/patient interaction. Pt. room located close to nurse's station. Bed alarm remains engaged throughout the shift as a precaution. Problem: Skin Integrity:  Goal: Absence of new skin breakdown  Description: Absence of new skin breakdown  5/28/2021 0511 by Angle Friedman RN  Outcome: Met This Shift    Skin assessment completed this shift. Nutrition and Hydration status assessed with adequate intake. Ruy Score as charted. Pressure Relief Overlay remains intact and inflated to patient's bed throughout the shift. Chair cushion remains intact when pt is up to chair. Bilateral heels remain elevated on pillows throughout the shift. Left Prafo boot remains on throughout the shift.   Patient able to reposition self for comfort and to prevent breakdown. Patient verbalizes understanding of pressure ulcer prevention measures. Skin integrity maintained. No new skin breakdown noted. Skin to high risk pressure areas including coccyx and heels are clear. Ryann care provided as needed throughout the shift. Aloe Vesta Moisture Barrier ointment applied to buttocks as a preventative measure.

## 2021-05-28 NOTE — PROGRESS NOTES
[address]   ACUTE REHABILITATION OCCUPATIONAL THERAPY  DAILY NOTE    Date: 21  Patient Name: New Mccollum      Room: 2300/8860-59    MRN: [id number]   : 1955  (63 y.o.)  Gender: female      Diagnosis: ischemic CVA,  subacute right MCA distribution infarct, L lynette, dysphagia, L neglect, loop recorder 21 (Dr. Renata De Anda)  Additional Pertinent Hx: 71-year-old female who was found down, last known well approximately 21 hours before. Patient was found to have a right MCA M1 occlusion. Thrombectomy was not successful, MRI showed patient had a right MCA stroke with hemorrhagic conversion. 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 :  (loop recorder)  Other position/activity restrictions:  L neglect with LUE/LLE deficits, neglect improving. Required Braces or Orthoses?: No  Equipment Used: Bed, Wheelchair    Subjective  Comments: Pt seated EOB with RN present upon writer entry, preparing for transfer to /. Writer assisted as initiation for treatment session. Patient Currently in Pain: Denies  Restrictions/Precautions: Fall Risk  Overall Orientation Status: Within Functional Limits          Objective  Cognition  Overall Cognitive Status: Impaired  Arousal/Alertness: Appropriate responses to stimuli  Following Directions:  Follows one step commands  Attention Span: Attends with cues to redirect  Safety Judgement: Decreased awareness of need for assistance  Insights: Decreased awareness of deficits  Sequencing and Organization: Assistance required to generate solutions  Perception  Overall Perceptual Status: Impaired  Unilateral Attention: Cues to attend left visual field;Cues to maintain midline in sitting;Cues to maintain midline in standing;Cues to attend to left side of body (improving attention midline; not so far directed towards R )  Balance  Sitting Balance: Contact guard assistance (SBA-CGA while seated EOB)  Bed mobility  Supine to Sit: Minimal assistance (HOB elevated, use of bed rail. Per RN report. )  Scooting: Minimal assistance  Transfers  Stand Pivot Transfers: Maximum assistance (EOB>w/c towards R side. )           Type of ROM/Therapeutic Exercise  Type of ROM/Therapeutic Exercise: Cane/Wand;PROM; Self PROM  Comment: BUE engagement will use of therabar for increased awareness and joint mobility to LUE. Pt placed in front of mirror to increase visual feedback; cues to keep attention to mirror and LUE awareness. Support provided at L elbow through bar ex's. 15 reps x2. Frequent tactile cues for RUE grasp on bar. Exercises  Scapular Protraction: x (w/ cane )  Scapular Retraction: x (w/ cane)  Shoulder Flexion: x (w/ cane)  Shoulder Extension: x (w/ cane)  Elbow Flexion: x (w/ cane)  Elbow Extension: x (w/ cane)  Wrist Flexion: x  Wrist Extension: x  Finger Flexion: x  Finger Extension: x  Other: Pt reports pain up LUE to shoulder with digit(mainly thumb) ROM. Weight Bearing  Weight Bearing Technique: Yes  LUE Weight Bearing: Forearm seated  Response To Weight Bearing Technique: light weight bearing into L wrist/hand on wheelchair arm table. Additional Activities: Other  Additional Activities: Educated pt daughter on via therapy zac for her smartphone to increased family education and techniques/tasks to initiate outside of scheduled therapy time to increase overall rehab potential.  (good motivation reported in return by daughter. )  Electrical Stimulation  Electrical Stimulation Location: Left;Wrist;Forearm;Elbow (bicep/tricep )  Electrical Stimulation Action: to stimulate muscle for return in movement and engage attention to LUE through stimulation. Electrodes placed to address wrist and digit flexion/extension, added electordes to bicep/tricep this date to increase focus in prep for elbow flexion/extension.  Return noted for wrist flexion only. Electrical Stimulation Parameters: RUE placed in mirror duirng estim with direction to move RUE based on goals on stimulation in LUE to increase neuro feedback. Cues for attention and awareness to direction. 35 Hz, reciprocal NMS pattern, 2nd-4th settings based on tolerance, flucates settings throughout. x12 min tolerated; pt reported some pain however tolerated more than shoulder previous date.  (estim on from [id number])  Electrical Stimulation Goals: Neuromuscular Facilitation  Neuromuscular Education  Neuromuscular education: Yes  Other: Taping technique provided to L bicep muscle belly; noted trace return in wrist/forearm. Cues for pt awareness. Weight Bearing  Weight Bearing Technique: Yes  LUE Weight Bearing: Forearm seated  Response To Weight Bearing Technique: light weight bearing into L wrist/hand on wheelchair arm table. ADL  Equipment Provided: Reacher  Feeding: Modified independent   Grooming: Setup (set up for wash cloth. oral care/face washing/hair grooming)  UE Bathing: None (pt declines)  LE Bathing: None (pt declines)  UE Dressing: None (pt declines)  LE Dressing: Maximum assistance;Dependent/Total (footlevel dressing only; TEDs/shoes/L AFO)  Toileting: None (pt declines need. )          Assessment  Performance deficits / Impairments: Decreased functional mobility ; Decreased ADL status; Decreased strength;Decreased endurance;Decreased balance;Decreased high-level IADLs;Decreased vision/visual deficit; Decreased cognition;Decreased safe awareness;Decreased ROM; Decreased fine motor control;Decreased coordination;Decreased posture;Decreased sensation  Prognosis: Good  Discharge Recommendations: Patient would benefit from continued therapy after discharge  Activity Tolerance: Patient Tolerated treatment well;Patient limited by pain  Safety Devices in place: Yes  Type of devices: All fall risk precautions in place;Gait belt;Patient at risk for falls; Left in chair;Nurse notified  Equipment Recommendations  Equipment Needed:  (TBD)          Plan  Plan  Times per week: 5-7  Times per day: Twice a day  Current Treatment Recommendations: Strengthening, Positioning, ROM, Safety Education & Training, Balance Training, Patient/Caregiver Education & Training, Self-Care / ADL, Cognitive/Perceptual Training, Functional Mobility Training, Neuromuscular Re-education, Equipment Evaluation, Education, & procurement, Endurance Training, Cognitive Reorientation  Patient Goals   Patient goals : \"get as close to normal as possible. \"  specifies:\"get in and out of bed on my own, shower by myself. \"  Short term goals  Time Frame for Short term goals: 1 week  Short term goal 1: mod A UE bathe and dress  Short term goal 2: set up brush teeth (goal met)  Short term goal 3: min feeding, with cues able to locate L side of tray and scan to locate items on L. Short term goal 4: tolerate 6-8 min sitting edge of bed (static sit)  with min assist and RUE support, head at midline and fair safety awareness  Short term goal 5: pt to initiate reposition LUE with RUE with good safety ie. hold by wrist not by 1 finger  Short term goal 6: demo improved awareness of L side and neuromuscular christian by weight bearing on LUE with physical assist to complete  Short term goal 7: from w/c:  consistently maintain midline trunk control and demo able to lean forward 4 inches away from back of chair without physical assist for adls. Short term goal 8: tolerate 2 min static stand for adls with RUE support with mod of 2 and assist with LLE as needed. Long term goals  Time Frame for Long term goals : by discharge  Long term goal 1: set up and occasional verbal cues ie.  L visual scan for self feeding  Long term goal 2: set up oral care (goal met)  Long term goal 3: max x 1 toileting and adl transfers  Long term goal 4: min UE bathe and dress (shirt)  Long term goal 5: max x 1 LE bathe and dress, able to cross to reach RLE with min assist and keep trunk balance        05/28/21 0947 05/28/21 0948 05/28/21 1604   OT Individual Minutes   Time In [phone]   Time Out [phone]   Minutes 4 40 30     Electronically signed by ERIN Callaway on 5/28/21 at 4:12 PM EDT

## 2021-05-28 NOTE — PROGRESS NOTES
Speech Language Pathology  Speech Language Pathology  [facility] Acute Rehab Unit at [facility]    Dysphagia/SpeechLanguage Treatment Note    Date: 5/28/2021  Patients Name: Flaco Pinzon  MRN: [id number]  Diagnosis:   Patient Active Problem List   Diagnosis Code    Pneumonia of left lower lobe due to infectious organism J18.9    Tobacco abuse Z72.0    Legionella pneumonia ([address] A48.1    Dysphagia R13.10    Hyperplastic colon polyp K63.5    Esophageal ring K22.2    Gastritis K29.70    Elbow effusion, right M25.421    Stroke due to occlusion of right middle cerebral artery ([facility]) I63.511    Nihss score 15 R29.715    Acute left hemiparesis ([facility]) G81.94    Cerebrovascular accident (CVA) due to occlusion of right middle cerebral artery ([facility] [address] I63.511    Acute CVA (cerebrovascular accident) ([facility] [address] I63.9    COPD (chronic obstructive pulmonary disease) ([facility]) J44.9    Essential hypertension I10    Hyperlipidemia E78.5    Pre-diabetes R73.03       Pain: 0/10    Speech/Cognitive/Dysphagia Treatment    Treatment time:  6890-6860    Subjective: [x] Alert [x] Cooperative     [] Confused     [] Agitated    [] Lethargic    Objective/Assessment:  Attention:  Min verbal cues needed for increased attention to midline, pt. c extreme L sided neglect. Pt. Completed visual scanning task with  min to mod cues to locate items to the L.   Pt. Answered ?s re: picture she is looking at c 90% accuracy, 100% c cues. Pt. Easily distracted, multiple distractions in room, ST attempting to redirect pt. Attention often. Recall: n/a      Organization: n/a    Problem Solving/Reasoning:  n/a    Other:  ST utilized vital stim for L sided oral sling (one channel only), 6.0 mA for 25 minutes. Pt. Completed oral motor exercises x3, 15 reps each  with NMES in place. Pt. Daughter present, reports pt. Seems to be tolerating her diet of soft and bite sized without difficulty.      Plan:  [x] Continue ST services    [] Discharge from [address] [place] [address] Dr:      Discharge recommendations: [] Inpatient Rehab   [] [place]   [] Outpatient Therapy  [] Follow up at trauma clinic   [] Other:       Treatment completed by: Elenita Estrada A.CCC/SLP

## 2021-05-28 NOTE — PROGRESS NOTES
Physical Therapy  [address]  Acute Rehabilitation Physical Therapy Progress Note    Date: 21  Patient Name: Ben Sol       Room: 5378/0572-40  MRN: [id number]   Account: [de-identified]   : 1955  (63 y.o.) Gender: female      Diagnosis: ischemic CVA,  subacute right MCA distribution infarct, L lynette, dysphagia, L neglect, loop recorder 21 (Dr. Olmos November)  Past Medical History:  has a past medical history of Asthma, COPD (chronic obstructive pulmonary disease) ([facility] Diabetes mellitus ([facility] [facility] Esophageal ring, Gastritis, Hyperlipidemia, Hyperplastic colon polyp, Hypertension, Osteoarthritis, Patient in clinical research study, and TIA (transient ischemic attack). Past Surgical History:   has a past surgical history that includes Appendectomy; Hysterectomy; Esophagus dilation; Colonoscopy (2017); Endoscopy, colon, diagnostic; pr egd transoral biopsy single/multiple (N/A, 2017); pr colsc flx w/rmvl of tumor polyp lesion snare tq (N/A, 2017); and Upper gastrointestinal endoscopy (2017). Restrictions/Precautions  Restrictions/Precautions: Fall Risk  Required Braces or Orthoses?: No  Implants present? :  (loop recorder)  Position Activity Restriction  Other position/activity restrictions:  L neglect with LUE/LLE deficits, neglect improving. Subjective: Pt states she's considering moving to [place] with daughter who's an RN & son-in-law who's retired/present to assist PRN. Pt reports having to use toilet in AM; without output.      Vital Signs  Patient Currently in Pain: Yes  Pain Assessment: Faces  Lara-Baker Pain Rating: Hurts even more  Pain Type: Acute pain  Pain Location: Shoulder  Pain Orientation: Left  Non-Pharmaceutical Pain Intervention(s): Elevation;Rest;Repositioned (Sling applied for some seated activities)    Bed Mobility   Bridging: Minimal assistance (positioning L LE)  Rolling: Rolling Left;Rolling Right;Minimal assistance (Cues for L UE safety/movement, facilitating L LE placement)  Supine to Sit: Minimal assistance (to Pt's R: L LE assist & momentum for trunk)  Sit to Supine: Minimal assistance (L LE assist)  Scooting: Minimal assistance; Maximal assistance x2 (L UE safety, Max x2 to [facility])  Comment: Bed flat, rails, 1 pillow    Transfers  Sit to Stand: Minimal Assistance (L UE assist; walker lift/Jacqueline Stedy)  Stand to sit: Minimal Assistance (L UE assist, improved control today, G reach back c R UE)  Bed to Chair: Moderate assistance (squat pivot to Pt's R; level of assist depends on set-up)  Squat Pivot Transfers: Moderate Assistance (Impulsive. Assist c L UE/LE positioning, set-up; safety cues. To Pt's R.)  Stand pivot transfers: Maximum Assistance (hand rail; requires L knee blocking & balance assist)  Comments: Safety concerns with squat pivots: Cues for safety c some return, but impulsive. Attempting to use teach-back techniques to encourage slower pace, more thoughtfulness with preparation. Does not always have enough momentum to complete transfer in one push; requires cues to focus on L UE/LE position. Ambulation 1  Surface: level tile  Device:  (walker lift)  Other Apparatus: Left;Slider;AFO;Wheelchair follow (wheelchair brought up behind afterward)  Assistance: 2 Person assistance;Maximum assistance (+ 3rd assist to steer walker)  Quality of Gait: Some quadricep contraction noted during amb for L knee extension. Pt is able to initiate L hip flexion, but continues to need assist to fully advance L LE upon fatigue. Fewer cues to increase step length today. Gait Deviations: Decreased step length; Slow Juanita;Decreased step height;Decreased head and trunk rotation  Distance: 100'    Propulsion 1  Propulsion: Manual  Level: Level Tile  Method: RUE;RLE  Level of Assistance: Minimal assistance  Description/ Details: Repeated verbal cues to avoid objects, wall, rail on her L (collision 3x despite cues); fails to scan environment without cues. 90º turns with cues; fair straight path with uncluttered environment. Uncoordinated/sporadic use of UE + LE. Distance: 150'    Balance   Posture: Poor (Pt largely unaware of posture/requires cues to correct)  Sitting - Static: Fair (Edge of mat, no back support, R UE support. Mattress inflate)  Sitting - Dynamic: Fair (Edge of mat, no back support, R UE support. Mattress inflate)  Standing - Static: Poor;+ (walker lift )  Standing - Dynamic: Poor (walker lift)    Exercises   Other exercises?: Yes  Other exercises 2: Seated bilateral LE exercises, 20 reps each: L LE active assisted Hip ex's, passive ROM at knee and ankle. R LE 2.5 lbs. lime (minimal) resistance band  Other exercises 4: Supine LE ex: 2# R LE, AA/PROM L LE x 15-20. Vibration did not elicit quad contraction, did elicit dorsiflexion x6. (Including bridges)  Other exercises 6: Toilet transfer x1 Cleone Gang, sling applied to L UE for comfort)  Other exercises 9: Squat pivot transfers x 3; stand pivot transfer x1  Other exercises 13: Bed mobility    Activity Tolerance: Patient limited by endurance (Rest breaks PRN)     PT Equipment Recommendations  Other: CTA; pending further progress with mobility    Current Treatment Recommendations: Strengthening, Balance Training, Functional Mobility Training, Transfer Training, Endurance Training, Wheelchair Mobility Training, Gait Training, Stair training, Neuromuscular Re-education, Home Exercise Program, Safety Education & Training, Patient/Caregiver Education & Training, Modalities, Positioning, ROM, Equipment Evaluation, Education, & procurement    Conditions Requiring Skilled Therapeutic Intervention  Body structures, Functions, Activity limitations: Decreased functional mobility ; Decreased strength;Decreased safe awareness;Decreased endurance;Decreased balance;Decreased posture;Decreased coordination;Decreased vision/visual deficit; Decreased fine motor control  REQUIRES PT FOLLOW UP: Yes  Discharge Recommendations: Patient would benefit from continued therapy after discharge;Home with assist PRN    Goals  Short term goals  Time Frame for Short term goals: 10 days  Short term goal 1: Pt able to roll in bed side to side at min A without bed rail  Short term goal 2: Pt able to perform supine<>sit at mod A  Short term goal 3: Pt able to perform sit<>stand at min/mod A . Short term goal 4: Pt able to progress to pivot transfers at max A   Short term goal 5: Pt able to improve sitting balance at EOB/EOM at SBA for static balance and able to track to left side as well as turn to head to left side to scan her environment with minimal cues. Short term goal 6: Pt able to tolerate standing in // bars with R UE support and assist at mod A  Short term goal 7: Pt able to propel w/c with R UE/R LE distance of 50 to 80 ft, straight path and turn around 180 degrees, at min/mod A   Long term goals  Time Frame for Long term goals : By DC  Long term goal 1: Pt able to roll in bed mod-I  Long term goal 2: Pt able to perform supine <> sit at CGA/min A  Long term goal 3: Pt able to perform pivot transfers min A / mod A and able to scan Left side environment. Long term goal 4: Pt able to ambulate in // bars, or with appropriate assistive device and/or LE bracing, distance of  20 to 30 ft, min/mod A x 2. Long term goal 5: Pt able to propel w/c on level surfaces distance 100 to 150 ft, SBA/CGA. Long term goal 6: Educate pt/family in safe technique for mobility and  to go up and down steps to enter/exit home. Long term goal 7: Improve sitting balance at EOM to good, and standing balance with R UE support to fair- to reduce fall risk. Long term goal 8: Improve PASS score to 20/36 improve overall function.         05/28/21 0842 05/28/21 1446   PT Individual Minutes   Time In 0617 [id number]   Time Out 0940 1441   Minutes 57 60     Electronically signed by Lyanne Sandhoff, PTA on 5/28/21 at 5:18 PM EDT

## 2021-05-28 NOTE — PROGRESS NOTES
Fewer cues to increase step length today. Pt attempts to step with R LE before L has been well placed/extended. Gait Deviations: Decreased step length, Slow Juanita, Decreased step height  Distance: 30ftx 1 60ft x 1  Comments: Pt c/o feeling light headed this morning. /89 [id number]% with HR 94bpm post amb          OT:  ADL  Equipment Provided: Reacher  Feeding: Modified independent   Grooming: Setup (set up for wash cloth. oral care/face washing/hair grooming)  UE Bathing: None (pt declines)  LE Bathing: None (pt declines)  UE Dressing: None (pt declines)  LE Dressing: Maximum assistance, Dependent/Total (footlevel dressing only; TEDs/shoes/L AFO)  Toileting: None (pt declines need. )  Additional Comments: Pt continues to demonstrate fair return with lynette techniques and awareness to L side. Pt does show L side awareness when bathing lower leg. Continued education on use of AE for increased independence. Balance  Sitting Balance: Contact guard assistance (SBA-CGA while seated EOB)  Standing Balance: Moderate assistance (stabilizing UE support)   Standing Balance  Time: <1 min x3, ~1 min x 2   Activity: functional transfers, lower body care tasks. Comment: with nilson galdamez. Pt declines stand pivot transfer due to fatigue  Functional Mobility  Functional - Mobility Device: Wheelchair  Activity: To/from bathroom  Assist Level: Moderate assistance  Functional Mobility Comments: pt navigates herself from bathroom at wheelchair level     Bed mobility  Bridging: Minimal assistance (positioning L LE)  Rolling to Left: Minimal assistance  Rolling to Right: Maximum assistance  Supine to Sit: Minimal assistance (HOB elevated, use of bed rail. Per RN report. )  Sit to Supine: Moderate assistance (Assist with BLEs over EOB. )  Scooting: Minimal assistance  Comment: HOB elevated, use of bed rail. Transfers  Stand Step Transfers: Dependent/Total  Stand Pivot Transfers: Maximum assistance (EOB>w/c towards R side. )  Sit to stand: Moderate assistance  Stand to sit: Moderate assistance  Transfer Comments: Pt impulsive, ready to stand before safety techniques implemented. Toilet Transfers  Toilet - Technique:  (nilson denice )  Equipment Used: Raised toilet seat without rails  Toilet Transfer: Dependent/Total (nilson stedy, min-mod A)  Toilet Transfers Comments: Completed with use of nilson galdamez with min-mod A to complete. Shower Transfers  Shower - Transfer From: Wheelchair  Shower - Transfer Type: To and From  Shower - Transfer To: Transfer tub bench  Shower - Technique: Stand pivot  Shower Transfers: Moderate assistance, Maximal assistance  Shower Transfers Comments: R<>L. Grab bars for support. Wheelchair Bed Transfers  Wheelchair/Bed - Technique: Stand pivot  Equipment Used: Bed, Wheelchair  Level of Asssistance: Maximum assistance (Block L knee)      ST:        Subjective: [x]? Alert     [x]? Cooperative     []? Confused     []? Agitated    []? Lethargic     Objective/Assessment:  Attention:  Min verbal cues needed for increased attention to midline, pt. c extreme L sided neglect. Pt. Completed visual scanning task with  min to mod cues to locate items to the L.   Pt. Answered ?s re: picture she is looking at c 90% accuracy, 100% c cues. Pt. Easily distracted, multiple distractions in room, ST attempting to redirect pt. Attention often.       Recall: n/a       Organization: n/a     Problem Solving/Reasoning:  n/a     Other:  ST utilized vital stim for L sided oral sling (one channel only), 6.0 mA for 25 minutes. Pt. Completed oral motor exercises x3, 15 reps each  with NMES in place. Pt. Daughter present, reports pt. Seems to be tolerating her diet of soft and bite sized without difficulty.             Objective:  /84   Pulse 99   Temp 98.5 °F (36.9 °C) (Oral)   Resp 20   Ht 5' 8\" (1.727 m)   Wt 191 lb 8 oz (86.9 kg)   SpO2 93%   BMI 29.12 kg/m²  I Body mass index is 29.12 kg/m².  I   Wt Readings from Last 1 Encounters:   05/10/21 191 lb 8 oz (86.9 kg)      Temp (24hrs), Av.2 °F (36.8 °C), Min:97.8 °F (36.6 °C), Max:98.5 °F (36.9 °C)         GEN: well developed, well nourished, no acute distress  HEENT: Normocephalic atraumatic, EOMI, mucous membranes pink and moist  CV: RRR, no murmurs, rubs or gallops  PULM: CTAB, no rales or rhonchi. Respirations WNL and unlabored  ABD: soft, NT, ND, +BS and equal  NEURO: A&O x3. Sensation intact to light touch. MSK: 4+/5 right upper/ lower extremity, dense left hemiparesis, distal left upper lower extremity 0/5 proximal left upper extremity 1/5, left hip flexion 1-2/5, no tone-no change  EXTREMITIES: No calf tenderness to palpation bilaterally. No edema BLEs,   SKIN: warm dry and intact with good turgor  PSYCH: appropriately interactive. Affect WNL. Medications   Scheduled Meds:   FLUoxetine  20 mg Oral Daily    amitriptyline  25 mg Oral Nightly    heparin (porcine)  5,000 Units Subcutaneous 3 times per day    acetaminophen  1,000 mg Oral 3 times per day    amLODIPine  5 mg Oral Daily    aspirin  81 mg Oral Daily    atorvastatin  80 mg Oral Daily    budesonide-formoterol  2 puff Inhalation BID    insulin lispro  0-3 Units Subcutaneous Nightly    insulin lispro  0-6 Units Subcutaneous TID WC    levothyroxine  25 mcg Oral Daily    nicotine  1 patch Transdermal Daily    tiotropium  2 puff Inhalation Daily    polyethylene glycol  17 g Oral Daily     Continuous Infusions:   sodium chloride       PRN Meds:.polyvinyl alcohol, sodium chloride flush, promethazine **OR** ondansetron, sodium chloride, albuterol, dextrose, glucagon (rDNA), glucose, senna, bisacodyl     Diagnostics:     CBC:   No results for input(s): WBC, RBC, HGB, HCT, MCV, RDW, PLT in the last 72 hours. BMP:   No results for input(s): NA, K, CL, CO2, PHOS, BUN, CREATININE in the last 72 hours. Invalid input(s): CA  BNP: No results for input(s): BNP in the last 72 hours.   PT/INR: No results for input(s): PROTIME, INR in the last 72 hours. APTT: No results for input(s): APTT in the last 72 hours. CARDIAC ENZYMES: No results for input(s): CKMB, CKMBINDEX, TROPONINT in the last 72 hours. Invalid input(s): CKTOTAL;3  FASTING LIPID PANEL:  Lab Results   Component Value Date    CHOL 206 (H) 05/05/2021    HDL 29 (L) 05/05/2021    TRIG 185 (H) 05/05/2021     LIVER PROFILE: No results for input(s): AST, ALT, ALB, BILIDIR, BILITOT, ALKPHOS in the last 72 hours. I/O (24Hr): No intake or output data in the 24 hours ending 05/28/21 1534    Glu last 24 hour  Recent Labs     05/27/21  0704 05/27/21  1944 05/28/21  0614 05/28/21  1046   POCGLU 113* 126* 126* 130*       No results for input(s): CLARITYU, COLORU, PHUR, SPECGRAV, PROTEINU, RBCUA, BLOODU, BACTERIA, NITRU, WBCUA, LEUKOCYTESUR, YEAST, GLUCOSEU, BILIRUBINUR in the last 72 hours.         Impression/Plan:   Impaired ADLs, gait, and mobility due to:        1. Ischemic R MCA CVA with hemorrhagic conversion and L non-dominant hemiparesis: Continue rehab efforts, on ASA, atorvastatin. Sling only to be used when patient is ambulating with therapy. -Discussed with patient and therapist.  Handicap placard on discharge. Noted discharge plan 6/2, family to place ramp. Patient get insurance 6/1-family requesting extension, or another acute rehab facility. Discussed with team-continues to require significant assistance will need 24/7 assistance-family unable to provide? May need skilled facility. Discussed with daughter present today 5/28. 2. Status post fall 5/16-currently no residual, continue to monitor  3. Headache: Has Tylenol routine TID. Improved on amitriptyline. 4. Dry eyes: has natural tears. 5. Cognitive impairment: SLP treating, discussed with daughter 5/22  6. Dysphagia/aphasia: SLP treating. Diet upgraded, continue aspiration precautions  7. HTN/Hyperlipidemia: on amlodipine  8.  DM: on insulin sliding scale, as above  9. COPD/asthma: on albuterol nebulizers prn, on symbicort BID, spiriva  10. Adjustment disorder with depressed mood: started fluoxetine 5/15, BMP okay  11. Esophageal Ring: Will monitor - on dysphagia diet  12. Hypothyroidism: on levothyroxine  13. Leukocytosis-improving  14. Nicotine dependence: on Nicoderm  15. Bowel Management: Miralax daily, senokot prn, dulcolax prn. 16. DVT Prophylaxis:  heparin, SCD's while in bed and MAXIM's   17. Internal medicine for medical management      Smitha England. Salinas Stark MD       This note is created with the assistance of a speech recognition program.  While intending to generate a document that actually reflects the content of the visit, the document can still have some errors including those of syntax and sound a like substitutions which may escape proof reading.   In such instances, actual meaning can be extrapolated by contextual diversion

## 2021-05-28 NOTE — PROGRESS NOTES
[place] [address] Internal Medicine    Progress Note  Chart Reviewed: Yes  Patient assessed for rehabilitation services?: Yes  Family / Caregiver Present: No  Referring Practitioner: Dr Kory Aviles  5/28/2021    3:33 PM    Name:   Germán Decker  MRN:     [id number]     Acct:      [de-identified]   Room:   [facility] Day:  25  Admit Date:  5/10/2021  7:13 AM    PCP:   Tonia Box MD  Code Status:  Full Code    Subjective:     C/C: medical mgmt    Active Problems:    Tobacco abuse    Gastritis    Acute left hemiparesis ([address]    Acute CVA (cerebrovascular accident) ([facility] [address]    COPD (chronic obstructive pulmonary disease) ([facility] [address]    Essential hypertension    Hyperlipidemia    Pre-diabetes  Resolved Problems:    * No resolved hospital problems. *      Interval History Status: improved. 5/14/21  Patient seen and examined at bedside. No acute overnight events. Afebrile, hemodynamically stable. Continues to work with PT/OT. No acute changes in hemiparesis. No acute complaints. 5/22  Patient is doing much better  No new complaints.      Significant last 24 hr data reviewed ;   Vitals:    05/27/21 0736 05/27/21 1900 05/28/21 0700 05/28/21 1345   BP: 132/72 128/64 125/71 128/84   Pulse: 81 80 80 99   Resp: 18 16 20 20   Temp: 97.8 °F (36.6 °C) 97.8 °F (36.6 °C) 98.3 °F (36.8 °C) 98.5 °F (36.9 °C)   TempSrc: Oral Oral Oral Oral   SpO2: 92% 95% 92% 93%   Weight:       Height:          Recent Results (from the past 24 hour(s))   POC Glucose Fingerstick    Collection Time: 05/27/21  7:44 PM   Result Value Ref Range    POC Glucose 126 (H) 65 - 105 mg/dL   POC Glucose Fingerstick    Collection Time: 05/28/21  6:14 AM   Result Value Ref Range    POC Glucose 126 (H) 65 - 105 mg/dL   POC Glucose Fingerstick    Collection Time: 05/28/21 10:46 AM   Result Value Ref Range    POC Glucose 130 (H) 65 - 105 mg/dL     Recent Labs     05/26/21 2029 05/27/21  0704 05/27/21 1944 05/28/21  1407 05/28/21  1046   POCGLU 121* 113* 126* 126* 130*        No results found. HPI:   See history in H and P      Review of Systems:     Constitutional:  negative for chills, fevers, sweats  Respiratory:  negative for cough, dyspnea on exertion, hemoptysis, shortness of breath, wheezing  Cardiovascular:  negative for chest pain, chest pressure/discomfort, lower extremity edema, palpitations  Gastrointestinal:  negative for abdominal pain, constipation, diarrhea, nausea, vomiting  Neurological: Positive for left-sided weakness. Negative for dizziness, headache  Data:     Past Medical History:  no change     Social History:  no change    Family History: @no change    Vitals:      I/O (24Hr): No intake or output data in the 24 hours ending 05/28/21 1533    Labs:    URINE ANALYSIS: No results found for: LABURIN     CBC:  Lab Results   Component Value Date    WBC 9.8 05/25/2021    HGB 13.8 05/25/2021     05/25/2021        BMP:    Lab Results   Component Value Date     05/25/2021    K 4.3 05/25/2021     05/25/2021    CO2 24 05/25/2021    BUN 13 05/25/2021    CREATININE 0.48 05/25/2021    GLUCOSE 111 05/25/2021      LIVER PROFILE:  Lab Results   Component Value Date    ALT 13 10/16/2017    AST 13 10/16/2017    PROT 6.9 10/16/2017    BILITOT 0.22 10/16/2017    BILIDIR 0.11 10/16/2017    LABALBU 4.0 10/16/2017               Radiology:  Medications:      Allergies:      Current Meds:   Scheduled Meds:    FLUoxetine  20 mg Oral Daily    amitriptyline  25 mg Oral Nightly    heparin (porcine)  5,000 Units Subcutaneous 3 times per day    acetaminophen  1,000 mg Oral 3 times per day    amLODIPine  5 mg Oral Daily    aspirin  81 mg Oral Daily    atorvastatin  80 mg Oral Daily    budesonide-formoterol  2 puff Inhalation BID    insulin lispro  0-3 Units Subcutaneous Nightly    insulin lispro  0-6 Units Subcutaneous TID WC    levothyroxine  25 mcg Oral Daily    nicotine  1 patch Transdermal Daily  tiotropium  2 puff Inhalation Daily    polyethylene glycol  17 g Oral Daily     Continuous Infusions:    sodium chloride       PRN Meds: polyvinyl alcohol, sodium chloride flush, promethazine **OR** ondansetron, sodium chloride, albuterol, dextrose, glucagon (rDNA), glucose, senna, bisacodyl      Physical Examination:        /84   Pulse 99   Temp 98.5 °F (36.9 °C) (Oral)   Resp 20   Ht 5' 8\" (1.727 m)   Wt 191 lb 8 oz (86.9 kg)   SpO2 93%   BMI 29.12 kg/m²   Temp (24hrs), Av.2 °F (36.8 °C), Min:97.8 °F (36.6 °C), Max:98.5 °F (36.9 °C)    Recent Labs     21  0704 21  1944 21  0614 21  1046   POCGLU 113* 126* 126* 130*     No intake or output data in the 24 hours ending 21 1533    General Appearance:  alert, well appearing, and in no acute distress  Mental status: oriented to person, place, and time with normal affect  Head:  normocephalic, atraumatic. Eye: no icterus, redness, pupils equal and reactive, extraocular eye movements intact, conjunctiva clear  Ear: normal external ear, no discharge, hearing intact  Nose:  no drainage noted  Mouth: mucous membranes moist  Neck: supple, no carotid bruits, thyroid not palpable  Lungs: Clear to auscultation bilaterally. No accessory muscle use. Cardiovascular: normal rate, regular rhythm, no murmur, gallop, rub. Abdomen: Soft, nontender, nondistended, normal bowel sounds, no hepatomegaly or splenomegaly  Neurologic: Left upper and lower extremity weakness, 0-1/5. No sensory loss.   Skin: No gross lesions, rashes, bruising or bleeding on exposed skin area  Extremities:  peripheral pulses palpable, no pedal edema or calf pain with palpation    Assessment:        Primary Problem  <principal problem not specified>    Active Hospital Problems    Diagnosis Date Noted    Acute CVA (cerebrovascular accident) ([facility] [I63.9] 05/10/2021    COPD (chronic obstructive pulmonary disease) ([facility] [J44.9] 05/10/2021    Essential hypertension [I10] 05/10/2021    Hyperlipidemia [E78.5] 05/10/2021    Pre-diabetes [R73.03] 05/10/2021    Acute left hemiparesis (HCC) [G81.94]     Gastritis [K29.70]     Tobacco abuse [Z72.0] 07/16/2017     Plan:        Continue current therapy regimen  BP well controlled. Continue norvasc 5 mg daily  Copd stable. continue bronchodilators  Synthroid 25 mcg daily   lipitor 80 mg daily  Asa 81 mg daily  Insulin sliding scale  nicoderm patch  Continue PT/OT  Encourage PO intake      5/28  Okay to discontinue AC at bedtime blood glucose checks blood sugars well controlled patient not on any antidiabetic medication. Patient reports no new complaints. Engaging with physical therapy. Labs and vitals reviewed. No new issues. Continue with current care. MD LEONOR Zamora [address], [address] [place] [address].    Phone [phone]   Fax: [phone]  Answering Service: [phone]

## 2021-05-29 LAB
GLUCOSE BLD-MCNC: 112 MG/DL (ref 65–105)
GLUCOSE BLD-MCNC: 118 MG/DL (ref 65–105)
GLUCOSE BLD-MCNC: 131 MG/DL (ref 65–105)

## 2021-05-29 PROCEDURE — 6370000000 HC RX 637 (ALT 250 FOR IP): Performed by: STUDENT IN AN ORGANIZED HEALTH CARE EDUCATION/TRAINING PROGRAM

## 2021-05-29 PROCEDURE — 82947 ASSAY GLUCOSE BLOOD QUANT: CPT

## 2021-05-29 PROCEDURE — 1180000000 HC REHAB R&B

## 2021-05-29 PROCEDURE — 99231 SBSQ HOSP IP/OBS SF/LOW 25: CPT | Performed by: INTERNAL MEDICINE

## 2021-05-29 PROCEDURE — 6370000000 HC RX 637 (ALT 250 FOR IP): Performed by: PHYSICAL MEDICINE & REHABILITATION

## 2021-05-29 PROCEDURE — 6360000002 HC RX W HCPCS: Performed by: PHYSICAL MEDICINE & REHABILITATION

## 2021-05-29 PROCEDURE — 92507 TX SP LANG VOICE COMM INDIV: CPT

## 2021-05-29 PROCEDURE — 99232 SBSQ HOSP IP/OBS MODERATE 35: CPT | Performed by: PHYSICAL MEDICINE & REHABILITATION

## 2021-05-29 RX ADMIN — LEVOTHYROXINE SODIUM 25 MCG: 0.03 TABLET ORAL at 05:50

## 2021-05-29 RX ADMIN — Medication 2 PUFF: at 22:42

## 2021-05-29 RX ADMIN — Medication 2 PUFF: at 13:46

## 2021-05-29 RX ADMIN — Medication 2 PUFF: at 09:11

## 2021-05-29 RX ADMIN — ACETAMINOPHEN 1000 MG: 500 TABLET, FILM COATED ORAL at 05:50

## 2021-05-29 RX ADMIN — ACETAMINOPHEN 1000 MG: 500 TABLET, FILM COATED ORAL at 13:45

## 2021-05-29 RX ADMIN — HEPARIN SODIUM 5000 UNITS: 5000 INJECTION INTRAVENOUS; SUBCUTANEOUS at 05:50

## 2021-05-29 RX ADMIN — ACETAMINOPHEN 1000 MG: 500 TABLET, FILM COATED ORAL at 22:43

## 2021-05-29 RX ADMIN — AMLODIPINE BESYLATE 5 MG: 5 TABLET ORAL at 09:07

## 2021-05-29 RX ADMIN — AMITRIPTYLINE HYDROCHLORIDE 25 MG: 25 TABLET, FILM COATED ORAL at 22:42

## 2021-05-29 RX ADMIN — FLUOXETINE HYDROCHLORIDE 20 MG: 20 CAPSULE ORAL at 09:09

## 2021-05-29 RX ADMIN — ASPIRIN 81 MG CHEWABLE TABLET 81 MG: 81 TABLET CHEWABLE at 09:09

## 2021-05-29 RX ADMIN — ATORVASTATIN CALCIUM 80 MG: 80 TABLET, FILM COATED ORAL at 09:07

## 2021-05-29 RX ADMIN — HEPARIN SODIUM 5000 UNITS: 5000 INJECTION INTRAVENOUS; SUBCUTANEOUS at 22:43

## 2021-05-29 RX ADMIN — HEPARIN SODIUM 5000 UNITS: 5000 INJECTION INTRAVENOUS; SUBCUTANEOUS at 13:46

## 2021-05-29 ASSESSMENT — PAIN DESCRIPTION - PROGRESSION

## 2021-05-29 ASSESSMENT — PAIN SCALES - GENERAL
PAINLEVEL_OUTOF10: 0

## 2021-05-29 ASSESSMENT — PAIN SCALES - WONG BAKER

## 2021-05-29 NOTE — PROGRESS NOTES
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 Patient is doing well today. She still reports intermittent headache which is mild and responds to Tylenol. She is asking if family can trial car transfers so she may attend her granddaughter's graduation in a week. ROS:  Denies fevers, chills, sweats. No chest pain, palpitations, lightheadedness. Denies coughing, wheezing or shortness of breath. Denies abdominal pain, nausea, diarrhea or constipation. No new areas of joint pain. Denies new areas of numbness or weakness. Denies new anxiety  issues. No new skin problems. Rehabilitation:   Progressing in therapies. PT:  Restrictions/Precautions: Fall Risk  Implants present? :  (loop recorder)  Other position/activity restrictions:  L neglect with LUE/LLE deficits, neglect improving. Transfers  Sit to Stand: Minimal Assistance (L UE assist; walker lift/Jacqueline Stedy)  Stand to sit: Minimal Assistance (L UE assist, improved control today, G reach back c R UE)  Bed to Chair: Moderate assistance (squat pivot to Pt's R; level of assist depends on set-up)  Stand Pivot Transfers: Maximum Assistance (hand rail; requires L knee blocking & balance assist)  Squat Pivot Transfers: Moderate Assistance (Impulsive. Assist c L UE/LE positioning, set-up; safety cues. To Pt's R.)  Lateral Transfers: Minimal Assistance  Comment: Safety concerns with squat pivots: Cues for safety c some return, but impulsive. Attempting to use teach-back techniques to encourage slower pace, more thoughtfulness with preparation. Does not always have enough momentum to complete transfer in one push; requires cues to focus on L UE/LE position.    Ambulation 1  Surface: level tile  Device:  (walker lift)  Other Apparatus: Left, Slider, AFO, Wheelchair follow (wheelchair brought up behind afterward)  Assistance: 2 Person assistance, Maximum assistance (+ 3rd assist to steer walker)  Quality of Gait: Some quadricep contraction noted during amb for L knee extension. Pt is able to initiate L hip flexion, but continues to need assist to fully advance L LE upon fatigue. Fewer cues to increase step length today. Gait Deviations: Decreased step length, Slow Juanita, Decreased step height, Decreased head and trunk rotation  Distance: 100'  Comments: Pt c/o feeling light headed this morning. /89 [id number]% with HR 94bpm post amb    Transfers  Sit to Stand: Minimal Assistance (L UE assist; walker lift/Jacqueline Stedy)  Stand to sit: Minimal Assistance (L UE assist, improved control today, G reach back c R UE)  Bed to Chair: Moderate assistance (squat pivot to Pt's R; level of assist depends on set-up)  Stand Pivot Transfers: Maximum Assistance (hand rail; requires L knee blocking & balance assist)  Squat Pivot Transfers: Moderate Assistance (Impulsive. Assist c L UE/LE positioning, set-up; safety cues. To Pt's R.)  Lateral Transfers: Minimal Assistance  Comment: Safety concerns with squat pivots: Cues for safety c some return, but impulsive. Attempting to use teach-back techniques to encourage slower pace, more thoughtfulness with preparation. Does not always have enough momentum to complete transfer in one push; requires cues to focus on L UE/LE position. Ambulation  Ambulation?: Yes  More Ambulation?: No  Ambulation 1  Surface: level tile  Device:  (walker lift)  Other Apparatus: Left, Slider, AFO, Wheelchair follow (wheelchair brought up behind afterward)  Assistance: 2 Person assistance, Maximum assistance (+ 3rd assist to steer walker)  Quality of Gait: Some quadricep contraction noted during amb for L knee extension. Pt is able to initiate L hip flexion, but continues to need assist to fully advance L LE upon fatigue. Fewer cues to increase step length today.    Gait Deviations: Decreased step length, Slow Juanita, Decreased step height, Decreased head and trunk rotation  Distance: 100'  Comments: Pt c/o feeling light headed this morning. /89 [id number]% with HR 94bpm post amb    Surface: level tile  Ambulation 1  Surface: level tile  Device:  (walker lift)  Other Apparatus: Left, Slider, AFO, Wheelchair follow (wheelchair brought up behind afterward)  Assistance: 2 Person assistance, Maximum assistance (+ 3rd assist to steer walker)  Quality of Gait: Some quadricep contraction noted during amb for L knee extension. Pt is able to initiate L hip flexion, but continues to need assist to fully advance L LE upon fatigue. Fewer cues to increase step length today. Gait Deviations: Decreased step length, Slow Juanita, Decreased step height, Decreased head and trunk rotation  Distance: 100'  Comments: Pt c/o feeling light headed this morning. /89 QRS471% with HR 94bpm post amb    OT:  ADL  Equipment Provided: Reacher  Feeding: Modified independent   Grooming: Setup (set up for wash cloth. oral care/face washing/hair grooming)  UE Bathing: None (pt declines)  LE Bathing: None (pt declines)  UE Dressing: None (pt declines)  LE Dressing: Maximum assistance, Dependent/Total (footlevel dressing only; TEDs/shoes/L AFO)  Toileting: None (pt declines need. )  Additional Comments: Pt continues to demonstrate fair return with lynette techniques and awareness to L side. Pt does show L side awareness when bathing lower leg. Continued education on use of AE for increased independence. Balance  Sitting Balance: Contact guard assistance (SBA-CGA while seated EOB)  Standing Balance: Moderate assistance (stabilizing UE support)   Standing Balance  Time: <1 min x3, ~1 min x 2   Activity: functional transfers, lower body care tasks. Comment: with nilson galdamez. Pt declines stand pivot transfer due to fatigue  Functional Mobility  Functional - Mobility Device: Wheelchair  Activity: To/from bathroom  Assist Level:  Moderate assistance  Functional Mobility Comments: pt navigates herself from bathroom at wheelchair level     Bed mobility  Bridging: Minimal assistance (positioning L LE)  Rolling to Left: Minimal assistance  Rolling to Right: Maximum assistance  Supine to Sit: Minimal assistance (HOB elevated, use of bed rail. Per RN report. )  Sit to Supine: Moderate assistance (Assist with BLEs over EOB. )  Scooting: Minimal assistance (L UE safety)  Comment: HOB elevated, use of bed rail. Transfers  Stand Step Transfers: Dependent/Total  Stand Pivot Transfers: Maximum assistance (EOB>w/c towards R side. )  Sit to stand: Moderate assistance  Stand to sit: Moderate assistance  Transfer Comments: Pt impulsive, ready to stand before safety techniques implemented. Toilet Transfers  Toilet - Technique:  (nilson stedy )  Equipment Used: Raised toilet seat without rails  Toilet Transfer: Dependent/Total (nilson stedy, min-mod A)  Toilet Transfers Comments: Completed with use of nilson stedy with min-mod A to complete. Shower Transfers  Shower - Transfer From: Wheelchair  Shower - Transfer Type: To and From  Shower - Transfer To: Transfer tub bench  Shower - Technique: Stand pivot  Shower Transfers: Moderate assistance, Maximal assistance  Shower Transfers Comments: R<>L. Grab bars for support. Wheelchair Bed Transfers  Wheelchair/Bed - Technique: Stand pivot  Equipment Used: Bed, Wheelchair  Level of Asssistance: Maximum assistance (Block L knee)    SPEECH:  Subjective: [x]? Alert     [x]? Cooperative     []? Confused     []? Agitated    []? Lethargic     Objective/Assessment:  Attention:  Min verbal cues needed for increased attention to midline, pt. c extreme L sided neglect. Pt. Answered ?s re: picture she is looking at c 81% accuracy, 90% c cues. Pt. Easily distracted, multiple distractions in room, ST attempting to redirect pt. Attention often.       Recall: n/a       Organization: n/a     Problem Solving/Reasoning:  n/a     Other:  Pt. Completed oral motor exercises x9, 15 reps each. Reduced labial ROM noted on L side. Pt. Reports tolerating her diet of soft and bite sized without difficulty. No family present. Objective:  /67   Pulse 85   Temp 98.3 °F (36.8 °C) (Oral)   Resp 18   Ht 5' 8\" (1.727 m)   Wt 191 lb 8 oz (86.9 kg)   SpO2 93%   BMI 29.12 kg/m²       GEN: Well developed, well nourished, in NAD  HEENT:  NCAT. PERRL. EOMI. Mucous membranes pink and moist.   PULM:  Clear to ausculation. No rales or rhonchi. Respirations WNL and unlabored. CV:  Regular rate rhythm. No murmurs or gallops. GI:  Abdomen soft. Nontender. Non-distended. BS + and equal.    NEUROLOGICAL: A&O x3. Sensation intact to light touch. Stable impaired L CN VII.   MSK:  Functional ROM RUE and RLEs. Impaired AROM LUE and LLE. [place] Motor testing 4+/5 key muscles RUE and RLE. L shoulder shrug 1/5, L hip flexion 2/5. Distal LUE and LLE muscles 0/5. SKIN: Warm dry and intact. Good turgor. EXTREMITIES:  No calf tenderness to palpation. No edema BLEs. [place] PSYCH: Mood depressed. Appropriately interactive. Affect WNL    Diagnostics:     CBC:   No results for input(s): WBC, RBC, HGB, HCT, MCV, RDW, PLT in the last 72 hours. BMP:   No results for input(s): NA, K, CL, CO2, PHOS, BUN, CREATININE, GLUCOSE in the last 72 hours. Invalid input(s): CA  BNP: No results for input(s): BNP in the last 72 hours. PT/INR: No results for input(s): PROTIME, INR in the last 72 hours. APTT: No results for input(s): APTT in the last 72 hours. CARDIAC ENZYMES: No results for input(s): CKMB, CKMBINDEX, TROPONINT in the last 72 hours. Invalid input(s): CKTOTAL;3  FASTING LIPID PANEL:  Lab Results   Component Value Date    CHOL 206 (H) 05/05/2021    HDL 29 (L) 05/05/2021    TRIG 185 (H) 05/05/2021     LIVER PROFILE: No results for input(s): AST, ALT, ALB, BILIDIR, BILITOT, ALKPHOS in the last 72 hours.      Current Medications:   Current Facility-Administered Medications: FLUoxetine (PROZAC) capsule 20 mg, 20 mg, Oral, Daily  amitriptyline (ELAVIL) tablet 25 mg, 25 mg, Oral, Nightly  heparin (porcine) injection 5,000 Units, 5,000 Units, Subcutaneous, 3 times per day  acetaminophen (TYLENOL) tablet 1,000 mg, 1,000 mg, Oral, 3 times per day  polyvinyl alcohol (LIQUIFILM TEARS) 1.4 % ophthalmic solution 1 drop, 1 drop, Both Eyes, PRN  sodium chloride flush 0.9 % injection 5-40 mL, 5-40 mL, Intravenous, PRN  promethazine (PHENERGAN) tablet 12.5 mg, 12.5 mg, Oral, Q6H PRN **OR** ondansetron (ZOFRAN) injection 4 mg, 4 mg, Intravenous, Q6H PRN  0.9 % sodium chloride infusion, 25 mL, Intravenous, PRN  albuterol (PROVENTIL) nebulizer solution 2.5 mg, 2.5 mg, Nebulization, Q4H PRN  amLODIPine (NORVASC) tablet 5 mg, 5 mg, Oral, Daily  aspirin chewable tablet 81 mg, 81 mg, Oral, Daily  atorvastatin (LIPITOR) tablet 80 mg, 80 mg, Oral, Daily  budesonide-formoterol (SYMBICORT) 160-4.5 MCG/ACT inhaler 2 puff, 2 puff, Inhalation, BID  dextrose 50 % IV solution, 12.5 g, Intravenous, PRN  glucagon (rDNA) injection 1 mg, 1 mg, Intramuscular, PRN  glucose (GLUTOSE) 40 % oral gel 15 g, 15 g, Oral, PRN  insulin lispro (HUMALOG) injection vial 0-3 Units, 0-3 Units, Subcutaneous, Nightly  insulin lispro (HUMALOG) injection vial 0-6 Units, 0-6 Units, Subcutaneous, TID WC  levothyroxine (SYNTHROID) tablet 25 mcg, 25 mcg, Oral, Daily  nicotine (NICODERM CQ) 14 MG/24HR 1 patch, 1 patch, Transdermal, Daily  tiotropium (SPIRIVA RESPIMAT) 2.5 MCG/ACT inhaler 2 puff, 2 puff, Inhalation, Daily  polyethylene glycol (GLYCOLAX) packet 17 g, 17 g, Oral, Daily  senna (SENOKOT) tablet 17.2 mg, 2 tablet, Oral, Daily PRN  bisacodyl (DULCOLAX) suppository 10 mg, 10 mg, Rectal, Daily PRN      Impression/Plan:   Impaired ADLs, gait, and mobility due to:      1. Ischemic R MCA CVA with hemorrhagic conversion and L non-dominant hemiparesis:  PT/OT for gait, mobility, strengthening, endurance, ADLs, and self care. On ASA, atorvastatin.  Sling only to be used when patient is ambulating with therapy. S/p fall on 5/16 - no injury. If approved by PT can trial car transfers for brief IVELISSE to attend granddaughter's graduation. 2. Headache: Has Tylenol routine TID. Improved on amitriptyline. 3. Dry eyes: has natural tears. 4. Cognitive impairment: SLP treating  5. Dysphagia/aphasia: SLP treating. Diet upgraded. 6. HTN/Hyperlipidemia: on amlodipine  7. DM: on insulin sliding scale  8. COPD/asthma: on albuterol nebulizers prn, on symbicort BID, spiriva  9. Adjustment disorder with depressed mood: Improved. started fluoxetine 5/15  10. Esophageal Ring: Will monitor - on dysphagia diet  11. Hypothyroidism: on levothyroxine  12. Nicotine dependence: on Nicoderm  13. Bowel Management: Miralax daily, senokot prn, dulcolax prn. 14. DVT Prophylaxis:  heparin, SCD's while in bed and MAXIM's   15. Internal medicine for medical management    Electronically signed by Keke Parekh MD on 5/29/2021 at 10:10 AM      This note is created with the assistance of a speech recognition program.  While intending to generate a document that actually reflects the content of the visit, the document can still have some errors including those of syntax and sound a like substitutions which may escape proof reading. In such instances, actual meaning can be extrapolated by contextual diversion.

## 2021-05-29 NOTE — PROGRESS NOTES
Speech Language Pathology  Speech Language Pathology  [facility] Acute Rehab Unit at [facility]    Dysphagia/SpeechLanguage Treatment Note    Date: 5/29/2021  Patients Name: Devora Montoya  MRN: [id number]  Diagnosis:   Patient Active Problem List   Diagnosis Code    Pneumonia of left lower lobe due to infectious organism J18.9    Tobacco abuse Z72.0    Legionella pneumonia ([facility] A48.1    Dysphagia R13.10    Hyperplastic colon polyp K63.5    Esophageal ring K22.2    Gastritis K29.70    Elbow effusion, right M25.421    Stroke due to occlusion of right middle cerebral artery (HCC) I63.511    Nihss score 15 R29.715    Acute left hemiparesis (HCC) G81.94    Cerebrovascular accident (CVA) due to occlusion of right middle cerebral artery ([facility] I63.511    Acute CVA (cerebrovascular accident) ([facility] I63.9    COPD (chronic obstructive pulmonary disease) ([facility] J44.9    Essential hypertension I10    Hyperlipidemia E78.5    Pre-diabetes R73.03       Pain: 0/10    Speech/Cognitive/Dysphagia Treatment    Treatment time:  4750-9105    Subjective: [x] Alert [x] Cooperative     [] Confused     [] Agitated    [] Lethargic    Objective/Assessment:  Attention:  Min verbal cues needed for increased attention to midline, pt. c extreme L sided neglect. Pt. Answered ?s re: picture she is looking at c 81% accuracy, 90% c cues. Pt. Easily distracted, multiple distractions in room, ST attempting to redirect pt. Attention often. Recall: n/a      Organization: n/a    Problem Solving/Reasoning:  n/a    Other:  Pt. Completed oral motor exercises x9, 15 reps each. Reduced labial ROM noted on L side. Pt. Reports tolerating her diet of soft and bite sized without difficulty. No family present.      Plan:  [x] Continue ST services    [] Discharge from ST:      Discharge recommendations: [] Inpatient Rehab   [] [place]   [] Outpatient Therapy  [] Follow up at trauma clinic   [] Other: Treatment completed by:  Eda Estrada M.A., CCC-SLP

## 2021-05-29 NOTE — PLAN OF CARE
Problem: Neurological  Goal: Maximum potential motor/sensory/cognitive function  Outcome: Ongoing     Problem: Pain:  Goal: Pain level will decrease  Description: Pain level will decrease  Outcome: Ongoing  Note: Pain was tolerable with PRN pain meds. Pain was decreased with pain meds. Goal: Control of acute pain  Description: Control of acute pain  Outcome: Ongoing  Goal: Control of chronic pain  Description: Control of chronic pain  Outcome: Ongoing     Problem: Neurological  Goal: Maximum potential motor/sensory/cognitive function  Outcome: Ongoing     Problem: Skin Integrity:  Goal: Will show no infection signs and symptoms  Description: Will show no infection signs and symptoms  Outcome: Ongoing  Note: Skin assessment as charted.    Goal: Absence of new skin breakdown  Description: Absence of new skin breakdown  Outcome: Ongoing     Problem: Falls - Risk of:  Goal: Will remain free from falls  Description: Will remain free from falls  Outcome: Ongoing  Goal: Absence of physical injury  Description: Absence of physical injury  Outcome: Ongoing     Problem: Nutrition  Goal: Optimal nutrition therapy  Outcome: Ongoing

## 2021-05-29 NOTE — PROGRESS NOTES
[place] [address] Internal Medicine    Progress Note     5/29/2021    1:48 PM    Name:   Devora Montoya  MRN:     [id number]     Acct:      [de-identified]   Room:   [address] [place] [address] Day:  23  Admit Date:  5/10/2021  7:13 AM    PCP:   Luigi Box MD  Code Status:  Full Code    Subjective:     C/C: medical mgmt    Active Problems:    Tobacco abuse    Gastritis    Acute left hemiparesis ([facility]    Acute CVA (cerebrovascular accident) ([facility]    COPD (chronic obstructive pulmonary disease) ([facility]    Essential hypertension    Hyperlipidemia    Pre-diabetes  Resolved Problems:    * No resolved hospital problems. *      Interval History Status: improved. 5/14/21  Patient seen and examined at bedside. No acute overnight events. Afebrile, hemodynamically stable. Continues to work with PT/OT. No acute changes in hemiparesis. No acute complaints. 5/22  Patient is doing much better  No new complaints.      Significant last 24 hr data reviewed ;   Vitals:    05/28/21 0700 05/28/21 1345 05/28/21 1844 05/29/21 0715   BP: 125/71 128/84 125/74 114/67   Pulse: 80 99 80 85   Resp: 20 20 20 18   Temp: 98.3 °F (36.8 °C) 98.5 °F (36.9 °C) 98.5 °F (36.9 °C) 98.3 °F (36.8 °C)   TempSrc: Oral Oral Oral Oral   SpO2: 92% 93% 93%    Weight:       Height:          Recent Results (from the past 24 hour(s))   POC Glucose Fingerstick    Collection Time: 05/28/21  3:43 PM   Result Value Ref Range    POC Glucose 157 (H) 65 - 105 mg/dL   POC Glucose Fingerstick    Collection Time: 05/28/21  8:35 PM   Result Value Ref Range    POC Glucose 148 (H) 65 - 105 mg/dL   POC Glucose Fingerstick    Collection Time: 05/29/21  6:50 AM   Result Value Ref Range    POC Glucose 118 (H) 65 - 105 mg/dL   POC Glucose Fingerstick    Collection Time: 05/29/21 10:49 AM   Result Value Ref Range    POC Glucose 131 (H) 65 - 105 mg/dL     Recent Labs     05/28/21  1046 05/28/21  1543 05/28/21  2035 05/29/21  0650 05/29/21  1049   OTTONIEL Noted    Acute CVA (cerebrovascular accident) ([facility] [I63.9] 05/10/2021    COPD (chronic obstructive pulmonary disease) ([facility] [J44.9] 05/10/2021    Essential hypertension [I10] 05/10/2021    Hyperlipidemia [E78.5] 05/10/2021    Pre-diabetes [R73.03] 05/10/2021    Acute left hemiparesis (HCC) [G81.94]     Gastritis [K29.70]     Tobacco abuse [Z72.0] 07/16/2017     Plan:        Continue current therapy regimen  BP well controlled. Continue norvasc 5 mg daily  Copd stable. continue bronchodilators  Synthroid 25 mcg daily   lipitor 80 mg daily  Asa 81 mg daily  Insulin sliding scale  nicoderm patch  Continue PT/OT  Encourage PO intake      5/28  Okay to discontinue AC at bedtime blood glucose checks blood sugars well controlled patient not on any antidiabetic medication. 5/29  Patient reports no new complaints. Engaging with physical therapy. Labs and vitals reviewed. No new issues. Continue with current care. MD LEONOR Willard [address], [address] [place] [address].    Phone [phone]   Fax: [phone]  Answering Service: [phone]

## 2021-05-30 LAB — GLUCOSE BLD-MCNC: 124 MG/DL (ref 65–105)

## 2021-05-30 PROCEDURE — 97530 THERAPEUTIC ACTIVITIES: CPT

## 2021-05-30 PROCEDURE — 6360000002 HC RX W HCPCS: Performed by: PHYSICAL MEDICINE & REHABILITATION

## 2021-05-30 PROCEDURE — 99231 SBSQ HOSP IP/OBS SF/LOW 25: CPT | Performed by: PHYSICAL MEDICINE & REHABILITATION

## 2021-05-30 PROCEDURE — 6370000000 HC RX 637 (ALT 250 FOR IP): Performed by: STUDENT IN AN ORGANIZED HEALTH CARE EDUCATION/TRAINING PROGRAM

## 2021-05-30 PROCEDURE — 99231 SBSQ HOSP IP/OBS SF/LOW 25: CPT | Performed by: INTERNAL MEDICINE

## 2021-05-30 PROCEDURE — 6370000000 HC RX 637 (ALT 250 FOR IP): Performed by: PHYSICAL MEDICINE & REHABILITATION

## 2021-05-30 PROCEDURE — 82947 ASSAY GLUCOSE BLOOD QUANT: CPT

## 2021-05-30 PROCEDURE — 1180000000 HC REHAB R&B

## 2021-05-30 RX ADMIN — Medication 2 PUFF: at 08:37

## 2021-05-30 RX ADMIN — HEPARIN SODIUM 5000 UNITS: 5000 INJECTION INTRAVENOUS; SUBCUTANEOUS at 05:53

## 2021-05-30 RX ADMIN — ASPIRIN 81 MG CHEWABLE TABLET 81 MG: 81 TABLET CHEWABLE at 08:37

## 2021-05-30 RX ADMIN — FLUOXETINE HYDROCHLORIDE 20 MG: 20 CAPSULE ORAL at 08:37

## 2021-05-30 RX ADMIN — AMLODIPINE BESYLATE 5 MG: 5 TABLET ORAL at 08:37

## 2021-05-30 RX ADMIN — ATORVASTATIN CALCIUM 80 MG: 80 TABLET, FILM COATED ORAL at 08:37

## 2021-05-30 RX ADMIN — POLYETHYLENE GLYCOL 3350 17 G: 17 POWDER, FOR SOLUTION ORAL at 08:37

## 2021-05-30 RX ADMIN — HEPARIN SODIUM 5000 UNITS: 5000 INJECTION INTRAVENOUS; SUBCUTANEOUS at 15:20

## 2021-05-30 RX ADMIN — ACETAMINOPHEN 1000 MG: 500 TABLET, FILM COATED ORAL at 15:19

## 2021-05-30 RX ADMIN — LEVOTHYROXINE SODIUM 25 MCG: 0.03 TABLET ORAL at 05:53

## 2021-05-30 RX ADMIN — AMITRIPTYLINE HYDROCHLORIDE 25 MG: 25 TABLET, FILM COATED ORAL at 21:09

## 2021-05-30 RX ADMIN — Medication 2 PUFF: at 21:09

## 2021-05-30 RX ADMIN — ACETAMINOPHEN 1000 MG: 500 TABLET, FILM COATED ORAL at 05:53

## 2021-05-30 RX ADMIN — ACETAMINOPHEN 1000 MG: 500 TABLET, FILM COATED ORAL at 21:09

## 2021-05-30 RX ADMIN — HEPARIN SODIUM 5000 UNITS: 5000 INJECTION INTRAVENOUS; SUBCUTANEOUS at 21:09

## 2021-05-30 ASSESSMENT — PAIN DESCRIPTION - PROGRESSION
CLINICAL_PROGRESSION: GRADUALLY IMPROVING
CLINICAL_PROGRESSION: NOT CHANGED
CLINICAL_PROGRESSION: GRADUALLY IMPROVING

## 2021-05-30 ASSESSMENT — PAIN DESCRIPTION - ONSET: ONSET: ON-GOING

## 2021-05-30 ASSESSMENT — PAIN DESCRIPTION - ORIENTATION
ORIENTATION: LEFT
ORIENTATION: LEFT

## 2021-05-30 ASSESSMENT — PAIN SCALES - WONG BAKER
WONGBAKER_NUMERICALRESPONSE: 0
WONGBAKER_NUMERICALRESPONSE: 6
WONGBAKER_NUMERICALRESPONSE: 0

## 2021-05-30 ASSESSMENT — PAIN DESCRIPTION - DESCRIPTORS
DESCRIPTORS: ACHING;DISCOMFORT
DESCRIPTORS: HEAVINESS;SORE

## 2021-05-30 ASSESSMENT — PAIN SCALES - GENERAL
PAINLEVEL_OUTOF10: 1
PAINLEVEL_OUTOF10: 0
PAINLEVEL_OUTOF10: 0

## 2021-05-30 ASSESSMENT — PAIN DESCRIPTION - PAIN TYPE
TYPE: ACUTE PAIN
TYPE: ACUTE PAIN

## 2021-05-30 ASSESSMENT — PAIN DESCRIPTION - LOCATION
LOCATION: SHOULDER
LOCATION: ELBOW

## 2021-05-30 ASSESSMENT — PAIN DESCRIPTION - FREQUENCY: FREQUENCY: INTERMITTENT

## 2021-05-30 NOTE — PROGRESS NOTES
RN answered patient's call light. Patient stated that she had fallen out of bed. RN found patient on the floor in her room lying on her left side. Patient's bed was raised in an elevated position and bed wheels were not locked. Patient denied hitting her head but did land on her left hip and arm. Patient denies pain. Vitals taken, stable. No signs of injury at this time. Dr. Gorge Romano notified. We will monitor tonight. Notify her of any increased pain. She will assess patient tomorrow.  Patient reported to RN when asked if she remembered how her bed got raised that it could have been when her daughter gave her a boost.

## 2021-05-30 NOTE — PROGRESS NOTES
Physical Therapy  [address]  Acute Rehabilitation Physical Therapy Progress Note    Date: 21  Patient Name: Nadia Bal       Room: 1536/0346-11  MRN: [id number]   Account: [de-identified]   : 1955  (63 y.o.) Gender: female      Diagnosis: ischemic CVA,  subacute right MCA distribution infarct, L lynette, dysphagia, L neglect, loop recorder 21 (Dr. Luis Ceja)  Past Medical History:  has a past medical history of Asthma, COPD (chronic obstructive pulmonary disease) ([facility] Diabetes mellitus ([facility] [address] Esophageal ring, Gastritis, Hyperlipidemia, Hyperplastic colon polyp, Hypertension, Osteoarthritis, Patient in clinical research study, and TIA (transient ischemic attack). Past Surgical History:   has a past surgical history that includes Appendectomy; Hysterectomy; Esophagus dilation; Colonoscopy (2017); Endoscopy, colon, diagnostic; pr egd transoral biopsy single/multiple (N/A, 2017); pr colsc flx w/rmvl of tumor polyp lesion snare tq (N/A, 2017); and Upper gastrointestinal endoscopy (2017). Restrictions/Precautions  Restrictions/Precautions: Fall Risk  Required Braces or Orthoses?: No  Implants present? :  (loop recorder)  Position Activity Restriction  Other position/activity restrictions:  L neglect with LUE/LLE deficits, neglect improving. Subjective: Pt reports her other daughter is in hospital having heart attack today. Comments: Pt granted permission from Dr. Shawna Engle for IVELISSE for g'daughter's graduation on 21 pending approval from PT; family training needed for squat pivots from wheelchair to toilet, car transfers. Vital Signs  Patient Currently in Pain: Yes  Pain Assessment: Faces  Lara-Baker Pain Rating: Hurts even more  Pain Type: Acute pain  Pain Location: Shoulder  Pain Orientation: Left  Pain Descriptors: Heaviness; Sore  Non-Pharmaceutical Pain Intervention(s): Repositioned;Elevation (sling applied for standing activity)  Response to Patient education  New Education Provided:  Car transfers  Lory Cancel and patient  Method: demonstration and explanation       Outcome: acknowledged understanding of, demonstrated understanding and needs reinforcement     Current Treatment Recommendations: Strengthening, Balance Training, Functional Mobility Training, Transfer Training, Endurance Training, Wheelchair Mobility Training, Gait Training, Stair training, Neuromuscular Re-education, Home Exercise Program, Safety Education & Training, Patient/Caregiver Education & Training, Modalities, Positioning, ROM, Equipment Evaluation, Education, & procurement    Conditions Requiring Skilled Therapeutic Intervention  Body structures, Functions, Activity limitations: Decreased functional mobility ; Decreased strength;Decreased safe awareness;Decreased endurance;Decreased balance;Decreased posture;Decreased coordination;Decreased vision/visual deficit; Decreased fine motor control  REQUIRES PT FOLLOW UP: Yes  Discharge Recommendations: Patient would benefit from continued therapy after discharge;Home with assist PRN    Goals  Short term goals  Time Frame for Short term goals: 10 days  Short term goal 1: Pt able to roll in bed side to side at min A without bed rail  Short term goal 2: Pt able to perform supine<>sit at mod A  Short term goal 3: Pt able to perform sit<>stand at min/mod A . Short term goal 4: Pt able to progress to pivot transfers at max A   Short term goal 5: Pt able to improve sitting balance at EOB/EOM at SBA for static balance and able to track to left side as well as turn to head to left side to scan her environment with minimal cues.    Short term goal 6: Pt able to tolerate standing in // bars with R UE support and assist at mod A  Short term goal 7: Pt able to propel w/c with R UE/R LE distance of 50 to 80 ft, straight path and turn around 180 degrees, at min/mod A   Long term goals  Time Frame for Long term goals : By DC  Long term goal 1: Pt able to roll in bed mod-I  Long term goal 2: Pt able to perform supine <> sit at CGA/min A  Long term goal 3: Pt able to perform pivot transfers min A / mod A and able to scan Left side environment. Long term goal 4: Pt able to ambulate in // bars, or with appropriate assistive device and/or LE bracing, distance of  20 to 30 ft, min/mod A x 2. Long term goal 5: Pt able to propel w/c on level surfaces distance 100 to 150 ft, SBA/CGA. Long term goal 6: Educate pt/family in safe technique for mobility and  to go up and down steps to enter/exit home. Long term goal 7: Improve sitting balance at EOM to good, and standing balance with R UE support to fair- to reduce fall risk. Long term goal 8: Improve PASS score to 20/36 improve overall function.       05/30/21 1521   PT Individual Minutes   Time In 1400   Time Out 1512   Minutes 72     Electronically signed by Neeta Pickard PTA on 5/30/21 at 4:35 PM EDT

## 2021-05-30 NOTE — PROGRESS NOTES
[place] [address] Internal Medicine    Progress Note     5/30/2021    9:43 AM    Name:   Tiara Maynard  MRN:     [id number]     Acct:      [de-identified]   Room:   [address] [place] [address] Day:  21  Admit Date:  5/10/2021  7:13 AM    PCP:   Marcy Box MD  Code Status:  Full Code    Subjective:     C/C: medical mgmt    Active Problems:    Tobacco abuse    Gastritis    Acute left hemiparesis ([facility] [address]    Acute CVA (cerebrovascular accident) ([facility] [address]    COPD (chronic obstructive pulmonary disease) ([facility] [address]    Essential hypertension    Hyperlipidemia    Pre-diabetes  Resolved Problems:    * No resolved hospital problems. *      Interval History Status: improved. 5/14/21  Patient seen and examined at bedside. No acute overnight events. Afebrile, hemodynamically stable. Continues to work with PT/OT. No acute changes in hemiparesis. No acute complaints. 5/22  Patient is doing much better  No new complaints. Significant last 24 hr data reviewed ;   Vitals:    05/28/21 1844 05/29/21 0715 05/29/21 1854 05/30/21 0834   BP: 125/74 114/67 116/65 135/68   Pulse: 80 85 83 82   Resp: 20 18 18 18   Temp: 98.5 °F (36.9 °C) 98.3 °F (36.8 °C) 98.4 °F (36.9 °C) 98.5 °F (36.9 °C)   TempSrc: Oral Oral Oral Oral   SpO2: 93%  95% 94%   Weight:       Height:          Recent Results (from the past 24 hour(s))   POC Glucose Fingerstick    Collection Time: 05/29/21 10:49 AM   Result Value Ref Range    POC Glucose 131 (H) 65 - 105 mg/dL   POC Glucose Fingerstick    Collection Time: 05/29/21  4:13 PM   Result Value Ref Range    POC Glucose 112 (H) 65 - 105 mg/dL   POC Glucose Fingerstick    Collection Time: 05/30/21  7:01 AM   Result Value Ref Range    POC Glucose 124 (H) 65 - 105 mg/dL     Recent Labs     05/28/21  2035 05/29/21  0650 05/29/21  1049 05/29/21  1613 05/30/21  0701   POCGLU 148* 118* 131* 112* 124*        No results found.           HPI:   See history in H and P      Review of Systems: Constitutional:  negative for chills, fevers, sweats  Respiratory:  negative for cough, dyspnea on exertion, hemoptysis, shortness of breath, wheezing  Cardiovascular:  negative for chest pain, chest pressure/discomfort, lower extremity edema, palpitations  Gastrointestinal:  negative for abdominal pain, constipation, diarrhea, nausea, vomiting  Neurological: Positive for left-sided weakness. Negative for dizziness, headache  Data:     Past Medical History:  no change     Social History:  no change    Family History: @no change    Vitals:      I/O (24Hr): No intake or output data in the 24 hours ending 05/30/21 0943    Labs:    URINE ANALYSIS: No results found for: LABURIN     CBC:  Lab Results   Component Value Date    WBC 9.8 05/25/2021    HGB 13.8 05/25/2021     05/25/2021        BMP:    Lab Results   Component Value Date     05/25/2021    K 4.3 05/25/2021     05/25/2021    CO2 24 05/25/2021    BUN 13 05/25/2021    CREATININE 0.48 05/25/2021    GLUCOSE 111 05/25/2021      LIVER PROFILE:  Lab Results   Component Value Date    ALT 13 10/16/2017    AST 13 10/16/2017    PROT 6.9 10/16/2017    BILITOT 0.22 10/16/2017    BILIDIR 0.11 10/16/2017    LABALBU 4.0 10/16/2017               Radiology:  Medications:      Allergies:      Current Meds:   Scheduled Meds:    FLUoxetine  20 mg Oral Daily    amitriptyline  25 mg Oral Nightly    heparin (porcine)  5,000 Units Subcutaneous 3 times per day    acetaminophen  1,000 mg Oral 3 times per day    amLODIPine  5 mg Oral Daily    aspirin  81 mg Oral Daily    atorvastatin  80 mg Oral Daily    budesonide-formoterol  2 puff Inhalation BID    insulin lispro  0-3 Units Subcutaneous Nightly    insulin lispro  0-6 Units Subcutaneous TID WC    levothyroxine  25 mcg Oral Daily    nicotine  1 patch Transdermal Daily    tiotropium  2 puff Inhalation Daily    polyethylene glycol  17 g Oral Daily     Continuous Infusions:    sodium chloride       PRN Meds: polyvinyl alcohol, sodium chloride flush, promethazine **OR** ondansetron, sodium chloride, albuterol, dextrose, glucagon (rDNA), glucose, senna, bisacodyl      Physical Examination:        /68   Pulse 82   Temp 98.5 °F (36.9 °C) (Oral)   Resp 18   Ht 5' 8\" (1.727 m)   Wt 191 lb 8 oz (86.9 kg)   SpO2 94%   BMI 29.12 kg/m²   Temp (24hrs), Av.5 °F (36.9 °C), Min:98.4 °F (36.9 °C), Max:98.5 °F (36.9 °C)    Recent Labs     21  0650 21  1049 21  1613 21  0701   POCGLU 118* 131* 112* 124*     No intake or output data in the 24 hours ending 21 0943    General Appearance:  alert, well appearing, and in no acute distress  Mental status: oriented to person, place, and time with normal affect  Head:  normocephalic, atraumatic. Eye: no icterus, redness, pupils equal and reactive, extraocular eye movements intact, conjunctiva clear  Ear: normal external ear, no discharge, hearing intact  Nose:  no drainage noted  Mouth: mucous membranes moist  Neck: supple, no carotid bruits, thyroid not palpable  Lungs: Clear to auscultation bilaterally. No accessory muscle use. Cardiovascular: normal rate, regular rhythm, no murmur, gallop, rub. Abdomen: Soft, nontender, nondistended, normal bowel sounds, no hepatomegaly or splenomegaly  Neurologic: Left upper and lower extremity weakness, 0-1/5. No sensory loss.   Skin: No gross lesions, rashes, bruising or bleeding on exposed skin area  Extremities:  peripheral pulses palpable, no pedal edema or calf pain with palpation    Assessment:        Primary Problem  <principal problem not specified>    Active Hospital Problems    Diagnosis Date Noted    [facility] [I63.9] 05/10/2021    COPD (chronic obstructive pulmonary disease) ([facility] [J44.9] 05/10/2021    Essential hypertension [I10] 05/10/2021    Hyperlipidemia [E78.5] 05/10/2021    Pre-diabetes [R73.03] 05/10/2021    Acute left hemiparesis ([facility] [address] [G81.94]     Gastritis [K29.70]     Tobacco abuse [Z72.0] 07/16/2017     Plan:        Continue current therapy regimen  BP well controlled. Continue norvasc 5 mg daily  Copd stable. continue bronchodilators  Synthroid 25 mcg daily   lipitor 80 mg daily  Asa 81 mg daily  Insulin sliding scale  nicoderm patch  Continue PT/OT  Encourage PO intake      5/30  Patient reports no new complaints. Engaging with physical therapy. Labs and vitals reviewed. No new issues. Continue with current care. MD LEONOR Damian [address], [address] [place] [address].    Phone [phone]   Fax: [phone]  Answering Service: [phone]

## 2021-05-30 NOTE — PROGRESS NOTES
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 Patient is doing well today. No new c/o pain or issues with sleep, appetite, bowel, or bladder. She and her daughter may be interested in home evaluation pending discharge plan. ROS:  Denies fevers, chills, sweats. No chest pain, palpitations, lightheadedness. Denies coughing, wheezing or shortness of breath. Denies abdominal pain, nausea, diarrhea or constipation. No new areas of joint pain. Denies new areas of numbness or weakness. Denies new anxiety  issues. No new skin problems. Rehabilitation:   Progressing in therapies. PT:  Restrictions/Precautions: Fall Risk  Implants present? :  (loop recorder)  Other position/activity restrictions:  L neglect with LUE/LLE deficits, neglect improving. Transfers  Sit to Stand: Minimal Assistance (L UE assist; walker lift/Jacqueline Stedy)  Stand to sit: Minimal Assistance (L UE assist, improved control today, G reach back c R UE)  Bed to Chair: Moderate assistance (squat pivot to Pt's R; level of assist depends on set-up)  Stand Pivot Transfers: Maximum Assistance (hand rail; requires L knee blocking & balance assist)  Squat Pivot Transfers: Moderate Assistance (Impulsive. Assist c L UE/LE positioning, set-up; safety cues. To Pt's R.)  Lateral Transfers: Minimal Assistance  Comment: Safety concerns with squat pivots: Cues for safety c some return, but impulsive. Attempting to use teach-back techniques to encourage slower pace, more thoughtfulness with preparation. Does not always have enough momentum to complete transfer in one push; requires cues to focus on L UE/LE position.    Ambulation 1  Surface: level tile  Device:  (walker lift)  Other Apparatus: Left, Slider, AFO, Wheelchair follow (wheelchair brought up behind afterward)  Assistance: 2 Person assistance, Maximum assistance (+ 3rd assist to steer walker)  Quality of Gait: Some quadricep contraction noted during amb for L knee extension. Pt is able to initiate L hip flexion, but continues to need assist to fully advance L LE upon fatigue. Fewer cues to increase step length today. Gait Deviations: Decreased step length, Slow Juanita, Decreased step height, Decreased head and trunk rotation  Distance: 100'  Comments: Pt c/o feeling light headed this morning. /89 [id number]% with HR 94bpm post amb    Transfers  Sit to Stand: Minimal Assistance (L UE assist; walker lift/Jacqueline Stedy)  Stand to sit: Minimal Assistance (L UE assist, improved control today, G reach back c R UE)  Bed to Chair: Moderate assistance (squat pivot to Pt's R; level of assist depends on set-up)  Stand Pivot Transfers: Maximum Assistance (hand rail; requires L knee blocking & balance assist)  Squat Pivot Transfers: Moderate Assistance (Impulsive. Assist c L UE/LE positioning, set-up; safety cues. To Pt's R.)  Lateral Transfers: Minimal Assistance  Comment: Safety concerns with squat pivots: Cues for safety c some return, but impulsive. Attempting to use teach-back techniques to encourage slower pace, more thoughtfulness with preparation. Does not always have enough momentum to complete transfer in one push; requires cues to focus on L UE/LE position. Ambulation  Ambulation?: Yes  More Ambulation?: No  Ambulation 1  Surface: level tile  Device:  (walker lift)  Other Apparatus: Left, Slider, AFO, Wheelchair follow (wheelchair brought up behind afterward)  Assistance: 2 Person assistance, Maximum assistance (+ 3rd assist to steer walker)  Quality of Gait: Some quadricep contraction noted during amb for L knee extension. Pt is able to initiate L hip flexion, but continues to need assist to fully advance L LE upon fatigue. Fewer cues to increase step length today.    Gait Deviations: Decreased step length, Slow Juanita, Decreased step height, Decreased head and trunk rotation  Distance: 100'  Comments: Pt c/o feeling light headed this morning. /89 [id number]% with HR 94bpm post amb    Surface: level tile  Ambulation 1  Surface: level tile  Device:  (walker lift)  Other Apparatus: Left, Slider, AFO, Wheelchair follow (wheelchair brought up behind afterward)  Assistance: 2 Person assistance, Maximum assistance (+ 3rd assist to steer walker)  Quality of Gait: Some quadricep contraction noted during amb for L knee extension. Pt is able to initiate L hip flexion, but continues to need assist to fully advance L LE upon fatigue. Fewer cues to increase step length today. Gait Deviations: Decreased step length, Slow Juanita, Decreased step height, Decreased head and trunk rotation  Distance: 100'  Comments: Pt c/o feeling light headed this morning. /89 [id number]% with HR 94bpm post amb    OT:  ADL  Equipment Provided: Reacher  Feeding: Modified independent   Grooming: Setup (set up for wash cloth. oral care/face washing/hair grooming)  UE Bathing: None (pt declines)  LE Bathing: None (pt declines)  UE Dressing: None (pt declines)  LE Dressing: Maximum assistance, Dependent/Total (footlevel dressing only; TEDs/shoes/L AFO)  Toileting: None (pt declines need. )  Additional Comments: Pt continues to demonstrate fair return with lynette techniques and awareness to L side. Pt does show L side awareness when bathing lower leg. Continued education on use of AE for increased independence. Balance  Sitting Balance: Contact guard assistance (SBA-CGA while seated EOB)  Standing Balance: Moderate assistance (stabilizing UE support)   Standing Balance  Time: <1 min x3, ~1 min x 2   Activity: functional transfers, lower body care tasks. Comment: with nilson galdamez. Pt declines stand pivot transfer due to fatigue  Functional Mobility  Functional - Mobility Device: Wheelchair  Activity: To/from bathroom  Assist Level:  Moderate assistance  Functional Mobility Comments: pt navigates herself from bathroom at wheelchair level     Bed mobility  Bridging: Minimal assistance (positioning L LE)  Rolling to Left: Minimal assistance  Rolling to Right: Maximum assistance  Supine to Sit: Minimal assistance (HOB elevated, use of bed rail. Per RN report. )  Sit to Supine: Moderate assistance (Assist with BLEs over EOB. )  Scooting: Minimal assistance (L UE safety)  Comment: HOB elevated, use of bed rail. Transfers  Stand Step Transfers: Dependent/Total  Stand Pivot Transfers: Maximum assistance (EOB>w/c towards R side. )  Sit to stand: Moderate assistance  Stand to sit: Moderate assistance  Transfer Comments: Pt impulsive, ready to stand before safety techniques implemented. Toilet Transfers  Toilet - Technique:  (nilson stedy )  Equipment Used: Raised toilet seat without rails  Toilet Transfer: Dependent/Total (nilson stedy, min-mod A)  Toilet Transfers Comments: Completed with use of nilson stedy with min-mod A to complete. Shower Transfers  Shower - Transfer From: Wheelchair  Shower - Transfer Type: To and From  Shower - Transfer To: Transfer tub bench  Shower - Technique: Stand pivot  Shower Transfers: Moderate assistance, Maximal assistance  Shower Transfers Comments: R<>L. Grab bars for support. Wheelchair Bed Transfers  Wheelchair/Bed - Technique: Stand pivot  Equipment Used: Bed, Wheelchair  Level of Asssistance: Maximum assistance (Block L knee)    SPEECH:    Objective:  /68   Pulse 82   Temp 98.5 °F (36.9 °C) (Oral)   Resp 18   Ht 5' 8\" (1.727 m)   Wt 191 lb 8 oz (86.9 kg)   SpO2 94%   BMI 29.12 kg/m²       GEN: Well developed, well nourished, in NAD  HEENT:  NCAT. PERRL. EOMI. Mucous membranes pink and moist.   PULM:  Clear to ausculation. No rales or rhonchi. Respirations WNL and unlabored. CV:  Regular rate rhythm. No murmurs or gallops. GI:  Abdomen soft. Nontender. Non-distended. BS + and equal.    NEUROLOGICAL: A&O x3. Sensation intact to light touch.   Stable impaired L CN VII.   MSK: Functional ROM RUE and RLEs. Impaired AROM LUE and LLE. Sueellen Payment Motor testing 4+/5 key muscles RUE and RLE. L shoulder shrug 1/5, L hip flexion 2/5. Distal LUE and LLE muscles 0/5. SKIN: Warm dry and intact. Good turgor. EXTREMITIES:  No calf tenderness to palpation. No edema BLEs. Sueellen Payment PSYCH: Mood depressed. Appropriately interactive. Affect WNL    Diagnostics:     CBC:   No results for input(s): WBC, RBC, HGB, HCT, MCV, RDW, PLT in the last 72 hours. BMP:   No results for input(s): NA, K, CL, CO2, PHOS, BUN, CREATININE, GLUCOSE in the last 72 hours. Invalid input(s): CA  BNP: No results for input(s): BNP in the last 72 hours. PT/INR: No results for input(s): PROTIME, INR in the last 72 hours. APTT: No results for input(s): APTT in the last 72 hours. CARDIAC ENZYMES: No results for input(s): CKMB, CKMBINDEX, TROPONINT in the last 72 hours. Invalid input(s): CKTOTAL;3  FASTING LIPID PANEL:  Lab Results   Component Value Date    CHOL 206 (H) 05/05/2021    HDL 29 (L) 05/05/2021    TRIG 185 (H) 05/05/2021     LIVER PROFILE: No results for input(s): AST, ALT, ALB, BILIDIR, BILITOT, ALKPHOS in the last 72 hours.      Current Medications:   Current Facility-Administered Medications: FLUoxetine (PROZAC) capsule 20 mg, 20 mg, Oral, Daily  amitriptyline (ELAVIL) tablet 25 mg, 25 mg, Oral, Nightly  heparin (porcine) injection 5,000 Units, 5,000 Units, Subcutaneous, 3 times per day  acetaminophen (TYLENOL) tablet 1,000 mg, 1,000 mg, Oral, 3 times per day  polyvinyl alcohol (LIQUIFILM TEARS) 1.4 % ophthalmic solution 1 drop, 1 drop, Both Eyes, PRN  sodium chloride flush 0.9 % injection 5-40 mL, 5-40 mL, Intravenous, PRN  promethazine (PHENERGAN) tablet 12.5 mg, 12.5 mg, Oral, Q6H PRN **OR** ondansetron (ZOFRAN) injection 4 mg, 4 mg, Intravenous, Q6H PRN  0.9 % sodium chloride infusion, 25 mL, Intravenous, PRN  albuterol (PROVENTIL) nebulizer solution 2.5 mg, 2.5 mg, Nebulization, Q4H PRN  amLODIPine (NORVASC) tablet 5 mg,

## 2021-05-31 PROCEDURE — 99231 SBSQ HOSP IP/OBS SF/LOW 25: CPT | Performed by: INTERNAL MEDICINE

## 2021-05-31 PROCEDURE — 97116 GAIT TRAINING THERAPY: CPT

## 2021-05-31 PROCEDURE — 6370000000 HC RX 637 (ALT 250 FOR IP): Performed by: STUDENT IN AN ORGANIZED HEALTH CARE EDUCATION/TRAINING PROGRAM

## 2021-05-31 PROCEDURE — 92507 TX SP LANG VOICE COMM INDIV: CPT

## 2021-05-31 PROCEDURE — 97112 NEUROMUSCULAR REEDUCATION: CPT

## 2021-05-31 PROCEDURE — 1180000000 HC REHAB R&B

## 2021-05-31 PROCEDURE — 97535 SELF CARE MNGMENT TRAINING: CPT

## 2021-05-31 PROCEDURE — 97530 THERAPEUTIC ACTIVITIES: CPT

## 2021-05-31 PROCEDURE — 99232 SBSQ HOSP IP/OBS MODERATE 35: CPT | Performed by: PHYSICAL MEDICINE & REHABILITATION

## 2021-05-31 PROCEDURE — 6370000000 HC RX 637 (ALT 250 FOR IP): Performed by: PHYSICAL MEDICINE & REHABILITATION

## 2021-05-31 PROCEDURE — 97542 WHEELCHAIR MNGMENT TRAINING: CPT

## 2021-05-31 PROCEDURE — 6360000002 HC RX W HCPCS: Performed by: PHYSICAL MEDICINE & REHABILITATION

## 2021-05-31 RX ADMIN — HEPARIN SODIUM 5000 UNITS: 5000 INJECTION INTRAVENOUS; SUBCUTANEOUS at 21:03

## 2021-05-31 RX ADMIN — Medication 2 PUFF: at 07:39

## 2021-05-31 RX ADMIN — ASPIRIN 81 MG CHEWABLE TABLET 81 MG: 81 TABLET CHEWABLE at 07:39

## 2021-05-31 RX ADMIN — ACETAMINOPHEN 1000 MG: 500 TABLET, FILM COATED ORAL at 21:02

## 2021-05-31 RX ADMIN — ATORVASTATIN CALCIUM 80 MG: 80 TABLET, FILM COATED ORAL at 07:39

## 2021-05-31 RX ADMIN — LEVOTHYROXINE SODIUM 25 MCG: 0.03 TABLET ORAL at 06:01

## 2021-05-31 RX ADMIN — ACETAMINOPHEN 1000 MG: 500 TABLET, FILM COATED ORAL at 13:36

## 2021-05-31 RX ADMIN — Medication 2 PUFF: at 21:02

## 2021-05-31 RX ADMIN — HEPARIN SODIUM 5000 UNITS: 5000 INJECTION INTRAVENOUS; SUBCUTANEOUS at 13:36

## 2021-05-31 RX ADMIN — ACETAMINOPHEN 1000 MG: 500 TABLET, FILM COATED ORAL at 06:01

## 2021-05-31 RX ADMIN — AMITRIPTYLINE HYDROCHLORIDE 25 MG: 25 TABLET, FILM COATED ORAL at 21:03

## 2021-05-31 RX ADMIN — AMLODIPINE BESYLATE 5 MG: 5 TABLET ORAL at 07:39

## 2021-05-31 RX ADMIN — FLUOXETINE HYDROCHLORIDE 20 MG: 20 CAPSULE ORAL at 07:39

## 2021-05-31 ASSESSMENT — PAIN DESCRIPTION - PAIN TYPE: TYPE: ACUTE PAIN

## 2021-05-31 ASSESSMENT — PAIN DESCRIPTION - FREQUENCY: FREQUENCY: INTERMITTENT

## 2021-05-31 ASSESSMENT — PAIN SCALES - GENERAL
PAINLEVEL_OUTOF10: 1
PAINLEVEL_OUTOF10: 1
PAINLEVEL_OUTOF10: 0

## 2021-05-31 ASSESSMENT — PAIN DESCRIPTION - PROGRESSION: CLINICAL_PROGRESSION: NOT CHANGED

## 2021-05-31 ASSESSMENT — PAIN DESCRIPTION - DESCRIPTORS: DESCRIPTORS: ACHING;SORE

## 2021-05-31 ASSESSMENT — PAIN DESCRIPTION - LOCATION: LOCATION: GENERALIZED

## 2021-05-31 ASSESSMENT — PAIN DESCRIPTION - ONSET: ONSET: PROGRESSIVE

## 2021-05-31 NOTE — PLAN OF CARE
Problem: Pain:  Goal: Pain level will decrease  Description: Pain level will decrease  Outcome: Ongoing   Pain assessment and reassessment completed so far this shift. Pt. able to rest after the use of pain medication. Patient medicated with 1000mg of Scheduled tylenol Q8hrs for c/o generalized soreness from therapy. Pt. Repositions per self for comfort. Nonverbal cues indicate pain relief. Pt. Rests quietly with eyes closed after pain medication administration. Respirations easy and unlabored. Appears free from distress.          Problem: Falls - Risk of:  Goal: Will remain free from falls  Description: Will remain free from falls  Outcome: Met This Shift  No falls or injuries sustained at this time. No attempts to get out of bed without nursing assistance. Call light within reach and pt. uses appropriately for assistance. Siderails up x 2. Nonskid footwear remains on. Bed in low and locked position. Hourly nursing rounds made. Pt. Alert and oriented, aware of limitations, and exhibits good safety judgement. Pt. uses assistive devices appropriately. Pt. understands individual fall risk factors.     Pt. reminded to use call light with each nurse/patient interaction.     Pt. room located close to nurse's station.      Bed alarm remains engaged throughout the shift as a precaution.          Problem: Skin Integrity:  Goal: Absence of new skin breakdown  Description: Absence of new skin breakdown  Outcome: Met This Shift     Skin assessment completed this shift. Nutrition and Hydration status assessed with adequate intake. Ruy Score as charted. Pressure Relief Overlay remains intact and inflated to patient's bed throughout the shift. Chair cushion remains intact when pt is up to chair. Bilateral heels remain elevated on pillows throughout the shift. Left Prafo boot remains on throughout the shift. Patient able to reposition self for comfort and to prevent breakdown.   Patient verbalizes understanding of pressure ulcer prevention measures. Skin integrity maintained. No new skin breakdown noted. Skin to high risk pressure areas including coccyx and heels are clear.     Ryann care provided as needed throughout the shift.  Aloe [place] Moisture Barrier ointment applied to buttocks as a preventative measure.

## 2021-05-31 NOTE — PROGRESS NOTES
Physical Therapy  [id number]  Acute Rehabilitation Physical Therapy Progress Note    Date: 21  Patient Name: Felicity Apley       Room: 6356/6939-67  MRN: [id number]   Account: [de-identified]   : 1955  (63 y.o.) Gender: female        Diagnosis: ischemic CVA,  subacute right MCA distribution infarct, L lynette, dysphagia, L neglect, loop recorder 21 (Dr. Andreina Tracey)  Past Medical History:  has a past medical history of Asthma, COPD (chronic obstructive pulmonary disease) ([facility] [address] Diabetes mellitus ([facility] [address] Esophageal ring, Gastritis, Hyperlipidemia, Hyperplastic colon polyp, Hypertension, Osteoarthritis, Patient in clinical research study, and TIA (transient ischemic attack). Past Surgical History:   has a past surgical history that includes Appendectomy; Hysterectomy; Esophagus dilation; Colonoscopy (2017); Endoscopy, colon, diagnostic; pr egd transoral biopsy single/multiple (N/A, 2017); pr colsc flx w/rmvl of tumor polyp lesion snare tq (N/A, 2017); and Upper gastrointestinal endoscopy (2017). Restrictions/Precautions  Restrictions/Precautions: Fall Risk  Required Braces or Orthoses?: No  Implants present? :  (loop recorder)  Position Activity Restriction  Other position/activity restrictions:  L neglect with LUE/LLE deficits, neglect improving. Comments: Pt reported she fell out of bed last night. Attempted to sit up at the EOB and lost her balance and landed on her L side.      Vital Signs  Patient Currently in Pain: Yes       Bed Mobility:   Bed Mobility  Bridging: Minimal assistance (positioning L LE)  Rolling: Rolling Left;Rolling Right;Minimal assistance (Cues for L UE safety/movement, facilitating L LE placement)  Supine to Sit: Minimal assistance (to Pt's R: L LE assist & momentum for trunk)  Sit to Supine: Minimal assistance (L LE assist)  Scooting: Minimal assistance (L UE safety)  Comment: mat table with 2 pillows  Bed mobility  Bridging: Minimal assistance (positioning L LE)  Scooting: Minimal assistance (L UE safety)    Transfers:  Sit to Stand: Minimal Assistance (L UE assist; walker lift)  Stand to sit: Minimal Assistance (L UE assist, improved control today, G reach back c R UE)        Squat Pivot Transfers: Moderate Assistance (Impulsive. Cues/review of set-up, L UE/LE positioning, safety. To Pt's R.)        Ambulation 1  Surface: level tile  Device:  (walker lift)  Other Apparatus: Left;Slider;AFO (wheelchair brought up behind afterward)  Assistance: 2 Person assistance;Maximum assistance (+ 3rd assist to steer walker)  Quality of Gait: Some quadricep contraction noted during amb for L knee extension. Pt is able to initiate L hip flexion, but continues to need assist to fully advance L LE upon fatigue. Fewer cues to increase step length today. Gait Deviations: Decreased step length; Slow Juanita;Decreased step height;Decreased head and trunk rotation  Distance: 100'  Ambulation 2  Surface - 2: level tile  Device 2: Parallel Bars  Other Apparatus 2: AFO; Left  Assistance 2: Moderate assistance  Distance: 6ft fwd/retro x 2  Comments: distance completed twice with seated rest break taken between. Mirror used as visual feedback focusing treatment on posutre maintaining midline and wt shifing to her L. Max verbal and tactile cues. Min quad control, however pt requires assist for LLE placement and TKE for safety. Pt impulsive and moves before ready.      Stairs/Curb  Stairs?: No              Wheelchair Activities  Wheelchair Size: 20\"  Wheelchair Type: Standard  Wheelchair Cushion: Pressure Relieving  Pressure Relief Type: Lateral lean;Self Adjusts  Propulsion: Yes  Propulsion 1  Propulsion: Manual  Level: Level Tile  Method: RUE;RLE  Level of Assistance: Minimal assistance  Description/ Details: Repeated verbal cues to avoid objects, wall, rail on her L (collision into wall, garbage can, and tool box despite cues); fails to scan environment without cues. 90º turns with cues; poor straight path with uncluttered environment. Uncoordinated/sporadic use of UE + LE. Pt very unsafe with no carryover for L neglect. Distance: 172ft    EXERCISES    Other exercises?: Yes  Other exercises 1: Standing tolerance in // bars: Using mirror for feedback working on  L knee extension/quad contraction, stood for 1 minx 1, 25sec x 1  Other exercises 11: Supine bilateral LE exercises, 15 each, 2# R LE, active/assisted/passive ROM L LE  Other exercises 15: Car transfers x3 with son  Other Activities  Comment: rest breaks PRN. Activity Tolerance: Patient limited by endurance  PT Equipment Recommendations  Other: CTA; pending further progress with mobility    Current Treatment Recommendations: Strengthening, Balance Training, Functional Mobility Training, Transfer Training, Endurance Training, Wheelchair Mobility Training, Gait Training, Stair training, Neuromuscular Re-education, Home Exercise Program, Safety Education & Training, Patient/Caregiver Education & Training, Modalities, Positioning, ROM, Equipment Evaluation, Education, & procurement    Conditions Requiring Skilled Therapeutic Intervention  Body structures, Functions, Activity limitations: Decreased functional mobility ; Decreased strength;Decreased safe awareness;Decreased endurance;Decreased balance;Decreased posture;Decreased coordination;Decreased vision/visual deficit; Decreased fine motor control  Assessment: Increased hip flexion noted today during ambulation resulting in fair initiation of swing , particularly if in a good walking rhythm; continues to require total assist for L knee extension. Discussed with daughter, therapy progress, needs 2 person assist for safety for transfers on weak side. Pt will need a ramp at the entrance to enter/exit at home.    Treatment Diagnosis: Weakness, impaired mobility  Prognosis: Good  REQUIRES PT FOLLOW UP: Yes  Discharge Recommendations: Patient would benefit from continued therapy after discharge;Home with assist PRN    Goals  Short term goals  Time Frame for Short term goals: 10 days  Short term goal 1: Pt able to roll in bed side to side at min A without bed rail  Short term goal 2: Pt able to perform supine<>sit at mod A  Short term goal 3: Pt able to perform sit<>stand at min/mod A . Short term goal 4: Pt able to progress to pivot transfers at max A   Short term goal 5: Pt able to improve sitting balance at EOB/EOM at SBA for static balance and able to track to left side as well as turn to head to left side to scan her environment with minimal cues. Short term goal 6: Pt able to tolerate standing in // bars with R UE support and assist at mod A  Short term goal 7: Pt able to propel w/c with R UE/R LE distance of 50 to 80 ft, straight path and turn around 180 degrees, at min/mod A   Long term goals  Time Frame for Long term goals : By DC  Long term goal 1: Pt able to roll in bed mod-I  Long term goal 2: Pt able to perform supine <> sit at CGA/min A  Long term goal 3: Pt able to perform pivot transfers min A / mod A and able to scan Left side environment. Long term goal 4: Pt able to ambulate in // bars, or with appropriate assistive device and/or LE bracing, distance of  20 to 30 ft, min/mod A x 2. Long term goal 5: Pt able to propel w/c on level surfaces distance 100 to 150 ft, SBA/CGA. Long term goal 6: Educate pt/family in safe technique for mobility and  to go up and down steps to enter/exit home. Long term goal 7: Improve sitting balance at EOM to good, and standing balance with R UE support to fair- to reduce fall risk. Long term goal 8: Improve PASS score to 20/36 improve overall function.         05/31/21 0847 05/31/21 1435   PT Individual Minutes   Time In [id number]   Time Out 0950 1420   Minutes 63 35       Electronically signed by Shaniqua Clark PTA on 5/31/21 at 4:56 PM EDT

## 2021-05-31 NOTE — PROGRESS NOTES
[place] [address] Internal Medicine    Progress Note  Chart Reviewed: Yes  Referring Practitioner: Dr Boyce Host  5/31/2021    2:10 PM    Name:   Carlton Dudley  MRN:     [id number]     Acct:      [de-identified]   Room:   [address] [place] [address] Day:  24  Admit Date:  5/10/2021  7:13 AM    PCP:   Torsten Box MD  Code Status:  Full Code    Subjective:     C/C: medical mgmt    Active Problems:    Tobacco abuse    Gastritis    Acute left hemiparesis ([facility] [address]    Acute CVA (cerebrovascular accident) ([facility] [address]    COPD (chronic obstructive pulmonary disease) ([facility] [address]    Essential hypertension    Hyperlipidemia    Pre-diabetes  Resolved Problems:    * No resolved hospital problems. *      Interval History Status: improved. 5/14/21  Patient seen and examined at bedside. No acute overnight events. Afebrile, hemodynamically stable. Continues to work with PT/OT. No acute changes in hemiparesis. No acute complaints. 5/22  Patient is doing much better  No new complaints. Significant last 24 hr data reviewed ;   Vitals:    05/30/21 0834 05/30/21 1718 05/30/21 1942 05/31/21 0745   BP: 135/68 138/71 124/78 132/78   Pulse: 82 96 85 91   Resp: 18  16 20   Temp: 98.5 °F (36.9 °C)  98 °F (36.7 °C) 98 °F (36.7 °C)   TempSrc: Oral  Oral Oral   SpO2: 94%  95% 93%   Weight:       Height:          No results found for this or any previous visit (from the past 24 hour(s)). Recent Labs     05/28/21  2035 05/29/21  0650 05/29/21  1049 05/29/21  1613 05/30/21  0701   POCGLU 148* 118* 131* 112* 124*        No results found.           HPI:   See history in H and P      Review of Systems:     Constitutional:  negative for chills, fevers, sweats  Respiratory:  negative for cough, dyspnea on exertion, hemoptysis, shortness of breath, wheezing  Cardiovascular:  negative for chest pain, chest pressure/discomfort, lower extremity edema, palpitations  Gastrointestinal:  negative for abdominal pain, constipation, diarrhea, nausea, vomiting  Neurological: Positive for left-sided weakness. Negative for dizziness, headache  Data:     Past Medical History:  no change     Social History:  no change    Family History: @no change    Vitals:      I/O (24Hr): No intake or output data in the 24 hours ending 21 1410    Labs:    URINE ANALYSIS: No results found for: LABURIN     CBC:  Lab Results   Component Value Date    WBC 9.8 2021    HGB 13.8 2021     2021        BMP:    Lab Results   Component Value Date     2021    K 4.3 2021     2021    CO2 24 2021    BUN 13 2021    CREATININE 0.48 2021    GLUCOSE 111 2021      LIVER PROFILE:  Lab Results   Component Value Date    ALT 13 10/16/2017    AST 13 10/16/2017    PROT 6.9 10/16/2017    BILITOT 0.22 10/16/2017    BILIDIR 0.11 10/16/2017    LABALBU 4.0 10/16/2017               Radiology:  Medications:      Allergies:      Current Meds:   Scheduled Meds:    FLUoxetine  20 mg Oral Daily    amitriptyline  25 mg Oral Nightly    heparin (porcine)  5,000 Units Subcutaneous 3 times per day    acetaminophen  1,000 mg Oral 3 times per day    amLODIPine  5 mg Oral Daily    aspirin  81 mg Oral Daily    atorvastatin  80 mg Oral Daily    budesonide-formoterol  2 puff Inhalation BID    levothyroxine  25 mcg Oral Daily    nicotine  1 patch Transdermal Daily    tiotropium  2 puff Inhalation Daily    polyethylene glycol  17 g Oral Daily     Continuous Infusions:    sodium chloride       PRN Meds: polyvinyl alcohol, sodium chloride flush, promethazine **OR** ondansetron, sodium chloride, albuterol, dextrose, glucagon (rDNA), glucose, senna, bisacodyl      Physical Examination:        /78   Pulse 91   Temp 98 °F (36.7 °C) (Oral)   Resp 20   Ht 5' 8\" (1.727 m)   Wt 191 lb 8 oz (86.9 kg)   SpO2 93%   BMI 29.12 kg/m²   Temp (24hrs), Av °F (36.7 °C), Min:98 °F (36.7 °C), Max:98 °F (36.7 °C)    Recent Labs 05/29/21  0650 05/29/21  1049 05/29/21  1613 05/30/21  0701   POCGLU 118* 131* 112* 124*     No intake or output data in the 24 hours ending 05/31/21 1410    General Appearance:  alert, well appearing, and in no acute distress  Mental status: oriented to person, place, and time with normal affect  Head:  normocephalic, atraumatic. Eye: no icterus, redness, pupils equal and reactive, extraocular eye movements intact, conjunctiva clear  Ear: normal external ear, no discharge, hearing intact  Nose:  no drainage noted  Mouth: mucous membranes moist  Neck: supple, no carotid bruits, thyroid not palpable  Lungs: Clear to auscultation bilaterally. No accessory muscle use. Cardiovascular: normal rate, regular rhythm, no murmur, gallop, rub. Abdomen: Soft, nontender, nondistended, normal bowel sounds, no hepatomegaly or splenomegaly  Neurologic: Left upper and lower extremity weakness, 0-1/5. No sensory loss. Skin: No gross lesions, rashes, bruising or bleeding on exposed skin area  Extremities:  peripheral pulses palpable, no pedal edema or calf pain with palpation    Assessment:        Primary Problem  <principal problem not specified>    Active Hospital Problems    Diagnosis Date Noted    Acute CVA (cerebrovascular accident) ([facility] [I63.9] 05/10/2021    COPD (chronic obstructive pulmonary disease) ([facility] [J44.9] 05/10/2021    Essential hypertension [I10] 05/10/2021    Hyperlipidemia [E78.5] 05/10/2021    Pre-diabetes [R73.03] 05/10/2021    Acute left hemiparesis ([facility] [G81.94]     Gastritis [K29.70]     Tobacco abuse [Z72.0] 07/16/2017     Plan:        Continue current therapy regimen  BP well controlled. Continue norvasc 5 mg daily  Copd stable.  continue bronchodilators  Synthroid 25 mcg daily   lipitor 80 mg daily  Asa 81 mg daily  Insulin sliding scale-discontinued as blood sugars have been controlled and stable  nicoderm patch  Continue PT/OT  Encourage PO intake      5/31  Patient reports no new

## 2021-05-31 NOTE — PROGRESS NOTES
preparation. Daughter participating in squat pivot and car transfer training, demonstrates good body mechanics, awareness of Pt's body positioning, but Pt can be impulsive, heightening the risk for injury for both parties. Pt needs reinforcement. Ambulation 1  Surface: level tile  Device:  (walker lift)  Other Apparatus: Left, Slider, AFO (wheelchair brought up behind afterward)  Assistance: 2 Person assistance, Maximum assistance (+ 3rd assist to steer walker)  Quality of Gait: Some quadricep contraction noted during amb for L knee extension. Pt is able to initiate L hip flexion, but continues to need assist to fully advance L LE upon fatigue. Fewer cues to increase step length today. Gait Deviations: Decreased step length, Slow Juanita, Decreased step height, Decreased head and trunk rotation  Distance: 100'  Comments: Pt c/o feeling light headed this morning. /89 GAG272% with HR 94bpm post amb    Transfers  Sit to Stand: Minimal Assistance (L UE assist; walker lift/Jacqueline Stedy)  Stand to sit: Minimal Assistance (L UE assist, improved control today, G reach back c R UE)  Bed to Chair: Moderate assistance (squat pivot to Pt's R; level of assist depends on set-up)  Stand Pivot Transfers: Maximum Assistance (hand rail; requires L knee blocking & balance assist)  Squat Pivot Transfers: Moderate Assistance (Impulsive. Cues/review of set-up, L UE/LE positioning, safety. To Pt's R.)  Lateral Transfers: Minimal Assistance  Car Transfer: Maximum Assistance (+ SBA (daughter participating in family training). Education for positioning, safety, sequencing, set-up. Drive side back seat to facilitate transfer to Pt's R; going to Pt's L to exit + education re: sliding over seat to get to other side for exit to R.)  Comment: Safety concerns with squat pivots: Impulsive, tends to attempt with brakes unlocked, L foot poorly positioned, no regard for L UE (using sling PRN).  Attempting to use teach-back techniques to encourage slower pace, more thoughtfulness with preparation. Daughter participating in squat pivot and car transfer training, demonstrates good body mechanics, awareness of Pt's body positioning, but Pt can be impulsive, heightening the risk for injury for both parties. Pt needs reinforcement. Ambulation  Ambulation?: Yes  More Ambulation?: No  Ambulation 1  Surface: level tile  Device:  (walker lift)  Other Apparatus: Left, Slider, AFO (wheelchair brought up behind afterward)  Assistance: 2 Person assistance, Maximum assistance (+ 3rd assist to steer walker)  Quality of Gait: Some quadricep contraction noted during amb for L knee extension. Pt is able to initiate L hip flexion, but continues to need assist to fully advance L LE upon fatigue. Fewer cues to increase step length today. Gait Deviations: Decreased step length, Slow Juanita, Decreased step height, Decreased head and trunk rotation  Distance: 100'  Comments: Pt c/o feeling light headed this morning. /89 HOQ079% with HR 94bpm post amb    Surface: level tile  Ambulation 1  Surface: level tile  Device:  (walker lift)  Other Apparatus: Left, Slider, AFO (wheelchair brought up behind afterward)  Assistance: 2 Person assistance, Maximum assistance (+ 3rd assist to steer walker)  Quality of Gait: Some quadricep contraction noted during amb for L knee extension. Pt is able to initiate L hip flexion, but continues to need assist to fully advance L LE upon fatigue. Fewer cues to increase step length today. Gait Deviations: Decreased step length, Slow Juanita, Decreased step height, Decreased head and trunk rotation  Distance: 100'  Comments: Pt c/o feeling light headed this morning. /89 EFD178% with HR 94bpm post amb    OT:  ADL  Equipment Provided: Reacher  Feeding: Modified independent   Grooming: Setup (set up for wash cloth.  oral care/face washing/hair grooming)  UE Bathing: None (pt declines)  LE Bathing: None (pt declines)  UE Dressing: None tub bench  Shower - Technique: Stand pivot  Shower Transfers: Moderate assistance, Maximal assistance  Shower Transfers Comments: R<>L. Grab bars for support. Wheelchair Bed Transfers  Wheelchair/Bed - Technique: Stand pivot  Equipment Used: Bed, Wheelchair  Level of Asssistance: Maximum assistance (Block L knee)    SPEECH:  Subjective: [x]? Alert     [x]? Cooperative     []? Confused     []? Agitated    []? Lethargic     Objective/Assessment:  Attention:  Min verbal cues needed for increased attention to midline, pt. c extreme L sided neglect. Pt. Completed visual scanning task with  min to mod cues to locate items to the L.  Pt. completed picture incongruities task c 73% accuracy, 100% c cues. Pt. Easily distracted, multiple distractions in room, ST attempting to redirect pt. Attention often.       Recall: n/a       Organization: n/a     Problem Solving/Reasoning:  n/a     Other:  Pt. Completed oral motor exercises x8, 15-25 reps each. Reduced labial ROM noted on L side. Pt. Reports tolerating her diet of soft and bite sized without difficulty. No family present. Objective:  /78   Pulse 91   Temp 98 °F (36.7 °C) (Oral)   Resp 20   Ht 5' 8\" (1.727 m)   Wt 191 lb 8 oz (86.9 kg)   SpO2 93%   BMI 29.12 kg/m²       GEN: Well developed, well nourished, in NAD  HEENT:  NCAT. PERRL. EOMI. Mucous membranes pink and moist.   PULM:  Clear to ausculation. No rales or rhonchi. Respirations WNL and unlabored. CV:  Regular rate rhythm. No murmurs or gallops. GI:  Abdomen soft. Nontender. Non-distended. BS + and equal.    NEUROLOGICAL: A&O x3. Sensation intact to light touch. Stable impaired L CN VII. Persistent L neglect with R gaze preference. MSK:  Functional ROM RUE and RLEs. Impaired AROM LUE and LLE. Excell Carrillo Motor testing 4+/5 key muscles RUE and RLE. L shoulder shrug 1/5, L hip flexion 2/5. Distal LUE and LLE muscles 0/5. No L elbow abnormality palpable or deformity noted.   SKIN: Warm dry and intact. Good turgor. EXTREMITIES:  No calf tenderness to palpation. No edema BLEs. Colleen Brittle PSYCH: Mood depressed. Appropriately interactive. Affect WNL    Diagnostics:     CBC:   No results for input(s): WBC, RBC, HGB, HCT, MCV, RDW, PLT in the last 72 hours. BMP:   No results for input(s): NA, K, CL, CO2, PHOS, BUN, CREATININE, GLUCOSE in the last 72 hours. Invalid input(s): CA  BNP: No results for input(s): BNP in the last 72 hours. PT/INR: No results for input(s): PROTIME, INR in the last 72 hours. APTT: No results for input(s): APTT in the last 72 hours. CARDIAC ENZYMES: No results for input(s): CKMB, CKMBINDEX, TROPONINT in the last 72 hours. Invalid input(s): CKTOTAL;3  FASTING LIPID PANEL:  Lab Results   Component Value Date    CHOL 206 (H) 05/05/2021    HDL 29 (L) 05/05/2021    TRIG 185 (H) 05/05/2021     LIVER PROFILE: No results for input(s): AST, ALT, ALB, BILIDIR, BILITOT, ALKPHOS in the last 72 hours.      Current Medications:   Current Facility-Administered Medications: FLUoxetine (PROZAC) capsule 20 mg, 20 mg, Oral, Daily  amitriptyline (ELAVIL) tablet 25 mg, 25 mg, Oral, Nightly  heparin (porcine) injection 5,000 Units, 5,000 Units, Subcutaneous, 3 times per day  acetaminophen (TYLENOL) tablet 1,000 mg, 1,000 mg, Oral, 3 times per day  polyvinyl alcohol (LIQUIFILM TEARS) 1.4 % ophthalmic solution 1 drop, 1 drop, Both Eyes, PRN  sodium chloride flush 0.9 % injection 5-40 mL, 5-40 mL, Intravenous, PRN  promethazine (PHENERGAN) tablet 12.5 mg, 12.5 mg, Oral, Q6H PRN **OR** ondansetron (ZOFRAN) injection 4 mg, 4 mg, Intravenous, Q6H PRN  0.9 % sodium chloride infusion, 25 mL, Intravenous, PRN  albuterol (PROVENTIL) nebulizer solution 2.5 mg, 2.5 mg, Nebulization, Q4H PRN  amLODIPine (NORVASC) tablet 5 mg, 5 mg, Oral, Daily  aspirin chewable tablet 81 mg, 81 mg, Oral, Daily  atorvastatin (LIPITOR) tablet 80 mg, 80 mg, Oral, Daily  budesonide-formoterol (SYMBICORT) 160-4.5 MCG/ACT inhaler 2 puff, 2 puff, Inhalation, BID  dextrose 50 % IV solution, 12.5 g, Intravenous, PRN  glucagon (rDNA) injection 1 mg, 1 mg, Intramuscular, PRN  glucose (GLUTOSE) 40 % oral gel 15 g, 15 g, Oral, PRN  levothyroxine (SYNTHROID) tablet 25 mcg, 25 mcg, Oral, Daily  nicotine (NICODERM CQ) 14 MG/24HR 1 patch, 1 patch, Transdermal, Daily  tiotropium (SPIRIVA RESPIMAT) 2.5 MCG/ACT inhaler 2 puff, 2 puff, Inhalation, Daily  polyethylene glycol (GLYCOLAX) packet 17 g, 17 g, Oral, Daily  senna (SENOKOT) tablet 17.2 mg, 2 tablet, Oral, Daily PRN  bisacodyl (DULCOLAX) suppository 10 mg, 10 mg, Rectal, Daily PRN      Impression/Plan:   Impaired ADLs, gait, and mobility due to:      1. Ischemic R MCA CVA with hemorrhagic conversion and L non-dominant hemiparesis:  PT/OT for gait, mobility, strengthening, endurance, ADLs, and self care. On ASA, atorvastatin. Sling only to be used when patient is ambulating with therapy. If approved by PT can trial car transfers for brief IVELISSE to attend granddaughter's graduation next week. 2. Headache: Has Tylenol routine TID. Improved on amitriptyline. 3. Falls: s/p Fall 5/16 and 5/30 - attempting to transfer to edge of bed without assistance. No injuries. 4. Dry eyes: has natural tears. 5. Cognitive impairment: SLP treating  6. Dysphagia/aphasia: SLP treating. Diet upgraded. 7. HTN/Hyperlipidemia: on amlodipine  8. DM: on insulin sliding scale  9. COPD/asthma: on albuterol nebulizers prn, on symbicort BID, spiriva  10. Adjustment disorder with depressed mood: Improved. started fluoxetine 5/15  11. Esophageal Ring: Will monitor - on dysphagia diet  12. Hypothyroidism: on levothyroxine  13. Nicotine dependence: on Nicoderm  14. Bowel Management: Miralax daily, senokot prn, dulcolax prn. 15. DVT Prophylaxis:  heparin, SCD's while in bed and MAXIM's   16.  Internal medicine for medical management    Electronically signed by Austen Rodriguez MD on 5/31/2021 at 11:59 AM      This note is created with the assistance of a speech recognition program.  While intending to generate a document that actually reflects the content of the visit, the document can still have some errors including those of syntax and sound a like substitutions which may escape proof reading. In such instances, actual meaning can be extrapolated by contextual diversion.

## 2021-05-31 NOTE — PROGRESS NOTES
[address] [facility]    ACUTE REHABILITATION OCCUPATIONAL THERAPY  DAILY NOTE    Date: 21  Patient Name: Zurdo Matt      Room: 2026/4739-22    MRN: [id number]   : 1955  (63 y.o.)  Gender: female      Diagnosis: ischemic CVA,  subacute right MCA distribution infarct, L lynette, dysphagia, L neglect, loop recorder 21 (Dr. Misty Cruz)       Restrictions  Restrictions/Precautions: Fall Risk  Implants present? :  (loop recorder)  Other position/activity restrictions:  L neglect with LUE/LLE deficits, neglect improving. Required Braces or Orthoses?: No  Equipment Used: Bed, Wheelchair    Subjective  Subjective: pt declined to stand with OT in bathroom in am due to fear of falling, \"There's a lot of things I could hit my head on in here. \"  post ed, continued to decline so OT allowed pt to  nilson stedy, pt tearful. after this pt requesting toileting and did well with SPT w/c to toilet to R with assist of 1 and SBA of 2nd person for safety. when attempting same transfer with aide in pm, pt moved impulsively not in coordination with aide assist and had to be lowered to the floor. Objective  Cognition  Arousal/Alertness: Delayed responses to stimuli  Attention Span: Attends with cues to redirect  Safety Judgement: Decreased awareness of need for safety  Insights: Decreased awareness of deficits  Sequencing and Organization:  (poor)  Cognition  Following Commands: Follows one step commands with increased time  Problem Solving: Decreased awareness of errors;Assistance required to identify errors made;Assistance required to correct errors made;Assistance required to generate solutions;Assistance required to implement solutions (poor)  Cognition Comment: am pt dotenzin valdovinos R shoe indep post set up and ed re elastic shoe laces provided today. pm unable to remember laces change or how to cross RLE and push on heel with RUE.   Perception  Unilateral Attention: Cues to attend left visual Bearing: Forearm seated; Extended arm seated; Extended arm standing  Response To Weight Bearing Technique: pt unable to gayathri extended arm in  am or sit in PM due to pain. pt gayathri on forearm. ADL  Feeding: Modified independent   Grooming: Setup (washcloth provided, then indep w/c to sink, wash face, comb hair, brush teeth.)  UE Bathing: None  LE Bathing: None  UE Dressing: None  LE Dressing: Maximum assistance  Toileting: Moderate assistance  Additional Comments: improved visual scan to L side of tray today (since admit)- indep today. but runs into objects in w/c on L. declines most adls. teds were donned. and TA don L shoe and brace, post ed pt doff, don R shoe indep post set up and ed re elastic shoe laces provided today. Assessment     Activity Tolerance: Treatment limited secondary to agitation  Activity Tolerance: pt upset and declining adls, max encouragement resulted in some adls done. Type of devices: All fall risk precautions in place;Gait belt;Patient at risk for falls;Nurse notified;Call light within reach; Chair alarm in place; Left in bed (yesterday unwitnessed fall out of bed when pt attempt sit EOB. today aide assist to floor after failed SPT to R to toilet with A x 1 and pt moved when aide not ready)            05/31/21 1915 05/31/21 1919   OT Individual Minutes   Time In 1041 1278   Time Out 3129 1247   Minutes 67 31       Patient Education:  Patient Goals   Patient goals : \"get as close to normal as possible. \"  specifies:\"get in and out of bed on my own, shower by myself. \"  Learner:family  Method: demonstration and explanation       Outcome: acknowledged understanding   pt present also but unable to remember ed in pm  OT Education  OT Education: Equipment;ADL Adaptive Strategies; Family Education  Patient Education: son, Kye 83 present and observed pt doff, don R shoe indep post set up and ed re elastic shoe laces provided today.   Barriers to Learning: memory deficits    Plan  Plan  Times per week: 5-7  Times per day: Twice a day  Current Treatment Recommendations: Strengthening, Positioning, ROM, Safety Education & Training, Balance Training, Patient/Caregiver Education & Training, Self-Care / ADL, Cognitive/Perceptual Training, Functional Mobility Training, Neuromuscular Re-education, Equipment Evaluation, Education, & procurement, Endurance Training, Cognitive Reorientation  Patient Goals   Patient goals : \"get as close to normal as possible. \"  specifies:\"get in and out of bed on my own, shower by myself. \"  Short term goals  Time Frame for Short term goals: 1 week  Short term goal 1: mod A UE bathe and dress  Short term goal 2: set up brush teeth (goal met)  Short term goal 3: min feeding, with cues able to locate L side of tray and scan to locate items on L. Short term goal 4: tolerate 6-8 min sitting edge of bed (static sit)  with min assist and RUE support, head at midline and fair safety awareness  Short term goal 5: pt to initiate reposition LUE with RUE with good safety ie. hold by wrist not by 1 finger  Short term goal 6: demo improved awareness of L side and neuromuscular christian by weight bearing on LUE with physical assist to complete  Short term goal 7: from w/c:  consistently maintain midline trunk control and demo able to lean forward 4 inches away from back of chair without physical assist for adls. Short term goal 8: tolerate 2 min static stand for adls with RUE support with mod of 2 and assist with LLE as needed. Long term goals  Time Frame for Long term goals : by discharge  Long term goal 1: set up and occasional verbal cues ie.  L visual scan for self feeding  Long term goal 2: set up oral care (goal met)  Long term goal 3: max x 1 toileting and adl transfers  Long term goal 4: min UE bathe and dress (shirt)  Long term goal 5: max x 1 LE bathe and dress, able to cross to reach RLE with min assist and keep trunk balance       Electronically signed by

## 2021-05-31 NOTE — PROGRESS NOTES
Speech Language Pathology  Speech Language Pathology  [facility] Acute Rehab Unit at [facility]    Dysphagia/SpeechLanguage Treatment Note    Date: 5/31/2021  Patients Name: Omar Deluna  MRN: [id number]  Diagnosis:   Patient Active Problem List   Diagnosis Code    Pneumonia of left lower lobe due to infectious organism J18.9    Tobacco abuse Z72.0    Legionella pneumonia ([facility] [address] A48.1    Dysphagia R13.10    Hyperplastic colon polyp K63.5    Esophageal ring K22.2    Gastritis K29.70    Elbow effusion, right M25.421    Stroke due to occlusion of right middle cerebral artery ([facility]) I63.511    Nihss score 15 R29.715    Acute left hemiparesis ([facility]) G81.94    Cerebrovascular accident (CVA) due to occlusion of right middle cerebral artery ([facility] [address] I63.511    Acute CVA (cerebrovascular accident) ([facility] [address] I63.9    COPD (chronic obstructive pulmonary disease) ([facility]) J44.9    Essential hypertension I10    Hyperlipidemia E78.5    Pre-diabetes R73.03       Pain: 0/10    Speech/Cognitive/Dysphagia Treatment    Treatment time:  8079-1718    Subjective: [x] Alert [x] Cooperative     [] Confused     [] Agitated    [] Lethargic    Objective/Assessment:  Attention:  Min verbal cues needed for increased attention to midline, pt. c extreme L sided neglect. Pt. Completed visual scanning task with  min to mod cues to locate items to the L.  Pt. completed picture incongruities task c 73% accuracy, 100% c cues. Pt. Easily distracted, multiple distractions in room, ST attempting to redirect pt. Attention often. Recall: n/a      Organization: n/a    Problem Solving/Reasoning:  n/a    Other:  Pt. Completed oral motor exercises x8, 15-25 reps each. Reduced labial ROM noted on L side. Pt. Reports tolerating her diet of soft and bite sized without difficulty. No family present.      Plan:  [x] Continue ST services    [] Discharge from ST:      Discharge recommendations: [] Inpatient Rehab   [] Skilled Nursing Facility   [] Outpatient Therapy  [] Follow up at trauma clinic   [] Other:       Treatment completed by:  Zane Elias M.A., CCC-SLP

## 2021-06-01 LAB
-: NORMAL
ANION GAP SERPL CALCULATED.3IONS-SCNC: 8 MMOL/L (ref 9–17)
BUN BLDV-MCNC: 18 MG/DL (ref 8–23)
BUN/CREAT BLD: ABNORMAL (ref 9–20)
CALCIUM SERPL-MCNC: 9.4 MG/DL (ref 8.6–10.4)
CHLORIDE BLD-SCNC: 105 MMOL/L (ref 98–107)
CO2: 30 MMOL/L (ref 20–31)
CREAT SERPL-MCNC: 0.54 MG/DL (ref 0.5–0.9)
GFR AFRICAN AMERICAN: >60 ML/MIN
GFR NON-AFRICAN AMERICAN: >60 ML/MIN
GFR SERPL CREATININE-BSD FRML MDRD: ABNORMAL ML/MIN/{1.73_M2}
GFR SERPL CREATININE-BSD FRML MDRD: ABNORMAL ML/MIN/{1.73_M2}
GLUCOSE BLD-MCNC: 97 MG/DL (ref 70–99)
HCT VFR BLD CALC: 40.9 % (ref 36–46)
HEMOGLOBIN: 13.3 G/DL (ref 12–16)
MCH RBC QN AUTO: 30.3 PG (ref 26–34)
MCHC RBC AUTO-ENTMCNC: 32.6 G/DL (ref 31–37)
MCV RBC AUTO: 92.7 FL (ref 80–100)
NRBC AUTOMATED: ABNORMAL PER 100 WBC
PDW BLD-RTO: 15.2 % (ref 11.5–14.9)
PLATELET # BLD: 285 K/UL (ref 150–450)
PMV BLD AUTO: 7 FL (ref 6–12)
POTASSIUM SERPL-SCNC: 3.8 MMOL/L (ref 3.7–5.3)
RBC # BLD: 4.41 M/UL (ref 4–5.2)
REASON FOR REJECTION: NORMAL
SODIUM BLD-SCNC: 143 MMOL/L (ref 135–144)
WBC # BLD: 10 K/UL (ref 3.5–11)
ZZ NTE CLEAN UP: ORDERED TEST: NORMAL
ZZ NTE WITH NAME CLEAN UP: SPECIMEN SOURCE: NORMAL

## 2021-06-01 PROCEDURE — 6370000000 HC RX 637 (ALT 250 FOR IP): Performed by: STUDENT IN AN ORGANIZED HEALTH CARE EDUCATION/TRAINING PROGRAM

## 2021-06-01 PROCEDURE — 92526 ORAL FUNCTION THERAPY: CPT

## 2021-06-01 PROCEDURE — 85027 COMPLETE CBC AUTOMATED: CPT

## 2021-06-01 PROCEDURE — 97530 THERAPEUTIC ACTIVITIES: CPT

## 2021-06-01 PROCEDURE — 99231 SBSQ HOSP IP/OBS SF/LOW 25: CPT | Performed by: INTERNAL MEDICINE

## 2021-06-01 PROCEDURE — 6370000000 HC RX 637 (ALT 250 FOR IP): Performed by: PHYSICAL MEDICINE & REHABILITATION

## 2021-06-01 PROCEDURE — 6360000002 HC RX W HCPCS: Performed by: PHYSICAL MEDICINE & REHABILITATION

## 2021-06-01 PROCEDURE — 1180000000 HC REHAB R&B

## 2021-06-01 PROCEDURE — 97110 THERAPEUTIC EXERCISES: CPT

## 2021-06-01 PROCEDURE — 80048 BASIC METABOLIC PNL TOTAL CA: CPT

## 2021-06-01 PROCEDURE — 97116 GAIT TRAINING THERAPY: CPT

## 2021-06-01 PROCEDURE — 92507 TX SP LANG VOICE COMM INDIV: CPT

## 2021-06-01 PROCEDURE — 97112 NEUROMUSCULAR REEDUCATION: CPT

## 2021-06-01 PROCEDURE — 97535 SELF CARE MNGMENT TRAINING: CPT

## 2021-06-01 PROCEDURE — 99232 SBSQ HOSP IP/OBS MODERATE 35: CPT | Performed by: PHYSICAL MEDICINE & REHABILITATION

## 2021-06-01 PROCEDURE — 36415 COLL VENOUS BLD VENIPUNCTURE: CPT

## 2021-06-01 RX ADMIN — FLUOXETINE HYDROCHLORIDE 20 MG: 20 CAPSULE ORAL at 07:38

## 2021-06-01 RX ADMIN — LEVOTHYROXINE SODIUM 25 MCG: 0.03 TABLET ORAL at 06:09

## 2021-06-01 RX ADMIN — Medication 2 PUFF: at 07:44

## 2021-06-01 RX ADMIN — AMLODIPINE BESYLATE 5 MG: 5 TABLET ORAL at 07:39

## 2021-06-01 RX ADMIN — ACETAMINOPHEN 1000 MG: 500 TABLET, FILM COATED ORAL at 21:31

## 2021-06-01 RX ADMIN — Medication 2 PUFF: at 21:31

## 2021-06-01 RX ADMIN — ASPIRIN 81 MG CHEWABLE TABLET 81 MG: 81 TABLET CHEWABLE at 07:38

## 2021-06-01 RX ADMIN — ACETAMINOPHEN 1000 MG: 500 TABLET, FILM COATED ORAL at 06:09

## 2021-06-01 RX ADMIN — ACETAMINOPHEN 1000 MG: 500 TABLET, FILM COATED ORAL at 14:30

## 2021-06-01 RX ADMIN — ATORVASTATIN CALCIUM 80 MG: 80 TABLET, FILM COATED ORAL at 07:38

## 2021-06-01 RX ADMIN — HEPARIN SODIUM 5000 UNITS: 5000 INJECTION INTRAVENOUS; SUBCUTANEOUS at 21:31

## 2021-06-01 RX ADMIN — HEPARIN SODIUM 5000 UNITS: 5000 INJECTION INTRAVENOUS; SUBCUTANEOUS at 14:30

## 2021-06-01 RX ADMIN — AMITRIPTYLINE HYDROCHLORIDE 25 MG: 25 TABLET, FILM COATED ORAL at 21:34

## 2021-06-01 RX ADMIN — Medication 2 PUFF: at 07:43

## 2021-06-01 ASSESSMENT — PAIN SCALES - GENERAL
PAINLEVEL_OUTOF10: 2
PAINLEVEL_OUTOF10: 0
PAINLEVEL_OUTOF10: 1
PAINLEVEL_OUTOF10: 0

## 2021-06-01 ASSESSMENT — PAIN DESCRIPTION - LOCATION: LOCATION: HIP;ARM

## 2021-06-01 ASSESSMENT — PAIN DESCRIPTION - PAIN TYPE: TYPE: ACUTE PAIN

## 2021-06-01 ASSESSMENT — PAIN DESCRIPTION - ORIENTATION: ORIENTATION: LEFT

## 2021-06-01 ASSESSMENT — PAIN SCALES - WONG BAKER: WONGBAKER_NUMERICALRESPONSE: 4

## 2021-06-01 NOTE — PROGRESS NOTES
[address] [facility]    ACUTE REHABILITATION OCCUPATIONAL THERAPY  DAILY NOTE    Date: 21  Patient Name: Racquel Stanford      Room: 7085/8025-07    MRN: [id number]   : 1955  (63 y.o.)  Gender: female      Diagnosis: ischemic CVA,  subacute right MCA distribution infarct, L lynette, dysphagia, L neglect, loop recorder 21 (Dr. Eric Hudson)  Additional Pertinent Hx: 77-year-old female who was found down, last known well approximately 21 hours before. Patient was found to have a right MCA M1 occlusion. Thrombectomy was not successful, MRI showed patient had a right MCA stroke with hemorrhagic conversion. 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 :  (loop recorder)  Other position/activity restrictions:  L neglect with LUE/LLE deficits, neglect improving. Required Braces or Orthoses?: No  Equipment Used: Bed, Wheelchair    Subjective  Subjective: \"this thing is worthless\" pt reports regarding reacher. Comments: Pt cooperative, encouragement throughout for increased independence. Patient Currently in Pain: Denies  Restrictions/Precautions: Fall Risk  Overall Orientation Status: Within Functional Limits          Objective  Cognition  Overall Cognitive Status: Impaired  Arousal/Alertness: Delayed responses to stimuli  Following Directions: Follows one step commands  Attention Span: Attends with cues to redirect  Safety Judgement: Decreased awareness of need for safety  Insights: Decreased awareness of deficits  Sequencing and Organization: Assistance required to generate solutions  Perception  Overall Perceptual Status: Impaired  Balance  Sitting Balance: Contact guard assistance (SBA-CGA with dynamic sitting)  Standing Balance: Minimal assistance  Bed mobility  Supine to Sit: Minimal assistance  Transfers  Stand Pivot Transfers: Moderate assistance (towards R side. )  Sit to stand: Minimal assistance  Stand to sit: Minimal assistance; Moderate assistance (for controlled sit. )  Transfer Comments: Pt less impulsive with trasnsfers this date. Standing Balance  Time: <1 min x4, ~1 min x 2   Activity: functional transfers, toileting hygiene/LB dressing. Comment: RUE support, pt declines to remove   Toilet Transfers  Toilet - Technique: Stand pivot  Equipment Used: Grab bars  Toilet Transfer: Minimal assistance; Moderate assistance (towards R<>L )  Toilet Transfers Comments: Pt required additional cues/education for safety as pt requesting privacy while attempting hygigne post BM. Due to unsafe dynamic sitting balance, pt requires SBA-CGA assist.   Shower Transfers  Shower - Transfer From: Wheelchair  Shower - Transfer Type: To and From  Shower - Transfer To: Transfer tub bench  Shower - Technique: Stand pivot  Shower Transfers: Moderate assistance  Shower Transfers Comments: R<>L. Grab bars for support. Upper Extremity Function  NDT Treatment: Upper extremity              Neuromuscular Education  Neuromuscular education: Yes  NDT Treatment: Upper extremity  Vibration: L tricep/bicep, forearm and wrist; no noted response. Other: LUE placed on tabletop skateboard for ROM faciliatation during vibration. ADL  Equipment Provided: Reacher;Long-handled sponge  Feeding: Modified independent   Grooming: Setup  UE Bathing: Minimal assistance;Verbal cueing (A to wash RUE, VC's to address as able. )  LE Bathing: Minimal assistance;Verbal cueing (Assist to wash buttocks w/ side lean tech using GB for support)  UE Dressing: Minimal assistance (Assist to thread LUE using lynette technique. )  LE Dressing: Maximum assistance;Verbal cueing  Toileting: Moderate assistance;Maximum assistance (pt declines to A to pull up due to discomfort in removing UE)  Additional Comments: Cues to increase independence as able with various techniques.            Assessment  Performance deficits / Impairments: Decreased functional mobility ; Decreased ADL status; Decreased strength;Decreased endurance;Decreased balance;Decreased high-level IADLs;Decreased vision/visual deficit; Decreased cognition;Decreased safe awareness;Decreased ROM; Decreased fine motor control;Decreased coordination;Decreased posture;Decreased sensation  Prognosis: Good  Discharge Recommendations: Patient would benefit from continued therapy after discharge  Activity Tolerance: Treatment limited secondary to agitation  Safety Devices in place: Yes  Type of devices: All fall risk precautions in place;Gait belt;Patient at risk for falls; Left in chair (PT gym )  Equipment Recommendations  Equipment Needed:  (TBD)        Plan  Plan  Times per week: 5-7  Times per day: Twice a day  Current Treatment Recommendations: Strengthening, Positioning, ROM, Safety Education & Training, Balance Training, Patient/Caregiver Education & Training, Self-Care / ADL, Cognitive/Perceptual Training, Functional Mobility Training, Neuromuscular Re-education, Equipment Evaluation, Education, & procurement, Endurance Training, Cognitive Reorientation  Patient Goals   Patient goals : \"get as close to normal as possible. \"  specifies:\"get in and out of bed on my own, shower by myself. \"  Short term goals  Time Frame for Short term goals: 1 week  Short term goal 1: mod A UE bathe and dress  Short term goal 2: set up brush teeth (goal met)  Short term goal 3: min feeding, with cues able to locate L side of tray and scan to locate items on L.   Short term goal 4: tolerate 6-8 min sitting edge of bed (static sit)  with min assist and RUE support, head at midline and fair safety awareness  Short term goal 5: pt to initiate reposition LUE with RUE with good safety ie. hold by wrist not by 1 finger  Short term goal 6: demo improved awareness of L side and neuromuscular christian by weight bearing on LUE with physical assist to complete  Short term goal 7: from w/c:  consistently maintain midline trunk control and demo able to lean forward 4 inches away from back of chair without physical assist for adls. Short term goal 8: tolerate 2 min static stand for adls with RUE support with mod of 2 and assist with LLE as needed. Long term goals  Time Frame for Long term goals : by discharge  Long term goal 1: set up and occasional verbal cues ie.  L visual scan for self feeding  Long term goal 2: set up oral care (goal met)  Long term goal 3: max x 1 toileting and adl transfers  Long term goal 4: min UE bathe and dress (shirt)  Long term goal 5: max x 1 LE bathe and dress, able to cross to reach RLE with min assist and keep trunk balance        06/01/21 0852 06/01/21 1308   OT Individual Minutes   Time In [facility] 4   Time Out 0833 1302   Minutes 48 27     Electronically signed by ERIN Elizalde on 6/1/21 at 3:46 PM EDT

## 2021-06-01 NOTE — PROGRESS NOTES
Speech Language Pathology  Speech Language Pathology  [facility] Acute Rehab Unit at [address]     Dysphagia/SpeechLanguage Treatment Note    Date: 6/1/2021  Patients Name: Devora Montoya  MRN: [id number]  Diagnosis:   Patient Active Problem List   Diagnosis Code    Pneumonia of left lower lobe due to infectious organism J18.9    Tobacco abuse Z72.0    Legionella pneumonia ([facility] [address] A48.1    Dysphagia R13.10    Hyperplastic colon polyp K63.5    Esophageal ring K22.2    Gastritis K29.70    Elbow effusion, right M25.421    Stroke due to occlusion of right middle cerebral artery ([facility]) I63.511    Nihss score 15 R29.715    Acute left hemiparesis ([facility]) G81.94    Cerebrovascular accident (CVA) due to occlusion of right middle cerebral artery ([facility] [address] I63.511    Acute CVA (cerebrovascular accident) ([facility] [address] I63.9    COPD (chronic obstructive pulmonary disease) ([facility]) J44.9    Essential hypertension I10    Hyperlipidemia E78.5    Pre-diabetes R73.03       Pain: 0/10    Speech/Cognitive/Dysphagia Treatment    Treatment time:  4156-8721    Subjective: [x] Alert [x] Cooperative     [] Confused     [] Agitated    [] Lethargic    Objective/Assessment:  Attention:  Min verbal cues needed for increased attention to midline, pt. c extreme L sided neglect. Pt. Able to answer questions re: sign she is looking at with max cues to look to L,, script label she is looking at with mod to max cues to look to L. Pt. Easily distracted with distractions in room, ST attempting to redirect pt. Attention often. Recall: n/a      Organization: n/a    Problem Solving/Reasoning:  n/a    Other:  Pt. Completed oral motor exercises x3, 20  reps each. Reduced labial ROM noted on L side. One channel NMES was used for L side oral sling at 6.5 mA, 21 mins. Pt. Reports tolerating her diet of soft and bite sized without difficulty. No family present.      Plan:  [x] Continue ST services    [] Discharge from ST:      Discharge

## 2021-06-01 NOTE — PROGRESS NOTES
Delivery:  Continue Current Diet, Continue Oral Nutrition Supplement  Nutrition Education/Counseling:  No recommendation at this time   Coordination of Nutrition Care:  Continue to monitor while inpatient    Goals:  PO intake % of meals and supplements       Nutrition Monitoring and Evaluation:   Behavioral-Environmental Outcomes:  None Identified   Food/Nutrient Intake Outcomes:  Food and Nutrient Intake, Supplement Intake  Physical Signs/Symptoms Outcomes:  Biochemical Data, GI Status, Fluid Status or Edema, Weight, Skin     Discharge Planning:     Too soon to determine     Electronically signed by Malina Adkins on 6/1/21 at 3:04 PM EDT

## 2021-06-01 NOTE — PROGRESS NOTES
None    Bed Mobility   Bridging: Minimal assistance (positioning L LE)  Rolling: Minimal assistance;Rolling Left;Rolling Right (L UE in sling 2º c/o pain; less assist to roll L)  Supine to Sit: Moderate assistance (to Pt's L: L LE & trunk assist)  Sit to Supine: Minimal assistance (L LE assist. Pt adjusts trunk for comfort)  Scooting: Minimal assistance (to scoot back in chair, squaring hips)  Comment: Continues c/o dizziness when moving from supine to seated. Transfers  Sit to Stand: Contact guard assistance (L UE assist; walker lift)  Stand to sit: Contact guard assistance (L UE assist, improved control today, G reach back c R UE)  Bed to Chair: Moderate assistance (Squat pivot to R, daughter hands-on assist, writer JENN)   Stand pivot transfers: Maximum Assistance (Daughter hands-on; inadequate communication re: technique.)  Squat Pivot Transfers: Moderate Assistance (Impulsive. Cues/review of set-up, L UE/LE positioning, safety. To Pt's R.)  Comment: Family training with transfers continuing. Pt impulsive & inconsistent with safety & L-sided awareness. Daughter asks for Pt's verbal cues regarding readiness with inadequate/unsafe response from Pt. Ambulation 1  Surface: level tile  Device:  (walker lift)  Other Apparatus: Left;Slider;AFO (wheelchair brought up behind afterward)  Assistance: 2 Person assistance;Maximum assistance (+ 3rd assist to steer walker)  Quality of Gait: Some quadricep contraction noted during amb for L knee extension. Pt is able to initiate L hip flexion, but continues to need assist to fully advance L LE upon fatigue. Fewer cues to increase step length today. Gait Deviations: Decreased step length; Slow Juanita;Decreased step height;Decreased head and trunk rotation  Distance: 36' & 30'  Comments: Seated rest between distances.       Stairs/Curb  Stairs?: Yes  Stairs  # Steps : 3  Stairs Height: 4\"  Rails: Right ascending (L UE tucked into gait belt (sling left in room))  Other Apparatus: Left;AFO  Assistance: Moderate assistance;2 Person assistance  Comment: Education and demonstration for sequencing. Mod A to place L foot fully on step ascending & initiate descent with L foot. Pt follows cues for sequencing and direction for turning. Wheelchair Activities  Wheelchair Size: 20\"  Wheelchair Type: Standard  Wheelchair Cushion: Pressure Relieving  Pressure Relief Type: Lateral lean;Self Adjusts  Propulsion: Yes  Propulsion 1  Propulsion: Manual  Level: Level Tile  Method: RUE;RLE  Level of Assistance: Moderate assistance  Description/ Details: Repeated verbal cues to avoid objects, people, wall, rail on her L without return: runs over writer's toe, hits wall repeatedly, runs L armrest into door despite cues. No environment scanning without cues. 90º turn. Pt very unsafe with no carryover for L neglect. Distance: 100'    Neuromuscular Education  Vibration: L dorsiflexors, no functional response  Functional Movement Patterns: Estim to L quadriceps & dorsiflexors, no functional response today; fasiculations noted at electrode pads. Balance   Posture: Fair  Sitting - Static: Fair (Edge of mat, R UE support, no back support)  Sitting - Dynamic: Fair (Edge of mat, able to return to midline alignment c UE/abd.)  Standing - Static: Fair (walker lift/2 assist at stairs)  Standing - Dynamic: Poor; - (walker lift)     Exercises   Other exercises?: Yes  Other exercises 9: Squat pivot transfers x1  Other exercises 11: Supine bilateral LE exercises, 15 each, 2# R LE, active/assisted/passive ROM L LE (Estim attempted at quads, DF, no response; vib. to DF, 0 res)  Other exercises 16: Stand pivot transfers x1    Activity Tolerance: Patient limited by endurance (Rest breaks PRN)     PT Equipment Recommendations  Other: CTA; pending further progress with mobility    Current Treatment Recommendations: Strengthening, Balance Training, Functional Mobility Training, Transfer Training, Endurance Training, Wheelchair Mobility Training, Gait Training, Stair training, Neuromuscular Re-education, Home Exercise Program, Safety Education & Training, Patient/Caregiver Education & Training, Modalities, Positioning, ROM, Equipment Evaluation, Education, & procurement    Conditions Requiring Skilled Therapeutic Intervention  Body structures, Functions, Activity limitations: Decreased functional mobility ; Decreased strength;Decreased safe awareness;Decreased endurance;Decreased balance;Decreased posture;Decreased coordination;Decreased vision/visual deficit; Decreased fine motor control  Assessment: Fatigue limiting Pt today, with increased L neglect & environment scanning, decreased ambulation distance, response to e-stim/vibration  REQUIRES PT FOLLOW UP: Yes  Discharge Recommendations: Patient would benefit from continued therapy after discharge;Home with assist PRN    Goals  Short term goals  Time Frame for Short term goals: 10 days  Short term goal 1: Pt able to roll in bed side to side at min A without bed rail  Short term goal 2: Pt able to perform supine<>sit at mod A  Short term goal 3: Pt able to perform sit<>stand at min/mod A . Short term goal 4: Pt able to progress to pivot transfers at max A   Short term goal 5: Pt able to improve sitting balance at EOB/EOM at SBA for static balance and able to track to left side as well as turn to head to left side to scan her environment with minimal cues. Short term goal 6: Pt able to tolerate standing in // bars with R UE support and assist at mod A  Short term goal 7: Pt able to propel w/c with R UE/R LE distance of 50 to 80 ft, straight path and turn around 180 degrees, at min/mod A   Long term goals  Time Frame for Long term goals : By DC  Long term goal 1: Pt able to roll in bed mod-I  Long term goal 2: Pt able to perform supine <> sit at CGA/min A  Long term goal 3: Pt able to perform pivot transfers min A / mod A and able to scan Left side environment.   Long term goal 4: Pt able to ambulate in // bars, or with appropriate assistive device and/or LE bracing, distance of  20 to 30 ft, min/mod A x 2. Long term goal 5: Pt able to propel w/c on level surfaces distance 100 to 150 ft, SBA/CGA. Long term goal 6: Educate pt/family in safe technique for mobility and  to go up and down steps to enter/exit home. Long term goal 7: Improve sitting balance at EOM to good, and standing balance with R UE support to fair- to reduce fall risk. Long term goal 8: Improve PASS score to 20/36 improve overall function.       06/01/21 0951 06/01/21 1339   PT Individual Minutes   Time In 0840 1339   Time Out 2771 0859   Minutes 40 49   PT Concurrent Minutes   Time In 1888  --    Time Out 0940  --    Minutes 15  --      Electronically signed by Lyanne Sandhoff, PTA on 6/1/21 at 3:23 PM EDT

## 2021-06-01 NOTE — PROGRESS NOTES
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 Patient is doing well again today. She denies any injuries or new pain after fall in bathroom yesterday. Discussed falls with her and her daughter. No new issues with pain, headache, sleep, appetite, bowel, or bladder. ROS:  Denies fevers, chills, sweats. No chest pain, palpitations, lightheadedness. Denies coughing, wheezing or shortness of breath. Denies abdominal pain, nausea, diarrhea or constipation. No new areas of joint pain. Denies new areas of numbness or weakness. Denies new anxiety  issues. No new skin problems. Rehabilitation:   Progressing in therapies. PT:  Restrictions/Precautions: Fall Risk  Implants present? :  (loop recorder)  Other position/activity restrictions:  L neglect with LUE/LLE deficits, neglect improving. Transfers  Sit to Stand: Minimal Assistance (L UE assist; walker lift)  Stand to sit: Minimal Assistance (L UE assist, improved control today, G reach back c R UE)  Bed to Chair: Moderate assistance (squat pivot to Pt's R; level of assist depends on set-up)  Stand Pivot Transfers: Maximum Assistance (hand rail; requires L knee blocking & balance assist)  Squat Pivot Transfers: Moderate Assistance (Impulsive. Cues/review of set-up, L UE/LE positioning, safety. To Pt's R.)  Lateral Transfers: Minimal Assistance  Car Transfer: Maximum Assistance (+ SBA (son participating in family training). Education for positioning, safety, sequencing, set-up. Drive side back seat to facilitate transfer to Pt's R; going to Pt's L to exit. )  Comment: Car transfer also completed attempting to get in on front passenger side to pts L to allow for more LE room. Pt then exited car to her R. Pt son reported he felt more comfortable getting her in/out in the back seat behind the .  Car door gave her more L side support as well once in and door was shut. Safety concerns due to pt being Impulsive, tends to attempt with brakes unlocked, L foot poorly positioned, no regard for L UE (using sling PRN). Attempting to use teach-back techniques to encourage slower pace, more thoughtfulness with preparation. Son participating in squat pivot and car transfer training, demonstrates good body mechanics, awareness of Pt's body positioning, but Pt can be impulsive, heightening the risk for injury for both parties. Pt needs reinforcement. Ambulation 1  Surface: level tile  Device:  (walker lift)  Other Apparatus: Left, Slider, AFO (wheelchair brought up behind afterward)  Assistance: 2 Person assistance, Maximum assistance (+ 3rd assist to steer walker)  Quality of Gait: Some quadricep contraction noted during amb for L knee extension. Pt is able to initiate L hip flexion, but continues to need assist to fully advance L LE upon fatigue. Fewer cues to increase step length today. Gait Deviations: Decreased step length, Slow Juanita, Decreased step height, Decreased head and trunk rotation  Distance: 100'  Comments: Pt c/o feeling light headed this morning. /89 [id number]% with HR 94bpm post amb    Transfers  Sit to Stand: Minimal Assistance (L UE assist; walker lift)  Stand to sit: Minimal Assistance (L UE assist, improved control today, G reach back c R UE)  Bed to Chair: Moderate assistance (squat pivot to Pt's R; level of assist depends on set-up)  Stand Pivot Transfers: Maximum Assistance (hand rail; requires L knee blocking & balance assist)  Squat Pivot Transfers: Moderate Assistance (Impulsive. Cues/review of set-up, L UE/LE positioning, safety. To Pt's R.)  Lateral Transfers: Minimal Assistance  Car Transfer: Maximum Assistance (+ SBA (son participating in family training). Education for positioning, safety, sequencing, set-up.  Drive side back seat to facilitate transfer to Pt's R; going to Pt's L to exit. )  Comment: Car transfer also completed attempting to get in on front passenger side to pts L to allow for more LE room. Pt then exited car to her R. Pt son reported he felt more comfortable getting her in/out in the back seat behind the . Car door gave her more L side support as well once in and door was shut. Safety concerns due to pt being Impulsive, tends to attempt with brakes unlocked, L foot poorly positioned, no regard for L UE (using sling PRN). Attempting to use teach-back techniques to encourage slower pace, more thoughtfulness with preparation. Son participating in squat pivot and car transfer training, demonstrates good body mechanics, awareness of Pt's body positioning, but Pt can be impulsive, heightening the risk for injury for both parties. Pt needs reinforcement. Ambulation  Ambulation?: Yes  More Ambulation?: No  Ambulation 1  Surface: level tile  Device:  (walker lift)  Other Apparatus: Left, Slider, AFO (wheelchair brought up behind afterward)  Assistance: 2 Person assistance, Maximum assistance (+ 3rd assist to steer walker)  Quality of Gait: Some quadricep contraction noted during amb for L knee extension. Pt is able to initiate L hip flexion, but continues to need assist to fully advance L LE upon fatigue. Fewer cues to increase step length today. Gait Deviations: Decreased step length, Slow Juanita, Decreased step height, Decreased head and trunk rotation  Distance: 100'  Comments: Pt c/o feeling light headed this morning. /89 [id number]% with HR 94bpm post amb    Surface: level tile  Ambulation 1  Surface: level tile  Device:  (walker lift)  Other Apparatus: Left, Slider, AFO (wheelchair brought up behind afterward)  Assistance: 2 Person assistance, Maximum assistance (+ 3rd assist to steer walker)  Quality of Gait: Some quadricep contraction noted during amb for L knee extension. Pt is able to initiate L hip flexion, but continues to need assist to fully advance L LE upon fatigue.  Fewer cues to increase step length today.   Gait Deviations: Decreased step length, Slow Juanita, Decreased step height, Decreased head and trunk rotation  Distance: 100'  Comments: Pt c/o feeling light headed this morning. /89 HPL897% with HR 94bpm post amb    OT:  ADL  Equipment Provided: Reacher  Feeding: Modified independent   Grooming: Setup (washcloth provided, then indep w/c to sink, wash face, comb hair, brush teeth.)  UE Bathing: None  LE Bathing: None  UE Dressing: None  LE Dressing: Maximum assistance  Toileting: Moderate assistance  Additional Comments: improved visual scan to L side of tray today (since admit)- indep today. but runs into objects in w/c on L. declines most adls. teds were donned. and TA don L shoe and brace, post ed pt doff, don R shoe indep post set up and ed re elastic shoe laces provided today. Balance  Sitting Balance: Contact guard assistance (SBA-CGA with dynamic sitting)  Standing Balance: Minimal assistance   Standing Balance  Time: <1 min x4, ~1 min x 2   Activity: functional transfers, toileting hygiene/LB dressing. Comment: RUE support, pt declines to remove   Functional Mobility  Functional - Mobility Device: Wheelchair  Activity: To/from bathroom  Assist Level: Moderate assistance  Functional Mobility Comments: unable to go straight with use of RUE and RLE and LLE on foot rest, also runs into items on L     Bed mobility  Bridging: Minimal assistance (positioning L LE)  Rolling to Left: Minimal assistance  Rolling to Right: Maximum assistance  Supine to Sit: Minimal assistance (HOB elevated, use of bed rail. Per RN report. )  Sit to Supine: Moderate assistance (Assist with BLEs over EOB. )  Scooting: Minimal assistance (L UE safety)  Comment: HOB elevated, use of bed rail. Transfers  Stand Step Transfers: Dependent/Total  Stand Pivot Transfers:  Moderate assistance (min- mod to R)  Sit to stand: Minimal assistance  Stand to sit: Minimal assistance  Transfer Comments: Pt impulsive, ready to injection 5-40 mL, 5-40 mL, Intravenous, PRN  promethazine (PHENERGAN) tablet 12.5 mg, 12.5 mg, Oral, Q6H PRN **OR** ondansetron (ZOFRAN) injection 4 mg, 4 mg, Intravenous, Q6H PRN  0.9 % sodium chloride infusion, 25 mL, Intravenous, PRN  albuterol (PROVENTIL) nebulizer solution 2.5 mg, 2.5 mg, Nebulization, Q4H PRN  amLODIPine (NORVASC) tablet 5 mg, 5 mg, Oral, Daily  aspirin chewable tablet 81 mg, 81 mg, Oral, Daily  atorvastatin (LIPITOR) tablet 80 mg, 80 mg, Oral, Daily  budesonide-formoterol (SYMBICORT) 160-4.5 MCG/ACT inhaler 2 puff, 2 puff, Inhalation, BID  dextrose 50 % IV solution, 12.5 g, Intravenous, PRN  glucagon (rDNA) injection 1 mg, 1 mg, Intramuscular, PRN  glucose (GLUTOSE) 40 % oral gel 15 g, 15 g, Oral, PRN  levothyroxine (SYNTHROID) tablet 25 mcg, 25 mcg, Oral, Daily  nicotine (NICODERM CQ) 14 MG/24HR 1 patch, 1 patch, Transdermal, Daily  tiotropium (SPIRIVA RESPIMAT) 2.5 MCG/ACT inhaler 2 puff, 2 puff, Inhalation, Daily  polyethylene glycol (GLYCOLAX) packet 17 g, 17 g, Oral, Daily  senna (SENOKOT) tablet 17.2 mg, 2 tablet, Oral, Daily PRN  bisacodyl (DULCOLAX) suppository 10 mg, 10 mg, Rectal, Daily PRN      Impression/Plan:   Impaired ADLs, gait, and mobility due to:      1. Ischemic R MCA CVA with hemorrhagic conversion and L non-dominant hemiparesis:  PT/OT for gait, mobility, strengthening, endurance, ADLs, and self care. On ASA, atorvastatin. Sling only to be used when patient is ambulating with therapy. Son is able to perform car transfers safely to his [facility] - reviewed plan for IVELISSE for 3 hours to attend graduation with no bathroom transfers and son to do car transfers. 2. Headache: Has Tylenol routine TID. Improved on amitriptyline. 3. Falls: s/p Fall 5/16, 5/30, and 5/31 - She was most recently transferring to toilet from wheelchair with nurse's aide. No injuries. 4. Dry eyes: has natural tears. 5. Cognitive impairment: SLP treating  6. Dysphagia/aphasia: SLP treating.  Diet upgraded. 7. HTN/Hyperlipidemia: on amlodipine  8. DM: on insulin sliding scale  9. COPD/asthma: on albuterol nebulizers prn, on symbicort BID, spiriva  10. Adjustment disorder with depressed mood: Improved. started fluoxetine 5/15  11. Esophageal Ring: Will monitor - on dysphagia diet  12. Hypothyroidism: on levothyroxine  13. Nicotine dependence: on Nicoderm  14. Bowel Management: Miralax daily, senokot prn, dulcolax prn. 15. DVT Prophylaxis:  heparin, SCD's while in bed and MAXIM's   16. Internal medicine for medical management    Electronically signed by David Phan MD on 6/1/2021 at 9:28 AM      This note is created with the assistance of a speech recognition program.  While intending to generate a document that actually reflects the content of the visit, the document can still have some errors including those of syntax and sound a like substitutions which may escape proof reading. In such instances, actual meaning can be extrapolated by contextual diversion.

## 2021-06-01 NOTE — PLAN OF CARE
Problem: Pain:  Goal: Pain level will decrease  Description: Pain level will decrease  Outcome: Ongoing   Pain assessment and reassessment completed so far this shift. Pt. able to rest after the use of pain medication. Patient medicated with 1000mg of Scheduled tylenol Q8hrs for c/o generalized soreness from therapy.  Pt. Repositions per self for comfort. Nonverbal cues indicate pain relief. Pt. Rests quietly with eyes closed after pain medication administration. Respirations easy and unlabored. Appears free from distress.          Problem: Falls - Risk of:  Goal: Will remain free from falls  Description: Will remain free from falls  Outcome: Met This Shift  No falls or injuries sustained at this time. No attempts to get out of bed without nursing assistance. Call light within reach and pt. uses appropriately for assistance. Siderails up x 2. Nonskid footwear remains on. Bed in low and locked position. Hourly nursing rounds made. Pt. Alert and oriented, aware of limitations, and exhibits good safety judgement. Pt. uses assistive devices appropriately. Pt. understands individual fall risk factors.     Pt. reminded to use call light with each nurse/patient interaction.     Pt. room located close to nurse's station.      Bed alarm remains engaged throughout the shift as a precaution.          Problem: Skin Integrity:  Goal: Absence of new skin breakdown  Description: Absence of new skin breakdown  Outcome: Met This Shift     Skin assessment completed this shift. Nutrition and Hydration status assessed with adequate intake. Ruy Score as charted. Pressure Relief Overlay remains intact and inflated to patient's bed throughout the shift. Chair cushion remains intact when pt is up to chair. Bilateral heels remain elevated on pillows throughout the shift. Left Prafo boot remains on throughout the shift. Patient able to reposition self for comfort and to prevent breakdown.   Patient verbalizes understanding of pressure

## 2021-06-01 NOTE — PROGRESS NOTES
Reason for Rejection Unable to perform testing: Specimen hemolyzed. - NOT REPORTED      Recent Labs     05/29/21  1613 05/30/21  0701   POCGLU 112* 124*        No results found. HPI:   See history in H and P      Review of Systems:     Constitutional:  negative for chills, fevers, sweats  Respiratory:  negative for cough, dyspnea on exertion, hemoptysis, shortness of breath, wheezing  Cardiovascular:  negative for chest pain, chest pressure/discomfort, lower extremity edema, palpitations  Gastrointestinal:  negative for abdominal pain, constipation, diarrhea, nausea, vomiting  Neurological: Positive for left-sided weakness. Negative for dizziness, headache  Data:     Past Medical History:  no change     Social History:  no change    Family History: @no change    Vitals:      I/O (24Hr): Intake/Output Summary (Last 24 hours) at 6/1/2021 1135  Last data filed at 6/1/2021 0737  Gross per 24 hour   Intake 300 ml   Output --   Net 300 ml       Labs:    URINE ANALYSIS: No results found for: LABURIN     CBC:  Lab Results   Component Value Date    WBC 10.0 06/01/2021    HGB 13.3 06/01/2021     06/01/2021        BMP:    Lab Results   Component Value Date     05/25/2021    K 4.3 05/25/2021     05/25/2021    CO2 24 05/25/2021    BUN 13 05/25/2021    CREATININE 0.48 05/25/2021    GLUCOSE 111 05/25/2021      LIVER PROFILE:  Lab Results   Component Value Date    ALT 13 10/16/2017    AST 13 10/16/2017    PROT 6.9 10/16/2017    BILITOT 0.22 10/16/2017    BILIDIR 0.11 10/16/2017    LABALBU 4.0 10/16/2017               Radiology:  Medications:      Allergies:      Current Meds:   Scheduled Meds:    FLUoxetine  20 mg Oral Daily    amitriptyline  25 mg Oral Nightly    heparin (porcine)  5,000 Units Subcutaneous 3 times per day    acetaminophen  1,000 mg Oral 3 times per day    amLODIPine  5 mg Oral Daily    aspirin  81 mg Oral Daily    atorvastatin  80 mg Oral Daily    budesonide-formoterol  2 (cerebrovascular accident) ([facility] [I63.9] 05/10/2021    COPD (chronic obstructive pulmonary disease) ([facility] [J44.9] 05/10/2021    Essential hypertension [I10] 05/10/2021    Hyperlipidemia [E78.5] 05/10/2021    Pre-diabetes [R73.03] 05/10/2021    Acute left hemiparesis (HCC) [G81.94]     Gastritis [K29.70]     Tobacco abuse [Z72.0] 07/16/2017     Plan:        Continue current therapy regimen  BP well controlled. Continue norvasc 5 mg daily  Copd stable. continue bronchodilators  Synthroid 25 mcg daily   lipitor 80 mg daily  Asa 81 mg daily  Insulin sliding scale-discontinued as blood sugars have been controlled and stable  nicoderm patch  Continue PT/OT  Encourage PO intake                      MD LEONOR Chong [address], [address] [place] [address].    Phone [phone]   Fax: [phone]  Answering Service: [phone]

## 2021-06-01 NOTE — PATIENT CARE CONFERENCE
[address]   ACUTE REHABILITATION  TEAM CONFERENCE NOTE  Date: 21  Patient Name: Ray Reich       Room: 6053/7337-65  MRN: [id number]       : 1955  (66 y.o.)     Gender: female   Referring Practitioner: Dr Mejia Hoover CVA (cerebrovascular accident) [facility]) [I63.9]  Diagnosis: ischemic CVA,  subacute right MCA distribution infarct, L lynette, dysphagia, L neglect, loop recorder 21 (Dr. Roberto Santos)     NURSING  Bladder  Incontinent Less Than Daily  Bowel   Always Continent  Date of Last BM: 21  Intervention    Both Bowel & Bladder Program     Wounds/Incisions/Ulcers: No skin issues identified  Medication Education Program: Patient currently unable to manage medications and family being educated  Pain: Patient's pain is currently controlled with -  Tylenol 1000 mg q 8 hours. Fall Risk:  Falling star program initiated    PHYSICAL THERAPY  Bed mobility  Bridging: Minimal assistance (positioning L LE)  Scooting: Minimal assistance (L UE safety)  Bed Mobility  Bridging: Minimal assistance (positioning L LE)  Rolling: Rolling Left;Rolling Right;Minimal assistance (Cues for L UE safety/movement, facilitating L LE placement)  Supine to Sit: Minimal assistance (to Pt's R: L LE assist & momentum for trunk)  Sit to Supine: Minimal assistance (L LE assist)  Scooting: Minimal assistance (L UE safety)  Comment: mat table with 2 pillows    Transfers:  Sit to Stand: Minimal Assistance (L UE assist; walker lift)  Stand to sit: Minimal Assistance (L UE assist, improved control today, G reach back c R UE)  Squat Pivot Transfers: Moderate Assistance (Impulsive. Cues/review of set-up, L UE/LE positioning, safety. To Pt's R.)  Comment: rest breaks PRN.      Ambulation 1  Surface: level tile  Device:  (walker lift)  Other Apparatus: Left;Slider;AFO (wheelchair brought up behind afterward)  Assistance: 2 Person assistance;Maximum assistance (+ 3rd assist to steer walker)  Quality of Gait: Some quadricep contraction noted during amb for L knee extension. Pt is able to initiate L hip flexion, but continues to need assist to fully advance L LE upon fatigue. Fewer cues to increase step length today. Gait Deviations: Decreased step length; Slow Juanita;Decreased step height;Decreased head and trunk rotation  Distance: 100'  Ambulation 2  Surface - 2: level tile  Device 2: Parallel Bars  Other Apparatus 2: AFO; Left  Assistance 2: Moderate assistance  Distance: 6ft fwd/retro x 2  Comments: distance completed twice with seated rest break taken between. Mirror used as visual feedback focusing treatment on posutre maintaining midline and wt shifing to her L. Max verbal and tactile cues. Min quad control, however pt requires assist for LLE placement and TKE for safety. Pt impulsive and moves before ready. Equipment Needed: No  Other: CTA; pending further progress with mobility    Increased hip flexion noted today during ambulation resulting in fair initiation of swing , particularly if in a good walking rhythm; continues to require total assist for L knee extension. Discussed with daughter, therapy progress, needs 2 person assist for safety for transfers on weak side. Pt will need a ramp at the entrance to enter/exit at home. Goals  Time Frame for Short term goals: 10 days  Short term goal 1: Pt able to roll in bed side to side at min A without bed rail  Short term goal 2: Pt able to perform supine<>sit at mod A  Short term goal 3: Pt able to perform sit<>stand at min/mod A . Short term goal 4: Pt able to progress to pivot transfers at max A   Short term goal 5: Pt able to improve sitting balance at EOB/EOM at SBA for static balance and able to track to left side as well as turn to head to left side to scan her environment with minimal cues.    Short term goal 6: Pt able to tolerate standing in // bars with R UE support and assist at mod A  Short term goal 7: Pt able to propel w/c with R UE/R LE distance of 50 to 80 ft, straight path and turn around 180 degrees, at min/mod [address] [facility] Provided: Reacher  Eating            Modified independent    Oral Hygiene            Setup (washcloth provided, then indep w/c to sink, wash face, comb hair, brush teeth.)   Shower/Bathe Self             UE Bathing: None  LE Bathing: None   Upper Body Dressing            None   Lower Body Dressing            Putting On/Taking Off Footwear             Maximum assistance   Toilet Transfer             Toilet - Technique: Stand pivot  Equipment Used: Grab bars  Toilet Transfer: Moderate assistance (min - mod to R side)  Toilet Transfers Comments: pt left with RN to provide direct supervision and transfer off of toilet- rec assist x 2 to turn to pt L weak side. Toileting Hygiene            Moderate assistance     Balance  Sitting Balance: Contact guard assistance (SBA-CGA with dynamic sitting)  Standing Balance: Minimal assistance  Standing Balance  Time: <1 min x4, ~1 min x 2   Activity: functional transfers, toileting hygiene/LB dressing. Comment: RUE support, pt declines to remove     Equipment Recommendations  Equipment Needed:  (TBD)  Assessment: severely impaired loss of adl indep post CVA with severe L lynette deficits, dysphagia, L neglect    Short term goals  Time Frame for Short term goals: 1 week  Short term goal 1: mod A UE bathe and dress  Short term goal 2: set up brush teeth (goal met)  Short term goal 3: min feeding, with cues able to locate L side of tray and scan to locate items on L.   Short term goal 4: tolerate 6-8 min sitting edge of bed (static sit)  with min assist and RUE support, head at midline and fair safety awareness  Short term goal 5: pt to initiate reposition LUE with RUE with good safety ie. hold by wrist not by 1 finger  Short term goal 6: demo improved awareness of L side and neuromuscular christian by weight bearing on LUE with physical assist to complete  Short term goal 7: from w/c:  consistently maintain midline trunk control and demo able to lean forward 4 inches away from back of chair without physical assist for adls. Short term goal 8: tolerate 2 min static stand for adls with RUE support with mod of 2 and assist with LLE as needed. SPEECH THERAPY  On thin liquids, soft and bite sized. Pt. Needs continued cues to increase attention to L visual field  Short Term Goal: diet at least restrictive consistency    NUTRITION  Weight: 191 lb 8 oz (86.9 kg) / Body mass index is 29.12 kg/m². Diet Rx: Carbohdyrate Control, 3-4 gm Na, Dysphagia Soft and Bite-Sized, Glucerna x 2 daily. PO intake appears fairly adequate with 26-75% of meals and majority of supplements consumed. Please see nutrition note for details. SOCIAL WORK ASSESSMENT  Assessment: Family is unable to provide 24 hr support. Discussed plans with pt and daughters Crystal Conn and Murtaza Bob; at this time they anticipate SNF. List was provided in pt insurance network. Insurance goes into affect on 6/1/2021. Pre-Admission Status:  Lives With: Alone  Type of Home: House  Home Layout: Two level, Able to Live on Main level with bedroom/bathroom, Laundry in basement  Home Access: Stairs to enter without rails  Entrance Stairs - Number of Steps: 3 (Front entrance)  Bathroom Shower/Tub: Tub/Shower unit, Curtain (tub shower is on main floor)  Bathroom Toilet: Standard  Bathroom Equipment: Hand-held shower  Home Equipment:  (No DME)  ADL Assistance: [facility] [facility] [address]: Independent  Homemaking Responsibilities: Yes  Ambulation Assistance: Independent  Transfer Assistance: Independent  Active : Yes  Mode of Transportation: [facility]  Occupation: Retired  Type of occupation: grocery store employee  Leisure & Hobbies: repurposing items, has a dog and fenced in back yard. IADL Comments: dtr, Murtaza Rojas, RN lives in [facility], Crystal Conn lives 20 min away, son, Jatin Camejo lives 4 blocks away.   Additional Comments: Pt independent for all mobility, self care and IADL's. Family Education: Family Education initiated and Ongoing    Percentage Risk for Readmission: Low 0 - 18%   Risk of Unplanned Readmission:  11 %    Critical Items: None     Problem / Barrier Intervention / Plan  Results   dysphagia Oral motor exercises           Impaired ability to care for self related to CVA  Patient and family training in modified care strategies to increase independence and safety during care tasks      Impaired function related to deficits form CVA Facilitation for muscle contractions-progressing to strengthening, balance activities and functional mobility training, including family training for safe mobility      Steps at entrance of home Depending on pt's progress in motor return, discuss/practise safe technique for enter/exiting home with family. Discussed need for a ramp to enter/exit home with pt/family.     Severe left neglect. Work on positioning, having pt scan left environment, pt/family education.                    Total Self Care Score    Total Mobility Score  Admission Score:  12      Admission Score:  17  Goal:  22/42         Goal:  33/90   `  Discharge Plan   Estimated Discharge Date: 6/7/2021  Home evaluation needed?  Home Evaluation Indication (NO, Requires ReEval, YES/Date): Requires re-evaluation closer to discharge  Overnight or Day Pass: No  Factors facilitating achievement of predicted outcomes: Family support, Motivated, Cooperative and Pleasant  Barriers to the achievement of predicted outcomes: Impulsivity, Limited safety awareness, Limited insight into deficits, Decreased endurance, Upper extremity weakness, Lower extremity weakness, Incontinence of bladder and Medication managment    Functional Goals at discharge:  Predicted Outcome: Home with familyPATIENT'S LEVEL OF ASSISTANCE: 24 hour Supervision  and Moderate Assistance  Discharge therapy goals:  PT: Long term goals  Time Frame for Long term goals : By DC  Long term goal 1: Pt able to roll in bed mod-I  Long term goal 2: Pt able to perform supine <> sit at CGA/min A  Long term goal 3: Pt able to perform pivot transfers min A / mod A and able to scan Left side environment. Long term goal 4: Pt able to ambulate in // bars, or with appropriate assistive device and/or LE bracing, distance of  20 to 30 ft, min/mod A x 2. Long term goal 5: Pt able to propel w/c on level surfaces distance 100 to 150 ft, SBA/CGA. Long term goal 6: Educate pt/family in safe technique for mobility and  to go up and down steps to enter/exit home. Long term goal 7: Improve sitting balance at EOM to good, and standing balance with R UE support to fair- to reduce fall risk. Long term goal 8: Improve PASS score to 20/36 improve overall function. OT:Long term goals  Time Frame for Long term goals : by discharge  Long term goal 1: set up and occasional verbal cues ie. L visual scan for self feeding  Long term goal 2: set up oral care (goal met)  Long term goal 3: max x 1 toileting and adl transfers  Long term goal 4: min UE bathe and dress (shirt)  Long term goal 5: max x 1 LE bathe and dress, able to cross to reach RLE with min assist and keep trunk balance  ST:  Diet at least restrictive consistency    Team Members Present at Conference:  :  Hay Marquez [facility]  Occupational Therapist: Jenna Teran OT   [address] PT  Speech Therapist: Denise GRAMAJOCCC/SLP  Nurse: Kanwal Delgado RN     Dietary/Nutrition: Charu Gomez RD, LD  Pastoral Care: Lucero Santillan  CMG: Lc Mccauley RN    I approve the established interdisciplinary plan of care as documented within the medical record of [address] [place] [address]     Arlet Hudson MD

## 2021-06-01 NOTE — CARE COORDINATION
Met with pt and daughter Vijay Brown. Discussed new discharge date. Family is not able to provide 24 hr support and per team recommendations [facility] chose [facility]. Referral sent.

## 2021-06-02 PROCEDURE — 6360000002 HC RX W HCPCS: Performed by: PHYSICAL MEDICINE & REHABILITATION

## 2021-06-02 PROCEDURE — 6370000000 HC RX 637 (ALT 250 FOR IP): Performed by: PHYSICAL MEDICINE & REHABILITATION

## 2021-06-02 PROCEDURE — 6370000000 HC RX 637 (ALT 250 FOR IP): Performed by: STUDENT IN AN ORGANIZED HEALTH CARE EDUCATION/TRAINING PROGRAM

## 2021-06-02 PROCEDURE — 97112 NEUROMUSCULAR REEDUCATION: CPT

## 2021-06-02 PROCEDURE — 99231 SBSQ HOSP IP/OBS SF/LOW 25: CPT | Performed by: INTERNAL MEDICINE

## 2021-06-02 PROCEDURE — 97110 THERAPEUTIC EXERCISES: CPT

## 2021-06-02 PROCEDURE — 97535 SELF CARE MNGMENT TRAINING: CPT

## 2021-06-02 PROCEDURE — 92507 TX SP LANG VOICE COMM INDIV: CPT

## 2021-06-02 PROCEDURE — 97542 WHEELCHAIR MNGMENT TRAINING: CPT

## 2021-06-02 PROCEDURE — 1180000000 HC REHAB R&B

## 2021-06-02 PROCEDURE — 99231 SBSQ HOSP IP/OBS SF/LOW 25: CPT | Performed by: PHYSICAL MEDICINE & REHABILITATION

## 2021-06-02 PROCEDURE — 92526 ORAL FUNCTION THERAPY: CPT

## 2021-06-02 PROCEDURE — 97116 GAIT TRAINING THERAPY: CPT

## 2021-06-02 RX ADMIN — HEPARIN SODIUM 5000 UNITS: 5000 INJECTION INTRAVENOUS; SUBCUTANEOUS at 21:04

## 2021-06-02 RX ADMIN — HEPARIN SODIUM 5000 UNITS: 5000 INJECTION INTRAVENOUS; SUBCUTANEOUS at 15:51

## 2021-06-02 RX ADMIN — ACETAMINOPHEN 1000 MG: 500 TABLET, FILM COATED ORAL at 05:47

## 2021-06-02 RX ADMIN — AMITRIPTYLINE HYDROCHLORIDE 25 MG: 25 TABLET, FILM COATED ORAL at 21:03

## 2021-06-02 RX ADMIN — AMLODIPINE BESYLATE 5 MG: 5 TABLET ORAL at 11:23

## 2021-06-02 RX ADMIN — ATORVASTATIN CALCIUM 80 MG: 80 TABLET, FILM COATED ORAL at 11:23

## 2021-06-02 RX ADMIN — LEVOTHYROXINE SODIUM 25 MCG: 0.03 TABLET ORAL at 05:47

## 2021-06-02 RX ADMIN — ACETAMINOPHEN 1000 MG: 500 TABLET, FILM COATED ORAL at 15:50

## 2021-06-02 RX ADMIN — ACETAMINOPHEN 1000 MG: 500 TABLET, FILM COATED ORAL at 21:03

## 2021-06-02 RX ADMIN — FLUOXETINE HYDROCHLORIDE 20 MG: 20 CAPSULE ORAL at 11:23

## 2021-06-02 RX ADMIN — Medication 2 PUFF: at 11:23

## 2021-06-02 RX ADMIN — ASPIRIN 81 MG CHEWABLE TABLET 81 MG: 81 TABLET CHEWABLE at 11:23

## 2021-06-02 RX ADMIN — HEPARIN SODIUM 5000 UNITS: 5000 INJECTION INTRAVENOUS; SUBCUTANEOUS at 05:47

## 2021-06-02 ASSESSMENT — PAIN DESCRIPTION - LOCATION: LOCATION: SHOULDER

## 2021-06-02 ASSESSMENT — PAIN SCALES - GENERAL
PAINLEVEL_OUTOF10: 0
PAINLEVEL_OUTOF10: 0
PAINLEVEL_OUTOF10: 1
PAINLEVEL_OUTOF10: 1

## 2021-06-02 ASSESSMENT — PAIN SCALES - WONG BAKER: WONGBAKER_NUMERICALRESPONSE: 4

## 2021-06-02 ASSESSMENT — PAIN DESCRIPTION - FREQUENCY: FREQUENCY: INTERMITTENT

## 2021-06-02 ASSESSMENT — PAIN DESCRIPTION - PAIN TYPE: TYPE: ACUTE PAIN

## 2021-06-02 ASSESSMENT — PAIN DESCRIPTION - ORIENTATION: ORIENTATION: LEFT

## 2021-06-02 NOTE — PROGRESS NOTES
[address] [facility]    ACUTE REHABILITATION OCCUPATIONAL THERAPY  DAILY NOTE    Date: 21  Patient Name: Billie Espinoza      Room: 6542/1086-77    MRN: [id number]   : 1955  (63 y.o.)  Gender: female      Diagnosis: ischemic CVA,  subacute right MCA distribution infarct, L lynette, dysphagia, L neglect, loop recorder 21 (Dr. Jenny Osman)  Additional Pertinent Hx: 29-year-old female who was found down, last known well approximately 21 hours before. Patient was found to have a right MCA M1 occlusion. Thrombectomy was not successful, MRI showed patient had a right MCA stroke with hemorrhagic conversion. 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 :  (loop recorder)  Other position/activity restrictions:  L neglect with LUE/LLE deficits, neglect improving. Required Braces or Orthoses?: No  Equipment Used: Bed, Wheelchair    Subjective  Comments: Pt cooperative, flat affect throughout. Patient Currently in Pain: Denies  Restrictions/Precautions: Fall Risk  Overall Orientation Status: Within Functional Limits          Objective  Cognition  Overall Cognitive Status: Impaired  Arousal/Alertness: Appropriate responses to stimuli  Following Directions: Follows one step commands  Attention Span: Attends with cues to redirect  Safety Judgement: Decreased awareness of need for safety  Insights: Decreased awareness of deficits  Sequencing and Organization: Assistance required to generate solutions  Perception  Overall Perceptual Status: Impaired  Unilateral Attention: Cues to attend left visual field;Cues to attend to left side of body  Initiation: Cues to initiate tasks  Motor Planning: Cues to use objects appropriately (appropriate lynette tech/AE use. )  Balance  Sitting Balance: Stand by assistance (sup-SBA pending activity.  G bal noted during meal engagement)  Standing Balance: Maximum assistance (mod-max for self care participation. )  Bed mobility  Supine to Sit: Stand by assistance (HOB slightly elevated, use of bed rail. )  Transfers  Stand Pivot Transfers: Moderate assistance (EOB>w/c towards R side. )  Sit to stand: Minimal assistance; Moderate assistance  Stand to sit: Moderate assistance (for controlled sit. )  Transfer Comments: fair technique. Standing Balance  Time: ~1 min x2   Activity: toileting transfer and tasks. Toilet Transfers  Toilet - Technique: Stand pivot  Equipment Used: Grab bars  Toilet Transfer: Moderate assistance  Toilet Transfers Comments: w/c<>toilet R<>L. Cues for technique to ensure safety. Pt requires cues to participate in clothing mgmt task while in standing due to fear of falling. Electrical Stimulation  Electrical Stimulation Location: Left;Wrist;Forearm;Elbow  Electrical Stimulation Action: to stimulate muscle for return in movement and engage attention to LUE through stimulation. Electrodes placed to address wrist and digit flexion/extension, added electordes to bicep/tricep this date to increase focus in prep for elbow flexion/extension. Return noted; wrist ulner deviation and slight flexion, slight digit flexion/extension this date. Electrical Stimulation Parameters: LUE placed on arm skateboard during estim with direction to move RUE based on goals of stimulation with no success in skateboard movement. Cues for attention and awareness to UE. 35 Hz, reciprocal NMS pattern, 2nd-4th settings based on tolerance. Pt seems to demo increased tolerance to stimulation. x12 min total.   (estim on 12:43-12:56)  Electrical Stimulation Goals: Neuromuscular Facilitation  Neuromuscular Education  Neuromuscular education: Yes  Taping: endura tape reapplied to L shoulder to increase stabilization for prevention of subluxation. Noted shoulder drop without tape applied.             ADL  Feeding: Modified independent   Grooming: Setup (w/c level sinkside, flip top toothpaste. )  UE Bathing: None (Pt declines. )  LE Bathing: None (pt declined this date)  UE Dressing: Minimal assistance (Assist with lynette tech to thread LUE)  LE Dressing: Maximum assistance (Footdressing only this date; B TEDs/shoes/L AFO.)  Toileting: Moderate assistance (Assist to pull up on L side. Increased bal A w/ pt participation in clothing mgmt)  Additional Comments: Cues to increase independence as able with various techniques. Assessment  Performance deficits / Impairments: Decreased functional mobility ; Decreased ADL status; Decreased strength;Decreased endurance;Decreased balance;Decreased high-level IADLs;Decreased vision/visual deficit; Decreased cognition;Decreased safe awareness;Decreased ROM; Decreased fine motor control;Decreased coordination;Decreased posture;Decreased sensation  Prognosis: Good  Discharge Recommendations: Patient would benefit from continued therapy after discharge  Activity Tolerance: Patient Tolerated treatment well  Safety Devices in place: Yes  Type of devices: All fall risk precautions in place;Gait belt;Patient at risk for falls; Left in chair (family present in room. )  Equipment Recommendations  Equipment Needed:  (TBD)        Plan  Plan  Times per week: 5-7  Times per day: Twice a day  Current Treatment Recommendations: Strengthening, Positioning, ROM, Safety Education & Training, Balance Training, Patient/Caregiver Education & Training, Self-Care / ADL, Cognitive/Perceptual Training, Functional Mobility Training, Neuromuscular Re-education, Equipment Evaluation, Education, & procurement, Endurance Training, Cognitive Reorientation  Patient Goals   Patient goals : \"get as close to normal as possible. \"  specifies:\"get in and out of bed on my own, shower by myself. \"  Short term goals  Time Frame for Short term goals: 1 week  Short term goal 1: mod A UE bathe and dress  Short term goal 2: set up brush teeth (goal met)  Short term goal 3: min feeding, with cues able to locate L side of tray and scan to locate items on L. Short term goal 4: tolerate 6-8 min sitting edge of bed (static sit)  with min assist and RUE support, head at midline and fair safety awareness  Short term goal 5: pt to initiate reposition LUE with RUE with good safety ie. hold by wrist not by 1 finger  Short term goal 6: demo improved awareness of L side and neuromuscular chrsitian by weight bearing on LUE with physical assist to complete  Short term goal 7: from w/c:  consistently maintain midline trunk control and demo able to lean forward 4 inches away from back of chair without physical assist for adls. Short term goal 8: tolerate 2 min static stand for adls with RUE support with mod of 2 and assist with LLE as needed. Long term goals  Time Frame for Long term goals : by discharge  Long term goal 1: set up and occasional verbal cues ie.  L visual scan for self feeding  Long term goal 2: set up oral care (goal met)  Long term goal 3: max x 1 toileting and adl transfers  Long term goal 4: min UE bathe and dress (shirt)  Long term goal 5: max x 1 LE bathe and dress, able to cross to reach RLE with min assist and keep trunk balance        06/02/21 0838 06/02/21 1025 06/02/21 1422   OT Individual Minutes   Time In [phone]   Time Out [id number] [phone]   Minutes 48 9 30     Electronically signed by ERIN Linda on 6/2/21 at 2:47 PM EDT

## 2021-06-02 NOTE — PROGRESS NOTES
Physical Therapy  [address] [facility]   Acute Rehabilitation Physical Therapy Progress Note    Date: 21  Patient Name: Devora Montoya       Room: 6040/0508-81  MRN: [id number]   Account: [de-identified]   : 1955  (63 y.o.) Gender: female      Diagnosis: ischemic CVA,  subacute right MCA distribution infarct, L lynette, dysphagia, L neglect, loop recorder 21 (Dr. Amara Mclean)  Past Medical History:  has a past medical history of Asthma, COPD (chronic obstructive pulmonary disease) ([facility] [address] Diabetes mellitus ([facility] [address] Esophageal ring, Gastritis, Hyperlipidemia, Hyperplastic colon polyp, Hypertension, Osteoarthritis, Patient in clinical research study, and TIA (transient ischemic attack). Past Surgical History:   has a past surgical history that includes Appendectomy; Hysterectomy; Esophagus dilation; Colonoscopy (2017); Endoscopy, colon, diagnostic; pr egd transoral biopsy single/multiple (N/A, 2017); pr colsc flx w/rmvl of tumor polyp lesion snare tq (N/A, 2017); and Upper gastrointestinal endoscopy (2017). Restrictions/Precautions  Restrictions/Precautions: Fall Risk  Required Braces or Orthoses?: No  Implants present? :  (loop recorder)  Position Activity Restriction  Other position/activity restrictions:  L neglect with LUE/LLE deficits, neglect improving. Subjective: Pt reporting feeling too fatigued in AM to ambulate. Vital Signs  Patient Currently in Pain: Yes  Pain Assessment: Faces  Lara-Baker Pain Rating: Hurts little more  Pain Type: Acute pain  Pain Location: Shoulder  Pain Orientation: Left  Pain Descriptors: Heaviness;Sore;Aching  Pain Frequency: Intermittent  Non-Pharmaceutical Pain Intervention(s): Repositioned;Rest;Ambulation/Increased Activity; Elevation (Sling donned in AM)  Response to Pain Intervention: None    Transfers  Sit to Stand: Minimal Assistance (L UE assist; walker lift)  Stand to sit: Minimal Assistance (L UE assist, improved control todayYOANNA reach back c R UE)  Bed to Chair: Minimal assistance (Squat pivot to R; requires safety cues & assist)  Squat Pivot Transfers: Minimal Assistance (to R; requires safety cues & assist)   Comment: Pt impulsive & inconsistent with safety & L-sided awareness. Ambulation 1  Surface: level tile  Device:  (\"Upwalker\")  Other Apparatus: Left;Slider;AFO;Wheelchair follow  Assistance: 2 Person assistance; Moderate assistance  Quality of Gait: Continued to demonstrate L quadricep contraction/knee extension with upright walker as with walker lift. Initiates L hip flexion, but continues to need assist to fully advance L LE when fatigued. Requires assist to keep walker close for support/strong forward; slight R lean, G return to cue. Gait Deviations: Decreased step length;Decreased step height;Slow Juanita;Decreased head and trunk rotation  Distance: 40'     Ambulation 2  Surface - 2: level tile  Device 2:  (walker lift)  Other Apparatus 2: Left;AFO;Slider  Assistance 2: 2 Person assistance;Maximum assistance (+ 3rd assist to steer walker)  Quality of Gait 2: More consistent L quadricep contraction/knee extension noted. Initiates L hip flexion, but continues to need assist to fully advance L LE upon fatigue. Cue 1x to increase R step length. Gait Deviations: Decreased step length; Slow Juanita;Decreased step height;Decreased head and trunk rotation  Distance: 120' with standing rest break at 80'; including 180º turn. Stairs/Curb  Stairs?: Yes  Stairs  # Steps : 3 (AM & PM)  Stairs Height: 4\"  Rails: Right ascending (L UE in sling AM, hand-held assist in PM)  Other Apparatus: Left;AFO (+ slider )  Assistance: 2 Person assistance;Maximum assistance (less assist in PM)  Comment: Pt vocalizes correct sequencing before both attempts. Pt's L knee buckled 1x in AM, required Max A x2 to recover to standing. Mod A to place L foot fully on step ascending & initiate descent with L foot.  Pt follows cues for sequencing, direction for turning & extending L knee and hip in PM.     Wheelchair Activities  Wheelchair Size: 20\"  Wheelchair Type: Standard  Wheelchair Cushion: Pressure Relieving  Pressure Relief Type: Lateral lean;Self Adjusts  Propulsion: Yes  Propulsion 1  Propulsion: Manual  Level: Level Tile  Method: RUE;RLE  Level of Assistance: Moderate assistance  Description/ Details: Visual and verbal cues to maintain straight path with F- return & little recall from one moment to the next. 180º turn with 2 attempts, including backing up upon cue to avoid hitting L foot on wall. Distance: 100'    Balance   Posture: Fair  Sitting - Static: Fair (Edge of mat, R UE support, no back support)  Sitting - Dynamic: Fair (Edge of mat, able to return to midline alignment c UE/abd.)  Standing - Static: Fair; - (walker lift)  Standing - Dynamic: Poor; - (walker lift)     Exercises   Other exercises?: Yes  Other exercises 1: Edge of mat activities, 30 min. total: self-assisted L UE exercises, L UE weightbearing and core stabilization exercises. (30 min. also includes seated LE exercises. )  Other exercises 2: Seated bilateral LE exercises, 20 reps each: L LE active assisted Hip ex's, passive ROM at knee and ankle. R LE 2 lbs. Lime (minimal) resistance band (Edge of mat, no back support, R UE support)  Other exercises 3: Seated L gastroc stretch, 2x30 sec.    Other exercises 9: Squat pivot transfers x2, to Pt's R (removing armrest; safety cues for positioning, L LE/UE. )    Activity Tolerance: Patient limited by endurance (Rest breaks PRN)     PT Equipment Recommendations  Other: CTA; pending further progress with mobility    Current Treatment Recommendations: Strengthening, Balance Training, Functional Mobility Training, Transfer Training, Endurance Training, Wheelchair Mobility Training, Gait Training, Stair training, Neuromuscular Re-education, Home Exercise Program, Safety Education & Training, Patient/Caregiver Education & Training, Modalities, Positioning, ROM, Equipment Evaluation, Education, & procurement    Conditions Requiring Skilled Therapeutic Intervention  Body structures, Functions, Activity limitations: Decreased functional mobility ; Decreased strength;Decreased safe awareness;Decreased endurance;Decreased balance;Decreased posture;Decreased coordination;Decreased vision/visual deficit; Decreased fine motor control  REQUIRES PT FOLLOW UP: Yes  Discharge Recommendations: Patient would benefit from continued therapy after discharge;Home with assist PRN    Goals  Short term goals  Time Frame for Short term goals: 10 days  Short term goal 1: Pt able to roll in bed side to side at min A without bed rail  Short term goal 2: Pt able to perform supine<>sit at mod A  Short term goal 3: Pt able to perform sit<>stand at min/mod A . Short term goal 4: Pt able to progress to pivot transfers at max A   Short term goal 5: Pt able to improve sitting balance at EOB/EOM at SBA for static balance and able to track to left side as well as turn to head to left side to scan her environment with minimal cues. Short term goal 6: Pt able to tolerate standing in // bars with R UE support and assist at mod A  Short term goal 7: Pt able to propel w/c with R UE/R LE distance of 50 to 80 ft, straight path and turn around 180 degrees, at min/mod A   Long term goals  Time Frame for Long term goals : By DC  Long term goal 1: Pt able to roll in bed mod-I  Long term goal 2: Pt able to perform supine <> sit at CGA/min A  Long term goal 3: Pt able to perform pivot transfers min A / mod A and able to scan Left side environment. Long term goal 4: Pt able to ambulate in // bars, or with appropriate assistive device and/or LE bracing, distance of  20 to 30 ft, min/mod A x 2. Long term goal 5: Pt able to propel w/c on level surfaces distance 100 to 150 ft, SBA/CGA. Long term goal 6: Educate pt/family in safe technique for mobility and  to go up and down steps to enter/exit home. Long term goal 7: Improve sitting balance at EOM to good, and standing balance with R UE support to fair- to reduce fall risk. Long term goal 8: Improve PASS score to 20/36 improve overall function.       06/02/21 0832 06/02/21 1510   PT Individual Minutes   Time In 0831 1430   Time Out 0930 1505   Minutes 59 35     Electronically signed by Saray Bradley PTA on 6/2/21 at 4:24 PM EDT

## 2021-06-02 NOTE — PROGRESS NOTES
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 Patient is doing well today. No new issues with pain, sleep, appetite, bowel, or bladder. ROS:  Denies fevers, chills, sweats. No chest pain, palpitations, lightheadedness. Denies coughing, wheezing or shortness of breath. Denies abdominal pain, nausea, diarrhea or constipation. No new areas of joint pain. Denies new areas of numbness or weakness. Denies new anxiety  issues. No new skin problems. Rehabilitation:   Progressing in therapies. PT:  Restrictions/Precautions: Fall Risk  Implants present? :  (loop recorder)  Other position/activity restrictions:  L neglect with LUE/LLE deficits, neglect improving. Transfers  Sit to Stand: Contact guard assistance (L UE assist; walker lift)  Stand to sit: Contact guard assistance (L UE assist, improved control today, G reach back c R UE)  Bed to Chair: Moderate assistance (Squat pivot to R, daughter hands-on assist, writer SBA)  Stand Pivot Transfers: Maximum Assistance (Daughter hands-on; inadequate communication re: technique. )  Squat Pivot Transfers: Moderate Assistance (Impulsive. Cues/review of set-up, L UE/LE positioning, safety. To Pt's R.)  Lateral Transfers: Minimal Assistance  Car Transfer: Maximum Assistance (+ SBA (son participating in family training). Education for positioning, safety, sequencing, set-up. Drive side back seat to facilitate transfer to Pt's R; going to Pt's L to exit. )  Comment: Family training with transfers continuing. Pt impulsive & inconsistent with safety & L-sided awareness. Daughter asks for Pt's verbal cues regarding readiness with inadequate/unsafe response from Pt.    Ambulation 1  Surface: level tile  Device:  (walker lift)  Other Apparatus: Left, Slider, AFO (wheelchair brought up behind afterward)  Assistance: 2 Person assistance, Maximum assistance (+ 3rd assist to Decreased step height, Decreased head and trunk rotation  Distance: 36' & 30'  Comments: Seated rest between distances. Surface: level tile  Ambulation 1  Surface: level tile  Device:  (walker lift)  Other Apparatus: Left, Slider, AFO (wheelchair brought up behind afterward)  Assistance: 2 Person assistance, Maximum assistance (+ 3rd assist to steer walker)  Quality of Gait: Some quadricep contraction noted during amb for L knee extension. Pt is able to initiate L hip flexion, but continues to need assist to fully advance L LE upon fatigue. Fewer cues to increase step length today. Gait Deviations: Decreased step length, Slow Juanita, Decreased step height, Decreased head and trunk rotation  Distance: 36' & 30'  Comments: Seated rest between distances. OT:  ADL  Equipment Provided: Reacher, Long-handled sponge  Feeding: Modified independent   Grooming: Setup (w/c level sinkside, flip top toothpaste. )  UE Bathing: None (Pt declines. )  LE Bathing: None (pt declined this date)  UE Dressing: Minimal assistance (Assist with lynette tech to thread LUE)  LE Dressing: Maximum assistance (Footdressing only this date; B TEDs/shoes/L AFO.)  Toileting: None (pt declined. )  Additional Comments: Cues to increase independence as able with various techniques. Balance  Sitting Balance: Stand by assistance (sup-SBA pending activity. G bal noted during meal engagement)  Standing Balance: Minimal assistance   Standing Balance  Time: <1 min x4, ~1 min x 2   Activity: functional transfers, toileting hygiene/LB dressing. Comment: RUE support, pt declines to remove   Functional Mobility  Functional - Mobility Device: Wheelchair  Activity: To/from bathroom  Assist Level:  Moderate assistance  Functional Mobility Comments: unable to go straight with use of RUE and RLE and LLE on foot rest, also runs into items on L     Bed mobility  Bridging: Minimal assistance (positioning L LE)  Rolling to Left: Minimal assistance  Rolling to Right: Maximum assistance  Supine to Sit: Stand by assistance (HOB slightly elevated, use of bed rail. )  Sit to Supine: Moderate assistance (Assist with BLEs over EOB. )  Scooting: Minimal assistance (to scoot back in chair, squaring hips)  Comment: HOB elevated, use of bed rail. Transfers  Stand Step Transfers: Dependent/Total  Stand Pivot Transfers: Moderate assistance (EOB>w/c towards R side. )  Sit to stand: Minimal assistance, Moderate assistance  Stand to sit: Moderate assistance (for controlled sit. )  Transfer Comments: fair technique. Toilet Transfers  Toilet - Technique: Stand pivot  Equipment Used: Grab bars  Toilet Transfer: Minimal assistance, Moderate assistance (towards R<>L )  Toilet Transfers Comments: Pt required additional cues/education for safety as pt requesting privacy while attempting hygigne post BM. Due to unsafe dynamic sitting balance, pt requires SBA-CGA assist.      Shower Transfers  Shower - Transfer From: Wheelchair  Shower - Transfer Type: To and From  Shower - Transfer To: Transfer tub bench  Shower - Technique: Stand pivot  Shower Transfers: Moderate assistance  Shower Transfers Comments: R<>L. Grab bars for support. Wheelchair Bed Transfers  Wheelchair/Bed - Technique: Stand pivot  Equipment Used: Bed, Wheelchair  Level of Asssistance: Maximum assistance (Block L knee)    SPEECH:  Subjective: [x]? Alert     [x]? Cooperative     []? Confused     []? Agitated    []? Lethargic     Objective/Assessment:  Attention:  Min verbal cues needed for increased attention to midline, pt. c extreme L sided neglect. Pt. Able to answer questions re: appt. Cards she is looking at x2 c max A .         Recall: n/a       Organization: n/a     Problem Solving/Reasoning:  n/a     Other:  Pt. Completed oral motor exercises x3, 20  reps each. Reduced labial ROM noted on L side. One channel NMES was used for L side oral sling at 6.5 mA, 21 mins.    Pt. Reports tolerating her diet of soft and bite sized without difficulty. No family present. Objective:  /82   Pulse 80   Temp 98 °F (36.7 °C) (Oral)   Resp 18   Ht 5' 8\" (1.727 m)   Wt 191 lb 8 oz (86.9 kg)   SpO2 93%   BMI 29.12 kg/m²       GEN: Well developed, well nourished, in NAD  HEENT:  NCAT. PERRL. EOMI. Mucous membranes pink and moist.   PULM:  Clear to ausculation. No rales or rhonchi. Respirations WNL and unlabored. CV:  Regular rate rhythm. No murmurs or gallops. GI:  Abdomen soft. Nontender. Non-distended. BS + and equal.    NEUROLOGICAL: A&O x3. Sensation intact to light touch. Stable impaired L CN VII. Persistent L neglect with R gaze preference. MSK:  Functional ROM RUE and RLEs. Impaired AROM LUE and LLE. Danya Maid Motor testing 4+/5 key muscles RUE and RLE. L shoulder shrug 1/5, L hip flexion 2/5. Distal LUE and LLE muscles 0/5. No L elbow abnormality palpable or deformity noted. SKIN: Warm dry and intact. Good turgor. EXTREMITIES:  No calf tenderness to palpation. No edema BLEs. Danya Maid PSYCH: Mood depressed. Appropriately interactive. Affect WNL    Diagnostics:     CBC:   Recent Labs     06/01/21  0639   WBC 10.0   RBC 4.41   HGB 13.3   HCT 40.9   MCV 92.7   RDW 15.2*        BMP:   Recent Labs     06/01/21  1022      K 3.8      CO2 30   BUN 18   CREATININE 0.54   GLUCOSE 97     BNP: No results for input(s): BNP in the last 72 hours. PT/INR: No results for input(s): PROTIME, INR in the last 72 hours. APTT: No results for input(s): APTT in the last 72 hours. CARDIAC ENZYMES: No results for input(s): CKMB, CKMBINDEX, TROPONINT in the last 72 hours. Invalid input(s): CKTOTAL;3  FASTING LIPID PANEL:  Lab Results   Component Value Date    CHOL 206 (H) 05/05/2021    HDL 29 (L) 05/05/2021    TRIG 185 (H) 05/05/2021     LIVER PROFILE: No results for input(s): AST, ALT, ALB, BILIDIR, BILITOT, ALKPHOS in the last 72 hours.      Current Medications:   Current Facility-Administered Medications: FLUoxetine (PROZAC) capsule 20 mg, 20 mg, Oral, Daily  amitriptyline (ELAVIL) tablet 25 mg, 25 mg, Oral, Nightly  heparin (porcine) injection 5,000 Units, 5,000 Units, Subcutaneous, 3 times per day  acetaminophen (TYLENOL) tablet 1,000 mg, 1,000 mg, Oral, 3 times per day  polyvinyl alcohol (LIQUIFILM TEARS) 1.4 % ophthalmic solution 1 drop, 1 drop, Both Eyes, PRN  sodium chloride flush 0.9 % injection 5-40 mL, 5-40 mL, Intravenous, PRN  promethazine (PHENERGAN) tablet 12.5 mg, 12.5 mg, Oral, Q6H PRN **OR** ondansetron (ZOFRAN) injection 4 mg, 4 mg, Intravenous, Q6H PRN  0.9 % sodium chloride infusion, 25 mL, Intravenous, PRN  albuterol (PROVENTIL) nebulizer solution 2.5 mg, 2.5 mg, Nebulization, Q4H PRN  amLODIPine (NORVASC) tablet 5 mg, 5 mg, Oral, Daily  aspirin chewable tablet 81 mg, 81 mg, Oral, Daily  atorvastatin (LIPITOR) tablet 80 mg, 80 mg, Oral, Daily  budesonide-formoterol (SYMBICORT) 160-4.5 MCG/ACT inhaler 2 puff, 2 puff, Inhalation, BID  dextrose 50 % IV solution, 12.5 g, Intravenous, PRN  glucagon (rDNA) injection 1 mg, 1 mg, Intramuscular, PRN  glucose (GLUTOSE) 40 % oral gel 15 g, 15 g, Oral, PRN  levothyroxine (SYNTHROID) tablet 25 mcg, 25 mcg, Oral, Daily  nicotine (NICODERM CQ) 14 MG/24HR 1 patch, 1 patch, Transdermal, Daily  tiotropium (SPIRIVA RESPIMAT) 2.5 MCG/ACT inhaler 2 puff, 2 puff, Inhalation, Daily  polyethylene glycol (GLYCOLAX) packet 17 g, 17 g, Oral, Daily  senna (SENOKOT) tablet 17.2 mg, 2 tablet, Oral, Daily PRN  bisacodyl (DULCOLAX) suppository 10 mg, 10 mg, Rectal, Daily PRN      Impression/Plan:   Impaired ADLs, gait, and mobility due to:      1. Ischemic R MCA CVA with hemorrhagic conversion and L non-dominant hemiparesis:  PT/OT for gait, mobility, strengthening, endurance, ADLs, and self care. On ASA, atorvastatin. Sling only to be used when patient is ambulating with therapy.  Son is able to perform car transfers safely to his [facility] Ill - reviewed plan for IVELISSE

## 2021-06-02 NOTE — PROGRESS NOTES
[facility] Internal Medicine    Progress Note  Patient assessed for rehabilitation services?: Yes  Family / Caregiver Present: Yes (daughter Rosie Ty, [place] in [place])  Referring Practitioner: Dr Karen Willard  6/2/2021    5:40 PM    Name:   Lanie Vigil  MRN:     [id number]     Acct:      [de-identified]   Room:   [address] [place] [address] Day:  21  Admit Date:  5/10/2021  7:13 AM    PCP:   Aurora Box MD  Code Status:  Full Code    Subjective:     C/C: medical mgmt    Active Problems:    Tobacco abuse    Gastritis    Acute left hemiparesis ([facility]    Acute CVA (cerebrovascular accident) ([facility]    COPD (chronic obstructive pulmonary disease) ([facility]    Essential hypertension    Hyperlipidemia    Pre-diabetes  Resolved Problems:    * No resolved hospital problems. *      Interval History Status: improved. 5/14/21  Patient seen and examined at bedside. No acute overnight events. Afebrile, hemodynamically stable. Continues to work with PT/OT. No acute changes in hemiparesis. No acute complaints. 5/22  Patient is doing much better  No new complaints. Significant last 24 hr data reviewed ;   Vitals:    05/31/21 1906 06/01/21 0645 06/01/21 1917 06/02/21 0737   BP: 124/82 128/75 129/72 133/82   Pulse: 82 89 83 80   Resp: 18 19 18 18   Temp: 98.9 °F (37.2 °C) 98.7 °F (37.1 °C) 97.9 °F (36.6 °C) 98 °F (36.7 °C)   TempSrc: Oral  Oral Oral   SpO2: 98% 96% 93% 93%   Weight:       Height:          No results found for this or any previous visit (from the past 24 hour(s)). No results for input(s): POCGLU in the last 72 hours. No results found.           HPI:   See history in H and P      Review of Systems:     Constitutional:  negative for chills, fevers, sweats  Respiratory:  negative for cough, dyspnea on exertion, hemoptysis, shortness of breath, wheezing  Cardiovascular:  negative for chest pain, chest pressure/discomfort, lower extremity edema, palpitations  Gastrointestinal:  negative for abdominal pain, constipation, diarrhea, nausea, vomiting  Neurological: Positive for left-sided weakness. Negative for dizziness, headache  Data:     Past Medical History:  no change     Social History:  no change    Family History: @no change    Vitals:      I/O (24Hr): No intake or output data in the 24 hours ending 06/02/21 1740    Labs:    URINE ANALYSIS: No results found for: LABURIN     CBC:  Lab Results   Component Value Date    WBC 10.0 06/01/2021    HGB 13.3 06/01/2021     06/01/2021        BMP:    Lab Results   Component Value Date     06/01/2021    K 3.8 06/01/2021     06/01/2021    CO2 30 06/01/2021    BUN 18 06/01/2021    CREATININE 0.54 06/01/2021    GLUCOSE 97 06/01/2021      LIVER PROFILE:  Lab Results   Component Value Date    ALT 13 10/16/2017    AST 13 10/16/2017    PROT 6.9 10/16/2017    BILITOT 0.22 10/16/2017    BILIDIR 0.11 10/16/2017    LABALBU 4.0 10/16/2017               Radiology:  Medications:      Allergies:      Current Meds:   Scheduled Meds:    FLUoxetine  20 mg Oral Daily    amitriptyline  25 mg Oral Nightly    heparin (porcine)  5,000 Units Subcutaneous 3 times per day    acetaminophen  1,000 mg Oral 3 times per day    amLODIPine  5 mg Oral Daily    aspirin  81 mg Oral Daily    atorvastatin  80 mg Oral Daily    budesonide-formoterol  2 puff Inhalation BID    levothyroxine  25 mcg Oral Daily    nicotine  1 patch Transdermal Daily    tiotropium  2 puff Inhalation Daily    polyethylene glycol  17 g Oral Daily     Continuous Infusions:    sodium chloride       PRN Meds: polyvinyl alcohol, sodium chloride flush, promethazine **OR** ondansetron, sodium chloride, albuterol, dextrose, glucagon (rDNA), glucose, senna, bisacodyl      Physical Examination:        /82   Pulse 80   Temp 98 °F (36.7 °C) (Oral)   Resp 18   Ht 5' 8\" (1.727 m)   Wt 191 lb 8 oz (86.9 kg)   SpO2 93%   BMI 29.12 kg/m²   Temp (24hrs), Av °F (36.7 °C), Min:97.9 °F (36.6 °C), Max:98 °F (36.7 °C)    No results for input(s): POCGLU in the last 72 hours. No intake or output data in the 24 hours ending 21 1740    General Appearance:  alert, well appearing, and in no acute distress  Mental status: oriented to person, place, and time with normal affect  Head:  normocephalic, atraumatic. Eye: no icterus, redness, pupils equal and reactive, extraocular eye movements intact, conjunctiva clear  Ear: normal external ear, no discharge, hearing intact  Nose:  no drainage noted  Mouth: mucous membranes moist  Neck: supple, no carotid bruits, thyroid not palpable  Lungs: Clear to auscultation bilaterally. No accessory muscle use. Cardiovascular: normal rate, regular rhythm, no murmur, gallop, rub. Abdomen: Soft, nontender, nondistended, normal bowel sounds, no hepatomegaly or splenomegaly  Neurologic: Left upper and lower extremity weakness, 0-1/5. No sensory loss. Skin: No gross lesions, rashes, bruising or bleeding on exposed skin area  Extremities:  peripheral pulses palpable, no pedal edema or calf pain with palpation    Assessment:        Primary Problem  <principal problem not specified>    Active Hospital Problems    Diagnosis Date Noted    [facility] [I63.9] 05/10/2021    COPD (chronic obstructive pulmonary disease) ([facility] [J44.9] 05/10/2021    Essential hypertension [I10] 05/10/2021    Hyperlipidemia [E78.5] 05/10/2021    Pre-diabetes [R73.03] 05/10/2021    Acute left hemiparesis ([facility] [G81.94]     Gastritis [K29.70]     Tobacco abuse [Z72.0] 2017     Plan:        Continue current therapy regimen  BP well controlled. Continue norvasc 5 mg daily  Copd stable.  continue bronchodilators  Synthroid 25 mcg daily   lipitor 80 mg daily  Asa 81 mg daily  Insulin sliding scale-discontinued as blood sugars have been controlled and stable  nicoderm patch  Continue PT/OT                        MD LEONOR Maloney [address], [address] [place] [address].    Phone [phone]   Fax: [phone]  Answering Service: [phone]

## 2021-06-02 NOTE — PLAN OF CARE
Problem: Neurological  Goal: Maximum potential motor/sensory/cognitive function  6/2/2021 0120 by Katherine Penaloza RN  Outcome: Ongoing     Problem: Pain:  Goal: Pain level will decrease  Description: Pain level will decrease  6/2/2021 0120 by Katherine Penaloza RN  Outcome: Ongoing     Problem: Pain:  Goal: Control of acute pain  Description: Control of acute pain  Outcome: Ongoing     Problem: Pain:  Goal: Control of chronic pain  Description: Control of chronic pain  Outcome: Ongoing     Problem: Neurological  Goal: Maximum potential motor/sensory/cognitive function  6/2/2021 0120 by Katherine Penaloza RN  Outcome: Ongoing     Problem: Skin Integrity:  Goal: Absence of new skin breakdown  Description: Absence of new skin breakdown  6/2/2021 0120 by Katherine Penaloza RN  Outcome: Ongoing     Problem: Falls - Risk of:  Goal: Will remain free from falls  Description: Will remain free from falls  6/2/2021 0120 by Katherine Penaloza RN  Outcome: Ongoing     Problem: Falls - Risk of:  Goal: Absence of physical injury  Description: Absence of physical injury  6/2/2021 0120 by Katherine Penaloza RN  Outcome: Ongoing     Problem: Nutrition  Goal: Optimal nutrition therapy  6/2/2021 0120 by Katherine Penaloza RN  Outcome: Ongoing

## 2021-06-02 NOTE — CARE COORDINATION
Message received from Lori Alan at [facility]. They are out of network for her specific insurance; including both [facility] and [facility].

## 2021-06-02 NOTE — PROGRESS NOTES
Speech Language Pathology  Speech Language Pathology  [facility] Acute Rehab Unit at [facility]    Dysphagia/SpeechLanguage Treatment Note    Date: 6/2/2021  Patients Name: Racquel Stanford  MRN: [id number]  Diagnosis:   Patient Active Problem List   Diagnosis Code    Pneumonia of left lower lobe due to infectious organism J18.9    Tobacco abuse Z72.0    Legionella pneumonia ([facility] A48.1    Dysphagia R13.10    Hyperplastic colon polyp K63.5    Esophageal ring K22.2    Gastritis K29.70    Elbow effusion, right M25.421    Stroke due to occlusion of right middle cerebral artery ([facility]) I63.511    Nihss score 15 R29.715    Acute left hemiparesis ([facility]) G81.94    Cerebrovascular accident (CVA) due to occlusion of right middle cerebral artery ([facility] I63.511    Acute CVA (cerebrovascular accident) ([facility] I63.9    COPD (chronic obstructive pulmonary disease) ([facility]) J44.9    Essential hypertension I10    Hyperlipidemia E78.5    Pre-diabetes R73.03       Pain: 0/10    Speech/Cognitive/Dysphagia Treatment    Treatment time:  9681-9175    Subjective: [x] Alert [x] Cooperative     [] Confused     [] Agitated    [] Lethargic    Objective/Assessment:  Attention:  Min verbal cues needed for increased attention to midline, pt. c extreme L sided neglect. Pt. Able to answer questions re: appt. Cards she is looking at x2 c max A . Recall: n/a      Organization: n/a    Problem Solving/Reasoning:  n/a    Other:  Pt. Completed oral motor exercises x3, 20  reps each. Reduced labial ROM noted on L side. One channel NMES was used for L side oral sling at 6.5 mA, 21 mins. Pt. Reports tolerating her diet of soft and bite sized without difficulty. No family present.      Plan:  [x] Continue ST services    [] Discharge from ST:      Discharge recommendations: [] Inpatient Rehab   [] [place]   [] Outpatient Therapy  [] Follow up at trauma clinic   [] Other:       Treatment completed by: Woodson Homans M. A. CARLA/SLP

## 2021-06-03 PROCEDURE — 6370000000 HC RX 637 (ALT 250 FOR IP): Performed by: STUDENT IN AN ORGANIZED HEALTH CARE EDUCATION/TRAINING PROGRAM

## 2021-06-03 PROCEDURE — 97530 THERAPEUTIC ACTIVITIES: CPT

## 2021-06-03 PROCEDURE — 97112 NEUROMUSCULAR REEDUCATION: CPT

## 2021-06-03 PROCEDURE — 92526 ORAL FUNCTION THERAPY: CPT

## 2021-06-03 PROCEDURE — 6360000002 HC RX W HCPCS: Performed by: PHYSICAL MEDICINE & REHABILITATION

## 2021-06-03 PROCEDURE — 6370000000 HC RX 637 (ALT 250 FOR IP): Performed by: PHYSICAL MEDICINE & REHABILITATION

## 2021-06-03 PROCEDURE — 99231 SBSQ HOSP IP/OBS SF/LOW 25: CPT | Performed by: INTERNAL MEDICINE

## 2021-06-03 PROCEDURE — 97535 SELF CARE MNGMENT TRAINING: CPT

## 2021-06-03 PROCEDURE — 97116 GAIT TRAINING THERAPY: CPT

## 2021-06-03 PROCEDURE — 92507 TX SP LANG VOICE COMM INDIV: CPT

## 2021-06-03 PROCEDURE — 97110 THERAPEUTIC EXERCISES: CPT

## 2021-06-03 PROCEDURE — 97542 WHEELCHAIR MNGMENT TRAINING: CPT

## 2021-06-03 PROCEDURE — 99231 SBSQ HOSP IP/OBS SF/LOW 25: CPT | Performed by: PHYSICAL MEDICINE & REHABILITATION

## 2021-06-03 PROCEDURE — 1180000000 HC REHAB R&B

## 2021-06-03 RX ADMIN — AMLODIPINE BESYLATE 5 MG: 5 TABLET ORAL at 09:58

## 2021-06-03 RX ADMIN — HEPARIN SODIUM 5000 UNITS: 5000 INJECTION INTRAVENOUS; SUBCUTANEOUS at 13:37

## 2021-06-03 RX ADMIN — Medication 2 PUFF: at 09:57

## 2021-06-03 RX ADMIN — FLUOXETINE HYDROCHLORIDE 20 MG: 20 CAPSULE ORAL at 09:56

## 2021-06-03 RX ADMIN — ACETAMINOPHEN 1000 MG: 500 TABLET, FILM COATED ORAL at 07:09

## 2021-06-03 RX ADMIN — ATORVASTATIN CALCIUM 80 MG: 80 TABLET, FILM COATED ORAL at 09:56

## 2021-06-03 RX ADMIN — LEVOTHYROXINE SODIUM 25 MCG: 0.03 TABLET ORAL at 07:09

## 2021-06-03 RX ADMIN — ACETAMINOPHEN 1000 MG: 500 TABLET, FILM COATED ORAL at 21:05

## 2021-06-03 RX ADMIN — ASPIRIN 81 MG CHEWABLE TABLET 81 MG: 81 TABLET CHEWABLE at 09:57

## 2021-06-03 RX ADMIN — HEPARIN SODIUM 5000 UNITS: 5000 INJECTION INTRAVENOUS; SUBCUTANEOUS at 21:05

## 2021-06-03 RX ADMIN — ACETAMINOPHEN 1000 MG: 500 TABLET, FILM COATED ORAL at 13:37

## 2021-06-03 RX ADMIN — AMITRIPTYLINE HYDROCHLORIDE 25 MG: 25 TABLET, FILM COATED ORAL at 21:05

## 2021-06-03 ASSESSMENT — PAIN SCALES - GENERAL
PAINLEVEL_OUTOF10: 2
PAINLEVEL_OUTOF10: 3
PAINLEVEL_OUTOF10: 0
PAINLEVEL_OUTOF10: 0

## 2021-06-03 ASSESSMENT — PAIN DESCRIPTION - PROGRESSION: CLINICAL_PROGRESSION: NOT CHANGED

## 2021-06-03 ASSESSMENT — PAIN SCALES - WONG BAKER: WONGBAKER_NUMERICALRESPONSE: 4

## 2021-06-03 NOTE — PROGRESS NOTES
Speech Language Pathology  Speech Language Pathology  [facility] Acute Rehab Unit at [facility]    Dysphagia/SpeechLanguage Treatment Note    Date: 6/3/2021  Patients Name: Racquel Stanford  MRN: [id number]  Diagnosis:   Patient Active Problem List   Diagnosis Code    Pneumonia of left lower lobe due to infectious organism J18.9    Tobacco abuse Z72.0    Legionella pneumonia ([facility] A48.1    Dysphagia R13.10    Hyperplastic colon polyp K63.5    Esophageal ring K22.2    Gastritis K29.70    Elbow effusion, right M25.421    Stroke due to occlusion of right middle cerebral artery ([facility]) I63.511    Nihss score 15 R29.715    Acute left hemiparesis ([facility]) G81.94    Cerebrovascular accident (CVA) due to occlusion of right middle cerebral artery ([facility] I63.511    Acute CVA (cerebrovascular accident) ([facility] I63.9    COPD (chronic obstructive pulmonary disease) ([facility]) J44.9    Essential hypertension I10    Hyperlipidemia E78.5    Pre-diabetes R73.03       Pain: 0/10    Speech/Cognitive/Dysphagia Treatment    Treatment time:  2640-2896    Subjective: [x] Alert [x] Cooperative     [] Confused     [] Agitated    [] Lethargic    Objective/Assessment:  Attention:  Min verbal cues needed for increased attention to midline, pt. c extreme L sided neglect. Pt. Able to answer questions re: script label c mod A and weather forecast c very min A    Other:  Pt. Completed oral motor exercises x3, 20  reps each. Reduced labial ROM noted on L side. One channel NMES was used for L side oral sling at 5.5 mA, 20 mins. Pt. Reports tolerating her diet of soft and bite sized without difficulty. No family present. Plan:  [x] Continue ST services    [] Discharge from ST:      Discharge recommendations: [] Inpatient Rehab   [] [place]   [] Outpatient Therapy  [] Follow up at trauma clinic   [] Other:       Treatment completed by: Pretty Hayden A.CCC/SLP

## 2021-06-03 NOTE — PROGRESS NOTES
cues to push L LE with R; Mod A for trunk)  Sit to Supine: Moderate assistance (bilateral LEs (R scooped under L); Pt self-adjusts trunk for comfort)  Scooting: Stand by assistance (hips edge of mat)  Comment: Mat, wedge, 2 pillows    Transfers  Sit to Stand: Minimal Assistance (L UE assist; \"upwalker\")  Stand to sit: Minimal Assistance (L UE assist, improved control today, G reach back c R UE)  Bed to Chair: Minimal assistance (Squat pivot to R; requires safety cues & assist for appropriate L LE placement & L UE if not using sling.)  Squat Pivot Transfers: Minimal Assistance (to R; requires safety cues & assist for appropriate L LE placement & L UE if not using sling.)   Comment: Pt impulsive & inconsistent with safety & L-sided awareness. Ambulation 1  Surface: level tile  Device:  (\"Upwalker\")  Other Apparatus: Left;Slider;AFO (Wheelchair brought up afterward)  Assistance: 2 Person assistance; Moderate assistance  Quality of Gait: Demonstrate Fair L quadricep contraction/knee extension with upright walker. Initiates L hip flexion to around R stance LE, but continues to need cue &/or assist to fully advance L LE when fatigued. Requires assist to keep walker close for support/strong forward lean with lagging L LE swing. Requires additional weightshifting cues to allow L LE to fully advance. Gait Deviations: Decreased step length;Decreased step height;Slow Juanita;Decreased head and trunk rotation  Distance: 48'  Comments: Fatigued quickly today, c/o increased pain in R glute. Fair response to weightshifting cues. Stairs/Curb  Stairs?: Yes  Stairs  # Steps : 3  Stairs Height: 4\"  Rails: Right ascending (L UE hand-held assist)  Other Apparatus: Left;AFO (+ slider )  Assistance: 2 Person assistance;Maximum assistance (daughter participating as 2nd assist)  Comment: Pt vocalizes correct sequencing before attempts. Cues to \"tighten up\" L LE (hip and knee) to extend LE before weightbearing to prevent buckling. Mod A to place L foot fully on step ascending (lifts to just below level of step) & to initiate descent with L foot & place appropriately. Pt follows cues for sequencing, direction for turning & extending L knee and hip. Wheelchair Activities  Wheelchair Size: 20\"  Wheelchair Type: Standard  Wheelchair Cushion: Pressure Relieving  Pressure Relief Type: Lateral lean;Self Adjusts  Propulsion: Yes  Propulsion 1  Propulsion: Manual  Level: Level Tile  Method: RUE;RLE  Level of Assistance: Minimal assistance  Description/ Details: Visual and verbal cues to maintain straight path with F- return & little recall from one moment to the next. 180º turn with 2 attempts, including backing up upon cue to avoid hitting L foot on wall. Distance: 100' & 90º turn in AM; 48' & 90º turn in PM    Balance   Posture: Fair  Sitting - Static: Fair (Edge of mat, R UE support, no back support)  Sitting - Dynamic: Fair (Edge of mat, able to return to midline alignment c UE/abd.)  Standing - Static: Poor;+ (Upwalker, brakes locked)  Standing - Dynamic: Poor; - (\"Upwalker\"; requires at least 2 assist. )     Exercises   Other exercises?: Yes  Other exercises 3: L gastroc stretch, 2x30 sec. Other exercises 4: Supine LE ex: 2.5# R; active/assisted/passive ROM L x 15-20. Vibration used in brief sets (3 reps x6 sets, rest breaks between) to elicit 75% of short arc quad with assist for remaining arc.    Other exercises 9: Squat pivot transfers x2, to Pt's R (removing armrest; safety cues for positioning, L LE/UE. )    Activity Tolerance: Patient limited by endurance (Rest breaks PRN)     PT Equipment Recommendations  Other: CTA; pending further progress with mobility    Current Treatment Recommendations: Strengthening, Balance Training, Functional Mobility Training, Transfer Training, Endurance Training, Wheelchair Mobility Training, Gait Training, Stair training, Neuromuscular Re-education, Home Exercise Program, Safety Education & Training, Patient/Caregiver Education & Training, Modalities, Positioning, ROM, Equipment Evaluation, Education, & procurement    Conditions Requiring Skilled Therapeutic Intervention  Body structures, Functions, Activity limitations: Decreased functional mobility ; Decreased strength;Decreased safe awareness;Decreased endurance;Decreased balance;Decreased posture;Decreased coordination;Decreased vision/visual deficit; Decreased fine motor control  Assessment: Patient fatigue limiting tx, from e-stim to ambulation. Fatigues much quicker with more independent advancement of L LE (cues only) with Matilde Sharma. REQUIRES PT FOLLOW UP: Yes  Discharge Recommendations: Patient would benefit from continued therapy after discharge;Home with assist PRN    Goals  Short term goals  Time Frame for Short term goals: 10 days  Short term goal 1: Pt able to roll in bed side to side at min A without bed rail  Short term goal 2: Pt able to perform supine<>sit at mod A  Short term goal 3: Pt able to perform sit<>stand at min/mod A . Short term goal 4: Pt able to progress to pivot transfers at max A   Short term goal 5: Pt able to improve sitting balance at EOB/EOM at SBA for static balance and able to track to left side as well as turn to head to left side to scan her environment with minimal cues. Short term goal 6: Pt able to tolerate standing in // bars with R UE support and assist at mod A  Short term goal 7: Pt able to propel w/c with R UE/R LE distance of 50 to 80 ft, straight path and turn around 180 degrees, at min/mod A   Long term goals  Time Frame for Long term goals : By DC  Long term goal 1: Pt able to roll in bed mod-I  Long term goal 2: Pt able to perform supine <> sit at CGA/min A  Long term goal 3: Pt able to perform pivot transfers min A / mod A and able to scan Left side environment.   Long term goal 4: Pt able to ambulate in // bars, or with appropriate assistive device and/or LE bracing, distance of  20 to 30 ft, min/mod A x 2.  Long term goal 5: Pt able to propel w/c on level surfaces distance 100 to 150 ft, SBA/CGA. Long term goal 6: Educate pt/family in safe technique for mobility and  to go up and down steps to enter/exit home. Long term goal 7: Improve sitting balance at EOM to good, and standing balance with R UE support to fair- to reduce fall risk. Long term goal 8: Improve PASS score to 20/36 improve overall function.         06/03/21 0841 06/03/21 1404   PT Individual Minutes   Time In 0840 1345   Time Out 0933 1420   Minutes 52 31     Electronically signed by Maureen Beck PTA on 6/3/21 at 4:25 PM EDT

## 2021-06-03 NOTE — CARE COORDINATION
Spoke with daughter Linda Blank and notified of pt discharge on 6/07. Contacted [facility] and spoke with Jose Lopez (Ref # [id number]) he confirmed [facility] is in network. Additional facilities were provided. Rebecca Park at [facility] for clarification. She stated they are in network but the costs out weigh the benefits and would not be able to accept. She has been in contact with Linda Blank and provided additional information to her. Spoke with Linda Blank and discussed other options. Daughter [address]. Referral sent. Faxed copy of new insurance card to [facility] administration. 3:00 PM- follow up call to [facility]. Per admissions they do not have record of the referral and requested hard fax be sent to [phone].  Fax sent

## 2021-06-03 NOTE — PROGRESS NOTES
advance L LE when fatigued. Requires assist to keep walker close for support/strong forward; slight R lean, G return to cue. Gait Deviations: Decreased step length, Decreased step height, Slow Juanita, Decreased head and trunk rotation  Distance: 40'  Comments: Seated rest between distances. Transfers  Sit to Stand: Minimal Assistance (L UE assist; walker lift)  Stand to sit: Minimal Assistance (L UE assist, improved control today, G reach back c R UE)  Bed to Chair: Minimal assistance (Squat pivot to R; requires safety cues & assist)  Stand Pivot Transfers: Maximum Assistance (Daughter hands-on; inadequate communication re: technique. )  Squat Pivot Transfers: Minimal Assistance (to R; requires safety cues & assist)  Lateral Transfers: Minimal Assistance  Car Transfer: Maximum Assistance (+ SBA (son participating in family training). Education for positioning, safety, sequencing, set-up. Drive side back seat to facilitate transfer to Pt's R; going to Pt's L to exit. )  Comment: Pt impulsive & inconsistent with safety & L-sided awareness. Ambulation  Ambulation?: Yes  More Ambulation?: No  Ambulation 1  Surface: level tile  Device:  (\"Upwalker\")  Other Apparatus: Left, Slider, AFO, Wheelchair follow  Assistance: 2 Person assistance, Moderate assistance  Quality of Gait: Continued to demonstrate L quadricep contraction/knee extension with upright walker as with walker lift. Initiates L hip flexion, but continues to need assist to fully advance L LE when fatigued. Requires assist to keep walker close for support/strong forward; slight R lean, G return to cue. Gait Deviations: Decreased step length, Decreased step height, Slow Juanita, Decreased head and trunk rotation  Distance: 40'  Comments: Seated rest between distances.      Surface: level tile  Ambulation 1  Surface: level tile  Device:  (\"Upwalker\")  Other Apparatus: Left, Slider, AFO, Wheelchair follow  Assistance: 2 Person assistance, Moderate assistance  Quality of Gait: Continued to demonstrate L quadricep contraction/knee extension with upright walker as with walker lift. Initiates L hip flexion, but continues to need assist to fully advance L LE when fatigued. Requires assist to keep walker close for support/strong forward; slight R lean, G return to cue. Gait Deviations: Decreased step length, Decreased step height, Slow Juanita, Decreased head and trunk rotation  Distance: 40'  Comments: Seated rest between distances. OT:  ADL  Equipment Provided: Reacher, Long-handled sponge  Feeding: Modified independent   Grooming: Setup (w/c level sinkside, flip top toothpaste. )  UE Bathing: None (Pt declines. )  LE Bathing: None (pt declined this date)  UE Dressing: Minimal assistance (Assist with lynette tech to thread LUE)  LE Dressing: Maximum assistance (Footdressing only this date; B TEDs/shoes/L AFO.)  Toileting: Moderate assistance (Assist to pull up on L side. Increased bal A w/ pt participa)  Additional Comments: Cues to increase independence as able with various techniques. Balance  Sitting Balance: Stand by assistance (sup-SBA pending activity. G bal noted during meal engagement)  Standing Balance: Maximum assistance (mod-max for self care participation. )   Standing Balance  Time: ~1 min x2   Activity: toileting transfer and tasks. Comment: RUE support, pt declines to remove   Functional Mobility  Functional - Mobility Device: Wheelchair  Activity: To/from bathroom  Assist Level: Moderate assistance  Functional Mobility Comments: unable to go straight with use of RUE and RLE and LLE on foot rest, also runs into items on L     Bed mobility  Bridging: Minimal assistance (positioning L LE)  Rolling to Left: Minimal assistance  Rolling to Right: Maximum assistance  Supine to Sit: Stand by assistance (HOB slightly elevated, use of bed rail. )  Sit to Supine:  Moderate assistance (Assist with BLEs over EOB. )  Scooting: Minimal assistance (to scoot back in chair, squaring hips)  Comment: HOB elevated, use of bed rail. Transfers  Stand Step Transfers: Dependent/Total  Stand Pivot Transfers: Moderate assistance (EOB>w/c towards R side. )  Sit to stand: Minimal assistance, Moderate assistance  Stand to sit: Moderate assistance (for controlled sit. )  Transfer Comments: fair technique. Toilet Transfers  Toilet - Technique: Stand pivot  Equipment Used: Grab bars  Toilet Transfer: Moderate assistance  Toilet Transfers Comments: w/c<>toilet R<>L. Cues for technique to ensure safety. Pt requires cues to participate in clothing mgmt task while in standing due to fear of falling. Shower Transfers  Shower - Transfer From: Wheelchair  Shower - Transfer Type: To and From  Shower - Transfer To: Transfer tub bench  Shower - Technique: Stand pivot  Shower Transfers: Moderate assistance  Shower Transfers Comments: R<>L. Grab bars for support. Wheelchair Bed Transfers  Wheelchair/Bed - Technique: Stand pivot  Equipment Used: Bed, Wheelchair  Level of Asssistance: Maximum assistance (Block L knee)    SPEECH:  Subjective: [x]? Alert     [x]? Cooperative     []? Confused     []? Agitated    []? Lethargic     Objective/Assessment:  Attention:  Min verbal cues needed for increased attention to midline, pt. c extreme L sided neglect. Pt. Able to answer questions re: script label c mod A and weather forecast c very min A     Other:  Pt. Completed oral motor exercises x3, 20  reps each. Reduced labial ROM noted on L side. One channel NMES was used for L side oral sling at 5.5 mA, 20 mins. Pt. Reports tolerating her diet of soft and bite sized without difficulty. No family present. Objective:  /83   Pulse 79   Temp 97.8 °F (36.6 °C) (Oral)   Resp 19   Ht 5' 8\" (1.727 m)   Wt 191 lb 8 oz (86.9 kg)   SpO2 94%   BMI 29.12 kg/m²       GEN: Well developed, well nourished, in NAD  HEENT:  NCAT. PERRL. EOMI.   Mucous membranes pink and moist. PULM:  Clear to ausculation. No rales or rhonchi. Respirations WNL and unlabored. CV:  Regular rate rhythm. No murmurs or gallops. GI:  Abdomen soft. Nontender. Non-distended. BS + and equal.    NEUROLOGICAL: A&O x3. Sensation intact to light touch. Stable impaired L CN VII. Persistent L neglect with R gaze preference. MSK:  Functional ROM RUE and RLEs. Impaired AROM LUE and LLE. Ej Shafer Motor testing 4+/5 key muscles RUE and RLE. L shoulder shrug 1/5, L hip flexion 2/5. Distal LUE and LLE muscles 0/5. No L elbow abnormality palpable or deformity noted. SKIN: Warm dry and intact. Good turgor. EXTREMITIES:  No calf tenderness to palpation. No edema BLEs. Ej Shafer PSYCH: Mood depressed. Appropriately interactive. Affect WNL    Diagnostics:     CBC:   Recent Labs     06/01/21  0639   WBC 10.0   RBC 4.41   HGB 13.3   HCT 40.9   MCV 92.7   RDW 15.2*        BMP:   Recent Labs     06/01/21  1022      K 3.8      CO2 30   BUN 18   CREATININE 0.54   GLUCOSE 97     BNP: No results for input(s): BNP in the last 72 hours. PT/INR: No results for input(s): PROTIME, INR in the last 72 hours. APTT: No results for input(s): APTT in the last 72 hours. CARDIAC ENZYMES: No results for input(s): CKMB, CKMBINDEX, TROPONINT in the last 72 hours. Invalid input(s): CKTOTAL;3  FASTING LIPID PANEL:  Lab Results   Component Value Date    CHOL 206 (H) 05/05/2021    HDL 29 (L) 05/05/2021    TRIG 185 (H) 05/05/2021     LIVER PROFILE: No results for input(s): AST, ALT, ALB, BILIDIR, BILITOT, ALKPHOS in the last 72 hours.      Current Medications:   Current Facility-Administered Medications: FLUoxetine (PROZAC) capsule 20 mg, 20 mg, Oral, Daily  amitriptyline (ELAVIL) tablet 25 mg, 25 mg, Oral, Nightly  heparin (porcine) injection 5,000 Units, 5,000 Units, Subcutaneous, 3 times per day  acetaminophen (TYLENOL) tablet 1,000 mg, 1,000 mg, Oral, 3 times per day  polyvinyl alcohol (LIQUIFILM TEARS) 1.4 % ophthalmic solution 1 has natural tears. 5. Cognitive impairment: SLP treating  6. Dysphagia/aphasia: SLP treating. Diet upgraded. 7. HTN/Hyperlipidemia: on amlodipine  8. DM: on insulin sliding scale  9. COPD/asthma: on albuterol nebulizers prn, on symbicort BID, spiriva  10. Adjustment disorder with depressed mood: Improved. started fluoxetine 5/15  11. Esophageal Ring: Will monitor - on dysphagia diet  12. Hypothyroidism: on levothyroxine  13. Nicotine dependence: on Nicoderm  14. Bowel Management: Miralax daily, senokot prn, dulcolax prn. 15. DVT Prophylaxis:  heparin, SCD's while in bed and MAXIM's   16. Internal medicine for medical management    Electronically signed by Marisa Rivera MD on 6/3/2021 at 10:50 AM      This note is created with the assistance of a speech recognition program.  While intending to generate a document that actually reflects the content of the visit, the document can still have some errors including those of syntax and sound a like substitutions which may escape proof reading. In such instances, actual meaning can be extrapolated by contextual diversion.

## 2021-06-03 NOTE — PLAN OF CARE
Problem: Neurological  Goal: Maximum potential motor/sensory/cognitive function  Outcome: Ongoing     Problem: Neurological  Goal: Maximum potential motor/sensory/cognitive function  Outcome: Ongoing     Problem: Nutrition  Goal: Optimal nutrition therapy  Outcome: Ongoing     Problem: Pain:  Goal: Pain level will decrease  Description: Pain level will decrease  Outcome: Ongoing  Goal: Control of acute pain  Description: Control of acute pain  Outcome: Ongoing  Goal: Control of chronic pain  Description: Control of chronic pain  Outcome: Ongoing     Problem: Skin Integrity:  Goal: Will show no infection signs and symptoms  Description: Will show no infection signs and symptoms  Outcome: Ongoing  Goal: Absence of new skin breakdown  Description: Absence of new skin breakdown  Outcome: Ongoing     Problem: Falls - Risk of:  Goal: Will remain free from falls  Description: Will remain free from falls  Outcome: Ongoing  Goal: Absence of physical injury  Description: Absence of physical injury  Outcome: Ongoing     Problem: Skin Integrity:  Goal: Will show no infection signs and symptoms  Description: Will show no infection signs and symptoms  Outcome: Ongoing  Goal: Absence of new skin breakdown  Description: Absence of new skin breakdown  Outcome: Ongoing

## 2021-06-04 PROCEDURE — 99231 SBSQ HOSP IP/OBS SF/LOW 25: CPT | Performed by: INTERNAL MEDICINE

## 2021-06-04 PROCEDURE — 1180000000 HC REHAB R&B

## 2021-06-04 PROCEDURE — 92507 TX SP LANG VOICE COMM INDIV: CPT

## 2021-06-04 PROCEDURE — 6360000002 HC RX W HCPCS: Performed by: PHYSICAL MEDICINE & REHABILITATION

## 2021-06-04 PROCEDURE — 6370000000 HC RX 637 (ALT 250 FOR IP): Performed by: PHYSICAL MEDICINE & REHABILITATION

## 2021-06-04 PROCEDURE — 97110 THERAPEUTIC EXERCISES: CPT

## 2021-06-04 PROCEDURE — 6370000000 HC RX 637 (ALT 250 FOR IP): Performed by: STUDENT IN AN ORGANIZED HEALTH CARE EDUCATION/TRAINING PROGRAM

## 2021-06-04 PROCEDURE — 97112 NEUROMUSCULAR REEDUCATION: CPT

## 2021-06-04 PROCEDURE — 97116 GAIT TRAINING THERAPY: CPT

## 2021-06-04 PROCEDURE — 97535 SELF CARE MNGMENT TRAINING: CPT

## 2021-06-04 PROCEDURE — 97530 THERAPEUTIC ACTIVITIES: CPT

## 2021-06-04 PROCEDURE — 99232 SBSQ HOSP IP/OBS MODERATE 35: CPT | Performed by: PHYSICAL MEDICINE & REHABILITATION

## 2021-06-04 PROCEDURE — 97542 WHEELCHAIR MNGMENT TRAINING: CPT

## 2021-06-04 RX ADMIN — ATORVASTATIN CALCIUM 80 MG: 80 TABLET, FILM COATED ORAL at 09:42

## 2021-06-04 RX ADMIN — FLUOXETINE HYDROCHLORIDE 20 MG: 20 CAPSULE ORAL at 09:45

## 2021-06-04 RX ADMIN — HEPARIN SODIUM 5000 UNITS: 5000 INJECTION INTRAVENOUS; SUBCUTANEOUS at 14:30

## 2021-06-04 RX ADMIN — HEPARIN SODIUM 5000 UNITS: 5000 INJECTION INTRAVENOUS; SUBCUTANEOUS at 21:49

## 2021-06-04 RX ADMIN — Medication 2 PUFF: at 09:45

## 2021-06-04 RX ADMIN — HEPARIN SODIUM 5000 UNITS: 5000 INJECTION INTRAVENOUS; SUBCUTANEOUS at 05:55

## 2021-06-04 RX ADMIN — AMITRIPTYLINE HYDROCHLORIDE 25 MG: 25 TABLET, FILM COATED ORAL at 21:49

## 2021-06-04 RX ADMIN — LEVOTHYROXINE SODIUM 25 MCG: 0.03 TABLET ORAL at 05:55

## 2021-06-04 RX ADMIN — AMLODIPINE BESYLATE 5 MG: 5 TABLET ORAL at 09:42

## 2021-06-04 RX ADMIN — POLYETHYLENE GLYCOL 3350 17 G: 17 POWDER, FOR SOLUTION ORAL at 09:42

## 2021-06-04 RX ADMIN — Medication 2 PUFF: at 09:46

## 2021-06-04 RX ADMIN — ACETAMINOPHEN 1000 MG: 500 TABLET, FILM COATED ORAL at 05:55

## 2021-06-04 RX ADMIN — ASPIRIN 81 MG CHEWABLE TABLET 81 MG: 81 TABLET CHEWABLE at 09:43

## 2021-06-04 RX ADMIN — ACETAMINOPHEN 1000 MG: 500 TABLET, FILM COATED ORAL at 14:30

## 2021-06-04 RX ADMIN — ACETAMINOPHEN 1000 MG: 500 TABLET, FILM COATED ORAL at 21:49

## 2021-06-04 ASSESSMENT — PAIN SCALES - GENERAL
PAINLEVEL_OUTOF10: 0
PAINLEVEL_OUTOF10: 5
PAINLEVEL_OUTOF10: 0
PAINLEVEL_OUTOF10: 3
PAINLEVEL_OUTOF10: 0
PAINLEVEL_OUTOF10: 2

## 2021-06-04 NOTE — PROGRESS NOTES
[facility]: REJI ARELLANO   ACUTE REHABILITATION OCCUPATIONAL THERAPY  DAILY NOTE    Date: 21  Patient Name: Zudro Matt      Room: 4066/1225-22    MRN: [id number]   : 1955  (63 y.o.)  Gender: female      Diagnosis: ischemic CVA,  subacute right MCA distribution infarct, L lynette, dysphagia, L neglect, loop recorder 21 (Dr. Misty Cruz)  Additional Pertinent Hx: 72-year-old female who was found down, last known well approximately 21 hours before. Patient was found to have a right MCA M1 occlusion. Thrombectomy was not successful, MRI showed patient had a right MCA stroke with hemorrhagic conversion. 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 :  (loop recorder)  Other position/activity restrictions:  L neglect with LUE/LLE deficits, neglect improving. Required Braces or Orthoses?: No  Equipment Used: Bed, Wheelchair    Subjective  Subjective: \"I dont want to get a shower today, I will do that tomorrow\"  Comments: Pt agreeable to occupational therapy. Pt declines shower on this date in preperation for graduation tomorrow. Pt was agreeable to complete shower tomorrow prior to graduation. Patient Currently in Pain: Denies  Restrictions/Precautions: Fall Risk  Overall Orientation Status: Within Functional Limits  Patient Observation  Observations: Pt continues to demonstrate flat affect in regards to therapy POC       Objective  Cognition  Overall Cognitive Status: Impaired  Arousal/Alertness: Appropriate responses to stimuli  Following Directions:  Follows one step commands  Attention Span: Attends with cues to redirect  Safety Judgement: Decreased awareness of need for safety  Insights: Decreased awareness of deficits  Sequencing and Organization: Assistance required to generate solutions  Perception  Overall Perceptual Status: Impaired  Unilateral Attention: Cues to attend left visual field;Cues to attend to left side of body  Initiation: Cues to initiate tasks  Motor Planning: Cues to use objects appropriately (appropriate lynette tech/AE use. )  Balance  Sitting Balance: Stand by assistance  Standing Balance: Dependent/Total (Min A with nilson stedy; max A with stand pivot transfers)  Bed mobility  Supine to Sit: Stand by assistance (HOB slightly elevated, use of bed rail. )  Sit to Supine: Moderate assistance (Assist with BLEs over EOB. )  Comment: Increased time to complete  Transfers  Stand Pivot Transfers: Maximum assistance (Max - mod A depending on impulsive behavior)  Sit to stand: Minimal assistance (Min A- CGA with nilson stedy)  Stand to sit: Minimal assistance (Controlled sit)  Transfer Comments: Pt requesting to complete standing with lower body dressing with use of nilson stedy per pt request. Pt agreeable to complete stand pivot transfers from bed <> wheelchair<> mat. Standing Balance  Time: AM: 1 minute x 3; PM: 1 minutes x 4  Activity: Functional transfers, lower body dressing  Comment: lower body dressing task with use of nilson stedy per pt request despite education on progression with self care tasks        Type of ROM/Therapeutic Exercise  Type of ROM/Therapeutic Exercise: Self PROM  Comment: Pt completed self PROM 15 reps each with verbal cues for technique. Pt tolerated self PROM well. Pt re-educated on completing exercises in room with pt demonstrating poor concern to complete. Exercises  Shoulder Elevation: x  Shoulder Flexion: x  Horizontal ABduction: x  Horizontal ADduction: x  Elbow Flexion: x  Elbow Extension: x  Wrist Flexion: x  Wrist Extension: x  Finger Flexion: x  Finger Extension: x  Weight Bearing  Weight Bearing Technique: Yes  LUE Weight Bearing: Extended arm seated  Response To Weight Bearing Technique: OT facilitated pt in weight bearing throught BASIM on this date. Pt positioned LUE into extended position while sitting on edge of mat.  Pt was educated on leaning towards the affected side with support provided from therapist through wrist and elbow while completing a functional actitvity with RUE. Pt was able to tolerate sitting while placing weight and balancing through LUE. Pt verbalized slight discomfort throught elbow while completing task. Additional Activities: Other  Additional Activities: Pt educated importance of completing toileting with staff at facility tomorrow prior to [facility]IS[facility] due to safety. Pt is unsafe to complete toilet transfers in a public rest room at this time. Pt demonstrated understanding. Weight Bearing  Weight Bearing Technique: Yes  LUE Weight Bearing: Extended arm seated  Response To Weight Bearing Technique: OT facilitated pt in weight bearing throught LUE on this date. Pt positioned LUE into extended position while sitting on edge of mat. Pt was educated on leaning towards the affected side with support provided from therapist through wrist and elbow while completing a functional actitvity with RUE. Pt was able to tolerate sitting while placing weight and balancing through LUE. Pt verbalized slight discomfort throught elbow while completing task. ADL  Grooming: Setup (w/c level sinkside/flip top toothpaste one hand open bottle)  UE Bathing: None (Pt denies)  LE Bathing: None (Pt denies)  UE Dressing: Minimal assistance (A with lynette dressing with LUE)  LE Dressing: Dependent/Total (nilson stedy; A with threading RLE)  Toileting: None (Pt denies)  Additional Comments: Pt completed grooming task of brushing teeth and hair while sitting in wheelchair at sink side. Pt demonstrated good ability to open mouthwash one handed on this date. Pt denies getting washed up on this date but was agreeable to change clothing with encouragement.  Pt completed upper body dressing with A for LUE with pt dressing shirt backwards with pt unwilling to adjust. Pt requesting to complete lower body dressing with use of nilson stedy at this time

## 2021-06-04 NOTE — PLAN OF CARE
Problem: Neurological  Goal: Maximum potential motor/sensory/cognitive function  6/4/2021 0704 by Sherman Deng RN  Outcome: Ongoing     Problem: Nutrition  Goal: Optimal nutrition therapy  6/4/2021 0704 by Sherman Deng RN  Outcome: Ongoing     Problem: Pain:  Goal: Control of acute pain  Description: Control of acute pain  6/4/2021 0652 by Sherman Deng RN  Outcome: Ongoing     Problem: Pain:  Goal: Pain level will decrease  Description: Pain level will decrease  6/4/2021 0704 by Sherman Deng RN  Outcome: Ongoing     Problem: Skin Integrity:  Goal: Will show no infection signs and symptoms  Description: Will show no infection signs and symptoms  6/4/2021 0704 by Sherman Deng RN  Outcome: Ongoing     Problem: Falls - Risk of:  Goal: Will remain free from falls  Description: Will remain free from falls  6/4/2021 0704 by Sherman Deng RN  Outcome: Ongoing

## 2021-06-04 NOTE — PROGRESS NOTES
[facility]    Progress Note  Chart Reviewed: Yes  Patient assessed for rehabilitation services?: Yes  Family / Caregiver Present: Yes (daughter Sarah Villavicencio, christian'daughter Rod Sierra in PM)  Referring Practitioner: Dr Janina Medellin  Comments: Pt is cooperative with PT. After PT tx, pt requests return to bed.  6/4/2021    1:55 PM    Name:   Ilana Berger  MRN:     [id number]     Acct:      [de-identified]   Room:   [address] [place] [address] Day:  22  Admit Date:  5/10/2021  7:13 AM    PCP:   Harish Box MD  Code Status:  Full Code    Subjective:     C/C: medical mgmt    Active Problems:    Tobacco abuse    Gastritis    Acute left hemiparesis ([facility]    Acute CVA (cerebrovascular accident) ([facility]    COPD (chronic obstructive pulmonary disease) ([facility]    Essential hypertension    Hyperlipidemia    Pre-diabetes  Resolved Problems:    * No resolved hospital problems. *      Interval History Status: improved. 5/14/21  Patient seen and examined at bedside. No acute overnight events. Afebrile, hemodynamically stable. Continues to work with PT/OT. No acute changes in hemiparesis. No acute complaints. 5/22  Patient is doing much better  No new complaints. Significant last 24 hr data reviewed ;   Vitals:    06/03/21 0800 06/03/21 1855 06/04/21 0730 06/04/21 0735   BP: 119/83 128/68 132/85 118/68   Pulse: 79 77 88 68   Resp: 19 18 18 18   Temp: 97.8 °F (36.6 °C) 98 °F (36.7 °C) 98 °F (36.7 °C) 98.3 °F (36.8 °C)   TempSrc: Oral Oral Oral Oral   SpO2: 94% 97% 96% 98%   Weight:       Height:          No results found for this or any previous visit (from the past 24 hour(s)). No results for input(s): POCGLU in the last 72 hours. No results found.           HPI:   See history in H and P      Review of Systems:     Constitutional:  negative for chills, fevers, sweats  Respiratory:  negative for cough, dyspnea on exertion, hemoptysis, shortness of breath, wheezing  Cardiovascular:  negative for chest pain, chest pressure/discomfort, lower extremity edema, palpitations  Gastrointestinal:  negative for abdominal pain, constipation, diarrhea, nausea, vomiting  Neurological: Positive for left-sided weakness. Negative for dizziness, headache  Data:     Past Medical History:  no change     Social History:  no change    Family History: @no change    Vitals:      I/O (24Hr): No intake or output data in the 24 hours ending 06/04/21 1355    Labs:    URINE ANALYSIS: No results found for: LABURIN     CBC:  Lab Results   Component Value Date    WBC 10.0 06/01/2021    HGB 13.3 06/01/2021     06/01/2021        BMP:    Lab Results   Component Value Date     06/01/2021    K 3.8 06/01/2021     06/01/2021    CO2 30 06/01/2021    BUN 18 06/01/2021    CREATININE 0.54 06/01/2021    GLUCOSE 97 06/01/2021      LIVER PROFILE:  Lab Results   Component Value Date    ALT 13 10/16/2017    AST 13 10/16/2017    PROT 6.9 10/16/2017    BILITOT 0.22 10/16/2017    BILIDIR 0.11 10/16/2017    LABALBU 4.0 10/16/2017               Radiology:  Medications:      Allergies:      Current Meds:   Scheduled Meds:    FLUoxetine  20 mg Oral Daily    amitriptyline  25 mg Oral Nightly    heparin (porcine)  5,000 Units Subcutaneous 3 times per day    acetaminophen  1,000 mg Oral 3 times per day    amLODIPine  5 mg Oral Daily    aspirin  81 mg Oral Daily    atorvastatin  80 mg Oral Daily    budesonide-formoterol  2 puff Inhalation BID    levothyroxine  25 mcg Oral Daily    nicotine  1 patch Transdermal Daily    tiotropium  2 puff Inhalation Daily    polyethylene glycol  17 g Oral Daily     Continuous Infusions:    sodium chloride       PRN Meds: polyvinyl alcohol, sodium chloride flush, promethazine **OR** ondansetron, sodium chloride, albuterol, dextrose, glucagon (rDNA), glucose, senna, bisacodyl      Physical Examination:        /68   Pulse 68   Temp 98.3 °F (36.8 °C) scale-discontinued as blood sugars have been controlled and stable  nicoderm patch  Continue PT/OT                        MD LEONOR Diaz[address], [address] [place] [address].    Phone [phone]   Fax: [phone]  Answering Service: [phone]

## 2021-06-04 NOTE — PLAN OF CARE
Problem: Neurological  Goal: Maximum potential motor/sensory/cognitive function  6/4/2021 0703 by Melba Soto RN  Outcome: Ongoing     Problem: Neurological  Goal: Maximum potential motor/sensory/cognitive function  6/4/2021 0703 by Melba Soto RN  Outcome: Ongoing     Problem: Nutrition  Goal: Optimal nutrition therapy  6/4/2021 0703 by Melba Soto RN  Outcome: Ongoing     Problem: Pain:  Goal: Pain level will decrease  Description: Pain level will decrease  6/4/2021 0703 by Melba Soto RN  Outcome: Ongoing     Problem: Pain:  Goal: Control of acute pain  Description: Control of acute pain  6/4/2021 0652 by Melba Soto RN  Outcome: Ongoing     Problem: Skin Integrity:  Goal: Will show no infection signs and symptoms  Description: Will show no infection signs and symptoms  6/4/2021 0703 by Melba Soto RN  Outcome: Ongoing     Problem: Falls - Risk of:  Goal: Will remain free from falls  Description: Will remain free from falls  6/4/2021 0703 by Melba Soto RN  Outcome: Ongoing     Problem: Falls - Risk of:  Goal: Will remain free from falls  Description: Will remain free from falls  6/4/2021 0703 by Melba Soto RN  Outcome: Ongoing

## 2021-06-04 NOTE — PROGRESS NOTES
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 Patient is doing well again today. She reports some mild c/o insomnia and headache but declines any medication changes are needed to address them. ROS:  Denies fevers, chills, sweats. No chest pain, palpitations, lightheadedness. Denies coughing, wheezing or shortness of breath. Denies abdominal pain, nausea, diarrhea or constipation. No new areas of joint pain. Denies new areas of numbness or weakness. Denies new anxiety  issues. No new skin problems. Rehabilitation:   Progressing in therapies. PT:  Restrictions/Precautions: Fall Risk  Implants present? :  (loop recorder)  Other position/activity restrictions:  L neglect with LUE/LLE deficits, neglect improving. Transfers  Sit to Stand: Minimal Assistance (L UE assist; \"upwalker\")  Stand to sit: Minimal Assistance (L UE assist, improved control today, G reach back c R UE)  Bed to Chair: Minimal assistance (Squat pivot to R; requires safety cues & assist for appropriate L LE placement & L UE if not using sling.)  Stand Pivot Transfers: Maximum Assistance (Daughter hands-on; inadequate communication re: technique. )  Squat Pivot Transfers: Minimal Assistance (to R; requires safety cues & assist for appropriate L LE placement & L UE if not using sling.)  Lateral Transfers: Minimal Assistance  Car Transfer: Maximum Assistance (+ SBA (son participating in family training). Education for positioning, safety, sequencing, set-up. Drive side back seat to facilitate transfer to Pt's R; going to Pt's L to exit. )  Comment: Pt impulsive & inconsistent with safety & L-sided awareness.    Ambulation 1  Surface: level tile  Device:  (\"Upwalker\")  Other Apparatus: Left, Slider, AFO (Wheelchair brought up afterward)  Assistance: 2 Person assistance (Max Ax1 + Mod Ax1)  Quality of Gait: Assisting pt with advancing left LE, assisting with terminal knee, cues for sequencing, cues for upright posture  Gait Deviations: Decreased step length, Decreased step height, Slow Juanita, Decreased head and trunk rotation  Distance: 50'  Comments: tactile assist for sequencing, weight shifting, terminal knee, pt impulsive and attempting to take steps with right LE prior to advancing left LE    Transfers  Sit to Stand: Minimal Assistance (L UE assist; \"upwalker\")  Stand to sit: Minimal Assistance (L UE assist, improved control today, G reach back c R UE)  Bed to Chair: Minimal assistance (Squat pivot to R; requires safety cues & assist for appropriate L LE placement & L UE if not using sling.)  Stand Pivot Transfers: Maximum Assistance (Daughter hands-on; inadequate communication re: technique. )  Squat Pivot Transfers: Minimal Assistance (to R; requires safety cues & assist for appropriate L LE placement & L UE if not using sling.)  Lateral Transfers: Minimal Assistance  Car Transfer: Maximum Assistance (+ SBA (son participating in family training). Education for positioning, safety, sequencing, set-up. Drive side back seat to facilitate transfer to Pt's R; going to Pt's L to exit. )  Comment: Pt impulsive & inconsistent with safety & L-sided awareness.    Ambulation  Ambulation?: Yes  More Ambulation?: No  Ambulation 1  Surface: level tile  Device:  (\"Upwalker\")  Other Apparatus: Left, Slider, AFO (Wheelchair brought up afterward)  Assistance: 2 Person assistance (Max Ax1 + Mod Ax1)  Quality of Gait: Assisting pt with advancing left LE, assisting with terminal knee, cues for sequencing, cues for upright posture  Gait Deviations: Decreased step length, Decreased step height, Slow Juanita, Decreased head and trunk rotation  Distance: 50'  Comments: tactile assist for sequencing, weight shifting, terminal knee, pt impulsive and attempting to take steps with right LE prior to advancing left LE    Surface: level tile  Ambulation 1  Surface: level support 40sec, No UE support <5sec. Tolerating locking LLE <3sec per self, Heavy anxiety standing unsupported; VC for posture/midline and lean correction with F carryover  Functional Mobility  Functional - Mobility Device: Wheelchair  Activity: To/from bathroom  Assist Level: Moderate assistance  Functional Mobility Comments: unable to go straight with use of RUE and RLE and LLE on foot rest, also runs into items on L, VC for Self assist LE, f carryover/initiation with repetition     Bed mobility  Bridging: Minimal assistance  Rolling to Left: Minimal assistance  Rolling to Right: Maximum assistance  Supine to Sit: Stand by assistance (HOB slightly elevated, use of bed rail. )  Sit to Supine: Moderate assistance (Assist with BLEs over EOB. )  Scooting: Stand by assistance (hips edge of mat)  Comment: increased time, VC to tend to L  Transfers  Stand Step Transfers: Dependent/Total  Stand Pivot Transfers: Unable to assess (refused, adamant for saratedy)  Sit to stand: Minimal assistance, Contact guard assistance  Stand to sit: Moderate assistance (for controlled sit. )  Transfer Comments: Sarstedy; fair technique; 3/7 initiation/ability to place LUE on GB/dycem utilized for improved  with f- result, SBA for WB tolerance   Toilet Transfers  Toilet - Technique: Stand pivot  Equipment Used: Grab bars  Toilet Transfer: Moderate assistance  Toilet Transfers Comments: Adamant for sarastedy     Shower Transfers  Shower - Transfer From: Wheelchair  Shower - Transfer Type: To and From  Shower - Transfer To: Transfer tub bench  Shower - Technique: Stand pivot  Shower Transfers: Moderate assistance  Shower Transfers Comments: R<>L. Grab bars for support. Wheelchair Bed Transfers  Wheelchair/Bed - Technique: Stand pivot  Equipment Used: Bed, Wheelchair  Level of Asssistance: Maximum assistance (Block L knee)    SPEECH:  Subjective: [x]? Alert     [x]? Cooperative     []? Confused     []? Agitated    []? Lethargic     Objective/Assessment:  Attention:  Min verbal cues needed for increased attention to midline, pt. c extreme L sided neglect. Pt. Completed visual scanning task with 73% accuracy praveen, required mod cues to attend to L side for task, missing multiple columns from handout.       Other:  Pt. Completed oral motor exercises x6, 15-20  reps each. Reduced labial ROM noted on L side. VitalStim was not utilized during session d/t shortened tx time -- Pt. toileting upon arrival.   Pt. with word search booklet present on tray, reporting daughter brought it for her. Education provided re: use of word search throughout day to work on L sided neglect and rationale for tasks (e.g., safety concerns with L sided neglect). Pt. Verbalized understanding and agreeable.       Pt. Reports tolerating her diet of soft and bite sized without difficulty. No family present. Objective:  /68   Pulse 68   Temp 98.3 °F (36.8 °C) (Oral)   Resp 18   Ht 5' 8\" (1.727 m)   Wt 191 lb 8 oz (86.9 kg)   SpO2 98%   BMI 29.12 kg/m²       GEN: Well developed, well nourished, in NAD  HEENT:  NCAT. PERRL. EOMI. Mucous membranes pink and moist.   PULM:  Clear to ausculation. No rales or rhonchi. Respirations WNL and unlabored. CV:  bradycardic rate regular rhythm. No murmurs or gallops. GI:  Abdomen soft. Nontender. Non-distended. BS + and equal.    NEUROLOGICAL: A&O x3. Sensation intact to light touch. Stable impaired L CN VII. Persistent L neglect with R gaze preference. MSK:  Functional ROM RUE and RLEs. Impaired AROM LUE and LLE. Willetta Smiles Motor testing 4+/5 key muscles RUE and RLE. L shoulder shrug 2/5, L hip flexion 2/5. Distal LUE and LLE muscles 0/5. SKIN: Warm dry and intact. Good turgor. EXTREMITIES:  No calf tenderness to palpation. No edema BLEs. Willetta Smiles PSYCH: Mood depressed. Appropriately interactive.  Affect WNL    Diagnostics:     CBC:   No results for input(s): WBC, RBC, HGB, HCT, MCV, RDW, PLT in the last 72 hours.  BMP:   No results for input(s): NA, K, CL, CO2, PHOS, BUN, CREATININE, GLUCOSE in the last 72 hours. Invalid input(s): CA  BNP: No results for input(s): BNP in the last 72 hours. PT/INR: No results for input(s): PROTIME, INR in the last 72 hours. APTT: No results for input(s): APTT in the last 72 hours. CARDIAC ENZYMES: No results for input(s): CKMB, CKMBINDEX, TROPONINT in the last 72 hours. Invalid input(s): CKTOTAL;3  FASTING LIPID PANEL:  Lab Results   Component Value Date    CHOL 206 (H) 05/05/2021    HDL 29 (L) 05/05/2021    TRIG 185 (H) 05/05/2021     LIVER PROFILE: No results for input(s): AST, ALT, ALB, BILIDIR, BILITOT, ALKPHOS in the last 72 hours.      Current Medications:   Current Facility-Administered Medications: FLUoxetine (PROZAC) capsule 20 mg, 20 mg, Oral, Daily  amitriptyline (ELAVIL) tablet 25 mg, 25 mg, Oral, Nightly  heparin (porcine) injection 5,000 Units, 5,000 Units, Subcutaneous, 3 times per day  acetaminophen (TYLENOL) tablet 1,000 mg, 1,000 mg, Oral, 3 times per day  polyvinyl alcohol (LIQUIFILM TEARS) 1.4 % ophthalmic solution 1 drop, 1 drop, Both Eyes, PRN  sodium chloride flush 0.9 % injection 5-40 mL, 5-40 mL, Intravenous, PRN  promethazine (PHENERGAN) tablet 12.5 mg, 12.5 mg, Oral, Q6H PRN **OR** ondansetron (ZOFRAN) injection 4 mg, 4 mg, Intravenous, Q6H PRN  0.9 % sodium chloride infusion, 25 mL, Intravenous, PRN  albuterol (PROVENTIL) nebulizer solution 2.5 mg, 2.5 mg, Nebulization, Q4H PRN  amLODIPine (NORVASC) tablet 5 mg, 5 mg, Oral, Daily  aspirin chewable tablet 81 mg, 81 mg, Oral, Daily  atorvastatin (LIPITOR) tablet 80 mg, 80 mg, Oral, Daily  budesonide-formoterol (SYMBICORT) 160-4.5 MCG/ACT inhaler 2 puff, 2 puff, Inhalation, BID  dextrose 50 % IV solution, 12.5 g, Intravenous, PRN  glucagon (rDNA) injection 1 mg, 1 mg, Intramuscular, PRN  glucose (GLUTOSE) 40 % oral gel 15 g, 15 g, Oral, PRN  levothyroxine (SYNTHROID) tablet 25 mcg, 25 mcg, Oral, Daily  nicotine (NICODERM CQ) 14 MG/24HR 1 patch, 1 patch, Transdermal, Daily  tiotropium (SPIRIVA RESPIMAT) 2.5 MCG/ACT inhaler 2 puff, 2 puff, Inhalation, Daily  polyethylene glycol (GLYCOLAX) packet 17 g, 17 g, Oral, Daily  senna (SENOKOT) tablet 17.2 mg, 2 tablet, Oral, Daily PRN  bisacodyl (DULCOLAX) suppository 10 mg, 10 mg, Rectal, Daily PRN      Impression/Plan:   Impaired ADLs, gait, and mobility due to:      1. Ischemic R MCA CVA with hemorrhagic conversion and L non-dominant hemiparesis:  PT/OT for gait, mobility, strengthening, endurance, ADLs, and self care. On ASA, atorvastatin. Sling only to be used when patient is ambulating with therapy. Son is able to perform car transfers safely to his mini Lila Pineda - reviewed plan for IVELISSE with patient and daughter 6/1 - OK for 3 hours (11 am - 2 pm) to attend graduation with no bathroom transfers and son to do car transfers. Nursing will toilet patient and place brief prior to her leaving. 2. Headache: Has Tylenol routine TID. Improved on amitriptyline. 3. Falls: s/p Fall 5/16, 5/30, and 5/31 -  No injuries. Only Alex Kohut transfers with nursing until advanced by therapy. 4. Dry eyes: has natural tears. 5. Cognitive impairment: SLP treating  6. Dysphagia/aphasia: SLP treating. Diet upgraded. 7. HTN/Hyperlipidemia: on amlodipine  8. DM: on insulin sliding scale  9. COPD/asthma: on albuterol nebulizers prn, on symbicort BID, spiriva  10. Adjustment disorder with depressed mood: Improved. started fluoxetine 5/15  11. Esophageal Ring: Will monitor - on dysphagia diet  12. Hypothyroidism: on levothyroxine  13. Nicotine dependence: on Nicoderm  14. Bowel Management: Miralax daily, senokot prn, dulcolax prn. 15. DVT Prophylaxis:  heparin, SCD's while in bed and MAXIM's   16.  Internal medicine for medical management    Electronically signed by Vessie Mortimer, MD on 6/4/2021 at 11:41 AM      This note is created with the assistance of a speech recognition program. While intending to generate a document that actually reflects the content of the visit, the document can still have some errors including those of syntax and sound a like substitutions which may escape proof reading. In such instances, actual meaning can be extrapolated by contextual diversion.

## 2021-06-04 NOTE — PROGRESS NOTES
[place] [address] Internal Medicine    Progress Note  Chart Reviewed: Yes  Patient assessed for rehabilitation services?: Yes  Family / Caregiver Present: Yes (daughter Napoleon Leslie, christian'daughter Matthias Burroughs in PM)  Referring Practitioner: Dr Alice Fairchild  Comments: Pt is cooperative with PT. After PT tx, pt requests return to bed.  6/4/2021    1:56 PM    Name:   Daiana Jarrell  MRN:     [id number]     Acct:      [de-identified]   Room:   [address] [place] [address] Day:  22  Admit Date:  5/10/2021  7:13 AM    PCP:   Marii Box MD  Code Status:  Full Code    Subjective:     C/C: medical mgmt    Active Problems:    Tobacco abuse    Gastritis    Acute left hemiparesis ([address]    Acute [facility]A (cerebro[facility]) [facility]    COPD (chronic obstructive pulmonary disease) ([facility]    Essential hypertension    Hyperlipidemia    Pre-diabetes  Resolved Problems:    * No resolved hospital problems. *      Interval History Status: improved. 5/14/21  Patient seen and examined at bedside. No acute overnight events. Afebrile, hemodynamically stable. Continues to work with PT/OT. No acute changes in hemiparesis. No acute complaints. 5/22  Patient is doing much better  No new complaints. Significant last 24 hr data reviewed ;   Vitals:    06/03/21 0800 06/03/21 1855 06/04/21 0730 06/04/21 0735   BP: 119/83 128/68 132/85 118/68   Pulse: 79 77 88 68   Resp: 19 18 18 18   Temp: 97.8 °F (36.6 °C) 98 °F (36.7 °C) 98 °F (36.7 °C) 98.3 °F (36.8 °C)   TempSrc: Oral Oral Oral Oral   SpO2: 94% 97% 96% 98%   Weight:       Height:          No results found for this or any previous visit (from the past 24 hour(s)). No results for input(s): POCGLU in the last 72 hours. No results found.           HPI:   See history in H and P      Review of Systems:     Constitutional:  negative for chills, fevers, sweats  Respiratory:  negative for cough, dyspnea on exertion, hemoptysis, shortness of breath, wheezing  Cardiovascular:  negative for chest pain, chest pressure/discomfort, lower extremity edema, palpitations  Gastrointestinal:  negative for abdominal pain, constipation, diarrhea, nausea, vomiting  Neurological: Positive for left-sided weakness. Negative for dizziness, headache  Data:     Past Medical History:  no change     Social History:  no change    Family History: @no change    Vitals:      I/O (24Hr): No intake or output data in the 24 hours ending 06/04/21 1356    Labs:    URINE ANALYSIS: No results found for: LABURIN     CBC:  Lab Results   Component Value Date    WBC 10.0 06/01/2021    HGB 13.3 06/01/2021     06/01/2021        BMP:    Lab Results   Component Value Date     06/01/2021    K 3.8 06/01/2021     06/01/2021    CO2 30 06/01/2021    BUN 18 06/01/2021    CREATININE 0.54 06/01/2021    GLUCOSE 97 06/01/2021      LIVER PROFILE:  Lab Results   Component Value Date    ALT 13 10/16/2017    AST 13 10/16/2017    PROT 6.9 10/16/2017    BILITOT 0.22 10/16/2017    BILIDIR 0.11 10/16/2017    LABALBU 4.0 10/16/2017               Radiology:  Medications:      Allergies:      Current Meds:   Scheduled Meds:    FLUoxetine  20 mg Oral Daily    amitriptyline  25 mg Oral Nightly    heparin (porcine)  5,000 Units Subcutaneous 3 times per day    acetaminophen  1,000 mg Oral 3 times per day    amLODIPine  5 mg Oral Daily    aspirin  81 mg Oral Daily    atorvastatin  80 mg Oral Daily    budesonide-formoterol  2 puff Inhalation BID    levothyroxine  25 mcg Oral Daily    nicotine  1 patch Transdermal Daily    tiotropium  2 puff Inhalation Daily    polyethylene glycol  17 g Oral Daily     Continuous Infusions:    sodium chloride       PRN Meds: polyvinyl alcohol, sodium chloride flush, promethazine **OR** ondansetron, sodium chloride, albuterol, dextrose, glucagon (rDNA), glucose, senna, bisacodyl      Physical Examination:        /68   Pulse 68   Temp 98.3 °F (36.8 °C) (Oral)   Resp 18   Ht 5' 8\" (1.727 m)   Wt 191 lb 8 oz (86.9 kg)   SpO2 98%   BMI 29.12 kg/m²   Temp (24hrs), Av.1 °F (36.7 °C), Min:98 °F (36.7 °C), Max:98.3 °F (36.8 °C)    No results for input(s): POCGLU in the last 72 hours. No intake or output data in the 24 hours ending 21 1356    General Appearance:  alert, well appearing, and in no acute distress  Mental status: oriented to person, place, and time with normal affect  Head:  normocephalic, atraumatic. Eye: no icterus, redness, pupils equal and reactive, extraocular eye movements intact, conjunctiva clear  Ear: normal external ear, no discharge, hearing intact  Nose:  no drainage noted  Mouth: mucous membranes moist  Neck: supple, no carotid bruits, thyroid not palpable  Lungs: Clear to auscultation bilaterally. No accessory muscle use. Cardiovascular: normal rate, regular rhythm, no murmur, gallop, rub. Abdomen: Soft, nontender, nondistended, normal bowel sounds, no hepatomegaly or splenomegaly  Neurologic: Left upper and lower extremity weakness, 0-1/5. No sensory loss. Skin: No gross lesions, rashes, bruising or bleeding on exposed skin area  Extremities:  peripheral pulses palpable, no pedal edema or calf pain with palpation    Assessment:        Primary Problem  <principal problem not specified>    Active Hospital Problems    Diagnosis Date Noted    [facility] [I63.9] 05/10/2021    COPD (chronic obstructive pulmonary disease) ([facility] [J44.9] 05/10/2021    Essential hypertension [I10] 05/10/2021    Hyperlipidemia [E78.5] 05/10/2021    Pre-diabetes [R73.03] 05/10/2021    Acute left hemiparesis ([facility] [G81.94]     Gastritis [K29.70]     Tobacco abuse [Z72.0] 2017     Plan:        Continue current therapy regimen  BP well controlled. Continue norvasc 5 mg daily  Copd stable.  continue bronchodilators  Synthroid 25 mcg daily   lipitor 80 mg daily  Asa 81 mg daily  Insulin sliding

## 2021-06-04 NOTE — PLAN OF CARE
Problem: Neurological  Goal: Maximum potential motor/sensory/cognitive function  6/4/2021 1102 by Bonita Scott RN  Outcome: Ongoing     Problem: Nutrition  Goal: Optimal nutrition therapy  Outcome: Ongoing     Problem: Pain:  Goal: Control of acute pain  Description: Control of acute pain  6/4/2021 0652 by Bonita Scott RN  Outcome: Ongoing  Interventions:  -Control environmental factors  -Medicate for pain prior to treatment/therapy  -Assess pain using appropriate pain scale tool  -Administer analgesic as ordered  -Handle areas of discomfort gently  -Assess and document results of pain interventions  -Initiate appropriate non-invasive pain relief measures  -Turn and reposition patient as appropriate for comfort    Evaluations:  Pain assessment completed. Pt. able to rest.  Pt. denies pain this shift. Pt. denies need for oral analgesic. Verbalizes understanding of nonpharmacologic strategies that provide comfort. Pt. repositioned for comfort. Nonverbal cues indicate absence of pain.

## 2021-06-04 NOTE — PROGRESS NOTES
[address]   ACUTE REHABILITATION OCCUPATIONAL THERAPY  DAILY NOTE    Date: 21  Patient Name: Daiana Jarrell      Room: 8969/5791-09    MRN: [id number]   : 1955  (63 y.o.)  Gender: female      Diagnosis: ischemic CVA,  subacute right MCA distribution infarct, L lynette, dysphagia, L neglect, loop recorder 21 (Dr. Cayla Damon)  Additional Pertinent Hx: 26-year-old female who was found down, last known well approximately 21 hours before. Patient was found to have a right MCA M1 occlusion. Thrombectomy was not successful, MRI showed patient had a right MCA stroke with hemorrhagic conversion. 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 :  (loop recorder)  Other position/activity restrictions:  L neglect with LUE/LLE deficits, neglect improving. Required Braces or Orthoses?: No  Equipment Used: Bed, Wheelchair         21 0729   Restrictions/Precautions   Restrictions/Precautions Fall Risk   Required Braces or Orthoses? No   Implants present?    (loop recorder)   Position Activity Restriction   Other position/activity restrictions  L neglect with LUE/LLE deficits, neglect improving. General   Chart Reviewed Yes   Patient assessed for rehabilitation services? Yes   Additional Pertinent Hx 26-year-old female who was found down, last known well approximately 21 hours before. Patient was found to have a right MCA M1 occlusion. Thrombectomy was not successful, MRI showed patient had a right MCA stroke with hemorrhagic conversion.   displaying hemineglect, she is plegic on the left with a right gaze preference and left facial droop   Response to previous treatment Patient with no complaints from previous session   Family / Caregiver Present No   Diagnosis ischemic CVA,  subacute right MCA distribution infarct, L lynette, dysphagia, L neglect, loop recorder 5/5/21 (Dr. Dell Ryan)   Pain Assessment   Patient Currently in Pain Yes   Lara-Garcia Pain Rating 4   Clinical Progression Not changed   Response to Pain Intervention Patient Satisfied   Vital Signs   BP Location Right upper arm   Level of Consciousness Alert (0)   Oxygen Therapy   O2 Device None (Room air)   Patient Observation   Observations Deadpan, sarcastic sense of humor; largely flat affect. Orientation   Overall Orientation Status WFL   Attendance   Participation Active participation   ADL   Equipment Provided Reacher;Long-handled sponge   Feeding Modified independent    Grooming Setup  (w/c level sinkside/flip top toothpaste; SBA mouthwash lid mgt)   UE Bathing   (Pt declines. )   LE Bathing   On toilet, noted below   UE Dressing   (Assist with lynette tech to thread LUE)   LE Dressing   (Footdressing only this date; B TEDs/shoes/L AFO.)   Toileting   (Assist to pull up on L side. Increased bal A w/ pt participa)   Additional Comments Cues to increase independence as able with various techniques. Willing to use reacher for doff L footie, SBA for initiation required; cross leg RLE care. VC for pericare seated, A with clothing over L side of hips. To trial standard socks over MAXIM on LLE for improved stability with AFO; PM reports F+ result/high socks. A stabilizing LUE and VC for threading over elbow; MiN down L posterior flank/shoulder. Reports allergic to deoderant   Balance   Sitting Balance Stand by assistance  (sup-SBA pending activity. G bal noted during meal engagement)   Standing Balance Maximum assistance  (mod-max for self care participation. )   Standing Balance   Time 5sec to 150sec   Activity brief stands in nilson in AM; PM: static stands in nilson alternating UE support; focus on balance/weightbearing/L scanning   Comment BUE support 10sec-2min40; RUE support [id number], [id number], 60sec; LUE support 40sec, No UE support <5sec.  Tolerating locking LLE <3sec per self, Heavy anxiety standing unsupported; VC for posture/midline and lean correction with F carryover   Functional Mobility   Functional - Mobility Device Wheelchair   Activity To/from bathroom   Assist Level Moderate assistance   Functional Mobility Comments unable to go straight with use of RUE and RLE and LLE on foot rest, also runs into items on L, VC for Self assist LE, f carryover/initiation with repetition   Bed mobility   Bridging Minimal assistance   Rolling to Left Minimal assistance   Supine to Sit Stand by assistance  (HOB slightly elevated, use of bed rail. )   Sit to Supine Moderate assistance  (Assist with BLEs over EOB. )   Scooting Stand by assistance  (hips edge of mat)   Comment increased time, VC to tend to L   Transfers   Stand Step Transfers Dependent/Total   Stand Pivot Transfers Unable to assess  (refused, adamant for lina)   Sit to stand Minimal assistance;Contact guard assistance   Stand to sit Moderate assistance  (for controlled sit. )   Transfer Comments Sarstedy; fair technique; 3/7 initiation/ability to place LUE on GB using SROM/dycem utilized for improved  with f- result, SBA for WB tolerance   Toilet Transfers   Toilet Transfers Comments Adamant for shavon, Min/Mod assist   Wheelchair Bed Transfers   Wheelchair/Bed - Technique Stand pivot   Equipment Used Bed; Wheelchair   Level of Asssistance Maximum assistance  (Block L knee)      Coordination   Fine Motor pt with flaccid LUE and unable to use for adls          Weight Bearing   Weight Bearing Technique Yes   LUE Weight Bearing Forearm seated; Extended arm seated; Extended arm standing   Response To Weight Bearing Technique fair tolerance WB   Cognition   Overall Cognitive Status Impaired   Arousal/Alertness Appropriate responses to stimuli   Following Directions Follows one step commands   Attention Span Attends with cues to redirect   Safety Judgement Decreased awareness of need for safety   Insights Decreased awareness of deficits   Sequencing and Organization Assistance required to generate solutions   Perception   Overall Perceptual Status Impaired   Unilateral Attention Cues to attend left visual field;Cues to attend to left side of body   Initiation Cues to initiate tasks   Motor Planning Cues to use objects appropriately  (appropriate lynette tech/AE use. )   Positioning   Bed Postion Comment Pt positoned in bed after session. Pt requesting side line position. Pillows places between knees, back, and under LUE to provide support. Wheelchair Position Type 1/2 lap tray   Wheelchair Postion Comment Fair LUE positioning in w/c with L arm tray in place, Min VC for correct, inverted wrist/placed on hips   Adaptations LUE placed in slinge when out of bed. Patient Goals    Patient goals  \"get as close to normal as possible. \"  specifies:\"get in and out of bed on my own, shower by myself. \"   Short term goals   Time Frame for Short term goals 1 week   Short term goal 1 mod A UE bathe and dress   Short term goal 2 set up brush teeth (goal met)   Short term goal 3 min feeding, with cues able to locate L side of tray and scan to locate items on L. Short term goal 4 tolerate 6-8 min sitting edge of bed (static sit)  with min assist and RUE support, head at midline and fair safety awareness   Short term goal 5 pt to initiate reposition LUE with RUE with good safety ie. hold by wrist not by 1 finger   Short term goal 6 demo improved awareness of L side and neuromuscular christian by weight bearing on LUE with physical assist to complete   Short term goal 7 from w/c:  consistently maintain midline trunk control and demo able to lean forward 4 inches away from back of chair without physical assist for adls. Short term goal 8 tolerate 2 min static stand for adls with RUE support with mod of 2 and assist with LLE as needed. Long term goals   Time Frame for Long term goals  by discharge   Long term goal 1 set up and occasional verbal cues ie.  L visual scan for self feeding Long term goal 2 set up oral care (goal met)   Long term goal 3 max x 1 toileting and adl transfers   Long term goal 4 min UE bathe and dress (shirt)   Long term goal 5 max x 1 LE bathe and dress, able to cross to reach RLE with min assist and keep trunk balance   Assessment   Performance deficits / Impairments Decreased functional mobility ; Decreased ADL status; Decreased strength;Decreased endurance;Decreased balance;Decreased high-level IADLs;Decreased vision/visual deficit; Decreased cognition;Decreased safe awareness;Decreased ROM; Decreased fine motor control;Decreased coordination;Decreased posture;Decreased sensation   Assessment severely impaired loss of adl indep post CVA with severe L lynette deficits, dysphagia, L neglect   Treatment Diagnosis Impaired self care status   Prognosis Good   History ischemic CVA,  subacute right MCA distribution infarct, L lynette, dysphagia, L neglect, loop recorder 5/5/21 (Dr. Eric Hudson)   Exam 14 performance deficits   Assistance / Modification high due to mobility, L lynette, cognition, L neglect   REQUIRES OT FOLLOW UP Yes   Discharge Recommendations Patient would benefit from continued therapy after discharge   Activity Tolerance   Activity Tolerance Patient Tolerated treatment well   Activity Tolerance Min encouragement today   Safety Devices   Safety Devices in place Yes   Type of devices All fall risk precautions in place;Gait belt;Patient at risk for falls; Left in chair     Restraints   Initially in place Yes   Restraints per pt request x4 bed rails   Plan   Times per week 5-7   Times per day Twice a day   Current Treatment Recommendations Strengthening;Positioning;ROM;Safety Education & Training;Balance Training;Patient/Caregiver Education & Training;Self-Care / ADL;Cognitive/Perceptual Training;Functional Mobility Training;Neuromuscular Re-education;Equipment Evaluation, Education, & procurement; Endurance Training;Cognitive Reorientation   Equipment Recommendations   Equipment Needed   (TBD)                  06/03/21 1631 06/03/21 1636   OT Individual Minutes   Time In 0732 1227   Time Out 0822 1304   Minutes 50 37     Electronically signed by DEBORAH Uriarte on 6/4/21 at 8:17 AM EDT

## 2021-06-04 NOTE — CARE COORDINATION
Follow-up call to [address] [place] [address] and spoke with Anthony Vicente. They confirmed receiving fax and approved her clinically but need to submit for pre-cert; will submit today. 12:30 Pm Call received from Anthony Vicente at [facility]. Pt will have a copay of 3,300 and they needed a check for 1500 for admission. Provided Tia with contact information for daughter Ru Caputo. 2:30 PM Call received from Anthony Vicente; daughter agreed to pay co-pay and they are proceeding with the pre-cert.

## 2021-06-04 NOTE — PROGRESS NOTES
Speech Language Pathology  Speech Language Pathology  [facility] Acute Rehab Unit at [facility]    Dysphagia/SpeechLanguage Treatment Note    Date: 6/4/2021  Patients Name: Jessica Malave  MRN: [id number]  Diagnosis:   Patient Active Problem List   Diagnosis Code    Pneumonia of left lower lobe due to infectious organism J18.9    Tobacco abuse Z72.0    Legionella pneumonia ([address] A48.1    Dysphagia R13.10    Hyperplastic colon polyp K63.5    Esophageal ring K22.2    Gastritis K29.70    Elbow effusion, right M25.421    Stroke due to occlusion of right middle cerebral artery ([facility]) I63.511    Nihss score 15 R29.715    Acute left hemiparesis ([facility]) G81.94    Cerebrovascular accident (CVA) due to occlusion of right middle cerebral artery ([facility] I63.511    Acute CVA (cerebrovascular accident) ([facility] [address] [facility] I63.9    COPD (chronic obstructive pulmonary disease) ([facility]) J44.9    Essential hypertension I10    Hyperlipidemia E78.5    Pre-diabetes R73.03       Pain: 0/10    Speech/Cognitive/Dysphagia Treatment    Treatment time:  5126-9155    Subjective: [x] Alert [x] Cooperative     [] Confused     [] Agitated    [] Lethargic    Objective/Assessment:  Attention:  Min verbal cues needed for increased attention to midline, pt. c extreme L sided neglect. Pt. Completed visual scanning task with 73% accuracy praveen, required mod cues to attend to L side for task, missing multiple columns from handout. Other:  Pt. Completed oral motor exercises x6, 15-20  reps each. Reduced labial ROM noted on L side. VitalStim was not utilized during session d/t shortened tx time -- Pt. toileting upon arrival.   Pt. with word search booklet present on tray, reporting daughter brought it for her. Education provided re: use of word search throughout day to work on L sided neglect and rationale for tasks (e.g., safety concerns with L sided neglect). Pt. Verbalized understanding and agreeable.       Pt. Reports tolerating her diet of soft and bite sized without difficulty. No family present. Plan:  [x] Continue ST services    [] Discharge from ST:      Discharge recommendations: [] Inpatient Rehab   [] [place]   [] Outpatient Therapy  [] Follow up at trauma clinic   [] Other:       Treatment completed by:  Lamont Petty M.A., CCC-SLP

## 2021-06-04 NOTE — PROGRESS NOTES
[address]   Physical Therapy Progress Note    Date: 21  Patient Name: Nadia Bal       Room: 3863/0041-18  MRN: [id number]   Account: [de-identified]   : 1955  (66 y.o.)   Gender: female     Discharge Recommendations   Patient would benefit from continued therapy after discharge, Home with assist PRN  Equipment Needed: No  Other: CTA; pending further progress with mobility       Diagnosis: ischemic CVA,  subacute right MCA distribution infarct, L lynette, dysphagia, L neglect, loop recorder 21 (Dr. Luis Ceja)  Restrictions/Precautions: Fall Risk  Implants present? :  (loop recorder)  Other position/activity restrictions:  L neglect with LUE/LLE deficits, neglect improving. Past Medical History:  has a past medical history of Asthma, COPD (chronic obstructive pulmonary disease) ([facility] Diabetes mellitus ([facility] Esophageal ring, Gastritis, Hyperlipidemia, Hyperplastic colon polyp, Hypertension, Osteoarthritis, Patient in clinical research study, and TIA (transient ischemic attack). Past Surgical History:   has a past surgical history that includes Appendectomy; Hysterectomy; Esophagus dilation; Colonoscopy (2017); Endoscopy, colon, diagnostic; pr egd transoral biopsy single/multiple (N/A, 2017); pr colsc flx w/rmvl of tumor polyp lesion snare tq (N/A, 2017); and Upper gastrointestinal endoscopy (2017). Overall Orientation Status: Within Functional Limits  Restrictions/Precautions  Restrictions/Precautions: Fall Risk  Required Braces or Orthoses?: No  Implants present? :  (loop recorder)  Position Activity Restriction  Other position/activity restrictions:  L neglect with LUE/LLE deficits, neglect improving.     Subjective: Pt very flat affect and minimal communication; pt reports no when asked if in pain; pt reports \"I think so\" when ask if pt slept okay; after walking pt reports that \"they woke me up at 4am for meds\"  Comments: Pt is cooperative with PT. After PT tx, pt requests return to bed. Vital Signs  Patient Currently in Pain: Denies                 Bed Mobility:   Rolling: Minimal assistance;Rolling Right;Stand by assistance;Rolling Left (Min L, SBA R)  Supine to Sit: Moderate assistance (to Pt's R; cues to push L LE with R; Mod A for trunk)  Sit to Supine: Moderate assistance (bilateral LEs (R scooped under L);  Pt self-adjusts trunk for comfort)  Scooting: Stand by assistance (hips edge of mat)      Transfers:  Sit to Stand: Minimal Assistance (L UE assist; \"upwalker\")  Stand to sit: Minimal Assistance (L UE assist, improved control today, G reach back c R UE)  Bed to Chair: Minimal assistance     Squat Pivot Transfers: Minimal Assistance (to R; requires safety cues & assist for appropriate L LE placement & L UE if not using sling.)        Ambulation 1  Surface: level tile  Device:  (\"Upwalker\")  Other Apparatus: Left;Slider;AFO (Wheelchair brought up afterward)  Assistance: 2 Person assistance (Max Ax1 + Mod Ax1)  Quality of Gait: Assisting pt with advancing left LE, assisting with terminal knee, cues for sequencing, cues for upright posture  Gait Deviations: Decreased step length;Decreased step height;Slow Juanita;Decreased head and trunk rotation  Distance: 50'  Comments: tactile assist for sequencing, weight shifting, terminal knee, pt impulsive and attempting to take steps with right LE prior to advancing left LE         Ambulation 2  Surface - 2: level tile  Device 2:  (UpWalker)  Other Apparatus 2: Left;AFO;Slider  Assistance 2: Moderate assistance;2 Person assistance  Quality of Gait 2: 4-5 good steps then fatigue and pt attempts to step with right LE before advancing left LE \"forgetting\" left LE in initial swing phase, unsteady  Gait Deviations: Slow Juanita;Decreased step length;Decreased step height;Deviated path  Distance: 65ft  Comments: tactile and vc's for sequencing and safety     Stairs/Curb  Stairs?: Yes  Stairs  # Steps : 5  Stairs Height: 4\" (& 6\")  Rails: Right ascending (L UE hand-held assist)  Other Apparatus: Left;AFO (+ slider )  Assistance: 2 Person assistance; Moderate assistance  Comment: Pt more fatigued today and needing extra cues for correct sequencing           Wheelchair Activities  Wheelchair Size: 20\"  Wheelchair Type: Standard  Wheelchair Cushion: Pressure Relieving  Pressure Relief Type: Lateral lean;Self Adjusts  Propulsion: Yes  Propulsion 1  Propulsion: Manual  Level: Level Tile  Method: RUE;RLE  Level of Assistance: Contact guard assistance;Stand by assistance  Description/ Details: pt neglects left side and needs cues to avoid objects in hallway, pt did well with straight path without objects near, cues for 90 degree turns  Distance: 100ft                                   Posture: Fair  Sitting - Static: Good;-  Sitting - Dynamic: Fair;+  Standing - Static: Fair;- (Upwalker, brakes locked)  Standing - Dynamic: Poor; - (\"Upwalker\"; requires at least 2 assist. )  Comments: Seated Edge of Bed unsupported, Standing with Upright Walker     Other exercises?: Yes  Other exercises 1: Seated UBE 5 min forward/ 5 min backward  Other exercises 2: Seated bilateral LE exercises, 20 reps each: L LE active assisted Hip ex's, passive ROM at knee and ankle. R LE 2 lbs. Lime (minimal) resistance band  Other exercises 3: Transfers from w/c to chair to w/c to bed  Other exercises 7: NU-step L4 x 10 min, therapist assist c L UE  wrapped & tactile support at L ankle/knee PRN for neutral L hip. Other Activities  Comment: rest breaks PRN. Activity Tolerance: Patient limited by fatigue;Patient limited by endurance  PT Equipment Recommendations  Other: CTA; pending further progress with mobility       Assessment  Activity Tolerance: Patient limited by fatigue;Patient limited by endurance   Body structures, Functions, Activity limitations: Decreased functional mobility ; Decreased strength;Decreased safe awareness;Decreased endurance;Decreased balance;Decreased posture;Decreased coordination;Decreased vision/visual deficit; Decreased fine motor control  Prognosis: Good  Discharge Recommendations: Patient would benefit from continued therapy after discharge;Home with assist PRN     Type of devices: Gait belt;Nurse notified; Patient at risk for falls; All fall risk precautions in place;Call light within reach; Left in chair;Chair alarm in place (Family present on exit)  Restraints  Initially in place: No     Plan  Times per week: 1.5 hr/day, 5 to 7 days/week  Current Treatment Recommendations: Strengthening, Balance Training, Functional Mobility Training, Transfer Training, Endurance Training, Wheelchair Mobility Training, Gait Training, Stair training, Neuromuscular Re-education, Home Exercise Program, Safety Education & Training, Patient/Caregiver Education & Training, Modalities, Positioning, ROM, Equipment Evaluation, Education, & procurement    Patient Education  New Education Provided:  Plan of Care  Learner:patient  Method: demonstration and explanation       Outcome: needs reinforcement     Goals  Short term goals  Time Frame for Short term goals: 10 days  Short term goal 1: Pt able to roll in bed side to side at min A without bed rail  Short term goal 2: Pt able to perform supine<>sit at mod A  Short term goal 3: Pt able to perform sit<>stand at min/mod A . Short term goal 4: Pt able to progress to pivot transfers at max A   Short term goal 5: Pt able to improve sitting balance at EOB/EOM at SBA for static balance and able to track to left side as well as turn to head to left side to scan her environment with minimal cues.    Short term goal 6: Pt able to tolerate standing in // bars with R UE support and assist at mod A  Short term goal 7: Pt able to propel w/c with R UE/R LE distance of 50 to 80 ft, straight path and turn around 180 degrees, at min/mod A   Long term goals  Time Frame for Long term goals : By [place]  Long term goal 1: Pt able to roll in bed mod-I  Long term goal 2: Pt able to perform supine <> sit at CGA/min A  Long term goal 3: Pt able to perform pivot transfers min A / mod A and able to scan Left side environment. Long term goal 4: Pt able to ambulate in // bars, or with appropriate assistive device and/or LE bracing, distance of  20 to 30 ft, min/mod A x 2. Long term goal 5: Pt able to propel w/c on level surfaces distance 100 to 150 ft, SBA/CGA. Long term goal 6: Educate pt/family in safe technique for mobility and  to go up and down steps to enter/exit home. Long term goal 7: Improve sitting balance at EOM to good, and standing balance with R UE support to fair- to reduce fall risk. Long term goal 8: Improve PASS score to 20/36 improve overall function.         06/04/21 0831 06/04/21 1345   PT Individual Minutes   Time In 0831 1345   Time Out 0933 1430   Minutes 62 45       Electronically signed by Rena Roldan PTA on 6/4/21 at 3:51 PM EDT

## 2021-06-04 NOTE — PLAN OF CARE
Problem: Nutrition  Goal: Optimal nutrition therapy  6/4/2021 0703 by Giuseppe Wilcox RN  Outcome: Ongoing     Problem: Pain:  Goal: Pain level will decrease  Description: Pain level will decrease  6/4/2021 0707 by Giuseppe Wilcox RN  Outcome: OngoingInterventions:  -Control environmental factors  -Medicate for pain prior to treatment/therapy  -Assess pain using appropriate pain scale tool  -Administer analgesic as ordered  -Handle areas of discomfort gently  -Assess and document results of pain interventions  -Initiate appropriate non-invasive pain relief measures  -Turn and reposition patient as appropriate for comfort    Evaluations:  Pain assessment completed. Pt. able to rest.  Pt. denies pain this shift. Pt. denies need for oral analgesic. Verbalizes understanding of nonpharmacologic strategies that provide comfort. Pt. repositioned for comfort. Nonverbal cues indicate absence of pain. Problem: Pain:  Goal: Control of acute pain  Description: Control of acute pain  6/4/2021 0652 by Giuseppe Wilcox RN  Outcome: Ongoing     Problem: Skin Integrity:  Goal: Will show no infection signs and symptoms  Description: Will show no infection signs and symptoms  6/4/2021 0707 by Giuseppe Wilcox RN  Outcome: Ongoing  Skin assessment completed this shift. Nutrition and Hydration status assessed with adequate intake. Ruy Score as charted. Pressure Relief Overlay remains intact and inflated to patient's bed throughout the shift. Bilateral heels remain elevated on pillows throughout the shift. Patient tolerates repositioning by staff at least every 2 hours. Patient able to reposition self for comfort and to prevent breakdown. Patient verbalizes understanding of pressure ulcer prevention measures. Skin integrity maintained. No new skin breakdown noted. Skin to high risk pressure areas including coccyx and heels are clear. Ryann / Incontinence care provided as needed throughout the shift.  [facility] Moisture Barrier ointment applied to buttocks as a preventative measure.      Problem: Falls - Risk of:  Goal: Will remain free from falls  Description: Will remain free from falls  6/4/2021 0707 by Jana Marrero RN  Outcome: Ongoing   No falls this shift

## 2021-06-04 NOTE — PLAN OF CARE
Problem: Neurological  Goal: Maximum potential motor/sensory/cognitive function  6/4/2021 1837 by Terrance Cid RN  Outcome: Ongoing     Problem: Neurological  Goal: Maximum potential motor/sensory/cognitive function  6/4/2021 1837 by Terrance Cid RN  Outcome: Ongoing     Problem: Nutrition  Goal: Optimal nutrition therapy  6/4/2021 1837 by Terrance Cid RN  Outcome: Ongoing     Problem: Pain:  Goal: Pain level will decrease  6/4/2021 1837 by Terrance Cid RN  Outcome: Ongoing     Problem: Pain:  Goal: Control of acute pain  6/4/2021 1837 by Terrance Cid RN  Outcome: Ongoing     Problem: Skin Integrity:  Goal: Will show no infection signs and symptoms  6/4/2021 1837 by Terrance Cid RN  Outcome: Ongoing     Problem: Skin Integrity:  Goal: Absence of new skin breakdown  Outcome: Ongoing     Problem: Falls - Risk of:  Goal: Will remain free from falls  6/4/2021 1837 by Terrance Cid RN  Outcome: Ongoing     Problem: Falls - Risk of:  Goal: Absence of physical injury  Outcome: Ongoing

## 2021-06-05 PROCEDURE — 97535 SELF CARE MNGMENT TRAINING: CPT

## 2021-06-05 PROCEDURE — 6370000000 HC RX 637 (ALT 250 FOR IP): Performed by: STUDENT IN AN ORGANIZED HEALTH CARE EDUCATION/TRAINING PROGRAM

## 2021-06-05 PROCEDURE — 6370000000 HC RX 637 (ALT 250 FOR IP): Performed by: PHYSICAL MEDICINE & REHABILITATION

## 2021-06-05 PROCEDURE — 99231 SBSQ HOSP IP/OBS SF/LOW 25: CPT | Performed by: INTERNAL MEDICINE

## 2021-06-05 PROCEDURE — 6360000002 HC RX W HCPCS: Performed by: PHYSICAL MEDICINE & REHABILITATION

## 2021-06-05 PROCEDURE — 1180000000 HC REHAB R&B

## 2021-06-05 RX ADMIN — ASPIRIN 81 MG CHEWABLE TABLET 81 MG: 81 TABLET CHEWABLE at 09:09

## 2021-06-05 RX ADMIN — ACETAMINOPHEN 1000 MG: 500 TABLET, FILM COATED ORAL at 14:48

## 2021-06-05 RX ADMIN — HEPARIN SODIUM 5000 UNITS: 5000 INJECTION INTRAVENOUS; SUBCUTANEOUS at 21:30

## 2021-06-05 RX ADMIN — AMITRIPTYLINE HYDROCHLORIDE 25 MG: 25 TABLET, FILM COATED ORAL at 21:31

## 2021-06-05 RX ADMIN — ATORVASTATIN CALCIUM 80 MG: 80 TABLET, FILM COATED ORAL at 09:09

## 2021-06-05 RX ADMIN — POLYETHYLENE GLYCOL 3350 17 G: 17 POWDER, FOR SOLUTION ORAL at 09:09

## 2021-06-05 RX ADMIN — LEVOTHYROXINE SODIUM 25 MCG: 0.03 TABLET ORAL at 06:00

## 2021-06-05 RX ADMIN — FLUOXETINE HYDROCHLORIDE 20 MG: 20 CAPSULE ORAL at 09:09

## 2021-06-05 RX ADMIN — Medication 2 PUFF: at 09:09

## 2021-06-05 RX ADMIN — AMLODIPINE BESYLATE 5 MG: 5 TABLET ORAL at 09:09

## 2021-06-05 RX ADMIN — Medication 2 PUFF: at 08:09

## 2021-06-05 RX ADMIN — ACETAMINOPHEN 1000 MG: 500 TABLET, FILM COATED ORAL at 21:30

## 2021-06-05 RX ADMIN — ACETAMINOPHEN 1000 MG: 500 TABLET, FILM COATED ORAL at 06:00

## 2021-06-05 RX ADMIN — HEPARIN SODIUM 5000 UNITS: 5000 INJECTION INTRAVENOUS; SUBCUTANEOUS at 14:39

## 2021-06-05 RX ADMIN — Medication 2 PUFF: at 21:30

## 2021-06-05 RX ADMIN — HEPARIN SODIUM 5000 UNITS: 5000 INJECTION INTRAVENOUS; SUBCUTANEOUS at 06:00

## 2021-06-05 ASSESSMENT — PAIN DESCRIPTION - DESCRIPTORS: DESCRIPTORS: ACHING

## 2021-06-05 ASSESSMENT — PAIN SCALES - GENERAL
PAINLEVEL_OUTOF10: 1
PAINLEVEL_OUTOF10: 0
PAINLEVEL_OUTOF10: 3
PAINLEVEL_OUTOF10: 2
PAINLEVEL_OUTOF10: 2

## 2021-06-05 ASSESSMENT — PAIN DESCRIPTION - FREQUENCY: FREQUENCY: CONTINUOUS

## 2021-06-05 ASSESSMENT — PAIN DESCRIPTION - PAIN TYPE: TYPE: ACUTE PAIN

## 2021-06-05 ASSESSMENT — PAIN DESCRIPTION - LOCATION: LOCATION: BACK;BUTTOCKS

## 2021-06-05 ASSESSMENT — PAIN DESCRIPTION - PROGRESSION: CLINICAL_PROGRESSION: NOT CHANGED

## 2021-06-05 NOTE — PLAN OF CARE
Problem: Neurological  Goal: Maximum potential motor/sensory/cognitive function  6/5/2021 0627 by Jana Marrero RN  Outcome: Ongoing     Problem: Nutrition  Goal: Optimal nutrition therapy  6/5/2021 0627 by Jana Marrero RN  Outcome: Ongoing     Problem: Pain:  Goal: Pain level will decrease  Description: Pain level will decrease  6/5/2021 0627 by Jana Marrero RN  Outcome: Ongoing  Interventions:  -Control environmental factors  -Medicate for pain prior to treatment/therapy  -Assess pain using appropriate pain scale tool  -Administer analgesic as ordered  -Handle areas of discomfort gently  -Assess and document results of pain interventions  -Initiate appropriate non-invasive pain relief measures  -Turn and reposition patient as appropriate for comfort    Evaluations:  Pain assessment completed. Pt. able to rest.  Pt. denies pain this shift. Pt. denies need for oral analgesic. Verbalizes understanding of nonpharmacologic strategies that provide comfort. Pt. repositioned for comfort. Nonverbal cues indicate absence of pain. Problem: Skin Integrity:  Goal: Will show no infection signs and symptoms  Description: Will show no infection signs and symptoms  6/5/2021 7382 by Jana Marrero RN  Outcome: Ongoing  Skin assessment completed this shift. Nutrition and Hydration status assessed with adequate intake. Ruy Score as charted. Pressure Relief Overlay remains intact and inflated to patient's bed throughout the shift. Patient   able to reposition self for comfort and to prevent breakdown. Patient verbalizes understanding of pressure ulcer prevention measures. Skin integrity maintained. No new skin breakdown noted. Skin to high risk pressure areas including coccyx and heels are clear. Problem: Falls - Risk of:  Goal: Will remain free from falls  Description: Will remain free from falls  6/5/2021 5764 by Jana Marrero RN  Outcome: Ongoing  No falls or injury this shift.  Bed is

## 2021-06-05 NOTE — PROGRESS NOTES
[place] [address] Internal Medicine    Progress Note  Patient assessed for rehabilitation services?: Yes  6/5/2021    3:42 PM    Name:   Billie Espinoza  MRN:     [id number]     Acct:      [de-identified]   Room:   [address] [place] [address] Day:  32  Admit Date:  5/10/2021  7:13 AM    PCP:   Betty Box MD  Code Status:  Full Code    Subjective:     C/C: medical mgmt    Active Problems:    Tobacco abuse    Gastritis    Acute left hemiparesis ([facility]    Acute CVA (cerebrovascular accident) ([facility]    COPD (chronic obstructive pulmonary disease) ([facility]    Essential hypertension    Hyperlipidemia    Pre-diabetes  Resolved Problems:    * No resolved hospital problems. *      Interval History Status: improved. 5/14/21  Patient seen and examined at bedside. No acute overnight events. Afebrile, hemodynamically stable. Continues to work with PT/OT. No acute changes in hemiparesis. No acute complaints. 5/22  Patient is doing much better  No new complaints. Significant last 24 hr data reviewed ;   Vitals:    06/04/21 0730 06/04/21 0735 06/04/21 1907 06/05/21 0615   BP: 132/85 118/68 120/66 103/72   Pulse: 88 68 80 91   Resp: 18 18 18 16   Temp: 98 °F (36.7 °C) 98.3 °F (36.8 °C) 98.6 °F (37 °C) 97.9 °F (36.6 °C)   TempSrc: Oral Oral Oral Oral   SpO2: 96% 98% 94% 95%   Weight:       Height:          No results found for this or any previous visit (from the past 24 hour(s)). No results for input(s): POCGLU in the last 72 hours. No results found.           HPI:   See history in H and P      Review of Systems:     Constitutional:  negative for chills, fevers, sweats  Respiratory:  negative for cough, dyspnea on exertion, hemoptysis, shortness of breath, wheezing  Cardiovascular:  negative for chest pain, chest pressure/discomfort, lower extremity edema, palpitations  Gastrointestinal:  negative for abdominal pain, constipation, diarrhea, nausea, vomiting  Neurological: Positive for left-sided weakness. Negative for dizziness, headache  Data:     Past Medical History:  no change     Social History:  no change    Family History: @no change    Vitals:      I/O (24Hr): No intake or output data in the 24 hours ending 21 1542    Labs:    URINE ANALYSIS: No results found for: LABURIN     CBC:  Lab Results   Component Value Date    WBC 10.0 2021    HGB 13.3 2021     2021        BMP:    Lab Results   Component Value Date     2021    K 3.8 2021     2021    CO2 30 2021    BUN 18 2021    CREATININE 0.54 2021    GLUCOSE 97 2021      LIVER PROFILE:  Lab Results   Component Value Date    ALT 13 10/16/2017    AST 13 10/16/2017    PROT 6.9 10/16/2017    BILITOT 0.22 10/16/2017    BILIDIR 0.11 10/16/2017    LABALBU 4.0 10/16/2017               Radiology:  Medications: Allergies:      Current Meds:   Scheduled Meds:    FLUoxetine  20 mg Oral Daily    amitriptyline  25 mg Oral Nightly    heparin (porcine)  5,000 Units Subcutaneous 3 times per day    acetaminophen  1,000 mg Oral 3 times per day    amLODIPine  5 mg Oral Daily    aspirin  81 mg Oral Daily    atorvastatin  80 mg Oral Daily    budesonide-formoterol  2 puff Inhalation BID    levothyroxine  25 mcg Oral Daily    nicotine  1 patch Transdermal Daily    tiotropium  2 puff Inhalation Daily    polyethylene glycol  17 g Oral Daily     Continuous Infusions:    sodium chloride       PRN Meds: polyvinyl alcohol, sodium chloride flush, promethazine **OR** ondansetron, sodium chloride, albuterol, dextrose, glucagon (rDNA), glucose, senna, bisacodyl      Physical Examination:        /72   Pulse 91   Temp 97.9 °F (36.6 °C) (Oral)   Resp 16   Ht 5' 8\" (1.727 m)   Wt 191 lb 8 oz (86.9 kg)   SpO2 95%   BMI 29.12 kg/m²   Temp (24hrs), Av.3 °F (36.8 °C), Min:97.9 °F (36.6 °C), Max:98.6 °F (37 °C)    No results for input(s): POCGLU in the last 72 hours.   No intake or output data in the 24 hours ending 06/05/21 1542    General Appearance:  alert, well appearing, and in no acute distress  Mental status: oriented to person, place, and time with normal affect  Head:  normocephalic, atraumatic. Eye: no icterus, redness, pupils equal and reactive, extraocular eye movements intact, conjunctiva clear  Ear: normal external ear, no discharge, hearing intact  Nose:  no drainage noted  Mouth: mucous membranes moist  Neck: supple, no carotid bruits, thyroid not palpable  Lungs: Clear to auscultation bilaterally. No accessory muscle use. Cardiovascular: normal rate, regular rhythm, no murmur, gallop, rub. Abdomen: Soft, nontender, nondistended, normal bowel sounds, no hepatomegaly or splenomegaly  Neurologic: Left upper and lower extremity weakness, 0-1/5. No sensory loss. Skin: No gross lesions, rashes, bruising or bleeding on exposed skin area  Extremities:  peripheral pulses palpable, no pedal edema or calf pain with palpation    Assessment:        Primary Problem  <principal problem not specified>    Active Hospital Problems    Diagnosis Date Noted    [facility] [I63.9] 05/10/2021    COPD (chronic obstructive pulmonary disease) [facility] [J44.9] 05/10/2021    Essential hypertension [I10] 05/10/2021    Hyperlipidemia [E78.5] 05/10/2021    Pre-diabetes [R73.03] 05/10/2021    Acute left hemiparesis ([facility] [G81.94]     Gastritis [K29.70]     Tobacco abuse [Z72.0] 07/16/2017     Plan:        Continue current therapy regimen  BP well controlled. Continue norvasc 5 mg daily  Copd stable. continue bronchodilators  Synthroid 25 mcg daily   lipitor 80 mg daily  Asa 81 mg daily  Insulin sliding scale-discontinued as blood sugars have been controlled and stable  nicoderm patch  Continue PT/OT        6/5/21    Patient tolerating rehabilitative therapies . Progressing fairly well . Continue present therapy . ·     ·  1.                            Jameel HERNANDES

## 2021-06-05 NOTE — PROGRESS NOTES
[address]    ACUTE REHABILITATION OCCUPATIONAL THERAPY  DAILY NOTE    Date: 21  Patient Name: Omar Deluna      Room: 9620/5028-30    MRN: [id number]   : 1955  (63 y.o.)  Gender: female   Referring Practitioner: Niru Patino MD  Diagnosis: ischemic CVA,  subacute right MCA distribution infarct, L lynette, dysphagia, L neglect, loop recorder 21 (Dr. Leni Funk)  Additional Pertinent Hx: 72-year-old female who was found down, last known well approximately 21 hours before. Patient was found to have a right MCA M1 occlusion. Thrombectomy was not successful, MRI showed patient had a right MCA stroke with hemorrhagic conversion. 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 :  (loop recorder)  Other position/activity restrictions:  L neglect with LUE/LLE deficits, neglect improving. Required Braces or Orthoses?: No  Equipment Used: Bed, Wheelchair    Subjective  Subjective: \"Can you help me get dressed too? \" Pt initially declined dressing during OT session, stating she wanted to wait for her daughter to get here. However, Pt agrees to dress at end of OT session. Patient Currently in Pain: Yes  Pain Level: 2  Pain Location: Back;Buttocks  Restrictions/Precautions: Fall Risk        Pain Assessment  Pain Assessment: 0-10  Pain Level: 2  Pain Type: Acute pain  Pain Location: Back, Buttocks  Pain Descriptors: Aching  Pain Frequency: Continuous  Clinical Progression: Not changed    Objective  Cognition  Overall Cognitive Status: Impaired  Arousal/Alertness: Appropriate responses to stimuli  Following Directions:  Follows one step commands  Attention Span: Attends with cues to redirect  Safety Judgement: Decreased awareness of need for safety  Insights: Decreased awareness of deficits  Sequencing and Organization: Assistance required to generate (assist buttocks/perineal area)  UE Dressing: Moderate assistance (bra/blouse per patient request - assist LUE/over back)  LE Dressing: Maximum assistance (assist w/ threading LLE & pulling pants/pull-up over hips)  Toileting: Dependent/Total  Additional Comments: OT facilitated patient engagement in showering routine this date. Pt is highly motivated to engage in shower this date in preparation for her leave of absence (IVELISSE) this afternoon. OT provided education regarding safety during IVELISSE including no transfers to/from public toilets per therapy and physician recommendations. Please see above for details. Positioning  Wheelchair Position Type: 1/2 lap tray       Assessment  Performance deficits / Impairments: Decreased functional mobility ; Decreased ADL status; Decreased strength;Decreased endurance;Decreased balance;Decreased high-level IADLs;Decreased vision/visual deficit; Decreased cognition;Decreased safe awareness;Decreased ROM; Decreased fine motor control;Decreased coordination;Decreased posture;Decreased sensation  Prognosis: Good  Discharge Recommendations: Patient would benefit from continued therapy after discharge  Activity Tolerance: Patient Tolerated treatment well  Safety Devices in place: Yes  Type of devices: All fall risk precautions in place;Call light within reach;Gait belt;Patient at risk for falls; Left in chair;Chair alarm in place;Nurse notified          Patient Education:  Patient Goals   Patient goals : \"get as close to normal as possible. \"  specifies:\"get in and out of bed on my own, shower by myself. \"  Learner:patient  Method: demonstration, explanation and handout       Outcome: needs reinforcement and asked questions   OT Education  OT Education: Equipment;ADL Adaptive Strategies; Family Education;OT Role;Plan of Care;Transfer Training  Patient Education: leave of absence goals and documentation - car transfer, wheelchair mobility; safety with self-care tasks; no use of public toilets during [address]  Times per week: 5-7  Times per day: Twice a day  Current Treatment Recommendations: Strengthening, Positioning, ROM, Safety Education & Training, Balance Training, Patient/Caregiver Education & Training, Self-Care / ADL, Cognitive/Perceptual Training, Functional Mobility Training, Neuromuscular Re-education, Equipment Evaluation, Education, & procurement, Endurance Training, Cognitive Reorientation  Patient Goals   Patient goals : \"get as close to normal as possible. \"  specifies:\"get in and out of bed on my own, shower by myself. \"  Short term goals  Time Frame for Short term goals: 1 week  Short term goal 1: mod A UE bathe and dress  Short term goal 2: set up brush teeth (goal met)  Short term goal 3: min feeding, with cues able to locate L side of tray and scan to locate items on L. Short term goal 4: tolerate 6-8 min sitting edge of bed (static sit)  with min assist and RUE support, head at midline and fair safety awareness  Short term goal 5: pt to initiate reposition LUE with RUE with good safety ie. hold by wrist not by 1 finger  Short term goal 6: demo improved awareness of L side and neuromuscular christian by weight bearing on LUE with physical assist to complete  Short term goal 7: from w/c:  consistently maintain midline trunk control and demo able to lean forward 4 inches away from back of chair without physical assist for adls. Short term goal 8: tolerate 2 min static stand for adls with RUE support with mod of 2 and assist with LLE as needed. Long term goals  Time Frame for Long term goals : by discharge  Long term goal 1: set up and occasional verbal cues ie.  L visual scan for self feeding  Long term goal 2: set up oral care (goal met)  Long term goal 3: max x 1 toileting and adl transfers  Long term goal 4: min UE bathe and dress (shirt)  Long term goal 5: max x 1 LE bathe and dress, able to cross to reach RLE with min assist and keep trunk balance     06/05/21 0730   OT Individual Minutes   Time In 0730   Time Out 0900   Minutes 90   Time Code Minutes    Timed Code Treatment Minutes 90 Minutes     Electronically signed by Radames Villanueva OT on 6/5/21 at 12:17 PM EDT

## 2021-06-05 NOTE — PROGRESS NOTES
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 Patient is doing well today with no new issues with sleep, headache, appetite, bowel, or bladder. last BM this AM. Some mild improvement in L shoulder shrug. Going for an IVELISSE this PM    ROS:  Denies fevers, chills, sweats. No chest pain, palpitations, lightheadedness. Denies coughing, wheezing or shortness of breath. Denies abdominal pain, nausea, diarrhea or constipation. No new areas of joint pain. Denies new areas of numbness or weakness. Denies new anxiety  issues. No new skin problems. Rehabilitation:   Progressing in therapies. PT:     Bed Mobility:   Rolling: Minimal assistance;Rolling Right;Stand by assistance;Rolling Left (Min L, SBA R)  Supine to Sit: Moderate assistance (to Pt's R; cues to push L LE with R; Mod A for trunk)  Sit to Supine: Moderate assistance (bilateral LEs (R scooped under L);  Pt self-adjusts trunk for comfort)  Scooting: Stand by assistance (hips edge of mat)        Transfers:  Sit to Stand: Minimal Assistance (L UE assist; \"upwalker\")  Stand to sit: Minimal Assistance (L UE assist, improved control today, G reach back c R UE)  Bed to Chair: Minimal assistance  Squat Pivot Transfers: Minimal Assistance (to R; requires safety cues & assist for appropriate L LE placement & L UE if not using sling.)  Ambulation 1  Surface: level tile  Device:  (\"Upwalker\")  Other Apparatus: Left;Slider;AFO (Wheelchair brought up afterward)  Assistance: 2 Person assistance (Max Ax1 + Mod Ax1)  Quality of Gait: Assisting pt with advancing left LE, assisting with terminal knee, cues for sequencing, cues for upright posture  Gait Deviations: Decreased step length;Decreased step height;Slow Juanita;Decreased head and trunk rotation  Distance: 50'  Comments: tactile assist for sequencing, weight shifting, terminal knee, pt impulsive and attempting to take steps with right LE prior to advancing left LE            Ambulation 2  Surface - 2: level tile  Device 2:  (UpWalker)  Other Apparatus 2: Left;AFO;Slider  Assistance 2: Moderate assistance;2 Person assistance  Quality of Gait 2: 4-5 good steps then fatigue and pt attempts to step with right LE before advancing left LE \"forgetting\" left LE in initial swing phase, unsteady  Gait Deviations: Slow Juanita;Decreased step length;Decreased step height;Deviated path  Distance: 65ft  Comments: tactile and vc's for sequencing and safety  Stairs/Curb  Stairs?: Yes  Stairs  # Steps : 5  Stairs Height: 4\" (& 6\")  Rails: Right ascending (L UE hand-held assist)  Other Apparatus: Left;AFO (+ slider )  Assistance: 2 Person assistance; Moderate assistance  Comment: Pt more fatigued today and needing extra cues for correct sequencing  Wheelchair Activities  Wheelchair Size: 20\"  Wheelchair Type: Standard  Wheelchair Cushion: Pressure Relieving  Pressure Relief Type: Lateral lean;Self Adjusts  Propulsion: Yes  Propulsion 1  Propulsion: Manual  Level: Level Tile  Method: RUE;RLE  Level of Assistance: Contact guard assistance;Stand by assistance  Description/ Details: pt neglects left side and needs cues to avoid objects in hallway, pt did well with straight path without objects near, cues for 90 degree turns  Distance: 100ft  Posture: Fair  Sitting - Static: Good;-  Sitting - Dynamic: Fair;+  Standing - Static: Fair;- (Upwalker, brakes locked)  Standing - Dynamic: Poor; - (\"Upwalker\"; requires at least 2 assist. )  Comments: Seated Edge of Bed unsupported, Standing with Upright Walker  Other exercises?: Yes  Other exercises 1: Seated UBE 5 min forward/ 5 min backward  Other exercises 2: Seated bilateral LE exercises, 20 reps each: L LE active assisted Hip ex's, passive ROM at knee and ankle. R LE 2 lbs.  Lime (minimal) resistance band  Other exercises 3: Transfers from w/c to chair to w/c to bed  Other exercises 7: NU-step L4 x 10 min, therapist assist c L UE  wrapped & tactile support at L ankle/knee PRN for neutral L hip. Other Activities  Comment: rest breaks PRN. Activity Tolerance: Patient limited by fatigue;Patient limited by endurance  PT Equipment Recommendations  Other: CTA; pending further progress with mobility       OT    Cognition  Overall Cognitive Status: Impaired  Arousal/Alertness: Appropriate responses to stimuli  Following Directions: Follows one step commands  Attention Span: Attends with cues to redirect  Safety Judgement: Decreased awareness of need for safety  Insights: Decreased awareness of deficits  Sequencing and Organization: Assistance required to generate solutions  Perception  Overall Perceptual Status: Impaired  Unilateral Attention: Cues to attend left visual field;Cues to attend to left side of body  Initiation: Cues to initiate tasks  Motor Planning: Cues to use objects appropriately (appropriate lynette tech/AE use. )  Balance  Sitting Balance: Stand by assistance  Standing Balance: Dependent/Total (Min A with nilson stedy; max A with stand pivot transfers)  Bed mobility  Supine to Sit: Stand by assistance (HOB slightly elevated, use of bed rail. )  Sit to Supine: Moderate assistance (Assist with BLEs over EOB. )  Comment: Increased time to complete  Transfers  Stand Pivot Transfers: Maximum assistance (Max - mod A depending on impulsive behavior)  Sit to stand: Minimal assistance (Min A- CGA with nilson stedy)  Stand to sit: Minimal assistance (Controlled sit)  Transfer Comments: Pt requesting to complete standing with lower body dressing with use of nilson stedy per pt request. Pt agreeable to complete stand pivot transfers from bed <> wheelchair<> mat.    Standing Balance  Time: AM: 1 minute x 3; PM: 1 minutes x 4  Activity: Functional transfers, lower body dressing  Comment: lower body dressing task with use of nilson stedy per pt request despite education on progression with self care tasks  Type of ROM/Therapeutic Exercise  Type of ROM/Therapeutic Exercise: Self PROM  Comment: Pt completed self PROM 15 reps each with verbal cues for technique. Pt tolerated self PROM well. Pt re-educated on completing exercises in room with pt demonstrating poor concern to complete. Exercises  Shoulder Elevation: x  Shoulder Flexion: x  Horizontal ABduction: x  Horizontal ADduction: x  Elbow Flexion: x  Elbow Extension: x  Wrist Flexion: x  Wrist Extension: x  Finger Flexion: x  Finger Extension: x  Weight Bearing  Weight Bearing Technique: Yes  LUE Weight Bearing: Extended arm seated  Response To Weight Bearing Technique: OT facilitated pt in weight bearing throught LUE on this date. Pt positioned LUE into extended position while sitting on edge of mat. Pt was educated on leaning towards the affected side with support provided from therapist through wrist and elbow while completing a functional actitvity with RUE. Pt was able to tolerate sitting while placing weight and balancing through LUE. Pt verbalized slight discomfort throught elbow while completing task. Additional Activities: Other  Additional Activities: Pt educated importance of completing toileting with staff at facility tomorrow prior to IVELISSE due to safety. Pt is unsafe to complete toilet transfers in a public rest room at this time. Pt demonstrated understanding. Weight Bearing  Weight Bearing Technique: Yes  LUE Weight Bearing: Extended arm seated  Response To Weight Bearing Technique: OT facilitated pt in weight bearing throught LUE on this date. Pt positioned LUE into extended position while sitting on edge of mat. Pt was educated on leaning towards the affected side with support provided from therapist through wrist and elbow while completing a functional actitvity with RUE. Pt was able to tolerate sitting while placing weight and balancing through LUE. Pt verbalized slight discomfort throught elbow while completing task.   ADL  Grooming: Setup (w/c level to ausculation. No rales or rhonchi. Respirations WNL and unlabored. CV:  Regular rate rhythm. No murmurs or gallops. GI:  Abdomen soft. Nontender. Non-distended. BS + and equal.    NEUROLOGICAL: A&O x3. Sensation intact to light touch. Stable impaired L CN VII. Persistent L neglect with R gaze preference. MSK:  Functional ROM RUE and RLEs. Impaired AROM LUE and LLE. [facility] [employer] testing 4+/5 key muscles RUE and RLE. L shoulder shrug 1/5, L hip flexion 2/5. Distal LUE and LLE muscles 0/5. No L elbow abnormality palpable or deformity noted. SKIN: Warm dry and intact. Good turgor. EXTREMITIES:  No calf tenderness to palpation. No edema BLEs. [employer] PSYCH: Mood depressed. Appropriately interactive. Affect WNL    Diagnostics:     CBC:   No results for input(s): WBC, RBC, HGB, HCT, MCV, RDW, PLT in the last 72 hours. BMP:   No results for input(s): NA, K, CL, CO2, PHOS, BUN, CREATININE, GLUCOSE in the last 72 hours. Invalid input(s): CA  BNP: No results for input(s): BNP in the last 72 hours. PT/INR: No results for input(s): PROTIME, INR in the last 72 hours. APTT: No results for input(s): APTT in the last 72 hours. CARDIAC ENZYMES: No results for input(s): CKMB, CKMBINDEX, TROPONINT in the last 72 hours. Invalid input(s): CKTOTAL;3  FASTING LIPID PANEL:  Lab Results   Component Value Date    CHOL 206 (H) 05/05/2021    HDL 29 (L) 05/05/2021    TRIG 185 (H) 05/05/2021     LIVER PROFILE: No results for input(s): AST, ALT, ALB, BILIDIR, BILITOT, ALKPHOS in the last 72 hours.      Current Medications:   Current Facility-Administered Medications: FLUoxetine (PROZAC) capsule 20 mg, 20 mg, Oral, Daily  amitriptyline (ELAVIL) tablet 25 mg, 25 mg, Oral, Nightly  heparin (porcine) injection 5,000 Units, 5,000 Units, Subcutaneous, 3 times per day  acetaminophen (TYLENOL) tablet 1,000 mg, 1,000 mg, Oral, 3 times per day  polyvinyl alcohol (LIQUIFILM TEARS) 1.4 % ophthalmic solution 1 drop, 1 drop, Both Eyes, PRN  sodium chloride flush 0.9 % injection 5-40 mL, 5-40 mL, Intravenous, PRN  promethazine (PHENERGAN) tablet 12.5 mg, 12.5 mg, Oral, Q6H PRN **OR** ondansetron (ZOFRAN) injection 4 mg, 4 mg, Intravenous, Q6H PRN  0.9 % sodium chloride infusion, 25 mL, Intravenous, PRN  albuterol (PROVENTIL) nebulizer solution 2.5 mg, 2.5 mg, Nebulization, Q4H PRN  amLODIPine (NORVASC) tablet 5 mg, 5 mg, Oral, Daily  aspirin chewable tablet 81 mg, 81 mg, Oral, Daily  atorvastatin (LIPITOR) tablet 80 mg, 80 mg, Oral, Daily  budesonide-formoterol (SYMBICORT) 160-4.5 MCG/ACT inhaler 2 puff, 2 puff, Inhalation, BID  dextrose 50 % IV solution, 12.5 g, Intravenous, PRN  glucagon (rDNA) injection 1 mg, 1 mg, Intramuscular, PRN  glucose (GLUTOSE) 40 % oral gel 15 g, 15 g, Oral, PRN  levothyroxine (SYNTHROID) tablet 25 mcg, 25 mcg, Oral, Daily  nicotine (NICODERM CQ) 14 MG/24HR 1 patch, 1 patch, Transdermal, Daily  tiotropium (SPIRIVA RESPIMAT) 2.5 MCG/ACT inhaler 2 puff, 2 puff, Inhalation, Daily  polyethylene glycol (GLYCOLAX) packet 17 g, 17 g, Oral, Daily  senna (SENOKOT) tablet 17.2 mg, 2 tablet, Oral, Daily PRN  bisacodyl (DULCOLAX) suppository 10 mg, 10 mg, Rectal, Daily PRN      Impression/Plan:   Impaired ADLs, gait, and mobility due to:      1. Ischemic R MCA CVA with hemorrhagic conversion and L non-dominant hemiparesis:  PT/OT for gait, mobility, strengthening, endurance, ADLs, and self care. On ASA, atorvastatin. Sling only to be used when patient is ambulating with therapy. Son is able to perform car transfers safely to his mini Ginna Wei - reviewed plan for IVELISSE with patient and daughter Tahira Preston 6/1 - OK for 3 hours (11 am - 2 pm) to attend graduation with no bathroom transfers and son to do car transfers. 2. Headache: Has Tylenol routine TID. Improved on amitriptyline. 3. Falls: s/p Fall 5/16, 5/30, and 5/31 -  No injuries. Only Jeffrie Sacks transfers with nursing until advanced by therapy. 4. Dry eyes: has natural tears.   5. Cognitive impairment: SLP treating  6. Dysphagia/aphasia: SLP treating. Diet upgraded. 7. HTN/Hyperlipidemia: on amlodipine  8. DM: on insulin sliding scale  9. COPD/asthma: on albuterol nebulizers prn, on symbicort BID, spiriva  10. Adjustment disorder with depressed mood: Improved. started fluoxetine 5/15  11. Esophageal Ring: Will monitor - on dysphagia diet  12. Hypothyroidism: on levothyroxine  13. Nicotine dependence: on Nicoderm  14. Bowel Management: Miralax daily, senokot prn, dulcolax prn. 15. DVT Prophylaxis:  heparin, SCD's while in bed and MAXIM's   16.  Internal medicine for medical management    Electronically signed by Erin Jeffers MD on 6/5/2021 at 11:15 AM

## 2021-06-06 LAB
-: ABNORMAL
AMORPHOUS: ABNORMAL
BACTERIA: ABNORMAL
BILIRUBIN URINE: NEGATIVE
CASTS UA: ABNORMAL /LPF
COLOR: YELLOW
COMMENT UA: ABNORMAL
CRYSTALS, UA: ABNORMAL /HPF
EPITHELIAL CELLS UA: ABNORMAL /HPF
GLUCOSE URINE: NEGATIVE
KETONES, URINE: NEGATIVE
LEUKOCYTE ESTERASE, URINE: ABNORMAL
MUCUS: ABNORMAL
NITRITE, URINE: POSITIVE
OTHER OBSERVATIONS UA: ABNORMAL
PH UA: 6 (ref 5–8)
PROTEIN UA: NEGATIVE
RBC UA: ABNORMAL /HPF
RENAL EPITHELIAL, UA: ABNORMAL /HPF
SPECIFIC GRAVITY UA: 1.02 (ref 1–1.03)
TRICHOMONAS: ABNORMAL
TURBIDITY: ABNORMAL
URINE HGB: ABNORMAL
UROBILINOGEN, URINE: NORMAL
WBC UA: ABNORMAL /HPF
YEAST: ABNORMAL

## 2021-06-06 PROCEDURE — 87186 SC STD MICRODIL/AGAR DIL: CPT

## 2021-06-06 PROCEDURE — 6370000000 HC RX 637 (ALT 250 FOR IP): Performed by: PHYSICAL MEDICINE & REHABILITATION

## 2021-06-06 PROCEDURE — 6360000002 HC RX W HCPCS: Performed by: PHYSICAL MEDICINE & REHABILITATION

## 2021-06-06 PROCEDURE — 99231 SBSQ HOSP IP/OBS SF/LOW 25: CPT | Performed by: INTERNAL MEDICINE

## 2021-06-06 PROCEDURE — 97110 THERAPEUTIC EXERCISES: CPT

## 2021-06-06 PROCEDURE — 87077 CULTURE AEROBIC IDENTIFY: CPT

## 2021-06-06 PROCEDURE — 1180000000 HC REHAB R&B

## 2021-06-06 PROCEDURE — 87086 URINE CULTURE/COLONY COUNT: CPT

## 2021-06-06 PROCEDURE — 97116 GAIT TRAINING THERAPY: CPT

## 2021-06-06 PROCEDURE — 81001 URINALYSIS AUTO W/SCOPE: CPT

## 2021-06-06 PROCEDURE — 6370000000 HC RX 637 (ALT 250 FOR IP): Performed by: STUDENT IN AN ORGANIZED HEALTH CARE EDUCATION/TRAINING PROGRAM

## 2021-06-06 PROCEDURE — 97530 THERAPEUTIC ACTIVITIES: CPT

## 2021-06-06 PROCEDURE — 97542 WHEELCHAIR MNGMENT TRAINING: CPT

## 2021-06-06 RX ADMIN — ACETAMINOPHEN 1000 MG: 500 TABLET, FILM COATED ORAL at 05:50

## 2021-06-06 RX ADMIN — Medication 2 PUFF: at 09:50

## 2021-06-06 RX ADMIN — LEVOTHYROXINE SODIUM 25 MCG: 0.03 TABLET ORAL at 05:50

## 2021-06-06 RX ADMIN — ACETAMINOPHEN 1000 MG: 500 TABLET, FILM COATED ORAL at 14:33

## 2021-06-06 RX ADMIN — FLUOXETINE HYDROCHLORIDE 20 MG: 20 CAPSULE ORAL at 09:49

## 2021-06-06 RX ADMIN — ACETAMINOPHEN 1000 MG: 500 TABLET, FILM COATED ORAL at 21:35

## 2021-06-06 RX ADMIN — ASPIRIN 81 MG CHEWABLE TABLET 81 MG: 81 TABLET CHEWABLE at 09:49

## 2021-06-06 RX ADMIN — POLYETHYLENE GLYCOL 3350 17 G: 17 POWDER, FOR SOLUTION ORAL at 09:50

## 2021-06-06 RX ADMIN — AMLODIPINE BESYLATE 5 MG: 5 TABLET ORAL at 09:50

## 2021-06-06 RX ADMIN — AMITRIPTYLINE HYDROCHLORIDE 25 MG: 25 TABLET, FILM COATED ORAL at 21:35

## 2021-06-06 RX ADMIN — HEPARIN SODIUM 5000 UNITS: 5000 INJECTION INTRAVENOUS; SUBCUTANEOUS at 14:33

## 2021-06-06 RX ADMIN — HEPARIN SODIUM 5000 UNITS: 5000 INJECTION INTRAVENOUS; SUBCUTANEOUS at 21:36

## 2021-06-06 RX ADMIN — HEPARIN SODIUM 5000 UNITS: 5000 INJECTION INTRAVENOUS; SUBCUTANEOUS at 05:50

## 2021-06-06 RX ADMIN — ATORVASTATIN CALCIUM 80 MG: 80 TABLET, FILM COATED ORAL at 09:50

## 2021-06-06 ASSESSMENT — PAIN DESCRIPTION - DESCRIPTORS: DESCRIPTORS: ACHING;DISCOMFORT

## 2021-06-06 ASSESSMENT — PAIN DESCRIPTION - FREQUENCY: FREQUENCY: INTERMITTENT

## 2021-06-06 ASSESSMENT — PAIN SCALES - GENERAL
PAINLEVEL_OUTOF10: 1
PAINLEVEL_OUTOF10: 1
PAINLEVEL_OUTOF10: 0
PAINLEVEL_OUTOF10: 2

## 2021-06-06 ASSESSMENT — PAIN DESCRIPTION - ORIENTATION: ORIENTATION: LEFT

## 2021-06-06 ASSESSMENT — PAIN DESCRIPTION - PROGRESSION: CLINICAL_PROGRESSION: NOT CHANGED

## 2021-06-06 ASSESSMENT — PAIN DESCRIPTION - LOCATION: LOCATION: SHOULDER

## 2021-06-06 ASSESSMENT — PAIN DESCRIPTION - ONSET: ONSET: ON-GOING

## 2021-06-06 ASSESSMENT — PAIN DESCRIPTION - PAIN TYPE: TYPE: ACUTE PAIN

## 2021-06-06 NOTE — PROGRESS NOTES
Patient has increased urgency to urinate. Dr. Torres Police was notified and orders received for urinalysis reflex to cultures. Awaiting a sample to send.

## 2021-06-06 NOTE — PROGRESS NOTES
[place] [address] Internal Medicine    Progress Note     6/6/2021    2:12 PM    Name:   Felicity Apley  MRN:     [id number]     Acct:      [de-identified]   Room:   [address] [place] [address] Day:  32  Admit Date:  5/10/2021  7:13 AM    PCP:   Loan Box MD  Code Status:  Full Code    Subjective:     C/C: medical mgmt    Active Problems:    Tobacco abuse    Gastritis    Acute left hemiparesis ([facility]    Acute CVA (cerebrovascular accident) ([facility]    COPD (chronic obstructive pulmonary disease) ([facility]    Essential hypertension    Hyperlipidemia    Pre-diabetes  Resolved Problems:    * No resolved hospital problems. *      Interval History Status: improved. 5/14/21  Patient seen and examined at bedside. No acute overnight events. Afebrile, hemodynamically stable. Continues to work with PT/OT. No acute changes in hemiparesis. No acute complaints. 5/22  Patient is doing much better  No new complaints. Significant last 24 hr data reviewed ;   Vitals:    06/04/21 1907 06/05/21 0615 06/05/21 1928 06/06/21 0700   BP: 120/66 103/72 120/76 127/70   Pulse: 80 91 89 86   Resp: 18 16 16 19   Temp: 98.6 °F (37 °C) 97.9 °F (36.6 °C) 98.9 °F (37.2 °C) 97.9 °F (36.6 °C)   TempSrc: Oral Oral Oral    SpO2: 94% 95% 91% 92%   Weight:       Height:          No results found for this or any previous visit (from the past 24 hour(s)). No results for input(s): POCGLU in the last 72 hours. No results found. HPI:   See history in H and P      Review of Systems:     Constitutional:  negative for chills, fevers, sweats  Respiratory:  negative for cough, dyspnea on exertion, hemoptysis, shortness of breath, wheezing  Cardiovascular:  negative for chest pain, chest pressure/discomfort, lower extremity edema, palpitations  Gastrointestinal:  negative for abdominal pain, constipation, diarrhea, nausea, vomiting  Neurological: Positive for left-sided weakness.   Negative for dizziness, headache  Data: Past Medical History:  no change     Social History:  no change    Family History: @no change    Vitals:      I/O (24Hr): No intake or output data in the 24 hours ending 21 1412    Labs:    URINE ANALYSIS: No results found for: LABURIN     CBC:  Lab Results   Component Value Date    WBC 10.0 2021    HGB 13.3 2021     2021        BMP:    Lab Results   Component Value Date     2021    K 3.8 2021     2021    CO2 30 2021    BUN 18 2021    CREATININE 0.54 2021    GLUCOSE 97 2021      LIVER PROFILE:  Lab Results   Component Value Date    ALT 13 10/16/2017    AST 13 10/16/2017    PROT 6.9 10/16/2017    BILITOT 0.22 10/16/2017    BILIDIR 0.11 10/16/2017    LABALBU 4.0 10/16/2017               Radiology:  Medications: Allergies:      Current Meds:   Scheduled Meds:    FLUoxetine  20 mg Oral Daily    amitriptyline  25 mg Oral Nightly    heparin (porcine)  5,000 Units Subcutaneous 3 times per day    acetaminophen  1,000 mg Oral 3 times per day    amLODIPine  5 mg Oral Daily    aspirin  81 mg Oral Daily    atorvastatin  80 mg Oral Daily    budesonide-formoterol  2 puff Inhalation BID    levothyroxine  25 mcg Oral Daily    nicotine  1 patch Transdermal Daily    tiotropium  2 puff Inhalation Daily    polyethylene glycol  17 g Oral Daily     Continuous Infusions:    sodium chloride       PRN Meds: polyvinyl alcohol, sodium chloride flush, promethazine **OR** ondansetron, sodium chloride, albuterol, dextrose, glucagon (rDNA), glucose, senna, bisacodyl      Physical Examination:        /70   Pulse 86   Temp 97.9 °F (36.6 °C)   Resp 19   Ht 5' 8\" (1.727 m)   Wt 191 lb 8 oz (86.9 kg)   SpO2 92%   BMI 29.12 kg/m²   Temp (24hrs), Av.4 °F (36.9 °C), Min:97.9 °F (36.6 °C), Max:98.9 °F (37.2 °C)    No results for input(s): POCGLU in the last 72 hours.   No intake or output data in the 24 hours ending 21 1412    General Appearance:  alert, well appearing, and in no acute distress  Mental status: oriented to person, place, and time with normal affect  Head:  normocephalic, atraumatic. Eye: no icterus, redness, pupils equal and reactive, extraocular eye movements intact, conjunctiva clear  Ear: normal external ear, no discharge, hearing intact  Nose:  no drainage noted  Mouth: mucous membranes moist  Neck: supple, no carotid bruits, thyroid not palpable  Lungs: Clear to auscultation bilaterally. No accessory muscle use. Cardiovascular: normal rate, regular rhythm, no murmur, gallop, rub. Abdomen: Soft, nontender, nondistended, normal bowel sounds, no hepatomegaly or splenomegaly  Neurologic: Left upper and lower extremity weakness, 0-1/5. No sensory loss. Skin: No gross lesions, rashes, bruising or bleeding on exposed skin area  Extremities:  peripheral pulses palpable, no pedal edema or calf pain with palpation    Assessment:        Primary Problem  <principal problem not specified>    Active Hospital Problems    Diagnosis Date Noted    Acute CVA (cerebrovascular accident) ([facility] [I63.9] 05/10/2021    COPD (chronic obstructive pulmonary disease) ([facility] [J44.9] 05/10/2021    Essential hypertension [I10] 05/10/2021    Hyperlipidemia [E78.5] 05/10/2021    Pre-diabetes [R73.03] 05/10/2021    Acute left hemiparesis ([facility] [G81.94]     Gastritis [K29.70]     Tobacco abuse [Z72.0] 07/16/2017     Plan:        Continue current therapy regimen  BP well controlled. Continue norvasc 5 mg daily  Copd stable. continue bronchodilators  Synthroid 25 mcg daily   lipitor 80 mg daily  Asa 81 mg daily  Insulin sliding scale-discontinued as blood sugars have been controlled and stable  nicoderm patch  Continue PT/OT        6/7/2021    Patient tolerating rehabilitative therapies . Progressing fairly well . Continue present therapy . ·     ·  1.                            MD LEONOR GarciaHarry S. [facility]  [address], [address] [place] [address].    Phone [phone]   Fax: [phone]  Answering Service: [phone]

## 2021-06-06 NOTE — PLAN OF CARE
Problem: Neurological  Goal: Maximum potential motor/sensory/cognitive function  6/6/2021 0414 by Isidoro Francisco RN  Outcome: Ongoing     Goal: Pain level will decrease  Description: Pain level will decrease  6/6/2021 0414 by Isidoro Francisco RN  Outcome: Ongoing     Problem: Pain:  Goal: Control of acute pain  Description: Control of acute pain  6/6/2021 0414 by Isidoro Francisco RN  Outcome: Ongoing  Patient assessed for pain, medicated per orders. Patient reports an acceptable level of discomfort with the current medications. Patient was repositioned and cold/heat therapy applied. Problem: Pain:  Goal: Control of acute pain  Description: Control of acute pain  6/6/2021 0414 by Isidoro Francisco RN  Outcome: Ongoing  Interventions:  -Control environmental factors  -Medicate for pain prior to treatment/therapy  -Assess pain using appropriate pain scale tool  -Administer analgesic as ordered  -Handle areas of discomfort gently  -Assess and document results of pain interventions  -Initiate appropriate non-invasive pain relief measures  -Turn and reposition patient as appropriate for comfort    Evaluations:  Pain assessment completed. Pt. able to rest.  Pt. denies pain this shift. Pt. denies need for oral analgesic. Verbalizes understanding of nonpharmacologic strategies that provide comfort. Pt. repositioned for comfort. Nonverbal cues indicate absence of pain.       Problem: Pain:  Goal: Control of chronic pain  Description: Control of chronic pain  Outcome: Ongoing     Problem: Skin Integrity:  Goal: Will show no infection signs and symptoms  Description: Will show no infection signs and symptoms  Outcome: Ongoing     Problem: Skin Integrity:  Goal: Absence of new skin breakdown  Description: Absence of new skin breakdown  Outcome: Ongoing     Problem: Falls - Risk of:  Goal: Will remain free from falls  Description: Will remain free from falls  6/6/2021 0414 by Isidoro Francisco RN  Outcome: Ongoing Problem: Falls - Risk of:  Goal: Absence of physical injury  Description: Absence of physical injury  6/6/2021 0414 by Samuel Yeh RN  Outcome: Ongoing

## 2021-06-06 NOTE — PROGRESS NOTES
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 Patient is doing well today with no new issues with sleep, headache, appetite, bowel, or bladder. last BM yesterday. Some mild improvement in L shoulder shrug. IVELISSE went very well    ROS:  Denies fevers, chills, sweats. No chest pain, palpitations, lightheadedness. Denies coughing, wheezing or shortness of breath. Denies abdominal pain, nausea, diarrhea or constipation. No new areas of joint pain. Denies new areas of numbness or weakness. Denies new anxiety  issues. No new skin problems. Rehabilitation:   Progressing in therapies. PT:     Bed Mobility:   Rolling: Minimal assistance;Rolling Right;Stand by assistance;Rolling Left (Min L, SBA R)  Supine to Sit: Moderate assistance (to Pt's R; cues to push L LE with R; Mod A for trunk)  Sit to Supine: Moderate assistance (bilateral LEs (R scooped under L);  Pt self-adjusts trunk for comfort)  Scooting: Stand by assistance (hips edge of mat)        Transfers:  Sit to Stand: Minimal Assistance (L UE assist; \"upwalker\")  Stand to sit: Minimal Assistance (L UE assist, improved control today, G reach back c R UE)  Bed to Chair: Minimal assistance  Squat Pivot Transfers: Minimal Assistance (to R; requires safety cues & assist for appropriate L LE placement & L UE if not using sling.)  Ambulation 1  Surface: level tile  Device:  (\"Upwalker\")  Other Apparatus: Left;Slider;AFO (Wheelchair brought up afterward)  Assistance: 2 Person assistance (Max Ax1 + Mod Ax1)  Quality of Gait: Assisting pt with advancing left LE, assisting with terminal knee, cues for sequencing, cues for upright posture  Gait Deviations: Decreased step length;Decreased step height;Slow Juanita;Decreased head and trunk rotation  Distance: 50'  Comments: tactile assist for sequencing, weight shifting, terminal knee, pt impulsive and attempting to take steps with right LE prior to advancing left LE            Ambulation 2  Surface - 2: level tile  Device 2:  (UpWalker)  Other Apparatus 2: Left;AFO;Slider  Assistance 2: Moderate assistance;2 Person assistance  Quality of Gait 2: 4-5 good steps then fatigue and pt attempts to step with right LE before advancing left LE \"forgetting\" left LE in initial swing phase, unsteady  Gait Deviations: Slow Juanita;Decreased step length;Decreased step height;Deviated path  Distance: 65ft  Comments: tactile and vc's for sequencing and safety  Stairs/Curb  Stairs?: Yes  Stairs  # Steps : 5  Stairs Height: 4\" (& 6\")  Rails: Right ascending (L UE hand-held assist)  Other Apparatus: Left;AFO (+ slider )  Assistance: 2 Person assistance; Moderate assistance  Comment: Pt more fatigued today and needing extra cues for correct sequencing  Wheelchair Activities  Wheelchair Size: 20\"  Wheelchair Type: Standard  Wheelchair Cushion: Pressure Relieving  Pressure Relief Type: Lateral lean;Self Adjusts  Propulsion: Yes  Propulsion 1  Propulsion: Manual  Level: Level Tile  Method: RUE;RLE  Level of Assistance: Contact guard assistance;Stand by assistance  Description/ Details: pt neglects left side and needs cues to avoid objects in hallway, pt did well with straight path without objects near, cues for 90 degree turns  Distance: 100ft  Posture: Fair  Sitting - Static: Good;-  Sitting - Dynamic: Fair;+  Standing - Static: Fair;- (Upwalker, brakes locked)  Standing - Dynamic: Poor; - (\"Upwalker\"; requires at least 2 assist. )  Comments: Seated Edge of Bed unsupported, Standing with Upright Walker  Other exercises?: Yes  Other exercises 1: Seated UBE 5 min forward/ 5 min backward  Other exercises 2: Seated bilateral LE exercises, 20 reps each: L LE active assisted Hip ex's, passive ROM at knee and ankle. R LE 2 lbs.  Lime (minimal) resistance band  Other exercises 3: Transfers from w/c to chair to w/c to bed  Other exercises 7: NU-step L4 x 10 min, therapist assist c L UE  wrapped & tactile support at L ankle/knee PRN for neutral L hip. Other Activities  Comment: rest breaks PRN. Activity Tolerance: Patient limited by fatigue;Patient limited by endurance  PT Equipment Recommendations  Other: CTA; pending further progress with mobility       OT    Cognition  Overall Cognitive Status: Impaired  Arousal/Alertness: Appropriate responses to stimuli  Following Directions: Follows one step commands  Attention Span: Attends with cues to redirect  Safety Judgement: Decreased awareness of need for safety  Insights: Decreased awareness of deficits  Sequencing and Organization: Assistance required to generate solutions  Perception  Overall Perceptual Status: Impaired  Unilateral Attention: Cues to attend left visual field;Cues to attend to left side of body  Initiation: Cues to initiate tasks  Motor Planning: Cues to use objects appropriately (appropriate lynette tech/AE use. )  Balance  Sitting Balance: Stand by assistance  Standing Balance: Dependent/Total (Min A with nilson stedy; max A with stand pivot transfers)  Bed mobility  Supine to Sit: Stand by assistance (HOB slightly elevated, use of bed rail. )  Sit to Supine: Moderate assistance (Assist with BLEs over EOB. )  Comment: Increased time to complete  Transfers  Stand Pivot Transfers: Maximum assistance (Max - mod A depending on impulsive behavior)  Sit to stand: Minimal assistance (Min A- CGA with nilson stedy)  Stand to sit: Minimal assistance (Controlled sit)  Transfer Comments: Pt requesting to complete standing with lower body dressing with use of nilson stedy per pt request. Pt agreeable to complete stand pivot transfers from bed <> wheelchair<> mat.    Standing Balance  Time: AM: 1 minute x 3; PM: 1 minutes x 4  Activity: Functional transfers, lower body dressing  Comment: lower body dressing task with use of nilson stedy per pt request despite education on progression with self care tasks  Type of ROM/Therapeutic Exercise  Type of ROM/Therapeutic Exercise: Self PROM  Comment: Pt completed self PROM 15 reps each with verbal cues for technique. Pt tolerated self PROM well. Pt re-educated on completing exercises in room with pt demonstrating poor concern to complete. Exercises  Shoulder Elevation: x  Shoulder Flexion: x  Horizontal ABduction: x  Horizontal ADduction: x  Elbow Flexion: x  Elbow Extension: x  Wrist Flexion: x  Wrist Extension: x  Finger Flexion: x  Finger Extension: x  Weight Bearing  Weight Bearing Technique: Yes  LUE Weight Bearing: Extended arm seated  Response To Weight Bearing Technique: OT facilitated pt in weight bearing throught LUE on this date. Pt positioned LUE into extended position while sitting on edge of mat. Pt was educated on leaning towards the affected side with support provided from therapist through wrist and elbow while completing a functional actitvity with RUE. Pt was able to tolerate sitting while placing weight and balancing through LUE. Pt verbalized slight discomfort throught elbow while completing task. Additional Activities: Other  Additional Activities: Pt educated importance of completing toileting with staff at facility tomorrow prior to IVELISSE due to safety. Pt is unsafe to complete toilet transfers in a public rest room at this time. Pt demonstrated understanding. Weight Bearing  Weight Bearing Technique: Yes  LUE Weight Bearing: Extended arm seated  Response To Weight Bearing Technique: OT facilitated pt in weight bearing throught LUE on this date. Pt positioned LUE into extended position while sitting on edge of mat. Pt was educated on leaning towards the affected side with support provided from therapist through wrist and elbow while completing a functional actitvity with RUE. Pt was able to tolerate sitting while placing weight and balancing through LUE. Pt verbalized slight discomfort throught elbow while completing task.   ADL  Grooming: Setup (w/c level sinkside/flip top toothpaste one hand open bottle)  UE Bathing: None (Pt denies)  LE Bathing: None (Pt denies)  UE Dressing: Minimal assistance (A with lynette dressing with LUE)  LE Dressing: Dependent/Total (nilson stedy; A with threading RLE)  Toileting: None (Pt denies)  Additional Comments: Pt completed grooming task of brushing teeth and hair while sitting in wheelchair at sink side. Pt demonstrated good ability to open mouthwash one handed on this date. Pt denies getting washed up on this date but was agreeable to change clothing with encouragement. Pt completed upper body dressing with A for LUE with pt dressing shirt backwards with pt unwilling to adjust. Pt requesting to complete lower body dressing with use of nilson stedy at this time despite encouragement and education to progress with treatment. Pt provided assistance doffing bilateral socks with use of reacher with assistance over heels to complete. Positioning  Wheelchair Position Type: 1/2 lap tray  Wheelchair Postion Comment: good LUE positioning in w/c with L arm tray in place       SPEECH:  Subjective: [x]? Alert     [x]? Cooperative     []? Confused     []? Agitated    []? Lethargic     Objective/Assessment:  Attention:  Min verbal cues needed for increased attention to midline, pt. c extreme L sided neglect. Pt. Able to answer questions re: script label c mod A and weather forecast c very min A     Other:  Pt. Completed oral motor exercises x3, 20  reps each. Reduced labial ROM noted on L side. One channel NMES was used for L side oral sling at 5.5 mA, 20 mins. Pt. Reports tolerating her diet of soft and bite sized without difficulty. No family present. Objective:  /70   Pulse 86   Temp 97.9 °F (36.6 °C)   Resp 19   Ht 5' 8\" (1.727 m)   Wt 191 lb 8 oz (86.9 kg)   SpO2 92%   BMI 29.12 kg/m²       GEN: Well developed, well nourished, in NAD  HEENT:  NCAT. PERRL. EOMI.   Mucous membranes pink and moist.   PULM:  Clear to ausculation. No rales or rhonchi. Respirations WNL and unlabored. CV:  Regular rate rhythm. No murmurs or gallops. GI:  Abdomen soft. Nontender. Non-distended. BS + and equal.    NEUROLOGICAL: A&O x3. Sensation intact to light touch. Stable impaired L CN VII. Persistent L neglect with R gaze preference. MSK:  Functional ROM RUE and RLEs. Impaired AROM LUE and LLE. Parrish Colder Motor testing 4+/5 key muscles RUE and RLE. L shoulder shrug 1/5, L hip flexion 2/5. Distal LUE and LLE muscles 0/5. No L elbow abnormality palpable or deformity noted. SKIN: Warm dry and intact. Good turgor. EXTREMITIES:  No calf tenderness to palpation. No edema BLEs. Parrish Colder PSYCH: Mood depressed. Appropriately interactive. Affect WNL    Diagnostics:     CBC:   No results for input(s): WBC, RBC, HGB, HCT, MCV, RDW, PLT in the last 72 hours. BMP:   No results for input(s): NA, K, CL, CO2, PHOS, BUN, CREATININE, GLUCOSE in the last 72 hours. Invalid input(s): CA  BNP: No results for input(s): BNP in the last 72 hours. PT/INR: No results for input(s): PROTIME, INR in the last 72 hours. APTT: No results for input(s): APTT in the last 72 hours. CARDIAC ENZYMES: No results for input(s): CKMB, CKMBINDEX, TROPONINT in the last 72 hours. Invalid input(s): CKTOTAL;3  FASTING LIPID PANEL:  Lab Results   Component Value Date    CHOL 206 (H) 05/05/2021    HDL 29 (L) 05/05/2021    TRIG 185 (H) 05/05/2021     LIVER PROFILE: No results for input(s): AST, ALT, ALB, BILIDIR, BILITOT, ALKPHOS in the last 72 hours.      Current Medications:   Current Facility-Administered Medications: FLUoxetine (PROZAC) capsule 20 mg, 20 mg, Oral, Daily  amitriptyline (ELAVIL) tablet 25 mg, 25 mg, Oral, Nightly  heparin (porcine) injection 5,000 Units, 5,000 Units, Subcutaneous, 3 times per day  acetaminophen (TYLENOL) tablet 1,000 mg, 1,000 mg, Oral, 3 times per day  polyvinyl alcohol (LIQUIFILM TEARS) 1.4 % ophthalmic solution 1 drop, 1 drop, Both Eyes, PRN  sodium chloride flush 0.9 % injection 5-40 mL, 5-40 mL, Intravenous, PRN  promethazine (PHENERGAN) tablet 12.5 mg, 12.5 mg, Oral, Q6H PRN **OR** ondansetron (ZOFRAN) injection 4 mg, 4 mg, Intravenous, Q6H PRN  0.9 % sodium chloride infusion, 25 mL, Intravenous, PRN  albuterol (PROVENTIL) nebulizer solution 2.5 mg, 2.5 mg, Nebulization, Q4H PRN  amLODIPine (NORVASC) tablet 5 mg, 5 mg, Oral, Daily  aspirin chewable tablet 81 mg, 81 mg, Oral, Daily  atorvastatin (LIPITOR) tablet 80 mg, 80 mg, Oral, Daily  budesonide-formoterol (SYMBICORT) 160-4.5 MCG/ACT inhaler 2 puff, 2 puff, Inhalation, BID  dextrose 50 % IV solution, 12.5 g, Intravenous, PRN  glucagon (rDNA) injection 1 mg, 1 mg, Intramuscular, PRN  glucose (GLUTOSE) 40 % oral gel 15 g, 15 g, Oral, PRN  levothyroxine (SYNTHROID) tablet 25 mcg, 25 mcg, Oral, Daily  nicotine (NICODERM CQ) 14 MG/24HR 1 patch, 1 patch, Transdermal, Daily  tiotropium (SPIRIVA RESPIMAT) 2.5 MCG/ACT inhaler 2 puff, 2 puff, Inhalation, Daily  polyethylene glycol (GLYCOLAX) packet 17 g, 17 g, Oral, Daily  senna (SENOKOT) tablet 17.2 mg, 2 tablet, Oral, Daily PRN  bisacodyl (DULCOLAX) suppository 10 mg, 10 mg, Rectal, Daily PRN      Impression/Plan:   Impaired ADLs, gait, and mobility due to:      1. Ischemic R MCA CVA with hemorrhagic conversion and L non-dominant hemiparesis:  PT/OT for gait, mobility, strengthening, endurance, ADLs, and self care. On ASA, atorvastatin. Sling only to be used when patient is ambulating with therapy. Son is able to perform car transfers safely to his mini [employer] [place] - reviewed plan for IVELISSE with patient and daughter Ru Smiling 6/1 - OK for 3 hours (11 am - 2 pm) to attend graduation with no bathroom transfers and son to do car transfers. 2. Headache: Has Tylenol routine TID. Improved on amitriptyline. 3. Falls: s/p Fall 5/16, 5/30, and 5/31 -  No injuries. Only Patrizia Ac transfers with nursing until advanced by therapy. 4. Dry eyes: has natural tears.   5. Cognitive impairment: SLP treating  6. Dysphagia/aphasia: SLP treating. Diet upgraded. 7. HTN/Hyperlipidemia: on amlodipine  8. DM: on insulin sliding scale  9. COPD/asthma: on albuterol nebulizers prn, on symbicort BID, spiriva  10. Adjustment disorder with depressed mood: Improved. started fluoxetine 5/15  11. Esophageal Ring: Will monitor - on dysphagia diet  12. Hypothyroidism: on levothyroxine  13. Nicotine dependence: on Nicoderm  14. Bowel Management: Miralax daily, senokot prn, dulcolax prn. 15. DVT Prophylaxis:  heparin, SCD's while in bed and MAXIM's   16.  Internal medicine for medical management    Electronically signed by Chiquita Ruiz MD on 6/6/2021 at 9:31 AM

## 2021-06-06 NOTE — PROGRESS NOTES
[address] [facility]    Physical Therapy Progress Note    Date: 21  Patient Name: Andre Crowell       Room: 0822/5224-48  MRN: [id number]   Account: [de-identified]   : 1955  (66 y.o.)   Gender: female       Discharge Recommendations   Patient would benefit from continued therapy after discharge, Home with assist PRN  Equipment Needed: No  Other: CTA; pending further progress with mobility    Referring Practitioner: Romayne Star, MD  Diagnosis: ischemic CVA,  subacute right MCA distribution infarct, L lynette, dysphagia, L neglect, loop recorder 21 (Dr. Rock Chun)  Restrictions/Precautions: Fall Risk  Implants present? :  (loop recorder)  Other position/activity restrictions:  L neglect with LUE/LLE deficits, neglect improving. Past Medical History:  has a past medical history of Asthma, COPD (chronic obstructive pulmonary disease) ([facility] [address] Diabetes mellitus ([facility] [address] Esophageal ring, Gastritis, Hyperlipidemia, Hyperplastic colon polyp, Hypertension, Osteoarthritis, Patient in clinical research study, and TIA (transient ischemic attack). Past Surgical History:   has a past surgical history that includes Appendectomy; Hysterectomy; Esophagus dilation; Colonoscopy (2017); Endoscopy, colon, diagnostic; pr egd transoral biopsy single/multiple (N/A, 2017); pr colsc flx w/rmvl of tumor polyp lesion snare tq (N/A, 2017); and Upper gastrointestinal endoscopy (2017). Overall Orientation Status: Within Functional Limits  Restrictions/Precautions  Restrictions/Precautions: Fall Risk  Required Braces or Orthoses?: No  Implants present? :  (loop recorder)    Subjective: Pt reports that she enjoyed the graduation yesterday; pt reports no pain; pt states my \"legs feel like jellow\"  Comments: Pt is pleasant and cooperative with PT.     Vital Signs  Patient Currently in Pain: Denies                     Bed Mobility  Rolling: Minimal assistance;Rolling Right;Stand by assistance;Rolling Left (Min L, SBA R)  Supine to Sit: Minimal assistance (to Pt's R; cues to push L LE with R; Mod A for trunk)  Sit to Supine: Minimal assistance  Scooting: Stand by assistance (hips edge of mat)        Transfers:  Sit to Stand: Minimal Assistance (L UE assist; \"upwalker\")  Stand to sit: Minimal Assistance (L UE assist, improved control today, G reach back c R UE)  Bed to Chair: Minimal assistance     Squat Pivot Transfers: Minimal Assistance (to R; requires safety cues & assist for appropriate L LE placement & L UE if not using sling.)        Ambulation 1  Surface: level tile  Device:  (Upwalker)  Other Apparatus: Left;Slider;AFO (Wheelchair brought up afterward)  Assistance: 2 Person assistance (Max Ax1 + Mod Ax1)  Quality of Gait: Assisting pt with advancing left LE, assisting with terminal knee, cues for sequencing, cues for upright posture  Gait Deviations: Decreased step length;Decreased step height;Slow Juanita;Decreased head and trunk rotation  Distance: 50'  Comments: tactile assist for sequencing, weight shifting, terminal knee, pt impulsive and attempting to take steps with right LE prior to advancing left LE         Ambulation 2  Surface - 2: level tile  Device 2:  (walker lift)  Other Apparatus 2: Left;AFO;Slider; Wheelchair follow  Assistance 2: 2 Person assistance; Moderate assistance  Quality of Gait 2: difficulty advancing left LE, attempting steps with right LE before advancing left LE  Gait Deviations: Slow Juanita;Decreased step length;Decreased step height;Deviated path  Distance: 75ft  Comments: tactile assist for advancing left LE and terminal knee in stance phase     Stairs/Curb  Stairs?: Yes  Stairs  # Steps : 5  Stairs Height: 4\" (& 6\")  Rails: Right ascending (L UE hand-held assist)  Other Apparatus: Left;AFO (+ slider )  Assistance: 2 Person assistance; Moderate assistance  Comment: Pt more fatigued today and needing extra cues for correct sequencing           Wheelchair Activities  Wheelchair Size: 20\"  Wheelchair Type: Standard  Wheelchair Cushion: Pressure Relieving  Pressure Relief Type: Lateral lean;Self Adjusts  Propulsion: Yes  Propulsion 1  Propulsion: Manual  Level: Level Tile  Method: RUE;RLE  Level of Assistance: Minimal assistance  Description/ Details: pt neglects left side and needs cues to avoid objects in hallway, pt did well with straight path without objects near, cues for 90 degree turns  Distance: 100ft                                         Posture: Fair  Sitting - Static: Good;-  Sitting - Dynamic: Fair;+  Standing - Static: Fair;- (Upwalker, brakes locked)  Standing - Dynamic: Poor; - (\"Upwalker\"; requires at least 2 assist. )  Comments: Seated Edge of Bed unsupported, Standing with Upright Walker         Other exercises?: Yes  Other exercises 1: Seated UBE 5 min forward/ 5 min backward  Other exercises 2: Seated bilateral LE exercises, 20 reps each: L LE active assisted Hip ex's, passive ROM at knee and ankle. R LE 2 lbs. Lime (minimal) resistance band  Other exercises 3: Transfers from w/c to chair to w/c to bed  Other Activities  Comment: rest breaks PRN. Activity Tolerance: Patient limited by fatigue;Patient limited by endurance  PT Equipment Recommendations  Other: CTA; pending further progress with mobility       Assessment  Activity Tolerance: Patient limited by fatigue;Patient limited by endurance   Body structures, Functions, Activity limitations: Decreased functional mobility ; Decreased strength;Decreased safe awareness;Decreased endurance;Decreased balance;Decreased posture;Decreased coordination;Decreased vision/visual deficit; Decreased fine motor control  Prognosis: Good  Discharge Recommendations: Patient would benefit from continued therapy after discharge;Home with assist PRN     Type of devices: Gait belt;Nurse notified; Patient at risk for falls; All fall risk precautions in place;Call light within reach; Left in chair;Chair alarm in place (Family present on exit)  Restraints  Initially in place: No     Plan  Times per week: 1.5 hr/day, 5 to 7 days/week  Current Treatment Recommendations: Strengthening, Balance Training, Functional Mobility Training, Transfer Training, Endurance Training, Wheelchair Mobility Training, Gait Training, Stair training, Neuromuscular Re-education, Home Exercise Program, Safety Education & Training, Patient/Caregiver Education & Training, Modalities, Positioning, ROM, Equipment Evaluation, Education, & procurement    Patient Education  New Education Provided:  Plan of Care  Learner:patient  Method: demonstration and explanation       Outcome: needs reinforcement     Goals  Short term goals  Time Frame for Short term goals: 10 days  Short term goal 1: Pt able to roll in bed side to side at min A without bed rail  Short term goal 2: Pt able to perform supine<>sit at mod A  Short term goal 3: Pt able to perform sit<>stand at min/mod A . Short term goal 4: Pt able to progress to pivot transfers at max A   Short term goal 5: Pt able to improve sitting balance at EOB/EOM at SBA for static balance and able to track to left side as well as turn to head to left side to scan her environment with minimal cues. Short term goal 6: Pt able to tolerate standing in // bars with R UE support and assist at mod A  Short term goal 7: Pt able to propel w/c with R UE/R LE distance of 50 to 80 ft, straight path and turn around 180 degrees, at min/mod A       Long term goals  Time Frame for Long term goals : By DC  Long term goal 1: Pt able to roll in bed mod-I  Long term goal 2: Pt able to perform supine <> sit at CGA/min A  Long term goal 3: Pt able to perform pivot transfers min A / mod A and able to scan Left side environment. Long term goal 4: Pt able to ambulate in // bars, or with appropriate assistive device and/or LE bracing, distance of  20 to 30 ft, min/mod A x 2.   Long term goal 5: Pt able to propel w/c on level surfaces distance 100 to 150 ft, SBA/CGA. Long term goal 6: Educate pt/family in safe technique for mobility and  to go up and down steps to enter/exit home. Long term goal 7: Improve sitting balance at EOM to good, and standing balance with R UE support to fair- to reduce fall risk. Long term goal 8: Improve PASS score to 20/36 improve overall function.           06/06/21 0835 06/06/21 1330   PT Individual Minutes   Time In [id number]   Time Out [id number]   Minutes 63 25       Electronically signed by Senia Hunt PTA on 6/6/21 at 3:52 PM EDT

## 2021-06-07 PROCEDURE — 6360000002 HC RX W HCPCS: Performed by: PHYSICAL MEDICINE & REHABILITATION

## 2021-06-07 PROCEDURE — 97530 THERAPEUTIC ACTIVITIES: CPT

## 2021-06-07 PROCEDURE — 6370000000 HC RX 637 (ALT 250 FOR IP): Performed by: PHYSICAL MEDICINE & REHABILITATION

## 2021-06-07 PROCEDURE — 97110 THERAPEUTIC EXERCISES: CPT

## 2021-06-07 PROCEDURE — 99231 SBSQ HOSP IP/OBS SF/LOW 25: CPT | Performed by: PHYSICAL MEDICINE & REHABILITATION

## 2021-06-07 PROCEDURE — 97116 GAIT TRAINING THERAPY: CPT

## 2021-06-07 PROCEDURE — 97535 SELF CARE MNGMENT TRAINING: CPT

## 2021-06-07 PROCEDURE — 6370000000 HC RX 637 (ALT 250 FOR IP): Performed by: STUDENT IN AN ORGANIZED HEALTH CARE EDUCATION/TRAINING PROGRAM

## 2021-06-07 PROCEDURE — 97129 THER IVNTJ 1ST 15 MIN: CPT

## 2021-06-07 PROCEDURE — 1180000000 HC REHAB R&B

## 2021-06-07 PROCEDURE — 6370000000 HC RX 637 (ALT 250 FOR IP): Performed by: NURSE PRACTITIONER

## 2021-06-07 PROCEDURE — 99231 SBSQ HOSP IP/OBS SF/LOW 25: CPT | Performed by: INTERNAL MEDICINE

## 2021-06-07 PROCEDURE — 92507 TX SP LANG VOICE COMM INDIV: CPT

## 2021-06-07 PROCEDURE — 97542 WHEELCHAIR MNGMENT TRAINING: CPT

## 2021-06-07 RX ORDER — CEPHALEXIN 500 MG/1
500 CAPSULE ORAL 2 TIMES DAILY
Status: DISCONTINUED | OUTPATIENT
Start: 2021-06-07 | End: 2021-06-08

## 2021-06-07 RX ADMIN — ACETAMINOPHEN 1000 MG: 500 TABLET, FILM COATED ORAL at 20:54

## 2021-06-07 RX ADMIN — Medication 2 PUFF: at 08:14

## 2021-06-07 RX ADMIN — CEPHALEXIN 500 MG: 500 CAPSULE ORAL at 08:14

## 2021-06-07 RX ADMIN — POLYETHYLENE GLYCOL 3350 17 G: 17 POWDER, FOR SOLUTION ORAL at 08:14

## 2021-06-07 RX ADMIN — ACETAMINOPHEN 1000 MG: 500 TABLET, FILM COATED ORAL at 13:39

## 2021-06-07 RX ADMIN — SENNOSIDES 17.2 MG: 8.6 TABLET, FILM COATED ORAL at 20:54

## 2021-06-07 RX ADMIN — ASPIRIN 81 MG CHEWABLE TABLET 81 MG: 81 TABLET CHEWABLE at 08:14

## 2021-06-07 RX ADMIN — FLUOXETINE HYDROCHLORIDE 20 MG: 20 CAPSULE ORAL at 08:14

## 2021-06-07 RX ADMIN — Medication 2 PUFF: at 20:54

## 2021-06-07 RX ADMIN — ACETAMINOPHEN 1000 MG: 500 TABLET, FILM COATED ORAL at 06:01

## 2021-06-07 RX ADMIN — HEPARIN SODIUM 5000 UNITS: 5000 INJECTION INTRAVENOUS; SUBCUTANEOUS at 20:55

## 2021-06-07 RX ADMIN — AMLODIPINE BESYLATE 5 MG: 5 TABLET ORAL at 08:14

## 2021-06-07 RX ADMIN — ATORVASTATIN CALCIUM 80 MG: 80 TABLET, FILM COATED ORAL at 08:14

## 2021-06-07 RX ADMIN — HEPARIN SODIUM 5000 UNITS: 5000 INJECTION INTRAVENOUS; SUBCUTANEOUS at 13:39

## 2021-06-07 RX ADMIN — AMITRIPTYLINE HYDROCHLORIDE 25 MG: 25 TABLET, FILM COATED ORAL at 20:54

## 2021-06-07 RX ADMIN — HEPARIN SODIUM 5000 UNITS: 5000 INJECTION INTRAVENOUS; SUBCUTANEOUS at 06:01

## 2021-06-07 RX ADMIN — CEPHALEXIN 500 MG: 500 CAPSULE ORAL at 20:54

## 2021-06-07 RX ADMIN — LEVOTHYROXINE SODIUM 25 MCG: 0.03 TABLET ORAL at 06:01

## 2021-06-07 ASSESSMENT — PAIN DESCRIPTION - ONSET: ONSET: ON-GOING

## 2021-06-07 ASSESSMENT — PAIN DESCRIPTION - PAIN TYPE
TYPE: ACUTE PAIN
TYPE: ACUTE PAIN

## 2021-06-07 ASSESSMENT — PAIN DESCRIPTION - LOCATION
LOCATION: KNEE
LOCATION: SHOULDER

## 2021-06-07 ASSESSMENT — PAIN SCALES - GENERAL
PAINLEVEL_OUTOF10: 0
PAINLEVEL_OUTOF10: 1
PAINLEVEL_OUTOF10: 5
PAINLEVEL_OUTOF10: 1

## 2021-06-07 ASSESSMENT — PAIN DESCRIPTION - DESCRIPTORS: DESCRIPTORS: ACHING;DISCOMFORT

## 2021-06-07 ASSESSMENT — PAIN DESCRIPTION - ORIENTATION
ORIENTATION: LEFT
ORIENTATION: LEFT

## 2021-06-07 ASSESSMENT — PAIN DESCRIPTION - FREQUENCY: FREQUENCY: INTERMITTENT

## 2021-06-07 ASSESSMENT — PAIN DESCRIPTION - PROGRESSION: CLINICAL_PROGRESSION: GRADUALLY IMPROVING

## 2021-06-07 NOTE — PROGRESS NOTES
Urinalysis shows 10-20 white blood cells, moderate leukocytes, positive nitrite. Urine sent for culture. Will start patient on Keflex.

## 2021-06-07 NOTE — PROGRESS NOTES
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 Patient is doing well today. She has no new issues with sleep, appetite, bowel, or bladder. She reports no issues during IVELISSE for her granddaughter's graduation on Saturday. ROS:  Denies fevers, chills, sweats. No chest pain, palpitations, lightheadedness. Denies coughing, wheezing or shortness of breath. Denies abdominal pain, nausea, diarrhea or constipation. No new areas of joint pain. Denies new areas of numbness or weakness. Denies new anxiety  issues. No new skin problems. Rehabilitation:   Progressing in therapies. PT:  Restrictions/Precautions: Fall Risk  Implants present? :  (loop recorder)  Other position/activity restrictions:  L neglect with LUE/LLE deficits, neglect improving. Transfers  Sit to Stand: Minimal Assistance (L UE assist; \"upwalker\")  Stand to sit: Minimal Assistance (L UE assist, improved control today, G reach back c R UE)  Bed to Chair: Minimal assistance  Stand Pivot Transfers: Maximum Assistance (Daughter hands-on; inadequate communication re: technique. )  Squat Pivot Transfers: Minimal Assistance (to R; requires safety cues & assist for appropriate L LE placement & L UE if not using sling.)  Lateral Transfers: Minimal Assistance  Car Transfer: Maximum Assistance (+ SBA (son participating in family training). Education for positioning, safety, sequencing, set-up. Drive side back seat to facilitate transfer to Pt's R; going to Pt's L to exit. )  Comment: Pt impulsive & inconsistent with safety & L-sided awareness.    Ambulation 1  Surface: level tile  Device:  (Upwalker)  Other Apparatus: Left, Slider, AFO (Wheelchair brought up afterward)  Assistance: 2 Person assistance (Max Ax1 + Mod Ax1)  Quality of Gait: Assisting pt with advancing left LE, assisting with terminal knee, cues for sequencing, cues for upright posture  Gait Deviations: Decreased step length, Decreased step height, Slow Juanita, Decreased head and trunk rotation  Distance: 50'  Comments: tactile assist for sequencing, weight shifting, terminal knee, pt impulsive and attempting to take steps with right LE prior to advancing left LE    Transfers  Sit to Stand: Minimal Assistance (L UE assist; \"upwalker\")  Stand to sit: Minimal Assistance (L UE assist, improved control today, G reach back c R UE)  Bed to Chair: Minimal assistance  Stand Pivot Transfers: Maximum Assistance (Daughter hands-on; inadequate communication re: technique. )  Squat Pivot Transfers: Minimal Assistance (to R; requires safety cues & assist for appropriate L LE placement & L UE if not using sling.)  Lateral Transfers: Minimal Assistance  Car Transfer: Maximum Assistance (+ SBA (son participating in family training). Education for positioning, safety, sequencing, set-up. Drive side back seat to facilitate transfer to Pt's R; going to Pt's L to exit. )  Comment: Pt impulsive & inconsistent with safety & L-sided awareness.    Ambulation  Ambulation?: Yes  More Ambulation?: No  Ambulation 1  Surface: level tile  Device:  (Upwalker)  Other Apparatus: Left, Slider, AFO (Wheelchair brought up afterward)  Assistance: 2 Person assistance (Max Ax1 + Mod Ax1)  Quality of Gait: Assisting pt with advancing left LE, assisting with terminal knee, cues for sequencing, cues for upright posture  Gait Deviations: Decreased step length, Decreased step height, Slow Juanita, Decreased head and trunk rotation  Distance: 50'  Comments: tactile assist for sequencing, weight shifting, terminal knee, pt impulsive and attempting to take steps with right LE prior to advancing left LE    Surface: level tile  Ambulation 1  Surface: level tile  Device:  (Upwalker)  Other Apparatus: Left, Slider, AFO (Wheelchair brought up afterward)  Assistance: 2 Person assistance (Max Ax1 + Mod Ax1)  Quality of Gait: Assisting pt with advancing left LE, assisting with terminal knee, cues for sequencing, cues for upright posture  Gait Deviations: Decreased step length, Decreased step height, Slow Juanita, Decreased head and trunk rotation  Distance: 50'  Comments: tactile assist for sequencing, weight shifting, terminal knee, pt impulsive and attempting to take steps with right LE prior to advancing left LE    OT:  ADL  Equipment Provided: Reacher, Long-handled sponge  Feeding: Modified independent  (requiring increased time to complete )  Grooming: Modified independent  (seated at sink for oral care )  UE Bathing: None  LE Bathing: None  UE Dressing: Minimal assistance (assist threading LUE into shirt )  LE Dressing: None  Toileting: Dependent/Total  Additional Comments: Pt completed ADL seated at sink this AM. Only changing shirt this date. Refusing any other ADL this date          Balance  Sitting Balance: Supervision (seated EOB for eating )  Standing Balance: Moderate assistance (Moderate Assist x 1 static standing; x 2 w/ dynamic)   Standing Balance  Time: 30 sec x3 for transfers   Activity: functional tranfers   Comment: utilixing grab bars   Functional Mobility  Functional - Mobility Device: Wheelchair  Activity: To/from bathroom  Assist Level: Moderate assistance  Functional Mobility Comments: unable to go straight with use of RUE and RLE and LLE on foot rest, also runs into items on L, VC for Self assist LE, f carryover/initiation with repetition     Bed mobility  Bridging: Minimal assistance  Rolling to Left: Minimal assistance  Rolling to Right: Maximum assistance  Supine to Sit: Stand by assistance  Sit to Supine:  Moderate assistance (Assist with BLEs over EOB. )  Scooting: Stand by assistance (hips edge of mat)  Comment: Increased time to complete  Transfers  Stand Step Transfers: Dependent/Total  Stand Pivot Transfers: 2 Person assistance, Moderate assistance (toilet & shower this date)  Sit to stand: Minimal assistance  Stand to sit: Minimal assistance  Transfer Comments: seated EOB using bed rails to stand    Toilet Transfers  Toilet - Technique: Stand pivot, To right, To left  Equipment Used: Grab bars  Toilet Transfer: 2 Person assistance, Moderate assistance  Toilet Transfers Comments: with Moderate verbal cues for safety     Shower Transfers  Shower - Transfer From: Wheelchair  Shower - Transfer Type: To and From  Shower - Transfer To: Transfer tub bench  Shower - Technique: Stand pivot, To right, To left  Shower Transfers: 2 Person assistance, Moderate assistance  Shower Transfers Comments: with Moderate verbal cues for safety and technique  Wheelchair Bed Transfers  Wheelchair/Bed - Technique: Stand pivot, To right  Equipment Used: Bed, Wheelchair  Level of Asssistance: 2 Person assistance, Minimal assistance  Wheelchair Transfers Comments: 2 assist for safety this date, no knee bucking or LOB     SPEECH:  Subjective: [x]? Alert     [x]? Cooperative     []? Confused     []? Agitated    []? Lethargic     Objective/Assessment:  Attention:  Sustained attention: visual scanning 2 units, completed with 25% accuracy praveen, mod cues provided throughout to physically turn head to affected side.      Other:  Pt. Completed oral motor exercises x 1 set, 10  reps each. Reduced labial ROM noted on L side. Pt. Reports tolerating her diet of soft and bite sized without difficulty. Significant pt ed provided re discharge diet recommendations and exercise program recommendations. Pt verbalized understanding. No family present in room. Objective:  BP (!) 141/70   Pulse 83   Temp 97.9 °F (36.6 °C) (Oral)   Resp 16   Ht 5' 8\" (1.727 m)   Wt 191 lb 8 oz (86.9 kg)   SpO2 93%   BMI 29.12 kg/m²       GEN: Well developed, well nourished, in NAD  HEENT:  NCAT. PERRL. EOMI. Mucous membranes pink and moist.   PULM:  Clear to ausculation. No rales or rhonchi. Respirations WNL and unlabored. CV:  bradycardic rate regular rhythm.  No murmurs or gallops. GI:  Abdomen soft. Nontender. Non-distended. BS + and equal.    NEUROLOGICAL: A&O x3. Sensation intact to light touch. Stable impaired L CN VII. Persistent L neglect with R gaze preference. MSK:  Functional ROM RUE and RLEs. Impaired AROM LUE and LLE. Charity Sida Motor testing 4+/5 key muscles RUE and RLE. L shoulder shrug 2/5, L hip flexion 2/5. Distal LUE and LLE muscles 0/5. SKIN: Warm dry and intact. Good turgor. EXTREMITIES:  No calf tenderness to palpation. No edema BLEs. Charity Sida PSYCH: Mood depressed. Appropriately interactive. Affect WNL    Diagnostics:     CBC:   No results for input(s): WBC, RBC, HGB, HCT, MCV, RDW, PLT in the last 72 hours. BMP:   No results for input(s): NA, K, CL, CO2, PHOS, BUN, CREATININE, GLUCOSE in the last 72 hours. Invalid input(s): CA  BNP: No results for input(s): BNP in the last 72 hours. PT/INR: No results for input(s): PROTIME, INR in the last 72 hours. APTT: No results for input(s): APTT in the last 72 hours. CARDIAC ENZYMES: No results for input(s): CKMB, CKMBINDEX, TROPONINT in the last 72 hours. Invalid input(s): CKTOTAL;3  FASTING LIPID PANEL:  Lab Results   Component Value Date    CHOL 206 (H) 05/05/2021    HDL 29 (L) 05/05/2021    TRIG 185 (H) 05/05/2021     LIVER PROFILE: No results for input(s): AST, ALT, ALB, BILIDIR, BILITOT, ALKPHOS in the last 72 hours.      Current Medications:   Current Facility-Administered Medications: cephALEXin (KEFLEX) capsule 500 mg, 500 mg, Oral, BID  FLUoxetine (PROZAC) capsule 20 mg, 20 mg, Oral, Daily  amitriptyline (ELAVIL) tablet 25 mg, 25 mg, Oral, Nightly  heparin (porcine) injection 5,000 Units, 5,000 Units, Subcutaneous, 3 times per day  acetaminophen (TYLENOL) tablet 1,000 mg, 1,000 mg, Oral, 3 times per day  polyvinyl alcohol (LIQUIFILM TEARS) 1.4 % ophthalmic solution 1 drop, 1 drop, Both Eyes, PRN  sodium chloride flush 0.9 % injection 5-40 mL, 5-40 mL, Intravenous, PRN  promethazine (PHENERGAN) tablet 12.5 mg, 12.5 mg, Oral, Q6H PRN **OR** ondansetron (ZOFRAN) injection 4 mg, 4 mg, Intravenous, Q6H PRN  0.9 % sodium chloride infusion, 25 mL, Intravenous, PRN  albuterol (PROVENTIL) nebulizer solution 2.5 mg, 2.5 mg, Nebulization, Q4H PRN  amLODIPine (NORVASC) tablet 5 mg, 5 mg, Oral, Daily  aspirin chewable tablet 81 mg, 81 mg, Oral, Daily  atorvastatin (LIPITOR) tablet 80 mg, 80 mg, Oral, Daily  budesonide-formoterol (SYMBICORT) 160-4.5 MCG/ACT inhaler 2 puff, 2 puff, Inhalation, BID  dextrose 50 % IV solution, 12.5 g, Intravenous, PRN  glucagon (rDNA) injection 1 mg, 1 mg, Intramuscular, PRN  glucose (GLUTOSE) 40 % oral gel 15 g, 15 g, Oral, PRN  levothyroxine (SYNTHROID) tablet 25 mcg, 25 mcg, Oral, Daily  nicotine (NICODERM CQ) 14 MG/24HR 1 patch, 1 patch, Transdermal, Daily  tiotropium (SPIRIVA RESPIMAT) 2.5 MCG/ACT inhaler 2 puff, 2 puff, Inhalation, Daily  polyethylene glycol (GLYCOLAX) packet 17 g, 17 g, Oral, Daily  senna (SENOKOT) tablet 17.2 mg, 2 tablet, Oral, Daily PRN  bisacodyl (DULCOLAX) suppository 10 mg, 10 mg, Rectal, Daily PRN      Impression/Plan:   Impaired ADLs, gait, and mobility due to:      1. Ischemic R MCA CVA with hemorrhagic conversion and L non-dominant hemiparesis:  PT/OT for gait, mobility, strengthening, endurance, ADLs, and self care. On ASA, atorvastatin. Sling only to be used when patient is ambulating with therapy. Son is able to perform car transfers safely to his mini Dorann Alderman - reviewed plan for IVELISSE with patient and daughter 6/1 - OK for 3 hours (11 am - 2 pm) to attend graduation with no bathroom transfers and son to do car transfers. Nursing will toilet patient and place brief prior to her leaving. 2. Headache: Has Tylenol routine TID. Improved on amitriptyline. 3. Falls: s/p Fall 5/16, 5/30, and 5/31 -  No injuries. Only [facility] BESSY transfers with nursing until advanced by therapy. 4. UTI: culture pending. IM started Keflex. Need stop date.    5. Dry eyes: has natural tears.  6. Cognitive impairment: SLP treating  7. Dysphagia/aphasia: SLP treating. Diet upgraded. 8. HTN/Hyperlipidemia: on amlodipine  9. DM: on insulin sliding scale  10. COPD/asthma: on albuterol nebulizers prn, on symbicort BID, spiriva  11. Adjustment disorder with depressed mood: Improved. started fluoxetine 5/15  12. Esophageal Ring: Will monitor - on dysphagia diet  13. Hypothyroidism: on levothyroxine  14. Nicotine dependence: on Nicoderm  15. Bowel Management: Miralax daily, senokot prn, dulcolax prn. 16. DVT Prophylaxis:  heparin, SCD's while in bed and MAXIM's   17. Internal medicine for medical management    Electronically signed by Sarah Castillo MD on 6/7/2021 at 9:20 AM      This note is created with the assistance of a speech recognition program.  While intending to generate a document that actually reflects the content of the visit, the document can still have some errors including those of syntax and sound a like substitutions which may escape proof reading. In such instances, actual meaning can be extrapolated by contextual diversion.

## 2021-06-07 NOTE — PROGRESS NOTES
Physical Therapy  Facility/Department: [facility]  Daily Treatment Note  NAME: Ashley Murrell  : 1955  MRN: [id number]    Date of Service: 2021    Discharge Recommendations:  Patient would benefit from continued therapy after discharge, Home with assist PRN   PT Equipment Recommendations  Other: CTA; pending further progress with mobility    Assessment   Body structures, Functions, Activity limitations: Decreased functional mobility ; Decreased strength;Decreased safe awareness;Decreased endurance;Decreased balance;Decreased posture;Decreased coordination;Decreased vision/visual deficit; Decreased fine motor control  Treatment Diagnosis: Weakness, impaired mobility  Specific instructions for Next Treatment: Assess comfort of new cushion. REQUIRES PT FOLLOW UP: Yes  Activity Tolerance  Activity Tolerance: Patient limited by fatigue;Patient limited by endurance     Patient Diagnosis(es): There were no encounter diagnoses. has a past medical history of Asthma, COPD (chronic obstructive pulmonary disease) ([facility] [address] Diabetes mellitus ([facility] [address] Esophageal ring, Gastritis, Hyperlipidemia, Hyperplastic colon polyp, Hypertension, Osteoarthritis, Patient in clinical research study, and TIA (transient ischemic attack). has a past surgical history that includes Appendectomy; Hysterectomy; Esophagus dilation; Colonoscopy (2017); Endoscopy, colon, diagnostic; pr egd transoral biopsy single/multiple (N/A, 2017); pr colsc flx w/rmvl of tumor polyp lesion snare tq (N/A, 2017); and Upper gastrointestinal endoscopy (2017). Restrictions  Restrictions/Precautions  Restrictions/Precautions: Fall Risk  Required Braces or Orthoses?: No  Implants present? :  ( Loop recorder)  Position Activity Restriction  Other position/activity restrictions:  L neglect with LUE/LLE deficits, neglect improving.   Subjective   General  Chart Reviewed: Yes  Response To Previous Treatment: Patient reporting fatigue but able to participate. Family / Caregiver Present: No  Referring Practitioner: Dr Arely Branch: Patient reports not sleeping well throughout the night   Pain Screening  Patient Currently in Pain: Yes  Pain Assessment  Pain Assessment: 0-10  Pain Level: 5  Pain Type: Acute pain  Pain Location: Knee  Pain Orientation: Left  Vital Signs  Patient Currently in Pain: Yes       Orientation  Orientation  Overall Orientation Status: Within Functional Limits  Objective      Transfers  Sit to Stand: Minimal Assistance  Stand to sit: Minimal Assistance  Stand Pivot Transfers: Maximum Assistance  Ambulation  Ambulation?: Yes  Ambulation 1  Surface: level tile  Device:  (up walker )  Other Apparatus: Left;Slider;AFO  Assistance: 2 Person assistance  Quality of Gait: Assisting pt with advancing left LE, assisting with terminal knee, cues for sequencing, cues for upright posture  Gait Deviations: Decreased step length;Decreased step height;Slow Juanita;Decreased head and trunk rotation  Distance: 25ft; 40ft AM 80ft PM   Comments: tactile assist for sequencing, weight shifting, terminal knee, pt impulsive and attempting to take steps with right LE prior to advancing left LE; seated rest break between diatances in AM   Wheelchair Activities  Propulsion: Yes  Propulsion 1  Propulsion: Manual  Level: Level Tile  Method: RUE;RLE  Level of Assistance: Minimal assistance  Description/ Details: pt neglects left side and needs cues to avoid objects in hallway, pt did well with straight path without objects near, cues for 90 degree turns  Distance: 120ft        Other exercises  Other exercises?: Yes  Other exercises 1: Seated UBE 3 min forward/ 3 min backward  Other exercises 2: Seated bilateral LE exercises, 20 reps each: L LE active assisted Hip ex's, passive ROM at knee and ankle. R LE 2 lbs. Lime (minimal) resistance band  Other exercises 3: donned AFO and shoes in PM          Comment: rest breaks PRN.      Goals  Short term goals  Time Frame for Short term goals: 10 days  Short term goal 1: Pt able to roll in bed side to side at min A without bed rail  Short term goal 2: Pt able to perform supine<>sit at mod A  Short term goal 3: Pt able to perform sit<>stand at min/mod A . Short term goal 4: Pt able to progress to pivot transfers at max A   Short term goal 5: Pt able to improve sitting balance at EOB/EOM at SBA for static balance and able to track to left side as well as turn to head to left side to scan her environment with minimal cues. Short term goal 6: Pt able to tolerate standing in // bars with R UE support and assist at mod A  Short term goal 7: Pt able to propel w/c with R UE/R LE distance of 50 to 80 ft, straight path and turn around 180 degrees, at min/mod A   Long term goals  Time Frame for Long term goals : By DC  Long term goal 1: Pt able to roll in bed mod-I  Long term goal 2: Pt able to perform supine <> sit at CGA/min A  Long term goal 3: Pt able to perform pivot transfers min A / mod A and able to scan Left side environment. Long term goal 4: Pt able to ambulate in // bars, or with appropriate assistive device and/or LE bracing, distance of  20 to 30 ft, min/mod A x 2. Long term goal 5: Pt able to propel w/c on level surfaces distance 100 to 150 ft, SBA/CGA. Long term goal 6: Educate pt/family in safe technique for mobility and  to go up and down steps to enter/exit home. Long term goal 7: Improve sitting balance at EOM to good, and standing balance with R UE support to fair- to reduce fall risk. Long term goal 8: Improve PASS score to 20/36 improve overall function. Patient Goals   Patient goals : Get better and go home    Plan    Plan  Times per week: 1.5 hr/day, 5 to 7 days/week  Specific instructions for Next Treatment: Assess comfort of new cushion.    Current Treatment Recommendations: Strengthening, Balance Training, Functional Mobility Training, Transfer Training, Endurance Training, Wheelchair Mobility Training, Gait Training, Stair training, Neuromuscular Re-education, Home Exercise Program, Safety Education & Training, Patient/Caregiver Education & Training, Modalities, Positioning, ROM, Equipment Evaluation, Education, & procurement  Safety Devices  Type of devices: Gait belt, Nurse notified, Patient at risk for falls, All fall risk precautions in place, Call light within reach, Left in chair, Chair alarm in place  Restraints  Initially in place: No        06/07/21 0941 06/07/21 1508   PT Individual Minutes   Time In [facility] 328   Time Out [id number]   [id number]   PT Concurrent Minutes   Time In 1020  --    Time Out 1027  --    Minutes 7  --      Electronically signed by Carmen Correa PTA on 6/7/21 at 3:17 PM EDT

## 2021-06-07 NOTE — PROGRESS NOTES
Speech Language Pathology  Speech Language Pathology  [facility] Acute Rehab Unit at [facility]    Dysphagia/SpeechLanguage Treatment Note    Date: 6/7/2021  Patients Name: Dannielle Rios  MRN: [id number]  Diagnosis:   Patient Active Problem List   Diagnosis Code    Pneumonia of left lower lobe due to infectious organism J18.9    Tobacco abuse Z72.0    Legionella pneumonia ([facility] [address] A48.1    Dysphagia R13.10    Hyperplastic colon polyp K63.5    Esophageal ring K22.2    Gastritis K29.70    Elbow effusion, right M25.421    Stroke due to occlusion of right middle cerebral artery ([facility]) I63.511    Nihss score 15 R29.715    Acute left hemiparesis ([facility]) G81.94    Cerebrovascular accident (CVA) due to occlusion of right middle cerebral artery ([facility] [address] I63.511    Acute CVA (cerebrovascular accident) ([facility] [address] I63.9    COPD (chronic obstructive pulmonary disease) ([facility]) J44.9    Essential hypertension I10    Hyperlipidemia E78.5    Pre-diabetes R73.03       Pain: 0/10    Speech/Cognitive/Dysphagia Treatment    Treatment time:  0270-1656    Subjective: [x] Alert [x] Cooperative     [] Confused     [] Agitated    [] Lethargic    Objective/Assessment:  Attention:  Sustained attention: visual scanning 2 units, completed with 25% accuracy praveen, mod cues provided throughout to physically turn head to affected side. Other:  Pt. Completed oral motor exercises x 1 set, 10  reps each. Reduced labial ROM noted on L side. Pt. Reports tolerating her diet of soft and bite sized without difficulty. Significant pt ed provided re discharge diet recommendations and exercise program recommendations. Pt verbalized understanding. No family present in room. Plan:  [x] Continue ST services    [] Discharge from ST:      Discharge recommendations: [] Inpatient Rehab   [] [place]   [] Outpatient Therapy  [] Follow up at trauma clinic   [] Other:       Treatment completed by:  Angeles Gresham M.A., [address] [place] [address]

## 2021-06-07 NOTE — PROGRESS NOTES
[address]   ACUTE REHABILITATION OCCUPATIONAL THERAPY  DAILY NOTE    Date: 21  Patient Name: Carlton Dudley      Room: 5778/2693-17    MRN: [id number]   : 1955  (63 y.o.)  Gender: female   Referring Practitioner: Luis Alexander MD  Diagnosis: ischemic CVA,  subacute right MCA distribution infarct, L lynette, dysphagia, L neglect, loop recorder 21 (Dr. Ranjit Fleming)  Additional Pertinent Hx: 72-year-old female who was found down, last known well approximately 21 hours before. Patient was found to have a right MCA M1 occlusion. Thrombectomy was not successful, MRI showed patient had a right MCA stroke with hemorrhagic conversion. 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 :  ( Loop recorder)  Other position/activity restrictions:  L neglect with LUE/LLE deficits, neglect improving.   Required Braces or Orthoses?: No  Equipment Used: Bed, Wheelchair    Subjective  Subjective: \" It's to damn early for this\" Pt states in regards to OT   Comments: Pt reports she is fatigued this AM   Patient Currently in Pain: Denies  Restrictions/Precautions: Fall Risk  Overall Orientation Status: Within Functional Limits  Patient Observation  Observations: Pt continues to demonstrate flat affect in regards to therapy POC       Objective  Perception  Overall Perceptual Status: Impaired  Unilateral Attention: Cues to attend left visual field;Cues to attend to left side of body  Initiation: Cues to initiate tasks  Motor Planning: Cues to use objects appropriately  Balance  Sitting Balance: Supervision (seated EOB for eating )  Bed mobility  Supine to Sit: Stand by assistance  Transfers  Sit to stand: Minimal assistance  Stand to sit: Minimal assistance  Transfer Comments: seated EOB using bed rails to stand   Standing Balance  Time: 30 sec x3 for transfers   Activity: functional tranfers   Comment: utilixing grab bars   Toilet Transfers  Toilet - Technique: Stand pivot; To right; To left  Equipment Used: Grab bars  Toilet Transfer: 2 Person assistance; Moderate assistance  Toilet Transfers Comments: with Moderate verbal cues for safety         ADL  Feeding: Modified independent  (requiring increased time to complete )  Grooming: Modified independent  (seated at sink for oral care )  UE Bathing: None  LE Bathing: None  UE Dressing: Minimal assistance (assist threading LUE into shirt )  LE Dressing: None  Toileting: Dependent/Total  Additional Comments: Pt completed ADL seated at sink this AM. Only changing shirt this date. Refusing any other ADL this date           Assessment  Performance deficits / Impairments: Decreased functional mobility ; Decreased ADL status; Decreased strength;Decreased endurance;Decreased balance;Decreased high-level IADLs;Decreased vision/visual deficit; Decreased cognition;Decreased safe awareness;Decreased ROM; Decreased fine motor control;Decreased coordination;Decreased posture;Decreased sensation  Prognosis: Good  Discharge Recommendations: Patient would benefit from continued therapy after discharge  Activity Tolerance: Patient Tolerated treatment well  Safety Devices in place: Yes  Restraints  Initially in place: Yes          Patient Education:  Patient Goals   Patient goals : \"get as close to normal as possible. \"  specifies:\"get in and out of bed on my own, shower by myself. \"  Learner:patient  Method: demonstration and explanation       Outcome: acknowledged understanding         Plan  Plan  Times per week: 5-7  Times per day: Twice a day  Current Treatment Recommendations: Strengthening, Positioning, ROM, Safety Education & Training, Balance Training, Patient/Caregiver Education & Training, Self-Care / ADL, Cognitive/Perceptual Training, Functional Mobility Training, Neuromuscular Re-education, Equipment Evaluation, Education, & procurement, Endurance Training, Cognitive Reorientation  Patient Goals   Patient goals : \"get as close to normal as possible. \"  specifies:\"get in and out of bed on my own, shower by myself. \"  Short term goals  Time Frame for Short term goals: 1 week  Short term goal 1: mod A UE bathe and dress  Short term goal 2: set up brush teeth (goal met)  Short term goal 3: min feeding, with cues able to locate L side of tray and scan to locate items on L. Short term goal 4: tolerate 6-8 min sitting edge of bed (static sit)  with min assist and RUE support, head at midline and fair safety awareness  Short term goal 5: pt to initiate reposition LUE with RUE with good safety ie. hold by wrist not by 1 finger  Short term goal 6: demo improved awareness of L side and neuromuscular christian by weight bearing on LUE with physical assist to complete  Short term goal 7: from w/c:  consistently maintain midline trunk control and demo able to lean forward 4 inches away from back of chair without physical assist for adls. Short term goal 8: tolerate 2 min static stand for adls with RUE support with mod of 2 and assist with LLE as needed. Long term goals  Time Frame for Long term goals : by discharge  Long term goal 1: set up and occasional verbal cues ie.  L visual scan for self feeding  Long term goal 2: set up oral care (goal met)  Long term goal 3: max x 1 toileting and adl transfers  Long term goal 4: min UE bathe and dress (shirt)  Long term goal 5: max x 1 LE bathe and dress, able to cross to reach RLE with min assist and keep trunk balance        06/07/21 0730   OT Individual Minutes   Time In 0730   Time Out 0808   Minutes 38     Electronically signed by DEBORAH Luz on 6/7/21 at 2:46 PM EDT

## 2021-06-07 NOTE — PATIENT CARE CONFERENCE
[facility]: REJI ARELLANO   ACUTE REHABILITATION  TEAM CONFERENCE NOTE  Date: 21  Patient Name: Florian Kerr       Room: 1948/8902-19  MRN: [id number]       : 1955  (66 y.o.)     Gender: female   Referring Practitioner: Dr Gail Castañeda CVA (cerebrovascular accident) [facility]) [I63.9]  Diagnosis: ischemic CVA,  subacute right MCA distribution infarct, L lynette, dysphagia, L neglect, loop recorder 21 (Dr. Lizy Mayo)     NURSING  Bladder  Incontinent Less Than Daily  Bowel   Always Continent  Date of Last BM: 2021  Intervention    Both Bowel & Bladder Program     Wounds/Incisions/Ulcers: No skin issues identified  Medication Education Program: Patient currently unable to manage medications and family being educated  Pain: Patient's pain is currently controlled with -  Tylenol scheduled     Fall Risk:  Falling star program initiated    PHYSICAL THERAPY  Bed Mobility  Rolling: Minimal assistance;Rolling Right;Stand by assistance;Rolling Left (Min L, SBA R)  Supine to Sit: Minimal assistance (to Pt's R; cues to push L LE with R; Mod A for trunk)  Sit to Supine: Minimal assistance  Scooting: Stand by assistance (hips edge of mat)    Transfers:  Sit to Stand: Minimal Assistance  Stand to sit: Minimal Assistance  Bed to Chair: Minimal assistance  Squat Pivot Transfers: Minimal Assistance (to R; requires safety cues & assist for appropriate L LE placement & L UE if not using sling.)  Comment: rest breaks PRN.      Ambulation 1  Surface: level tile  Device:  (up walker )  Other Apparatus: Left;Slider;AFO  Assistance: 2 Person assistance  Quality of Gait: Assisting pt with advancing left LE, assisting with terminal knee, cues for sequencing, cues for upright posture  Gait Deviations: Decreased step length;Decreased step height;Slow Juanita;Decreased head and trunk rotation  Distance: 25ft; 40ft AM 80ft PM   Comments: tactile assist for sequencing, weight shifting, terminal knee, pt impulsive and attempting to take steps with right LE prior to advancing left LE; seated rest break between diatances in AM   Ambulation 2  Surface - 2: level tile  Device 2:  (walker lift)  Other Apparatus 2: Left;AFO;Slider; Wheelchair follow  Assistance 2: 2 Person assistance; Moderate assistance  Quality of Gait 2: difficulty advancing left LE, attempting steps with right LE before advancing left LE  Gait Deviations: Slow Juanita;Decreased step length;Decreased step height;Deviated path  Distance: 75ft  Comments: tactile assist for advancing left LE and terminal knee in stance phase    # Steps : 5  Stairs Height: 4\" (& 6\")  Rails: Right ascending (L UE hand-held assist)  Other Apparatus: Left;AFO (+ slider )  Assistance: 2 Person assistance; Moderate assistance  Comment: Pt more fatigued today and needing extra cues for correct sequencing    Propulsion 1  Propulsion: Manual  Level: Level Tile  Method: RUE;RLE  Level of Assistance: Minimal assistance  Description/ Details: pt neglects left side and needs cues to avoid objects in hallway, pt did well with straight path without objects near, cues for 90 degree turns  Distance: 120ft             Equipment Needed: No  Other: CTA; pending further progress with mobility        Goals  Time Frame for Short term goals: 10 days  Short term goal 1: Pt able to roll in bed side to side at min A without bed rail  Short term goal 2: Pt able to perform supine<>sit at mod A  Short term goal 3: Pt able to perform sit<>stand at min/mod A . Short term goal 4: Pt able to progress to pivot transfers at max A   Short term goal 5: Pt able to improve sitting balance at EOB/EOM at SBA for static balance and able to track to left side as well as turn to head to left side to scan her environment with minimal cues.    Short term goal 6: Pt able to tolerate standing in // bars with R UE support and assist at mod A  Short term goal 7: Pt able to propel w/c with R UE/R LE distance of 50 to 80 ft, straight path and turn around 180 degrees, at min/mod [facility] Provided: Reacher, Long-handled sponge  Eating            Modified independent  (requiring increased time to complete )   Oral Hygiene            Modified independent  (seated at sink for oral care )   Shower/Bathe Self             UE Bathing: None  LE Bathing: None   Upper Body Dressing            Minimal assistance (assist threading LUE into shirt )   Lower Body Dressing            Putting On/Taking Off Footwear             None   Toilet Transfer             Toilet - Technique: Stand pivot; To right; To left  Equipment Used: Grab bars  Toilet Transfer: 2 Person assistance; Moderate assistance  Toilet Transfers Comments: with Moderate verbal cues for safety   Toileting Hygiene            Dependent/Total    Bed mobility  Supine to Sit: Stand by assistance      Balance  Sitting Balance: Supervision (seated EOB for eating )  Standing Balance  Time: 30 sec x3 for transfers   Activity: functional tranfers   Comment: utilixing grab bars     Equipment Recommendations  Equipment Needed:  (TBD)  Assessment: severely impaired loss of adl indep post CVA with severe L lynette deficits, dysphagia, L neglect    Short term goals  Time Frame for Short term goals: 1 week  Short term goal 1: mod A UE bathe and dress  Short term goal 2: set up brush teeth (goal met)  Short term goal 3: min feeding, with cues able to locate L side of tray and scan to locate items on L.   Short term goal 4: tolerate 6-8 min sitting edge of bed (static sit)  with min assist and RUE support, head at midline and fair safety awareness  Short term goal 5: pt to initiate reposition LUE with RUE with good safety ie. hold by wrist not by 1 finger  Short term goal 6: demo improved awareness of L side and neuromuscular christian by weight bearing on LUE with physical assist to complete  Short term goal 7: from w/c:  consistently maintain midline trunk control and demo able to lean forward 4 inches away from back of chair without physical assist for adls. Short term goal 8: tolerate 2 min static stand for adls with RUE support with mod of 2 and assist with LLE as needed. SPEECH THERAPY  Mod I for comprehension and expression, supervision for problem solving and memory  Short Term Goal: Independent for comprehension and expression, Mod I for problem solving and memory  Diet: Soft and bite-sized with thin liquids    NUTRITION  Weight: 191 lb 8 oz (86.9 kg) / Body mass index is 29.12 kg/m². Diet: 4 carbohydrate choices/Soft/Bite Sized with No Added Salt diet and Glucerna supplements twice daily- Pt consuming 25% of food provided. Needs encouragement at meal times. Please see nutrition note for details. SOCIAL WORK ASSESSMENT  Assessment: Currently pending pre-cert at [facility]  Pre-Admission Status:  Lives With: Alone  Type of Home: House  Home Layout: Two level, Able to Live on Main level with bedroom/bathroom, Laundry in basement  Home Access: Stairs to enter without rails  Entrance Stairs - Number of Steps: 3 (Front entrance)  Bathroom Shower/Tub: Tub/Shower unit, Curtain (tub shower is on main floor)  Bathroom Toilet: Standard  Bathroom Equipment: Hand-held shower  Home Equipment:  (No DME)  ADL Assistance: [address]: Independent  Homemaking Responsibilities: Yes  Ambulation Assistance: Independent  Transfer Assistance: Independent  Active : Yes  Mode of Transportation: [facility]  Occupation: Retired  Type of occupation: grocery store employee  Leisure & Hobbies: repurposing items, has a dog and fenced in back yard. IADL Comments: dtrOtilia RN lives in [facility], Naomi Lopez lives 20 min away, sonJauqan lives 4 blocks away. Additional Comments: Pt independent for all mobility, self care and IADL's.      Family Education: Family Education initiated and Ongoing    Percentage Risk for Readmission: Low 0 - 18%   Risk of Unplanned Readmission:  13 %    Critical Items: None     Problem / Barrier Intervention / Plan  Results   Dysphagia/Dysarthria Oral motor exercises            Impaired ability to care for self related to CVA  Patient and family training in modified care strategies to increase independence and safety during care tasks      Impaired function related to deficits form CVA Facilitation for muscle contractions-progressing to strengthening, balance activities and functional mobility training, including family training for safe mobility      Steps at entrance of home Depending on pt's progress in motor return, discuss/practise safe technique for enter/exiting home with family. Discussed need for a ramp to enter/exit home with pt/family.     Severe left neglect. Work on positioning, having pt scan left environment, pt/family education.                 Total Self Care Score    Total Mobility Score  Admission Score:  12      Admission Score:  17  Goal:  22/42         Goal:  33/90   `  Discharge Plan   Estimated Discharge Date: 6/8/2021  Home evaluation needed? Home Evaluation Indication (NO, Requires ReEval, YES/Date): No home evaluation need indicated for patient at this time  Overnight or Day Pass: No  Factors facilitating achievement of predicted outcomes: Family support, Motivated, Cooperative, Pleasant and Good insight into deficits  Barriers to the achievement of predicted outcomes: Decreased endurance, Upper extremity weakness, Lower extremity weakness and Medication managment    Functional Goals at discharge:  Predicted Outcome: [address]: Minimal Assistance  Discharge therapy goals:  PT: Long term goals  Time Frame for Long term goals : By DC  Long term goal 1: Pt able to roll in bed mod-I  Long term goal 2: Pt able to perform supine <> sit at CGA/min A  Long term goal 3: Pt able to perform pivot transfers min A / mod A and able to scan Left side environment.   Long term goal 4: Pt able to ambulate in // bars, or with appropriate assistive device and/or LE bracing, distance of  20 to 30 ft, min/mod A x 2. Long term goal 5: Pt able to propel w/c on level surfaces distance 100 to 150 ft, SBA/CGA. Long term goal 6: Educate pt/family in safe technique for mobility and  to go up and down steps to enter/exit home. Long term goal 7: Improve sitting balance at EOM to good, and standing balance with R UE support to fair- to reduce fall risk. Long term goal 8: Improve PASS score to 20/36 improve overall function. OT:Long term goals  Time Frame for Long term goals : by discharge  Long term goal 1: set up and occasional verbal cues ie. L visual scan for self feeding  Long term goal 2: set up oral care (goal met)  Long term goal 3: max x 1 toileting and adl transfers  Long term goal 4: min UE bathe and dress (shirt)  Long term goal 5: max x 1 LE bathe and dress, able to cross to reach RLE with min assist and keep trunk balance  ST: Long term goals: Independent for comprehension, expression, problem solving, and memory    Team Members Present at Conference:  :  Padmini Santos [facility]  Occupational Therapist: Adrienne Rodas OT   [address] PT  Speech Therapist: Casi Grace M.A., Kriss Shackleton  Nurse: Alexandria Langston RN   Dietary/Nutrition: Christiano Arreguin RD, LD  Pastoral Care: Zoran Bernardo  CMG: Nicholas Juarez RN    I approve the established interdisciplinary plan of care as documented within the medical record of [address] [place] [address]     Dillnilson Melton MD

## 2021-06-07 NOTE — CARE COORDINATION
Contacted Tia at [employer]. They do not have a determination yet and requested additional documentation. Faxed information to [phone].

## 2021-06-08 LAB
ANION GAP SERPL CALCULATED.3IONS-SCNC: 9 MMOL/L (ref 9–17)
BUN BLDV-MCNC: 15 MG/DL (ref 8–23)
BUN/CREAT BLD: ABNORMAL (ref 9–20)
CALCIUM SERPL-MCNC: 9.2 MG/DL (ref 8.6–10.4)
CHLORIDE BLD-SCNC: 105 MMOL/L (ref 98–107)
CO2: 27 MMOL/L (ref 20–31)
CREAT SERPL-MCNC: 0.41 MG/DL (ref 0.5–0.9)
CULTURE: ABNORMAL
GFR AFRICAN AMERICAN: >60 ML/MIN
GFR NON-AFRICAN AMERICAN: >60 ML/MIN
GFR SERPL CREATININE-BSD FRML MDRD: ABNORMAL ML/MIN/{1.73_M2}
GFR SERPL CREATININE-BSD FRML MDRD: ABNORMAL ML/MIN/{1.73_M2}
GLUCOSE BLD-MCNC: 108 MG/DL (ref 70–99)
HCT VFR BLD CALC: 40.7 % (ref 36–46)
HEMOGLOBIN: 13.6 G/DL (ref 12–16)
Lab: ABNORMAL
MCH RBC QN AUTO: 30.9 PG (ref 26–34)
MCHC RBC AUTO-ENTMCNC: 33.4 G/DL (ref 31–37)
MCV RBC AUTO: 92.6 FL (ref 80–100)
NRBC AUTOMATED: ABNORMAL PER 100 WBC
PDW BLD-RTO: 15 % (ref 11.5–14.9)
PLATELET # BLD: 318 K/UL (ref 150–450)
PMV BLD AUTO: 7.1 FL (ref 6–12)
POTASSIUM SERPL-SCNC: 4.4 MMOL/L (ref 3.7–5.3)
RBC # BLD: 4.4 M/UL (ref 4–5.2)
SODIUM BLD-SCNC: 141 MMOL/L (ref 135–144)
SPECIMEN DESCRIPTION: ABNORMAL
WBC # BLD: 9.1 K/UL (ref 3.5–11)

## 2021-06-08 PROCEDURE — 97112 NEUROMUSCULAR REEDUCATION: CPT

## 2021-06-08 PROCEDURE — 6370000000 HC RX 637 (ALT 250 FOR IP): Performed by: PHYSICAL MEDICINE & REHABILITATION

## 2021-06-08 PROCEDURE — 97542 WHEELCHAIR MNGMENT TRAINING: CPT

## 2021-06-08 PROCEDURE — 6370000000 HC RX 637 (ALT 250 FOR IP): Performed by: NURSE PRACTITIONER

## 2021-06-08 PROCEDURE — 92507 TX SP LANG VOICE COMM INDIV: CPT

## 2021-06-08 PROCEDURE — 97110 THERAPEUTIC EXERCISES: CPT

## 2021-06-08 PROCEDURE — 6370000000 HC RX 637 (ALT 250 FOR IP): Performed by: STUDENT IN AN ORGANIZED HEALTH CARE EDUCATION/TRAINING PROGRAM

## 2021-06-08 PROCEDURE — 99232 SBSQ HOSP IP/OBS MODERATE 35: CPT | Performed by: PHYSICAL MEDICINE & REHABILITATION

## 2021-06-08 PROCEDURE — 97530 THERAPEUTIC ACTIVITIES: CPT

## 2021-06-08 PROCEDURE — 1180000000 HC REHAB R&B

## 2021-06-08 PROCEDURE — 97129 THER IVNTJ 1ST 15 MIN: CPT

## 2021-06-08 PROCEDURE — 97535 SELF CARE MNGMENT TRAINING: CPT

## 2021-06-08 PROCEDURE — 99232 SBSQ HOSP IP/OBS MODERATE 35: CPT | Performed by: INTERNAL MEDICINE

## 2021-06-08 PROCEDURE — 80048 BASIC METABOLIC PNL TOTAL CA: CPT

## 2021-06-08 PROCEDURE — 6360000002 HC RX W HCPCS: Performed by: PHYSICAL MEDICINE & REHABILITATION

## 2021-06-08 PROCEDURE — 36415 COLL VENOUS BLD VENIPUNCTURE: CPT

## 2021-06-08 PROCEDURE — 85027 COMPLETE CBC AUTOMATED: CPT

## 2021-06-08 PROCEDURE — 97116 GAIT TRAINING THERAPY: CPT

## 2021-06-08 RX ORDER — CEPHALEXIN 500 MG/1
500 CAPSULE ORAL 2 TIMES DAILY
Status: DISCONTINUED | OUTPATIENT
Start: 2021-06-08 | End: 2021-06-10 | Stop reason: HOSPADM

## 2021-06-08 RX ORDER — CEPHALEXIN 500 MG/1
500 CAPSULE ORAL 2 TIMES DAILY
Status: DISCONTINUED | OUTPATIENT
Start: 2021-06-08 | End: 2021-06-08

## 2021-06-08 RX ADMIN — FLUOXETINE HYDROCHLORIDE 20 MG: 20 CAPSULE ORAL at 08:14

## 2021-06-08 RX ADMIN — HEPARIN SODIUM 5000 UNITS: 5000 INJECTION INTRAVENOUS; SUBCUTANEOUS at 06:00

## 2021-06-08 RX ADMIN — CEPHALEXIN 500 MG: 500 CAPSULE ORAL at 08:14

## 2021-06-08 RX ADMIN — Medication 2 PUFF: at 08:14

## 2021-06-08 RX ADMIN — AMITRIPTYLINE HYDROCHLORIDE 25 MG: 25 TABLET, FILM COATED ORAL at 23:14

## 2021-06-08 RX ADMIN — ACETAMINOPHEN 1000 MG: 500 TABLET, FILM COATED ORAL at 06:00

## 2021-06-08 RX ADMIN — HEPARIN SODIUM 5000 UNITS: 5000 INJECTION INTRAVENOUS; SUBCUTANEOUS at 14:47

## 2021-06-08 RX ADMIN — LEVOTHYROXINE SODIUM 25 MCG: 0.03 TABLET ORAL at 06:00

## 2021-06-08 RX ADMIN — ACETAMINOPHEN 1000 MG: 500 TABLET, FILM COATED ORAL at 22:44

## 2021-06-08 RX ADMIN — ATORVASTATIN CALCIUM 80 MG: 80 TABLET, FILM COATED ORAL at 08:14

## 2021-06-08 RX ADMIN — AMLODIPINE BESYLATE 5 MG: 5 TABLET ORAL at 08:14

## 2021-06-08 RX ADMIN — ASPIRIN 81 MG CHEWABLE TABLET 81 MG: 81 TABLET CHEWABLE at 08:13

## 2021-06-08 RX ADMIN — HEPARIN SODIUM 5000 UNITS: 5000 INJECTION INTRAVENOUS; SUBCUTANEOUS at 22:44

## 2021-06-08 RX ADMIN — ACETAMINOPHEN 1000 MG: 500 TABLET, FILM COATED ORAL at 14:33

## 2021-06-08 RX ADMIN — CEPHALEXIN 500 MG: 500 CAPSULE ORAL at 22:44

## 2021-06-08 ASSESSMENT — PAIN DESCRIPTION - PROGRESSION
CLINICAL_PROGRESSION: RAPIDLY IMPROVING
CLINICAL_PROGRESSION: GRADUALLY IMPROVING

## 2021-06-08 ASSESSMENT — PAIN DESCRIPTION - PAIN TYPE
TYPE: ACUTE PAIN
TYPE: ACUTE PAIN

## 2021-06-08 ASSESSMENT — PAIN SCALES - GENERAL
PAINLEVEL_OUTOF10: 3
PAINLEVEL_OUTOF10: 1
PAINLEVEL_OUTOF10: 0
PAINLEVEL_OUTOF10: 5
PAINLEVEL_OUTOF10: 4
PAINLEVEL_OUTOF10: 4
PAINLEVEL_OUTOF10: 0

## 2021-06-08 ASSESSMENT — PAIN DESCRIPTION - LOCATION
LOCATION: SHOULDER
LOCATION: SHOULDER
LOCATION: LEG

## 2021-06-08 ASSESSMENT — PAIN DESCRIPTION - FREQUENCY: FREQUENCY: CONTINUOUS

## 2021-06-08 ASSESSMENT — PAIN DESCRIPTION - ORIENTATION
ORIENTATION: LEFT

## 2021-06-08 NOTE — PROGRESS NOTES
demonstrate flat affect in regards to therapy POC       Bed Mobility:   Bed Mobility  Supine to Sit: Minimal assistance  Scooting: Contact guard assistance  Comment: Pt able to complete supine to sit with head of bed elevated. Pt requires trunk and LLE support to complete. Transfers:  Sit to Stand: Minimal Assistance  Stand to sit: Minimal Assistance      Comments: Pt impulsive & inconsistent with safety & L-sided awareness. Cues for technique. Ambulation 1  Surface: level tile  Device:  (up walker )  Other Apparatus: Left;Slider;AFO  Assistance: 2 Person assistance; Moderate assistance  Quality of Gait: Assisting pt with advancing left LE, assisting with terminal knee, cues for sequencing, cues for upright posture  Gait Deviations: Decreased step length;Decreased step height;Slow Juanita;Decreased head and trunk rotation  Distance: 60'x1  Comments: tactile assist for sequencing, weight shifting, terminal knee, pt impulsive and attempting to take steps with right LE prior to advancing left LE; seated rest break between diatances in AM. w/c brought up to pt PRN. Ambulation 2  Surface - 2: level tile  Device 2:  (upright walker)  Other Apparatus 2: Left;AFO;Slider; Wheelchair follow  Assistance 2: 2 Person assistance; Moderate assistance  Quality of Gait 2: difficulty advancing left LE, attempting steps with right LE before advancing left LE, worse with fatigue. Gait Deviations: Slow Juanita;Decreased step length;Decreased step height;Deviated path  Distance: 70ft  Comments: tactile assist for advancing left LE and terminal knee in stance phase. Chair brought up to pt PRN. Stairs/Curb  Stairs?: Yes  Stairs  # Steps : 5 (x2)  Stairs Height: 4\" (& 6\")  Rails: Right ascending (L UE hand-held assist)  Other Apparatus: Left;AFO  Assistance: 2 Person assistance; Moderate assistance  Comment: Pt requiring cues for sequencing and LLE placement/TKE.  Increase assistance with turns  at bottom of training stairs. Wheelchair Activities  Wheelchair Size: 20\"  Wheelchair Type: Standard  Wheelchair Cushion: Pressure Relieving  Pressure Relief Type: Lateral lean;Self Adjusts  Wheelchair Parts Management: Yes   Left Brakes Level of Assistance: Minimal assistance (extender added; brake requires max force to fully engage)  Right Brakes Level of Assistance: Stand by Assist  Propulsion: Yes  Propulsion 1  Propulsion: Manual  Level: Level Tile  Method: RUE;RLE  Level of Assistance: Minimal assistance  Description/ Details: Pt demos most difficutly with turns. Cues to avoid wall/objects during turns. Does well with straight path. Distance: 160'x1                 Left Brakes Level of Assistance: Minimal assistance (extender added; brake requires max force to fully engage)  Right Brakes Level of Assistance: Stand by Assist                   BALANCE Posture: Fair  Sitting - Static: Good;-  Sitting - Dynamic: Fair;+  Standing - Static: Fair;- (Upwalker, brakes locked)  Standing - Dynamic: Poor; - (\"Upwalker\"; requires at least 2 assist. )  Comments: Seated Edge of Bed unsupported, Standing with Upright Walker    EXERCISES    Other exercises?: Yes  Other exercises 2: Seated bilateral LE exercises, 20 reps each: L LE active assisted Hip ex's, passive ROM at knee and ankle. R LE 2 lbs. Lime (minimal) resistance band  Other exercises 3: donned AFO and shoes in PM   Other exercises 9: Squat pivot transfers x2, to Pt's R (removing armrest; safety cues for positioning, L LE/UE. )  Other exercises 10: Standing with walker lift: R UE crossbody activity, 8x, with assist to maintain L knee extension  Other exercises 13: Bed mobility  Other exercises 17: Standing BLE ex's AROM only in // bars. Pt requires Mod A x1 with an addtional CGA x1.   Other Activities  Comment: rest breaks PRN.          Activity Tolerance: Patient limited by fatigue, Patient limited by endurance  PT Equipment Recommendations  Equipment Needed: No  Other: CTA; pending further progress with mobility      Patient Education  New Education Provided:    Learner:patient  Method: demonstration and explanation       Outcome: needs reinforcement     Current Treatment Recommendations: Strengthening, Balance Training, Functional Mobility Training, Transfer Training, Endurance Training, Wheelchair Mobility Training, Gait Training, Stair training, Neuromuscular Re-education, Home Exercise Program, Safety Education & Training, Patient/Caregiver Education & Training, Modalities, Positioning, ROM, Equipment Evaluation, Education, & procurement    Conditions Requiring Skilled Therapeutic Intervention  Body structures, Functions, Activity limitations: Decreased functional mobility ; Decreased strength;Decreased safe awareness;Decreased endurance;Decreased balance;Decreased posture;Decreased coordination;Decreased vision/visual deficit; Decreased fine motor control  Assessment: Patient fatigue limiting tx, from e-stim to ambulation. Fatigues much quicker with more independent advancement of L LE (cues only) with [place]. Treatment Diagnosis: Weakness, impaired mobility  Prognosis: Good  Decision Making: Medium Complexity  Exam: ROM, MMT, fucntion, PASS score  Barriers to Learning: Cognition/Fatigue  REQUIRES PT FOLLOW UP: Yes  Discharge Recommendations: Patient would benefit from continued therapy after discharge;Home with assist PRN    Goals  Short term goals  Time Frame for Short term goals: 10 days  Short term goal 1: Pt able to roll in bed side to side at min A without bed rail  Short term goal 2: Pt able to perform supine<>sit at mod A  Short term goal 3: Pt able to perform sit<>stand at min/mod A . Short term goal 4: Pt able to progress to pivot transfers at max A   Short term goal 5: Pt able to improve sitting balance at EOB/EOM at SBA for static balance and able to track to left side as well as turn to head to left side to scan her environment with minimal cues.    Short term goal

## 2021-06-08 NOTE — PLAN OF CARE
ulcer prevention measures. Skin integrity maintained. No new skin breakdown noted. Skin to high risk pressure areas including coccyx and heels are clear.     Ryann care provided as needed throughout the shift. Aloe Vesta Moisture Barrier ointment applied to buttocks as a preventative measure.

## 2021-06-08 NOTE — PROGRESS NOTES
[address]   ACUTE REHABILITATION OCCUPATIONAL THERAPY  DAILY NOTE    Date: 21  Patient Name: Nadia Bal      Room: 1753/3934-94    MRN: [id number]   : 1955  (63 y.o.)  Gender: female   Referring Practitioner: Corbett Kawasaki, MD  Diagnosis: ischemic CVA,  subacute right MCA distribution infarct, L lynette, dysphagia, L neglect, loop recorder 21 (Dr. Luis Ceja)  Additional Pertinent Hx: 77-year-old female who was found down, last known well approximately 21 hours before. Patient was found to have a right MCA M1 occlusion. Thrombectomy was not successful, MRI showed patient had a right MCA stroke with hemorrhagic conversion. 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 :  (Loop recorder)  Other position/activity restrictions:  L neglect with LUE/LLE deficits, neglect improving. Required Braces or Orthoses?: No  Equipment Used: Bed, Wheelchair    Subjective  Subjective: \" I am ready to go, I wish they would just let me leave\"   Comments: Pt perseverating on dischange at this time   Patient Currently in Pain: Yes  Pain Level: 4  Pain Location: Shoulder  Pain Orientation: Left  Restrictions/Precautions: Fall Risk  Overall Orientation Status: Within Functional Limits  Patient Observation  Observations: Pt continues to demonstrate flat affect in regards to therapy POC  Pain Assessment  Pain Assessment: 0-10  Pain Level: 4  Pain Type: Acute pain  Pain Location: Shoulder  Pain Orientation: Left    Objective  Perception  Overall Perceptual Status: Impaired  Unilateral Attention: Cues to attend left visual field;Cues to attend to left side of body  Initiation: Cues to initiate tasks  Balance  Sitting Balance: Supervision       Neuromuscular Education  Other: Message completed on pts LUE to decrease swelling. Pt tollerating well.  Pt hand repositioned on try to decrase edema in digits       ADL  Additional Comments: see AM note   Positioning  Wheelchair Position Type: 1/2 lap tray  Wheelchair Postion Comment: good LUE positioning in w/c with L arm tray in place       Assessment  Performance deficits / Impairments: Decreased functional mobility ; Decreased ADL status; Decreased strength;Decreased endurance;Decreased balance;Decreased high-level IADLs;Decreased vision/visual deficit; Decreased cognition;Decreased safe awareness;Decreased ROM; Decreased fine motor control;Decreased coordination;Decreased posture;Decreased sensation  Prognosis: Good  Discharge Recommendations: Patient would benefit from continued therapy after discharge;Subacute/Skilled Nursing Facility  Activity Tolerance: Patient Tolerated treatment well;Treatment limited secondary to decreased cognition  Safety Devices in place: Yes  Type of devices: Left in chair;Call light within reach          Patient Education:  Patient Goals   Patient goals : \"get as close to normal as possible. \"  specifies:\"get in and out of bed on my own, shower by myself. \"  Learner:patient  Method: demonstration and explanation       Outcome: acknowledged understanding         Plan  Plan  Times per week: 5-7  Times per day: Twice a day  Current Treatment Recommendations: Strengthening, Positioning, ROM, Safety Education & Training, Balance Training, Patient/Caregiver Education & Training, Self-Care / ADL, Cognitive/Perceptual Training, Functional Mobility Training, Neuromuscular Re-education, Equipment Evaluation, Education, & procurement, Endurance Training, Cognitive Reorientation  Patient Goals   Patient goals : \"get as close to normal as possible. \"  specifies:\"get in and out of bed on my own, shower by myself. \"  Short term goals  Time Frame for Short term goals: 1 week  Short term goal 1: mod A UE bathe and dress  Short term goal 2: set up brush teeth (goal met)  Short term goal 3: min feeding, with cues able to locate L side of tray and scan to locate items on L. Short term goal 4: tolerate 6-8 min sitting edge of bed (static sit)  with min assist and RUE support, head at midline and fair safety awareness  Short term goal 5: pt to initiate reposition LUE with RUE with good safety ie. hold by wrist not by 1 finger  Short term goal 6: demo improved awareness of L side and neuromuscular christian by weight bearing on LUE with physical assist to complete  Short term goal 7: from w/c:  consistently maintain midline trunk control and demo able to lean forward 4 inches away from back of chair without physical assist for adls. Short term goal 8: tolerate 2 min static stand for adls with RUE support with mod of 2 and assist with LLE as needed. Long term goals  Time Frame for Long term goals : by discharge  Long term goal 1: set up and occasional verbal cues ie.  L visual scan for self feeding  Long term goal 2: set up oral care (goal met)  Long term goal 3: max x 1 toileting and adl transfers  Long term goal 4: min UE bathe and dress (shirt)  Long term goal 5: max x 1 LE bathe and dress, able to cross to reach RLE with min assist and keep trunk balance        06/08/21 1228   OT Individual Minutes   Time In 5566   Time Out 1258   Minutes 30     Electronically signed by DEBORAH Sanchez on 6/8/21 at 3:30 PM EDT

## 2021-06-08 NOTE — RESEARCH
[address]    Patient Name: Ofelia Marinelli  MRN: [id number]  YOB: 1955  Date of evaluation: 6/8/2021  Time of evaluation: 16:50  Reason for evaluation: 30 Day ± 7 Days Post Randomization Follow-Up    Protocol:  Protocol No: DAXA  NCT: [id number]  NATALIYA: [id number]  IRB: 2018[birth year]49  Site PI: Nancy Rasheed MD    Telephone    Enrollment: The subject wishes to remain involved in the study at this time. Current living arrangement: [facility] LIVING ARRANGEMENT: Acute Rehabilitation Facility         Any new adverse events? YES/NO: No    Blinded Modified [place] Scale performed by MO    MRS Score: 5  Writer spoke with patient via cell phone. Patient currently in ARU at [facility] with anticipated discharge to SNF at this time. No new protocol defined events reported. Patient agrees to 90 Day visit/phone Call. We will continue to follow this patient throughout the study. Please contact the Research Office at [phone] or Dr. Sheba Gordon via [employer] with any questions.       Romayne Lovely, RN  Clinical Research Services  [address] [place] [address]  P: [phone]  F: [phone]

## 2021-06-08 NOTE — PROGRESS NOTES
[address]   ACUTE REHABILITATION OCCUPATIONAL THERAPY  DAILY NOTE    Date: 21  Patient Name: New Mccollum      Room: 1485/8710-10    MRN: [id number]   : 1955  (63 y.o.)  Gender: female   Referring Practitioner: Sharda Moore MD  Diagnosis: ischemic CVA,  subacute right MCA distribution infarct, L lynette, dysphagia, L neglect, loop recorder 21 (Dr. Renata De Anda)  Additional Pertinent Hx: 59-year-old female who was found down, last known well approximately 21 hours before. Patient was found to have a right MCA M1 occlusion. Thrombectomy was not successful, MRI showed patient had a right MCA stroke with hemorrhagic conversion. 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 :  (Loop recorder)  Other position/activity restrictions:  L neglect with LUE/LLE deficits, neglect improving. Required Braces or Orthoses?: No  Equipment Used: Bed, Wheelchair    Subjective  Subjective: \"I just want to get the hell out of here. No offense, everyone is nice but staring at the same four walls is getting to me\"  Comments: Pt reports plan to discharge to SNF was delayed yesterday but that she is hoping to leave this afternoon.   Patient Currently in Pain: Yes  Pain Level: 5  Pain Location: Shoulder  Pain Orientation: Left  Restrictions/Precautions: Fall Risk  Overall Orientation Status: Within Functional Limits     Pain Assessment  Pain Assessment: 0-10  Pain Level: 5  Patient's Stated Pain Goal: No pain  Pain Type: Acute pain  Pain Location: Shoulder  Pain Orientation: Left  Pain Descriptors:  (\"Like someone is tearing at my muscles\")  Pain Frequency: Continuous  Clinical Progression: Rapidly improving (Pt reported 0/10 at end of session after Enduratape applied)  Response to Pain Intervention: Patient Satisfied    Objective  Cognition  Overall Cognitive Status: Impaired  Following Directions: Follows one step commands  Attention Span: Attends with cues to redirect  Sequencing and Organization: Assistance required to generate solutions  Perception  Overall Perceptual Status: Impaired  Unilateral Attention: Cues to attend left visual field;Cues to attend to left side of body  Initiation: Cues to initiate tasks     Neuromuscular Education  Neuromuscular education: Yes  Taping: endura tape reapplied to L shoulder to increase stabilization for prevention of subluxation. Noted shoulder drop without tape applied. Weight Bearing  Weight Bearing Technique: Yes  LUE Weight Bearing: Forearm seated  Response To Weight Bearing Technique: OT repositioned LUE onto w/c arm tray and encouraged pt to weight bear into it. Pt demo'd Fair recpetion, frequently pulling arm off of arm tray and placing it back in her lap. ADL  UE Dressing: Minimal assistance (Cues to attend to L side; A to thread LUE)  Additional Comments: Pt declined any further ADL tasks this morning. Positioning  Wheelchair Position Type: 1/2 lap tray  Wheelchair Postion Comment: good LUE positioning in w/c with L arm tray in place  Adaptations: Pt frequently takes LUE of off arm tray and places it in her lap. OT provided A with repositioning and education on purpose of arm tray. [address] reception. Assessment  Performance deficits / Impairments: Decreased functional mobility ; Decreased ADL status; Decreased strength;Decreased endurance;Decreased balance;Decreased high-level IADLs;Decreased vision/visual deficit; Decreased cognition;Decreased safe awareness;Decreased ROM; Decreased fine motor control;Decreased coordination;Decreased posture;Decreased sensation  Prognosis: Good  Discharge Recommendations: Patient would benefit from continued therapy after discharge;Subacute/Skilled Nursing Facility  Activity Tolerance: Patient Tolerated treatment well;Treatment limited secondary to decreased cognition  Activity Tolerance: Limited engagement in functional tasks - pt eager for discharge. Safety Devices in place: Yes  Type of devices: Left in chair;Call light within reach    Patient Education:  Patient Goals   Patient goals : \"get as close to normal as possible. \"  specifies:\"get in and out of bed on my own, shower by myself. \"   Taping technique, LUE positioning/weight-bearing  Learner:patient  Method: demonstration and explanation       Outcome: needs reinforcement     Plan  Plan  Times per week: 5-7  Times per day: Twice a day  Current Treatment Recommendations: Strengthening, Positioning, ROM, Safety Education & Training, Balance Training, Patient/Caregiver Education & Training, Self-Care / ADL, Cognitive/Perceptual Training, Functional Mobility Training, Neuromuscular Re-education, Equipment Evaluation, Education, & procurement, Endurance Training, Cognitive Reorientation  Patient Goals   Patient goals : \"get as close to normal as possible. \"  specifies:\"get in and out of bed on my own, shower by myself. \"  Short term goals  Time Frame for Short term goals: 1 week  Short term goal 1: mod A UE bathe and dress  Short term goal 2: set up brush teeth (goal met)  Short term goal 3: min feeding, with cues able to locate L side of tray and scan to locate items on L. Short term goal 4: tolerate 6-8 min sitting edge of bed (static sit)  with min assist and RUE support, head at midline and fair safety awareness  Short term goal 5: pt to initiate reposition LUE with RUE with good safety ie. hold by wrist not by 1 finger  Short term goal 6: demo improved awareness of L side and neuromuscular christian by weight bearing on LUE with physical assist to complete  Short term goal 7: from w/c:  consistently maintain midline trunk control and demo able to lean forward 4 inches away from back of chair without physical assist for adls. Short term goal 8: tolerate 2 min static stand for adls with RUE support with mod of 2 and assist with LLE as needed.   Long term goals  Time Frame for Long term goals : by discharge  Long term goal 1: set up and occasional verbal cues ie.  L visual scan for self feeding  Long term goal 2: set up oral care (goal met)  Long term goal 3: max x 1 toileting and adl transfers  Long term goal 4: min UE bathe and dress (shirt)  Long term goal 5: max x 1 LE bathe and dress, able to cross to reach RLE with min assist and keep trunk balance     06/08/21 1016   OT Individual Minutes   Time In 1016   Time Out 1100   Minutes 44   Time Code Minutes    Timed Code Treatment Minutes 44 Minutes     Electronically signed by Yuko Gamez on 6/8/21 at 12:31 PM EDT

## 2021-06-08 NOTE — PROGRESS NOTES
Speech Language Pathology  Speech Language Pathology  [facility] Acute Rehab Unit at [facility]    Dysphagia/SpeechLanguage Treatment Note    Date: 6/8/2021  Patients Name: Zurdo Matt  MRN: [id number]  Diagnosis:   Patient Active Problem List   Diagnosis Code    Pneumonia of left lower lobe due to infectious organism J18.9    Tobacco abuse Z72.0    Legionella pneumonia ([facility] A48.1    Dysphagia R13.10    Hyperplastic colon polyp K63.5    Esophageal ring K22.2    Gastritis K29.70    Elbow effusion, right M25.421    Stroke due to occlusion of right middle cerebral artery ([facility]) I63.511    Nihss score 15 R29.715    Acute left hemiparesis ([facility]) G81.94    Cerebrovascular accident (CVA) due to occlusion of right middle cerebral artery ([facility]) I63.511    Acute CVA (cerebrovascular accident) ([facility] I63.9    COPD (chronic obstructive pulmonary disease) ([facility]) J44.9    Essential hypertension I10    Hyperlipidemia E78.5    Pre-diabetes R73.03       Pain: 0/10    Speech/Cognitive/Dysphagia Treatment    Treatment time:  8868-2648    Subjective: [x] Alert [x] Cooperative     [] Confused     [] Agitated    [] Lethargic    Objective/Assessment:  Attention:  Sustained attention: visual scanning 2 units, completed with 75% accuracy praveen, min cues provided throughout to physically turn head to affected side. Other:  Pt. Completed oral motor exercises x 7 set, 15 reps each. Reduced labial ROM noted on L side. Pt. Reports tolerating her diet of soft and bite sized without difficulty. Significant pt ed provided re discharge diet recommendations and exercise program recommendations. Pt verbalized understanding. No family present in room. Plan:  [x] Continue ST services    [] Discharge from :      Discharge recommendations: [] Inpatient Rehab   [] [place]   [] Outpatient Therapy  [] Follow up at trauma clinic   [] Other:       Treatment completed by:  Marlys Woodward M.A., CCC-SLP

## 2021-06-08 NOTE — PROGRESS NOTES
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 Patient is doing well today and denying any current issues with pain, appetite, sleep, bowel, or bladder. ROS:  Denies fevers, chills, sweats. No chest pain, palpitations, lightheadedness. Denies coughing, wheezing or shortness of breath. Denies abdominal pain, nausea, diarrhea or constipation. No new areas of joint pain. Denies new areas of numbness or weakness. Denies new anxiety  issues. No new skin problems. Rehabilitation:   Progressing in therapies. PT:  Restrictions/Precautions: Fall Risk  Implants present? :  ( Loop recorder)  Other position/activity restrictions:  L neglect with LUE/LLE deficits, neglect improving. Transfers  Sit to Stand: Minimal Assistance  Stand to sit: Minimal Assistance  Bed to Chair: Minimal assistance  Stand Pivot Transfers: Maximum Assistance  Squat Pivot Transfers: Minimal Assistance (to R; requires safety cues & assist for appropriate L LE placement & L UE if not using sling.)  Lateral Transfers: Minimal Assistance  Car Transfer: Maximum Assistance (+ SBA (son participating in family training). Education for positioning, safety, sequencing, set-up. Drive side back seat to facilitate transfer to Pt's R; going to Pt's L to exit. )  Comment: Pt impulsive & inconsistent with safety & L-sided awareness.    Ambulation 1  Surface: level tile  Device:  (up walker )  Other Apparatus: Left, Slider, AFO  Assistance: 2 Person assistance  Quality of Gait: Assisting pt with advancing left LE, assisting with terminal knee, cues for sequencing, cues for upright posture  Gait Deviations: Decreased step length, Decreased step height, Slow Juanita, Decreased head and trunk rotation  Distance: 25ft; 40ft AM 80ft PM   Comments: tactile assist for sequencing, weight shifting, terminal knee, pt impulsive and attempting to take steps with right LE prior to advancing left LE; seated rest break between diatances in AM     Transfers  Sit to Stand: Minimal Assistance  Stand to sit: Minimal Assistance  Bed to Chair: Minimal assistance  Stand Pivot Transfers: Maximum Assistance  Squat Pivot Transfers: Minimal Assistance (to R; requires safety cues & assist for appropriate L LE placement & L UE if not using sling.)  Lateral Transfers: Minimal Assistance  Car Transfer: Maximum Assistance (+ SBA (son participating in family training). Education for positioning, safety, sequencing, set-up. Drive side back seat to facilitate transfer to Pt's R; going to Pt's L to exit. )  Comment: Pt impulsive & inconsistent with safety & L-sided awareness.    Ambulation  Ambulation?: Yes  More Ambulation?: No  Ambulation 1  Surface: level tile  Device:  (up walker )  Other Apparatus: Left, Slider, AFO  Assistance: 2 Person assistance  Quality of Gait: Assisting pt with advancing left LE, assisting with terminal knee, cues for sequencing, cues for upright posture  Gait Deviations: Decreased step length, Decreased step height, Slow Juanita, Decreased head and trunk rotation  Distance: 25ft; 40ft AM 80ft PM   Comments: tactile assist for sequencing, weight shifting, terminal knee, pt impulsive and attempting to take steps with right LE prior to advancing left LE; seated rest break between diatances in AM     Surface: level tile  Ambulation 1  Surface: level tile  Device:  (up walker )  Other Apparatus: Left, Slider, AFO  Assistance: 2 Person assistance  Quality of Gait: Assisting pt with advancing left LE, assisting with terminal knee, cues for sequencing, cues for upright posture  Gait Deviations: Decreased step length, Decreased step height, Slow Juanita, Decreased head and trunk rotation  Distance: 25ft; 40ft AM 80ft PM   Comments: tactile assist for sequencing, weight shifting, terminal knee, pt impulsive and attempting to take steps with right LE prior to advancing left LE; seated rest break between diatances in AM     OT:  ADL  Equipment Provided: Reacher, Long-handled sponge  Feeding: Modified independent  (requiring increased time to complete )  Grooming: Modified independent  (seated at sink for oral care )  UE Bathing: None  LE Bathing: None  UE Dressing: Minimal assistance (assist threading LUE into shirt )  LE Dressing: None  Toileting: Dependent/Total  Additional Comments: Pt completed ADL seated at sink this AM. Only changing shirt this date. Refusing any other ADL this date          Balance  Sitting Balance: Supervision (seated EOB for eating )  Standing Balance: Moderate assistance (Moderate Assist x 1 static standing; x 2 w/ dynamic)   Standing Balance  Time: 30 sec x3 for transfers   Activity: functional tranfers   Comment: utilixing grab bars   Functional Mobility  Functional - Mobility Device: Wheelchair  Activity: To/from bathroom  Assist Level: Moderate assistance  Functional Mobility Comments: unable to go straight with use of RUE and RLE and LLE on foot rest, also runs into items on L, VC for Self assist LE, f carryover/initiation with repetition     Bed mobility  Bridging: Minimal assistance  Rolling to Left: Minimal assistance  Rolling to Right: Maximum assistance  Supine to Sit: Stand by assistance  Sit to Supine:  Moderate assistance (Assist with BLEs over EOB. )  Scooting: Stand by assistance (hips edge of mat)  Comment: Increased time to complete  Transfers  Stand Step Transfers: Dependent/Total  Stand Pivot Transfers: 2 Person assistance, Moderate assistance (toilet & shower this date)  Sit to stand: Minimal assistance  Stand to sit: Minimal assistance  Transfer Comments: seated EOB using bed rails to stand    Toilet Transfers  Toilet - Technique: Stand pivot, To right, To left  Equipment Used: Grab bars  Toilet Transfer: 2 Person assistance, Moderate assistance  Toilet Transfers Comments: with Moderate verbal cues for safety     Shower Transfers  Shower - Transfer From: Wheelchair  Shower - Transfer Type: To and From  Shower - Transfer To: Transfer tub bench  Shower - Technique: Stand pivot, To right, To left  Shower Transfers: 2 Person assistance, Moderate assistance  Shower Transfers Comments: with Moderate verbal cues for safety and technique  Wheelchair Bed Transfers  Wheelchair/Bed - Technique: Stand pivot, To right  Equipment Used: Bed, Wheelchair  Level of Asssistance: 2 Person assistance, Minimal assistance  Wheelchair Transfers Comments: 2 assist for safety this date, no knee bucking or LOB     SPEECH:  Subjective: [x]? Alert     [x]? Cooperative     []? Confused     []? Agitated    []? Lethargic     Objective/Assessment:  Attention:  Sustained attention: visual scanning 2 units, completed with 75% accuracy praveen, min cues provided throughout to physically turn head to affected side.      Other:  Pt. Completed oral motor exercises x 7 set, 15 reps each. Reduced labial ROM noted on L side. Pt. Reports tolerating her diet of soft and bite sized without difficulty. Significant pt ed provided re discharge diet recommendations and exercise program recommendations. Pt verbalized understanding. No family present in room. Objective:  /62   Pulse 79   Temp 97.6 °F (36.4 °C) (Oral)   Resp 15   Ht 5' 8\" (1.727 m)   Wt 191 lb 8 oz (86.9 kg)   SpO2 95%   BMI 29.12 kg/m²       GEN: Well developed, well nourished, in NAD  HEENT:  NCAT. PERRL. EOMI. Mucous membranes pink and moist.   PULM:  Clear to ausculation. No rales or rhonchi. Respirations WNL and unlabored. CV:  bradycardic rate regular rhythm. No murmurs or gallops. GI:  Abdomen soft. Nontender. Non-distended. BS + and equal.    NEUROLOGICAL: A&O x3. Sensation intact to light touch. Stable impaired L CN VII. Persistent L neglect with R gaze preference. MSK:  Functional ROM RUE and RLEs. Impaired AROM LUE and LLE. Bettey Grad Motor testing 4+/5 key muscles RUE and RLE.  L shoulder shrug 2/5, L hip flexion 2/5. Distal LUE and LLE muscles 0/5. SKIN: Warm dry and intact. Good turgor. EXTREMITIES:  No calf tenderness to palpation. No edema BLEs. Delphine Livingston PSYCH: Mood depressed. Appropriately interactive. Affect WNL    Diagnostics:     CBC:   Recent Labs     06/08/21  0618   WBC 9.1   RBC 4.40   HGB 13.6   HCT 40.7   MCV 92.6   RDW 15.0*        BMP:   Recent Labs     06/08/21 0618      K 4.4      CO2 27   BUN 15   CREATININE 0.41*   GLUCOSE 108*     BNP: No results for input(s): BNP in the last 72 hours. PT/INR: No results for input(s): PROTIME, INR in the last 72 hours. APTT: No results for input(s): APTT in the last 72 hours. CARDIAC ENZYMES: No results for input(s): CKMB, CKMBINDEX, TROPONINT in the last 72 hours. Invalid input(s): CKTOTAL;3  FASTING LIPID PANEL:  Lab Results   Component Value Date    CHOL 206 (H) 05/05/2021    HDL 29 (L) 05/05/2021    TRIG 185 (H) 05/05/2021     LIVER PROFILE: No results for input(s): AST, ALT, ALB, BILIDIR, BILITOT, ALKPHOS in the last 72 hours.      Current Medications:   Current Facility-Administered Medications: cephALEXin (KEFLEX) capsule 500 mg, 500 mg, Oral, BID  FLUoxetine (PROZAC) capsule 20 mg, 20 mg, Oral, Daily  amitriptyline (ELAVIL) tablet 25 mg, 25 mg, Oral, Nightly  heparin (porcine) injection 5,000 Units, 5,000 Units, Subcutaneous, 3 times per day  acetaminophen (TYLENOL) tablet 1,000 mg, 1,000 mg, Oral, 3 times per day  polyvinyl alcohol (LIQUIFILM TEARS) 1.4 % ophthalmic solution 1 drop, 1 drop, Both Eyes, PRN  sodium chloride flush 0.9 % injection 5-40 mL, 5-40 mL, Intravenous, PRN  promethazine (PHENERGAN) tablet 12.5 mg, 12.5 mg, Oral, Q6H PRN **OR** ondansetron (ZOFRAN) injection 4 mg, 4 mg, Intravenous, Q6H PRN  0.9 % sodium chloride infusion, 25 mL, Intravenous, PRN  albuterol (PROVENTIL) nebulizer solution 2.5 mg, 2.5 mg, Nebulization, Q4H PRN  amLODIPine (NORVASC) tablet 5 mg, 5 mg, Oral, Daily  aspirin chewable tablet 81 mg, 81 mg, Oral, Daily  atorvastatin (LIPITOR) tablet 80 mg, 80 mg, Oral, Daily  budesonide-formoterol (SYMBICORT) 160-4.5 MCG/ACT inhaler 2 puff, 2 puff, Inhalation, BID  dextrose 50 % IV solution, 12.5 g, Intravenous, PRN  glucagon (rDNA) injection 1 mg, 1 mg, Intramuscular, PRN  glucose (GLUTOSE) 40 % oral gel 15 g, 15 g, Oral, PRN  levothyroxine (SYNTHROID) tablet 25 mcg, 25 mcg, Oral, Daily  nicotine (NICODERM CQ) 14 MG/24HR 1 patch, 1 patch, Transdermal, Daily  tiotropium (SPIRIVA RESPIMAT) 2.5 MCG/ACT inhaler 2 puff, 2 puff, Inhalation, Daily  polyethylene glycol (GLYCOLAX) packet 17 g, 17 g, Oral, Daily  senna (SENOKOT) tablet 17.2 mg, 2 tablet, Oral, Daily PRN  bisacodyl (DULCOLAX) suppository 10 mg, 10 mg, Rectal, Daily PRN      Impression/Plan:   Impaired ADLs, gait, and mobility due to:      1. Ischemic R MCA CVA with hemorrhagic conversion and L non-dominant hemiparesis:  PT/OT for gait, mobility, strengthening, endurance, ADLs, and self care. On ASA, atorvastatin. Sling only to be used when patient is ambulating with therapy. 2. Headache: Has Tylenol routine TID. Improved on amitriptyline. 3. Falls: s/p Fall 5/16, 5/30, and 5/31 -  No injuries. Only Stephane Aragon transfers with nursing until advanced by therapy. 4. UTI: culture pending. IM started Keflex ordered through 6/13.  5. Dry eyes: has natural tears. 6. Cognitive impairment: SLP treating  7. Dysphagia/aphasia: SLP treating. Diet upgraded. 8. HTN/Hyperlipidemia: on amlodipine  9. DM: on insulin sliding scale  10. COPD/asthma: on albuterol nebulizers prn, on symbicort BID, spiriva  11. Adjustment disorder with depressed mood: Improved. started fluoxetine 5/15  12. Esophageal Ring: Will monitor - on dysphagia diet  13. Hypothyroidism: on levothyroxine  14. Nicotine dependence: on Nicoderm  15. Bowel Management: Miralax daily, senokot prn, dulcolax prn. 16. DVT Prophylaxis:  heparin, SCD's while in bed and MAXIM's   17.  Internal medicine for medical management  18. Awaiting for placement at SNF as family is unable to provide 24 hour care. Electronically signed by Lisa Lux MD on 6/8/2021 at 9:57 AM      This note is created with the assistance of a speech recognition program.  While intending to generate a document that actually reflects the content of the visit, the document can still have some errors including those of syntax and sound a like substitutions which may escape proof reading. In such instances, actual meaning can be extrapolated by contextual diversion.

## 2021-06-08 NOTE — PROGRESS NOTES
[place] [address] Internal Medicine    Progress Note  Chart Reviewed: Yes  Patient assessed for rehabilitation services?: Yes  Family / Caregiver Present: No  Referring Practitioner: Dr Amaya Mendez  Comments: Pt is pleasant and cooperative with PT.  6/8/2021    1:38 PM    Name:   Jessica Malave  MRN:     [id number]     Acct:      [de-identified]   Room:   [address] [place] [address] Day:  34  Admit Date:  5/10/2021  7:13 AM    PCP:   Mara Box MD  Code Status:  Full Code    Subjective:     C/C: No chief complaint on file. Active Problems:    Tobacco abuse    Gastritis    Acute left hemiparesis (HCC)    Acute [facility]A [facility]cerebrovascular accident) ([facility]    COPD (chronic obstructive pulmonary disease) ([facility]    Essential hypertension    Hyperlipidemia    Pre-diabetes  Resolved Problems:    * No resolved hospital problems. *      Interval History Status: improved. Jessica Malave  is a 63 y.o. right-handed single    female admitted to the [address] [place] [address] on 5/10/2021. She was originally admitted to [address] [place] [address] on 5/4/2021. She initially presented with left-sided weakness, left facial droop, and right-sided gaze preference for 1 day. NIHSS 15.  CT head showed moderate-size acute/subacute stroke in the right MCA territory. CTA head/neck showed near occlusion of the right MCA M1 segment and severe stenosis of the left vertebral V4 segment. She underwent mechanical thrombectomy of the right M1 MCA occlusion, which was unsuccessful, on 5/5/21 (Dr. Raul Kc). MRI brain showed subacute right MCA distribution infarct, most pronounced in the frontotemporal region and right basal ganglia with a hemorrhagic component and hemosiderin staining involving the right basal ganglia. PHYLLIS showed EF 55%, no shunt. Loop recorder was implanted on 5/5/21 (Dr. Stephanie Hernandez).   Repeat CT head showed extension and hemorrhagic transformation of right MCA infarct     6/8/2021: Patient evaluated at bedside. Vitals reviewed, BP stable on Norvasc 6 mg daily. Labs reviewed, blood sugar controlled 108. Admits to bowel movements. No issues sleeping  Denies any Pain. Evaluated after therapy. No improvement in left arm function. Plan to transfer to [facility] pending. Significant last 24 hr data reviewed ;   Vitals:    06/06/21 2000 06/07/21 0559 06/07/21 2200 06/08/21 0535   BP: 139/75 (!) 141/70 113/69 126/62   Pulse: 77 83 80 79   Resp: 16 16 16 15   Temp: 98.4 °F (36.9 °C) 97.9 °F (36.6 °C) 97.7 °F (36.5 °C) 97.6 °F (36.4 °C)   TempSrc: Oral Oral Oral Oral   SpO2: 94% 93% 96% 95%   Weight:       Height:          Recent Results (from the past 24 hour(s))   Basic Metabolic Panel w/ Reflex to MG    Collection Time: 06/08/21  6:18 AM   Result Value Ref Range    Glucose 108 (H) 70 - 99 mg/dL    BUN 15 8 - 23 mg/dL    CREATININE 0.41 (L) 0.50 - 0.90 mg/dL    Bun/Cre Ratio NOT REPORTED 9 - 20    Calcium 9.2 8.6 - 10.4 mg/dL    Sodium 141 135 - 144 mmol/L    Potassium 4.4 3.7 - 5.3 mmol/L    Chloride 105 98 - 107 mmol/L    CO2 27 20 - 31 mmol/L    Anion Gap 9 9 - 17 mmol/L    GFR Non-African American >60 >60 mL/min    GFR African American >60 >60 mL/min    GFR Comment          GFR Staging NOT REPORTED    CBC    Collection Time: 06/08/21  6:18 AM   Result Value Ref Range    WBC 9.1 3.5 - 11.0 k/uL    RBC 4.40 4.0 - 5.2 m/uL    Hemoglobin 13.6 12.0 - 16.0 g/dL    Hematocrit 40.7 36 - 46 %    MCV 92.6 80 - 100 fL    MCH 30.9 26 - 34 pg    MCHC 33.4 31 - 37 g/dL    RDW 15.0 (H) 11.5 - 14.9 %    Platelets 870 554 - 882 k/uL    MPV 7.1 6.0 - 12.0 fL    NRBC Automated NOT REPORTED per 100 WBC     No results for input(s): POCGLU in the last 72 hours. No results found.           HPI:   See history in H and P         Review of Systems:     Constitutional:  negative for chills, fevers, sweats  Respiratory:  negative for cough, dyspnea on exertion, hemoptysis, shortness of breath, wheezing  Cardiovascular:  negative for chest pain, chest pressure/discomfort, lower extremity edema, palpitations  Gastrointestinal:  negative for abdominal pain, constipation, diarrhea, nausea, vomiting  Neurological:  negative for dizziness, headache  Data:     Past Medical History:  no change     Social History:  no change    Family History: @no change    Vitals:      I/O (24Hr): No intake or output data in the 24 hours ending 06/08/21 1338    Labs:    URINE ANALYSIS: No results found for: LABURIN     CBC:  Lab Results   Component Value Date    WBC 9.1 06/08/2021    HGB 13.6 06/08/2021     06/08/2021        BMP:    Lab Results   Component Value Date     06/08/2021    K 4.4 06/08/2021     06/08/2021    CO2 27 06/08/2021    BUN 15 06/08/2021    CREATININE 0.41 06/08/2021    GLUCOSE 108 06/08/2021      LIVER PROFILE:  Lab Results   Component Value Date    ALT 13 10/16/2017    AST 13 10/16/2017    PROT 6.9 10/16/2017    BILITOT 0.22 10/16/2017    BILIDIR 0.11 10/16/2017    LABALBU 4.0 10/16/2017               Radiology:  Medications:      Allergies:      Current Meds:   Scheduled Meds:    cephALEXin  500 mg Oral BID    FLUoxetine  20 mg Oral Daily    amitriptyline  25 mg Oral Nightly    heparin (porcine)  5,000 Units Subcutaneous 3 times per day    acetaminophen  1,000 mg Oral 3 times per day    amLODIPine  5 mg Oral Daily    aspirin  81 mg Oral Daily    atorvastatin  80 mg Oral Daily    budesonide-formoterol  2 puff Inhalation BID    levothyroxine  25 mcg Oral Daily    nicotine  1 patch Transdermal Daily    tiotropium  2 puff Inhalation Daily    polyethylene glycol  17 g Oral Daily     Continuous Infusions:    sodium chloride       PRN Meds: polyvinyl alcohol, sodium chloride flush, promethazine **OR** ondansetron, sodium chloride, albuterol, dextrose, glucagon (rDNA), glucose, senna, bisacodyl      Physical Examination:        /62   Pulse 79   Temp 97.6 °F (36.4 °C) (Oral)   Resp 15   Ht 5' 8\" (1.727 m)   Wt 191 lb 8 oz (86.9 kg)   SpO2 95%   BMI 29.12 kg/m²   Temp (24hrs), Av.7 °F (36.5 °C), Min:97.6 °F (36.4 °C), Max:97.7 °F (36.5 °C)    No results for input(s): POCGLU in the last 72 hours. No intake or output data in the 24 hours ending 21 1338    General Appearance:  alert, well appearing, and in no acute distress  Mental status: oriented to person, place, and time with normal affect  Head:  normocephalic, atraumatic. Eye: no icterus, redness, pupils equal and reactive, extraocular eye movements intact, conjunctiva clear  Ear: normal external ear, no discharge, hearing intact  Nose:  no drainage noted  Mouth: mucous membranes moist  Neck: supple, no carotid bruits, thyroid not palpable  Lungs: Bilateral equal air entry, clear to ausculation, no wheezing, rales or rhonchi, normal effort  Cardiovascular: normal rate, regular rhythm, no murmur, gallop, rub. Abdomen: Soft, nontender, nondistended, normal bowel sounds, no hepatomegaly or splenomegaly  Neurologic: There are no new focal motor or sensory deficits, normal muscle tone and bulk, no abnormal sensation, normal speech, cranial nerves II through XII grossly intact  Skin: No gross lesions, rashes, bruising or bleeding on exposed skin area  Extremities:  peripheral pulses palpable, no pedal edema or calf pain with palpation  Psych:             Assessment:        Primary Problem  <principal problem not specified>    Active Hospital Problems    Diagnosis Date Noted    [facility] [I63.9] 05/10/2021    COPD (chronic obstructive pulmonary disease) ([facility] [J44.9] 05/10/2021    Essential hypertension [I10] 05/10/2021    Hyperlipidemia [E78.5] 05/10/2021    Pre-diabetes [R73.03] 05/10/2021    Acute left hemiparesis ([facility] [G81.94]     Gastritis [K29.70]     Tobacco abuse [Z72.0] 2017     Plan:        1.                        Patient Active Problem List Diagnosis    Pneumonia of left lower lobe due to infectious organism    Tobacco abuse    Legionella pneumonia ([facility]    Dysphagia    Hyperplastic colon polyp    Esophageal ring    Gastritis    Elbow effusion, right    Stroke due to occlusion of right middle cerebral artery (HCC)    Nihss score 15    Acute left hemiparesis (HCC)    Cerebrovascular accident (CVA) due to occlusion of right middle cerebral artery ([address]    Acute CVA (cerebrovascular accident) ([address]    COPD (chronic obstructive pulmonary disease) ([facility]    Essential hypertension    Hyperlipidemia    Pre-diabetes      Little improvement to therapy. BP well controlled. Continue norvasc 5 mg daily  Copd stable. continue bronchodilators  Synthroid 25 mcg daily   lipitor 80 mg daily  Asa 81 mg daily  Insulin sliding scale-discontinued as blood sugars have been controlled and stable  nicoderm patch  Continue PT/OT  Transfer pending to [facility]. Will monitor vitals and clinical course , and  Optimize therapy  as needed .       Savana Jeter MD  6/8/2021  1:38 PM

## 2021-06-08 NOTE — PROGRESS NOTES
Comprehensive Nutrition Assessment    Type and Reason for Visit:  Reassess    Nutrition Recommendations/Plan: Recommend continue 4 carbohydrate choices per tray with Soft and Bite sized texture. Recommend continue to provide Glucerna supplements twice daily. Nutrition Assessment:  Intake is around 50% and taking portions of supplements provided. Plan is for discharge to SNF today or tomorrow. Malnutrition Assessment:  Malnutrition Status: At risk for malnutrition (Comment)    Context:  Acute Illness     Findings of the 6 clinical characteristics of malnutrition:  Energy Intake:  Mild decrease in energy intake (Comment) (Variable intake)  Weight Loss:  Unable to assess     Body Fat Loss:  No significant body fat loss     Muscle Mass Loss:  No significant muscle mass loss    Fluid Accumulation:  No significant fluid accumulation     Strength:  Not Performed    Estimated Daily Nutrient Needs:  Energy (kcal):  1770 based on [place] [place] with 1.2 factor; Weight Used for Energy Requirements:  Admission     Protein (g):  83-89 based on 1.3-1.4 gm per kg; Weight Used for Protein Requirements:  Ideal        Nutrition Related Findings:  no edema, Labs/Meds reviewed      Wounds:  Surgical Incision       Current Nutrition Therapies:    Dietary Nutrition Supplements: Diabetic Oral Supplement  DIET CARB CONTROL; Carb Control: 4 carb choices (60 gms)/meal; No Added Salt (3-4 GM);  Dysphagia Soft and Bite-Sized    Anthropometric Measures:  · Height: 5' 8\" (172.7 cm)  · Current Body Weight: 191 lb 9.3 oz (86.9 kg)   · Admission Body Weight: 191 lb 9.3 oz (86.9 kg)    · Usual Body Weight: 195 lb (88.5 kg) (per pt)     Ideal Body Weight: 140 lbs;     Nutrition Diagnosis:   · Inadequate oral intake related to swallowing difficulty (acute illness) as evidenced by intake 26-50%, intake 51-75%, poor intake prior to admission    Nutrition Interventions:   Food and/or Nutrient Delivery:  Continue Current Diet, Continue Oral Nutrition Supplement  Nutrition Education/Counseling:  No recommendation at this time   Coordination of Nutrition Care:  Continue to monitor while inpatient    Goals:  PO intake % of meals and supplements       Nutrition Monitoring and Evaluation:   Food/Nutrient Intake Outcomes:  Food and Nutrient Intake, Supplement Intake  Physical Signs/Symptoms Outcomes:  Biochemical Data, GI Status, Fluid Status or Edema, Weight, Skin     Discharge Planning:    Continue current diet     Electronically signed by Lilibeth Lopez RD, LD on 6/8/21 at 12:46 PM EDT    Contact: [phone]

## 2021-06-08 NOTE — PROGRESS NOTES
SENIOR NOTE: Acute Rehab. Audrey Muñoz a 63 y. o. right-handed single    female admitted to the [facility] Acute Rehabiliation unit on 5/10/2021.  She was originally admitted to [facility] 5/4/2021.       She initially presented with left-sided weakness, left facial droop, and right-sided gaze preference for 1 day.  NIHSS 15.  CT head showed moderate-size acute/subacute stroke in the right MCA territory.  CTA head/neck showed near occlusion of the right MCA M1 segment and severe stenosis of the left vertebral V4 segment.  She underwent mechanical thrombectomy of the right M1 MCA occlusion, which was unsuccessful, on 5/5/21 (Dr. Abebe Section).  MRI brain showed subacute right MCA distribution infarct, most pronounced in the frontotemporal region and right basal ganglia with a hemorrhagic component and hemosiderin staining involving the right basal ganglia.  PHYLLIS showed EF 55%, no shunt.  Loop recorder was implanted on 5/5/21 (Dr. Ag Hooper).  Repeat CT head showed extension and hemorrhagic transformation of right MCA infarct    6/8/2021:    Patient evaluated at bedside. Vitals reviewed, BP stable on Norvasc 6 mg daily. Labs reviewed, blood sugar controlled 108. Admits to bowel movements. No issues sleeping  Denies any Pain. Evaluated after therapy. No improvement in left arm function. Plan to transfer to [facility] pending.      Assessment and Plan    Patient Active Problem List   Diagnosis    Pneumonia of left lower lobe due to infectious organism    Tobacco abuse    Legionella pneumonia ([address]    Dysphagia    Hyperplastic colon polyp    Esophageal ring    Gastritis    Elbow effusion, right    Stroke due to occlusion of right middle cerebral artery (HCC)    Nihss score 15    Acute left hemiparesis (HCC)    Cerebrovascular accident (CVA) due to occlusion of right middle cerebral artery ([address]    Acute CVA (cerebrovascular accident) ([address]    COPD (chronic obstructive pulmonary disease) [facility] [facility]    Essential hypertension    Hyperlipidemia    Pre-diabetes     Little improvement to therapy. BP well controlled. Continue norvasc 5 mg daily  Copd stable. continue bronchodilators  Synthroid 25 mcg daily   lipitor 80 mg daily  Asa 81 mg daily  Insulin sliding scale-discontinued as blood sugars have been controlled and stable  nicoderm patch  Continue PT/OT  Transfer pending to [employer] [facility].      Morenita Espinosa,   PGY-2, Internal medicine resident  [facility] between [facility], [place], [place]  6/8/2021 9:57 AM

## 2021-06-08 NOTE — PLAN OF CARE
Problem: Neurological  Goal: Maximum potential motor/sensory/cognitive function  Outcome: Ongoing     Problem: Pain:  Goal: Pain level will decrease  Description: Pain level will decrease  Outcome: Ongoing  Goal: Control of acute pain  Description: Control of acute pain  Outcome: Ongoing  Goal: Control of chronic pain  Description: Control of chronic pain  Outcome: Ongoing     Problem: Neurological  Goal: Maximum potential motor/sensory/cognitive function  Outcome: Ongoing     Problem: Skin Integrity:  Goal: Will show no infection signs and symptoms  Description: Will show no infection signs and symptoms  Outcome: Ongoing  Goal: Absence of new skin breakdown  Description: Absence of new skin breakdown  Outcome: Ongoing     Problem: Falls - Risk of:  Goal: Will remain free from falls  Description: Will remain free from falls  Outcome: Ongoing  Goal: Absence of physical injury  Description: Absence of physical injury  Outcome: Ongoing     Problem: Nutrition  Goal: Optimal nutrition therapy  6/8/2021 1800 by Liborio Schneider RN  Outcome: Ongoing  6/8/2021 1248 by Minerva Calvin RD, LD  Outcome: Ongoing

## 2021-06-09 PROCEDURE — 97110 THERAPEUTIC EXERCISES: CPT

## 2021-06-09 PROCEDURE — 6360000002 HC RX W HCPCS: Performed by: PHYSICAL MEDICINE & REHABILITATION

## 2021-06-09 PROCEDURE — 99231 SBSQ HOSP IP/OBS SF/LOW 25: CPT | Performed by: PHYSICAL MEDICINE & REHABILITATION

## 2021-06-09 PROCEDURE — 97535 SELF CARE MNGMENT TRAINING: CPT

## 2021-06-09 PROCEDURE — 1180000000 HC REHAB R&B

## 2021-06-09 PROCEDURE — 97530 THERAPEUTIC ACTIVITIES: CPT

## 2021-06-09 PROCEDURE — 97112 NEUROMUSCULAR REEDUCATION: CPT

## 2021-06-09 PROCEDURE — 6370000000 HC RX 637 (ALT 250 FOR IP): Performed by: STUDENT IN AN ORGANIZED HEALTH CARE EDUCATION/TRAINING PROGRAM

## 2021-06-09 PROCEDURE — 97542 WHEELCHAIR MNGMENT TRAINING: CPT

## 2021-06-09 PROCEDURE — 97116 GAIT TRAINING THERAPY: CPT

## 2021-06-09 PROCEDURE — 6370000000 HC RX 637 (ALT 250 FOR IP): Performed by: PHYSICAL MEDICINE & REHABILITATION

## 2021-06-09 PROCEDURE — 99231 SBSQ HOSP IP/OBS SF/LOW 25: CPT | Performed by: INTERNAL MEDICINE

## 2021-06-09 RX ADMIN — ACETAMINOPHEN 1000 MG: 500 TABLET, FILM COATED ORAL at 14:52

## 2021-06-09 RX ADMIN — ACETAMINOPHEN 1000 MG: 500 TABLET, FILM COATED ORAL at 21:16

## 2021-06-09 RX ADMIN — POLYETHYLENE GLYCOL 3350 17 G: 17 POWDER, FOR SOLUTION ORAL at 07:55

## 2021-06-09 RX ADMIN — FLUOXETINE HYDROCHLORIDE 20 MG: 20 CAPSULE ORAL at 07:55

## 2021-06-09 RX ADMIN — HEPARIN SODIUM 5000 UNITS: 5000 INJECTION INTRAVENOUS; SUBCUTANEOUS at 21:16

## 2021-06-09 RX ADMIN — Medication 2 PUFF: at 07:55

## 2021-06-09 RX ADMIN — ACETAMINOPHEN 1000 MG: 500 TABLET, FILM COATED ORAL at 06:28

## 2021-06-09 RX ADMIN — ASPIRIN 81 MG CHEWABLE TABLET 81 MG: 81 TABLET CHEWABLE at 07:55

## 2021-06-09 RX ADMIN — ATORVASTATIN CALCIUM 80 MG: 80 TABLET, FILM COATED ORAL at 07:55

## 2021-06-09 RX ADMIN — AMITRIPTYLINE HYDROCHLORIDE 25 MG: 25 TABLET, FILM COATED ORAL at 21:17

## 2021-06-09 RX ADMIN — HEPARIN SODIUM 5000 UNITS: 5000 INJECTION INTRAVENOUS; SUBCUTANEOUS at 14:52

## 2021-06-09 RX ADMIN — LEVOTHYROXINE SODIUM 25 MCG: 0.03 TABLET ORAL at 06:29

## 2021-06-09 RX ADMIN — CEPHALEXIN 500 MG: 500 CAPSULE ORAL at 21:16

## 2021-06-09 RX ADMIN — AMLODIPINE BESYLATE 5 MG: 5 TABLET ORAL at 07:55

## 2021-06-09 RX ADMIN — CEPHALEXIN 500 MG: 500 CAPSULE ORAL at 07:55

## 2021-06-09 RX ADMIN — HEPARIN SODIUM 5000 UNITS: 5000 INJECTION INTRAVENOUS; SUBCUTANEOUS at 06:29

## 2021-06-09 ASSESSMENT — PAIN DESCRIPTION - PAIN TYPE: TYPE: ACUTE PAIN

## 2021-06-09 ASSESSMENT — PAIN DESCRIPTION - ORIENTATION: ORIENTATION: LEFT

## 2021-06-09 ASSESSMENT — PAIN SCALES - GENERAL
PAINLEVEL_OUTOF10: 5
PAINLEVEL_OUTOF10: 0
PAINLEVEL_OUTOF10: 0
PAINLEVEL_OUTOF10: 2

## 2021-06-09 ASSESSMENT — PAIN DESCRIPTION - LOCATION: LOCATION: SHOULDER

## 2021-06-09 ASSESSMENT — PAIN SCALES - WONG BAKER: WONGBAKER_NUMERICALRESPONSE: 4

## 2021-06-09 NOTE — DISCHARGE SUMMARY
Physical Medicine & Rehabilitation  Discharge Summary     Patient Identification:  Racquel Stanford  : 1955  Admit date: 5/10/2021  Discharge date: 6/10/2021   Attending provider: Parris Calderon MD        Primary care provider: Vera Bagley MD     Discharge Diagnoses:   Ischemic CVA - R MCA with hemorrhagic conversion and L non-dominant hemiparesis  Headache  Falls  UTI  Dry eyes  Cognitive Impairment  Dysphagia  Aphasia  Hypertension/Hyperlipidemia      Discharge Functional Status:    Physical therapy:  Bed Mobility: Rolling: Minimal assistance, Rolling Right, Stand by assistance, Rolling Left (Min L, SBA R)  Supine to Sit: Minimal assistance  Sit to Supine: Minimal assistance  Scooting: Contact guard assistance  Transfers: Sit to Stand: Minimal Assistance  Stand to sit: Minimal Assistance  Bed to Chair: Minimal assistance, Moderate assistance  Squat Pivot Transfers: Minimal Assistance (to R; requires safety cues & assist for appropriate L LE placement & L UE if not using sling.)  Comment: rest breaks PRN. , Ambulation 1  Surface: level tile  Device:  (up walker )  Other Apparatus: Left, Slider, AFO  Assistance: 2 Person assistance, Contact guard assistance, Moderate assistance  Quality of Gait: Assisting pt with advancing left LE, assisting with terminal knee, cues for sequencing, cues for upright posture. Seated rest breaks in between amb. Second person CGA for safety. Gait Deviations: Decreased step length, Decreased step height, Slow Juanita, Decreased head and trunk rotation  Distance: 35'x1 and 45'x1  Comments: tactile assist for sequencing, weight shifting, terminal knee, pt impulsive and attempting to take steps with right LE prior to advancing left LE; seated rest break between diatances in AM. w/c brought up to pt PRN.  , Stairs  # Steps : 5 (x2)  Stairs Height: 4\" (& 6\")  Rails: Right ascending (L UE in sling per pt request. )  Other Apparatus: Left, AFO  Assistance: 2 Person assistance, Moderate assistance  Comment: Pt requiring cues for sequencing and LLE placement/TKE. Increase assistance with turns  at bottom of training stairs. Occassional manual assistance with LLE placement. Mobility:  , PT Equipment Recommendations  Equipment Needed: No  Other: CTA; pending further progress with mobility, Assessment: Patient fatigue limiting tx, from e-stim to ambulation. Fatigues much quicker with more independent advancement of L LE (cues only) with Jayden Choi. Occupational therapy:  , Equipment Recommendations  Equipment Needed:  (TBD), Assessment: severely impaired loss of adl indep post CVA with severe L lynette deficits, dysphagia, L neglect    Speech therapy:  Comprehension: 6 - Complex ideas 90% or device (hearing aid or glasses- if patient is primarily a visual learner)  Expression: 6 - Device used to express complex ideas/needs  Social Interaction: 6 - Patient requires medication for mood and/or effect  Problem Solvin - Patient able to solve simple/routine tasks  Memory: 5 - Patient requires prompting with stress/unfamiliar situations      Inpatient Rehabilitation Course:   Ilana Berger is a 63 y.o. female admitted to inpatient rehabilitation on 5/10/2021 for rehab for ischemic CVA. INITIAL HPI:  She initially presented with left-sided weakness, left facial droop, and right-sided gaze preference for 1 day.  NIHSS 15.  CT head showed moderate-size acute/subacute stroke in the right MCA territory.   CTA head/neck showed near occlusion of the right MCA M1 segment and severe stenosis of the left vertebral V4 segment.  She underwent mechanical thrombectomy of the right M1 MCA occlusion, which was unsuccessful, on 21 (Dr. Meagan Bragg).  MRI brain showed subacute right MCA distribution infarct, most pronounced in the frontotemporal region and right basal ganglia with a hemorrhagic component and hemosiderin staining involving the right basal ganglia.  PHYLLIS showed EF 55%, no shunt.  Loop recorder 06/08/2021    MCV 92.6 06/08/2021    MCH 30.9 06/08/2021    MCHC 33.4 06/08/2021    RDW 15.0 06/08/2021     06/08/2021    MPV 7.1 06/08/2021     BMP:    Lab Results   Component Value Date     06/08/2021    K 4.4 06/08/2021     06/08/2021    CO2 27 06/08/2021    BUN 15 06/08/2021    LABALBU 4.0 10/16/2017    CREATININE 0.41 06/08/2021    CALCIUM 9.2 06/08/2021    GFRAA >60 06/08/2021    LABGLOM >60 06/08/2021    GLUCOSE 108 06/08/2021         Patient Instructions:    No driving until cleared by physician and passes drivers evaluation. Sling to be used prn ONLY when ambulating/transferring. Medications, precautions and follow up reviewed with patient and family    Follow-up visits: See after visit summary from hospitalization - PCP 1-2 weeks after discharge, Neurology 2-4 weeks, Dr. Gorge Romano 4-6 weeks.     Disposition:  [address] [facility]    Discharge Medications:   Consuelo Ridleydomenic   Home Medication Instructions MARTHA:[id number]    Printed on:06/10/21 1041   Medication Information                      acetaminophen (TYLENOL) 500 MG tablet  Take 2 tablets by mouth every 8 hours             albuterol (PROVENTIL) (2.5 MG/3ML) 0.083% nebulizer solution  Take 3 mLs by nebulization every 4 hours as needed for Wheezing or Shortness of Breath             amitriptyline (ELAVIL) 25 MG tablet  Take 1 tablet by mouth nightly             amLODIPine (NORVASC) 5 MG tablet  Take 1 tablet by mouth daily             aspirin 81 MG chewable tablet  Take 1 tablet by mouth daily             atorvastatin (LIPITOR) 80 MG tablet  Take 1 tablet by mouth daily             bisacodyl (DULCOLAX) 10 MG suppository  Place 1 suppository rectally daily as needed for Constipation             budesonide-formoterol (SYMBICORT) 160-4.5 MCG/ACT AERO  Inhale 2 puffs into the lungs 2 times daily             cephALEXin (KEFLEX) 500 MG capsule  Take 1 capsule by mouth 2 times daily for 4 days FLUoxetine (PROZAC) 20 MG capsule  Take 1 capsule by mouth daily             glucagon, rDNA, 1 MG injection  Inject 1 mg into the muscle as needed for Low blood sugar (Blood glucose less than 70 mg/dL and patient NOT ALERT or NPO and does not have IV access.)             glucose (GLUTOSE) 40 % GEL  Take 37.5 mLs by mouth as needed (hypoglycemia)             Heparin Sodium, Porcine, (HEPARIN, PORCINE,) 5000 UNIT/ML injection  Inject 1 mL into the skin every 8 hours             levothyroxine (SYNTHROID) 25 MCG tablet  Take 1 tablet by mouth Daily             nicotine (NICODERM CQ) 14 MG/24HR  Place 1 patch onto the skin daily             polyethylene glycol (GLYCOLAX) 17 g packet  Take 17 g by mouth daily             polyvinyl alcohol (LIQUIFILM TEARS) 1.4 % ophthalmic solution  Place 1 drop into both eyes as needed for Dry Eyes             senna (SENOKOT) 8.6 MG tablet  Take 2 tablets by mouth daily as needed (Constipation)             tiotropium (SPIRIVA RESPIMAT) 2.5 MCG/ACT AERS inhaler  Inhale 2 puffs into the lungs daily                 Jeffy Jones MD, MD

## 2021-06-09 NOTE — PROGRESS NOTES
[address] [facility]    ACUTE REHABILITATION OCCUPATIONAL THERAPY  DAILY NOTE    Date: 21  Patient Name: Wing Hutson      Room: 8636/6690-90    MRN: [id number]   : 1955  (63 y.o.)  Gender: female   Referring Practitioner: Hannah Aguillon MD  Diagnosis: ischemic CVA,  subacute right MCA distribution infarct, L lynette, dysphagia, L neglect, loop recorder 21 (Dr. Faheem Frost)  Additional Pertinent Hx: 62-year-old female who was found down, last known well approximately 21 hours before. Patient was found to have a right MCA M1 occlusion. Thrombectomy was not successful, MRI showed patient had a right MCA stroke with hemorrhagic conversion. 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 :  (Loop recorder)  Other position/activity restrictions:  L neglect with LUE/LLE deficits, neglect improving. Required Braces or Orthoses?: No  Equipment Used: Bed, Wheelchair    Subjective  Subjective: \"I'm not sure, insurance is holding it up\" Pt states in regards to d/c  Patient Currently in Pain: Yes  Pain Level: 5  Pain Location: Shoulder  Pain Orientation: Left  Restrictions/Precautions: Fall Risk  Overall Orientation Status: Within Functional Limits  Patient Observation  Observations: Pt continues to demonstrate flat affect in regards to therapy POC  Pain Assessment  Pain Assessment: 0-10  Pain Level: 5  Pain Type: Acute pain  Pain Location: Shoulder  Pain Orientation: Left    Objective  Cognition  Overall Cognitive Status: Impaired  Arousal/Alertness: Appropriate responses to stimuli  Following Directions:  Follows one step commands  Attention Span: Attends with cues to redirect  Safety Judgement: Decreased awareness of need for safety  Insights: Decreased awareness of deficits  Sequencing and Organization: Assistance required to generate solutions  Perception  Overall Perceptual Status: Impaired  Unilateral Attention: Cues to attend left visual field;Cues to attend to left side of body  Initiation: Cues to initiate tasks  Motor Planning: Cues to use objects appropriately  Balance  Sitting Balance: Supervision  Standing Balance: moderate assistance X2  Bed mobility  Supine to Sit: Stand by assistance  Transfers  Stand Pivot Transfers: 2 Person assistance; Moderate assistance (to/from toilet. )  Sit to stand: Minimal assistance  Stand to sit: Minimal assistance  Standing Balance  Time: AM: 30 sec X4; PM: 30 sec X3  Activity: AM: toilet transfer using Emelina Coffey, stand pivot X1; PM: toileting with use of Gemiin Main Steady  Comment: utilixing grab flaveit   Functional Mobility  Functional - Mobility Device: Wheelchair  Activity: To/from bathroom  Assist Level: Moderate assistance  Functional Mobility Comments: unable to go straight with use of RUE and RLE and LLE on foot rest, also runs into items on L, VC for Self assist LE, f carryover/initiation with repetition  Toilet Transfers  Toilet - Technique: Stand step; To right  Equipment Used: Grab bars  Toilet Transfer: 2 Person assistance; Moderate assistance  Toilet Transfers Comments: with Moderate verbal cues for safety     Type of ROM/Therapeutic Exercise  Type of ROM/Therapeutic Exercise: PROM; Self PROM  Comment: ROSADO provided PROM to LUE for optimal joint ROM and integrity. P tolerated for 10 reps each. Pt education provided on importance of self ROM daily for optimal joint integrity. Pt demos with fair understanding and carryover when prompted to demonstrate.    Exercises  Scapular Protraction: x  Scapular Retraction: x  Shoulder Flexion: x  Shoulder Extension: x  Shoulder ABduction: x  Shoulder ADduction: x  Horizontal ABduction: x  Horizontal ADduction: x  Elbow Flexion: x  Elbow Extension: x  Supination: x  Pronation: x  Wrist Flexion: x  Wrist Extension: x  Finger Flexion: x  Finger Extension: x                       ADL  Equipment Provided: Reacher  Feeding: Modified independent  (Per report)  Grooming: Modified independent  (seated at sink)  UE Bathing: None  LE Bathing: None  UE Dressing: Minimal assistance (Cues to attend to L side; A to thread LUE)  LE Dressing: Maximum assistance (A threading LLUE and pulling over hips. )  Toileting: Dependent/Total  Additional Comments: Pt lying in bed upon writer arrival and initially declines ADLs. Agreeable to toileting and then chaning pants. Pt doff pants while seated on the tolet with use of reacher, REquires max A for threading LLUE and pulling over hip. Next pt sits at sink for grooming and UB dressing. Then max A for TEDs and shoes with L AFO. After dressing, pt requires use of toilet again. ; PM: toileting completed again. Positioning  Wheelchair Position Type: 1/2 lap tray  Wheelchair Postion Comment: good LUE positioning in w/c with L arm tray in place       Assessment  Performance deficits / Impairments: Decreased functional mobility ; Decreased ADL status; Decreased strength;Decreased endurance;Decreased balance;Decreased high-level IADLs;Decreased vision/visual deficit; Decreased cognition;Decreased safe awareness;Decreased ROM; Decreased fine motor control;Decreased coordination;Decreased posture;Decreased sensation  Prognosis: Good  Discharge Recommendations: Patient would benefit from continued therapy after discharge;Subacute/Skilled Nursing Facility  Activity Tolerance: Patient Tolerated treatment well  Safety Devices in place: Yes  Type of devices: Patient at risk for falls; Left in chair;Chair alarm in place;Call light within reach  Restraints  Initially in place: No          Patient Education: OT POC, lynette dressing techniques and AE, safety with stand pivot transfer with toileting, self PROM for LUE and importance on daily completion for optimal join integrity. Patient Goals   Patient goals : \"get as close to normal as possible. \"  specifies:\"get in and out of bed on my own, shower bathe and dress, able to cross to reach RLE with min assist and keep trunk balance        06/09/21 1434 06/09/21 1504   OT Individual Minutes   Time In [facility]   Time Out 1426 0932   Minutes 32 58     Electronically signed by ERIN Martin on 6/9/21 at 3:17 PM EDT

## 2021-06-09 NOTE — PLAN OF CARE
Problem: Neurological  Goal: Maximum potential motor/sensory/cognitive function  6/9/2021 1623 by Ronny Brown RN  Outcome: Ongoing     Problem: Neurological  Goal: Maximum potential motor/sensory/cognitive function  Outcome: Ongoing     Problem: Pain:  Goal: Pain level will decrease  Description: Pain level will decrease  6/9/2021 1623 by Ronny Brown RN  Outcome: Ongoing     Problem: Skin Integrity:  Goal: Will show no infection signs and symptoms  Description: Will show no infection signs and symptoms  6/9/2021 1623 by Ronny Brown RN  Outcome: Ongoing     Problem: Falls - Risk of:  Goal: Will remain free from falls  Description: Will remain free from falls  6/9/2021 1623 by Ronny Brown RN  Outcome: Ongoing

## 2021-06-09 NOTE — PROGRESS NOTES
Bed Mobility  Bridging: Minimal assistance  Rolling: Minimal assistance;Rolling Right;Stand by assistance;Rolling Left (Min L, SBA R)  Supine to Sit: Minimal assistance  Sit to Supine: Minimal assistance  Scooting: Contact guard assistance  Comment: PT mat, wedge, 3 pillows. Transfers:  Sit to Stand: Minimal Assistance  Stand to sit: Minimal Assistance  Bed to Chair: Minimal assistance     Squat Pivot Transfers: Minimal Assistance (to R; requires safety cues & assist for appropriate L LE placement & L UE if not using sling.)   Comments: Pt impulsive & inconsistent with safety & L-sided awareness. Cues for technique. Ambulation 1  Surface: level tile  Device:  (up walker )  Other Apparatus: Left;Slider;AFO  Assistance: 2 Person assistance; Moderate assistance  Quality of Gait: Assisting pt with advancing left LE, assisting with terminal knee, cues for sequencing, cues for upright posture. Seated rest breaks in between amb. Gait Deviations: Decreased step length;Decreased step height;Slow Juanita;Decreased head and trunk rotation  Distance: 35'x2  Comments: tactile assist for sequencing, weight shifting, terminal knee, pt impulsive and attempting to take steps with right LE prior to advancing left LE; seated rest break between diatances in AM. w/c brought up to pt PRN. Stairs/Curb  Stairs?: Yes  Stairs  # Steps : 5 (x2)  Stairs Height: 4\" (& 6\")  Rails: Right ascending (L UE hand-held assist)  Other Apparatus: Left;AFO  Assistance: 2 Person assistance; Moderate assistance  Comment: Pt requiring cues for sequencing and LLE placement/TKE. Increase assistance with turns  at bottom of training stairs.             Wheelchair Activities  Wheelchair Size: 20\"  Wheelchair Type: Standard  Wheelchair Cushion: Pressure Relieving  Pressure Relief Type: Lateral lean;Self Adjusts  Wheelchair Parts Management: Yes  Left Brakes Level of Assistance: Minimal assistance (extender added; brake requires max force to fully engage)  Right Brakes Level of Assistance: Stand by Assist  Propulsion: Yes  Propulsion 1  Propulsion: Manual  Level: Level Tile  Method: RUE;RLE  Level of Assistance: Minimal assistance  Description/ Details: Pt demos most difficutly with turns. Cues to avoid wall/objects during turns. Does well with straight path. Two short rest breaks due to fatigue. Distance: 200'x1                 Left Brakes Level of Assistance: Minimal assistance (extender added; brake requires max force to fully engage)  Right Brakes Level of Assistance: Stand by Assist       Neuromuscular Education  Neuromuscular education: Yes  Functional Movement Patterns: Vibration L quadriceps & dorsiflexors, Minimal response today; fasiculations noted at electrode pads. Other: Message completed on pts LUE to decrease swelling. Pt tollerating well. Pt hand repositioned on try to decrase edema in digits   BALANCE Posture: Fair  Sitting - Static: Good;-  Sitting - Dynamic: Fair;+  Standing - Static: Fair;- (Upwalker, brakes locked)  Standing - Dynamic: Poor; - (\"Upwalker\"; requires at least 2 assist. )  Comments: Seated Edge of Bed unsupported, Standing with Upright Walker    EXERCISES    Other exercises?: Yes  Other exercises 2: Seated bilateral LE exercises, 20 reps each: L LE active assisted Hip ex's, passive ROM at knee and ankle. R LE 2.5 lbs. Lime (minimal) resistance band  Other exercises 3: donned AFO and shoes in PM   Other exercises 4: Supine LE ex: 2.5# R; active/assisted/passive ROM L x 15-20. Vibration used in brief sets (3 reps x6 sets, rest breaks between) to elicit 28% of short arc quad with assist for remaining arc. Other exercises 5: Standing with rolling walker c L hand splint, 90 sec. with mirror feedback for self-postural adjustment & to address L neglect. Standing tolerance in walker lift with daughter assisting to cue Pt for postural adjustment, >2 min.    Other exercises 7: NU-step L4 x 10 min, therapist assist c L UE  wrapped & tactile support at L ankle/knee PRN for neutral L hip. Other exercises 9: Squat pivot transfers x6, to Pt's R (removing armrest; safety cues for positioning, L LE/UE. )  Other exercises 11: Supine bilateral LE exercises, 15 each, 2# R LE, active/assisted/passive ROM L LE (Estim attempted at quads, DF, no response; vib. to DF, 0 res)  Other exercises 13: Bed mobility  Other exercises 16: Stand pivot transfers x1  Other exercises 17: Standing BLE ex's AROM only in // bars. Pt requires Mod A x1 with an addtional CGA x1.   Other Activities  Comment: rest breaks PRN. Activity Tolerance: Patient limited by fatigue, Patient limited by endurance  PT Equipment Recommendations  Equipment Needed: No  Other: CTA; pending further progress with mobility  Other Comments  Comments: Pt requested the need to use toilet at end of am session. Pt assisted onto toilet and RN called to stay with pt. Pt assisted into bed following pm therapy session. Patient Education  New Education Provided:    Learner:patient  Method: demonstration and explanation       Outcome: needs reinforcement     Current Treatment Recommendations: Strengthening, Balance Training, Functional Mobility Training, Transfer Training, Endurance Training, Wheelchair Mobility Training, Gait Training, Stair training, Neuromuscular Re-education, Home Exercise Program, Safety Education & Training, Patient/Caregiver Education & Training, Modalities, Positioning, ROM, Equipment Evaluation, Education, & procurement    Conditions Requiring Skilled Therapeutic Intervention  Body structures, Functions, Activity limitations: Decreased functional mobility ; Decreased strength;Decreased safe awareness;Decreased endurance;Decreased balance;Decreased posture;Decreased coordination;Decreased vision/visual deficit; Decreased fine motor control  Assessment: Patient fatigue limiting tx, from e-stim to ambulation.  Fatigues much quicker with more independent advancement of L LE (cues only) with Miriam Ulloa. Treatment Diagnosis: Weakness, impaired mobility  Prognosis: Good  Decision Making: Medium Complexity  Exam: ROM, MMT, fucntion, PASS score  Barriers to Learning: Cognition/Fatigue  REQUIRES PT FOLLOW UP: Yes  Discharge Recommendations: Patient would benefit from continued therapy after discharge;Home with assist PRN    Goals  Short term goals  Time Frame for Short term goals: 10 days  Short term goal 1: Pt able to roll in bed side to side at min A without bed rail  Short term goal 2: Pt able to perform supine<>sit at mod A  Short term goal 3: Pt able to perform sit<>stand at min/mod A . Short term goal 4: Pt able to progress to pivot transfers at max A   Short term goal 5: Pt able to improve sitting balance at EOB/EOM at SBA for static balance and able to track to left side as well as turn to head to left side to scan her environment with minimal cues. Short term goal 6: Pt able to tolerate standing in // bars with R UE support and assist at mod A  Short term goal 7: Pt able to propel w/c with R UE/R LE distance of 50 to 80 ft, straight path and turn around 180 degrees, at min/mod A   Long term goals  Time Frame for Long term goals : By DC  Long term goal 1: Pt able to roll in bed mod-I  Long term goal 2: Pt able to perform supine <> sit at CGA/min A  Long term goal 3: Pt able to perform pivot transfers min A / mod A and able to scan Left side environment. Long term goal 4: Pt able to ambulate in // bars, or with appropriate assistive device and/or LE bracing, distance of  20 to 30 ft, min/mod A x 2. Long term goal 5: Pt able to propel w/c on level surfaces distance 100 to 150 ft, SBA/CGA. Long term goal 6: Educate pt/family in safe technique for mobility and  to go up and down steps to enter/exit home. Long term goal 7: Improve sitting balance at EOM to good, and standing balance with R UE support to fair- to reduce fall risk.    Long term goal 8: Improve PASS score to 20/36 improve overall function.         06/09/21 1034 06/09/21 1458   PT Individual Minutes   Time In 1034 1458   Time Out 1120 1545   Minutes 46 47       Electronically signed by [employer]joh[employer]RA on 6/9/21 at 3:58 PM EDT

## 2021-06-09 NOTE — CARE COORDINATION
Contacted Oziel at [facility] regarding pre-cert. They still have not heard back. He will reach out to the home office to inquire about pre-cert.

## 2021-06-09 NOTE — PLAN OF CARE
Problem: Neurological  Goal: Maximum potential motor/sensory/cognitive function  6/9/2021 0415 by Kathryn Carnes RN  Outcome: Ongoing     Problem: Pain:  Goal: Pain level will decrease  Description: Pain level will decrease  6/9/2021 0415 by Kathryn Carnes RN  Outcome: Ongoing     Problem: Pain:  Goal: Control of acute pain  Description: Control of acute pain  6/9/2021 0415 by Kathryn Carnes RN  Outcome: Ongoing     Problem: Skin Integrity:  Goal: Will show no infection signs and symptoms  Description: Will show no infection signs and symptoms  6/9/2021 0415 by Kathryn Carnes RN  Outcome: Ongoing     Problem: Skin Integrity:  Goal: Absence of new skin breakdown  Description: Absence of new skin breakdown  6/9/2021 0415 by Kathryn Carnes RN  Outcome: Ongoing     Problem: Falls - Risk of:  Goal: Will remain free from falls  Description: Will remain free from falls  6/9/2021 0415 by Kathryn Carnes RN  Outcome: Ongoing     Problem: Falls - Risk of:  Goal: Absence of physical injury  Description: Absence of physical injury  6/9/2021 0415 by Kathryn Carnes RN  Outcome: Ongoing

## 2021-06-10 VITALS
DIASTOLIC BLOOD PRESSURE: 63 MMHG | HEIGHT: 68 IN | BODY MASS INDEX: 29.02 KG/M2 | HEART RATE: 86 BPM | TEMPERATURE: 98.8 F | OXYGEN SATURATION: 94 % | SYSTOLIC BLOOD PRESSURE: 135 MMHG | WEIGHT: 191.5 LBS | RESPIRATION RATE: 16 BRPM

## 2021-06-10 PROCEDURE — 97542 WHEELCHAIR MNGMENT TRAINING: CPT

## 2021-06-10 PROCEDURE — 97112 NEUROMUSCULAR REEDUCATION: CPT

## 2021-06-10 PROCEDURE — 6370000000 HC RX 637 (ALT 250 FOR IP): Performed by: STUDENT IN AN ORGANIZED HEALTH CARE EDUCATION/TRAINING PROGRAM

## 2021-06-10 PROCEDURE — 6360000002 HC RX W HCPCS: Performed by: PHYSICAL MEDICINE & REHABILITATION

## 2021-06-10 PROCEDURE — 92507 TX SP LANG VOICE COMM INDIV: CPT

## 2021-06-10 PROCEDURE — 97116 GAIT TRAINING THERAPY: CPT

## 2021-06-10 PROCEDURE — 97129 THER IVNTJ 1ST 15 MIN: CPT

## 2021-06-10 PROCEDURE — 99238 HOSP IP/OBS DSCHRG MGMT 30/<: CPT | Performed by: PHYSICAL MEDICINE & REHABILITATION

## 2021-06-10 PROCEDURE — 6370000000 HC RX 637 (ALT 250 FOR IP): Performed by: PHYSICAL MEDICINE & REHABILITATION

## 2021-06-10 PROCEDURE — 97535 SELF CARE MNGMENT TRAINING: CPT

## 2021-06-10 PROCEDURE — 97110 THERAPEUTIC EXERCISES: CPT

## 2021-06-10 PROCEDURE — 97530 THERAPEUTIC ACTIVITIES: CPT

## 2021-06-10 RX ORDER — POLYETHYLENE GLYCOL 3350 17 G/17G
17 POWDER, FOR SOLUTION ORAL DAILY
Qty: 527 G | Refills: 1 | DISCHARGE
Start: 2021-06-10 | End: 2021-07-10

## 2021-06-10 RX ORDER — ACETAMINOPHEN 500 MG
1000 TABLET ORAL EVERY 8 HOURS SCHEDULED
Qty: 120 TABLET | Refills: 3 | DISCHARGE
Start: 2021-06-10

## 2021-06-10 RX ORDER — NICOTINE POLACRILEX 4 MG
15 LOZENGE BUCCAL PRN
Qty: 45 G | Refills: 1 | DISCHARGE
Start: 2021-06-10

## 2021-06-10 RX ORDER — ALBUTEROL SULFATE 2.5 MG/3ML
2.5 SOLUTION RESPIRATORY (INHALATION) EVERY 4 HOURS PRN
Qty: 120 EACH | Refills: 3 | DISCHARGE
Start: 2021-06-10

## 2021-06-10 RX ORDER — AMITRIPTYLINE HYDROCHLORIDE 25 MG/1
25 TABLET, FILM COATED ORAL NIGHTLY
Qty: 30 TABLET | Refills: 3 | DISCHARGE
Start: 2021-06-10

## 2021-06-10 RX ORDER — CEPHALEXIN 500 MG/1
500 CAPSULE ORAL 2 TIMES DAILY
DISCHARGE
Start: 2021-06-10 | End: 2021-06-14

## 2021-06-10 RX ORDER — SENNA PLUS 8.6 MG/1
2 TABLET ORAL DAILY PRN
DISCHARGE
Start: 2021-06-10 | End: 2021-07-10

## 2021-06-10 RX ORDER — NICOTINE 21 MG/24HR
1 PATCH, TRANSDERMAL 24 HOURS TRANSDERMAL DAILY
Qty: 30 PATCH | Refills: 3 | DISCHARGE
Start: 2021-06-10

## 2021-06-10 RX ORDER — POLYVINYL ALCOHOL 14 MG/ML
1 SOLUTION/ DROPS OPHTHALMIC PRN
Qty: 1 BOTTLE | Refills: 4 | DISCHARGE
Start: 2021-06-10 | End: 2021-07-10

## 2021-06-10 RX ORDER — LEVOTHYROXINE SODIUM 0.03 MG/1
25 TABLET ORAL DAILY
Qty: 30 TABLET | Refills: 3 | DISCHARGE
Start: 2021-06-10

## 2021-06-10 RX ORDER — FLUOXETINE HYDROCHLORIDE 20 MG/1
20 CAPSULE ORAL DAILY
Qty: 30 CAPSULE | Refills: 3 | DISCHARGE
Start: 2021-06-10

## 2021-06-10 RX ORDER — BUDESONIDE AND FORMOTEROL FUMARATE DIHYDRATE 160; 4.5 UG/1; UG/1
2 AEROSOL RESPIRATORY (INHALATION) 2 TIMES DAILY
Qty: 1 INHALER | Refills: 3 | DISCHARGE
Start: 2021-06-10

## 2021-06-10 RX ORDER — BISACODYL 10 MG
10 SUPPOSITORY, RECTAL RECTAL DAILY PRN
DISCHARGE
Start: 2021-06-10 | End: 2021-07-10

## 2021-06-10 RX ADMIN — ATORVASTATIN CALCIUM 80 MG: 80 TABLET, FILM COATED ORAL at 11:23

## 2021-06-10 RX ADMIN — HEPARIN SODIUM 5000 UNITS: 5000 INJECTION INTRAVENOUS; SUBCUTANEOUS at 05:59

## 2021-06-10 RX ADMIN — CEPHALEXIN 500 MG: 500 CAPSULE ORAL at 11:23

## 2021-06-10 RX ADMIN — LEVOTHYROXINE SODIUM 25 MCG: 0.03 TABLET ORAL at 05:59

## 2021-06-10 RX ADMIN — Medication 2 PUFF: at 11:23

## 2021-06-10 RX ADMIN — FLUOXETINE HYDROCHLORIDE 20 MG: 20 CAPSULE ORAL at 11:23

## 2021-06-10 RX ADMIN — ASPIRIN 81 MG CHEWABLE TABLET 81 MG: 81 TABLET CHEWABLE at 11:23

## 2021-06-10 RX ADMIN — AMLODIPINE BESYLATE 5 MG: 5 TABLET ORAL at 11:23

## 2021-06-10 RX ADMIN — ACETAMINOPHEN 1000 MG: 500 TABLET, FILM COATED ORAL at 05:59

## 2021-06-10 ASSESSMENT — PAIN DESCRIPTION - PROGRESSION
CLINICAL_PROGRESSION: NOT CHANGED
CLINICAL_PROGRESSION: NOT CHANGED

## 2021-06-10 ASSESSMENT — PAIN DESCRIPTION - ORIENTATION: ORIENTATION: RIGHT;LEFT

## 2021-06-10 ASSESSMENT — PAIN DESCRIPTION - LOCATION
LOCATION: SHOULDER
LOCATION: SHOULDER;KNEE

## 2021-06-10 ASSESSMENT — PAIN DESCRIPTION - FREQUENCY: FREQUENCY: CONTINUOUS

## 2021-06-10 ASSESSMENT — PAIN DESCRIPTION - PAIN TYPE: TYPE: ACUTE PAIN

## 2021-06-10 ASSESSMENT — PAIN SCALES - WONG BAKER: WONGBAKER_NUMERICALRESPONSE: 4

## 2021-06-10 ASSESSMENT — PAIN SCALES - GENERAL
PAINLEVEL_OUTOF10: 0
PAINLEVEL_OUTOF10: 0
PAINLEVEL_OUTOF10: 2

## 2021-06-10 NOTE — DISCHARGE INSTR - DIET

## 2021-06-10 NOTE — PROGRESS NOTES
[address] [facility]    ACUTE REHABILITATION OCCUPATIONAL THERAPY  DAILY NOTE    Date: 6/10/21  Patient Name: Kareem Mccormick      Room: 8942/8248-26    MRN: [id number]   : 1955  (63 y.o.)  Gender: female   Referring Practitioner: Nico Villanueva MD  Diagnosis: ischemic CVA,  subacute right MCA distribution infarct, L lynette, dysphagia, L neglect, loop recorder 21 (Dr. Anamika Negrete)  Additional Pertinent Hx: 72-year-old female who was found down, last known well approximately 21 hours before. Patient was found to have a right MCA M1 occlusion. Thrombectomy was not successful, MRI showed patient had a right MCA stroke with hemorrhagic conversion. 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 :  (Loop recorder)  Other position/activity restrictions:  L neglect with LUE/LLE deficits, neglect improving. Required Braces or Orthoses?: No  Equipment Used: Bed, Wheelchair    Subjective  Subjective: \"No, I don't want to do anything else\"  Comments: Pt completed limited ADL, refusing all other tasks despite OT encouragement.   Patient Currently in Pain: Yes  Pain Level: 2  Pain Location: Shoulder;Knee  Pain Orientation: Right;Left (L shoulder, B knees)  Restrictions/Precautions: Fall Risk  Overall Orientation Status: Within Functional Limits     Pain Assessment  Pain Assessment: 0-10  Pain Level: 2  Patient's Stated Pain Goal: No pain  Pain Type: Acute pain  Pain Location: Shoulder, Knee  Pain Orientation: Right, Left (L shoulder, B knees)  Pain Descriptors:  (\"Like someone is tearing at my muscles\")  Pain Frequency: Continuous  Clinical Progression: Not changed  Response to Pain Intervention: Patient Satisfied    Objective  Perception  Overall Perceptual Status: Impaired  Unilateral Attention: Cues to attend left visual field;Cues to attend to left side of body     Type of ROM/Therapeutic Exercise  Type of ROM/Therapeutic Exercise: Free weights  Comment: Completed RUE therapeutic exercise with 2# dumbbell, 2 sets x20 reps, to increase strength/endurance and promote increased IND with self-care and transfers. Exercises  Scapular Protraction: x  Scapular Retraction: x  Shoulder Flexion: x  Shoulder Extension: x  Shoulder ABduction: x  Shoulder ADduction: x  Elbow Flexion: x  Elbow Extension: x     Weight Bearing  Weight Bearing Technique: Yes  LUE Weight Bearing: Forearm seated  Response To Weight Bearing Technique: Pt initially insisted on keeping LUE in sling while sitting in w/c.  OT provided education and by end of session, pt requested sling be removed and arm placed back on arm tray. ADL  Grooming: Modified independent  (Brushed teeth from w/c level)  UE Dressing: Minimal assistance (Doffed shirt w/SBA; Donned shirt with A to fully thread LUE)  Additional Comments: Pt refused all other ADL tasks. Assessment  Performance deficits / Impairments: Decreased functional mobility ; Decreased ADL status; Decreased strength;Decreased endurance;Decreased balance;Decreased high-level IADLs;Decreased vision/visual deficit; Decreased cognition;Decreased safe awareness;Decreased ROM; Decreased fine motor control;Decreased coordination;Decreased posture;Decreased sensation  Prognosis: Good  Discharge Recommendations: Patient would benefit from continued therapy after discharge;Subacute/Skilled Nursing Facility  Activity Tolerance: Patient Tolerated treatment well  Activity Tolerance: Limited engagement in functional tasks - pt eager for discharge. Safety Devices in place: Yes  Type of devices: Patient at risk for falls; Left in chair;Call light within reach    Patient Education:  Patient Goals   Patient goals : \"get as close to normal as possible. \"  specifies:\"get in and out of bed on my own, shower by myself. \"   Proper positioning of LUE when sitting in w/c, attempted engagement in 1 LE bathe and dress, able to cross to reach RLE with min assist and keep trunk balance     06/10/21 0934 06/10/21 0954   OT Individual Minutes   Time In [address]   Time Out 0948 1014   Minutes 14 20   Time Code Minutes    Timed Code Treatment Minutes 14 Minutes 20 Minutes     Electronically signed by Eloy Navarro on 6/10/21 at 3:12 PM EDT

## 2021-06-10 NOTE — PROGRESS NOTES
Speech Language Pathology  Speech Language Pathology  [facility] Acute Rehab Unit at [facility]    Dysphagia/SpeechLanguage Treatment Note    Date: 6/10/2021  Patients Name: Tiara Maynard  MRN: [id number]  Diagnosis:   Patient Active Problem List   Diagnosis Code    Pneumonia of left lower lobe due to infectious organism J18.9    Tobacco abuse Z72.0    Legionella pneumonia ([facility] A48.1    Dysphagia R13.10    Hyperplastic colon polyp K63.5    Esophageal ring K22.2    Gastritis K29.70    Elbow effusion, right M25.421    Stroke due to occlusion of right middle cerebral artery ([facility]) I63.511    Nihss score 15 R29.715    Acute left hemiparesis ([facility]) G81.94    Cerebrovascular accident (CVA) due to occlusion of right middle cerebral artery ([facility]) I63.511    Acute CVA (cerebrovascular accident) ([facility] [address] I63.9    COPD (chronic obstructive pulmonary disease) ([facility]) J44.9    Essential hypertension I10    Hyperlipidemia E78.5    Pre-diabetes R73.03       Pain: 0/10    Speech/Cognitive/Dysphagia Treatment    Treatment time:  0675-7963    Subjective: [x] Alert [x] Cooperative     [] Confused     [] Agitated    [] Lethargic    Objective/Assessment:  Attention:  Sustained attention: visual scanning 2 units x2, completed with 85% accuracy praveen, min cues provided throughout to physically turn head to affected side. Other:  Pt. Completed oral motor exercises x 10 sets, 10 reps each. Reduced labial ROM noted on L side. Pt. Reports tolerating her diet of soft and bite sized without difficulty. Significant pt ed provided re discharge diet recommendations and exercise program recommendations. Handout of oral motor exercises reviewed and left in Pt's room. Encouraged Pt. To complete exercises throughout day. Pt verbalized understanding. No family present in room.        Plan:  [x] Continue ST services    [] Discharge from :      Discharge recommendations: [] Inpatient Rehab   [] [place]   [] Outpatient Therapy  [] Follow up at trauma clinic   [] Other:       Treatment completed by:  Farideh Garcia M.A., CCC-SLP

## 2021-06-10 NOTE — PROGRESS NOTES
Physical Therapy  [address]  Acute Rehabilitation Physical Therapy Progress Note    Date: 6/10/21  Patient Name: Jessica Malave       Room: 5940/2740-00  MRN: [id number]   Account: [de-identified]   : 1955  (63 y.o.) Gender: female     Referring Practitioner: Joselo Su MD  Diagnosis: ischemic CVA,  subacute right MCA distribution infarct, L lynette, dysphagia, L neglect, loop recorder 21 (Dr. Stephanie Hernandez)  Past Medical History:  has a past medical history of Asthma, COPD (chronic obstructive pulmonary disease) ([facility] [address] Diabetes mellitus ([facility] [address] Esophageal ring, Gastritis, Hyperlipidemia, Hyperplastic colon polyp, Hypertension, Osteoarthritis, Patient in clinical research study, and TIA (transient ischemic attack). Past Surgical History:   has a past surgical history that includes Appendectomy; Hysterectomy; Esophagus dilation; Colonoscopy (2017); Endoscopy, colon, diagnostic; pr egd transoral biopsy single/multiple (N/A, 2017); pr colsc flx w/rmvl of tumor polyp lesion snare tq (N/A, 2017); and Upper gastrointestinal endoscopy (2017). Overall Orientation Status: Within Functional Limits  Restrictions/Precautions  Restrictions/Precautions: Fall Risk  Required Braces or Orthoses?: No  Implants present? :  (Loop recorder)  Position Activity Restriction  Other position/activity restrictions:  L neglect with LUE/LLE deficits, neglect improving. Subjective: Reporting pain in :L shoulder. Requesting to put sling on LUE. Pt reports fatigue this AM due to decrease sleep last night. Comments: Pt is pleasant and cooperative with PT.     Vital Signs  BP Location: Right upper arm  Level of Consciousness: Alert (0)  Patient Currently in Pain: No  Lara-Garcia Pain Rating: Hurts little more  Response to Pain Intervention: Patient Satisfied     Oxygen Therapy  O2 Device: None (Room air)  Patient Observation  Observations: Pt continues to demonstrate flat affect in regards to therapy POC       Bed Mobility:   Bed Mobility  Bridging: Minimal assistance  Rolling: Minimal assistance;Rolling Right;Stand by assistance;Rolling Left (Min L, SBA R)  Supine to Sit: Minimal assistance  Sit to Supine: Minimal assistance  Scooting: Contact guard assistance  Comment: PT mat, wedge, 3 pillows. Transfers:  Sit to Stand: Minimal Assistance  Stand to sit: Minimal Assistance  Bed to Chair: Minimal assistance; Moderate assistance   Comments: Pt impulsive & inconsistent with safety & L-sided awareness. Cues for technique. Transfers completed with up right walker. Stand pivot transfers completed to strong side (R). Ambulation 1  Surface: level tile  Device:  (up walker )  Other Apparatus: Left;Slider;AFO  Assistance: 2 Person assistance;Contact guard assistance; Moderate assistance  Quality of Gait: Assisting pt with advancing left LE, assisting with terminal knee, cues for sequencing, cues for upright posture. Seated rest breaks in between amb. Second person CGA for safety. Gait Deviations: Decreased step length;Decreased step height;Slow Juanita;Decreased head and trunk rotation  Distance: 35'x1 and 45'x1  Comments: tactile assist for sequencing, weight shifting, terminal knee, pt impulsive and attempting to take steps with right LE prior to advancing left LE; seated rest break between diatances in AM. w/c brought up to pt PRN. Stairs/Curb  Stairs?: Yes  Stairs  # Steps : 5 (x2)  Stairs Height: 4\" (& 6\")  Rails: Right ascending (L UE in sling per pt request. )  Other Apparatus: Left;AFO  Assistance: 2 Person assistance; Moderate assistance  Comment: Pt requiring cues for sequencing and LLE placement/TKE. Increase assistance with turns  at bottom of training stairs. Occassional manual assistance with LLE placement.             Wheelchair Activities  Wheelchair Size: 20\"  Wheelchair Type: Standard  Wheelchair Cushion: Pressure Relieving  Pressure Relief Type: Lateral lean;Self support  4. Standing on Non-paretic Leg  Examiner: Have the subject stand on the non-paretic leg. Evaluate only the ability to bear weight entirely on the non-paretic leg. Do not consider how the subject accomplishes the task. 1  Can stand on non-paretic leg for a few seconds  5. Standing on Paretic Leg  Examiner: Have the subject stand on the paretic leg. Evaluate only the ability to bear weight entirely on the paretic leg. Do not consider how the subject accomplishes the task. 0  Cannot stand of paretic leg     Maintaining Posture SUBTOTAL     5/15    Changing a Posture  6. Supine to Paretic Side Lateral  Examiner:  Begin with the subject in supine on a treatment mat. Instruct the subject to roll to the paretic side (lateral movement). Assist as necessary. Evaluated the subject's performance on the amount of help required. Do not consider the quality of performance. 2  Can perform with little help  7. Supine to Non-paretic Side Lateral  Examiner:  Begin with the subject in supine on a treatment mat. Instruct the subject to roll to the non-paretic side (lateral movement). Assist as necessary. Evaluate the subject's Performance on the amount of help required. Do not consider the quality of performance. 1  Can perform with much help   8. Supine to Sitting up on the Edge of the Mat   Examiner:  Begin with the subject in supine on a treatment mat. Instruct the subject to come to sitting on the edge of the mat. Assist as necessary. Evaluate the subject's performance on the amount of help required. Do not considered the quality performance. 1  Can perform with much help    9. Sitting on the Edge of the Mat to Supine  Examiner: Begin with the subject sitting on the edge of a treatment mat. Instruct the subject to return to supine. Assist as necessary. Evaluate the subjects performance on the amount of help required. Co not consider the quality of performance.    1  Can perform with much help   10. Sitting to Standing Up  Examiner:  Begin with the subject sitting on the edge of a treatment mat. Instruct the subject to stand up without support. Assist if necessary. Evaluated the subject's performance on the amount ot help required. Do not consider the quality of the performance. 1  Can perform with much help   11. Standing Up to Sitting Down  Examiner:  Begin with the subject standing by the edge of a treatment mat. Instruct the subject to sit on the edge of mat without support. Assist if necessary. Evaluated the subject's performance on the amount of help required. Do not consider the quality of the performance. 1  Can perform with much help   12 . Standing, Picking up a Pencil from the Floor  Examiner:  Begin with the subject standing. Instruct the subject to  a pencil from the floor without support. Assist if necessary. Evaluate the subject's performance on the amount of help required. Do Not consider the quality of the performance. 0  Cannot perform    Changing Posture SUBTOTAL   7/21    PASS TOTAL   12/36     EXERCISES    Other exercises?: Yes  Other exercises 2: Seated bilateral LE exercises, 20 reps each: L LE active assisted Hip ex's, passive ROM at knee and ankle. R LE 2.5 lbs. Lime (minimal) resistance band. Mirror therapy for neuro feedback.  (EOM)  Other exercises 3: donned AFO and shoes in PM   Other exercises 4: Supine LE ex: 2.5# R; active/assisted/passive ROM L x 15-20. Vibration used in brief sets (3 reps x6 sets, rest breaks between) to elicit 97% of short arc quad with assist for remaining arc. Other exercises 8: R sidelying: antigravity table used with vibrator working on stimulating LLE. Was able to elicite min HS and DF for a couple repititions. Other exercises 9: Squat pivot transfers x6, to Pt's R (removing armrest; safety cues for positioning, L LE/UE. )  Other exercises 12:  Bridges completed x 20,  with edu on importance of repositioning. Other exercises 13: Bed mobility  Other exercises 16: Stand pivot transfers x4  Other Activities  Comment: rest breaks PRN. Activity Tolerance: Patient limited by fatigue, Patient limited by endurance  PT Equipment Recommendations  Equipment Needed: No  Other: CTA; pending further progress with mobility  Other Comments  Comments: Pt requested the need to use toilet at end of am session. Pt assisted onto toilet and RN called to stay with pt. Pt assisted into bed following pm therapy session. Patient Education  New Education Provided:    Learner:patient  Method: demonstration  And verbal explanation      Outcome: needs reinforcement     Current Treatment Recommendations: Strengthening, Balance Training, Functional Mobility Training, Transfer Training, Endurance Training, Wheelchair Mobility Training, Gait Training, Stair training, Neuromuscular Re-education, Home Exercise Program, Safety Education & Training, Patient/Caregiver Education & Training, Modalities, Positioning, ROM, Equipment Evaluation, Education, & procurement    Conditions Requiring Skilled Therapeutic Intervention  Body structures, Functions, Activity limitations: Decreased functional mobility ; Decreased strength;Decreased safe awareness;Decreased endurance;Decreased balance;Decreased posture;Decreased coordination;Decreased vision/visual deficit; Decreased fine motor control  Assessment: Patient fatigue limiting tx, from e-stim to ambulation. Fatigues much quicker with more independent advancement of L LE (cues only) with Jose M Vargas.    Treatment Diagnosis: Weakness, impaired mobility  Prognosis: Good  Decision Making: Medium Complexity  Exam: ROM, MMT, fucntion, PASS score  Barriers to Learning: Cognition/Fatigue  REQUIRES PT FOLLOW UP: Yes  Discharge Recommendations: Patient would benefit from continued therapy after discharge;Home with assist PRN    Goals  Short term goals  Time Frame for Short term goals: 10 days  Short term goal 1: Pt able to roll in bed side to side at min A without bed rail  Short term goal 2: Pt able to perform supine<>sit at mod A  Short term goal 3: Pt able to perform sit<>stand at min/mod A . Short term goal 4: Pt able to progress to pivot transfers at max A   Short term goal 5: Pt able to improve sitting balance at EOB/EOM at SBA for static balance and able to track to left side as well as turn to head to left side to scan her environment with minimal cues. Short term goal 6: Pt able to tolerate standing in // bars with R UE support and assist at mod A  Short term goal 7: Pt able to propel w/c with R UE/R LE distance of 50 to 80 ft, straight path and turn around 180 degrees, at min/mod A   Long term goals  Time Frame for Long term goals : By DC  Long term goal 1: Pt able to roll in bed mod-I  Long term goal 2: Pt able to perform supine <> sit at CGA/min A  Long term goal 3: Pt able to perform pivot transfers min A / mod A and able to scan Left side environment. Long term goal 4: Pt able to ambulate in // bars, or with appropriate assistive device and/or LE bracing, distance of  20 to 30 ft, min/mod A x 2. Long term goal 5: Pt able to propel w/c on level surfaces distance 100 to 150 ft, SBA/CGA. Long term goal 6: Educate pt/family in safe technique for mobility and  to go up and down steps to enter/exit home. Long term goal 7: Improve sitting balance at EOM to good, and standing balance with R UE support to fair- to reduce fall risk. Long term goal 8: Improve PASS score to 20/36 improve overall function.         06/10/21 0750   PT Individual Minutes   Time In 7024   Time Out 0920   Minutes 90       Electronically signed by Noemi Palacios PTA on 6/10/21 at 9:33 AM EDT

## 2021-06-10 NOTE — DISCHARGE INSTR - COC
Continuity of Care Form    Patient Name: Andre Socrates   :  1955  MRN:  [id number]    Admit date:  5/10/2021  Discharge date:  6-10-21    Code Status Order: Full Code   Advance Directives:   [facility] Documentation       Date/Time Healthcare Directive Type of Healthcare Directive Copy in [facility] [address] Agent's Name Healthcare Agent's Phone Number    05/15/21 1032  No, patient does not have an advance directive for healthcare treatment -- -- -- -- --            Admitting Physician:  Romayne Star, MD  PCP: Luis A Castellon MD    Discharging Nurse: [facility] Unit/Room#: 8351/5730-83  Discharging Unit Phone Number: [phone]    Emergency Contact:   Extended Emergency Contact Information  Primary Emergency Contact: [address] [place] [address] Phone: [phone]  Relation: Child  Secondary Emergency Contact: Cachorro Rhodes  Mobile Phone: [phone]  Relation: Child    Past Surgical History:  Past Surgical History:   Procedure Laterality Date    APPENDECTOMY      COLONOSCOPY  2017    FRAGMENTS OF HYPERPLASTIC POLYP    ENDOSCOPY, COLON, DIAGNOSTIC      ESOPHAGEAL DILATATION      HYSTERECTOMY      ND COLSC FLX W/RMVL OF TUMOR POLYP LESION SNARE TQ N/A 2017    COLONOSCOPY POLYPECTOMY COLD BIOPSY performed by Juan Painting MD at [facility] EGD TRANSORAL BIOPSY SINGLE/MULTIPLE N/A 2017    EGD BIOPSY with esophageal dilitation performed by Juan Painting MD at [facility]  2017    E-ring and gastritis        Immunization History:   Immunization History   Administered Date(s) Administered    Influenza Virus Vaccine 10/29/2019    Influenza, Aldean Cheese, Recombinant, IM PF (Flublok 18 yrs and older) 10/29/2019    Pneumococcal Polysaccharide ([id number]) 10/29/2019    Tdap (Boostrix, Adacel) 2012, 2016       Active Problems:  Patient Active Problem List   Diagnosis Code  Pneumonia of left lower lobe due to infectious organism J18.9    Tobacco abuse Z72.0    Legionella pneumonia ([facility]) A48.1    Dysphagia R13.10    Hyperplastic colon polyp K63.5    Esophageal ring K22.2    Gastritis K29.70    Elbow effusion, right M25.421    Stroke due to occlusion of right middle cerebral artery ([facility]) I63.511    Nihss score 15 R29.715    Acute left hemiparesis ([facility]) G81.94    Cerebrovascular accident (CVA) due to occlusion of right middle cerebral artery ([facility]) I63.511    Acute CVA (cerebrovascular accident) ([address] I63.9    COPD (chronic obstructive pulmonary disease) ([facility]) J44.9    Essential hypertension I10    Hyperlipidemia E78.5    Pre-diabetes R73.03       Isolation/Infection:   Isolation            No Isolation          Patient Infection Status       None to display            Nurse Assessment:  Last Vital Signs: /63   Pulse 86   Temp 98.8 °F (37.1 °C) (Oral)   Resp 16   Ht 5' 8\" (1.727 m)   Wt 191 lb 8 oz (86.9 kg)   SpO2 94%   BMI 29.12 kg/m²     Last documented pain score (0-10 scale): Pain Level: 2  Last Weight:   Wt Readings from Last 1 Encounters:   05/10/21 191 lb 8 oz (86.9 kg)     Mental Status:  oriented    IV Access:  - None    Nursing Mobility/ADLs:  Walking   Dependent  Transfer  Dependent  Bathing  Assisted  Dressing  Assisted  Toileting  Assisted  Feeding  Independent  Med Admin  Independent  Med Delivery   whole    Wound Care Documentation and Therapy:        Elimination:  Continence:   · Bowel: Yes  · Bladder: Yes  Urinary Catheter: None   Colostomy/Ileostomy/Ileal Conduit: No       Date of Last BM: 6/7/21  No intake or output data in the 24 hours ending 06/10/21 1042  No intake/output data recorded. Safety Concerns:      At Risk for Falls    Impairments/Disabilities:      flaccid BLE    Nutrition Therapy:  Current Nutrition Therapy:   - Oral Diet:  Carb Control 5 carbs/meal (2000kcals/day) and sift and bite size    Routes of Feeding: Oral  Liquids: No Restrictions  Daily Fluid Restriction: no  Last Modified Barium Swallow with Video (Video Swallowing Test): not done    Treatments at the Time of Hospital Discharge:   Respiratory Treatments: as needed  Oxygen Therapy:  is not on home oxygen therapy. Ventilator:    - No ventilator support    Rehab Therapies: Physical Therapy and Occupational Therapy  Weight Bearing Status/Restrictions: No weight bearing restirctions  Other Medical Equipment (for information only, NOT a DME order):  wheelchair and walker  Other Treatments: ***    Patient's personal belongings (please select all that are sent with patient):  None    RN SIGNATURE:  Electronically signed by Delta Barthel, RN on 6/10/21 at 11:44 AM EDT    CASE MANAGEMENT/SOCIAL WORK SECTION    Inpatient Status Date: 5/10/2021    Readmission Risk Assessment Score:  Readmission Risk              Risk of Unplanned Readmission:  13           Discharging to Facility/ Agency     [facility] [place] [address]  Phone: [phone]  Fax: [phone]        / signature: Electronically signed by RADHA Gonzalez on 6/10/21 at 10:54 AM EDT    PHYSICIAN SECTION    Prognosis: Fair    Condition at Discharge: Stable    Rehab Potential (if transferring to Rehab): Fair    Recommended Labs or Other Treatments After Discharge: PT/OT/SLP to eval and treat. Sling to be used ONLY for transfers and ambulation. Physician Certification: I certify the above information and transfer of Andre Crowell  is necessary for the continuing treatment of the diagnosis listed and that she requires [facility] for less 30 days.      Update Admission H&P: No change in H&P    PHYSICIAN SIGNATURE:  Electronically signed by Pooja Canales MD on 6/10/21 at 10:43 AM EDT

## 2021-06-10 NOTE — PROGRESS NOTES
terminal knee, pt impulsive and attempting to take steps with right LE prior to advancing left LE; seated rest break between diatances in AM. w/c brought up to pt PRN. Transfers  Sit to Stand: Minimal Assistance  Stand to sit: Minimal Assistance  Bed to Chair: Minimal assistance  Stand Pivot Transfers: Moderate Assistance  Squat Pivot Transfers: Minimal Assistance (to R; requires safety cues & assist for appropriate L LE placement & L UE if not using sling.)  Lateral Transfers: Minimal Assistance  Car Transfer: Maximum Assistance (+ SBA (son participating in family training). Education for positioning, safety, sequencing, set-up. Drive side back seat to facilitate transfer to Pt's R; going to Pt's L to exit. )  Comment: Pt impulsive & inconsistent with safety & L-sided awareness. Cues for technique. Ambulation  Ambulation?: Yes  More Ambulation?: No  Ambulation 1  Surface: level tile  Device:  (up walker )  Other Apparatus: Left, Slider, AFO  Assistance: 2 Person assistance, Moderate assistance  Quality of Gait: Assisting pt with advancing left LE, assisting with terminal knee, cues for sequencing, cues for upright posture. Seated rest breaks in between amb. Gait Deviations: Decreased step length, Decreased step height, Slow Juanita, Decreased head and trunk rotation  Distance: 35'x2  Comments: tactile assist for sequencing, weight shifting, terminal knee, pt impulsive and attempting to take steps with right LE prior to advancing left LE; seated rest break between diatances in AM. w/c brought up to pt PRN. Surface: level tile  Ambulation 1  Surface: level tile  Device:  (up walker )  Other Apparatus: Left, Slider, AFO  Assistance: 2 Person assistance, Moderate assistance  Quality of Gait: Assisting pt with advancing left LE, assisting with terminal knee, cues for sequencing, cues for upright posture. Seated rest breaks in between amb.    Gait Deviations: Decreased step length, Decreased step height, Slow turgor. EXTREMITIES:  No calf tenderness to palpation. No edema BLEs. Nadyne [place] PSYCH: Mood depressed. Appropriately interactive. Affect WNL    Diagnostics:     CBC:   Recent Labs     06/08/21  0618   WBC 9.1   RBC 4.40   HGB 13.6   HCT 40.7   MCV 92.6   RDW 15.0*        BMP:   Recent Labs     06/08/21  0618      K 4.4      CO2 27   BUN 15   CREATININE 0.41*   GLUCOSE 108*     BNP: No results for input(s): BNP in the last 72 hours. PT/INR: No results for input(s): PROTIME, INR in the last 72 hours. APTT: No results for input(s): APTT in the last 72 hours. CARDIAC ENZYMES: No results for input(s): CKMB, CKMBINDEX, TROPONINT in the last 72 hours. Invalid input(s): CKTOTAL;3  FASTING LIPID PANEL:  Lab Results   Component Value Date    CHOL 206 (H) 05/05/2021    HDL 29 (L) 05/05/2021    TRIG 185 (H) 05/05/2021     LIVER PROFILE: No results for input(s): AST, ALT, ALB, BILIDIR, BILITOT, ALKPHOS in the last 72 hours.      Current Medications:   Current Facility-Administered Medications: cephALEXin (KEFLEX) capsule 500 mg, 500 mg, Oral, BID  FLUoxetine (PROZAC) capsule 20 mg, 20 mg, Oral, Daily  amitriptyline (ELAVIL) tablet 25 mg, 25 mg, Oral, Nightly  heparin (porcine) injection 5,000 Units, 5,000 Units, Subcutaneous, 3 times per day  acetaminophen (TYLENOL) tablet 1,000 mg, 1,000 mg, Oral, 3 times per day  polyvinyl alcohol (LIQUIFILM TEARS) 1.4 % ophthalmic solution 1 drop, 1 drop, Both Eyes, PRN  sodium chloride flush 0.9 % injection 5-40 mL, 5-40 mL, Intravenous, PRN  promethazine (PHENERGAN) tablet 12.5 mg, 12.5 mg, Oral, Q6H PRN **OR** ondansetron (ZOFRAN) injection 4 mg, 4 mg, Intravenous, Q6H PRN  0.9 % sodium chloride infusion, 25 mL, Intravenous, PRN  albuterol (PROVENTIL) nebulizer solution 2.5 mg, 2.5 mg, Nebulization, Q4H PRN  amLODIPine (NORVASC) tablet 5 mg, 5 mg, Oral, Daily  aspirin chewable tablet 81 mg, 81 mg, Oral, Daily  atorvastatin (LIPITOR) tablet 80 mg, 80 mg, Oral, placement at SNF as family is unable to provide 24 hour care. Electronically signed by Jose Diehl MD on 6/9/2021 at 9:49 PM      This note is created with the assistance of a speech recognition program.  While intending to generate a document that actually reflects the content of the visit, the document can still have some errors including those of syntax and sound a like substitutions which may escape proof reading. In such instances, actual meaning can be extrapolated by contextual diversion.

## 2021-06-10 NOTE — PROGRESS NOTES
Patient evaluated at bedside. Vitals reviewed, BP stable on Norvasc 6 mg daily. Labs reviewed, blood sugar controlled 108. Admits to bowel movements. No issues sleeping  Denies any Pain. Evaluated after therapy. No improvement in left arm function. Plan to transfer to [facility] pending. Significant last 24 hr data reviewed ;   Vitals:    06/09/21 0745 06/09/21 1837 06/10/21 0545 06/10/21 0658   BP: 115/66 123/81 133/67 135/63   Pulse: 84 88 79 86   Resp: 12 18 16 16   Temp: 98.4 °F (36.9 °C) 97.8 °F (36.6 °C) 97.4 °F (36.3 °C) 98.8 °F (37.1 °C)   TempSrc: Oral Oral  Oral   SpO2: 90% 95% 93% 94%   Weight:       Height:          No results found for this or any previous visit (from the past 24 hour(s)). No results for input(s): POCGLU in the last 72 hours. No results found. HPI:   See history in H and P         Review of Systems:     Constitutional:  negative for chills, fevers, sweats  Respiratory:  negative for cough, dyspnea on exertion, hemoptysis, shortness of breath, wheezing  Cardiovascular:  negative for chest pain, chest pressure/discomfort, lower extremity edema, palpitations  Gastrointestinal:  negative for abdominal pain, constipation, diarrhea, nausea, vomiting  Neurological:  negative for dizziness, headache  Data:     Past Medical History:  no change     Social History:  no change    Family History: @no change    Vitals:      I/O (24Hr):   No intake or output data in the 24 hours ending 06/10/21 1323    Labs:    URINE ANALYSIS: No results found for: LABURIN     CBC:  Lab Results   Component Value Date    WBC 9.1 06/08/2021    HGB 13.6 06/08/2021     06/08/2021        BMP:    Lab Results   Component Value Date     06/08/2021    K 4.4 06/08/2021     06/08/2021    CO2 27 06/08/2021    BUN 15 06/08/2021    CREATININE 0.41 06/08/2021    GLUCOSE 108 06/08/2021      LIVER PROFILE:  Lab Results   Component Value Date    ALT 13 10/16/2017    AST 13 10/16/2017    PROT 6.9 10/16/2017    BILITOT 0.22 10/16/2017    BILIDIR 0.11 10/16/2017    LABALBU 4.0 10/16/2017               Radiology:  Medications: Allergies:      Current Meds:   Scheduled Meds:    cephALEXin  500 mg Oral BID    FLUoxetine  20 mg Oral Daily    amitriptyline  25 mg Oral Nightly    heparin (porcine)  5,000 Units Subcutaneous 3 times per day    acetaminophen  1,000 mg Oral 3 times per day    amLODIPine  5 mg Oral Daily    aspirin  81 mg Oral Daily    atorvastatin  80 mg Oral Daily    budesonide-formoterol  2 puff Inhalation BID    levothyroxine  25 mcg Oral Daily    nicotine  1 patch Transdermal Daily    tiotropium  2 puff Inhalation Daily    polyethylene glycol  17 g Oral Daily     Continuous Infusions:    sodium chloride       PRN Meds: polyvinyl alcohol, sodium chloride flush, promethazine **OR** ondansetron, sodium chloride, albuterol, dextrose, glucagon (rDNA), glucose, senna, bisacodyl      Physical Examination:        /63   Pulse 86   Temp 98.8 °F (37.1 °C) (Oral)   Resp 16   Ht 5' 8\" (1.727 m)   Wt 191 lb 8 oz (86.9 kg)   SpO2 94%   BMI 29.12 kg/m²   Temp (24hrs), Av °F (36.7 °C), Min:97.4 °F (36.3 °C), Max:98.8 °F (37.1 °C)    No results for input(s): POCGLU in the last 72 hours. No intake or output data in the 24 hours ending 06/10/21 1323    General Appearance:  alert, well appearing, and in no acute distress  Mental status: oriented to person, place, and time with normal affect  Head:  normocephalic, atraumatic.   Eye: no icterus, redness, pupils equal and reactive, extraocular eye movements intact, conjunctiva clear  Ear: normal external ear, no discharge, hearing intact  Nose:  no drainage noted  Mouth: mucous membranes moist  Neck: supple, no carotid bruits, thyroid not palpable  Lungs: Bilateral equal air entry, clear to ausculation, no wheezing, rales or rhonchi, normal effort  Cardiovascular: normal rate, regular rhythm, no murmur, gallop, rub. Abdomen: Soft, nontender, nondistended, normal bowel sounds, no hepatomegaly or splenomegaly  Neurologic: There are no new focal motor or sensory deficits, normal muscle tone and bulk, no abnormal sensation, normal speech, cranial nerves II through XII grossly intact  Skin: No gross lesions, rashes, bruising or bleeding on exposed skin area  Extremities:  peripheral pulses palpable, no pedal edema or calf pain with palpation  Psych:             Assessment:        Primary Problem  <principal problem not specified>    Active Hospital Problems    Diagnosis Date Noted    Acute CVA (cerebrovascular accident) ([address] [I63.9] 05/10/2021    COPD (chronic obstructive pulmonary disease) ([facility] [J44.9] 05/10/2021    Essential hypertension [I10] 05/10/2021    Hyperlipidemia [E78.5] 05/10/2021    Pre-diabetes [R73.03] 05/10/2021    Acute left hemiparesis (HCC) [G81.94]     Gastritis [K29.70]     Tobacco abuse [Z72.0] 07/16/2017     Plan:        No new complaints/symptoms . Symptoms or ailment  course ;                                   are improving over time. 1. Continue present regimen                      Patient Active Problem List   Diagnosis    Pneumonia of left lower lobe due to infectious organism    Tobacco abuse    Legionella pneumonia ([address]    Dysphagia    Hyperplastic colon polyp    Esophageal ring    Gastritis    Elbow effusion, right    Stroke due to occlusion of right middle cerebral artery (HCC)    Nihss score 15    Acute left hemiparesis ([address]    Cerebrovascular accident (CVA) due to occlusion of right middle cerebral artery ([address]    Acute CVA (cerebrovascular accident) ([address]    COPD (chronic obstructive pulmonary disease) ([address]    Essential hypertension    Hyperlipidemia    Pre-diabetes      Little improvement to therapy. BP well controlled. Continue norvasc 5 mg daily  Copd stable.  continue bronchodilators  Synthroid 25 mcg daily   lipitor 80 mg daily  Asa 81 mg daily  Insulin sliding scale-discontinued as blood sugars have been controlled and stable  nicoderm patch  Continue PT/OT  Transfer pending to [facility]. Will monitor vitals and clinical course , and  Optimize therapy  as needed .       Naomi Holland MD  6/10/2021  1:23 PM

## 2021-06-29 ENCOUNTER — CLINICAL DOCUMENTATION (OUTPATIENT)
Dept: PHYSICAL MEDICINE AND REHAB | Age: 66
End: 2021-06-29

## 2021-06-29 NOTE — PROGRESS NOTES
Therapist the [facility] called to inquire if E-stim was ok to do on patient with a loop recorder. I told them to contact the cardiologist and she was told when she called them first, was advised to contact the neurologist.      She decided to call dr. Miracle Ortega since that is the name the patient gave to her. Advised that the loop recorder was implanted by cardiologist only they would know if electrical stim. Would affect this.

## 2021-06-30 NOTE — PROGRESS NOTES
[address] and left a message on therapy room voice mail that e-stim is ok to do with loop recorder as long as it is documented and cardiology is notified so that if the loop recorder picks up activity while the therapy is happening it will not mean a cardiac event is happening.     I did ask that if there were any questions to contact our office back

## 2021-07-12 ENCOUNTER — TELEPHONE (OUTPATIENT)
Dept: RESEARCH | Age: 66
End: 2021-07-12

## 2021-07-12 NOTE — TELEPHONE ENCOUNTER
[address]    Patient Name: Amelia Wheat  MRN: [id number]  YOB: 1955  Date of evaluation: 7/12/2021  Time of evaluation: 16:45   Reason for evaluation: 90 day +/- 30 Day Post Randomization CRC Follow-Up    Protocol:  Protocol No: DAXA  NCT: [id number]  NATALIYA: [id number]  IRB: 2016-49  Site PI: Nikolai Mcdermott MD  Subject Number: [id number]    Enrollment:   Amelia Wheat voices no objection to remaining involved in the study at this time. Research Visit Completed via: Telephone    Current living arrangement:    [facility]: [facility]      Any new adverse events? No    EQ-5D-5L Questionnaire completed after mRS:  No   Awaiting blinded rater mRS, EQ5D5L completed by writer.     EQ-5D-5L Version:   Self Complete Telephone    Information provided by:  Subject     Relationship:  Spoke with subject directly via phone           MOBILITY    No problems walking    Slight problems walking    Moderate problems walking    Severe problems walking    Unable to walk     Moderate problems walking     SELF-CARE    No problems washing or dressing him/herself    Slight problems washing or dressing him/herself    Moderate problems washing or dressing him/herself    Severe problems washing or dressing him/herself    Unable to wash or dress him/herself Unable to wash or dress him/herself         USUAL ACTIVITIES (e.g. work, study, housework, family or leisure   activities)    No problems doing his/her usual activities    Slight problems doing his/her usual activities    Moderate problems doing his/her usual activities    Severe problems doing his/her usual activities    Unable to do his/her usual activities     Unable to do his/her usual activities      PAIN / DISCOMFORT    No pain or discomfort    Slight pain or discomfort    Moderate pain or discomfort    Severe pain or discomfort    Extreme pain or discomfort     Moderate pain or discomfort      ANXIETY / DEPRESSION    Not anxious or depressed    Slightly anxious or depressed    Moderately anxious or depressed    Severely anxious or depressed    Extremely anxious or depressed       Not anxious or depressed        How Good is Subjects Health Today.  This scale is numbered from 0 to 100.  100 means the best health imaginable. 0 means the worst health imaginable. Subjects Health Today Rated 0 - 100 = 40       Questions:  Please contact the Research Office at [phone] or Dr. Cathy Ruth via [place] with any questions. Patient was thanked for participating in this important research, agreeable to blinded rater call and 1 year follow up.       Deangelo Luevano RN  Clinical Research Services  [address] [place] [address], [facility] Gary Elaine  P: [phone]  F: [phone]

## 2022-02-09 NOTE — VIRTUAL HEALTH
[address] [facility] Stroke and Vascular Neurology Consult for  [facility] Stroke Unit Stroke Alert through [address] @ 7:30 pm  2/9/2022 2:42 PM  Pt Name: Jeferson Kang  MRN: [id number]  YOB: 1955  Date of evaluation: 2/9/2022  Primary Care Physician: John Mireles MD  Reason for Evaluation: Stroke evaluation with Phone Consult, Discussion and Review of imaging    Jeferson Kang is a 61 y.o. female with past medical history including TIA with no residual symptoms. Last known well at 10 PM on 5/3. She was brought with right gaze/left-sided weakness. LKW: 10 PM on 5/3. NIH:  13    Allergies  has No Known Allergies. Medications  Prior to Admission medications    Medication Sig Start Date End Date Taking?  Authorizing Provider   acetaminophen (TYLENOL) 500 MG tablet Take 2 tablets by mouth every 8 hours 6/10/21   Mikey Chin MD   albuterol (PROVENTIL) (2.5 MG/3ML) 0.083% nebulizer solution Take 3 mLs by nebulization every 4 hours as needed for Wheezing or Shortness of Breath 6/10/21   Mikey Chin MD   budesonide-formoterol [facility]) 160-4.5 MCG/ACT AERO Inhale 2 puffs into the lungs 2 times daily 6/10/21   Mikey Chin MD   amitriptyline (ELAVIL) 25 MG tablet Take 1 tablet by mouth nightly 6/10/21   Mikey Chin MD   FLUoxetine (PROZAC) 20 MG capsule Take 1 capsule by mouth daily 6/10/21   Mikey Chin MD   glucose (GLUTOSE) 40 % GEL Take 37.5 mLs by mouth as needed (hypoglycemia) 6/10/21   Mikey Chin MD   glucagon, rDNA, 1 MG injection Inject 1 mg into the muscle as needed for Low blood sugar (Blood glucose less than 70 mg/dL and patient NOT ALERT or NPO and does not have IV access.) 6/10/21 6/5/22  Mikey Chin MD   nicotine (NICODERM CQ) 14 MG/24HR Place 1 patch onto the skin daily 6/10/21   Mikey Chin MD   levothyroxine (SYNTHROID) 25 MCG tablet Take 1 tablet by mouth Daily 6/10/21   Mikey Chin MD Exercise per Week: Not on file    Minutes of Exercise per Session: Not on file   Stress:     Feeling of Stress : Not on file   Social Connections:     Frequency of Communication with Friends and Family: Not on file    Frequency of Social Gatherings with Friends and Family: Not on file    Attends Buddhism Services: Not on file    Active Member of [address] [place] [address] [employer] or Organizations: Not on file    Attends Club or Organization Meetings: Not on file    Marital Status: Not on file   Intimate Partner Violence:     Fear of Current or Ex-Partner: Not on file    Emotionally Abused: Not on file    Physically Abused: Not on file    Sexually Abused: Not on file   Housing Stability:     Unable to Pay for Housing in the Last Year: Not on file    Number of [place] in the Last Year: Not on file    Unstable Housing in the Last Year: Not on file     Family History      Problem Relation Age of Onset    Other Mother     Diabetes Mother     Prostate Cancer Father     Cancer Brother         lining of lung       OBJECTIVE  There were no vitals taken for this visit. Reported right gaze, left-sided weakness, left facial droop. Pre-Morbid mRS: 0    Imaging:  Images were personally reviewed with KAILEE. AI used to review images including:  CT brain without contrast: No acute bleeding noted. Right MCA moderate to large ischemic stroke    Assessment    Differential DDx:  1. Acute ischemic stroke. 2. Moderate to large core ischemic stroke. Recommendations:  1. NIH 13  2. Recommend Inpatient Neurology Consult for further assessment and evaluation   3. IV fluids  4. Give aspirin 325 mg now  5. Obtain CTA head and neck. 6. Transfer to [facility] ED. 7. Evaluating for possible александр candidate      Discussed with Stroke team      This is a Phone Consult, I have not seen the patient face to face, the telemedicine device was not utilized.     Christofer Ortiz RN, MD   Stroke, Neurocritical Care And/or [facility]  [address] [address] [place]  Electronically signed 2/9/2022 at 2:42 PM

## 2022-02-09 NOTE — ED NOTES
2/9/2022 2:42 PM    Patient: Alicia Reilly, 61 y.o., female Race:cauc  Patient Address: [address] [address]  Incident Address:  [x]Same as patient address     [x] [facility]  [] [facility]  [] [facility] AT [place]   [] [facility]  [] [facility]  Patient Phone #: [phone]   Insurance: Payor: Kamilla Barnett /  /  /   Analisa Paulino:  []  Yes   [x]  No  Emergency Contact: Extended Emergency Contact Information  Primary Emergency Contact: [address] [place] [address] Phone: [phone]  Relation: Child  Secondary Emergency Contact: AngelicaSophia Search  Mobile Phone: [phone]  Relation: Child  MRN: [id number]  [facility]# [id number]  YOB: 1955  Primary Care Physician: Memo Multani MD  Advance Directive: [x] Full Code   []DNR-CC   []DNR-CCA    MSU Crew: [address], [place]ot - Paramedic, German - RN    Pt Transported To:  [x] [facility]  [] [facility]  [] [facility]  [] [facility]  [] [facility]  [] [facility]  [facility] [] [facility] [] [facility]    Was patient transported to closest facility? [x]Yes  []No (if No specify reason)   []   Patient/family request    []   Divert to specialist       Response Code   [] 2  [x] 3  []   Change  [] 2  [] 3   Transport Code   [x] 2  [] 3  []   Change  [] 2  [] 3     Mileage:  [facility]   Total Miles 6     Call Received 2/9/2022 1825   Dispatched 2/9/2022 1842   Enroute 2/9/2022 1846   Arrived Scene 2/9/2022 1857   At Patient 2/9/2022 1859   Decision to Scan 2/9/2022 1900   Scan 2/9/2022 1911   Departed Scene 2/9/2022 [address] 2/9/2022 1954   Departed Hospital 2/9/2022 2013   In Service 2/9/2022 2013         Allergies  [] Updated/Reviewed by MSU  [x] Historical Data Only  [] Unavailable  has No Known Allergies. Medications  [] Updated/Reviewed by MSU  [x] Historical Data Only  [] Unavailable  Prior to Admission medications    Medication Sig Start Date End Date Taking?  Authorizing Provider   acetaminophen (TYLENOL) 500 MG tablet Take 2 tablets by mouth every 8 hours 6/10/21   Jeri Avelar MD   albuterol (PROVENTIL) (2.5 MG/3ML) 0.083% nebulizer solution Take 3 mLs by nebulization every 4 hours as needed for Wheezing or Shortness of Breath 6/10/21   Jeri Avelar MD   budesonide-formoterol [facility]) 160-4.5 MCG/ACT AERO Inhale 2 puffs into the lungs 2 times daily 6/10/21   Jeri Avelar MD   amitriptyline (ELAVIL) 25 MG tablet Take 1 tablet by mouth nightly 6/10/21   Jeri Avelar MD   FLUoxetine (PROZAC) 20 MG capsule Take 1 capsule by mouth daily 6/10/21   Jeri Avelar MD   glucose (GLUTOSE) 40 % GEL Take 37.5 mLs by mouth as needed (hypoglycemia) 6/10/21   Jeri Avelar MD   glucagon, rDNA, 1 MG injection Inject 1 mg into the muscle as needed for Low blood sugar (Blood glucose less than 70 mg/dL and patient NOT ALERT or NPO and does not have IV access.) 6/10/21 6/5/22  Jeri Avelar MD   nicotine (NICODERM CQ) 14 MG/24HR Place 1 patch onto the skin daily 6/10/21   Jeri Avelar MD   levothyroxine (SYNTHROID) 25 MCG tablet Take 1 tablet by mouth Daily 6/10/21   Jeri Avelar MD   aspirin 81 MG chewable tablet Take 1 tablet by mouth daily 5/8/21   Neeraj Giron MD   Heparin Sodium, Porcine, (HEPARIN, PORCINE,) 5000 UNIT/ML injection Inject 1 mL into the skin every 8 hours 5/8/21   Neeraj Giron MD   amLODIPine (NORVASC) 5 MG tablet Take 1 tablet by mouth daily 5/9/21   Neeraj Giron MD   atorvastatin (LIPITOR) 80 MG tablet Take 1 tablet by mouth daily 5/9/21   Neeraj Giron MD   tiotropium (SPIRIVA RESPIMAT) 2.5 MCG/ACT AERS inhaler Inhale 2 puffs into the lungs daily 8/19/20   Jakub Weeks APRN - CNP      Past Medical History  [] Updated/Reviewed by MSU  [x] Historical Data Only  [] Unavailable   has a past medical history of Asthma, COPD (chronic obstructive pulmonary disease) ([facility] [address] Diabetes mellitus ([facility] [address] Esophageal ring, Gastritis, Hyperlipidemia, Hyperplastic colon polyp, Hypertension, Osteoarthritis, Patient in clinical research study, and TIA (transient ischemic attack). Patient Weight: 175lbs   [x]   Estimated   []   Stated          VITALS          Time BP Pulse   Resp  Rate N-normal  S-shallow  D-deep Monitor SpO2 O2    LPM BGL   Temp Pain   pta 160/103 116 16 n st 98 ra 125  0   1919 139/82 96 14 n sr 96 ra   0   1930 117/70 98 14 n sr 95 ra   0   1940 117/55 104 12 n st 96 ra   0                   Pupils GCS   Time R L E  4 V  5 M  6 T  15   pta 3 3 4 5 6 15   1919 3 3 4 5 6 15   1930 3 3 4 5 6 15   1940 3 3 4 5 6 15                NIH -   record on all transports and Q15 min after tPA administration    NIHSS       Time [address]   1a. LOC - Arousal Status 0 0 0 0   1b. LOC - Questions 0 0 0 0   1c. LOC - Commands 0 0 0 0   2. Eye Movements (gaze) 1 1 1 1   3. Visual Fields 0 0 0 0   4. Facial Palsy 2 2 2 2   5a. Motor Left Arm 4 4 4 4   5b. Motor Right Arm 0 0 0 0   6a. Motor Left Leg 3 3 3 3   6b. Motor Right Leg 0 0 0 0   7. Limb Ataxia 0 0 0 0   8. Sensory 1 1 1 1   9. Language/Aphasia 0 0 0 0   10. Dysarthria 1 1 1 1   11. Neglect 1 1 1 1   TOTAL 13 13 13 13       Research:    RACE Score: 4 Time: PTA  StrokeVAN Score: (+) Time:1902    Time Last Known Well: 2200 on 5/3/2021 (21 hrs prior to ems arrival)    Narrative:    Dispatched to possible CVA per [facility]. Arrived to find Wing Hutson, 61 y.o., female c/o Stroke symptoms. Report received from [facility] at patients side. EMS states pt. Was found on floor C-spine cleared and pt is (-) for DECAPBTLS. Pt. Has a HX of TIA with no deficits 15 years prior. Pt. Is AnO x 4 and states she was reaching for remote at 2200 the night prior and rolled off the couch on to the floor (where she remained) until a bystander arrived and found her this evening and called EMS. Giovanny Adhikari at in contact via cellular phone.  (telemed not connecting per the Doc)     Patient received the following medications prior to MSU arrival:none  Patient has the following lines prior to MSU arrival:20g piv RAC  Patient has the following airway placed prior to MSU arrival:none    Patient was transferred to cot via 2 person assist and secured with straps x3. Zoll ECG, SpO2, and NIBP applied and monitored throughout encounter. HOB maintained at 30 degrees. Patient was transferred to ambulance, cot secured in scan position. Non contrast CT scan performed. After scan cot secured in transport position. IV tPA inclusion/exclusion criteria reviewed with patient and physician. Candidate for IV tPA therapy? [] Yes   [x] No - due to the following exclusion criteria:    [] ICH   [] Taking anticoagulant -   [x] Beyond last time known well window  [] At or returned to baseline   [] Marked improvement of symptoms  [] No disabling symptoms  [] Other -     Patient is being transported to [facility] . During transport patient rests comfortably. Cabin temp maintained at 70-72 °F throughout transport. Patient was transferred to bed CT1 via 4 person sheet lift. . Patient care handoff completed with Lokesh Navas RN at bedside. Imaging:  Images were obtained onboard the MSU including:  [x] CT brain without contrast - see results below:      Ct Head Wo Contrast    Result Date: 5/4/2021  EXAMINATION: CT OF THE HEAD WITHOUT CONTRAST  5/4/2021 7:51 pm TECHNIQUE: CT of the head was performed without the administration of intravenous contrast. Dose modulation, iterative reconstruction, and/or weight based adjustment of the mA/kV was utilized to reduce the radiation dose to as low as reasonably achievable. COMPARISON: Noncontrast CT head done earlier same day.  HISTORY: ORDERING SYSTEM PROVIDED HISTORY: L sided weakness TECHNOLOGIST PROVIDED HISTORY: L sided weakness Decision Support Exception - unselect if not a suspected or confirmed emergency medical condition->Emergency Medical Condition (MA) Reason for Exam: stroke,lt sided weakness Acuity: Unknown Type of Exam: Unknown FINDINGS: BRAIN/VENTRICLES: Redemonstration of a moderate-sized area of patchy hypoattenuation with loss of the normal gray-white matter interface in the right temporal lobe, right frontal insular region and right basal ganglia. Surrounding mass effect with partially effacement of the right lateral ventricle. Minimal right left midline shift measures 2 mm at the level of the lateral and 3rd ventricles. No acute intracranial hemorrhage. No hydrocephalus. The basal cisterns are patent. ORBITS: The visualized portion of the orbits demonstrate no acute abnormality. SINUSES: The visualized paranasal sinuses and mastoid air cells demonstrate no acute abnormality. SOFT TISSUES/SKULL:  No acute abnormality of the visualized skull or soft tissues. Moderate-sized acute/subacute stroke in the right middle cerebral artery territory. No acute intracranial hemorrhage. Critical results were called by Dr. Han Sessions to DR Sima Long on 5/4/2021 at 20:04. Ct Head Wo Contrast    Result Date: 5/4/2021  EXAMINATION: CT OF THE HEAD WITHOUT CONTRAST  5/4/2021 7:05 pm TECHNIQUE: CT of the head was performed without the administration of intravenous contrast. Dose modulation, iterative reconstruction, and/or weight based adjustment of the mA/kV was utilized to reduce the radiation dose to as low as reasonably achievable. COMPARISON: None. HISTORY: ORDERING SYSTEM PROVIDED HISTORY: Stroke TECHNOLOGIST PROVIDED HISTORY: Stroke FINDINGS: Image quality is degraded by motion artifact. BRAIN/VENTRICLES: Moderate-sized area of patchy hypoattenuation with loss of the normal gray-white matter interface in the right temporal lobe, right frontal insular region and right basal ganglia consistent with acute/subacute infarct. Mild surrounding mass effect with partially effacement of the right lateral ventricle and minimal right left midline shift measuring 2 mm at the level of the lateral and 3rd ventricles.  No acute intracranial hemorrhage. No hydrocephalus. The basal cisterns are patent. ORBITS: The visualized portion of the orbits demonstrate no acute abnormality. SINUSES: The visualized paranasal sinuses and mastoid air cells demonstrate no acute abnormality. SOFT TISSUES/SKULL:  No acute abnormality of the visualized skull or soft tissues. Moderate-size acute/subacute stroke in the right middle cerebral artery territory involving the right temporal and frontal lobes as well as the right basal ganglia. ASPECTS score is 5/10. No acute intracranial hemorrhage. Critical results were called by Dr. Guille Oviedo to [facility] on 5/4/2021 at 19:36. Physical assessment performed:    Neuro: Pt shows R sided gaze deviation, with partial L sided facial paralysis, L Arm is flaccid, and L leg has some movement but no effort against gravity, Pt states she has a numbing loss of sensation on her L cheek, arm, and leg. Pt also has very mild dysarthria, which she states is not normal for her. Pt is not able to identify her L hand. Resp: lungs clear to auscultation bilaterally, normal effort  Cardiac: regular rate to ST, no murmur, 12 lead ECG shows non-diag. Muscular/skeletal/peripheral vascular: no edema, no redness or tenderness in the calves, pulses present in 4 extremities   Abd/GI/: abd flat, soft, non tender, bowel sounds present x 4 quadrants   Skin: normal color, warm, dry      IV LINES   Time Size Site # Attempts Performed By   pta 20 rac 1 ems                         ACUTE STROKE DYSPHAGIA SCREEN              YES NO   1 GCS less than 13?      n   2 Facial Asymmetry or Weakness?    y   3 Tongue Asymmetry or Weakness? 4 Palatal Asymmetry or Weakness?        5 Signs of aspiration during 3oz water test?*                 If answers to questions 1-4 are NO proceed to 3oz water test               *Administer 3oz of water for sequential drinks, note any throat clearing, cough, or change in vocal quality immediately after and 1 minute following the swallow.                If answers to questions 1-5 are NO proceed with ordered PO meds           Assistance:   [x]    [address]    []    Police    []    Other EMS (See Below)          Hospital Notification:    [address] -  [x] [address]  [] [facility]  [] [facility]  [] [facility]  [] [facility] [] [facility] [] [facility] [] [facility]  [] [employer]     [x]    Med Channel:     []    Cell Phone     Med Control Orders Received:   [x] No  []  Yes:     Med Control Physician (if on line medical direction received)  -        Hospital Team Alert:   []    Trauma Alert    []    STEMI Alert         [x]    Stroke Alert    []    RACE Alert      Description Of Valuables: Purse w Cell Phone    Patient Valuables:   [x]    With Patient    []    Not Received    []    ER / Lab     Call Outcome:   [x]    Transport to Facility    []    Care Transferred to [facility]    []    Cancelled    []    Patient Refusal    []                           Mobile Stroke Unit    Electronically signed by Teto Amaya, RN on 5/4/21 at 8:32 PM EDT     Teto Amaya RN  05/04/21 2100

## 2022-03-10 ENCOUNTER — TELEPHONE (OUTPATIENT)
Dept: RESEARCH | Age: 67
End: 2022-03-10

## 2022-03-10 NOTE — TELEPHONE ENCOUNTER
[address]    Patient Name: Henry Cardenas  MRN: [id number]  YOB: 1955  Date of evaluation: 3/10/2022  Time of evaluation: 14:52  Reason for evaluation: 1 Year ± 60 Days Post Randomization Follow-Up    Protocol:  Protocol No: DAXA  NCT: [id number]  NATALIYA: [id number]  IRB: 2016-49  Site PI: Los Peoples MD    Subject ID: [id number]    Telephone    Blinded Modified [place] Scale performed prior to this encounter by Dr. Remi Hicks. Modified Zoltan Score:    4 - Moderate severe disability:  unable to walk or attend to own bodily needs without assistance. Current living arrangement:  Home with family      Any new adverse events?   Yes    EQ-5D-5L Questionnaire completed after mRS:  Yes    EQ-5D-5L Version:   Self Complete Telephone    Information provided by:  Subject            MOBILITY    No problems walking    Slight problems walking    Moderate problems walking    Severe problems walking    Unable to walk     Severe problems walking     SELF-CARE    No problems washing or dressing him/herself    Slight problems washing or dressing him/herself    Moderate problems washing or dressing him/herself    Severe problems washing or dressing him/herself    Unable to wash or dress him/herself No problems washing or dressing him/herself          USUAL ACTIVITIES (e.g. work, study, housework, family or leisure   activities)    No problems doing his/her usual activities    Slight problems doing his/her usual activities    Moderate problems doing his/her usual activities    Severe problems doing his/her usual activities    Unable to do his/her usual activities     No problems doing his/her usual activities      PAIN / DISCOMFORT    No pain or discomfort    Slight pain or discomfort    Moderate pain or discomfort    Severe pain or discomfort    Extreme pain or discomfort     No pain or discomfort      ANXIETY / DEPRESSION    Not anxious or depressed    Slightly anxious or depressed    Moderately anxious or depressed    Severely anxious or depressed    Extremely anxious or depressed       Slightly anxious or depressed        How Good is Subjects Health Today.  This scale is numbered from 0 to 100.  100 means the best health imaginable. 0 means the worst health imaginable. Subjects Health Today Rated 0 - 100 = 50       Subject thanked for voluntary participation in the DAXA Trial and for helping advance stroke research.        Ky Rodriguez RN    Clinical Research Services  [address] [place] [address], [address]  P: [phone]  F: [phone]

## 2022-09-22 ENCOUNTER — TELEPHONE (OUTPATIENT)
Dept: FAMILY MEDICINE CLINIC | Age: 67
End: 2022-09-22

## 2022-09-22 NOTE — TELEPHONE ENCOUNTER
Patient called stating she used to be a patient at our office but moved and had to find a new physician. Patient states she has moved back and would like to know if she can re-establish with our office.

## 2022-10-11 ENCOUNTER — TELEPHONE (OUTPATIENT)
Dept: FAMILY MEDICINE CLINIC | Age: 67
End: 2022-10-11

## 2022-10-11 NOTE — TELEPHONE ENCOUNTER
Spoke with patient and informed her since she has not re-established with our office yet we cannot order this. Explained to patient this can be discussed at her new patient appt on 11/14.

## 2022-10-11 NOTE — TELEPHONE ENCOUNTER
----- Message from Iker Davis sent at 10/11/2022 10:33 AM EDT -----  Subject: Referral Request    Reason for referral request? Patient is requesting a referral for [address] as she is diabetic and had a stroke about a year ago and is   having a hard time maneuvering around her home. Provider patient wants to be referred to(if known):     Provider Phone Number(if known): Additional Information for Provider? If there are any questions, pt would   like a call back please.  Thank you!   ---------------------------------------------------------------------------  --------------  [id number]    [phone]; OK to leave message on voicemail  ---------------------------------------------------------------------------  --------------

## 2022-11-08 ENCOUNTER — OFFICE VISIT (OUTPATIENT)
Dept: FAMILY MEDICINE CLINIC | Age: 67
End: 2022-11-08
Payer: MEDICARE

## 2022-11-08 VITALS
BODY MASS INDEX: 25.85 KG/M2 | SYSTOLIC BLOOD PRESSURE: 120 MMHG | DIASTOLIC BLOOD PRESSURE: 84 MMHG | WEIGHT: 170 LBS | OXYGEN SATURATION: 97 % | HEART RATE: 74 BPM

## 2022-11-08 DIAGNOSIS — J44.9 CHRONIC OBSTRUCTIVE PULMONARY DISEASE, UNSPECIFIED COPD TYPE (HCC): Primary | ICD-10-CM

## 2022-11-08 DIAGNOSIS — Z91.81 AT HIGH RISK FOR FALLS: ICD-10-CM

## 2022-11-08 DIAGNOSIS — Z13.29 SCREENING FOR THYROID DISORDER: ICD-10-CM

## 2022-11-08 DIAGNOSIS — Z79.899 MEDICATION MANAGEMENT: ICD-10-CM

## 2022-11-08 DIAGNOSIS — Z13.1 SCREENING FOR DIABETES MELLITUS: ICD-10-CM

## 2022-11-08 DIAGNOSIS — Z86.73 HISTORY OF ISCHEMIC RIGHT MCA STROKE: ICD-10-CM

## 2022-11-08 DIAGNOSIS — R73.9 HYPERGLYCEMIA: ICD-10-CM

## 2022-11-08 DIAGNOSIS — Z13.220 SCREENING, LIPID: ICD-10-CM

## 2022-11-08 DIAGNOSIS — I10 ESSENTIAL HYPERTENSION: ICD-10-CM

## 2022-11-08 PROCEDURE — 4004F PT TOBACCO SCREEN RCVD TLK: CPT | Performed by: NURSE PRACTITIONER

## 2022-11-08 PROCEDURE — G8484 FLU IMMUNIZE NO ADMIN: HCPCS | Performed by: NURSE PRACTITIONER

## 2022-11-08 PROCEDURE — G8417 CALC BMI ABV UP PARAM F/U: HCPCS | Performed by: NURSE PRACTITIONER

## 2022-11-08 PROCEDURE — 1123F ACP DISCUSS/DSCN MKR DOCD: CPT | Performed by: NURSE PRACTITIONER

## 2022-11-08 PROCEDURE — 3074F SYST BP LT 130 MM HG: CPT | Performed by: NURSE PRACTITIONER

## 2022-11-08 PROCEDURE — 3078F DIAST BP <80 MM HG: CPT | Performed by: NURSE PRACTITIONER

## 2022-11-08 PROCEDURE — 1090F PRES/ABSN URINE INCON ASSESS: CPT | Performed by: NURSE PRACTITIONER

## 2022-11-08 PROCEDURE — 99214 OFFICE O/P EST MOD 30 MIN: CPT | Performed by: NURSE PRACTITIONER

## 2022-11-08 PROCEDURE — G8400 PT W/DXA NO RESULTS DOC: HCPCS | Performed by: NURSE PRACTITIONER

## 2022-11-08 PROCEDURE — 3017F COLORECTAL CA SCREEN DOC REV: CPT | Performed by: NURSE PRACTITIONER

## 2022-11-08 PROCEDURE — G8427 DOCREV CUR MEDS BY ELIG CLIN: HCPCS | Performed by: NURSE PRACTITIONER

## 2022-11-08 PROCEDURE — 3023F SPIROM DOC REV: CPT | Performed by: NURSE PRACTITIONER

## 2022-11-08 RX ORDER — FLUOXETINE HYDROCHLORIDE 20 MG/1
20 CAPSULE ORAL DAILY
Qty: 30 CAPSULE | Refills: 3 | Status: SHIPPED | OUTPATIENT
Start: 2022-11-08

## 2022-11-08 RX ORDER — ALBUTEROL SULFATE 90 UG/1
2 AEROSOL, METERED RESPIRATORY (INHALATION) EVERY 6 HOURS PRN
COMMUNITY

## 2022-11-08 RX ORDER — LISINOPRIL 10 MG/1
10 TABLET ORAL DAILY
COMMUNITY

## 2022-11-08 SDOH — ECONOMIC STABILITY: FOOD INSECURITY: WITHIN THE PAST 12 MONTHS, THE FOOD YOU BOUGHT JUST DIDN'T LAST AND YOU DIDN'T HAVE MONEY TO GET MORE.: NEVER TRUE

## 2022-11-08 SDOH — ECONOMIC STABILITY: FOOD INSECURITY: WITHIN THE PAST 12 MONTHS, YOU WORRIED THAT YOUR FOOD WOULD RUN OUT BEFORE YOU GOT MONEY TO BUY MORE.: NEVER TRUE

## 2022-11-08 ASSESSMENT — SOCIAL DETERMINANTS OF HEALTH (SDOH): HOW HARD IS IT FOR YOU TO PAY FOR THE VERY BASICS LIKE FOOD, HOUSING, MEDICAL CARE, AND HEATING?: NOT HARD AT ALL

## 2022-11-08 NOTE — PROGRESS NOTES
Patient is present to re-establish care    Patient states she already has home care through [facility]    Patient is asking for referrals to pulm and cardio  Patient states she had a stroke to due plaque buildup  Patient states she has asthma and copd

## 2022-11-08 NOTE — PROGRESS NOTES
Yvette Murillo (:  1955) is a 74 y.o. female,Established patient, here for evaluation of the following chief complaint(s):  Establish Care and Referral - General         ASSESSMENT/PLAN:  1. Chronic obstructive pulmonary disease, unspecified COPD type [facility])  -     [facility], [facility]  2. Screening for thyroid disorder  -     TSH With Reflex Ft4; Future  3. Screening, lipid  -     Lipid, Fasting; Future  4. Screening for diabetes mellitus  5. Medication management  -     CBC; Future  -     Comprehensive Metabolic Panel; Future  6. At high risk for falls  7. History of ischemic right MCA stroke  -     AFL - Linda Gruber MD, Cardiology, [facility]  8. Essential hypertension  -     AFL - Linda Gruber MD, Cardiology, [facility]  9. Hyperglycemia  -     Hemoglobin A1C; Future    No follow-ups on file. Subjective   SUBJECTIVE/OBJECTIVE:  HPI  Copd- still smoking. Using spiriva, symbicort, katie. Needing albuterol using three times a day. Would like to see pulm      Living at home alone- has pt and ot at home. Able to transfer herself in and out of chair. Significant left sided weakness from cva        Review of Systems   Constitutional:  Negative for activity change, appetite change, chills, fatigue and fever. HENT:  Negative for congestion, ear pain, rhinorrhea and sinus pressure. Eyes:  Negative for discharge and visual disturbance. Respiratory:  Negative for cough, chest tightness and shortness of breath. Cardiovascular:  Negative for chest pain, palpitations and leg swelling. Gastrointestinal:  Negative for abdominal pain, blood in stool, constipation and diarrhea. Endocrine: Negative for cold intolerance and heat intolerance. Genitourinary:  Negative for difficulty urinating and hematuria. Musculoskeletal:  Negative for arthralgias and myalgias. Skin:  Negative for rash. Neurological:  Negative for dizziness, light-headedness and headaches. Psychiatric/Behavioral:  Negative for dysphoric mood and self-injury. Objective     Vitals:    11/08/22 1527   BP: 120/84   Pulse: 74   SpO2: 97%     Wt Readings from Last 3 Encounters:   11/08/22 170 lb (77.1 kg)   05/10/21 191 lb 8 oz (86.9 kg)   05/04/21 200 lb (90.7 kg)       Physical Exam  Constitutional:       General: She is not in acute distress. Appearance: She is well-developed. She is not diaphoretic. HENT:      Head: Normocephalic and atraumatic. Right Ear: External ear normal.      Left Ear: External ear normal.      Nose: Nose normal.   Eyes:      Conjunctiva/sclera: Conjunctivae normal.      Pupils: Pupils are equal, round, and reactive to light. Neck:      Thyroid: No thyroid mass. Trachea: Trachea normal.   Cardiovascular:      Rate and Rhythm: Normal rate and regular rhythm. Heart sounds: Normal heart sounds, S1 normal and S2 normal. Heart sounds not distant. No friction rub. Pulmonary:      Effort: Pulmonary effort is normal. No accessory muscle usage or respiratory distress. Breath sounds: Normal breath sounds. Abdominal:      General: Bowel sounds are normal. There is no distension. Palpations: Abdomen is soft. There is no mass. Musculoskeletal:      Cervical back: Full passive range of motion without pain and normal range of motion. Comments: Pain free ROM- left sided hemiparesis. Lymphadenopathy:      Cervical: No cervical adenopathy. Skin:     General: Skin is warm and dry. Findings: No rash. Neurological:      Mental Status: She is oriented to person, place, and time. Comments: Gait is not observed. Pt is wheelchair d/t hemiparesis   Psychiatric:         Behavior: Behavior normal.         Thought Content: Thought content normal.         Judgment: Judgment normal.          An electronic signature was used to authenticate this note.     --Washington Ortiz, APRN - CNP

## 2022-11-09 ENCOUNTER — TELEPHONE (OUTPATIENT)
Dept: FAMILY MEDICINE CLINIC | Age: 67
End: 2022-11-09

## 2022-11-09 DIAGNOSIS — J44.9 CHRONIC OBSTRUCTIVE PULMONARY DISEASE, UNSPECIFIED COPD TYPE (HCC): Primary | ICD-10-CM

## 2022-11-09 NOTE — TELEPHONE ENCOUNTER
Parris Finney from [facility] is calling due to unable to schedule until patient has Chest XR and PFT done. Patient was notified of this.     PLEASE ADVISE

## 2022-11-10 NOTE — TELEPHONE ENCOUNTER
Patient was notified that PFT and XR were ordered.  She requested I call back and leave scheduling number oon her VM

## 2022-11-18 ASSESSMENT — ENCOUNTER SYMPTOMS
CHEST TIGHTNESS: 0
ABDOMINAL PAIN: 0
COUGH: 0
BLOOD IN STOOL: 0
SHORTNESS OF BREATH: 0
EYE DISCHARGE: 0
RHINORRHEA: 0
SINUS PRESSURE: 0
CONSTIPATION: 0
DIARRHEA: 0

## 2022-11-21 ENCOUNTER — HOSPITAL ENCOUNTER (OUTPATIENT)
Age: 67
Setting detail: SPECIMEN
Discharge: HOME | End: 2022-11-21
Payer: MEDICARE

## 2022-11-21 LAB
ABSOLUTE EOS #: 0.3 K/UL (ref 0–0.44)
ABSOLUTE IMMATURE GRANULOCYTE: 0.03 K/UL (ref 0–0.3)
ABSOLUTE LYMPH #: 1.62 K/UL (ref 1.1–3.7)
ABSOLUTE MONO #: 0.48 K/UL (ref 0.1–1.2)
ALBUMIN SERPL-MCNC: 3.7 G/DL (ref 3.5–5.2)
ALBUMIN/GLOBULIN RATIO: 1.3 (ref 1–2.5)
ALP BLD-CCNC: 93 U/L (ref 35–104)
ALT SERPL-CCNC: 8 U/L (ref 5–33)
ANION GAP SERPL CALCULATED.3IONS-SCNC: 11 MMOL/L (ref 9–17)
AST SERPL-CCNC: 10 U/L
BASOPHILS # BLD: 0 % (ref 0–2)
BASOPHILS ABSOLUTE: 0.03 K/UL (ref 0–0.2)
BILIRUB SERPL-MCNC: 0.3 MG/DL (ref 0.3–1.2)
BUN BLDV-MCNC: 10 MG/DL (ref 8–23)
CALCIUM SERPL-MCNC: 9.1 MG/DL (ref 8.6–10.4)
CHLORIDE BLD-SCNC: 104 MMOL/L (ref 98–107)
CHOLESTEROL/HDL RATIO: 6.1
CHOLESTEROL: 268 MG/DL
CO2: 27 MMOL/L (ref 20–31)
CREAT SERPL-MCNC: 0.47 MG/DL (ref 0.5–0.9)
EOSINOPHILS RELATIVE PERCENT: 4 % (ref 1–4)
ESTIMATED AVERAGE GLUCOSE: 126 MG/DL
GFR SERPL CREATININE-BSD FRML MDRD: >60 ML/MIN/1.73M2
GLUCOSE BLD-MCNC: 69 MG/DL (ref 70–99)
HBA1C MFR BLD: 6 % (ref 4–6)
HCT VFR BLD CALC: 44.3 % (ref 36.3–47.1)
HDLC SERPL-MCNC: 44 MG/DL
HEMOGLOBIN: 14.3 G/DL (ref 11.9–15.1)
IMMATURE GRANULOCYTES: 0 %
LDL CHOLESTEROL: 180 MG/DL (ref 0–130)
LYMPHOCYTES # BLD: 19 % (ref 24–43)
MCH RBC QN AUTO: 29.4 PG (ref 25.2–33.5)
MCHC RBC AUTO-ENTMCNC: 32.3 G/DL (ref 28.4–34.8)
MCV RBC AUTO: 91 FL (ref 82.6–102.9)
MONOCYTES # BLD: 6 % (ref 3–12)
NRBC AUTOMATED: 0 PER 100 WBC
PDW BLD-RTO: 13.2 % (ref 11.8–14.4)
PLATELET # BLD: 365 K/UL (ref 138–453)
PMV BLD AUTO: 9.7 FL (ref 8.1–13.5)
POTASSIUM SERPL-SCNC: 3.7 MMOL/L (ref 3.7–5.3)
RBC # BLD: 4.87 M/UL (ref 3.95–5.11)
SEG NEUTROPHILS: 71 % (ref 36–65)
SEGMENTED NEUTROPHILS ABSOLUTE COUNT: 6.23 K/UL (ref 1.5–8.1)
SODIUM BLD-SCNC: 142 MMOL/L (ref 135–144)
TOTAL PROTEIN: 6.5 G/DL (ref 6.4–8.3)
TRIGL SERPL-MCNC: 220 MG/DL
TSH SERPL DL<=0.05 MIU/L-ACNC: 1.61 UIU/ML (ref 0.3–5)
WBC # BLD: 8.7 K/UL (ref 3.5–11.3)

## 2022-11-21 PROCEDURE — 80053 COMPREHEN METABOLIC PANEL: CPT

## 2022-11-21 PROCEDURE — 80061 LIPID PANEL: CPT

## 2022-11-21 PROCEDURE — 85025 COMPLETE CBC W/AUTO DIFF WBC: CPT

## 2022-11-21 PROCEDURE — 83036 HEMOGLOBIN GLYCOSYLATED A1C: CPT

## 2022-11-21 PROCEDURE — 84443 ASSAY THYROID STIM HORMONE: CPT

## 2022-11-22 ENCOUNTER — TELEPHONE (OUTPATIENT)
Dept: FAMILY MEDICINE CLINIC | Age: 67
End: 2022-11-22

## 2022-11-22 NOTE — TELEPHONE ENCOUNTER
Lia from [address] [place] [address] called stating that pt asked her to call and ask if a prescription for Chantix could be called in     Pt started smoking again

## 2022-12-01 RX ORDER — VARENICLINE TARTRATE 25 MG
KIT ORAL
OUTPATIENT
Start: 2022-12-01

## 2022-12-05 RX ORDER — ALBUTEROL SULFATE 2.5 MG/3ML
2.5 SOLUTION RESPIRATORY (INHALATION) EVERY 4 HOURS PRN
Qty: 120 EACH | Refills: 5 | Status: SHIPPED | OUTPATIENT
Start: 2022-12-05

## 2022-12-08 ENCOUNTER — OFFICE VISIT (OUTPATIENT)
Dept: PHYSICAL MEDICINE AND REHAB | Age: 67
End: 2022-12-08

## 2022-12-08 VITALS
BODY MASS INDEX: 25.76 KG/M2 | WEIGHT: 170 LBS | DIASTOLIC BLOOD PRESSURE: 74 MMHG | HEIGHT: 68 IN | SYSTOLIC BLOOD PRESSURE: 104 MMHG | TEMPERATURE: 98.5 F | HEART RATE: 92 BPM

## 2022-12-08 DIAGNOSIS — I69.354 SPASTIC HEMIPLEGIA OF LEFT NONDOMINANT SIDE AS LATE EFFECT OF CEREBRAL INFARCTION (HCC): Primary | ICD-10-CM

## 2022-12-08 RX ORDER — BACLOFEN 5 MG/1
10 TABLET ORAL NIGHTLY
Qty: 30 TABLET | Refills: 1 | Status: SHIPPED | OUTPATIENT
Start: 2022-12-08

## 2022-12-08 NOTE — PROGRESS NOTES
[address] PHYSICAL MEDICINE AND REHABILITATION  [address] [facility] [address]  Dept: [phone]  Dept Fax: [phone]    Outpatient Followup Note    Iam Salcedo, 74 y.o., female, presents for follow up c/o of Leg Pain  . HPI:     HPI  Patient with history ischemic CVA R MCA with hemorrhagic conversion and L non-dominant hemiparesis who is being seen in follow up today. She was admitted to inpatient acute rehabilitation at [facility] from 5/10/21 - 6/10/21 then discharged to SNF. Subsequently she moved to [place] with 1 of her daughters but has recently moved back to her home here in [place] with care and support from her other daughter and son who live locally. She is currently receiving home health. Stroke    Affected Side: left non dominant  Handedness: right handed  Therapy Status: Home health PT. OT discontinued by insurance  Dysphagia: Absent   Aphasia: some occasional receptive issues - more short-term memory impairment, impaired prioritization  Sleep:WNL  Daytime Focus/Attention:Impaired and not treated  Bowel: Spontaneous  : Spontaneous  Mood: Depressed  Support: children / family / friends to help, daughter and son, friends  Spasticity: Present and no treatment  Edema: left arm and left leg  DME: Orthosis AFO Left - off the shelf, [place], Hemiwalker, Manual Wheelchair, and shower bench, toilet riser  Shoulder Pain: Intermittent left, sharp and aching pain which can be severe with ROM/activity. Lives alone on 1st floor of home, laundry in basement, 3 SENDY, ramp in the back. Daughter working with Edy Peace to obtain Passport assistance at home and [employer] for obtain new ramp to enter the home. Current ramp is very steep and they have difficulty using it.      Past Medical History:   Diagnosis Date    Asthma     COPD (chronic obstructive pulmonary disease) (HCC)     Diabetes mellitus (HCC)     Esophageal ring Gastritis     Hyperlipidemia     Hyperplastic colon polyp     Hypertension     Osteoarthritis     Patient in clinical research study 05/04/2021    DAXA     TIA (transient ischemic attack) 2013    Per Daughter      Past Surgical History:   Procedure Laterality Date    APPENDECTOMY      COLONOSCOPY  09/20/2017    FRAGMENTS OF HYPERPLASTIC POLYP    ENDOSCOPY, COLON, DIAGNOSTIC      ESOPHAGEAL DILATION      HYSTERECTOMY      VA COLSC FLX W/RMVL OF TUMOR POLYP LESION SNARE TQ N/A 9/20/2017    COLONOSCOPY POLYPECTOMY COLD BIOPSY performed by Joel Hannon MD at [facility] EGD TRANSORAL BIOPSY SINGLE/MULTIPLE N/A 9/20/2017    EGD BIOPSY with esophageal dilitation performed by Joel Hannon MD at [address] [place] [address]  09/2017    E-ring and gastritis      Family History   Problem Relation Age of Onset    Other Mother     Diabetes Mother     Prostate Cancer Father     Cancer Brother         lining of lung     Social History     Socioeconomic History    Marital status:    Tobacco Use    Smoking status: Every Day     Packs/day: 0.50     Years: 40.00     Pack years: 20.00     Types: Cigarettes    Smokeless tobacco: Never   Substance and Sexual Activity    Alcohol use: No    Drug use: No    Sexual activity: Never     Social Determinants of Health     Financial Resource Strain: Low Risk     Difficulty of Paying Living Expenses: Not hard at all   Food Insecurity: No Food Insecurity    Worried About Running Out of Food in the Last Year: Never true    Ran Out of Food in the Last Year: Never true       Current Outpatient Medications   Medication Sig Dispense Refill    Baclofen (LIORESAL) 5 MG tablet Take 2 tablets by mouth at bedtime 30 tablet 1    Handicap Placard MISC by Does not apply route Duration: 5 years / Dx: Stroke     Unable to walk > 200 feet without assistive device 1 each 0    albuterol (PROVENTIL) (2.5 MG/3ML) 0.083% nebulizer solution Take 3 mLs by nebulization every 4 hours as needed for Wheezing or Shortness of Breath 120 each 5    metFORMIN (GLUCOPHAGE) 1000 MG tablet Take 1,000 mg by mouth daily (with breakfast)      lisinopril (PRINIVIL;ZESTRIL) 10 MG tablet Take 10 mg by mouth daily      FLUoxetine (PROZAC) 20 MG capsule Take 1 capsule by mouth daily 30 capsule 3    albuterol sulfate HFA (PROVENTIL;VENTOLIN;PROAIR) 108 (90 Base) MCG/ACT inhaler Inhale 2 puffs into the lungs every 6 hours as needed for Wheezing      acetaminophen (TYLENOL) 500 MG tablet Take 2 tablets by mouth every 8 hours 120 tablet 3    budesonide-formoterol (SYMBICORT) 160-4.5 MCG/ACT AERO Inhale 2 puffs into the lungs 2 times daily 1 Inhaler 3    amitriptyline (ELAVIL) 25 MG tablet Take 1 tablet by mouth nightly 30 tablet 3    levothyroxine (SYNTHROID) 25 MCG tablet Take 1 tablet by mouth Daily (Patient taking differently: Take 37.5 mcg by mouth Daily) 30 tablet 3    aspirin 81 MG chewable tablet Take 1 tablet by mouth daily 30 tablet 3    atorvastatin (LIPITOR) 80 MG tablet Take 1 tablet by mouth daily 30 tablet 3    tiotropium (SPIRIVA RESPIMAT) 2.5 MCG/ACT AERS inhaler Inhale 2 puffs into the lungs daily 1 Inhaler 5     No current facility-administered medications for this visit. No Known Allergies    Subjective:      Review of Systems  Constitutional: Negative for fever, chills and unexpected weight change. HENT: Negative for trouble swallowing. Respiratory: Negative for cough and shortness of breath. Cardiovascular: Negative for chest pain. Endocrine: Negative for polyuria. Genitourinary: Negative for dysuria, urgency, frequency, incontinence and difficulty urinating. Gastrointestinal: Negative for constipation or diarrhea. Musculoskeletal: Positive for arthralgias. Neurological: Negative for headaches, numbness, or tingling. Psychiatric: Positive for depressed mood, negative  for anxiety.        Objective:     Physical Exam  /74   Pulse 92   Temp 98.5 °F (36.9 °C)   Ht 5' 8\" (1.727 m)   Wt 170 lb (77.1 kg)   BMI 25.85 kg/m²   Constitutional: She appears well-developed and well-nourished. In no distress. HEENT: NCAT, PERRL, EOMI. Mucous membranes pink and moist.  Pulmonary/Chest: Respirations WNL and unlabored. MSK: Functional ROM RUE and RLE. Impaired AROM LUE and LLE. L shoulder flexion PROM limited to 150 degrees, abduction to 130 degrees, ER with arm abducted limited to 60 degrees, IR with arm abducted limited to 45 degrees. Positive L shoulder sulcus. Strength 2/5 L shoulder flexion. 3/5 L hip flexion. Neurological: She is alert and oriented to person, place, and time. Spasticity L finger flexors 2 on MAS. L thumb flexors 1+ on MAS. Skin: Skin is warm dry and intact with good turgor. L AFO in place  Psychiatric: She has a normal mood and affect. Her behavior is normal. Thought content normal.   Medical assistant note and vitals for today's encounter reviewed. Diagnostic Studies:     CT HEAD 5/8/21  FINDINGS:   BRAIN/VENTRICLES:       Stable appearance to evolving right MCA distribution infarct as compared to   the prior study . No change in mass effect on the right lateral ventricle   with minimal midline shift of 2 mm from right to left unchanged from prior   studies       ORBITS: The visualized portion of the orbits demonstrate no acute abnormality. SINUSES: The visualized paranasal sinuses and mastoid air cells demonstrate   no acute abnormality. SOFT TISSUES/SKULL:  No acute abnormality of the visualized skull or soft   tissues. Impression   Evolving MCA infarct with no evidence for new infarct. No evidence for   hemorrhage persistent mass effect on the right lateral ventricle with minimal   midline shift from right to left       Assessment:       Diagnosis Orders   1.  Spastic hemiplegia of left nondominant side as late effect of cerebral infarction [facility])  AFO Brace    Custom Splint Upper Extremity Resting Hand           Plan:      Evaluation with orthotist for custom orthotic to prevent ankle inversion, resting hand splint for L hand to address spasticity and pain. Start trial of baclofen for spasticity - titrate up to effective dose. Continue home health therapy for now. Recommend resuming OT when able. Discussed Stroke [address] for support, resources, therapy, education. Provided with contact information. Orders Placed This Encounter   Procedures    AFO Brace     L AFO    Custom Splint Upper Extremity Resting Hand     Orders Placed This Encounter   Medications    Baclofen (LIORESAL) 5 MG tablet     Sig: Take 2 tablets by mouth at bedtime     Dispense:  30 tablet     Refill:  1    Handicap Placard MISC     Sig: by Does not apply route Duration: 5 years / Dx: Stroke     Unable to walk > 200 feet without assistive device     Dispense:  1 each     Refill:  0     Return in about 8 weeks (around 2/2/2023). Time spent: 35 minutes     Electronically signedby Amanda Escobar MD on 12/8/2022 at 9:44 AM.     Please note that this chartwas generated using voice recognition Dragon dictation software. Although everyeffort was made to ensure the accuracy of this automated transcription, some errorsin transcription may have occurred.

## 2022-12-13 ENCOUNTER — TELEPHONE (OUTPATIENT)
Dept: FAMILY MEDICINE CLINIC | Age: 67
End: 2022-12-13

## 2022-12-13 NOTE — TELEPHONE ENCOUNTER
Pro Lam did check orthostatic vital signs and states no signs of hypotension but signs states other wise. Her BS was 118. She did state that Elisa Dalton had not eaten or drank a lot. She felt better after eating and having fluids.

## 2022-12-13 NOTE — TELEPHONE ENCOUNTER
Arleen Andres from [facility] with [facility] called due to [address] having new onset of dizziness and being lightheaded when standing or walking today.  She has been doing good with PT    PLEASE ADVISE

## 2022-12-13 NOTE — TELEPHONE ENCOUNTER
Continue to monitor - encourage consistent food and fluid intake. Will reevaluate if symptoms reoccur.

## 2022-12-16 NOTE — TELEPHONE ENCOUNTER
Fariha Bailon, pts PT,  called stating she is there with pt to do PT, pt is having same symptoms ar reported the other day     Pt states that when she sits she feel better     Vitals supine 110/64   Sitting 120/70 heart rate 84  Standing very dizzy, fuzzy vision  130/82 96 with SOB     Pt has been sitting about 5 minutes feels fine, but things look a little foggy     No headaches, no numbing or tingling    Fariha Bailon [phone]

## 2022-12-19 ENCOUNTER — TELEPHONE (OUTPATIENT)
Dept: FAMILY MEDICINE CLINIC | Age: 67
End: 2022-12-19

## 2022-12-19 NOTE — TELEPHONE ENCOUNTER
Lia with [facility] stopped in office. States patient was previously on Eliquis and Plavix by previous pcp. Lia stated the medications were not stopped, patient just never picked up.      Lia will go through other medications and let us know what else is missing

## 2022-12-19 NOTE — TELEPHONE ENCOUNTER
Lia called back stating pt is Plavix 75 MG daily     Eliquis 5 MG BID     Furosamide 40 MG daily    Pt has not picked this up in over a year about 09/2021, pt did have a moment of syncope last Friday, ambulance was almost called     Lia asked to have something in writing if pt is going to continue these or a current med list

## 2022-12-20 ENCOUNTER — TELEPHONE (OUTPATIENT)
Dept: FAMILY MEDICINE CLINIC | Age: 67
End: 2022-12-20

## 2022-12-20 ENCOUNTER — OFFICE VISIT (OUTPATIENT)
Dept: FAMILY MEDICINE CLINIC | Age: 67
End: 2022-12-20

## 2022-12-20 VITALS
BODY MASS INDEX: 25.39 KG/M2 | OXYGEN SATURATION: 96 % | DIASTOLIC BLOOD PRESSURE: 54 MMHG | WEIGHT: 167 LBS | SYSTOLIC BLOOD PRESSURE: 110 MMHG | HEART RATE: 104 BPM

## 2022-12-20 DIAGNOSIS — Z86.73 HISTORY OF ISCHEMIC RIGHT MCA STROKE: Primary | ICD-10-CM

## 2022-12-20 DIAGNOSIS — I10 ESSENTIAL HYPERTENSION: ICD-10-CM

## 2022-12-20 DIAGNOSIS — J44.9 CHRONIC OBSTRUCTIVE PULMONARY DISEASE, UNSPECIFIED COPD TYPE (HCC): ICD-10-CM

## 2022-12-20 RX ORDER — ATORVASTATIN CALCIUM 80 MG/1
80 TABLET, FILM COATED ORAL DAILY
Qty: 30 TABLET | Refills: 5 | Status: SHIPPED | OUTPATIENT
Start: 2022-12-20

## 2022-12-20 RX ORDER — LEVOTHYROXINE SODIUM 0.03 MG/1
37.5 TABLET ORAL DAILY
Qty: 45 TABLET | Refills: 5 | Status: SHIPPED | OUTPATIENT
Start: 2022-12-20

## 2022-12-20 RX ORDER — LISINOPRIL 10 MG/1
10 TABLET ORAL DAILY
Qty: 30 TABLET | Refills: 5 | Status: SHIPPED | OUTPATIENT
Start: 2022-12-20

## 2022-12-20 RX ORDER — CLOPIDOGREL BISULFATE 75 MG/1
75 TABLET ORAL DAILY
Qty: 30 TABLET | Refills: 3 | Status: SHIPPED | OUTPATIENT
Start: 2022-12-20

## 2022-12-20 ASSESSMENT — PATIENT HEALTH QUESTIONNAIRE - PHQ9
SUM OF ALL RESPONSES TO PHQ9 QUESTIONS 1 & 2: 0
SUM OF ALL RESPONSES TO PHQ QUESTIONS 1-9: 0
1. LITTLE INTEREST OR PLEASURE IN DOING THINGS: 0
SUM OF ALL RESPONSES TO PHQ QUESTIONS 1-9: 0
2. FEELING DOWN, DEPRESSED OR HOPELESS: 0

## 2022-12-20 ASSESSMENT — ENCOUNTER SYMPTOMS
COUGH: 0
SHORTNESS OF BREATH: 0

## 2022-12-20 NOTE — PROGRESS NOTES
Jabier Garcia (:  1955) is a 74 y.o. female,Established patient, here for evaluation of the following chief complaint(s):  Follow-up    ASSESSMENT/PLAN:    1. History of ischemic right MCA stroke  -     [facility], Neurology, [facility]  2. Chronic obstructive pulmonary disease, unspecified COPD type ([address]  Still smoking  Encouraged to quit    3. Essential hypertension    No follow-ups on file. Subjective   SUBJECTIVE/OBJECTIVE:  HPI  Needs to follow up with cardio and neuro  Pt states neuro stopped eliquis d/t cost.   Was on furosemide in the past for leg swelling, no longer an issue. Family comes to fill pill box, nurse double checks    Call and verify meds with pharmacy       Review of Systems   Constitutional:  Negative for chills and fever. Respiratory:  Negative for cough and shortness of breath. Cardiovascular:  Negative for chest pain, palpitations and leg swelling. Objective   Vitals:    22 1352   BP: (!) 110/54   Pulse: (!) 104   SpO2: 96%     Wt Readings from Last 3 Encounters:   22 167 lb (75.8 kg)   22 170 lb (77.1 kg)   22 170 lb (77.1 kg)       Physical Exam  Constitutional:       Appearance: She is well-developed. HENT:      Right Ear: External ear normal.      Left Ear: External ear normal.      Nose: Nose normal.   Cardiovascular:      Rate and Rhythm: Normal rate and regular rhythm. Heart sounds: Normal heart sounds, S1 normal and S2 normal.   Pulmonary:      Effort: Pulmonary effort is normal. No respiratory distress. Breath sounds: Normal breath sounds. Musculoskeletal:         General: No deformity. Normal range of motion. Cervical back: Full passive range of motion without pain and normal range of motion. Skin:     General: Skin is warm and dry. Neurological:      Mental Status: She is alert and oriented to person, place, and time. Comments: Left sided hemiparesis d/t stroke. An electronic signature was used to authenticate this note.     --Layla Boswell, EKELY - CNP

## 2022-12-20 NOTE — PROGRESS NOTES
Patient is present for f/u  Patient has questions about medications    Amlodipine-on patient's med list; she is not sure who this is from  Eliquis  Plavix  Furosamide

## 2022-12-20 NOTE — TELEPHONE ENCOUNTER
Spoke with Ari to see when patient last filled medications.     Amitriptyline- August 2021  Prozac-12/5/22

## 2022-12-20 NOTE — TELEPHONE ENCOUNTER
Last visit: 12/20/22  Last Med refill:   Does patient have enough medication for 72 hours: No:     Next Visit Date:  Future Appointments   Date Time Provider Kassie Mendieta   2/9/2023  9:00 AM Janiya Rogers MD [facility] [employer]   3/20/2023  2:15 PM Ave Shah, APRN - CNP [facility] [address] Maintenance   Topic Date Due    Shingles vaccine (1 of 2) Never done    DEXA (modify frequency per FRAX score)  Never done    Breast cancer screen  09/01/2019    Colorectal Cancer Screen  09/20/2020    COVID-19 Vaccine (2 - Booster for [place] series) 05/26/2021    Annual Wellness Visit (AWV)  Never done    Flu vaccine (1) 11/08/2023 (Originally 8/1/2022)    A1C test (Diabetic or Prediabetic)  11/21/2023    Lipids  11/21/2023    Depression Screen  12/20/2023    DTaP/Tdap/Td vaccine (3 - Td or Tdap) 01/01/2026    Pneumococcal 65+ years Vaccine  Completed    Hepatitis C screen  Completed    HIV screen  Completed    Hepatitis A vaccine  Aged Out    Hib vaccine  Aged Out    Meningococcal (ACWY) vaccine  Aged Out       Hemoglobin A1C (%)   Date Value   11/21/2022 6.0   05/05/2021 5.9   08/19/2020 6.3             ( goal A1C is < 7)   No results found for: LABMICR  LDL Cholesterol (mg/dL)   Date Value   11/21/2022 180 (H)   05/05/2021 140 (H)       (goal LDL is <100)   AST (U/L)   Date Value   11/21/2022 10     ALT (U/L)   Date Value   11/21/2022 8     BUN (mg/dL)   Date Value   11/21/2022 10     BP Readings from Last 3 Encounters:   12/20/22 (!) 110/54   12/08/22 104/74   11/08/22 120/84          (goal 120/80)    All Future Testing planned in [facility]  Lab Frequency Next Occurrence   CBC Once 11/08/2022   Comprehensive Metabolic Panel Once 25/58/5992   Lipid, Fasting Once 11/08/2022   TSH With Reflex Ft4 Once 11/08/2022   Hemoglobin A1C Once 11/08/2022   XR CHEST (2 VW) Once 11/09/2022   Full PFT Study With Bronchodilator Once 11/09/2022               Patient Active Problem List:     Pneumonia of left lower lobe due to infectious organism     Tobacco abuse     Legionella pneumonia (HCC)     Dysphagia     Hyperplastic colon polyp     Esophageal ring     Gastritis     Elbow effusion, right     Stroke due to occlusion of right middle cerebral artery (HCC)     Nihss score 15     Acute left hemiparesis (HCC)     Cerebrovascular accident (CVA) due to occlusion of right middle cerebral artery ([facility]     Acute CVA (cerebrovascular accident) ([facility]     COPD (chronic obstructive pulmonary disease) ([facility]     Essential hypertension     Hyperlipidemia     Pre-diabetes

## 2022-12-21 RX ORDER — VARENICLINE TARTRATE 25 MG
KIT ORAL
OUTPATIENT
Start: 2022-12-21

## 2023-01-18 ENCOUNTER — TELEPHONE (OUTPATIENT)
Dept: FAMILY MEDICINE CLINIC | Age: 68
End: 2023-01-18

## 2023-01-18 NOTE — TELEPHONE ENCOUNTER
Alejandra Cadena from [facility] called to state that Brennan Dow is not herself today. She states her legs feel like jello. Her vitals are good but she feels off, couldn't say exactly how. She is positive for babinski in LT foot. She is smoking . She broke a tooth and was told will need to hold blood thinner for 5 days to have the tooth extracted.

## 2023-01-18 NOTE — TELEPHONE ENCOUNTER
Lia BURK from [facility] stopped in and states that Rosa's vitals are normal but she is pale and stated the same things that maximiliano had said. Lia did say Vinay Limajairo is smoking. She has an appt 03/20/23. Do you want to see her before then?

## 2023-01-19 NOTE — TELEPHONE ENCOUNTER
Spoke with pt in regards to message. Pt states she was dizzy yesterday. Pt states she is fine today, no problems.

## 2023-02-24 ENCOUNTER — TELEPHONE (OUTPATIENT)
Dept: FAMILY MEDICINE CLINIC | Age: 68
End: 2023-02-24

## 2023-02-24 NOTE — TELEPHONE ENCOUNTER
Eva Dowling PT from [facility] and Rebe[facility] called and states pt was D/C from home PT 02/23/2023 and as of right now pt is not receiving any skilled services at this time. Only concern at this time is compliance with medications. Eva Dowling states the family is aware and to discuss with patient night time meds. Pt current forgets her medications at night. This is an FYI for provider.

## 2023-03-20 ENCOUNTER — OFFICE VISIT (OUTPATIENT)
Dept: FAMILY MEDICINE CLINIC | Age: 68
End: 2023-03-20
Payer: COMMERCIAL

## 2023-03-20 VITALS
SYSTOLIC BLOOD PRESSURE: 134 MMHG | BODY MASS INDEX: 26.15 KG/M2 | HEART RATE: 92 BPM | WEIGHT: 172 LBS | OXYGEN SATURATION: 96 % | DIASTOLIC BLOOD PRESSURE: 70 MMHG

## 2023-03-20 DIAGNOSIS — E78.5 HYPERLIPIDEMIA, UNSPECIFIED HYPERLIPIDEMIA TYPE: ICD-10-CM

## 2023-03-20 DIAGNOSIS — Z00.00 INITIAL MEDICARE ANNUAL WELLNESS VISIT: Primary | ICD-10-CM

## 2023-03-20 PROCEDURE — 1123F ACP DISCUSS/DSCN MKR DOCD: CPT | Performed by: NURSE PRACTITIONER

## 2023-03-20 PROCEDURE — 3075F SYST BP GE 130 - 139MM HG: CPT | Performed by: NURSE PRACTITIONER

## 2023-03-20 PROCEDURE — G0438 PPPS, INITIAL VISIT: HCPCS | Performed by: NURSE PRACTITIONER

## 2023-03-20 PROCEDURE — 3078F DIAST BP <80 MM HG: CPT | Performed by: NURSE PRACTITIONER

## 2023-03-20 RX ORDER — GUAIFENESIN 600 MG/1
600 TABLET, EXTENDED RELEASE ORAL 2 TIMES DAILY
Qty: 14 TABLET | Refills: 0 | Status: SHIPPED | OUTPATIENT
Start: 2023-03-20 | End: 2023-03-27

## 2023-03-20 RX ORDER — ALBUTEROL SULFATE 2.5 MG/3ML
2.5 SOLUTION RESPIRATORY (INHALATION) EVERY 6 HOURS PRN
Qty: 120 EACH | Refills: 5 | Status: SHIPPED | OUTPATIENT
Start: 2023-03-20

## 2023-03-20 SDOH — ECONOMIC STABILITY: HOUSING INSECURITY
IN THE LAST 12 MONTHS, WAS THERE A TIME WHEN YOU DID NOT HAVE A STEADY PLACE TO SLEEP OR SLEPT IN A SHELTER (INCLUDING NOW)?: NO

## 2023-03-20 SDOH — ECONOMIC STABILITY: FOOD INSECURITY: WITHIN THE PAST 12 MONTHS, THE FOOD YOU BOUGHT JUST DIDN'T LAST AND YOU DIDN'T HAVE MONEY TO GET MORE.: NEVER TRUE

## 2023-03-20 SDOH — ECONOMIC STABILITY: INCOME INSECURITY: HOW HARD IS IT FOR YOU TO PAY FOR THE VERY BASICS LIKE FOOD, HOUSING, MEDICAL CARE, AND HEATING?: NOT HARD AT ALL

## 2023-03-20 SDOH — ECONOMIC STABILITY: FOOD INSECURITY: WITHIN THE PAST 12 MONTHS, YOU WORRIED THAT YOUR FOOD WOULD RUN OUT BEFORE YOU GOT MONEY TO BUY MORE.: NEVER TRUE

## 2023-03-20 ASSESSMENT — LIFESTYLE VARIABLES
HOW OFTEN DO YOU HAVE A DRINK CONTAINING ALCOHOL: NEVER
HOW MANY STANDARD DRINKS CONTAINING ALCOHOL DO YOU HAVE ON A TYPICAL DAY: PATIENT DOES NOT DRINK

## 2023-03-20 ASSESSMENT — PATIENT HEALTH QUESTIONNAIRE - PHQ9
SUM OF ALL RESPONSES TO PHQ QUESTIONS 1-9: 0
SUM OF ALL RESPONSES TO PHQ QUESTIONS 1-9: 0
1. LITTLE INTEREST OR PLEASURE IN DOING THINGS: 0
2. FEELING DOWN, DEPRESSED OR HOPELESS: 0
SUM OF ALL RESPONSES TO PHQ QUESTIONS 1-9: 0
SUM OF ALL RESPONSES TO PHQ QUESTIONS 1-9: 0
SUM OF ALL RESPONSES TO PHQ9 QUESTIONS 1 & 2: 0

## 2023-03-20 NOTE — PATIENT INSTRUCTIONS
your taste buds will adjust to less salt.     Eat fewer snack items, fast foods, canned soups, and other high-salt, high-fat, processed foods.     Read food labels and try to avoid saturated and trans fats. They increase your risk of heart disease by raising cholesterol levels.     Limit the amount of solid fat-butter, margarine, and shortening-you eat. Use olive, peanut, or canola oil when you cook. Bake, broil, and steam foods instead of frying them.     Eat a variety of fruit and vegetables every day. Dark green, deep orange, red, or yellow fruits and vegetables are especially good for you. Examples include spinach, carrots, peaches, and berries.     Foods high in fiber can reduce your cholesterol and provide important vitamins and minerals. High-fiber foods include whole-grain cereals and breads, oatmeal, beans, brown rice, citrus fruits, and apples.     Eat lean proteins. Heart-healthy proteins include seafood, lean meats and poultry, eggs, beans, peas, nuts, seeds, and soy products.     Limit drinks and foods with added sugar. These include candy, desserts, and soda pop. Lifestyle changes    If your doctor recommends it, get more exercise. Walking is a good choice. Bit by bit, increase the amount you walk every day. Try for at least 30 minutes on most days of the week. You also may want to swim, bike, or do other activities.     Do not smoke. If you need help quitting, talk to your doctor about stop-smoking programs and medicines. These can increase your chances of quitting for good. Quitting smoking may be the most important step you can take to protect your heart. It is never too late to quit.     Limit alcohol to 2 drinks a day for men and 1 drink a day for women. Too much alcohol can cause health problems.     Manage other health problems such as diabetes, high blood pressure, and high cholesterol. If you think you may have a problem with alcohol or drug use, talk to your doctor.    Medicines    Take your

## 2023-03-20 NOTE — PROGRESS NOTES
Patient is present for AWV    Patient declined ACP packet    Patient declined mammogram and colonoscopy
had her stroke. Advanced Directives:  Do you have a Living Will?: (!) No    Intervention:  Paperwork given                       Objective   Vitals:    03/20/23 1409   BP: 134/70   Site: Right Upper Arm   Position: Sitting   Cuff Size: Large Adult   Pulse: 92   SpO2: 96%   Weight: 172 lb (78 kg)      Body mass index is 26.15 kg/m². No Known Allergies  Prior to Visit Medications    Medication Sig Taking?  Authorizing Provider   guaiFENesin (MUCINEX) 600 MG extended release tablet Take 1 tablet by mouth 2 times daily for 7 days Yes KEELY Laboy CNP   albuterol (PROVENTIL) (2.5 MG/3ML) 0.083% nebulizer solution Take 3 mLs by nebulization every 6 hours as needed for Wheezing or Shortness of Breath Yes KEELY Laboy CNP   clopidogrel (PLAVIX) 75 MG tablet Take 1 tablet by mouth daily Yes KEELY Laboy CNP   metFORMIN (GLUCOPHAGE) 1000 MG tablet Take 1 tablet by mouth daily (with breakfast) Yes KEELY Laboy CNP   lisinopril (PRINIVIL;ZESTRIL) 10 MG tablet Take 1 tablet by mouth daily Yes KEELY Laboy CNP   atorvastatin (LIPITOR) 80 MG tablet Take 1 tablet by mouth daily Yes KEELY Laboy CNP   levothyroxine (SYNTHROID) 25 MCG tablet Take 1.5 tablets by mouth daily Yes KEELY Laboy CNP   Baclofen (LIORESAL) 5 MG tablet Take 2 tablets by mouth at bedtime Yes Kenn Williamson MD   Handicap Placard MISC by Does not apply route Duration: 5 years / Dx: Stroke     Unable to walk > 200 feet without assistive device Yes Kenn Williamson MD   albuterol sulfate HFA (PROVENTIL;VENTOLIN;PROAIR) 108 (90 Base) MCG/ACT inhaler Inhale 2 puffs into the lungs every 6 hours as needed for Wheezing Yes Historical Provider, MD   acetaminophen (TYLENOL) 500 MG tablet Take 2 tablets by mouth every 8 hours Yes Kenn Williamson MD   budesonide-formoterol (SYMBICORT) 160-4.5 MCG/ACT AERO Inhale 2 puffs into the lungs 2 times

## 2023-03-27 ENCOUNTER — OFFICE VISIT (OUTPATIENT)
Dept: PHYSICAL MEDICINE AND REHAB | Age: 68
End: 2023-03-27
Payer: MEDICARE

## 2023-03-27 VITALS
BODY MASS INDEX: 26.07 KG/M2 | HEART RATE: 80 BPM | WEIGHT: 172 LBS | DIASTOLIC BLOOD PRESSURE: 70 MMHG | TEMPERATURE: 97.3 F | SYSTOLIC BLOOD PRESSURE: 138 MMHG | HEIGHT: 68 IN

## 2023-03-27 DIAGNOSIS — I69.354 SPASTIC HEMIPLEGIA OF LEFT NONDOMINANT SIDE AS LATE EFFECT OF CEREBRAL INFARCTION (HCC): Primary | ICD-10-CM

## 2023-03-27 PROCEDURE — 3017F COLORECTAL CA SCREEN DOC REV: CPT | Performed by: PHYSICAL MEDICINE & REHABILITATION

## 2023-03-27 PROCEDURE — 3078F DIAST BP <80 MM HG: CPT | Performed by: PHYSICAL MEDICINE & REHABILITATION

## 2023-03-27 PROCEDURE — 3075F SYST BP GE 130 - 139MM HG: CPT | Performed by: PHYSICAL MEDICINE & REHABILITATION

## 2023-03-27 PROCEDURE — G8484 FLU IMMUNIZE NO ADMIN: HCPCS | Performed by: PHYSICAL MEDICINE & REHABILITATION

## 2023-03-27 PROCEDURE — G8417 CALC BMI ABV UP PARAM F/U: HCPCS | Performed by: PHYSICAL MEDICINE & REHABILITATION

## 2023-03-27 PROCEDURE — 1123F ACP DISCUSS/DSCN MKR DOCD: CPT | Performed by: PHYSICAL MEDICINE & REHABILITATION

## 2023-03-27 PROCEDURE — 4004F PT TOBACCO SCREEN RCVD TLK: CPT | Performed by: PHYSICAL MEDICINE & REHABILITATION

## 2023-03-27 PROCEDURE — 1090F PRES/ABSN URINE INCON ASSESS: CPT | Performed by: PHYSICAL MEDICINE & REHABILITATION

## 2023-03-27 PROCEDURE — G8427 DOCREV CUR MEDS BY ELIG CLIN: HCPCS | Performed by: PHYSICAL MEDICINE & REHABILITATION

## 2023-03-27 PROCEDURE — G8400 PT W/DXA NO RESULTS DOC: HCPCS | Performed by: PHYSICAL MEDICINE & REHABILITATION

## 2023-03-27 PROCEDURE — 99213 OFFICE O/P EST LOW 20 MIN: CPT | Performed by: PHYSICAL MEDICINE & REHABILITATION

## 2023-03-27 RX ORDER — BACLOFEN 5 MG/1
5 TABLET ORAL 4 TIMES DAILY
Qty: 30 TABLET | Refills: 1 | Status: SHIPPED | OUTPATIENT
Start: 2023-03-27

## 2023-03-27 NOTE — PROGRESS NOTES
power mobility device. Will increase baclofen to 5 mg, 5 mg, and 10 mg to address spasticity. Will plan to treat LUE with Botox as it is causing pain and affecting hygiene. Encouraged her to consider activity with the [employer] [address] to address depressed mood. Completed [employer] application to assist with transportation. No orders of the defined types were placed in this encounter. Orders Placed This Encounter   Medications    Baclofen (LIORESAL) 5 MG tablet     Sig: Take 1 tablet by mouth 4 times daily     Dispense:  30 tablet     Refill:  1     Return for Botox. Electronically signed by Teresita Parks MD on 3/27/2023 at 6:08 PM.     Please note that this chart was generated using voice recognition Dragon dictation software. Although every effortwas made to ensure the accuracy of this automated transcription, some errors in transcription may have occurred.

## 2023-05-09 ENCOUNTER — PROCEDURE VISIT (OUTPATIENT)
Dept: PHYSICAL MEDICINE AND REHAB | Age: 68
End: 2023-05-09
Payer: MEDICARE

## 2023-05-09 VITALS
HEART RATE: 84 BPM | DIASTOLIC BLOOD PRESSURE: 84 MMHG | TEMPERATURE: 98.2 F | BODY MASS INDEX: 26.52 KG/M2 | WEIGHT: 175 LBS | HEIGHT: 68 IN | SYSTOLIC BLOOD PRESSURE: 124 MMHG

## 2023-05-09 DIAGNOSIS — I69.354 SPASTIC HEMIPLEGIA OF LEFT NONDOMINANT SIDE AS LATE EFFECT OF CEREBRAL INFARCTION (HCC): Primary | ICD-10-CM

## 2023-05-09 PROCEDURE — 64643 CHEMODENERV 1 EXTREM 1-4 EA: CPT | Performed by: PHYSICAL MEDICINE & REHABILITATION

## 2023-05-09 PROCEDURE — 64642 CHEMODENERV 1 EXTREMITY 1-4: CPT | Performed by: PHYSICAL MEDICINE & REHABILITATION

## 2023-05-09 PROCEDURE — 95874 GUIDE NERV DESTR NEEDLE EMG: CPT | Performed by: PHYSICAL MEDICINE & REHABILITATION

## 2023-05-09 NOTE — PROGRESS NOTES
[address] [facility] PHYSICAL MEDICINE AND REHABILITATION  [address] [place] [address]  Dept: [phone]  Dept Fax: [phone]    Outpatient Followup Note    Pippa Beckham, 74 y.o., female, presents for follow up c/o of Botox Injection  . HPI:     HPI  Patient with history of spastic hemiplegia affecting L nondominant side after CVA 2 years ago. Being seen today for Botox treatment of spasticity affecting LUE and LLE. This spasticity and the clonus in the LLE is affecting mobility, causing pain, and impairing ADLs such as dressing.      Past Medical History:   Diagnosis Date    Asthma     COPD (chronic obstructive pulmonary disease) ([facility]     Diabetes mellitus ([facility]     Esophageal ring     Gastritis     Hyperlipidemia     Hyperplastic colon polyp     Hypertension     Osteoarthritis     Patient in clinical research study 05/04/2021    DAXA     TIA (transient ischemic attack) 2013    Per Daughter      Past Surgical History:   Procedure Laterality Date    APPENDECTOMY      COLONOSCOPY  09/20/2017    FRAGMENTS OF HYPERPLASTIC POLYP    ENDOSCOPY, COLON, DIAGNOSTIC      ESOPHAGEAL DILATATION      HYSTERECTOMY (CERVIX STATUS UNKNOWN)      ND COLSC FLX W/RMVL OF TUMOR POLYP LESION SNARE TQ N/A 9/20/2017    COLONOSCOPY POLYPECTOMY COLD BIOPSY performed by Haile Vaca MD at [facility] EGD TRANSORAL BIOPSY SINGLE/MULTIPLE N/A 9/20/2017    EGD BIOPSY with esophageal dilitation performed by Haile Vaca MD at [facility]  09/2017    E-ring and gastritis      Family History   Problem Relation Age of Onset    Other Mother     Diabetes Mother     Prostate Cancer Father     Cancer Brother         lining of lung     Social History     Socioeconomic History    Marital status:      Spouse name: None    Number of children: None    Years of education: None    Highest education level: None   Tobacco Use    Smoking

## 2023-05-18 RX ORDER — FLUOXETINE HYDROCHLORIDE 20 MG/1
CAPSULE ORAL
Qty: 90 CAPSULE | OUTPATIENT
Start: 2023-05-18

## 2023-05-22 RX ORDER — LEVOTHYROXINE SODIUM 0.03 MG/1
37.5 TABLET ORAL DAILY
Qty: 135 TABLET | Refills: 1 | Status: SHIPPED | OUTPATIENT
Start: 2023-05-22

## 2023-05-22 RX ORDER — ATORVASTATIN CALCIUM 80 MG/1
80 TABLET, FILM COATED ORAL DAILY
Qty: 90 TABLET | Refills: 1 | Status: SHIPPED | OUTPATIENT
Start: 2023-05-22

## 2023-05-22 NOTE — TELEPHONE ENCOUNTER
11/09/2022   Lipid Panel Once 03/20/2023               Patient Active Problem List:     Pneumonia of left lower lobe due to infectious organism     Tobacco abuse     Legionella pneumonia ([address]     Dysphagia     Hyperplastic colon polyp     Esophageal ring     Gastritis     Elbow effusion, right     Stroke due to occlusion of right middle cerebral artery (HCC)     Nihss score 15     Acute left hemiparesis (HCC)     Cerebrovascular accident (CVA) due to occlusion of right middle cerebral artery ([address]     Acute CVA (cerebrovascular accident) ([address]     COPD (chronic obstructive pulmonary disease) ([address]     Essential hypertension     Hyperlipidemia     Pre-diabetes

## 2023-05-24 RX ORDER — LEVOTHYROXINE SODIUM 0.03 MG/1
TABLET ORAL
Qty: 135 TABLET | Refills: 1 | OUTPATIENT
Start: 2023-05-24

## 2023-05-24 RX ORDER — ATORVASTATIN CALCIUM 80 MG/1
TABLET, FILM COATED ORAL
Qty: 90 TABLET | Refills: 1 | OUTPATIENT
Start: 2023-05-24

## 2023-06-05 ENCOUNTER — OFFICE VISIT (OUTPATIENT)
Dept: NEUROLOGY | Age: 68
End: 2023-06-05
Payer: MEDICARE

## 2023-06-05 VITALS
HEART RATE: 78 BPM | HEIGHT: 68 IN | SYSTOLIC BLOOD PRESSURE: 137 MMHG | OXYGEN SATURATION: 94 % | BODY MASS INDEX: 26.52 KG/M2 | WEIGHT: 175 LBS | DIASTOLIC BLOOD PRESSURE: 77 MMHG

## 2023-06-05 DIAGNOSIS — I63.511 CEREBROVASCULAR ACCIDENT (CVA) DUE TO OCCLUSION OF RIGHT MIDDLE CEREBRAL ARTERY (HCC): Primary | ICD-10-CM

## 2023-06-05 DIAGNOSIS — J44.9 CHRONIC OBSTRUCTIVE PULMONARY DISEASE, UNSPECIFIED COPD TYPE (HCC): ICD-10-CM

## 2023-06-05 DIAGNOSIS — G81.94 LEFT HEMIPLEGIA (HCC): ICD-10-CM

## 2023-06-05 DIAGNOSIS — E78.2 MIXED HYPERLIPIDEMIA: ICD-10-CM

## 2023-06-05 DIAGNOSIS — F17.200 SMOKER: ICD-10-CM

## 2023-06-05 DIAGNOSIS — I10 ESSENTIAL HYPERTENSION: ICD-10-CM

## 2023-06-05 PROCEDURE — 3078F DIAST BP <80 MM HG: CPT | Performed by: PSYCHIATRY & NEUROLOGY

## 2023-06-05 PROCEDURE — 3075F SYST BP GE 130 - 139MM HG: CPT | Performed by: PSYCHIATRY & NEUROLOGY

## 2023-06-05 PROCEDURE — 3017F COLORECTAL CA SCREEN DOC REV: CPT | Performed by: FAMILY MEDICINE

## 2023-06-05 PROCEDURE — 1090F PRES/ABSN URINE INCON ASSESS: CPT | Performed by: FAMILY MEDICINE

## 2023-06-05 PROCEDURE — 4004F PT TOBACCO SCREEN RCVD TLK: CPT | Performed by: FAMILY MEDICINE

## 2023-06-05 PROCEDURE — G8417 CALC BMI ABV UP PARAM F/U: HCPCS | Performed by: FAMILY MEDICINE

## 2023-06-05 PROCEDURE — G8400 PT W/DXA NO RESULTS DOC: HCPCS | Performed by: FAMILY MEDICINE

## 2023-06-05 PROCEDURE — 99204 OFFICE O/P NEW MOD 45 MIN: CPT | Performed by: PSYCHIATRY & NEUROLOGY

## 2023-06-05 PROCEDURE — G8427 DOCREV CUR MEDS BY ELIG CLIN: HCPCS | Performed by: FAMILY MEDICINE

## 2023-06-05 PROCEDURE — 3023F SPIROM DOC REV: CPT | Performed by: FAMILY MEDICINE

## 2023-06-05 PROCEDURE — 1123F ACP DISCUSS/DSCN MKR DOCD: CPT | Performed by: PSYCHIATRY & NEUROLOGY

## 2023-06-05 NOTE — PROGRESS NOTES
[place] [address] [id number]  Dept: [phone]  Dept Fax: [phone]    NEUROLOGY NEW PATIENT NOTE       PATIENT NAME: Vee Vega  PATIENT MRN: [id number]  PRIMARY CARE PHYSICIAN: Nedra Araiza, KEELY - CNP      HPI:      I have the pleasure to evaluate Vee Vega who is a 74 y.o. right-handed female patient with PMH of HTN, DM, HLP, smoker, prior Rt MCA stroke May 2021 who comes for evaluation. After her stroke in 2021, she had moved to [facility] for about 1.5 years for recovery. In reca, patient had presented to the hospital on 5/4/2021. He was reported to be a wake-up stroke when she had left-sided weakness and left facial droop and right gaze deviation. CT of the head revealed a right MCA M1 occlusion with collateralization. Patient underwent an unsuccessful thrombectomy with multiple attempts, 6 passes with TICI 0. Patient's MRI showed right MCA stroke with hemorrhagic component extending to basal ganglia. She did have a PHYLLIS done which did not show any intramural thrombus or PFO. She did have a loop recorder placed however and her moved to [facility] and thereafter has misplaced the  and reports having no follow-up for such. She reports compliance to aspirin and Plavix as well as atorvastatin 80 however may miss some doses on occasion. Her last lipid panel was done in November 2022 with cholesterol 268 and . Patient is also continuing to smoke about half a pack a day. With regards to functionality, patient mainly uses a wheelchair. She continues to be hemiparetic on the left side. Some spasticity which she gets managed by Botox with PM&R.       PREVIOUS WORKUP:     Lab Results   Component Value Date    WBC 8.7 11/21/2022    HGB 14.3 11/21/2022    HCT 44.3 11/21/2022    MCV 91.0 11/21/2022     11/21/2022       Past Medical History:   Diagnosis Date    Asthma     COPD

## 2023-06-12 ENCOUNTER — OFFICE VISIT (OUTPATIENT)
Dept: PHYSICAL MEDICINE AND REHAB | Age: 68
End: 2023-06-12
Payer: MEDICARE

## 2023-06-12 DIAGNOSIS — I69.354 SPASTIC HEMIPLEGIA OF LEFT NONDOMINANT SIDE AS LATE EFFECT OF CEREBRAL INFARCTION (HCC): Primary | ICD-10-CM

## 2023-06-12 PROCEDURE — G8417 CALC BMI ABV UP PARAM F/U: HCPCS | Performed by: PHYSICAL MEDICINE & REHABILITATION

## 2023-06-12 PROCEDURE — 1123F ACP DISCUSS/DSCN MKR DOCD: CPT | Performed by: PHYSICAL MEDICINE & REHABILITATION

## 2023-06-12 PROCEDURE — 1090F PRES/ABSN URINE INCON ASSESS: CPT | Performed by: PHYSICAL MEDICINE & REHABILITATION

## 2023-06-12 PROCEDURE — 4004F PT TOBACCO SCREEN RCVD TLK: CPT | Performed by: PHYSICAL MEDICINE & REHABILITATION

## 2023-06-12 PROCEDURE — G8400 PT W/DXA NO RESULTS DOC: HCPCS | Performed by: PHYSICAL MEDICINE & REHABILITATION

## 2023-06-12 PROCEDURE — G8427 DOCREV CUR MEDS BY ELIG CLIN: HCPCS | Performed by: PHYSICAL MEDICINE & REHABILITATION

## 2023-06-12 PROCEDURE — 3017F COLORECTAL CA SCREEN DOC REV: CPT | Performed by: PHYSICAL MEDICINE & REHABILITATION

## 2023-06-12 PROCEDURE — 99212 OFFICE O/P EST SF 10 MIN: CPT | Performed by: PHYSICAL MEDICINE & REHABILITATION

## 2023-06-20 NOTE — PROGRESS NOTES
[address] [facility] PHYSICAL MEDICINE AND REHABILITATION  [address] [place] [address]  Dept: [phone]  Dept Fax: [phone]    Outpatient Followup Note    Alexandria Francis, 74 y.o., female, presents for follow up c/o of Other (Checking on how Botox helped at last visit. )  . HPI:     HPI  Patient being seen in follow up after initial Botox treatment at last visit. Noting some improvement in tone. She is interested in resuming some maintenance PT/OT at [facility] - will plan to order at next visit once she has transportation arranged through [address] She tried the power wheelchair and reports that it was too difficult to maneuver in her home so she \"sent it back. \"     Past Medical History:   Diagnosis Date    Asthma     COPD (chronic obstructive pulmonary disease) ([facility]     Diabetes mellitus ([facility]     Esophageal ring     Gastritis     Hyperlipidemia     Hyperplastic colon polyp     Hypertension     Osteoarthritis     Patient in clinical research study 05/04/2021    DAXA     TIA (transient ischemic attack) 2013    Per Daughter      Past Surgical History:   Procedure Laterality Date    APPENDECTOMY      COLONOSCOPY  09/20/2017    FRAGMENTS OF HYPERPLASTIC POLYP    ENDOSCOPY, COLON, DIAGNOSTIC      ESOPHAGEAL DILATATION      HYSTERECTOMY (CERVIX STATUS UNKNOWN)      [place] COLSC FLX W/RMVL OF TUMOR POLYP LESION SNARE TQ N/A 9/20/2017    COLONOSCOPY POLYPECTOMY COLD BIOPSY performed by Tamiko Montoya MD at [facility] EGD TRANSORAL BIOPSY SINGLE/MULTIPLE N/A 9/20/2017    EGD BIOPSY with esophageal dilitation performed by Tamiko Montoya MD at [address]  09/2017    E-ring and gastritis      Family History   Problem Relation Age of Onset    Other Mother     Diabetes Mother     Prostate Cancer Father     Cancer Brother         lining of lung     Social History     Socioeconomic History    Marital status:

## 2023-06-26 RX ORDER — ALBUTEROL SULFATE 90 UG/1
2 AEROSOL, METERED RESPIRATORY (INHALATION) EVERY 6 HOURS PRN
Qty: 18 G | Refills: 5 | Status: SHIPPED | OUTPATIENT
Start: 2023-06-26

## 2023-07-21 ENCOUNTER — HOSPITAL ENCOUNTER (OUTPATIENT)
Age: 68
Discharge: HOME | End: 2023-07-21
Payer: MEDICARE

## 2023-07-21 ENCOUNTER — HOSPITAL ENCOUNTER (OUTPATIENT)
Dept: CT IMAGING | Age: 68
End: 2023-07-21
Attending: FAMILY MEDICINE
Payer: MEDICARE

## 2023-07-21 DIAGNOSIS — I63.511 CEREBROVASCULAR ACCIDENT (CVA) DUE TO OCCLUSION OF RIGHT MIDDLE CEREBRAL ARTERY (HCC): Primary | ICD-10-CM

## 2023-07-21 DIAGNOSIS — G81.94 LEFT HEMIPLEGIA (HCC): ICD-10-CM

## 2023-07-21 DIAGNOSIS — R13.10 DYSPHAGIA, UNSPECIFIED TYPE: Primary | ICD-10-CM

## 2023-07-21 LAB
CREAT SERPL-MCNC: 0.5 MG/DL (ref 0.5–0.9)
GFR SERPL CREATININE-BSD FRML MDRD: >60 ML/MIN/1.73M2

## 2023-07-21 PROCEDURE — 36415 COLL VENOUS BLD VENIPUNCTURE: CPT

## 2023-07-21 PROCEDURE — 70496 CT ANGIOGRAPHY HEAD: CPT

## 2023-07-21 PROCEDURE — 82565 ASSAY OF CREATININE: CPT

## 2023-07-21 PROCEDURE — 6360000004 HC RX CONTRAST MEDICATION: Performed by: FAMILY MEDICINE

## 2023-07-21 RX ADMIN — IOPAMIDOL 90 ML: 755 INJECTION, SOLUTION INTRAVENOUS at 15:05

## 2023-08-10 ENCOUNTER — PROCEDURE VISIT (OUTPATIENT)
Dept: PHYSICAL MEDICINE AND REHAB | Age: 68
End: 2023-08-10

## 2023-08-10 VITALS
BODY MASS INDEX: 26.52 KG/M2 | HEART RATE: 72 BPM | SYSTOLIC BLOOD PRESSURE: 124 MMHG | HEIGHT: 68 IN | DIASTOLIC BLOOD PRESSURE: 78 MMHG | WEIGHT: 175 LBS | TEMPERATURE: 98.2 F

## 2023-08-10 DIAGNOSIS — I69.354 SPASTIC HEMIPLEGIA OF LEFT NONDOMINANT SIDE AS LATE EFFECT OF CEREBRAL INFARCTION (HCC): Primary | ICD-10-CM

## 2023-08-10 RX ORDER — TIZANIDINE 4 MG/1
4 TABLET ORAL 3 TIMES DAILY
Qty: 90 TABLET | Refills: 2 | Status: SHIPPED | OUTPATIENT
Start: 2023-08-10

## 2023-08-10 NOTE — PROGRESS NOTES
[facility] PHYSICAL MEDICINE AND REHABILITATION  [address] [facility] [address]  Dept: [phone]  Dept Fax: [phone]    Outpatient Followup Note    Ursula Kee, 74 y.o., female, presents for follow up c/o of Botox Injection  . 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 She is being seen for spasticity management with Botox. She has stopped taking baclofen due to adverse GI effects of stomach pain and notices that her tone has increased over past 4 weeks. She is also noticing its negative effect on her mobility, transfers, gait and ADLs. Reviewed Dr. Mc Salazar note from 6/5/23 with order for CTA - reviewed results with patient today. She has neurology follow up in December to determine whether she should continue DAPT or transition to monotherapy.      Past Medical History:   Diagnosis Date    Asthma     COPD (chronic obstructive pulmonary disease) ([address])     Diabetes mellitus ([address])     Esophageal ring     Gastritis     Hyperlipidemia     Hyperplastic colon polyp     Hypertension     Osteoarthritis     Patient in clinical research study 05/04/2021    DAXA     TIA (transient ischemic attack) 2013    Per Daughter      Past Surgical History:   Procedure Laterality Date    APPENDECTOMY      COLONOSCOPY  09/20/2017    FRAGMENTS OF HYPERPLASTIC POLYP    ENDOSCOPY, COLON, DIAGNOSTIC      ESOPHAGEAL DILATATION      HYSTERECTOMY (CERVIX STATUS UNKNOWN)      OR COLSC FLX W/RMVL OF TUMOR POLYP LESION SNARE TQ N/A 9/20/2017    COLONOSCOPY POLYPECTOMY COLD BIOPSY performed by Kassidy Boston MD at [address] [facility] EGD TRANSORAL BIOPSY SINGLE/MULTIPLE N/A 9/20/2017    EGD BIOPSY with esophageal dilitation performed by Kassidy Boston MD at [address] [place] [address]  09/2017    E-ring and gastritis      Family History   Problem Relation Age of Onset    Other Mother     Diabetes Mother

## 2023-08-22 ENCOUNTER — HOSPITAL ENCOUNTER (OUTPATIENT)
Dept: PHYSICAL THERAPY | Age: 68
Setting detail: THERAPIES SERIES
Discharge: HOME | End: 2023-08-22
Attending: PHYSICAL MEDICINE & REHABILITATION
Payer: MEDICARE

## 2023-08-22 PROCEDURE — 97162 PT EVAL MOD COMPLEX 30 MIN: CPT

## 2023-08-22 NOTE — CONSULTS
[x] [address]  [address] [facility].    [phone]  F: [phone] [] [address] [facility]  [address]   [address]  P: [phone]  F: [phone] [] [address]  [address]  P: [phone]  F: [phone] [] [address]  [address]   [address]   P: [phone]  F: [phone] [] [facility] - [facility] & Therapy  [address] [facility] [address]  [place]: [phone]   F: [phone]        Physical Therapy Neurologic Evaluation for CVA    Date:  2023  Patient: Buren Babinski  : 1955  MRN: [id number]  Physician: Dr. Sonia Fleming MD   Insurance: Medicare ([id number] [place] [address])  Medical Diagnosis: L82.109 (ICD-10-CM) - Spastic hemiplegia of left nondominant side as late effect of cerebral infarction [facility])  Rehab Codes: R26.89, M62.81, R29.3, G81.1  Next 's appt.: 23  Date of symptom onset: 21    Subjective:  CC: Pt arrives for physical therapy evaluation of left hemiplegia of LUE=LLE after sustaining R MCA CVA in May of 2021. Arrives with chronic strength/functional mobility deficits. Does report left peripheral vision field cut, denies any issues with speaking/swallowing (though later reports sometimes it feels like she's \"talking with marbles in my mouth\"). Gets LLE clonus frequently throughout the day, occasionally in LUE. Arrives using anterior shell AFO, notes she used to have a custom AFO but was too difficult to get on herself and wouldn't fit her in her shoe, so just got new AFO ~6 mo ago through [facility]. Denies ever wearing any brace for LUE - notes that she gets lots of pain in L upper arm.      Recently underwent botox for the second time in the following areas per Dr. Bobby Dobbins:    Marilynn cadet

## 2023-08-23 NOTE — FLOWSHEET NOTE
[facility] Fall Risk Assessment    Patient Name:  Herve Raines  : 1955    Risk Factor Scale  Score   History of Falls [] Yes  [x] No 25  0 0   Secondary Diagnosis [x] Yes  [] No 15  0 15   Ambulatory Aid [] Furniture  [] Crutches/cane/walker  [x] None/bedrest/wheelchair/nurse 30  15  0 0   IV/Heparin Lock [] Yes  [x] No 20  0 0   Gait/Transferring [x] Impaired  [] Weak  [] Normal/bedrest/immobile 20  10  0 20   Mental Status [] Forgets limitations  [x] Oriented to own ability 15  0 0      Total:35     Based on the Assessment score: check the appropriate box.     []  No intervention needed   Low =   Score of 0-24    [x]  Use standard prevention interventions Moderate =  Score of 24-44   [x] Give patient handout and discuss fall prevention strategies - will provide in upcoming session   [x] Establish goal of education for patient/family RE: fall prevention strategies    []  Use high risk prevention interventions High = Score of 45 and higher   [] Give patient handout and discuss fall prevention strategies   [] Establish goal of education for patient/family Re: fall prevention strategies   [] Discuss lifeline / other resources    Electronically signed by:   Teja Linares PT  Date: 2023

## 2023-08-30 ENCOUNTER — HOSPITAL ENCOUNTER (OUTPATIENT)
Dept: PHYSICAL THERAPY | Age: 68
Setting detail: THERAPIES SERIES
Discharge: HOME | End: 2023-08-30
Attending: PHYSICAL MEDICINE & REHABILITATION
Payer: MEDICARE

## 2023-08-30 ENCOUNTER — HOSPITAL ENCOUNTER (OUTPATIENT)
Dept: OCCUPATIONAL THERAPY | Age: 68
Setting detail: THERAPIES SERIES
Discharge: HOME | End: 2023-08-30
Attending: PHYSICAL MEDICINE & REHABILITATION
Payer: MEDICARE

## 2023-08-30 PROCEDURE — 97116 GAIT TRAINING THERAPY: CPT

## 2023-08-30 PROCEDURE — 97110 THERAPEUTIC EXERCISES: CPT

## 2023-08-30 PROCEDURE — 97167 OT EVAL HIGH COMPLEX 60 MIN: CPT

## 2023-08-30 PROCEDURE — 97112 NEUROMUSCULAR REEDUCATION: CPT

## 2023-08-30 PROCEDURE — 97140 MANUAL THERAPY 1/> REGIONS: CPT

## 2023-08-30 NOTE — FLOWSHEET NOTE
that you cannot place your foot directly in front,  try to step far enough ahead that the heel of your forward foot is ahead of the  toes of the other foot. (To score 3 points, the length of the step should exceed  the length of the other foot and the width of the stance should approximate the  subject's normal stride width). (4) able to place foot tandem independently and hold 30 seconds  (3) able to place foot ahead of other independently and hold 30 seconds  (2) able to take small step independently and hold 30 seconds  (1) needs help to step but can hold 15 seconds  (0) loses balance while stepping or standing  Score: 0 - needs UE assist    14. STANDING ON ONE LEG  INSTRUCTIONS: Stand on one leg as long as you can without holding. (4) able to lift leg independently and hold >10 seconds  (3) able to lift leg independently and hold 5-10 seconds  (2) able to lift leg independently and hold = or >3 seconds  (1) tries to lift leg unable to hold 3 seconds but remains standing  independently  (0) unable to try or needs assist to prevent fall  Score:  0 - needs UE assist    Total Score:  12/56  Max score 56,a person scoring below 45 is considered to be at risk for falling.     Completed by: Sky Hannah, PT
No  [] Reviewed Prior HEP/Ed  Method of Education: [x] Verbal  [] Demo  [] Written  Comprehension of Education:  [x] Verbalizes understanding. [] Demonstrates understanding. [] Needs review. [] Demonstrates/verbalizes HEP/Ed previously given. Plan: [x] Continue current frequency toward long and short term goals.     [x] Specific Instructions for subsequent treatments: contacting wheelchair and AFO vendors for improved equipment, high intensity gait training, balance training, HEP next visit      Time In:12:07 pm            Time Out: 1:01 pm    Electronically signed by:  Tamy Koroma PT

## 2023-09-01 ENCOUNTER — HOSPITAL ENCOUNTER (OUTPATIENT)
Dept: PHYSICAL THERAPY | Age: 68
Setting detail: THERAPIES SERIES
Discharge: HOME | End: 2023-09-01
Attending: PHYSICAL MEDICINE & REHABILITATION
Payer: MEDICARE

## 2023-09-01 PROCEDURE — 97116 GAIT TRAINING THERAPY: CPT

## 2023-09-01 NOTE — FLOWSHEET NOTE
[x] [facility] - [facility] &  Therapy  [address] [facility].  [phone]  F: [phone]     Physical Therapy Daily Treatment Note    Date:  2023  Patient Name:  Gustavo Lanier    :  1955  MRN: [id number]  Physician: Dr. Elias Valderrama MD                                   Insurance: Medicare ([address] [place] [address])  Medical Diagnosis: U65.684 (ICD-10-CM) - Spastic hemiplegia of left nondominant side as late effect of cerebral infarction [facility])  Rehab Codes: R26.89, M62.81, R29.3, G81.1  Next 's appt.: 23  Date of symptom onset: 21  Visit# / total visits: 3/20; Progress note for Medicare patient due at visit 10    Cancels/No Shows: 0    Subjective:    Pain:  [x] Yes  [] No Location: L shoulder Pain Rating: (0-10 scale) not rated/10  Pain altered Tx:  [] No  [] Yes  Action:  Comments: Pt states \"she has been worse\". Reports she went home and took a very long nap after her last treatment.      Objective:  10MWT: .06m/s with hemicane, CGA (<.4m/s considered limited household ambulator)  6MWT: able to walk 38 ft in 2'22\" and then needs seated break  Lorenzo/56 (<45 indicates fall risk)      Modalities:   Precautions:  Exercises:  Exercise Reps/ Time Weight/ Level Comments   Nustep  6min L3 CGA - patient squat pivot from wheelchair to nustep chair    Educated for 60-80spm  Required assist placing L foot back on foot plate 2x                Gait train      Gait w/ hemiwalker 1x55ft   CGA, wheelchair follow   Stair climbing 3 steps 4\" In OT gym  - very very difficult, has to descend backwards d/t fear of falling and sequencing         Pt education x  1) demo'd use of of litegait and how this will be beneficial to achieve high intensity gait training - pt agreeable to trying in upcoming session    2) discussed need for more secure AFO that allows for prevention of genurecurvatum in L stance    - billed with gait         Litegait 31 min  (Including 9 min ambulation)     Initial

## 2023-09-05 ENCOUNTER — HOSPITAL ENCOUNTER (OUTPATIENT)
Dept: OCCUPATIONAL THERAPY | Age: 68
Setting detail: THERAPIES SERIES
Discharge: HOME | End: 2023-09-05
Attending: PHYSICAL MEDICINE & REHABILITATION
Payer: MEDICARE

## 2023-09-05 ENCOUNTER — HOSPITAL ENCOUNTER (OUTPATIENT)
Dept: PHYSICAL THERAPY | Age: 68
Setting detail: THERAPIES SERIES
Discharge: HOME | End: 2023-09-05
Attending: PHYSICAL MEDICINE & REHABILITATION
Payer: MEDICARE

## 2023-09-05 PROCEDURE — 97116 GAIT TRAINING THERAPY: CPT

## 2023-09-05 PROCEDURE — 97110 THERAPEUTIC EXERCISES: CPT

## 2023-09-05 PROCEDURE — 97530 THERAPEUTIC ACTIVITIES: CPT

## 2023-09-05 PROCEDURE — 97112 NEUROMUSCULAR REEDUCATION: CPT

## 2023-09-05 NOTE — FLOWSHEET NOTE
least 5deg to allow improved ankle mobility for transfers, low sit<>stands  ? Strength:  Pt able to complete 5x STS with min UE assist from 18\" seat in <20s to indicate further improving power/strength in BLEs needed for transfers from low couch  ? Balance: Lorenzo score to at least 14/56 to indicate improving standing static/dynamic balance  ? Function:  Pt will be able to ambulate at least 300 ft consecutively with . 2m/s self selected gait speed using LRAD and supervision to improve safety and tolerance to short household ambulation distances  Pt able to complete stand pivot transfers to unlevel surfaces independently with good balance and speed, especially from lower surface to elevated surface like toilet seat  Pt able to ascend/descend ramp with LRAD with supervision and improving speed to improve independent access in/out of home  Any NMES home unit needs will have been discussed and worked towards at this time. At least 70% function per SIS-16 to indicate improving subjective functional ability        Patient goals: \"walk up and down steps, walk more\"    Pt. Education:  [x] Yes  [] No  [] Reviewed Prior HEP/Ed  Method of Education: [x] Verbal  [] Demo  [] Written  Comprehension of Education:  [x] Verbalizes understanding. [] Demonstrates understanding. [] Needs review. [] Demonstrates/verbalizes HEP/Ed previously given. Plan: [x] Continue current frequency toward long and short term goals.     [x] Specific Instructions for subsequent treatments: contacting wheelchair and AFO vendors for improved equipment, high intensity gait training, balance training, HEP next visit      Time In: 200 pm            Time Out: 258 pm    Electronically signed by:  Micah Jackson PTA

## 2023-09-05 NOTE — FLOWSHEET NOTE
[x] [address] [facility]       Occupational Therapy            1st floor       [address] [place], [place]         Phone: [phone]       Fax: [phone] [] [address] [facility] Occupational Therapy  [address]   [place], [place]  Phone: [phone]  Fax: [phone]      Occupational Therapy Daily Treatment Note    Date:  2023  Patient Name:  Myra Verde    :  1955  MRN: [id number]  Referring Provider:  Jose Daniel Wagner MD  Insurance: Medicare, 80 VISITS AUTHORIZED, BASED ON MED [facility]  Medical Diagnosis: I69.354 spastic hemiplegia of left nondominant side as late effect of cerebral infarction [facility])         Rehab Codes: pain in shoulder M25.51,, pain in elbow M25.52,, pain in hand M79.646,, pain in wrist M25.53,, stiffness in shoulder M25.61,, stiffness in elbow M25.62,, stiffness in hand M25.64,, stiffness in wrist M25.63,, lack of coordination R27.8,, fine motor skills loss R29.818,, or muscle weakness generalized M62.81,  Onset Date: 21                   Next Dr. Licha Gasca: 23  Visit# / total visits: /6; Progress note for Medicare patient due at visit 3    Cancels/No Shows: 0/0      Subjective:    Pain:  [x] Yes  [] No Location: L shoulder with ROM N/A Pain Rating: (0-10 scale) not rated/10  Pain altered Tx:  [x] No  [] Yes  Action:  Pt Comments: \"It feels tight\". per pt regarding shoulder. Precautions: NONE  Surgery procedure and date:    Objective: Today's Treatment:  Modalities:                   Precautions: none                     Exercise Flow Sheet:  Exercise Reps/Time Weight/Level Comments   PROM ~10 min   Completed    Self ROM ~10 reps   Completed, Reviewed HEP   Shoulder Shrugs 10 reps x 2 sets   Completed, Reviewed HEP    Scap mobs  10 reps     Added & Completed      Functional tasks      Reviewed Donning t-shirt technique scoop dish, scoop cutting board, rocker knife/cutter, &  simulate with putty & printed info given.    Wt shifting    10 x   Added &

## 2023-09-07 ENCOUNTER — HOSPITAL ENCOUNTER (OUTPATIENT)
Dept: OCCUPATIONAL THERAPY | Age: 68
Setting detail: THERAPIES SERIES
Discharge: HOME | End: 2023-09-07
Attending: PHYSICAL MEDICINE & REHABILITATION
Payer: MEDICARE

## 2023-09-07 ENCOUNTER — HOSPITAL ENCOUNTER (OUTPATIENT)
Dept: PHYSICAL THERAPY | Age: 68
Setting detail: THERAPIES SERIES
Discharge: HOME | End: 2023-09-07
Attending: PHYSICAL MEDICINE & REHABILITATION
Payer: MEDICARE

## 2023-09-07 NOTE — FLOWSHEET NOTE
[x] [address] [facility]. Occupational Therapy       [address], 1st Floor       Phone: [phone]       Fax: [phone] [] [facility]  Therapy at [facility]       Phone: [phone]       Fax: [phone]          Occupational Therapy Cancel/No Show note    Date: 2023  Patient: Marjorie Tillman  : 1955  MRN: [id number]  Cancels/No Shows to date:     For today's appointment patient:  [x]  Cancelled  []  Rescheduled appointment  []  No-show     Reason given by patient:  []  Patient ill  []  Conflicting appointment  []  No transportation    []  Conflict with work  []  No reason given  [x]  Other:     Comments: [place] no transportation.       Electronically signed by: NIELS Villanueva

## 2023-09-07 NOTE — FLOWSHEET NOTE
[x] [address]  [address] [facility].    [phone]  F: [phone]   [] [address]  [address]   [address]  P: [phone]  F: [phone]  [] [address]  [address]  P: [phone]  F: [phone] [] Phill Bhagat: [phone]  F: [phone]  [] [address] [facility]  [address] [facility]   [address]   [place]: [phone]  F: [phone]   [] [address] [facility] [address] [facility] [address]  [place]: [phone]   F: [phone]     Physical Therapy Cancel/No Show note    Date: 2023  Patient: Nguyễn Larson  : 1955  MRN: [id number]    Cancels/No Shows to date:     For today's appointment patient:    [x]  Cancelled    [] Rescheduled appointment    [] No-show     Reason given by patient:    []  Patient ill    []  Conflicting appointment    [x] No transportation      [] Conflict with work    [] No reason given    [] Weather related    [] COVID-19    [x] Other:      Comments:  Patient with no future appts scheduled at this time. Called to inform of schedule and make future appts, left voicemail requesting return call to be scheduled.        [] Next appointment was confirmed    Electronically signed by: Linton Hodgkin, PTA

## 2023-09-18 ENCOUNTER — HOSPITAL ENCOUNTER (OUTPATIENT)
Dept: PHYSICAL THERAPY | Age: 68
Setting detail: THERAPIES SERIES
Discharge: HOME | End: 2023-09-18
Attending: PHYSICAL MEDICINE & REHABILITATION
Payer: MEDICARE

## 2023-09-18 ENCOUNTER — HOSPITAL ENCOUNTER (OUTPATIENT)
Dept: OCCUPATIONAL THERAPY | Age: 68
Setting detail: THERAPIES SERIES
Discharge: HOME | End: 2023-09-18
Attending: PHYSICAL MEDICINE & REHABILITATION
Payer: MEDICARE

## 2023-09-18 PROCEDURE — 97110 THERAPEUTIC EXERCISES: CPT

## 2023-09-18 PROCEDURE — 97116 GAIT TRAINING THERAPY: CPT

## 2023-09-18 PROCEDURE — 97112 NEUROMUSCULAR REEDUCATION: CPT

## 2023-09-18 NOTE — PROGRESS NOTES
Occupational Therapy    [x] [address]  [address] [facility] [place] [facility].  [phone]  F: [phone] [] [address] [facility] Occupational Therapy  [address] [facility]  Phone: [phone]  Fax: [phone]        Occupational Therapy Progress Note    Date: 2023      Patient: Herve Raines  : 1955  MRN: [id number]    Referring Provider:  Padmini Granda MD  Insurance: Medicare, 80 VISITS AUTHORIZED, BASED ON MED [facility]  Medical Diagnosis: I69.354 spastic hemiplegia of left nondominant side as late effect of cerebral infarction [facility])         Rehab Codes: pain in shoulder M25.51,, pain in elbow M25.52,, pain in hand M79.646,, pain in wrist M25.53,, stiffness in shoulder M25.61,, stiffness in elbow M25.62,, stiffness in hand M25.64,, stiffness in wrist M25.63,, lack of coordination R27.8,, fine motor skills loss R29.818,, or muscle weakness generalized M62.81,  Onset Date: 21                   Next Dr. Gonzalez Lamp: 23  Total visits attended:3  Cancels/No shows: 1/0  Date range of services: 23 to 23    Subjective:  Pain:  [] Yes  [x] No  Location:  N/A Pain Rating: (0-10 scale) 0/10 Pain altered Tx:  [x] No  [] Yes  Action:  Comments: \"It's just there\" re: L UE     Objective:  Test Measurements: No active movement in L UE at this time. Reviewed SROM, brace, AE & adl's. Function:   (decreased 5 pts since eval) functionally impaired as measured with the Upper Extremity Functional Index Survey. 0-80 scale, with 80 = no Deficits  (The UEFI model does not provide any specific cut off points that could classify the upper limb disability degree, however, a minimal detectable change of 9 points is provided. This means that for improvement or deterioration to be considered, between two subsequent evaluations, the scores must differ by at least 9 points.)       Assessment:  Short Term Goals: (  3    Treatments)  Decrease LUE pain to

## 2023-09-18 NOTE — FLOWSHEET NOTE
[x] [facility]       Occupational Therapy            1st floor       [address] [place], [place]         Phone: [phone]       Fax: [phone] [] [facility] Occupational Therapy  [address]   [place], [place]  Phone: [phone]  Fax: [phone]      Occupational Therapy Daily Treatment Note    Date:  2023  Patient Name:  Justino Jordan    :  1955  MRN: [id number]  Referring Provider:  Margo Ashby MD  Insurance: Medicare, 80 VISITS AUTHORIZED, BASED ON MED [facility]  Medical Diagnosis: I69.354 spastic hemiplegia of left nondominant side as late effect of cerebral infarction [facility])         Rehab Codes: pain in shoulder M25.51,, pain in elbow M25.52,, pain in hand M79.646,, pain in wrist M25.53,, stiffness in shoulder M25.61,, stiffness in elbow M25.62,, stiffness in hand M25.64,, stiffness in wrist M25.63,, lack of coordination R27.8,, fine motor skills loss R29.818,, or muscle weakness generalized M62.81,  Onset Date: 21                   Next Dr. Nato Manjarrez: 23  Visit# / total visits: 3/6; Progress note for Medicare patient Completed at visit 3    Cancels/No Shows: 1/0      Subjective:    Pain:  [] Yes  [x] No Location:  N/A Pain Rating: (0-10 scale) not rated/10  Pt at times c/o of \"hurts\" with end stretch of PROM/SROM and wt bearing. Pain altered Tx:  [x] No  [] Yes  Action:  Pt Comments: \"It's just there\"  Precautions: NONE  Surgery procedure and date:    Objective:   Today's Treatment:  Modalities:                   Precautions: none                     Exercise Flow Sheet:  Exercise Reps/Time Weight/Level Comments   PROM L All PLANES  ~10 min   Completed    Self ROM ~10 reps   Completed, Reviewed HEP   Shoulder Shrugs 10 reps x 2 sets   Completed, Reviewed HEP    Scap mobs  10 reps     Completed      Functional tasks      Reviewed   *Pt ordered silverware and may order scoop plate & cutting board    Wt shifting    To L on therapist leg and using

## 2023-09-18 NOTE — FLOWSHEET NOTE
least 300 ft consecutively with . 2m/s self selected gait speed using LRAD and supervision to improve safety and tolerance to short household ambulation distances  Pt able to complete stand pivot transfers to unlevel surfaces independently with good balance and speed, especially from lower surface to elevated surface like toilet seat  Pt able to ascend/descend ramp with LRAD with supervision and improving speed to improve independent access in/out of home  Any NMES home unit needs will have been discussed and worked towards at this time. At least 70% function per SIS-16 to indicate improving subjective functional ability     Patient goals: \"walk up and down steps, walk more\"    Pt. Education:  [x] Yes  [] No  [x] Reviewed Prior HEP/Ed  Method of Education: [x] Verbal  [x] Demo  [] Written  Comprehension of Education:  [x] Verbalizes understanding. [] Demonstrates understanding. [x] Needs review. [] Demonstrates/verbalizes HEP/Ed previously given. Plan: [x] Continue current frequency toward long and short term goals.     [x] Specific Instructions for subsequent treatments: contacting wheelchair and AFO vendors for improved equipment, high intensity gait training, balance training, HEP next visit      Time In: 200 pm            Time Out: 300 pm    Electronically signed by:  Abigail Cunningham PTA

## 2023-09-21 ENCOUNTER — TELEPHONE (OUTPATIENT)
Dept: PHYSICAL MEDICINE AND REHAB | Age: 68
End: 2023-09-21

## 2023-09-21 DIAGNOSIS — I69.354 SPASTIC HEMIPLEGIA OF LEFT NONDOMINANT SIDE AS LATE EFFECT OF CEREBRAL INFARCTION (HCC): Primary | ICD-10-CM

## 2023-09-21 NOTE — TELEPHONE ENCOUNTER
Pt called in asking for an order for 's rehab training. She states that while at PT recently that they mentioned that she could try driving again and to ask about a referral to be out in. Please advise.

## 2023-09-26 ENCOUNTER — HOSPITAL ENCOUNTER (OUTPATIENT)
Dept: PHYSICAL THERAPY | Age: 68
Setting detail: THERAPIES SERIES
Discharge: HOME | End: 2023-09-26
Attending: PHYSICAL MEDICINE & REHABILITATION
Payer: MEDICARE

## 2023-09-26 PROCEDURE — 97112 NEUROMUSCULAR REEDUCATION: CPT

## 2023-09-26 PROCEDURE — 97110 THERAPEUTIC EXERCISES: CPT

## 2023-09-26 PROCEDURE — 97116 GAIT TRAINING THERAPY: CPT

## 2023-09-26 NOTE — FLOWSHEET NOTE
to no UE assist as well as difficulty with step down despite support from [address] [place] [address]  - completed modified MaxA stand-pivot transfer to wheelchair    Ambulation 9 mins total  .1 - .2 mph   Assist for LLE placement and advancement as needed         Seated      LAQ 5x AA    HS curls 10x AA Towel on floor   Glute sets 10x           Supine      Glute sets 10x     Quad sets 1x     Bed mobility 2x No assist Sit to supine  Supine to siting         Other:     HEP  -  fall prevention handout  - high intensity gait training in litegait  - transfer training to higher surfaces, turning balance in bathroom    - consider Bioness trial  - give strengthening program for home, gastroc/posterior tib stretching program for home      Treatment Charges: Mins Units   []  Modalities     [x]  Ther Exercise 38 2   []  Manual Therapy     []  Ther Activities     [x]  Neuro Re-ed 12 1   []  Vasocompression     [x] Gait 12 1   []  Other: neuro     Total Treatment time 62 4       Assessment: [x] Progressing toward goals. Initiated treatment with ECOtality training for visual tracking, balance and reaching with non involved limb. ModA with side stepping to center of BITS screen with use of lynette walker. Patient did demonstrate a decreased reaction time to the far L quadrants; however not sure if patient was not visually seeing them as she denied trying to reaching far past midline. Introduced leg  for assist with car transfers. Patient preferred wider gait belt due to size of shoe. Placed shoulder support holster on patient to decrease risk on dislocation; however pt declined, stating she has not problems with the arm. Ended with gait training and SciFit to facilitate LE strengthening and function. Patient required max verbal encouragement throughout treatment. [] No change. [x] Other:  Orthotist missed zac't and r/s for next session. Patient made aware.       [x] Patient would continue to benefit from skilled physical therapy

## 2023-09-27 ENCOUNTER — HOSPITAL ENCOUNTER (OUTPATIENT)
Dept: PHYSICAL THERAPY | Age: 68
Setting detail: THERAPIES SERIES
Discharge: HOME | End: 2023-09-27
Attending: PHYSICAL MEDICINE & REHABILITATION
Payer: MEDICARE

## 2023-09-27 ENCOUNTER — HOSPITAL ENCOUNTER (OUTPATIENT)
Dept: OCCUPATIONAL THERAPY | Age: 68
Setting detail: THERAPIES SERIES
Discharge: HOME | End: 2023-09-27
Attending: PHYSICAL MEDICINE & REHABILITATION
Payer: MEDICARE

## 2023-09-27 PROCEDURE — 97112 NEUROMUSCULAR REEDUCATION: CPT

## 2023-09-27 NOTE — FLOWSHEET NOTE
[x] 1200 [facility]       Occupational Therapy            1st floor       [address] [place], [place]         Phone: [phone]       Fax: [phone] [] 805 [facility] Occupational Therapy  [address]   [place], [place]  Phone: [phone]  Fax: [phone]      Occupational Therapy Daily Treatment Note    Date:  2023  Patient Name:  Rashawn MustafaB:  1955  MRN: [id number]  Referring Provider:  Katherine Tavarez MD  Insurance: Medicare, 80 VISITS AUTHORIZED, BASED ON MED [facility]  Medical Diagnosis: I69.354 spastic hemiplegia of left nondominant side as late effect of cerebral infarction [facility])         Rehab Codes: pain in shoulder M25.51,, pain in elbow M25.52,, pain in hand M79.646,, pain in wrist M25.53,, stiffness in shoulder M25.61,, stiffness in elbow M25.62,, stiffness in hand M25.64,, stiffness in wrist M25.63,, lack of coordination R27.8,, fine motor skills loss R29.818,, or muscle weakness generalized M62.81,  Onset Date: 21                   Next Dr. Duke Cantrell: 23  Visit# / total visits: ; Progress note for Medicare patient Completed at visit 3    Cancels/No Shows: 1/0      Subjective:    Pain:  [] Yes  [x] No Location:  N/A Pain Rating: (0-10 scale) 3/10    Pain altered Tx:  [x] No  [] Yes  Action: NA  Pt Comments: no new reports or changes since last therapy appt. Reports L shoulder and wrist sometimes hurt. Precautions: NONE  Surgery procedure and date:  8/10/23 - L UPPER EXTREMITY  L pectoralis major 50 units  L FDS 80 units  L FDP 75 units  L FCR 25 units  L FPL 20 units     L LOWER EXTREMITY   L gastrocnemius 100 units (50 medial head, 50 lateral head)  L tibialis posterior 50 units    Objective:   Today's Treatment:  Modalities:  NA                 Precautions: none                     Exercise Flow Sheet:  Exercise Reps/Time Weight/Level Comments   PROM L All PLANES  ~10 min   Completed    Self ROM ~10 reps   Not completed    Shoulder Shrugs

## 2023-09-27 NOTE — FLOWSHEET NOTE
more\"    Pt. Education:  [x] Yes  [] No  [x] Reviewed Prior HEP/Ed  Method of Education: [x] Verbal  [x] Demo  [] Written  Comprehension of Education:  [x] Verbalizes understanding. [] Demonstrates understanding. [x] Needs review. [] Demonstrates/verbalizes HEP/Ed previously given. Plan: [x] Continue current frequency toward long and short term goals.     [x] Specific Instructions for subsequent treatments: contacting wheelchair and AFO vendors for improved equipment, high intensity gait training, balance training, HEP next visit      Time In: 200 pm            Time Out: 302 pm    Electronically signed by:  Luna Carrasco PTA

## 2023-10-04 ENCOUNTER — HOSPITAL ENCOUNTER (OUTPATIENT)
Dept: PHYSICAL THERAPY | Age: 68
Setting detail: THERAPIES SERIES
Discharge: HOME | End: 2023-10-04
Attending: PHYSICAL MEDICINE & REHABILITATION
Payer: MEDICARE

## 2023-10-04 ENCOUNTER — HOSPITAL ENCOUNTER (OUTPATIENT)
Dept: OCCUPATIONAL THERAPY | Age: 68
Setting detail: THERAPIES SERIES
Discharge: HOME | End: 2023-10-04
Attending: PHYSICAL MEDICINE & REHABILITATION
Payer: MEDICARE

## 2023-10-04 PROCEDURE — 97110 THERAPEUTIC EXERCISES: CPT

## 2023-10-04 PROCEDURE — 97112 NEUROMUSCULAR REEDUCATION: CPT

## 2023-10-04 NOTE — FLOWSHEET NOTE
[x] 1200 [facility]       Occupational Therapy            1st floor       [address] [place], [place]         Phone: [phone]       Fax: [phone] [] 805 [facility] Occupational Therapy  [address]   [place], [place]  Phone: [phone]  Fax: [phone]      Occupational Therapy Daily Treatment Note    Date:  10/4/2023  Patient Name:  Justino Jordan    :  1955  MRN: [id number]  Referring Provider:  Margo Ashby MD  Insurance: Medicare, 80 VISITS AUTHORIZED, BASED ON MED [facility]  Medical Diagnosis: I69.354 spastic hemiplegia of left nondominant side as late effect of cerebral infarction [facility])         Rehab Codes: pain in shoulder M25.51,, pain in elbow M25.52,, pain in hand M79.646,, pain in wrist M25.53,, stiffness in shoulder M25.61,, stiffness in elbow M25.62,, stiffness in hand M25.64,, stiffness in wrist M25.63,, lack of coordination R27.8,, fine motor skills loss R29.818,, or muscle weakness generalized M62.81,  Onset Date: 21                   Next Dr. Nato Manjarrez: 23  Visit# / total visits: ; Progress note for Medicare patient Completed at visit 3    Cancels/No Shows: 1/0      Subjective:    Pain:  [] Yes  [x] No Location:  L shoulder  Pain Rating: (0-10 scale) 3/10    Pain altered Tx:  [x] No  [] Yes  Action: NA  Pt Comments: Reports L shoulder \"just hangs\" so it hurts a lot. Precautions: NONE  Surgery procedure and date:  8/10/23 - L UPPER EXTREMITY  L pectoralis major 50 units  L FDS 80 units  L FDP 75 units  L FCR 25 units  L FPL 20 units     L LOWER EXTREMITY   L gastrocnemius 100 units (50 medial head, 50 lateral head)  L tibialis posterior 50 units    Objective:   Today's Treatment:  Modalities:  NA                 Precautions: none                     Exercise Flow Sheet:  Exercise Reps/Time Weight/Level Comments   PROM L All PLANES  ~10 min   Completed    Self ROM ~10 reps   Not completed    Table top towel slides - assisted   Completed -

## 2023-10-04 NOTE — FLOWSHEET NOTE
able to trial NMES for L quad/ankle to assess for improved stance/swing phases of gait  Independent in self stretching program for L PF/inverters to allow maximal improvements with ROM post regular botox  Patient to be independent with home exercise program as demonstrated by performance with correct form without cues. Demonstrate Knowledge of fall prevention     LTG: (to be met in 20 treatments)  ? Pain: No more than 3/10 max pain in L hemibody post therapy to indicate improving tolerance to increased functional mobility  ? ROM: L DF PROM to at least 5deg to allow improved ankle mobility for transfers, low sit<>stands  ? Strength:  Pt able to complete 5x STS with min UE assist from 18\" seat in <20s to indicate further improving power/strength in BLEs needed for transfers from low couch  ? Balance: Lorenzo score to at least 14/56 to indicate improving standing static/dynamic balance  ? Function:  Pt will be able to ambulate at least 300 ft consecutively with . 2m/s self selected gait speed using LRAD and supervision to improve safety and tolerance to short household ambulation distances  Pt able to complete stand pivot transfers to unlevel surfaces independently with good balance and speed, especially from lower surface to elevated surface like toilet seat  Pt able to ascend/descend ramp with LRAD with supervision and improving speed to improve independent access in/out of home  Any NMES home unit needs will have been discussed and worked towards at this time. At least 70% function per SIS-16 to indicate improving subjective functional ability     Patient goals: \"walk up and down steps, walk more\"    Pt. Education:  [x] Yes  [] No  [x] Reviewed Prior HEP/Ed  Method of Education: [x] Verbal  [x] Demo  [] Written  Comprehension of Education:  [x] Verbalizes understanding. [] Demonstrates understanding. [x] Needs review. [] Demonstrates/verbalizes HEP/Ed previously given.      Plan: [x] Continue current frequency

## 2023-10-06 ENCOUNTER — HOSPITAL ENCOUNTER (OUTPATIENT)
Dept: PHYSICAL THERAPY | Age: 68
Setting detail: THERAPIES SERIES
End: 2023-10-06
Attending: PHYSICAL MEDICINE & REHABILITATION
Payer: MEDICARE

## 2023-10-06 ENCOUNTER — APPOINTMENT (OUTPATIENT)
Dept: OCCUPATIONAL THERAPY | Age: 68
End: 2023-10-06
Attending: PHYSICAL MEDICINE & REHABILITATION
Payer: MEDICARE

## 2023-10-17 ENCOUNTER — HOSPITAL ENCOUNTER (OUTPATIENT)
Dept: PHYSICAL THERAPY | Age: 68
Setting detail: THERAPIES SERIES
Discharge: HOME | End: 2023-10-17
Attending: PHYSICAL MEDICINE & REHABILITATION
Payer: MEDICARE

## 2023-10-17 ENCOUNTER — HOSPITAL ENCOUNTER (OUTPATIENT)
Dept: OCCUPATIONAL THERAPY | Age: 68
Setting detail: THERAPIES SERIES
Discharge: HOME | End: 2023-10-17
Attending: PHYSICAL MEDICINE & REHABILITATION
Payer: MEDICARE

## 2023-10-17 PROCEDURE — 97110 THERAPEUTIC EXERCISES: CPT

## 2023-10-17 PROCEDURE — 97112 NEUROMUSCULAR REEDUCATION: CPT

## 2023-10-17 NOTE — PROGRESS NOTES
Assessment: Educated pt on placing skilled OT intervention on hold at this time due to no change in LUE function since OT evaluation. No active movement of LUE. Pt has been offered to complete UB dressing during OT sessions, but has declined. Tried Bioness one session, but pt did not like it. Educated pt that going forward, it would likely be best to complete skilled OT treatment 2-3 weeks after Botox injections to maximize benefit, voiced understanding. Pt was scheduled in November following next round of Botox injections. Encouraged pt to continue with self ROM to maintain joint flexibility.      Short Term Goals: (  3    Treatments)  Decrease LUE pain to 2/10 MET  Increase function:UE Functional Index Score 25/80 or more points to promote increased functional abilities NOT MET   Patient to be independent with home exercise program as demonstrated by performance with correct form without cues NOT MET      Long Term Goals: (  6  Treatments)  Increase function:UE Functional Index Score 28/30 or more points to promote increased functional abilities - NOT MET  Patient will identify 2 pieces of adaptive equipment to increase safety and independence with home making tasks - NOT MET  Patient will need standby assistance with UE dressing tasks - NOT MET, Min A     Patient Goals: get dressed independently - NOT MET    Treatment Plan:   [x]  Therapeutic Exercise   06836              []  Iontophoresis: 4 mg/mL Dexamethasone Sodium Phosphate  mAmin  15567    []  Therapeutic Activity  15074  []  Vasopneumatic cold with compression  68974                [x]  ADL training  52235  []  Ultrasound        [id number]    [x]  Neuromuscular Re-education  42232  [x]  Electrical Stimulation Attended  48915    [x]  Manual Therapy  40649  Orthotic    []  Fit  60870     []  Train [phone]    [x]  Instruction in HEP        Prosthetic    []  Fit [id number]      []  Train [phone]    []  Cognitive Interventions, (first 15 min 19631, subsequent 15 min

## 2023-10-17 NOTE — FLOWSHEET NOTE
[x] [facility] ([facility]) [facility] &  Therapy  [address] [facility].  [phone]  F: [phone]     Physical Therapy Daily Treatment Note    Date:  10/17/2023  Patient Name:  Myra Verde    :  1955  MRN: [id number]  Physician: Dr. Yuki Moore MD                                   Insurance: Medicare ([address] [place] [address])  Medical Diagnosis: F60.354 (ICD-10-CM) - Spastic hemiplegia of left nondominant side as late effect of cerebral infarction [facility])  Rehab Codes: R26.89, M62.81, R29.3, G81.1  Next 's appt.: 23  Date of symptom onset: 21  Visit# / total visits: ; Progress note for Medicare patient due at visit 10     Cancels/No Shows: 0    Subjective:    Pain:  [] Yes  [x] No Location: L shoulder Pain Rating: (0-10 scale) 0/10  Pain altered Tx:  [x] No  [] Yes  Action:  Comments:   Patient arrives with broken AFO strap around upper gastroc this morning. Would like to talk to someone about getting a new wheelchair. Objective:    Treatment this date included using Functional Electrical Stimulation via the Xcite 2. Stimulation parameters and outcomes can be viewed on [email or web address] with the patients' username (patient ID generated by [employer] without any patient identifiers) and password (patients' pin generated by [employer] without any patient identifiers). A check of the patients' skin prior to application of electrodes, and after the treatment concluded was completed and noted mod thin skin. See below in treatment log for treatments performed.     Modalities:   Precautions: Gait belt donned for all gait and standing therex  Exercises: bold completed 10/17/23  Exercise Reps/ Time Weight/ Level Comments   SciFit - supervised 5 min L3 ModA - patient squat pivot from wheelchair to nustep chair    Blue tband around knees for L knee alignment     Educated for 60-80spm  Required assist placing L foot back on foot plate 2x     Supervised with hand assist   BITS training 12

## 2023-10-23 ENCOUNTER — APPOINTMENT (OUTPATIENT)
Dept: OCCUPATIONAL THERAPY | Age: 68
End: 2023-10-23
Attending: PHYSICAL MEDICINE & REHABILITATION
Payer: MEDICARE

## 2023-10-23 ENCOUNTER — HOSPITAL ENCOUNTER (OUTPATIENT)
Dept: PHYSICAL THERAPY | Age: 68
Setting detail: THERAPIES SERIES
Discharge: HOME | End: 2023-10-23
Attending: PHYSICAL MEDICINE & REHABILITATION
Payer: MEDICARE

## 2023-10-23 PROCEDURE — 97116 GAIT TRAINING THERAPY: CPT

## 2023-10-23 PROCEDURE — 97112 NEUROMUSCULAR REEDUCATION: CPT

## 2023-10-23 PROCEDURE — 97530 THERAPEUTIC ACTIVITIES: CPT

## 2023-10-23 NOTE — FLOWSHEET NOTE
[x] [facility] ([facility]) - [facility] &  Therapy  [address] [facility].  [phone]  F: [phone]     Physical Therapy Daily Treatment Note    Date:  10/23/2023  Patient Name:  Gregoria Hancock    :  1955  MRN: [id number]  Physician: Dr. Mendel Innocent, MD                                   Insurance: Medicare ([address] [place] [address])  Medical Diagnosis: X31.350 (ICD-10-CM) - Spastic hemiplegia of left nondominant side as late effect of cerebral infarction [facility])  Rehab Codes: R26.89, M62.81, R29.3, G81.1  Next 's appt.: 23  Date of symptom onset: 21  Visit# / total visits: 10/20; Progress note for Medicare patient due at visit 10     Cancels/No Shows: 0    Subjective:    Pain:  [] Yes  [x] No Location: L shoulder Pain Rating: (0-10 scale) 0/10  Pain altered Tx:  [x] No  [] Yes  Action:  Comments:   Patient notes it's difficult for her to walk to the bathroom at night d/t no brace on and her L ankle wants to roll in. L anterior shell AFO is still broken - unable to achieve good contact at top of brace with supplemented velcro from last visit and pt notes she needs a brace to feel comfortable walking on ankle in shoe, otherwise will feel that ankle will \"pop\" or roll inward. Pt states she's stretching her leg while laying down, bringing knee into her chest and \"letting leg do most of the work\", describes like a SKTC stretch. Objective:    Treatment this date included using Functional Electrical Stimulation via the [employer] L300Go, including the thigh cuff. Stimulation parameters and outcomes can be viewed on [employer] with the patients' username (patient ID is generated by first initial of first name, and first initial of last name, followed by two digit month and two digit day the treatment commenced), and no patient specific password required.      A check of the patients' skin prior to application of electrodes, and after the treatment concluded was completed and no skin

## 2023-10-25 ENCOUNTER — APPOINTMENT (OUTPATIENT)
Dept: OCCUPATIONAL THERAPY | Age: 68
End: 2023-10-25
Attending: PHYSICAL MEDICINE & REHABILITATION
Payer: MEDICARE

## 2023-10-25 ENCOUNTER — HOSPITAL ENCOUNTER (OUTPATIENT)
Dept: PHYSICAL THERAPY | Age: 68
Setting detail: THERAPIES SERIES
Discharge: HOME | End: 2023-10-25
Attending: PHYSICAL MEDICINE & REHABILITATION
Payer: MEDICARE

## 2023-10-25 PROCEDURE — 97116 GAIT TRAINING THERAPY: CPT

## 2023-10-25 PROCEDURE — 97110 THERAPEUTIC EXERCISES: CPT

## 2023-10-25 NOTE — FLOWSHEET NOTE
[x] [facility]) [facility] &  Therapy  [address].  [phone]  F: [phone]     Physical Therapy Daily Treatment Note    Date:  10/25/2023  Patient Name:  Suellen Hagan    :  1955  MRN: [id number]  Physician: Dr. Ramsey Mckeon MD                                   Insurance: Medicare ([address] [place] [address])  Medical Diagnosis: B74.056 (ICD-10-CM) - Spastic hemiplegia of left nondominant side as late effect of cerebral infarction [facility])  Rehab Codes: R26.89, M62.81, R29.3, G81.1  Next 's appt.: 23  Date of symptom onset: 21  Visit# / total visits: ; Progress note for Medicare patient due at visit 10     Cancels/No Shows: 0    Subjective:    Pain:  [] Yes  [x] No Location: L shoulder Pain Rating: (0-10 scale) 0/10  Pain altered Tx:  [x] No  [] Yes  Action:  Comments:   Patient notes she's still not weightbearing/walking on LLE d/t brace being broken and not having enough strength/stability when standing. Objective:    Treatment this date included using Functional Electrical Stimulation via the [employer] L300Go, including the thigh cuff. Stimulation parameters and outcomes can be viewed on [employer] with the patients' username (patient ID is generated by first initial of first name, and first initial of last name, followed by two digit month and two digit day the treatment commenced), and no patient specific password required. A check of the patients' skin prior to application of electrodes, and after the treatment concluded was completed and no skin abnormalities noted. Gait mode was used this date. See below in treatment log for treatments performed.     Modalities:   Precautions: Gait belt donned for all gait and standing therex  Exercises: bold completed 10/25/23 - HELD most treatment d/t time spent in LTGs, see below for results:  Exercise Reps/ Time Weight/ Level Comments   BITS training 12 min Visual Scanning W/C to lynette  ModA with

## 2023-10-26 DIAGNOSIS — I69.398 SPASTICITY AS LATE EFFECT OF CEREBROVASCULAR ACCIDENT (CVA): ICD-10-CM

## 2023-10-26 DIAGNOSIS — G81.94 LEFT HEMIPLEGIA (HCC): Primary | ICD-10-CM

## 2023-10-26 DIAGNOSIS — R25.2 SPASTICITY AS LATE EFFECT OF CEREBROVASCULAR ACCIDENT (CVA): ICD-10-CM

## 2023-10-30 ENCOUNTER — APPOINTMENT (OUTPATIENT)
Dept: OCCUPATIONAL THERAPY | Age: 68
End: 2023-10-30
Attending: PHYSICAL MEDICINE & REHABILITATION
Payer: MEDICARE

## 2023-10-30 ENCOUNTER — HOSPITAL ENCOUNTER (OUTPATIENT)
Dept: PHYSICAL THERAPY | Age: 68
Setting detail: THERAPIES SERIES
Discharge: HOME | End: 2023-10-30
Attending: PHYSICAL MEDICINE & REHABILITATION
Payer: MEDICARE

## 2023-10-30 PROCEDURE — 97116 GAIT TRAINING THERAPY: CPT

## 2023-10-30 PROCEDURE — 97530 THERAPEUTIC ACTIVITIES: CPT

## 2023-11-01 ENCOUNTER — APPOINTMENT (OUTPATIENT)
Dept: OCCUPATIONAL THERAPY | Age: 68
End: 2023-11-01
Attending: PHYSICAL MEDICINE & REHABILITATION
Payer: MEDICARE

## 2023-11-01 ENCOUNTER — HOSPITAL ENCOUNTER (OUTPATIENT)
Dept: PHYSICAL THERAPY | Age: 68
Setting detail: THERAPIES SERIES
Discharge: HOME | End: 2023-11-01
Attending: PHYSICAL MEDICINE & REHABILITATION
Payer: MEDICARE

## 2023-11-01 PROCEDURE — 97530 THERAPEUTIC ACTIVITIES: CPT

## 2023-11-01 NOTE — FLOWSHEET NOTE
[x] [facility] ([facility]) [facility] &  Therapy  [address] [facility].  [phone]  F: [phone]     Physical Therapy Daily Treatment Note    Date:  2023  Patient Name:  Kayla Veras    :  1955  MRN: [id number]  Physician: Dr. Yeimi Arndt MD                                   Insurance: Medicare ([address] [place] [address])  Medical Diagnosis: G30.927 (ICD-10-CM) - Spastic hemiplegia of left nondominant side as late effect of cerebral infarction [facility])  Rehab Codes: R26.89, M62.81, R29.3, G81.1  Next 's appt.: 23  Date of symptom onset: 21  Visit# / total visits: ; Progress note for Medicare patient due at visit 10     Cancels/No Shows: 0    Subjective:    Pain:  [x] Yes  [] No Location: L shoulder Pain Rating: (0-10 scale) did not assess/10  Pain altered Tx:  [x] No  [] Yes  Action:  Comments:   Patient states she felt fatigued at start of treatment after not sleeping well last night.      Objective:      Modalities:   Precautions: Gait belt donned for all gait and standing therex  Exercises: bold completed 23   Exercise Reps/ Time Weight/ Level Comments   BITS training 12 min Visual Scanning W/C to lynette  ModA with side stepping to screen  User Pace   1 min at mid quad  1 min Upper quad full screen  1 min Lateral quad  Patient declined with cross reaching far past midline - encouraged to try  Sit rest breaks b/t each  After 1st set, brought in clinic WC  Poor stepping strategy to the side   Xcite Training 15 min  EOB and supine  Gastroc/Ant Fib 2x3.5\" pads  Quads/Hams 3x4\" pads    DF on bolster     10x3   Heel slides with Xcite     10x2     7x; 8x without [employer]     Next session:  Add glutes  Move to // bars to focus on weight shift           Mat      Supine hip flexor stretch 3x1 min     Scooting in bed x  Practiced repetitively between exercises   Bridge 2x10     Seated calf stretch 2x1 min  Foot tucked under chair, pt pushing down on

## 2023-11-06 ENCOUNTER — HOSPITAL ENCOUNTER (OUTPATIENT)
Dept: PHYSICAL THERAPY | Age: 68
Setting detail: THERAPIES SERIES
Discharge: HOME | End: 2023-11-06
Attending: PHYSICAL MEDICINE & REHABILITATION
Payer: MEDICARE

## 2023-11-06 PROCEDURE — 97140 MANUAL THERAPY 1/> REGIONS: CPT

## 2023-11-06 PROCEDURE — 97110 THERAPEUTIC EXERCISES: CPT

## 2023-11-06 NOTE — FLOWSHEET NOTE
femoris deficit evident also  ? Strength:  Pt able to complete 5x STS with min UE assist from 18\" seat in <30s to indicate improving power/strength in BLEs needed for transfers from low couch - Nearly met, 30.77s still with unilat assist, CGA only  ? Balance:   Pt able to stand at kitchen counter and reach into overhead cupboard for glasses without LOB, CGA for safety - Ongoing, able to complete but just feels unsteady, not leaning on counter  Pt able to stand with LRAD and complete pivot turn in either direction in <5s with improving balance, quickness of LLE placement - Partially met, able to complete sit pivot in <5sec towards R, but ~10s with hemicane to left and spins fully around to R (avoiding L turn)  ? Function:  Pt to be able to ambulate at least 150 ft with . 1m/s gait speed with CGA and LRAD. - NOT MET, limited to ~50 ft before needing rest break, at .09m/s which is improved since eval (.06m/s)  Pt able to complete stand pivot transfers to unlevel surfaces with no more than CGA needed and no cuing with improving ability to move LLE vs. just pivoting on limb and getting it twisted underneath her - Ongoing, gets stuck on carpet when pivoting  Pt able to ascend/descend ramp with LRAD with CGA to improve independent access in/out of home - Did not assess in error, will complete in upcoming visits; of note, likely difficult d/t current ability to ambulate only ~50 ft max consecutively on level ground  Pt able to trial NMES for L quad/ankle to assess for improved stance/swing phases of gait  Independent in self stretching program for L PF/inverters to allow maximal improvements with ROM post regular botox - Ongoing, has used Xcite but will trial Bioness for gait improvements  Patient to be independent with home exercise program as demonstrated by performance with correct form without cues.  - Ongoing, unclear completion at home (more self led exercise)  Demonstrate Knowledge of fall prevention - Ongoing, will

## 2023-11-08 ENCOUNTER — HOSPITAL ENCOUNTER (OUTPATIENT)
Dept: PHYSICAL THERAPY | Age: 68
Setting detail: THERAPIES SERIES
Discharge: HOME | End: 2023-11-08
Attending: PHYSICAL MEDICINE & REHABILITATION
Payer: MEDICARE

## 2023-11-08 PROCEDURE — 97140 MANUAL THERAPY 1/> REGIONS: CPT

## 2023-11-08 PROCEDURE — 97110 THERAPEUTIC EXERCISES: CPT

## 2023-11-08 NOTE — FLOWSHEET NOTE
ascend/descend ramp with LRAD with CGA to improve independent access in/out of home - Did not assess in error, will complete in upcoming visits; of note, likely difficult d/t current ability to ambulate only ~50 ft max consecutively on level ground  Pt able to trial NMES for L quad/ankle to assess for improved stance/swing phases of gait  Independent in self stretching program for L PF/inverters to allow maximal improvements with ROM post regular botox - Ongoing, has used Xcite but will trial Bioness for gait improvements  Patient to be independent with home exercise program as demonstrated by performance with correct form without cues. - Ongoing, unclear completion at home (more self led exercise)  Demonstrate Knowledge of fall prevention - Ongoing, will provide written handout next visit but does verbalize strategies already in place     LTG: (to be met in 20 treatments)  ? Pain: No more than 3/10 max pain in L hemibody post therapy to indicate improving tolerance to increased functional mobility  ? ROM: L DF PROM to at least 5deg to allow improved ankle mobility for transfers, low sit<>stands  ? Strength:  Pt able to complete 5x STS with min UE assist from 18\" seat in <20s to indicate further improving power/strength in BLEs needed for transfers from low couch 15 sec  ? Balance: Lorenzo score to at least 14/56 to indicate improving standing static/dynamic balance  ? Function:  Pt will be able to ambulate at least 300 ft consecutively with . 2m/s self selected gait speed using LRAD and supervision to improve safety and tolerance to short household ambulation distances  Pt able to complete stand pivot transfers to unlevel surfaces independently with good balance and speed, especially from lower surface to elevated surface like toilet seat  Pt able to ascend/descend ramp with LRAD with supervision and improving speed to improve independent access in/out of home  Any NMES home unit needs will have been discussed and

## 2023-11-13 ENCOUNTER — PROCEDURE VISIT (OUTPATIENT)
Dept: PHYSICAL MEDICINE AND REHAB | Age: 68
End: 2023-11-13
Payer: MEDICARE

## 2023-11-13 VITALS
HEIGHT: 68 IN | SYSTOLIC BLOOD PRESSURE: 118 MMHG | BODY MASS INDEX: 26.61 KG/M2 | TEMPERATURE: 98 F | DIASTOLIC BLOOD PRESSURE: 82 MMHG | HEART RATE: 85 BPM

## 2023-11-13 DIAGNOSIS — G44.52 NEW DAILY PERSISTENT HEADACHE: ICD-10-CM

## 2023-11-13 DIAGNOSIS — I69.354 SPASTIC HEMIPLEGIA OF LEFT NONDOMINANT SIDE AS LATE EFFECT OF CEREBRAL INFARCTION (HCC): Primary | ICD-10-CM

## 2023-11-13 PROCEDURE — 1123F ACP DISCUSS/DSCN MKR DOCD: CPT | Performed by: PHYSICAL MEDICINE & REHABILITATION

## 2023-11-13 PROCEDURE — 64646 CHEMODENERV TRUNK MUSC 1-5: CPT | Performed by: PHYSICAL MEDICINE & REHABILITATION

## 2023-11-13 PROCEDURE — 64644 CHEMODENERV 1 EXTREM 5/> MUS: CPT | Performed by: PHYSICAL MEDICINE & REHABILITATION

## 2023-11-13 PROCEDURE — G8400 PT W/DXA NO RESULTS DOC: HCPCS | Performed by: PHYSICAL MEDICINE & REHABILITATION

## 2023-11-13 PROCEDURE — 4004F PT TOBACCO SCREEN RCVD TLK: CPT | Performed by: PHYSICAL MEDICINE & REHABILITATION

## 2023-11-13 PROCEDURE — G8417 CALC BMI ABV UP PARAM F/U: HCPCS | Performed by: PHYSICAL MEDICINE & REHABILITATION

## 2023-11-13 PROCEDURE — 1090F PRES/ABSN URINE INCON ASSESS: CPT | Performed by: PHYSICAL MEDICINE & REHABILITATION

## 2023-11-13 PROCEDURE — 99213 OFFICE O/P EST LOW 20 MIN: CPT | Performed by: PHYSICAL MEDICINE & REHABILITATION

## 2023-11-13 PROCEDURE — 64643 CHEMODENERV 1 EXTREM 1-4 EA: CPT | Performed by: PHYSICAL MEDICINE & REHABILITATION

## 2023-11-13 PROCEDURE — 3017F COLORECTAL CA SCREEN DOC REV: CPT | Performed by: PHYSICAL MEDICINE & REHABILITATION

## 2023-11-13 PROCEDURE — 3074F SYST BP LT 130 MM HG: CPT | Performed by: PHYSICAL MEDICINE & REHABILITATION

## 2023-11-13 PROCEDURE — 95874 GUIDE NERV DESTR NEEDLE EMG: CPT | Performed by: PHYSICAL MEDICINE & REHABILITATION

## 2023-11-13 PROCEDURE — G8427 DOCREV CUR MEDS BY ELIG CLIN: HCPCS | Performed by: PHYSICAL MEDICINE & REHABILITATION

## 2023-11-13 PROCEDURE — 3079F DIAST BP 80-89 MM HG: CPT | Performed by: PHYSICAL MEDICINE & REHABILITATION

## 2023-11-13 PROCEDURE — G8484 FLU IMMUNIZE NO ADMIN: HCPCS | Performed by: PHYSICAL MEDICINE & REHABILITATION

## 2023-11-13 NOTE — PROGRESS NOTES
[facility],[address] PHYSICAL MEDICINE AND REHABILITATION  [address] [place] [address]  Dept: [phone]  Dept Fax: [phone]    Outpatient Followup Note    Nguyễn Larson, 79 y.o., female, presents for follow up c/o of Neurologic Problem  . 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 She notes some improvement in her tone after Botox but doesn't feel that it lasts very long. Today she is c/o new headache that started approximately 2 weeks ago, is occurring daily. Is sharp and bifrontal/sinus and then at times posterior aching headache. Some associated dizziness at times. No other associated symptoms. Mild relief with OTC Tylenol. It can keep her from sleeping at night. Neurologic Problem  Associated symptoms include headaches. Headache  Longest time without a headache:  Weeks  Time of day symptoms are worse:  No specific time of day  Do headaches wake patient from sleep?: Yes    Days of the week symptoms are worse:  No specific day of the week  Quality:  Sharp  Laterality:  Both sides at the same time  Location:  Back of head  Pain severity:  8  Duration:  2 weeks  Headaches last more than three days?: Yes    Aggravating factors:  None    Spasticity: only 1 week relief. Hard for PROM worse in fingers.     Walking further in PT       Past Medical History:   Diagnosis Date    Asthma     COPD (chronic obstructive pulmonary disease) ([address])     Diabetes mellitus ([address])     Esophageal ring     Gastritis     Hyperlipidemia     Hyperplastic colon polyp     Hypertension     Osteoarthritis     Patient in clinical research study 05/04/2021    DAXA     TIA (transient ischemic attack) 2013    Per Daughter      Past Surgical History:   Procedure Laterality Date    APPENDECTOMY      COLONOSCOPY  09/20/2017    FRAGMENTS OF HYPERPLASTIC POLYP

## 2023-11-15 ENCOUNTER — HOSPITAL ENCOUNTER (OUTPATIENT)
Dept: PHYSICAL THERAPY | Age: 68
Setting detail: THERAPIES SERIES
Discharge: HOME | End: 2023-11-15
Attending: PHYSICAL MEDICINE & REHABILITATION
Payer: MEDICARE

## 2023-11-15 PROCEDURE — 97110 THERAPEUTIC EXERCISES: CPT

## 2023-11-15 PROCEDURE — 97116 GAIT TRAINING THERAPY: CPT

## 2023-11-15 NOTE — FLOWSHEET NOTE
Transfer from Sam to commode  STS  2x5 ft   Turning in [place] 10x ea way  - restricted radius to simulate bathroom at home   - hemiwalker         // bars      Reji 8x   Cues for terminal knee   Weight shifts 2 min  Performed in front of mirror   STS 5x  5x at 15 sec         Litegait      Initial explanation, safety check, donning harness, transfers x  Transfers:   ONTO LITEGAIT  - seated in chair to stand > into safety harness/belts  - assist from [address] [place] [address] for support and PTA assist for LE advancement to step up to treadmill    OFF LITEGAIT  - patient facing forward stepping off Litegait   - Unable to complete full transfer with litegait assist this date secondary to no UE assist as well as difficulty with step down despite support from [address] [place] [address]  - completed modified MaxA stand-pivot transfer to wheelchair                Other:     Plan for next:  - turning in [place] with hemiwalker, OT kitchen reach/balance, gait train with error augmentation, transferring to L side    Treatment Charges: Mins Units   []  Modalities     [x]  Ther Exercise 25 2   [x]  Manual Therapy     []  Ther Activities     []  Neuro Re-ed     []  Vasocompression     [x] Gait 40 2   []  Other     Total Treatment time 65 4     1) Billed gait for 3M walk test, incline ramps, sam walker gait    Assessment: [x] Progressing toward goals. Initiated treatment with sam walker gait training into // bars. Limited time spend in // bars with patient reporting slippery  on handrail despite addition of thera bands. Advanced to incline gait training for home environment. CGA with use of handrail with incline up ramp for 48 ft, continued with no rest break, declined ramp 24 ft with use of sam walker. Patient reports mod level of fatigue and required use of inhaler. Poor gait speed noted as a independent ADL ambulator with 0.08 m/s during the 3 M gait test.  Ended with doffed AFO with PROM to promote ROM following botox injections.   Patient

## 2023-11-20 ENCOUNTER — HOSPITAL ENCOUNTER (OUTPATIENT)
Dept: PHYSICAL THERAPY | Age: 68
Setting detail: THERAPIES SERIES
Discharge: HOME | End: 2023-11-20
Attending: PHYSICAL MEDICINE & REHABILITATION
Payer: MEDICARE

## 2023-11-20 ENCOUNTER — APPOINTMENT (OUTPATIENT)
Dept: OCCUPATIONAL THERAPY | Age: 68
End: 2023-11-20
Attending: PHYSICAL MEDICINE & REHABILITATION
Payer: MEDICARE

## 2023-11-20 PROCEDURE — 97530 THERAPEUTIC ACTIVITIES: CPT

## 2023-11-20 NOTE — FLOWSHEET NOTE
[x] [address]  [facility].  [phone]  F: [phone]     Physical Therapy Daily Treatment Note    Date:  2023  Patient Name:  Savana Muller    :  1955  MRN: [id number]  Physician: Dr. Finesse Gutierrez MD                                   Insurance: Medicare ([id number] [place] [address])  Medical Diagnosis: E32.787 (ICD-10-CM) - Spastic hemiplegia of left nondominant side as late effect of cerebral infarction [facility])  Rehab Codes: R26.89, M62.81, R29.3, G81.1  Next 's appt.: 23  Date of symptom onset: 21  Visit# / total visits: ; Progress note for Medicare patient due at visit 10     Cancels/No Shows: 0    Subjective:    Pain:  [x] Yes  [] No Location: L leg (piriformis) Pain Rating: (0-10 scale) not rated/10  Pain altered Tx:  [x] No  [] Yes  Action:   Comments:  Patient reports she has had a headache for a week now, on and off, but when it comes on it's \"blinding\" and hurts behind her eyes and at her temples - causes some blurriness.     Objective:      Modalities:   Precautions: Gait belt donned for all gait and standing therex  Exercises: bold completed 23   Exercise Reps/ Time Weight/ Level Comments   NuStep 5' Level 4 ModA  Pt request assist - encouraged self assist  Billed under TE  Use of gait belt for adductors   BITS training 12 min Visual Scanning W/C to lynette  ModA with side stepping to screen  User Pace   1 min at mid quad  1 min Upper quad full screen  1 min Lateral quad  Patient declined with cross reaching far past midline - encouraged to try  Sit rest breaks b/t each  After 1st set, brought in clinic WC  Poor stepping strategy to the side   Xcite Training 15 min  EOB and supine  Gastroc/Ant Fib 2x3.5\" pads  Quads/Hams 3x4\" pads    DF on bolster     10x3   Heel slides with Xcite     10x2     7x; 8x without [employer]     10x2     Next session:  Add glutes  Move to // bars to focus on weight shift

## 2023-11-22 ENCOUNTER — HOSPITAL ENCOUNTER (OUTPATIENT)
Dept: PHYSICAL THERAPY | Age: 68
Setting detail: THERAPIES SERIES
Discharge: HOME | End: 2023-11-22
Attending: PHYSICAL MEDICINE & REHABILITATION
Payer: MEDICARE

## 2023-11-22 NOTE — FLOWSHEET NOTE
[x] [facility] &  Therapy  [address] [facility].    [phone]  F: [phone]   [] [address]  [address]  P: [phone]  F: [phone]  [] [address]  [address]  P: [phone]  F: [phone] [] [facility]  P: [phone]  F: [phone]  [] [address]  [address]   [facility]: [phone]  F: [phone]   [] [address] [facility]  [address]  [facility]: [phone]   F: [phone]     Physical Therapy Cancel/No Show note    Date: 2023  Patient: Flaco Cuevas  : 1955  MRN: [id number]    Cancels/No Shows to date:     For today's appointment patient:    [x]  Cancelled    [] Rescheduled appointment    [] No-show     Reason given by patient:    []  Patient ill    []  Conflicting appointment    [] No transportation      [] Conflict with work    [x] No reason given    [] Weather related    [] BUOTL-    [] Other:      Comments:       [x] Next appointment was confirmed    Electronically signed by: Niru Hollingsworth PTA

## 2023-11-22 NOTE — FLOWSHEET NOTE
[x] [address]  [address] [facility].  [phone]  F: [phone] [] [address] [facility]  [address]  P: [phone]  F: [phone] [] [address]  [address] [facility]  P: [phone]  F: [phone] [] Phill Gruberie: [phone]  F: [phone] [] [address] [facility]  [address]   P: [phone]  F: [phone]  [] [address] [facility].   P: [phone]  F: [phone] [] [address] [facility]  [address]  P: [phone]  F: [phone] [] [address] [facility]  [address] [facility]  [place]: [phone]  F: [phone] [] [facility]  [place]: [phone]  F: [phone]  [] [address]  [address]  [place]: [phone]  F: [phone]     THERAPY RESPONSIBILITY OF CARE TRANSFER FORM       PATIENT NAME: Joyce Cardozo  MRN: [id number]   : 1955      TRANSFERRING FACILITY:    [] [facility]   [] [facility]   [] [facility]   [] [facility]   [] [facility] [] [place] [facility]   [x] [facility]  [] [facility]  [] [facility]   [] [facility]  [] [facility]   [] Other:          ACCEPTING FACILITY  [] [facility]   [] [facility]   [] [facility]   [] [facility]   [] [facility] [] [facility]   [x] [facility]  [] [facility] So[facility]  [] [facility]   [] [facility]  [] [facility]   [] Other:         REASON FOR TRANSFER: Primary PT soon

## 2023-11-27 ENCOUNTER — HOSPITAL ENCOUNTER (OUTPATIENT)
Dept: PHYSICAL THERAPY | Age: 68
Setting detail: THERAPIES SERIES
Discharge: HOME | End: 2023-11-27
Attending: PHYSICAL MEDICINE & REHABILITATION
Payer: MEDICARE

## 2023-11-27 PROCEDURE — 97116 GAIT TRAINING THERAPY: CPT

## 2023-11-27 PROCEDURE — 97530 THERAPEUTIC ACTIVITIES: CPT

## 2023-11-29 ENCOUNTER — HOSPITAL ENCOUNTER (OUTPATIENT)
Dept: PHYSICAL THERAPY | Age: 68
Setting detail: THERAPIES SERIES
Discharge: HOME | End: 2023-11-29
Attending: PHYSICAL MEDICINE & REHABILITATION
Payer: MEDICARE

## 2023-11-29 PROCEDURE — 97116 GAIT TRAINING THERAPY: CPT

## 2023-11-29 PROCEDURE — 97530 THERAPEUTIC ACTIVITIES: CPT

## 2023-11-29 NOTE — FLOWSHEET NOTE
goals. Continued with PF stretching to maximize benefits of botox injections and gait with hemiwalker - pt with limited endurance d/t shortness of breath. Re-initiated lateral walking along mat to simulate counter walking at home with VC for large steps on the LLE - pt again limited by shortness of breath and required use of rescue inhaler. [] No change. [] Other:    [x] Patient would continue to benefit from skilled physical therapy services in order to: address gait/balance impairment with attempt to address asymmetries, increase gait speed/efficiency, improve postural stability and dynamic standing balance, increase gastroc and hip flexor muscle length to allow improved gait mechanics, and improve overall functional mobility, progress independence and safety, and maximize level of function as close to PLOF as possible. STG: (to be met in 10 treatments) - Assessed by Mary Joseph on 10/23/23:   ? Pain: No more than 4/10 max pain in L hemibody post therapy to indicate improving tolerance to increased functional mobility - MET, no pain after therapy sessions just very fatigued  ? ROM:  L DF PROM to at least 0deg to allow foot flat on ground with gait, transfers - MET, 0 deg with ankle in subtalar neutral  No deficits in hip flexor length on LLE to allow full upright posture in standing - NOT MET, 15-20deg iliopsoas deficit on L, rectus femoris deficit evident also  ? Strength:  Pt able to complete 5x STS with min UE assist from 18\" seat in <30s to indicate improving power/strength in BLEs needed for transfers from low couch - Nearly met, 30.77s still with unilat assist, CGA only  ?  Balance:   Pt able to stand at kitchen counter and reach into overhead cupboard for glasses without LOB, CGA for safety - Ongoing, able to complete but just feels unsteady, not leaning on counter  Pt able to stand with LRAD and complete pivot turn in either direction in <5s with improving balance, quickness of LLE placement

## 2023-11-30 RX ORDER — ALBUTEROL SULFATE 90 UG/1
2 AEROSOL, METERED RESPIRATORY (INHALATION) EVERY 6 HOURS PRN
Qty: 18 G | Refills: 5 | Status: SHIPPED | OUTPATIENT
Start: 2023-11-30

## 2023-11-30 RX ORDER — FLUOXETINE HYDROCHLORIDE 20 MG/1
20 CAPSULE ORAL DAILY
Refills: 0 | OUTPATIENT
Start: 2023-11-30

## 2023-11-30 NOTE — TELEPHONE ENCOUNTER
PATIENT STATES SHE IS NOT TAKING THE PROZAC  She states is unable to come in for appt due to no transportation.  She did schedule a VV for 12/07/23  SHE DID REQUEST A REFILL ON ALBUTEROL INH    LAST OV:  03/20/23

## 2023-12-04 ENCOUNTER — HOSPITAL ENCOUNTER (OUTPATIENT)
Dept: PHYSICAL THERAPY | Age: 68
Setting detail: THERAPIES SERIES
Discharge: HOME | End: 2023-12-04
Attending: PHYSICAL MEDICINE & REHABILITATION
Payer: MEDICARE

## 2023-12-04 PROCEDURE — 97112 NEUROMUSCULAR REEDUCATION: CPT

## 2023-12-04 PROCEDURE — 97116 GAIT TRAINING THERAPY: CPT

## 2023-12-04 PROCEDURE — 97110 THERAPEUTIC EXERCISES: CPT

## 2023-12-04 NOTE — DISCHARGE SUMMARY
complete 5x STS with min UE assist from 18\" seat in <20s to indicate further improving power/strength in BLEs needed for transfers from low couch Met  ? Balance: Lorenzo score to at least 14/56 to indicate improving standing static/dynamic balance Met  ? Function:  Pt will be able to ambulate at least 300 ft consecutively with . 2m/s self selected gait speed using LRAD and supervision to improve safety and tolerance to short household ambulation distances Not met  Pt able to complete stand pivot transfers to unlevel surfaces independently with good balance and speed, especially from lower surface to elevated surface like toilet seat Met  Pt able to ascend/descend ramp with LRAD with supervision and improving speed to improve independent access in/out of home Met  Any NMES home unit needs will have been discussed and worked towards at this time. Met  At least 70% function per SIS-16 to indicate improving subjective functional ability Not met     Patient goals: \"walk up and down steps, walk more\"    Treatment to Date:  [x] Therapeutic Exercise    [] Modalities:  [x] Therapeutic Activity    [] Ultrasound  [] Electrical Stimulation  [x] Gait Training     [] Massage       [] Lumbar/Cervical Traction  [x] Neuromuscular Re-education [] Cold/hotpack [] Iontophoresis: 4 mg/mL  [x] Instruction in Home Exercise Program                     Dexamethasone Sodium  [] Manual Therapy             Phosphate 40-80 mAmin  [] Aquatic Therapy                   [] Vasocompression/    [] Other:             Game Ready    Discharge Status:     [] Pt recovered from conditions. Treatment goals were met. [] Pt received maximum benefit. No further therapy indicated at this time. [x] Pt to continue exercise/home instructions independently. [] Therapy interrupted due to:    [] Pt has 2 or more no shows/cancels, is discontinued per our policy. [] Pt has completed prescribed number of treatment sessions.     [] Other:         Electronically

## 2023-12-04 NOTE — FLOWSHEET NOTE
[x] [facility]) [facility] &  Therapy  [address] [facility].  [phone]  F: [phone]     Physical Therapy Daily Treatment Note    Date:  2023  Patient Name:  Gustavo Lanier    :  1955  MRN: [id number]  Physician: Dr. Elias Valderrama MD                                   Insurance: Medicare ([address] [place] [address])  Medical Diagnosis: F37.949 (ICD-10-CM) - Spastic hemiplegia of left nondominant side as late effect of cerebral infarction [facility])  Rehab Codes: R26.89, M62.81, R29.3, G81.1  Next 's appt.: 23  Date of symptom onset: 21  Visit# / total visits: ; Progress note for Medicare patient due at visit 10     Cancels/No Shows: 0    Subjective:    Pain:  [] Yes  [x] No Location: L leg (piriformis) Pain Rating: (0-10 scale) did not rate/10   Pain altered Tx:  [x] No  [] Yes  Action:   Comments:  Patient reports she had a cough over the weekend and is feeling short of breath d/t that.     Objective:      Modalities:   Precautions: Gait belt donned for all gait and standing therex  Exercises: bold completed 23   Exercise Reps/ Time Weight/ Level Comments   NuStep 5' Level 4 ModA  Pt request assist - encouraged self assist  Billed under TE  Use of gait belt for adductors   BITS training 12 min Visual Scanning W/C to lynette  ModA with side stepping to screen  User Pace   1 min at mid quad  1 min Upper quad full screen  1 min Lateral quad  Patient declined with cross reaching far past midline - encouraged to try  Sit rest breaks b/t each  After 1st set, brought in clinic WC  Poor stepping strategy to the side   Xcite Training 15 min  EOB and supine  Gastroc/Ant Fib 2x3.5\" pads  Quads/Hams 3x4\" pads    DF on bolster     10x3   Heel slides with Xcite     10x2     7x; 8x without Xcite  NASOFORM     Next session:  Add glutes  Move to // bars to focus on weight shift           Mat      Supine hip flexor stretch 3x1 min     Scooting in bed x  Practiced

## 2023-12-04 NOTE — FLOWSHEET NOTE
CABRERA BALANCE SCALE 14-Item Long Form Original Version    Patient Name:  Leanne Kumar  Date:  12/4/2023    1. SITTING TO STANDING  INSTRUCTIONS: Please stand up. Try not to use your hands for support. (4) able to stand without using hands and stabilize independently  (3) able to stand independently using hands  (2) able to stand using hands after several tries  (1) needs minimal aid to stand or to stabilize  (0) needs moderate or maximal assist to stand  Score: 3    2. STANDING UNSUPPORTED  INSTRUCTIONS: Please stand for two minutes without holding. (4) able to stand safely 2 minutes  (3) able to stand 2 minutes with supervision  (2) able to stand 30 seconds unsupported  (1) needs several tries to stand 30 seconds unsupported  (0) unable to stand 30 seconds unassisted If a subject is able to stand 2  minutes unsupported, score full points for sitting unsupported. Proceed to  item #4. Score: 2    3. SITTING WITH BACK UNSUPPORTED BUT FEET SUPPORTED  ON FLOOR OR ON A STOOL  INSTRUCTIONS: Please sit with arms folded for 2 minutes. (4) able to sit safely and securely 2 minutes  (3) able to sit 2 minutes under supervision  (2) able to sit 30 seconds  (1) able to sit 10 seconds  (0) unable to sit without support 10 seconds  Score: 4     4. STANDING TO SITTING  INSTRUCTIONS: Please sit down. (4) sits safely with minimal use of hands  (3) controls descent by using hands  (2) uses back of legs against chair to control descent  (1) sits independently but has uncontrolled descent  (0) needs assistance to sit  Score: 3    5. TRANSFERS  INSTRUCTIONS: Arrange chairs(s) for a pivot transfer. Ask subject to  transfer one way toward a seat with armrests and one way toward a seat  without armrests. You may use two chairs (one with and one without  armrests) or a bed and a chair.   (4) able to transfer safely with minor use of hands  (3) able to transfer safely definite need of hands  (2) able to transfer with verbal cueing

## 2023-12-07 ENCOUNTER — TELEMEDICINE (OUTPATIENT)
Dept: FAMILY MEDICINE CLINIC | Age: 68
End: 2023-12-07
Payer: COMMERCIAL

## 2023-12-07 DIAGNOSIS — Z86.73 HISTORY OF ISCHEMIC RIGHT MCA STROKE: ICD-10-CM

## 2023-12-07 DIAGNOSIS — R05.1 ACUTE COUGH: ICD-10-CM

## 2023-12-07 DIAGNOSIS — I10 ESSENTIAL HYPERTENSION: ICD-10-CM

## 2023-12-07 DIAGNOSIS — J44.9 CHRONIC OBSTRUCTIVE PULMONARY DISEASE, UNSPECIFIED COPD TYPE (HCC): ICD-10-CM

## 2023-12-07 DIAGNOSIS — R73.03 PRE-DIABETES: ICD-10-CM

## 2023-12-07 DIAGNOSIS — Z78.0 POST-MENOPAUSAL: Primary | ICD-10-CM

## 2023-12-07 DIAGNOSIS — E03.9 HYPOTHYROIDISM, UNSPECIFIED TYPE: ICD-10-CM

## 2023-12-07 PROCEDURE — 99214 OFFICE O/P EST MOD 30 MIN: CPT | Performed by: NURSE PRACTITIONER

## 2023-12-07 PROCEDURE — 1123F ACP DISCUSS/DSCN MKR DOCD: CPT | Performed by: NURSE PRACTITIONER

## 2023-12-07 RX ORDER — LEVOTHYROXINE SODIUM 0.03 MG/1
37.5 TABLET ORAL DAILY
Qty: 135 TABLET | Refills: 1 | Status: SHIPPED | OUTPATIENT
Start: 2023-12-07

## 2023-12-07 RX ORDER — BUDESONIDE AND FORMOTEROL FUMARATE DIHYDRATE 160; 4.5 UG/1; UG/1
2 AEROSOL RESPIRATORY (INHALATION) 2 TIMES DAILY
Qty: 1 EACH | Refills: 3 | Status: SHIPPED | OUTPATIENT
Start: 2023-12-07

## 2023-12-07 RX ORDER — PREDNISONE 20 MG/1
20 TABLET ORAL 2 TIMES DAILY
Qty: 10 TABLET | Refills: 0 | Status: SHIPPED | OUTPATIENT
Start: 2023-12-07 | End: 2023-12-12

## 2023-12-07 RX ORDER — ATORVASTATIN CALCIUM 80 MG/1
80 TABLET, FILM COATED ORAL DAILY
Qty: 90 TABLET | Refills: 1 | Status: SHIPPED | OUTPATIENT
Start: 2023-12-07

## 2023-12-07 ASSESSMENT — COPD QUESTIONNAIRES: COPD: 1

## 2023-12-07 ASSESSMENT — ENCOUNTER SYMPTOMS
COUGH: 0
SHORTNESS OF BREATH: 0

## 2023-12-07 NOTE — PROGRESS NOTES
Harsh Ray, was evaluated through a synchronous (real-time) audio-video encounter. The patient (or guardian if applicable) is aware that this is a billable service, which includes applicable co-pays. This Virtual Visit was conducted with patient's (and/or legal guardian's) consent. Patient identification was verified, and a caregiver was present when appropriate. The patient was located at Home: [address] [place] [address] [facility] [address]  Provider was located at Home ([address] [facility]): [address] [place] (:  1955) is a Established patient, presenting virtually for evaluation of the following:    Assessment & Plan   Below is the assessment and plan developed based on review of pertinent history, physical exam, labs, studies, and medications. 1. Post-menopausal  -     DEXA Bone Density Axial Skeleton; Future  2. History of ischemic right MCA stroke  3. Chronic obstructive pulmonary disease, unspecified COPD type (HCC)  Restart inhalers    -     budesonide-formoterol (SYMBICORT) 160-4.5 MCG/ACT AERO; Inhale 2 puffs into the lungs 2 times daily, Disp-1 each, R-3Normal  -     tiotropium (SPIRIVA RESPIMAT) 2.5 MCG/ACT AERS inhaler; Inhale 2 puffs into the lungs daily, Disp-1 each, R-5Normal  4. Essential hypertension  Most recent bp wnl   -     atorvastatin (LIPITOR) 80 MG tablet; Take 1 tablet by mouth daily, Disp-90 tablet, R-1Normal  -     CBC; Future  -     Comprehensive Metabolic Panel; Future  -     Lipid Panel; Future  -     TSH; Future  5. Pre-diabetes  Needs a1c check. -     atorvastatin (LIPITOR) 80 MG tablet; Take 1 tablet by mouth daily, Disp-90 tablet, R-1Normal  -     Hemoglobin A1C; Future  6. Acute cough  -     predniSONE (DELTASONE) 20 MG tablet; Take 1 tablet by mouth 2 times daily for 5 days, Disp-10 tablet, R-0Normal  7. Hypothyroidism, unspecified type  Needs up to date tsh  -     levothyroxine (SYNTHROID) 25 MCG tablet;  Take 1.5 tablets by mouth daily, Disp-135 tablet,

## 2023-12-11 ENCOUNTER — OFFICE VISIT (OUTPATIENT)
Dept: NEUROLOGY | Age: 68
End: 2023-12-11

## 2023-12-11 VITALS
HEIGHT: 68 IN | OXYGEN SATURATION: 95 % | SYSTOLIC BLOOD PRESSURE: 178 MMHG | BODY MASS INDEX: 26.52 KG/M2 | WEIGHT: 175 LBS | HEART RATE: 61 BPM | DIASTOLIC BLOOD PRESSURE: 85 MMHG

## 2023-12-11 DIAGNOSIS — I10 ESSENTIAL HYPERTENSION: ICD-10-CM

## 2023-12-11 DIAGNOSIS — Z72.0 TOBACCO USE: ICD-10-CM

## 2023-12-11 DIAGNOSIS — G44.329 CHRONIC POST-TRAUMATIC HEADACHE, NOT INTRACTABLE: Primary | ICD-10-CM

## 2023-12-11 DIAGNOSIS — I63.511 CEREBROVASCULAR ACCIDENT (CVA) DUE TO OCCLUSION OF RIGHT MIDDLE CEREBRAL ARTERY (HCC): ICD-10-CM

## 2023-12-11 DIAGNOSIS — R73.03 PRE-DIABETES: ICD-10-CM

## 2023-12-11 DIAGNOSIS — H93.13 TINNITUS AURIUM, BILATERAL: ICD-10-CM

## 2023-12-11 RX ORDER — CLOPIDOGREL BISULFATE 75 MG/1
75 TABLET ORAL DAILY
Qty: 30 TABLET | Refills: 3 | Status: SHIPPED | OUTPATIENT
Start: 2023-12-11 | End: 2023-12-11

## 2023-12-11 RX ORDER — CALCIUM CARBONATE/VITAMIN D3 500-10/5ML
400 LIQUID (ML) ORAL NIGHTLY
Qty: 90 CAPSULE | Refills: 2 | Status: SHIPPED | OUTPATIENT
Start: 2023-12-11 | End: 2024-03-10

## 2023-12-11 RX ORDER — ATORVASTATIN CALCIUM 80 MG/1
80 TABLET, FILM COATED ORAL DAILY
Qty: 90 TABLET | Refills: 1 | Status: SHIPPED | OUTPATIENT
Start: 2023-12-11

## 2023-12-11 RX ORDER — ASPIRIN 81 MG/1
81 TABLET, CHEWABLE ORAL DAILY
Qty: 30 TABLET | Refills: 3 | Status: SHIPPED | OUTPATIENT
Start: 2023-12-11

## 2023-12-11 ASSESSMENT — ENCOUNTER SYMPTOMS
PHOTOPHOBIA: 0
CONSTIPATION: 0
ABDOMINAL PAIN: 0
CHOKING: 0
EYE REDNESS: 0
EYE ITCHING: 0
CHEST TIGHTNESS: 0
TROUBLE SWALLOWING: 0
NAUSEA: 0
EYE PAIN: 0
COUGH: 0
DIARRHEA: 0
BACK PAIN: 0
RHINORRHEA: 0
EYE DISCHARGE: 0
SHORTNESS OF BREATH: 0
ABDOMINAL DISTENTION: 0
VOICE CHANGE: 0
SORE THROAT: 0
WHEEZING: 0

## 2023-12-11 NOTE — PATIENT INSTRUCTIONS
Maintain a blood pressure diary and present those findings to your PCP   Refer to cardiology for loop recorder interrogation   Mag oxide 400 mg nightly, Tylenol for abortive   Continue with aspirin daily   Stop Plavix   Hearing test   Home health aide   Smoking cessation clinic   MRI brain without contrast

## 2023-12-11 NOTE — PROGRESS NOTES
[address], [facility] [address]2, [address], [address]  P: [phone]  F: [phone]              Patient: Harsh Ray : 1955  MRN: [id number]   Date: 2023  PCP: KEELY Chung CNP  Reason for visit: Post stroke follow-up  Information obtained from: Patient, chart review, patient advocate    History of Presenting Illness:  Harsh Ray is a 79 y.o. female with a history of cryptogenic proximal right MCA infarct in May 2021, hypertension, hyperlipidemia, and active tobacco use who presents for a follow-up visit. Patient is currently wheelchair-bound without any use of her left hemibody. Patient is currently maintained on dual antiplatelet therapy for stroke prophylaxis. Patient presented to clinic with her patient care advocate, who also contributed additional historical information. Patient care advocate sees patient approximately twice per week and shares concerns that patient needs additional care. History of stroke  Patient had an implantable loop recorder placed sometime in . Was interrogated once but apparently lost  and has not been interrogated since. Per patient, her  was recently found by her son-in-law. Patient was advised to get a hold of her  and follow-up with cardiology to continue monitoring for possible underlying dysrhythmia. On presentation today, patient systolic blood pressure was in the 170s. She does not check her blood pressure at home as she is unable to do so herself. She does have a blood pressure cuff at home. Patient care advocate was advised to maintain a blood pressure diary for 1 week and present those readings to the patient's primary care provider. Patient is currently maintained on lisinopril 10 mg daily. In terms of other stroke prophylactic measures, patient continues to smoke tobacco on a daily basis.   Patient was encouraged to stop smoking and is currently agreeable to

## 2023-12-13 ENCOUNTER — TELEPHONE (OUTPATIENT)
Dept: FAMILY MEDICINE CLINIC | Age: 68
End: 2023-12-13

## 2023-12-13 NOTE — TELEPHONE ENCOUNTER
Wili Moore, nurse advocate for Stefan Tillman is calling in regards to elevated BP  She states Rosa's BP was elevated on:   12/11/23--178/85  12/12/22--160/90  12/13/23--184/100  She is currently taking Lisinopril 10 mg daily. She is having headache and SOB-was advised with these symptoms should go to ER. She said would wait for response from [address] [place] [address]  She was offered appt there is nothing available this week and states is going out of town 12/18/23.   She states no numbness or tingling

## 2023-12-13 NOTE — TELEPHONE ENCOUNTER
Patient was advised to go to the ER for further evaluation. She did state she isn't going out of town until further out now and would like an appointment if one comes available soon.

## 2023-12-20 DIAGNOSIS — R05.1 ACUTE COUGH: ICD-10-CM

## 2023-12-21 RX ORDER — PREDNISONE 20 MG/1
TABLET ORAL
Qty: 10 TABLET | Refills: 0 | OUTPATIENT
Start: 2023-12-21

## 2023-12-27 NOTE — TELEPHONE ENCOUNTER
The original prescription was discontinued on 12/11/2023 by Aguilar Diez DO for the following reason: REORDER. Renewing this prescription may not be appropriate   Last visit: 12/07/23  Last Med refill: 11/29/23  Does patient have enough medication for 72 hours: Yes    Next Visit Date:  Future Appointments   Date Time Provider Department Center   1/4/2024  1:40 PM [facility]   2/20/2024  2:00 PM Tara Hahn MD [facility]   3/11/2024  1:00 PM Aguilar Diez DO Neuro [facility] -       Health Maintenance   Topic Date Due    Shingles vaccine (1 of 2) Never done    Low dose CT lung screening &/or counseling  Never done    DEXA (modify frequency per FRAX score)  Never done    Respiratory Syncytial Virus (RSV) Pregnant or age 60 yrs+ (1 - 1-dose 60+ series) Never done    Pneumococcal 65+ years Vaccine (2 - PCV) 10/29/2020    Flu vaccine (1) 08/01/2023    COVID-19 Vaccine (2 - 2023-24 season) 09/01/2023    A1C test (Diabetic or Prediabetic)  11/21/2023    Lipids  11/21/2023    Depression Screen  03/20/2024    Breast cancer screen  03/20/2024    Annual Wellness Visit (AWV)  03/20/2024    Colorectal Cancer Screen  03/20/2024    DTaP/Tdap/Td vaccine (3 - Td or Tdap) 01/01/2026    Hepatitis C screen  Completed    Hepatitis A vaccine  Aged Out    Hepatitis B vaccine  Aged Out    Hib vaccine  Aged Out    Polio vaccine  Aged Out    Meningococcal (ACWY) vaccine  Aged Out    Pneumococcal 0-64 years Vaccine  Discontinued    HIV screen  Discontinued       Hemoglobin A1C (%)   Date Value   11/21/2022 6.0   05/05/2021 5.9   08/19/2020 6.3             ( goal A1C is < 7)   No components found for: \"LABMICR\"  LDL Cholesterol (mg/dL)   Date Value   11/21/2022 180 (H)   05/05/2021 140 (H)       (goal LDL is <100)   AST (U/L)   Date Value   11/21/2022 10     ALT (U/L)   Date Value   11/21/2022 8     BUN (mg/dL)   Date Value   11/21/2022 10     BP Readings from Last 3 Encounters:

## 2023-12-28 RX ORDER — CLOPIDOGREL BISULFATE 75 MG/1
75 TABLET ORAL DAILY
Qty: 90 TABLET | Refills: 1 | OUTPATIENT
Start: 2023-12-28

## 2024-01-11 ENCOUNTER — TELEPHONE (OUTPATIENT)
Dept: PHARMACY | Age: 69
End: 2024-01-11

## 2024-01-11 ENCOUNTER — HOSPITAL ENCOUNTER (OUTPATIENT)
Dept: PHARMACY | Age: 69
Setting detail: THERAPIES SERIES
Discharge: HOME | End: 2024-01-11
Payer: MEDICARE

## 2024-01-11 PROCEDURE — 99213 OFFICE O/P EST LOW 20 MIN: CPT

## 2024-01-11 RX ORDER — BUPROPION HYDROCHLORIDE 150 MG/1
TABLET, EXTENDED RELEASE ORAL
Qty: 60 TABLET | Refills: 11 | Status: SHIPPED | OUTPATIENT
Start: 2024-01-11

## 2024-01-11 NOTE — TELEPHONE ENCOUNTER
Patient was NCNS for Initial SC visit today.  Called to reschedule and VM.      Nalini WADE. Jaye., CACP, Clinical Pharmacist  Anticoagulation Services, [facility]  1/11/2024  1:28 PM

## 2024-01-11 NOTE — PROGRESS NOTES
[facility]ication [facility]  Smoking Cessation Program      Rosa Cook          1955  Mariam Marquez APRN - ANA    Rosa Cook is a 68 y.o. female has been referred to our service by Dr. Aguilar Diez for Smoking Cessation Counseling and Medication Management per Consult Agreement.  Patient acknowledges working in consult agreement with clinical pharmacist and referring physician.     SUBJECTIVE     Current Smoking Status:    - What kind of tobacco (or nicotine) products do you use? cigarettes   - How much do you smoke or use in an average day? 20   - What age did you start using tobacco? Age 22 for 48 years  Other Tobacco Use History:   - Are you currently using any tobacco cessation medications or nicotine replacement therapy (NRT)? no   - Does anyone in your home use tobacco? no   - Do you drink alcohol? Yes    - How much and how often? rarely   - Do you use any drugs other than those prescribed to you? no    - How much and how often? N/A  - Have you been diagnosed with any behavioral health conditions? no  - Are you being treated for this condition? N/a  - Have you been diagnosed with any physical health conditions? DIABETES, COPD, ASTHMA  - Are you being treated for this condition? YES   Previous Quit Attempts:    - Have you ever stopped using tobacco for more than a week? 1 & 1/2 YEARS after her stroke   - If YES:    - When did you stop? 2022    - What helped you stop? daughter    - What didn't help you to stop? boredom    - Did you receive any social support? yes    - Why did you start using again? 1 & 1/2 years later   Motivation to Quit:   - helath-to avoid another stroke  Potential Barriers:    - easily accessible.  Tried Chantix in the past but it makes her sick to her stomach. She reports that happened right away the first week of therapy.     OBJECTIVE     Past Medical History:    Past Medical History:   Diagnosis Date    Asthma     COPD (chronic obstructive pulmonary

## 2024-01-16 NOTE — TELEPHONE ENCOUNTER
Rosa Cook is calling to request a refill on the following medication(s):    Medication Request:  Requested Prescriptions     Pending Prescriptions Disp Refills    lisinopril (PRINIVIL;ZESTRIL) 10 MG tablet [Pharmacy Med Name: LISINOPRIL 10 MG TABLET] 90 tablet      Sig: TAKE ONE TABLET BY MOUTH DAILY       Last Visit Date (If Applicable):  12/7/2023    Next Visit Date:    Visit date not found

## 2024-01-21 RX ORDER — LISINOPRIL 10 MG/1
10 TABLET ORAL DAILY
Qty: 90 TABLET | Refills: 1 | Status: SHIPPED | OUTPATIENT
Start: 2024-01-21

## 2024-01-29 ENCOUNTER — TELEPHONE (OUTPATIENT)
Dept: FAMILY MEDICINE CLINIC | Age: 69
End: 2024-01-29

## 2024-01-29 ENCOUNTER — HOSPITAL ENCOUNTER (OUTPATIENT)
Dept: MRI IMAGING | Age: 69
Discharge: HOME | End: 2024-01-31
Payer: MEDICARE

## 2024-01-29 DIAGNOSIS — I63.511 CEREBROVASCULAR ACCIDENT (CVA) DUE TO OCCLUSION OF RIGHT MIDDLE CEREBRAL ARTERY (HCC): ICD-10-CM

## 2024-01-29 PROCEDURE — 70551 MRI BRAIN STEM W/O DYE: CPT

## 2024-01-29 NOTE — TELEPHONE ENCOUNTER
Per [employer] the Symbicort 160-4.5 mcg inhaler is non formulary  The alternatives are:  Breo Ellipta Powder, Fluticasone Prop-Salmeterol Breath Activated    PLEASE ADVISE

## 2024-01-30 RX ORDER — FLUTICASONE FUROATE AND VILANTEROL 100; 25 UG/1; UG/1
1 POWDER RESPIRATORY (INHALATION) DAILY
Qty: 1 EACH | Refills: 5 | Status: SHIPPED | OUTPATIENT
Start: 2024-01-30

## 2024-02-09 NOTE — TELEPHONE ENCOUNTER
Incoming fax requesting refill for Bupropion. Please review and e-scribe if applicable.     Last Visit Date: 12/11/2023    Next Visit Date:  Future Appointments   Date Time Provider Department Center   2/20/2024  2:00 PM Tara Hahn MD [facility]   3/11/2024  1:00 PM Aguilar Diez, DO Neuro [facility] -

## 2024-02-12 ENCOUNTER — TELEPHONE (OUTPATIENT)
Dept: PHARMACY | Age: 69
End: 2024-02-12

## 2024-02-12 NOTE — TELEPHONE ENCOUNTER
Patient was a NCNS for her last SC visit.  She reports she did NOT  the Wellbutrin that I called in for her a month ago.  I told her to call the pharmacy to have them get it ready again as it will likely be returned to stock at this point.   I went over dosing and advised that she start the medication one week prior to her planned quit date.  I advised that she avoid alcohol due to increased seizure risk.  She does NOT drink alcohol. Quit date 3/13/24, telephone follow up to check in on 3/12.    Nalini WADE. Ph., CACP, Clinical Pharmacist  Anticoagulation Services, [facility]  2024  2:14 PM      For Pharmacy Admin Tracking Only    Program: Medication Management  CPA in place:  Yes  Recommendation Provided To: Patient/Caregiver: 1 via Telephone  Intervention Detail: Adherence Monitorin  Intervention Accepted By: Patient/Caregiver: 1  Gap Closed?: No   Time Spent (min): 10

## 2024-02-20 ENCOUNTER — PROCEDURE VISIT (OUTPATIENT)
Dept: PHYSICAL MEDICINE AND REHAB | Age: 69
End: 2024-02-20
Payer: MEDICARE

## 2024-02-20 VITALS — TEMPERATURE: 97.8 F | DIASTOLIC BLOOD PRESSURE: 70 MMHG | SYSTOLIC BLOOD PRESSURE: 130 MMHG | HEART RATE: 68 BPM

## 2024-02-20 DIAGNOSIS — I69.354 SPASTIC HEMIPLEGIA OF LEFT NONDOMINANT SIDE AS LATE EFFECT OF CEREBRAL INFARCTION (HCC): Primary | ICD-10-CM

## 2024-02-20 PROCEDURE — 64643 CHEMODENERV 1 EXTREM 1-4 EA: CPT | Performed by: PHYSICAL MEDICINE & REHABILITATION

## 2024-02-20 PROCEDURE — 95874 GUIDE NERV DESTR NEEDLE EMG: CPT | Performed by: PHYSICAL MEDICINE & REHABILITATION

## 2024-02-20 PROCEDURE — 64642 CHEMODENERV 1 EXTREMITY 1-4: CPT | Performed by: PHYSICAL MEDICINE & REHABILITATION

## 2024-02-20 PROCEDURE — 64646 CHEMODENERV TRUNK MUSC 1-5: CPT | Performed by: PHYSICAL MEDICINE & REHABILITATION

## 2024-02-20 NOTE — PROGRESS NOTES
[facility] PHYSICAL MEDICINE AND REHABILITATION  [address] [place] [address]  [place] [address]  Dept: [phone]  Dept Fax: [phone]    Outpatient Followup Note    Rosa Cook, 68 y.o., female, presents for follow up c/o of Botox Injection  .     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 She does note some loosening of tight tone in left fingers which has not lasted the full 3 months. She also notes new onset of L calf pain and tightness at night.         Past Medical History:   Diagnosis Date    Asthma     COPD (chronic obstructive pulmonary disease) (HCC)     Diabetes mellitus (HCC)     Esophageal ring     Gastritis     Hyperlipidemia     Hyperplastic colon polyp     Hypertension     Osteoarthritis     Patient in clinical research study 05/04/2021    DAXA     TIA (transient ischemic attack) 2013    Per Daughter      Past Surgical History:   Procedure Laterality Date    APPENDECTOMY      COLONOSCOPY  09/20/2017    FRAGMENTS OF HYPERPLASTIC POLYP    ENDOSCOPY, COLON, DIAGNOSTIC      ESOPHAGEAL DILATATION      HYSTERECTOMY (CERVIX STATUS UNKNOWN)      MA COLSC FLX W/RMVL OF TUMOR POLYP LESION SNARE TQ N/A 9/20/2017    COLONOSCOPY POLYPECTOMY COLD BIOPSY performed by Raquel Bains MD at [facility] OR    MA EGD TRANSORAL BIOPSY SINGLE/MULTIPLE N/A 9/20/2017    EGD BIOPSY with esophageal dilitation performed by Raquel Bains MD at [facility] OR    UPPER GASTROINTESTINAL ENDOSCOPY  09/2017    E-ring and gastritis      Family History   Problem Relation Age of Onset    Other Mother     Diabetes Mother     Prostate Cancer Father     Cancer Brother         lining of lung     Social History     Socioeconomic History    Marital status:    Tobacco Use    Smoking status: Every Day     Current packs/day: 0.50     Average packs/day: 0.5 packs/day for 40.0 years

## 2024-02-22 RX ORDER — BUPROPION HYDROCHLORIDE 150 MG/1
TABLET, EXTENDED RELEASE ORAL
Qty: 60 TABLET | Refills: 11 | OUTPATIENT
Start: 2024-02-22

## 2024-03-06 ENCOUNTER — HOSPITAL ENCOUNTER (OUTPATIENT)
Dept: OCCUPATIONAL THERAPY | Age: 69
Setting detail: THERAPIES SERIES
Discharge: HOME | End: 2024-03-06
Attending: PHYSICAL MEDICINE & REHABILITATION
Payer: MEDICARE

## 2024-03-06 PROCEDURE — 97166 OT EVAL MOD COMPLEX 45 MIN: CPT

## 2024-03-06 PROCEDURE — 97530 THERAPEUTIC ACTIVITIES: CPT

## 2024-03-06 NOTE — CONSULTS
into driving. Discussed with pt driving evaluation. Would also like to return to completing physical therapy treatment to improve strength of LLE. Pt with recent Botox injections, will incorporate passive stretching to maximize benefits of Botox injections. Patient would benefit from skilled occupational therapy services in order to: learn compensatory strategies to complete ADLs with greater independence     Problems:    [] ? Pain:  [x] ? ROM:  [] ? Vision:  [x] ? Strength:  [x] ? Function:  [x] ? Coordination  [x] ? ADL/IADL participation:  [] Other:       LTG: (to be met in 8 treatments)  ? ROM:  Demo minimal movement with L scapula in prep for functional activities   ? Strength:  Demo trace muscle activation throughout LUE   ? Function:  Complete UB dressing SBA utilizing lynette dressing techniques PRN  Verbally report completing bathing tasks Min A   Complete simple cooking task Min A   Pt will have referral to complete driving evaluation   Patient to be independent with home exercise program as demonstrated by performance with correct form without cues.    Patient goals: cooking     Rehab Potential:  [] Good  [x] Fair  [] Poor   Suggested Professional Referral:  [] No  [x] Yes: physical therapy   Barriers to Goal Achievement:  [] No  [x] Yes: internal drive and stroke being chronic   Domestic Concerns:  [x] No  [] Yes:    Pt. Education:  [x] Plans/Goals, Risks/Benefits discussed  [] Home exercise program  Method of Education: [x] Verbal  [x] Demo  [] Written  Comprehension of Education:  [] Verbalizes understanding.  [x] Demonstrates understanding.  [x] Needs Review.  [] Demonstrates/verbalizes understanding of HEP/Ed previously given.      Treatment Plan:  [x] Therapeutic Exercise   21060  [] Iontophoresis: 4 mg/mL Dexamethasone Sodium Phosphate  mAmin  94853   [x] Therapeutic Activity  17995 [] Vasopneumatic cold with compression  48620    [] Gait Training   98158 [] Ultrasound   54227   [x]

## 2024-03-12 ENCOUNTER — TELEPHONE (OUTPATIENT)
Dept: PHARMACY | Age: 69
End: 2024-03-12

## 2024-03-12 ENCOUNTER — HOSPITAL ENCOUNTER (OUTPATIENT)
Dept: PHARMACY | Age: 69
Setting detail: THERAPIES SERIES
Discharge: HOME | End: 2024-03-12

## 2024-03-12 NOTE — TELEPHONE ENCOUNTER
[facility]ication [facility]  Smoking Cessation Program  Telephone Visit      Rosa Cook          1955  Mariam Marquez, APRN - CNP    Rosa Cook is a 68 y.o. female has been referred to our service by Dr. Diez for Smoking Cessation Patient Education/Counseling.     Patient started bupropion therapy after last check on 2/13/2024    PLAN       Patient's quit date is planned for tomorrow,  She has healthy snacks at home.  She has friends and family that she can call for support.  Patient does not have any side effects with bupropion. I answered all questions.  I explained that she can take bupropion for up to one year of therapy. (I  called in a year supply to her pharmacy) I advised she get rid of her ashtrays and clean the area where she previously smoked.  I also suggested that she clean her jacket and or other clothing she was wearing to smoke.     Nalini WADE. Jaye., CACP, Clinical Pharmacist  Anticoagulation Services, [facility]  3/12/2024  2:21 PM      For Pharmacy Admin Tracking Only    Program: Medication Management  CPA in place:  Yes      Gap Closed?: No   Time Spent (min): 20

## 2024-03-14 DIAGNOSIS — I69.354 SPASTIC HEMIPLEGIA OF LEFT NONDOMINANT SIDE AS LATE EFFECT OF CEREBRAL INFARCTION (HCC): Primary | ICD-10-CM

## 2024-03-14 NOTE — TELEPHONE ENCOUNTER
Received a refill request for Albuterol inhaler to go to [facility].  This was last sent to [facility] [address].    AMBERLY for patient to call the office to confirm pharmacy.

## 2024-03-14 NOTE — TELEPHONE ENCOUNTER
Patient called back and stated she is switching to [facility].    States she also needs a refill on lisinopril. States she is on 10mg but doesn't feel it is helping.  States she checked her bp Monday and it was 180/90.  States she is not having any headaches, chest pain, SOB.  Advised patient to make an appt and she stated she does not have transportation.

## 2024-03-18 RX ORDER — ALBUTEROL SULFATE 90 UG/1
2 AEROSOL, METERED RESPIRATORY (INHALATION) EVERY 6 HOURS PRN
Qty: 18 G | Refills: 5 | OUTPATIENT
Start: 2024-03-18

## 2024-03-19 ENCOUNTER — HOSPITAL ENCOUNTER (OUTPATIENT)
Age: 69
Discharge: HOME | End: 2024-03-19
Payer: MEDICARE

## 2024-03-19 ENCOUNTER — OFFICE VISIT (OUTPATIENT)
Dept: NEUROLOGY | Age: 69
End: 2024-03-19
Payer: MEDICARE

## 2024-03-19 VITALS
SYSTOLIC BLOOD PRESSURE: 170 MMHG | HEART RATE: 86 BPM | BODY MASS INDEX: 28.89 KG/M2 | DIASTOLIC BLOOD PRESSURE: 96 MMHG | WEIGHT: 190 LBS

## 2024-03-19 DIAGNOSIS — I63.9 ACUTE ISCHEMIC STROKE (HCC): ICD-10-CM

## 2024-03-19 DIAGNOSIS — I10 ESSENTIAL HYPERTENSION: ICD-10-CM

## 2024-03-19 DIAGNOSIS — Z86.73 HISTORY OF STROKE: Primary | ICD-10-CM

## 2024-03-19 DIAGNOSIS — H91.90 HEARING LOSS, UNSPECIFIED HEARING LOSS TYPE, UNSPECIFIED LATERALITY: ICD-10-CM

## 2024-03-19 DIAGNOSIS — F17.200 SMOKING: ICD-10-CM

## 2024-03-19 DIAGNOSIS — R73.03 PRE-DIABETES: ICD-10-CM

## 2024-03-19 DIAGNOSIS — Z86.73 HISTORY OF STROKE: ICD-10-CM

## 2024-03-19 LAB
CHOLEST SERPL-MCNC: 181 MG/DL (ref 0–199)
CHOLESTEROL/HDL RATIO: 5
EST. AVERAGE GLUCOSE BLD GHB EST-MCNC: 120 MG/DL
HBA1C MFR BLD: 5.8 % (ref 4–6)
HDLC SERPL-MCNC: 36 MG/DL
LDLC SERPL CALC-MCNC: 101 MG/DL (ref 0–100)
TRIGL SERPL-MCNC: 223 MG/DL
VLDLC SERPL CALC-MCNC: 45 MG/DL

## 2024-03-19 PROCEDURE — 36415 COLL VENOUS BLD VENIPUNCTURE: CPT

## 2024-03-19 PROCEDURE — 4004F PT TOBACCO SCREEN RCVD TLK: CPT | Performed by: STUDENT IN AN ORGANIZED HEALTH CARE EDUCATION/TRAINING PROGRAM

## 2024-03-19 PROCEDURE — G8484 FLU IMMUNIZE NO ADMIN: HCPCS | Performed by: STUDENT IN AN ORGANIZED HEALTH CARE EDUCATION/TRAINING PROGRAM

## 2024-03-19 PROCEDURE — 99214 OFFICE O/P EST MOD 30 MIN: CPT | Performed by: STUDENT IN AN ORGANIZED HEALTH CARE EDUCATION/TRAINING PROGRAM

## 2024-03-19 PROCEDURE — 83036 HEMOGLOBIN GLYCOSYLATED A1C: CPT

## 2024-03-19 PROCEDURE — G8400 PT W/DXA NO RESULTS DOC: HCPCS | Performed by: STUDENT IN AN ORGANIZED HEALTH CARE EDUCATION/TRAINING PROGRAM

## 2024-03-19 PROCEDURE — 1123F ACP DISCUSS/DSCN MKR DOCD: CPT | Performed by: STUDENT IN AN ORGANIZED HEALTH CARE EDUCATION/TRAINING PROGRAM

## 2024-03-19 PROCEDURE — G8417 CALC BMI ABV UP PARAM F/U: HCPCS | Performed by: STUDENT IN AN ORGANIZED HEALTH CARE EDUCATION/TRAINING PROGRAM

## 2024-03-19 PROCEDURE — 1090F PRES/ABSN URINE INCON ASSESS: CPT | Performed by: STUDENT IN AN ORGANIZED HEALTH CARE EDUCATION/TRAINING PROGRAM

## 2024-03-19 PROCEDURE — 80061 LIPID PANEL: CPT

## 2024-03-19 PROCEDURE — G8427 DOCREV CUR MEDS BY ELIG CLIN: HCPCS | Performed by: STUDENT IN AN ORGANIZED HEALTH CARE EDUCATION/TRAINING PROGRAM

## 2024-03-19 PROCEDURE — 3017F COLORECTAL CA SCREEN DOC REV: CPT | Performed by: STUDENT IN AN ORGANIZED HEALTH CARE EDUCATION/TRAINING PROGRAM

## 2024-03-19 PROCEDURE — 3077F SYST BP >= 140 MM HG: CPT | Performed by: STUDENT IN AN ORGANIZED HEALTH CARE EDUCATION/TRAINING PROGRAM

## 2024-03-19 PROCEDURE — 3080F DIAST BP >= 90 MM HG: CPT | Performed by: STUDENT IN AN ORGANIZED HEALTH CARE EDUCATION/TRAINING PROGRAM

## 2024-03-19 RX ORDER — ASPIRIN 81 MG/1
81 TABLET, CHEWABLE ORAL DAILY
Qty: 30 TABLET | Refills: 3 | Status: SHIPPED | OUTPATIENT
Start: 2024-03-19

## 2024-03-19 RX ORDER — BUPROPION HYDROCHLORIDE 150 MG/1
150 TABLET, EXTENDED RELEASE ORAL 2 TIMES DAILY
Qty: 60 TABLET | Refills: 2 | Status: SHIPPED | OUTPATIENT
Start: 2024-03-19

## 2024-03-19 RX ORDER — ATORVASTATIN CALCIUM 80 MG/1
80 TABLET, FILM COATED ORAL DAILY
Qty: 90 TABLET | Refills: 1 | Status: SHIPPED | OUTPATIENT
Start: 2024-03-19

## 2024-03-19 ASSESSMENT — ENCOUNTER SYMPTOMS
EYE PAIN: 0
EYE ITCHING: 0
NAUSEA: 0
EYE DISCHARGE: 0
SORE THROAT: 0
ABDOMINAL DISTENTION: 0
COUGH: 0
DIARRHEA: 0
RHINORRHEA: 0
BACK PAIN: 0
WHEEZING: 0
VOICE CHANGE: 0
ABDOMINAL PAIN: 0
PHOTOPHOBIA: 0
CONSTIPATION: 0
CHEST TIGHTNESS: 0
SHORTNESS OF BREATH: 0
EYE REDNESS: 0
CHOKING: 0

## 2024-03-19 NOTE — PROGRESS NOTES
[address] [facility], [facility] [address], [address]  [place] [address]  P: [phone]  F: [phone]              Patient: Rosa Cook : 1955  MRN: [id number]   Date: 3/19/2024  PCP: Mariam Marquez APRN - CNP  Reason for visit: Post stroke follow-up  Information obtained from: Patient, chart review, patient advocate    History of Presenting Illness:  Rosa Cook is a 68 y.o. female with a history of cryptogenic proximal right MCA infarct in May 2021, hypertension, hyperlipidemia, and active tobacco use who presents for a follow-up visit.  Patient is currently wheelchair-bound without any use of her left hemibody. Patient is currently maintained on dual antiplatelet therapy for stroke prophylaxis.    Patient presented to clinic with her patient care advocate, who also contributed additional historical information.  Patient care advocate sees patient approximately twice per week and shares concerns that patient needs additional care.      History of stroke  Patient had an implantable loop recorder placed sometime in .  Was interrogated once but apparently lost  and has not been interrogated since.  Per patient, her  was recently found by her son-in-law.  Patient was advised to get a hold of her  and follow-up with cardiology to continue monitoring for possible underlying dysrhythmia.    On presentation today, patient systolic blood pressure was in the 170s.  She does not check her blood pressure at home as she is unable to do so herself.  She does have a blood pressure cuff at home.  Patient care advocate was advised to maintain a blood pressure diary for 1 week and present those readings to the patient's primary care provider.  Patient is currently maintained on lisinopril 10 mg daily.    In terms of other stroke prophylactic measures, patient continues to smoke tobacco on a daily basis.  Patient was encouraged to stop smoking and is currently agreeable to follow-up

## 2024-03-20 ENCOUNTER — HOSPITAL ENCOUNTER (OUTPATIENT)
Dept: PHARMACY | Age: 69
Setting detail: THERAPIES SERIES
Discharge: HOME | End: 2024-03-20

## 2024-03-20 NOTE — PROGRESS NOTES
[facility]ication [facility]  Smoking Cessation Program  Telephone follow up      Rosa Cook          1955  Mariam Marquez, APRN - CNP    Rosa Cook is a 68 y.o. female has been referred to our service by Dr. Aguilar Diez for Smoking Cessation Counseling and Medication Management per Consult Agreement.  Patient acknowledges working in consult agreement with clinical pharmacist and referring physician.     SUBJECTIVE     Current Smoking Status:    - What kind of tobacco (or nicotine) products do you use? cigarettes   - How much do you smoke or use in an average day? 10 or less which is down from 20 daily.   -    ASSESSMENT    Patient's planned quit date on 3/13/24 was not achieved.  She continues to have about 10 each day.  I encouraged her to further reduce her amount to 7-8 cigarettes each day.  Patient reluctantly agreed to start working to have only 7-8 daily.          PLAN     Continue bupropion therapy and reduce from 10 cigarettes daily to 7 or 8.  Follow up in 2 weeks.     For Pharmacy Admin Tracking Only    Program: Medication Management  CPA in place:  Yes  Recommendation Provided To: Patient/Caregiver: 1 via Telephone  Intervention Detail: Adherence Monitorin  Intervention Accepted By: Patient/Caregiver: 1  Gap Closed?: No   Time Spent (min): 10

## 2024-03-26 ENCOUNTER — HOSPITAL ENCOUNTER (OUTPATIENT)
Dept: PHYSICAL THERAPY | Age: 69
Setting detail: THERAPIES SERIES
Discharge: HOME | End: 2024-03-26
Attending: PHYSICAL MEDICINE & REHABILITATION
Payer: MEDICARE

## 2024-03-26 ENCOUNTER — HOSPITAL ENCOUNTER (OUTPATIENT)
Dept: OCCUPATIONAL THERAPY | Age: 69
Setting detail: THERAPIES SERIES
Discharge: HOME | End: 2024-03-26
Attending: PHYSICAL MEDICINE & REHABILITATION
Payer: MEDICARE

## 2024-03-26 PROCEDURE — 97140 MANUAL THERAPY 1/> REGIONS: CPT

## 2024-03-26 PROCEDURE — 97116 GAIT TRAINING THERAPY: CPT

## 2024-03-26 PROCEDURE — 97110 THERAPEUTIC EXERCISES: CPT

## 2024-03-26 PROCEDURE — 97161 PT EVAL LOW COMPLEX 20 MIN: CPT

## 2024-03-26 RX ORDER — ALBUTEROL SULFATE 90 UG/1
2 AEROSOL, METERED RESPIRATORY (INHALATION) EVERY 6 HOURS PRN
Qty: 3 EACH | Refills: 1 | Status: SHIPPED | OUTPATIENT
Start: 2024-03-26

## 2024-03-26 RX ORDER — LISINOPRIL 10 MG/1
10 TABLET ORAL DAILY
Qty: 90 TABLET | Refills: 1 | Status: SHIPPED | OUTPATIENT
Start: 2024-03-26

## 2024-03-26 NOTE — FLOWSHEET NOTE
[]  Ultrasound     [x]  Ther Exercise 10 1   [x]  Manual Therapy 37 2   []  Ther Activities     []  Orthotic fit/train     []  Orthotic recheck     []  Other     Total Billable time 47 3       Electronically signed by:  Natali Galaviz OTR/L

## 2024-03-26 NOTE — CONSULTS
[x] [facility]  Outpatient Rehabilitation &  Therapy  [address]    [phone]  F: [phone] [] [facility]  Outpatient Rehabilitation &  Therapy  [address]   [address]  P: [phone]  F: [phone] [] [facility]  Outpatient Rehabilitation &  Therapy  [address]  P: [phone]  F: [phone] [] [facility]  Outpatient Rehabilitation &  Therapy  [address]   [address]   P: [phone]  F: [phone] [] [facility]   Outpatient Rehabilitation & Therapy  [address]  P: [phone]   F: [phone]        Physical Therapy Neurologic Evaluation for CVA    Date:  3/26/2024  Patient: Rosa Cook  : 1955  MRN: [id number]  Physician: Dr. Tara Hahn MD     Insurance: Medicare, [employer] Supplement.  No prior auth, BMN  Medical Diagnosis: I69.354 (ICD-10-CM) - Spastic hemiplegia of left nondominant side as late effect of cerebral infarction (HCC)   Rehab Codes: R26.89, M62.81, R29.3, G81.1, Z 91.81  Next 's appt.:  24  Date of symptom onset: 3/14/24    Subjective:  CC: Feels like she has become lazy since last therapy sessions.  States left leg does not move and she would like to ride the bike. Has gotten more stiff.  No falls since last therapy.      From eval 23 - \"Pt arrives for physical therapy evaluation of left hemiplegia of LUE=LLE after sustaining R MCA CVA in May of 2021. Arrives with chronic strength/functional mobility deficits. Does report left peripheral vision field cut, denies any issues with speaking/swallowing (though later reports sometimes it feels like she's \"talking with marbles in my mouth\"). Gets LLE clonus frequently throughout the day, occasionally in LUE. Arrives using anterior shell AFO, notes she used to have a custom AFO but was too difficult to get on herself and wouldn't fit her in her shoe, so just got new AFO ~6

## 2024-04-02 ENCOUNTER — HOSPITAL ENCOUNTER (OUTPATIENT)
Dept: PHYSICAL THERAPY | Age: 69
Setting detail: THERAPIES SERIES
Discharge: HOME | End: 2024-04-02
Attending: PHYSICAL MEDICINE & REHABILITATION
Payer: MEDICARE

## 2024-04-02 ENCOUNTER — TELEPHONE (OUTPATIENT)
Dept: PHYSICAL MEDICINE AND REHAB | Age: 69
End: 2024-04-02

## 2024-04-02 ENCOUNTER — HOSPITAL ENCOUNTER (OUTPATIENT)
Dept: OCCUPATIONAL THERAPY | Age: 69
Setting detail: THERAPIES SERIES
Discharge: HOME | End: 2024-04-02
Attending: PHYSICAL MEDICINE & REHABILITATION
Payer: MEDICARE

## 2024-04-02 PROCEDURE — 97110 THERAPEUTIC EXERCISES: CPT

## 2024-04-02 PROCEDURE — 97112 NEUROMUSCULAR REEDUCATION: CPT

## 2024-04-02 NOTE — FLOWSHEET NOTE
[x] [facility]  Outpatient Rehabilitation &  Therapy  [address]  [phone]  F: [phone] [] [facility]  Outpatient Rehabilitation &  Therapy  [address]   [address]  P: [phone]  F: [phone] [] [facility]  Outpatient Rehabilitation &  Therapy  [address]  P: [phone]  F: [phone] [] [facility]  Outpatient Rehabilitation &  Therapy  [address]   P: [phone]  F: [phone] [] [facility]   Outpatient Rehabilitation   & Therapy  [address]  P: [phone]   F: [phone]     Occupational Therapy Daily Treatment Note    Date:  2024  Patient Name:  Rosa Cook    :  1955  MRN: [id number]  Physician: Dr. Selma MD                                  Insurance: Medicare - [facility]  Medical Diagnosis: I69.354 spastic hemiplegia of left nondominant side as late effect of cerebral infarction        Rehab Codes: stiffness in shoulder M25.61,, lack of coordination R27.8,, fine motor skills loss R29.818,, or muscle weakness generalized M62.81,  Next 's appt.: 24 - next Botox treatment   Date of symptom onset: May 2, 2021  Visit# / total visits: 3/8; Progress note for patient due at visit 4-5    Cancels/No Shows: 0/0      Subjective:    Pain:  [] Yes  [x] No  Location: N/A  Pain Rating: (0-10 scale) 0/10  Pain altered Tx:  [x] No  [] Yes  Action: NA  Pt Comments: \"Same stuff just a different day.\"    Precautions: none  Surgery procedure and date: NA    Objective:  Today's Treatment:  Modalities: NA  Exercises:  EXERCISE    REPS/     TIME  WEIGHT/    LEVEL COMMENTS   Self PROM 10 reps x 2 sets  Not Completed, HEP   PROM ~30 min   Completed   Shoulder Shrugs 15 reps x 2 set  Completed    Sit to Stand 5 reps  Not Completed    Stand Pivot/Squat Pivot Transfer    Not Completed    myWebRoom H200 -  ~15 minutes with initial set-up  Added & completed -

## 2024-04-02 NOTE — TELEPHONE ENCOUNTER
Pt had to cancel her 3/27/24 botox f/u appt due to no transportation. Did you want to bring her in for a f/u before her 5/20/24 Botox appt? If so, When?

## 2024-04-02 NOTE — FLOWSHEET NOTE
[x] [facility]  Outpatient Rehabilitation &  Therapy  [address]  [phone]  [phone] [] [facility]  Outpatient Rehabilitation &  Therapy  [address]  P: [phone]  F: [phone] [] [facility]  Outpatient Rehabilitation &  Therapy  [address]  P: [phone]  F: [phone] [] [facility]  Outpatient Rehabilitation &  Therapy  [address]  [phone]  [phone] [] [facility]  Outpatient Rehabilitation &  Therapy  [address]   P: [phone]  F: [phone]  [] [facility]  Outpatient Rehabilitation &  Therapy  [address]  P: [phone]  F: [phone] [] [facility]  Outpatient Rehabilitation &  Therapy  [address]  P: [phone]  F: [phone] [] [facility]  Outpatient Rehabilitation &  Therapy  [address]  P: [phone]  F: [phone] [] [facility]  Outpatient Rehabilitation &  Therapy  [address]  P: [phone]  F: [phone]  [] [facility] Outpatient Rehabilitation &  Therapy  [address]  P: [phone]  F: [phone]     Physical Therapy Daily Treatment Note    Date:  2024  Patient Name:  Rosa Cook    :  1955  MRN: [id number]   Physician: Dr. Tara Hahn MD                                     Insurance: Medicare, [employer] Supp[employer].  No prior auth, BMN  Medical Diagnosis: I69.354 (ICD-10-CM) - Spastic hemiplegia of left nondominant side as late effect of cerebral infarction (HCC)   Rehab Codes: R26.89, M62.81, R29.3, G81.1, Z 91.81  Next 's appt.:  24  Date of symptom onset: 3/14/24  Visit#/total visits:     Cancels/No Shows: 0/0    Subjective:    Pain:  [x] Yes  [] No Location:  gait/balance goals/ Df Pain Rating: (0-10

## 2024-04-03 ENCOUNTER — HOSPITAL ENCOUNTER (OUTPATIENT)
Dept: PHARMACY | Age: 69
Setting detail: THERAPIES SERIES
Discharge: HOME | End: 2024-04-03

## 2024-04-03 NOTE — PROGRESS NOTES
CLINICAL PHARMACY CONSULT:  Smoking Cessation- Telephone visit    Rosa Cook is a 68 y.o. female has been referred to our service by Aguilar Norris for Smoking Cessation Counseling and Medication Management per Consult Agreement.  Patient acknowledges working in consult agreement with clinical pharmacist and referring physician.     SUBJECTIVE   Patient has reduced her smoking from 10  cigarettes daily to 2-4.  At last visit she agreed to reduce to 7-8.  She surpassed her goal!!  She reduced to 2-4 daily about three days ago.               PLAN     Patient is not ready to set a quit date yet.  Patient wants a month to get ready to quit. I agreed to give her this time and when I call if she has not already quit she will set her quit date at that time.   Continue Wellbutrin therapy.    - Follow up:  .May 3,2024    Nalini WADE. Ph., CACP, Clinical Pharmacist  Anticoagulation Services, [facility]  4/3/2024  2:01 PM      For Pharmacy Admin Tracking Only    Intervention Detail:   Total # of Interventions Recommended: 0  Total # of Interventions Accepted: 0  Time Spent (min): 10    -

## 2024-04-03 NOTE — TELEPHONE ENCOUNTER
Patient has no means of transportation at this time. Cannot do Friday or any day in the near future until she can figure it out.   I am going to mail her a [employer] application to see if she could use that.     Thank you.

## 2024-04-04 ENCOUNTER — APPOINTMENT (OUTPATIENT)
Dept: GENERAL RADIOLOGY | Age: 69
End: 2024-04-04
Payer: MEDICARE

## 2024-04-04 ENCOUNTER — HOSPITAL ENCOUNTER (OUTPATIENT)
Age: 69
Setting detail: OBSERVATION
Discharge: HOME | End: 2024-04-05
Attending: EMERGENCY MEDICINE | Admitting: EMERGENCY MEDICINE
Payer: MEDICARE

## 2024-04-04 ENCOUNTER — HOSPITAL ENCOUNTER (OUTPATIENT)
Dept: OCCUPATIONAL THERAPY | Age: 69
Setting detail: THERAPIES SERIES
Discharge: HOME | End: 2024-04-04
Attending: PHYSICAL MEDICINE & REHABILITATION
Payer: MEDICARE

## 2024-04-04 ENCOUNTER — HOSPITAL ENCOUNTER (OUTPATIENT)
Dept: PHYSICAL THERAPY | Age: 69
Setting detail: THERAPIES SERIES
Discharge: HOME | End: 2024-04-04
Attending: PHYSICAL MEDICINE & REHABILITATION
Payer: MEDICARE

## 2024-04-04 DIAGNOSIS — I10 HYPERTENSION, UNSPECIFIED TYPE: Primary | ICD-10-CM

## 2024-04-04 DIAGNOSIS — R42 VERTIGO: ICD-10-CM

## 2024-04-04 PROBLEM — I16.0 HYPERTENSIVE URGENCY: Status: ACTIVE | Noted: 2024-04-04

## 2024-04-04 LAB
ANION GAP SERPL CALCULATED.3IONS-SCNC: 10 MMOL/L (ref 9–16)
BASOPHILS # BLD: 0.05 K/UL (ref 0–0.2)
BASOPHILS NFR BLD: 1 % (ref 0–2)
BNP SERPL-MCNC: 77 PG/ML (ref 0–300)
BUN SERPL-MCNC: 17 MG/DL (ref 8–23)
CALCIUM SERPL-MCNC: 9.6 MG/DL (ref 8.6–10.4)
CHLORIDE SERPL-SCNC: 105 MMOL/L (ref 98–107)
CO2 SERPL-SCNC: 28 MMOL/L (ref 20–31)
CREAT SERPL-MCNC: 0.7 MG/DL (ref 0.5–0.9)
EOSINOPHIL # BLD: 0.48 K/UL (ref 0–0.44)
EOSINOPHILS RELATIVE PERCENT: 5 % (ref 1–4)
ERYTHROCYTE [DISTWIDTH] IN BLOOD BY AUTOMATED COUNT: 14 % (ref 11.8–14.4)
GFR SERPL CREATININE-BSD FRML MDRD: >90 ML/MIN/1.73M2
GLUCOSE BLD-MCNC: 125 MG/DL (ref 65–105)
GLUCOSE SERPL-MCNC: 88 MG/DL (ref 74–99)
HCT VFR BLD AUTO: 44.7 % (ref 36.3–47.1)
HGB BLD-MCNC: 14.6 G/DL (ref 11.9–15.1)
IMM GRANULOCYTES # BLD AUTO: 0.03 K/UL (ref 0–0.3)
IMM GRANULOCYTES NFR BLD: 0 %
LYMPHOCYTES NFR BLD: 2.02 K/UL (ref 1.1–3.7)
LYMPHOCYTES RELATIVE PERCENT: 22 % (ref 24–43)
MCH RBC QN AUTO: 29.9 PG (ref 25.2–33.5)
MCHC RBC AUTO-ENTMCNC: 32.7 G/DL (ref 28.4–34.8)
MCV RBC AUTO: 91.4 FL (ref 82.6–102.9)
MONOCYTES NFR BLD: 0.44 K/UL (ref 0.1–1.2)
MONOCYTES NFR BLD: 5 % (ref 3–12)
NEUTROPHILS NFR BLD: 67 % (ref 36–65)
NEUTS SEG NFR BLD: 6.38 K/UL (ref 1.5–8.1)
NRBC BLD-RTO: 0 PER 100 WBC
PLATELET # BLD AUTO: 325 K/UL (ref 138–453)
PMV BLD AUTO: 9.6 FL (ref 8.1–13.5)
POTASSIUM SERPL-SCNC: 3.6 MMOL/L (ref 3.7–5.3)
RBC # BLD AUTO: 4.89 M/UL (ref 3.95–5.11)
SODIUM SERPL-SCNC: 143 MMOL/L (ref 136–145)
TROPONIN I SERPL HS-MCNC: 18 NG/L (ref 0–14)
TROPONIN I SERPL HS-MCNC: 18 NG/L (ref 0–14)
WBC OTHER # BLD: 9.4 K/UL (ref 3.5–11.3)

## 2024-04-04 PROCEDURE — G0378 HOSPITAL OBSERVATION PER HR: HCPCS

## 2024-04-04 PROCEDURE — 6360000002 HC RX W HCPCS: Performed by: STUDENT IN AN ORGANIZED HEALTH CARE EDUCATION/TRAINING PROGRAM

## 2024-04-04 PROCEDURE — 71045 X-RAY EXAM CHEST 1 VIEW: CPT

## 2024-04-04 PROCEDURE — 99285 EMERGENCY DEPT VISIT HI MDM: CPT

## 2024-04-04 PROCEDURE — 2580000003 HC RX 258: Performed by: STUDENT IN AN ORGANIZED HEALTH CARE EDUCATION/TRAINING PROGRAM

## 2024-04-04 PROCEDURE — 6370000000 HC RX 637 (ALT 250 FOR IP): Performed by: STUDENT IN AN ORGANIZED HEALTH CARE EDUCATION/TRAINING PROGRAM

## 2024-04-04 PROCEDURE — 93005 ELECTROCARDIOGRAM TRACING: CPT | Performed by: STUDENT IN AN ORGANIZED HEALTH CARE EDUCATION/TRAINING PROGRAM

## 2024-04-04 PROCEDURE — 85025 COMPLETE CBC W/AUTO DIFF WBC: CPT

## 2024-04-04 PROCEDURE — 96372 THER/PROPH/DIAG INJ SC/IM: CPT

## 2024-04-04 PROCEDURE — 83880 ASSAY OF NATRIURETIC PEPTIDE: CPT

## 2024-04-04 PROCEDURE — 84484 ASSAY OF TROPONIN QUANT: CPT

## 2024-04-04 PROCEDURE — 82947 ASSAY GLUCOSE BLOOD QUANT: CPT

## 2024-04-04 PROCEDURE — 80048 BASIC METABOLIC PNL TOTAL CA: CPT

## 2024-04-04 RX ORDER — ENOXAPARIN SODIUM 100 MG/ML
40 INJECTION SUBCUTANEOUS DAILY
Status: DISCONTINUED | OUTPATIENT
Start: 2024-04-04 | End: 2024-04-05 | Stop reason: HOSPADM

## 2024-04-04 RX ORDER — AMLODIPINE BESYLATE 5 MG/1
5 TABLET ORAL DAILY
Status: DISCONTINUED | OUTPATIENT
Start: 2024-04-04 | End: 2024-04-05 | Stop reason: HOSPADM

## 2024-04-04 RX ORDER — LEVOTHYROXINE SODIUM 0.07 MG/1
37.5 TABLET ORAL DAILY
Status: DISCONTINUED | OUTPATIENT
Start: 2024-04-04 | End: 2024-04-05 | Stop reason: HOSPADM

## 2024-04-04 RX ORDER — HYDRALAZINE HYDROCHLORIDE 20 MG/ML
10 INJECTION INTRAMUSCULAR; INTRAVENOUS EVERY 6 HOURS PRN
Status: DISCONTINUED | OUTPATIENT
Start: 2024-04-04 | End: 2024-04-04

## 2024-04-04 RX ORDER — INSULIN LISPRO 100 [IU]/ML
0-8 INJECTION, SOLUTION INTRAVENOUS; SUBCUTANEOUS
Status: DISCONTINUED | OUTPATIENT
Start: 2024-04-04 | End: 2024-04-05 | Stop reason: HOSPADM

## 2024-04-04 RX ORDER — INSULIN LISPRO 100 [IU]/ML
0-4 INJECTION, SOLUTION INTRAVENOUS; SUBCUTANEOUS NIGHTLY
Status: DISCONTINUED | OUTPATIENT
Start: 2024-04-04 | End: 2024-04-05 | Stop reason: HOSPADM

## 2024-04-04 RX ORDER — ONDANSETRON 2 MG/ML
4 INJECTION INTRAMUSCULAR; INTRAVENOUS EVERY 6 HOURS PRN
Status: DISCONTINUED | OUTPATIENT
Start: 2024-04-04 | End: 2024-04-05 | Stop reason: HOSPADM

## 2024-04-04 RX ORDER — SODIUM CHLORIDE 9 MG/ML
INJECTION, SOLUTION INTRAVENOUS PRN
Status: DISCONTINUED | OUTPATIENT
Start: 2024-04-04 | End: 2024-04-05 | Stop reason: HOSPADM

## 2024-04-04 RX ORDER — ATORVASTATIN CALCIUM 80 MG/1
80 TABLET, FILM COATED ORAL DAILY
Status: DISCONTINUED | OUTPATIENT
Start: 2024-04-04 | End: 2024-04-05 | Stop reason: HOSPADM

## 2024-04-04 RX ORDER — LISINOPRIL 10 MG/1
10 TABLET ORAL DAILY
Status: DISCONTINUED | OUTPATIENT
Start: 2024-04-04 | End: 2024-04-05 | Stop reason: HOSPADM

## 2024-04-04 RX ORDER — ASPIRIN 81 MG/1
81 TABLET, CHEWABLE ORAL DAILY
Status: DISCONTINUED | OUTPATIENT
Start: 2024-04-04 | End: 2024-04-05 | Stop reason: HOSPADM

## 2024-04-04 RX ORDER — HYDRALAZINE HYDROCHLORIDE 20 MG/ML
20 INJECTION INTRAMUSCULAR; INTRAVENOUS EVERY 6 HOURS PRN
Status: DISCONTINUED | OUTPATIENT
Start: 2024-04-04 | End: 2024-04-05 | Stop reason: HOSPADM

## 2024-04-04 RX ORDER — SODIUM CHLORIDE 0.9 % (FLUSH) 0.9 %
5-40 SYRINGE (ML) INJECTION EVERY 12 HOURS SCHEDULED
Status: DISCONTINUED | OUTPATIENT
Start: 2024-04-04 | End: 2024-04-05 | Stop reason: HOSPADM

## 2024-04-04 RX ORDER — MAGNESIUM 30 MG
30 TABLET ORAL 2 TIMES DAILY
COMMUNITY

## 2024-04-04 RX ORDER — ACETAMINOPHEN 325 MG/1
650 TABLET ORAL EVERY 4 HOURS PRN
Status: DISCONTINUED | OUTPATIENT
Start: 2024-04-04 | End: 2024-04-05 | Stop reason: HOSPADM

## 2024-04-04 RX ORDER — DEXTROSE MONOHYDRATE 100 MG/ML
INJECTION, SOLUTION INTRAVENOUS CONTINUOUS PRN
Status: DISCONTINUED | OUTPATIENT
Start: 2024-04-04 | End: 2024-04-05 | Stop reason: HOSPADM

## 2024-04-04 RX ORDER — SODIUM CHLORIDE 0.9 % (FLUSH) 0.9 %
5-40 SYRINGE (ML) INJECTION PRN
Status: DISCONTINUED | OUTPATIENT
Start: 2024-04-04 | End: 2024-04-05 | Stop reason: HOSPADM

## 2024-04-04 RX ORDER — LABETALOL HYDROCHLORIDE 5 MG/ML
10 INJECTION, SOLUTION INTRAVENOUS EVERY 6 HOURS PRN
Status: DISCONTINUED | OUTPATIENT
Start: 2024-04-04 | End: 2024-04-05 | Stop reason: HOSPADM

## 2024-04-04 RX ORDER — BUPROPION HYDROCHLORIDE 150 MG/1
150 TABLET, EXTENDED RELEASE ORAL 2 TIMES DAILY
Status: DISCONTINUED | OUTPATIENT
Start: 2024-04-04 | End: 2024-04-05 | Stop reason: HOSPADM

## 2024-04-04 RX ORDER — GLUCAGON 1 MG/ML
1 KIT INJECTION PRN
Status: DISCONTINUED | OUTPATIENT
Start: 2024-04-04 | End: 2024-04-05 | Stop reason: HOSPADM

## 2024-04-04 RX ORDER — HYDRALAZINE HYDROCHLORIDE 20 MG/ML
20 INJECTION INTRAMUSCULAR; INTRAVENOUS EVERY 6 HOURS PRN
Status: DISCONTINUED | OUTPATIENT
Start: 2024-04-04 | End: 2024-04-04

## 2024-04-04 RX ORDER — HYDRALAZINE HYDROCHLORIDE 20 MG/ML
10 INJECTION INTRAMUSCULAR; INTRAVENOUS EVERY 6 HOURS PRN
Status: DISCONTINUED | OUTPATIENT
Start: 2024-04-04 | End: 2024-04-05 | Stop reason: HOSPADM

## 2024-04-04 RX ORDER — ONDANSETRON 4 MG/1
4 TABLET, ORALLY DISINTEGRATING ORAL EVERY 8 HOURS PRN
Status: DISCONTINUED | OUTPATIENT
Start: 2024-04-04 | End: 2024-04-05 | Stop reason: HOSPADM

## 2024-04-04 RX ADMIN — BUPROPION HYDROCHLORIDE 150 MG: 150 TABLET, FILM COATED, EXTENDED RELEASE ORAL at 22:21

## 2024-04-04 RX ADMIN — ENOXAPARIN SODIUM 40 MG: 100 INJECTION SUBCUTANEOUS at 18:28

## 2024-04-04 RX ADMIN — AMLODIPINE BESYLATE 5 MG: 5 TABLET ORAL at 18:27

## 2024-04-04 RX ADMIN — ACETAMINOPHEN 650 MG: 325 TABLET ORAL at 18:51

## 2024-04-04 RX ADMIN — SODIUM CHLORIDE, PRESERVATIVE FREE 10 ML: 5 INJECTION INTRAVENOUS at 22:19

## 2024-04-04 ASSESSMENT — LIFESTYLE VARIABLES
HOW MANY STANDARD DRINKS CONTAINING ALCOHOL DO YOU HAVE ON A TYPICAL DAY: PATIENT DOES NOT DRINK
HOW OFTEN DO YOU HAVE A DRINK CONTAINING ALCOHOL: NEVER

## 2024-04-04 ASSESSMENT — PAIN - FUNCTIONAL ASSESSMENT: PAIN_FUNCTIONAL_ASSESSMENT: NONE - DENIES PAIN

## 2024-04-04 NOTE — ED TRIAGE NOTES
Pt arrived to ED through triage from OT/Pt with concern for hypertension the patient states she is compliant with medication. Pt does have hx of a stroke, with weakness to the left side of her body.    VSS, RR equal and non labored, NAD, pt is alert and oriented x4    Call light within reach, pt changed into gown, placed on cardiac monitoring, EKG obtained and IV line started and labs drawn     Following plan of care

## 2024-04-04 NOTE — ED NOTES
ED to inpatient nurses report      Chief Complaint:  Chief Complaint   Patient presents with    Hypertension     Present to ED from: PT/OT    MOA:     LOC: alert and orientated to name, place, date  Mobility: Requires assistance * 2  Oxygen Baseline: 94    Current needs required: none   Pending ED orders: None  Present condition: Stable    Why did the patient come to the ED? Pt arrived to ED through triage from OT/Pt with concern for hypertension the patient states she is compliant with medication. Pt does have hx of a stroke, with weakness to the left side of her body.     VSS, RR equal and non labored, NAD, pt is alert and oriented x4     Call light within reach, pt changed into gown, placed on cardiac monitoring, EKG obtained and IV line started and labs drawn      Following plan of care  What is the plan? Obs, Cards consult  Any procedures or intervention occur? Labs, EKG, Xray  Any safety concerns??    Mental Status:  Level of Consciousness: Alert (0)    Psych Assessment:   Psychosocial  Psychosocial (WDL): Within Defined Limits  Vital signs   Vitals:    04/04/24 1207   BP: (!) 184/98   Pulse: 84   Resp: 15   Temp: 97.8 °F (36.6 °C)   SpO2: 94%   Weight: 86.2 kg (190 lb)        Vitals:  Patient Vitals for the past 24 hrs:   BP Temp Pulse Resp SpO2 Weight   04/04/24 1207 (!) 184/98 97.8 °F (36.6 °C) 84 15 94 % 86.2 kg (190 lb)      Visit Vitals  BP (!) 184/98   Pulse 84   Temp 97.8 °F (36.6 °C)   Resp 15   Wt 86.2 kg (190 lb)   SpO2 94%   BMI 28.89 kg/m²        LDAs:   Peripheral IV 04/04/24 Right Antecubital (Active)   Site Assessment Clean, dry & intact 04/04/24 1215   Line Status Blood return noted;Flushed;Normal saline locked;Specimen collected 04/04/24 1215       Ambulatory Status:  Presents to emergency department  because of falls (Syncope, seizure, or loss of consciousness): No, Age > 70: No, Altered Mental Status, Intoxication with alcohol or substance confusion (Disorientation, impaired judgment, poor       Spouse name: None    Number of children: None    Years of education: None    Highest education level: None   Tobacco Use    Smoking status: Every Day     Current packs/day: 0.50     Average packs/day: 0.5 packs/day for 40.0 years (20.0 ttl pk-yrs)     Types: Cigarettes    Smokeless tobacco: Never   Substance and Sexual Activity    Alcohol use: No    Drug use: No    Sexual activity: Never     Social Determinants of Health     Financial Resource Strain: Low Risk  (3/20/2023)    Overall Financial Resource Strain (CARDIA)     Difficulty of Paying Living Expenses: Not hard at all   Transportation Needs: Unmet Transportation Needs (3/20/2023)    PRAPARE - Transportation     Lack of Transportation (Non-Medical): Yes   Physical Activity: Insufficiently Active (3/20/2023)    Exercise Vital Sign     Days of Exercise per Week: 7 days     Minutes of Exercise per Session: 10 min   Housing Stability: Unknown (3/20/2023)    Housing Stability Vital Sign     Unstable Housing in the Last Year: No       FAMILY HISTORY       Family History   Problem Relation Age of Onset    Other Mother     Diabetes Mother     Prostate Cancer Father     Cancer Brother         lining of lung       ALLERGIES     Patient has no known allergies.    CURRENT MEDICATIONS       Previous Medications    ACETAMINOPHEN (TYLENOL) 500 MG TABLET    Take 2 tablets by mouth every 8 hours    ALBUTEROL (PROVENTIL) (2.5 MG/3ML) 0.083% NEBULIZER SOLUTION    Take 3 mLs by nebulization every 6 hours as needed for Wheezing or Shortness of Breath    ALBUTEROL SULFATE HFA (PROVENTIL;VENTOLIN;PROAIR) 108 (90 BASE) MCG/ACT INHALER    Inhale 2 puffs into the lungs every 6 hours as needed for Wheezing    ASPIRIN 81 MG CHEWABLE TABLET    Take 1 tablet by mouth daily    ATORVASTATIN (LIPITOR) 80 MG TABLET    Take 1 tablet by mouth daily    BUDESONIDE-FORMOTEROL (SYMBICORT) 160-4.5 MCG/ACT AERO    Inhale 2 puffs into the lungs 2 times daily    BUPROPION (WELLBUTRIN SR) 150

## 2024-04-04 NOTE — FLOWSHEET NOTE
[x] [facility]  Outpatient Rehabilitation &  Therapy  [address]  [phone]  [phone] [] [facility]  Outpatient Rehabilitation &  Therapy  [address]  P: [phone]  F: [phone] [] [facility]  Outpatient Rehabilitation &  Therapy  [address]  P: [phone]  F: [phone] [] [facility]  Outpatient Rehabilitation &  Therapy  [address]  [phone]  [phone] [] [facility]  Outpatient Rehabilitation &  Therapy  [address]   P: [phone]  F: [phone]  [] [facility]  Outpatient Rehabilitation &  Therapy  [address]  P: [phone]  F: [phone] [] [facility]  Outpatient Rehabilitation &  Therapy  [address]  P: [phone]  F: [phone] [] [facility]  Outpatient Rehabilitation &  Therapy  [address]  P: [phone]  F: [phone] [] [facility]  Outpatient Rehabilitation &  Therapy  [address]  P: [phone]  F: [phone]  [] [facility] Outpatient Rehabilitation &  Therapy  [address]  P: [phone]  F: [phone]     Therapy Cancel/No Show note    Date: 2024  Patient: Rosa Cook  : 1955  MRN: [id number]    Cancels/No Shows to date: --- TODAY'S CANCEL NOT COUNTED AGAINST PT    For today's appointment patient:    [x]  Cancelled    [] Rescheduled appointment    [] No-show     Reason given by patient:    []  Patient ill    []  Conflicting appointment    [] No transportation      [] Conflict with work    [] No reason given    [] Weather related    [] COVID-19    [x] Other:      Comments: Pt arrives for OT session prior to PT session and states she continues to be very dizzy (was noted at least PT session), as well as BP uncontrolled. She

## 2024-04-04 NOTE — ED PROVIDER NOTES
[facility] ED     Emergency Department     Faculty Attestation    I performed a history and physical examination of the patient and discussed management with the resident. I reviewed the resident’s note and agree with the documented findings and plan of care. Any areas of disagreement are noted on the chart. I was personally present for the key portions of any procedures. I have documented in the chart those procedures where I was not present during the key portions. I have reviewed the emergency nurses triage note. I agree with the chief complaint, past medical history, past surgical history, allergies, medications, social and family history as documented unless otherwise noted below. For Physician Assistant/ Nurse Practitioner cases/documentation I have personally evaluated this patient and have completed at least one if not all key elements of the E/M (history, physical exam, and MDM). Additional findings are as noted.    12:53 PM EDT    Patient presents with elevated blood pressure and dizziness.  Patient was at a Occupational Therapy appointment today and she mentioned that she has been feeling dizzy.  Her blood pressure was measured and was found to be elevated so was sent here for evaluation.  Patient says she has been having off-and-on dizziness for the past several weeks.  She denies any chest pain but says she does occasionally become short of breath.  She denies any recent fever, cough, congestion, vomiting or diarrhea.  On exam, patient is resting comfortably in the bed and appears well.  She is alert and oriented and answers questions appropriately.  Lungs are clear to auscultation bilaterally and heart sounds are normal.  Abdomen is soft and nontender.  There are no focal neurologic deficits.  Patient says she has been taking her lisinopril as prescribed.  Will get an EKG and labs and reassess.    EKG Interpretation    Interpreted by emergency department physician    Rhythm: normal

## 2024-04-04 NOTE — FLOWSHEET NOTE
[x] [facility]  Outpatient Rehabilitation &  Therapy  [address]  [phone]  F: [phone] [] [facility]  Outpatient Rehabilitation &  Therapy  [address]   [address]  P: [phone]  F: [phone] [] [facility]  Outpatient Rehabilitation &  Therapy  [address]  P: [phone]  F: [phone] [] [facility]  Outpatient Rehabilitation &  Therapy  [address]   P: [phone]  F: [phone] [] [facility]   Outpatient Rehabilitation   & Therapy  [address]  P: [phone]   F: [phone]     Occupational Therapy Daily Treatment Note    Date:  2024  Patient Name:  Rosa Cook    :  1955  MRN: [id number]  Physician: Dr. Selma MD                                  Insurance: Medicare - [facility]  Medical Diagnosis: I69.354 spastic hemiplegia of left nondominant side as late effect of cerebral infarction        Rehab Codes: stiffness in shoulder M25.61,, lack of coordination R27.8,, fine motor skills loss R29.818,, or muscle weakness generalized M62.81,  Next 's appt.: 24 - next Botox treatment   Date of symptom onset: May 2, 2021  Visit# / total visits: ; Progress note for patient due at visit 4-5    Cancels/No Shows: 0/0      Subjective:    Pain:  [] Yes  [x] No  Location: N/A  Pain Rating: (0-10 scale) 0/10  Pain altered Tx:  [x] No  [] Yes  Action: NA  Pt Comments: Pt arrives with complaints of dizziness, which she states has been going on for several days. States she called her doctor a few days ago and they recommended she go to the ER due to dizziness and elevated blood pressure. Pt states she hasn't had a way to get to the ER.    Precautions: none  Surgery procedure and date: NA    Objective:  Today's Treatment:  Modalities: NA  Exercises:  EXERCISE    REPS/     TIME  WEIGHT/    LEVEL COMMENTS   Self PROM 10 reps x 2 sets  Not Completed, HEP   PROM

## 2024-04-04 NOTE — FLOWSHEET NOTE
[x] [facility]       Occupational Therapy       [address]       Phone: [phone]       Fax: [phone] [] [facility] Occupational  Therapy at [place]       [address]       Phone: [phone]       Fax: [phone] [] [facility]  Outpatient Rehabilitation &  Therapy  [address]  P: [phone]  F: [phone]           Occupational Therapy Cancel/No Show note    Date: 2024  Patient: Rosa Cook  : 1955  MRN: [id number]  Cancels/No Shows to date: 0/0 - not counted against pt     For today's appointment patient:  [x]  Cancelled  []  Rescheduled appointment  []  No-show     Reason given by patient:  []  Patient ill  []  Conflicting appointment  []  No transportation    []  Conflict with work  []  No reason given  [x]  Other: pt arrived with complaints of dizziness, states has been going on for several days. [facility] she also feels very dizzy when walking to the bathroom at home. [facility] she has had elevated blood pressure ratings over the past few days. [facility] doctor told her to go to ER, but she didn't have a way to get there. BP today in clinic was 159/98. Due to elevated BP and ongoing complaints of dizziness, pt escorted down to ED for further evaluation.  Confirmed next appt    Comments:      Electronically signed by: CHRIS Brownlee/L

## 2024-04-05 VITALS
RESPIRATION RATE: 18 BRPM | OXYGEN SATURATION: 94 % | WEIGHT: 184.08 LBS | HEIGHT: 68 IN | DIASTOLIC BLOOD PRESSURE: 74 MMHG | SYSTOLIC BLOOD PRESSURE: 127 MMHG | HEART RATE: 84 BPM | TEMPERATURE: 98.1 F | BODY MASS INDEX: 27.9 KG/M2

## 2024-04-05 PROBLEM — R42 LIGHTHEADEDNESS: Status: ACTIVE | Noted: 2024-04-05

## 2024-04-05 LAB
EKG ATRIAL RATE: 86 BPM
EKG ATRIAL RATE: 86 BPM
EKG P AXIS: 39 DEGREES
EKG P AXIS: 57 DEGREES
EKG P-R INTERVAL: 124 MS
EKG P-R INTERVAL: 130 MS
EKG Q-T INTERVAL: 344 MS
EKG Q-T INTERVAL: 386 MS
EKG QRS DURATION: 86 MS
EKG QRS DURATION: 90 MS
EKG QTC CALCULATION (BAZETT): 411 MS
EKG QTC CALCULATION (BAZETT): 461 MS
EKG R AXIS: 26 DEGREES
EKG R AXIS: 30 DEGREES
EKG T AXIS: 55 DEGREES
EKG T AXIS: 65 DEGREES
EKG VENTRICULAR RATE: 86 BPM
EKG VENTRICULAR RATE: 86 BPM
GLUCOSE BLD-MCNC: 122 MG/DL (ref 65–105)
GLUCOSE BLD-MCNC: 185 MG/DL (ref 65–105)

## 2024-04-05 PROCEDURE — 96376 TX/PRO/DX INJ SAME DRUG ADON: CPT

## 2024-04-05 PROCEDURE — 6370000000 HC RX 637 (ALT 250 FOR IP)

## 2024-04-05 PROCEDURE — 93010 ELECTROCARDIOGRAM REPORT: CPT | Performed by: INTERNAL MEDICINE

## 2024-04-05 PROCEDURE — 93005 ELECTROCARDIOGRAM TRACING: CPT | Performed by: EMERGENCY MEDICINE

## 2024-04-05 PROCEDURE — G0378 HOSPITAL OBSERVATION PER HR: HCPCS

## 2024-04-05 PROCEDURE — 6370000000 HC RX 637 (ALT 250 FOR IP): Performed by: STUDENT IN AN ORGANIZED HEALTH CARE EDUCATION/TRAINING PROGRAM

## 2024-04-05 PROCEDURE — 96372 THER/PROPH/DIAG INJ SC/IM: CPT

## 2024-04-05 PROCEDURE — 99222 1ST HOSP IP/OBS MODERATE 55: CPT | Performed by: INTERNAL MEDICINE

## 2024-04-05 PROCEDURE — 99222 1ST HOSP IP/OBS MODERATE 55: CPT | Performed by: PSYCHIATRY & NEUROLOGY

## 2024-04-05 PROCEDURE — 2580000003 HC RX 258: Performed by: STUDENT IN AN ORGANIZED HEALTH CARE EDUCATION/TRAINING PROGRAM

## 2024-04-05 PROCEDURE — 97161 PT EVAL LOW COMPLEX 20 MIN: CPT

## 2024-04-05 PROCEDURE — 96374 THER/PROPH/DIAG INJ IV PUSH: CPT

## 2024-04-05 PROCEDURE — 97116 GAIT TRAINING THERAPY: CPT

## 2024-04-05 PROCEDURE — 82947 ASSAY GLUCOSE BLOOD QUANT: CPT

## 2024-04-05 PROCEDURE — 6360000002 HC RX W HCPCS: Performed by: STUDENT IN AN ORGANIZED HEALTH CARE EDUCATION/TRAINING PROGRAM

## 2024-04-05 RX ORDER — SCOLOPAMINE TRANSDERMAL SYSTEM 1 MG/1
1 PATCH, EXTENDED RELEASE TRANSDERMAL
Qty: 4 PATCH | Refills: 0 | Status: SHIPPED | OUTPATIENT
Start: 2024-04-08 | End: 2024-04-20

## 2024-04-05 RX ORDER — MECLIZINE HCL 12.5 MG/1
25 TABLET ORAL EVERY 8 HOURS SCHEDULED
Status: DISCONTINUED | OUTPATIENT
Start: 2024-04-05 | End: 2024-04-05 | Stop reason: HOSPADM

## 2024-04-05 RX ORDER — MECLIZINE HYDROCHLORIDE 25 MG/1
25 TABLET ORAL EVERY 8 HOURS PRN
Qty: 30 TABLET | Refills: 0 | Status: SHIPPED | OUTPATIENT
Start: 2024-04-05 | End: 2024-04-15

## 2024-04-05 RX ORDER — AMLODIPINE BESYLATE 5 MG/1
5 TABLET ORAL DAILY
Qty: 30 TABLET | Refills: 0 | Status: SHIPPED | OUTPATIENT
Start: 2024-04-06

## 2024-04-05 RX ORDER — SCOLOPAMINE TRANSDERMAL SYSTEM 1 MG/1
1 PATCH, EXTENDED RELEASE TRANSDERMAL
Status: DISCONTINUED | OUTPATIENT
Start: 2024-04-05 | End: 2024-04-05 | Stop reason: HOSPADM

## 2024-04-05 RX ADMIN — ONDANSETRON 4 MG: 2 INJECTION INTRAMUSCULAR; INTRAVENOUS at 13:46

## 2024-04-05 RX ADMIN — SODIUM CHLORIDE, PRESERVATIVE FREE 10 ML: 5 INJECTION INTRAVENOUS at 10:23

## 2024-04-05 RX ADMIN — MECLIZINE 25 MG: 12.5 TABLET ORAL at 13:46

## 2024-04-05 RX ADMIN — LISINOPRIL 10 MG: 10 TABLET ORAL at 10:15

## 2024-04-05 RX ADMIN — ATORVASTATIN CALCIUM 80 MG: 80 TABLET, FILM COATED ORAL at 10:15

## 2024-04-05 RX ADMIN — DOCUSATE SODIUM 500 MG: 50 LIQUID ORAL at 11:43

## 2024-04-05 RX ADMIN — ACETAMINOPHEN 650 MG: 325 TABLET ORAL at 10:22

## 2024-04-05 RX ADMIN — ACETAMINOPHEN 650 MG: 325 TABLET ORAL at 05:34

## 2024-04-05 RX ADMIN — LEVOTHYROXINE SODIUM 37.5 MCG: 75 TABLET ORAL at 05:34

## 2024-04-05 RX ADMIN — BUPROPION HYDROCHLORIDE 150 MG: 150 TABLET, FILM COATED, EXTENDED RELEASE ORAL at 10:15

## 2024-04-05 RX ADMIN — ONDANSETRON 4 MG: 2 INJECTION INTRAMUSCULAR; INTRAVENOUS at 05:34

## 2024-04-05 RX ADMIN — ASPIRIN 81 MG 81 MG: 81 TABLET ORAL at 10:16

## 2024-04-05 RX ADMIN — AMLODIPINE BESYLATE 5 MG: 5 TABLET ORAL at 10:16

## 2024-04-05 RX ADMIN — ENOXAPARIN SODIUM 40 MG: 100 INJECTION SUBCUTANEOUS at 10:19

## 2024-04-05 ASSESSMENT — PAIN DESCRIPTION - DESCRIPTORS: DESCRIPTORS: ACHING

## 2024-04-05 ASSESSMENT — PAIN DESCRIPTION - LOCATION: LOCATION: HEAD

## 2024-04-05 ASSESSMENT — PAIN SCALES - GENERAL
PAINLEVEL_OUTOF10: 3
PAINLEVEL_OUTOF10: 0

## 2024-04-05 ASSESSMENT — PAIN - FUNCTIONAL ASSESSMENT: PAIN_FUNCTIONAL_ASSESSMENT: ACTIVITIES ARE NOT PREVENTED

## 2024-04-05 NOTE — CONSULTS
[facility] Neurology   IN-PATIENT SERVICE      NEUROLOGY CONSULT  NOTE            Date:   4/5/2024  Patient name:  Rosa Cook  Date of admission:  4/4/2024  YOB: 1955      Chief Complaint:     Chief Complaint   Patient presents with    Hypertension       Reason for Consult:      Dizziness    History of Present Illness:     The patient is a 68 y.o. female who presents with Hypertension  . The patient was seen and examined and the chart was reviewed.  Patient presents to the hospital after being found to be hypertensive when she went to her OT appointment yesterday.  Systolic blood pressures approaching 190s.  She was reportedly complaining of lightheadedness for the last couple of weeks.  Describing a sensation of whenever she stands up feeling as though she is lightheaded or woozy and at times associated nausea.  She denies any vertiginous or room spinning sensation.  Denies any worsening with certain head movements such as rolling over in bed.  States it is only occurring when she is standing up.  She does admit to some wax buildup in her ears and is currently being prepped for cerumen disimpaction on the right side.      Patient has significant history of right MCA stroke with chronic left spastic hemiparesis.  Stroke was back in 2021 considered to be cryptogenic.  She does follow in our clinic actually having been seen recently end of March by Dr. Rubin in the resident clinic.  Recommendations at that time were to continue to maintain a blood pressure diary, continue on aspirin and Lipitor.  Hearing testing was reordered and bupropion was increased to 150 mg twice daily.  There was no mention of complaint of lightheadedness at that time.  She has had a recent MRI brain done in December, 2023 showing chronic encephalomalacia in the right MCA distribution without any acute abnormalities.    Past Medical History:     Past Medical History:   Diagnosis Date    Asthma     COPD (chronic obstructive

## 2024-04-05 NOTE — CONSULTS
Today's Date: 4/5/2024  Patient Name: Rosa Cook  Date of admission: 4/4/2024 11:59 AM  Patient's age: 68 y.o., 1955  Admission Dx: Hypertension, unspecified type [I10]    Reason for Consult:  Cardiac evaluation    Requesting Physician: Johnny Saha MD    CHIEF COMPLAINT:  Dizziness    History Obtained From:  patient, electronic medical record    HISTORY OF PRESENT ILLNESS:      Patient is a 8-year-old female who was seen by Occupational Therapy today and found to have dizziness from where she was referred to the ER.  She was subsequently found to have elevated blood pressure in the 190s in the ER, troponin were mild elevated for which cardiology was consulted.    Past Medical History:   has a past medical history of Asthma, COPD (chronic obstructive pulmonary disease) (HCC), Diabetes mellitus (HCC), Esophageal ring, Gastritis, Hyperlipidemia, Hyperplastic colon polyp, Hypertension, Osteoarthritis, Patient in clinical research study, and TIA (transient ischemic attack).    Past Surgical History:   has a past surgical history that includes Appendectomy; Hysterectomy; Esophagus dilation; Colonoscopy (09/20/2017); Endoscopy, colon, diagnostic; pr egd transoral biopsy single/multiple (N/A, 9/20/2017); pr colsc flx w/rmvl of tumor polyp lesion snare tq (N/A, 9/20/2017); and Upper gastrointestinal endoscopy (09/2017).     Home Medications:    Prior to Admission medications    Medication Sig Start Date End Date Taking? Authorizing Provider   magnesium 30 MG tablet Take 1 tablet by mouth 2 times daily  Patient not taking: Reported on 4/4/2024   Yes Provider, MD [place]   lisinopril (PRINIVIL;ZESTRIL) 10 MG tablet Take 1 tablet by mouth daily  Patient taking differently: Take 2 tablets by mouth daily Pt states blood pressure was uncontrolled. Pt NP requested she double her dose 3/26/24   Mariam Marquez, APRN - CNP   albuterol sulfate HFA (PROVENTIL;VENTOLIN;PROAIR) 108 (90 Base) MCG/ACT    HGB 14.6   HCT 44.7          BMP:   Recent Labs     04/04/24  1241      K 3.6*   CO2 28   BUN 17   CREATININE 0.7   LABGLOM >90   GLUCOSE 88       BNP: No results for input(s): \"BNP\" in the last 72 hours.  PT/INR: No results for input(s): \"PROTIME\", \"INR\" in the last 72 hours.  APTT:No results for input(s): \"APTT\" in the last 72 hours.  CARDIAC ENZYMES:No results for input(s): \"CKTOTAL\", \"CKMB\", \"CKMBINDEX\", \"TROPONINI\" in the last 72 hours.  FASTING LIPID PANEL:  Lab Results   Component Value Date/Time    HDL 36 03/19/2024 02:25 PM    TRIG 223 03/19/2024 02:25 PM     LIVER PROFILE:No results for input(s): \"AST\", \"ALT\", \"LABALBU\" in the last 72 hours.      PHYLLIS 05/21: LVEF 55%.  No significant valvular or wall motion abnormalities.      IMPRESSION:    Initial admission with dizziness, found to have elevated blood pressure.  No chest pain.  Mild elevated troponin with no acute EKG changes.  History of stroke in 2021 with subsequent PHYLLIS and loop recorder placement.  Hypertension.  Hyperlipidemia.    RECOMMENDATIONS:  Home lisinopril resumed by primary team.  Amlodipine added.  Continue aspirin and statin for hyperlipidemia and CAD prevention.  No acute intervention per cardiology.      Discussed with patient and Nurse.      Jean Paul Marcus MD       Cardiovascular Fellow  4/5/2024, 7:48 AM    Attending note.   The patient was seen and examined, agree with evaluation and plan as documented above by Dr. Marcus. Dizziness. Even laying down. Possible Vertigo. No chest pain or SOB. No signs of ACS or acute CHF. PRevious PHYLLIS reviewed. ECG within normal. Check orthostatics. Adjust BP medications. Recommend neurology evaluation for vertigo. Will follow.

## 2024-04-05 NOTE — H&P
[facility]  CDU / OBSERVATION ENCOUNTER  ATTENDING NOTE     Pt Name: Rosa Cook  MRN: [id number]  Birthdate 1955  Date of evaluation: 4/5/24  Patient's PCP is :  Mariam Marquez APRN - ANA    CHIEF COMPLAINT       Chief Complaint   Patient presents with    Hypertension         HISTORY OF PRESENT ILLNESS    Rosa Cook is a 68 y.o. female who presents to the ED as sent via Occupational Therapy due to elevated blood pressure.  Patient endorsing vertigo it has been going on and continuous for the past month.    Patient has a past medical history of chronic right MCA stroke with spastic left-sided hemiparesis.  Patient also has a past medical history of COPD/asthma, diabetes, hyperlipidemia, hypertension, TIAs, hypothyroidism, appendectomy, congestive heart failure.  Patient currently on dual antiplatelet therapy due to stroke, metformin.    Patient sent in via Occupational Therapy due to elevated blood pressure.  Patient endorses vertigo which been going on for the past month.  Patient denies any recent imaging since vertigo has begun and endorses vertigo began progressively.    Laboratory evaluation notable for stable electrolytes, mild hypokalemia 3.6, stable glucose 125, normal BMP, elevated troponin 18 and 18 stable, CBC without signs of leukocytosis, stable hemoglobin, chest x-ray with pulmonary vascular congestion with findings suspicious for congestive heart failure.  EKG nonspecific without acute aberration ST or T wave changes.    Patient given patient's home blood pressure medication, aspirin and admitted to the observation unit for cardiology consultation.    History was obtained in part through review of the ED chart. When possible, a direct discussion was had with ED nurses, residents, and attendings  REVIEW OF SYSTEMS       General ROS - No fevers, No malaise   Ophthalmic ROS - No discharge, No changes in vision  ENT ROS -  No sore throat, No rhinorrhea,

## 2024-04-05 NOTE — PROGRESS NOTES
[facility]  CDU / OBSERVATION ENCOUNTER  ATTENDING NOTE       I performed a history and physical examination of the patient and discussed management with the resident or midlevel provider. I reviewed the resident or midlevel provider's note and agree with the documented findings and plan of care. Any areas of disagreement are noted on the chart. I was personally present for the key portions of any procedures. I have documented in the chart those procedures where I was not present during the key portions. I have reviewed the nurses notes. I agree with the chief complaint, past medical history, past surgical history, allergies, medications, social and family history as documented unless otherwise noted below.    The Family history, social history, and ROS are effectively unchanged since admission unless noted elsewhere in the chart.    This patient was placed in the observation unit for reevaluation for possible admission to the hospital    Patient admitted to the ETU for elevated blood pressures as well as dizziness and some shortness of breath.  Patient's blood pressure has improved and patient symptoms are much improved this morning.  Cardiology was consulted and they do not feel that her symptoms are cardiac in etiology and are not planning any further workup at this time.      Today, patient is describing her dizziness as more of a vertiginous type dizziness.  Patient states that her symptoms seem to get worse when she is up moving around or if she turns her head too quickly.  She says she has had tinnitus for the last several years.  Patient does have a cerumen impaction of the right ear which the resident will attempt to remove.  Will also consult neurology for their recommendations on patient's vertigo symptoms.    Candace Bailey MD  Attending Emergency  Physician

## 2024-04-05 NOTE — PROGRESS NOTES
Physical Therapy  Facility/Department: [facility] OBSERVATION UNIT  Physical Therapy Initial Assessment    Name: Rosa Cook  : 1955  MRN: [id number]  Date of Service: 2024  Chief Complaint   Patient presents with    Hypertension        Discharge Recommendations:  Patient would benefit from continued therapy after discharge          Patient Diagnosis(es): The primary encounter diagnosis was Hypertension, unspecified type. A diagnosis of Vertigo was also pertinent to this visit.  Past Medical History:  has a past medical history of Asthma, COPD (chronic obstructive pulmonary disease) (HCC), Diabetes mellitus (HCC), Esophageal ring, Gastritis, Hyperlipidemia, Hyperplastic colon polyp, Hypertension, Osteoarthritis, Patient in clinical research study, and TIA (transient ischemic attack).  Past Surgical History:  has a past surgical history that includes Appendectomy; Hysterectomy; Esophagus dilation; Colonoscopy (2017); Endoscopy, colon, diagnostic; pr egd transoral biopsy single/multiple (N/A, 2017); pr colsc flx w/rmvl of tumor polyp lesion snare tq (N/A, 2017); and Upper gastrointestinal endoscopy (2017).    Assessment   Assessment: Patient is at her baseline according to her demonstration compared to her description.  Patient also agrees that she is at her baseline.  Limited ambulation distances at home, similar to today's distance.  Patient was able to move with little to no assistance, as she has at her home.  No need for further PT while here at the hospital.  DC PT.  Therapy Prognosis: Good  Decision Making: Low Complexity  Clinical Presentation: stable  Requires PT Follow-Up: Yes  Activity Tolerance  Activity Tolerance: Patient tolerated evaluation without incident     Plan   Physical Therapy Plan  General Plan:  (.)  Safety Devices  Type of Devices: All fall risk precautions in place, Left in bed, Nurse notified, Gait belt, Call light within reach     Restrictions  Position  Extension: 0/5 (No activity, patellar reflex brisk)  L Ankle Dorsiflexion: 0/5  L Ankle Plantar Flexion: 0/5           Bed mobility  Supine to Sit: Stand by assistance  Transfers  Sit to Stand: Minimal Assistance  Stand to Sit: Contact guard assistance  Bed to Chair: Minimal assistance  Ambulation  Surface: Level tile  Device: Rolling Walker  Quality of Gait: She uses a hemiwalker at baseline.  She was willing to use therapist's rolling walker as a substitute for a hemiwalker, though said it made ambulation harder.  Gait Deviations: Slow Juanita  Distance: 18'     Balance  Standing - Static: Fair  Standing - Dynamic: Fair;-  Comments: Able to stand without UE support in a static position only. Needs UE support for any weight shifting.       [facility] - Mobility    AM-PAC Basic Mobility - Inpatient   How much help is needed turning from your back to your side while in a flat bed without using bedrails?: None  How much help is needed moving from lying on your back to sitting on the side of a flat bed without using bedrails?: A Little  How much help is needed moving to and from a bed to a chair?: A Little  How much help is needed standing up from a chair using your arms?: None  How much help is needed walking in hospital room?: A Little  How much help is needed climbing 3-5 steps with a railing?: A Lot  AM-PAC Inpatient Mobility Raw Score : 19  AM-PAC Inpatient T-Scale Score : 45.44  Mobility Inpatient CMS 0-100% Score: 41.77  Mobility Inpatient CMS G-Code Modifier : CK          Education  Patient Education  Education Given To: Patient  Education Provided: Role of Therapy;Plan of Care;Home Exercise Program;Transfer Training;Fall Prevention Strategies  Education Method: Demonstration;Verbal  Education Outcome: Verbalized understanding;Demonstrated understanding      Therapy Time   Individual Concurrent Group Co-treatment   Time In 1335         Time Out 1358         Minutes 23         Timed Code Treatment Minutes: 8

## 2024-04-05 NOTE — DISCHARGE INSTRUCTIONS
You have been evaluated in the Emergency Department at [facility] 4/5/2024     Your recommendations and if necessary prescriptions from today's visit:   -Take medication as prescribed  -Follow-up with your PCP  -Follow-up with neurology in 2 weeks  -Take medication as needed/as prescribed for vertigo symptoms  -Utilize referral to physical therapy for vestibular rehab as needed if symptoms continue to persist  -You are also seen via cardiology with recommendation for adding amlodipine and to continue aspirin and statin for prevention of further strokes.  This medication will be sent to her pharmacy of choice as well as any other recommended medication.  -While we attempted to clear the earwax out of your ears, if we are unable to completely clear your ears it is recommended you follow-up with your PCP for additional recommendations.    Should you have any questions regarding your care or further treatment, please call [facility] Emergency Department at [phone].    PLEASE RETURN TO THE EMERGENCY DEPARTMENT IMMEDIATELY for   -Changing numbness, tingling, weakness  -Vertigo that does not improve and inability to follow-up with PCP or neurology  -Chest pain, shortness of breath  -Changing symptoms  -Worsening symptoms  -Unable to follow up with your primary care provider  -Any other care or concern

## 2024-04-05 NOTE — PROCEDURES
PROCEDURE NOTE -bilateral ear irrigation    PATIENT NAME: Rosa Cook  MEDICAL RECORD NO. [id number]  DATE: 4/5/2024  ATTENDING PHYSICIAN: PEDRO LUIS MORGAN     PREOPERATIVE DIAGNOSIS: Cerumen impaction  POSTOPERATIVE DIAGNOSIS:  Same  PROCEDURE PERFORMED: Bilateral ear irrigation  PERFORMING PHYSICIAN: Leonel Patton DO    DISCUSSION:  Rosa Cook is a 68 y.o.-year-old female who requires a bilateral ear irrigation for bilateral cerumen impaction.  The history and physical examination were reviewed and confirmed.      CONSENT: The patient provided verbal consent for this procedure.     PROCEDURE: Liquid Colace was instilled via nursing staff into both ears and allowed to dwell for approximately 15 minutes.  Afterwards the ears were thoroughly irrigated with lukewarm water via 25 cc syringe with large 16-gauge Angiocath.  Paperclip was fashioned as well in order to remove cerumen impaction successfully to bilateral ears with improved symptoms afterward.      The patient tolerated the procedure well.     COMPLICATIONS:None     Leonel Patton DO  5:42 PM, 4/5/24

## 2024-04-05 NOTE — DISCHARGE SUMMARY
CDU Discharge Summary        Patient:  Rosa Cook  YOB: 1955    MRN: [id number]   Acct: [id number]    Primary Care Physician: Mariam Marquez APRN - CNP    Admit date:  4/4/2024 11:59 AM  Discharge date: 4/5/2024  3:35 PM     Discharge Diagnoses:     Acute vertigo/hypertension due to believe secular recent stroke/CVA  Improved with symptomatic treatment, serial reassessment, cerumen impaction    Follow-up:  Call today/tomorrow for a follow up appointment with Mariam Marquez APRN - CNP , or return to the Emergency Room with worsening symptoms    Stressed to patient the importance of following up with primary care doctor for further workup/management of symptoms.  Pt verbalizes understanding and agrees with plan.    Discharge Medications:  Changes to medications            Medication List        START taking these medications      amLODIPine 5 MG tablet  Commonly known as: NORVASC  Take 1 tablet by mouth daily     meclizine 25 MG tablet  Commonly known as: ANTIVERT  Take 1 tablet by mouth every 8 hours as needed for Dizziness     scopolamine transdermal patch  Commonly known as: TRANSDERM-SCOP  Place 1 patch onto the skin every 72 hours for 12 days  Start taking on: April 8, 2024            CONTINUE taking these medications      acetaminophen 500 MG tablet  Commonly known as: TYLENOL  Take 2 tablets by mouth every 8 hours     * albuterol (2.5 MG/3ML) 0.083% nebulizer solution  Commonly known as: PROVENTIL  Take 3 mLs by nebulization every 6 hours as needed for Wheezing or Shortness of Breath     * albuterol sulfate  (90 Base) MCG/ACT inhaler  Commonly known as: PROVENTIL;VENTOLIN;PROAIR  Inhale 2 puffs into the lungs every 6 hours as needed for Wheezing     aspirin 81 MG chewable tablet  Take 1 tablet by mouth daily     atorvastatin 80 MG tablet  Commonly known as: LIPITOR  Take 1 tablet by mouth daily     buPROPion 150 MG extended release tablet  Commonly known as:  32.7 28.4 - 34.8 g/dL    RDW 14.0 11.8 - 14.4 %    Platelets 325 138 - 453 k/uL    MPV 9.6 8.1 - 13.5 fL    NRBC Automated 0.0 0.0 per 100 WBC    Neutrophils % 67 (H) 36 - 65 %    Lymphocytes % 22 (L) 24 - 43 %    Monocytes % 5 3 - 12 %    Eosinophils % 5 (H) 1 - 4 %    Basophils % 1 0 - 2 %    Immature Granulocytes % 0 0 %    Neutrophils Absolute 6.38 1.50 - 8.10 k/uL    Lymphocytes Absolute 2.02 1.10 - 3.70 k/uL    Monocytes Absolute 0.44 0.10 - 1.20 k/uL    Eosinophils Absolute 0.48 (H) 0.00 - 0.44 k/uL    Basophils Absolute 0.05 0.00 - 0.20 k/uL    Immature Granulocytes Absolute 0.03 0.00 - 0.30 k/uL   Basic Metabolic Panel   Result Value Ref Range    Sodium 143 136 - 145 mmol/L    Potassium 3.6 (L) 3.7 - 5.3 mmol/L    Chloride 105 98 - 107 mmol/L    CO2 28 20 - 31 mmol/L    Anion Gap 10 9 - 16 mmol/L    Glucose 88 74 - 99 mg/dL    BUN 17 8 - 23 mg/dL    Creatinine 0.7 0.50 - 0.90 mg/dL    Est, Glom Filt Rate >90 >60 mL/min/1.73m2    Calcium 9.6 8.6 - 10.4 mg/dL   Troponin   Result Value Ref Range    Troponin, High Sensitivity 18 (H) 0 - 14 ng/L   Troponin   Result Value Ref Range    Troponin, High Sensitivity 18 (H) 0 - 14 ng/L   Brain Natriuretic Peptide   Result Value Ref Range    Pro-BNP 77 0 - 300 pg/mL   POC Glucose Fingerstick   Result Value Ref Range    POC Glucose 125 (H) 65 - 105 mg/dL   POC Glucose Fingerstick   Result Value Ref Range    POC Glucose 122 (H) 65 - 105 mg/dL   POC Glucose Fingerstick   Result Value Ref Range    POC Glucose 185 (H) 65 - 105 mg/dL   EKG 12 Lead   Result Value Ref Range    Ventricular Rate 86 BPM    Atrial Rate 86 BPM    P-R Interval 124 ms    QRS Duration 86 ms    Q-T Interval 344 ms    QTc Calculation (Bazett) 411 ms    P Axis 39 degrees    R Axis 26 degrees    T Axis 55 degrees   EKG 12 Lead   Result Value Ref Range    Ventricular Rate 86 BPM    Atrial Rate 86 BPM    P-R Interval 130 ms    QRS Duration 90 ms    Q-T Interval 386 ms    QTc Calculation (Bazett) 461 ms    P

## 2024-04-05 NOTE — CARE COORDINATION
DISCHARGE PLANNING EVALUATION: OP/OBSERVATION        4/5/24, 12:12 PM EDT    Rosa Cook         Location: OBS 22/22-1   Reason for hospitalization: Hypertension, unspecified type [I10]     CM Services requested for transitional needs.     PCP: Mariam Marquez APRN - CNP    Transportation/Food Security/Housekeeping Addressed:  No issues identified.     Equipment needs: Pt has DME waker wheelchair and nebulizer    Transition plan: Goal is home, son to provide transportation home, fills meds at [facility]

## 2024-04-08 ENCOUNTER — HOSPITAL ENCOUNTER (OUTPATIENT)
Dept: PHYSICAL THERAPY | Age: 69
Setting detail: THERAPIES SERIES
Discharge: HOME | End: 2024-04-08
Attending: PHYSICAL MEDICINE & REHABILITATION
Payer: MEDICARE

## 2024-04-08 ENCOUNTER — HOSPITAL ENCOUNTER (OUTPATIENT)
Dept: OCCUPATIONAL THERAPY | Age: 69
Setting detail: THERAPIES SERIES
Discharge: HOME | End: 2024-04-08
Attending: PHYSICAL MEDICINE & REHABILITATION
Payer: MEDICARE

## 2024-04-08 PROCEDURE — 97110 THERAPEUTIC EXERCISES: CPT

## 2024-04-08 PROCEDURE — 97112 NEUROMUSCULAR REEDUCATION: CPT

## 2024-04-08 PROCEDURE — 97116 GAIT TRAINING THERAPY: CPT

## 2024-04-08 NOTE — FLOWSHEET NOTE
activities   ? Strength:  Demo trace muscle activation throughout LUE   ? Function:  Complete UB dressing SBA utilizing lynette dressing techniques PRN  Verbally report completing bathing tasks Min A   Complete simple cooking task Min A   Pt will have referral to complete driving evaluation   Patient to be independent with home exercise program as demonstrated by performance with correct form without cues.     Patient goals: cooking       Pt. Education:  [] Yes  [x] No  [] Reviewed Prior HEP/Ed  Method of Education: [] Verbal  [] Demo  [] Written  Re:   Comprehension of Education:  [] Verbalizes understanding.  [] Demonstrates understanding.  [] Needs review.  [] Demonstrates/verbalizes HEP/Ed previously given.      Plan: [x] Continue current frequency toward short and long term goals.  [x] Specific Instructions for subsequent treatments: continue PROM, Bioness, arm bike, towel slides     [] Other:       Time In: 1231pm  Time Out: 117pm        Treatment Charges: Mins Units   []  Modalities:      []  Ultrasound     [x]  Ther Exercise 31 2   []  Manual Therapy     []  Ther Activities     []  Orthotic fit/train     []  Orthotic recheck     [x]  Other: neuro re-ed 15 1   Total Billable time 46 3       Electronically signed by:  Keren Mariee OTR/L

## 2024-04-08 NOTE — FLOWSHEET NOTE
[x] [facility]  Outpatient Rehabilitation &  Therapy  [address]  [phone]  [phone] [] [facility]  Outpatient Rehabilitation &  Therapy  [address]  P: [phone]  F: [phone] [] [facility]  Outpatient Rehabilitation &  Therapy  [address]  P: [phone]  F: [phone] [] [facility]  Outpatient Rehabilitation &  Therapy  [address]  [phone]  [phone] [] [facility]  Outpatient Rehabilitation &  Therapy  [address]   P: [phone]  F: [phone]  [] [facility]  Outpatient Rehabilitation &  Therapy  [address]  P: [phone]  F: [phone] [] [facility]  Outpatient Rehabilitation &  Therapy  [address]  P: [phone]  F: [phone] [] [facility]  Outpatient Rehabilitation &  Therapy  [address]  P: [phone]  F: [phone] [] [facility]  Outpatient Rehabilitation &  Therapy  [address]  P: [phone]  F: [phone]  [] [facility] Outpatient Rehabilitation &  Therapy  [address]  P: [phone]  F: [phone]     Physical Therapy Daily Treatment Note    Date:  2024  Patient Name:  Rosa Cook    :  1955  MRN: [id number]   Physician: Dr. Tara Hahn MD                                     Insurance: Medicare, [employer] Supplement.  No prior auth, BMN  Medical Diagnosis: I69.354 (ICD-10-CM) - Spastic hemiplegia of left nondominant side as late effect of cerebral infarction (HCC)   Rehab Codes: R26.89, M62.81, R29.3, G81.1, Z 91.81  Next 's appt.:  24  Date of symptom onset: 3/14/24  Visit#/total visits: 3/20    Cancels/No Shows: 0/0    Subjective:    Pain:  [x] Yes  [] No Location:  gait/balance goals/ Df Pain Rating: (0-10 
directly in front,  try to step far enough ahead that the heel of your forward foot is ahead of the  toes of the other foot. (To score 3 points, the length of the step should exceed  the length of the other foot and the width of the stance should approximate the  subject's normal stride width).  (4) able to place foot tandem independently and hold 30 seconds  (3) able to place foot ahead of other independently and hold 30 seconds  (2) able to take small step independently and hold 30 seconds  (1) needs help to step but can hold 15 seconds  (0) loses balance while stepping or standing  Score: 0    14. STANDING ON ONE LEG  INSTRUCTIONS: Stand on one leg as long as you can without holding.  (4) able to lift leg independently and hold >10 seconds  (3) able to lift leg independently and hold 5-10 seconds  (2) able to lift leg independently and hold = or >3 seconds  (1) tries to lift leg unable to hold 3 seconds but remains standing  independently  (0) unable to try or needs assist to prevent fall  Score:  0    Total Score:  11/56  Max score 56,a person scoring below 45 is considered to be at risk for falling.    Completed by: Kasie Palmer, PT

## 2024-04-11 ENCOUNTER — HOSPITAL ENCOUNTER (OUTPATIENT)
Dept: OCCUPATIONAL THERAPY | Age: 69
Setting detail: THERAPIES SERIES
Discharge: HOME | End: 2024-04-11
Attending: PHYSICAL MEDICINE & REHABILITATION
Payer: MEDICARE

## 2024-04-11 ENCOUNTER — HOSPITAL ENCOUNTER (OUTPATIENT)
Dept: PHYSICAL THERAPY | Age: 69
Setting detail: THERAPIES SERIES
Discharge: HOME | End: 2024-04-11
Attending: PHYSICAL MEDICINE & REHABILITATION
Payer: MEDICARE

## 2024-04-11 PROCEDURE — 97116 GAIT TRAINING THERAPY: CPT

## 2024-04-11 PROCEDURE — 97530 THERAPEUTIC ACTIVITIES: CPT

## 2024-04-11 PROCEDURE — 97110 THERAPEUTIC EXERCISES: CPT

## 2024-04-11 PROCEDURE — 97112 NEUROMUSCULAR REEDUCATION: CPT

## 2024-04-11 NOTE — FLOWSHEET NOTE
Plan: [x] Continue current frequency toward long and short term goals.    [x] Specific Instructions for subsequent treatments: see above.     Frequency: 2 x/weeks for 20 visits     Time In:1:32 pm           Time Out: 2:05 pm    Electronically signed by:  Kasie Palmer, PT

## 2024-04-11 NOTE — FLOWSHEET NOTE
Completed    Bioness H200 -  15 minutes   Not Completed - grasp/release program          Other: BP this date - 149/80    Bioness H200  Treatment this date included using Functional Electrical Stimulation via the Bioness H200.  Stimulation parameters and outcomes can be viewed on [employer]  with the patients' username (patient ID is generated by first initial of first name, and first initial of last name, followed by two digit month and two digit day the treatment commenced), and no patient specific password required.     A check of the patients' skin prior to application of electrodes, and after the treatment concluded was completed and no signs of skin irritation noted.    See above in treatment log for treatments performed.      Assessment: [x] Progressing toward goals. Mod difficulty problem solving how to complete transfer from transport chair to EOB. Attempting to provide pt with step by step instructions, however pt was adamant on trying to complete her own way. Allowed pt to problem solve, but was not coming up with a safe solution. On first attempt, pt needed to sit back down bc she became SOB. After seated rest break and use of inhaler, pt recovered and was able to complete transfer. Attempted to use bioness this date, however device was not working. Pt wants to try estim on shoulder, will attempt next session. Painful L shoulder with passive stretching, no passive stretching past 90 degrees of shoulder flexion or abduction.  Continued prolonged stretching of L scapula due to rounded shoulders and L scap being very tight. Pt satisfied with stretch. Continued with passive stretching of entirety of LUE to maximize benefits of Botox treatment.  No change with physical status or abilities to complete therapeutic exercise.    [] No change.  [] Other     [x] Patient would continue to benefit from skilled occupational therapy services in order to: learn compensatory strategies to complete ADLs with

## 2024-04-16 ENCOUNTER — HOSPITAL ENCOUNTER (OUTPATIENT)
Dept: OCCUPATIONAL THERAPY | Age: 69
Setting detail: THERAPIES SERIES
Discharge: HOME | End: 2024-04-16
Attending: PHYSICAL MEDICINE & REHABILITATION
Payer: MEDICARE

## 2024-04-16 ENCOUNTER — HOSPITAL ENCOUNTER (OUTPATIENT)
Dept: PHYSICAL THERAPY | Age: 69
Setting detail: THERAPIES SERIES
Discharge: HOME | End: 2024-04-16
Attending: PHYSICAL MEDICINE & REHABILITATION
Payer: MEDICARE

## 2024-04-16 NOTE — FLOWSHEET NOTE
[x] [facility]       Occupational Therapy       [address]       Phone: [phone]       Fax: [phone] [] [facility] Occupational  Therapy at [place]       [address]       Phone: [phone]       Fax: [phone] [] [facility]  Outpatient Rehabilitation &  Therapy  [address]   [address]  P: [phone]  F: [phone]           Occupational Therapy Cancel/No Show note    Date: 2024  Patient: Rosa Cook  : 1955  MRN: [id number]  Cancels/No Shows to date:      For today's appointment patient:  [x]  Cancelled  []  Rescheduled appointment  []  No-show     Reason given by patient:  [x]  Patient ill  []  Conflicting appointment  []  No transportation    []  Conflict with work  []  No reason given  []  Other:    Comments:      Electronically signed by: CHRIS Brownlee/L

## 2024-04-16 NOTE — FLOWSHEET NOTE
[x] [facility]  Outpatient Rehabilitation &  Therapy  [address]  [phone]  [phone] [] [facility]  Outpatient Rehabilitation &  Therapy  [address]  P: [phone]  F: [phone] [] [facility]  Outpatient Rehabilitation &  Therapy  [address]  P: [phone]  F: [phone] [] [facility]  Outpatient Rehabilitation &  Therapy  [address]  [phone]  [phone] [] [facility]  Outpatient Rehabilitation &  Therapy  [address]   P: [phone]  F: [phone]  [] [facility]  Outpatient Rehabilitation &  Therapy  [address]  P: [phone]  F: [phone] [] [facility]  Outpatient Rehabilitation &  Therapy  [address]  P: [phone]  F: [phone] [] [facility]  Outpatient Rehabilitation &  Therapy  [address]  P: [phone]  F: [phone] [] [facility]  Outpatient Rehabilitation &  Therapy  [address]  P: [phone]  F: [phone]  [] [facility] Outpatient Rehabilitation &  Therapy  [address]  P: [phone]  F: [phone]     Therapy Cancel/No Show note    Date: 2024  Patient: Rosa MADRID Joyce  : 1955  MRN: [id number]    Cancels/No Shows to date:     For today's appointment patient:    [x]  Cancelled    [] Rescheduled appointment    [] No-show     Reason given by patient:    [x]  Patient ill    []  Conflicting appointment    [] No transportation      [] Conflict with work    [] No reason given    [] Weather related    [] COVID-19    [x] Other:      Comments: Patient not feeling well      [x] Next appointment was confirmed    Electronically signed by: Milady Oseguera PTA

## 2024-04-17 ENCOUNTER — APPOINTMENT (OUTPATIENT)
Dept: PHYSICAL THERAPY | Age: 69
End: 2024-04-17
Attending: PHYSICAL MEDICINE & REHABILITATION
Payer: MEDICARE

## 2024-04-18 ENCOUNTER — HOSPITAL ENCOUNTER (OUTPATIENT)
Dept: PHYSICAL THERAPY | Age: 69
Setting detail: THERAPIES SERIES
Discharge: HOME | End: 2024-04-18
Attending: PHYSICAL MEDICINE & REHABILITATION
Payer: MEDICARE

## 2024-04-18 ENCOUNTER — HOSPITAL ENCOUNTER (OUTPATIENT)
Dept: OCCUPATIONAL THERAPY | Age: 69
Setting detail: THERAPIES SERIES
Discharge: HOME | End: 2024-04-18
Attending: PHYSICAL MEDICINE & REHABILITATION
Payer: MEDICARE

## 2024-04-18 PROCEDURE — 97110 THERAPEUTIC EXERCISES: CPT

## 2024-04-18 PROCEDURE — 97116 GAIT TRAINING THERAPY: CPT

## 2024-04-18 ASSESSMENT — ENCOUNTER SYMPTOMS
VOMITING: 0
ABDOMINAL PAIN: 0
NAUSEA: 0
SORE THROAT: 0
BACK PAIN: 0
RHINORRHEA: 0
SHORTNESS OF BREATH: 0

## 2024-04-18 NOTE — FLOWSHEET NOTE
[x] [facility]  Outpatient Rehabilitation &  Therapy  [address]  [phone]  F: [phone] [] [facility]  Outpatient Rehabilitation &  Therapy  [address]   [address]  P: [phone]  F: [phone] [] [facility]  Outpatient Rehabilitation &  Therapy  [address]  P: [phone]  F: [phone] [] [facility]  Outpatient Rehabilitation &  Therapy  [address]   P: [phone]  F: [phone] [] [facility]   Outpatient Rehabilitation   & Therapy  [address]  P: [phone]   F: [phone]     Occupational Therapy Daily Treatment Note    Date:  2024  Patient Name:  Rosa Cook    :  1955  MRN: [id number]  Physician: Dr. Selma MD                                  Insurance: Medicare - [facility]  Medical Diagnosis: I69.354 spastic hemiplegia of left nondominant side as late effect of cerebral infarction        Rehab Codes: stiffness in shoulder M25.61,, lack of coordination R27.8,, fine motor skills loss R29.818,, or muscle weakness generalized M62.81,  Next 's appt.: 24 - next Botox treatment   Date of symptom onset: May 2, 2021  Visit# / total visits: ; Progress note for patient due at visit 8    Cancels/No Shows: 1/0      Subjective:    Pain:  [] Yes  [x] No  Location: N/A  Pain Rating: (0-10 scale) 0/10  Pain altered Tx:  [x] No  [] Yes  Action: NA  Pt Comments: Reports she hasn't been checking blood pressure because she can't get the cuff on independently. States she is feeling better than she was on Tuesday when she had to cx.      Precautions: none  Surgery procedure and date: NA    Objective:  Today's Treatment:  Modalities: NA  Exercises:  EXERCISE    REPS/     TIME  WEIGHT/    LEVEL COMMENTS   Self PROM 10 reps x 2 sets  Not Completed, HEP   PROM - all joints including scapula  ~25 min   Completed   Shoulder Shrugs 15 reps x 2 set  Not Completed

## 2024-04-18 NOTE — FLOWSHEET NOTE
scale) 0/10  Pain altered Tx:  [x] No  [] Yes  Action:  Comments: Pt again reports significant fatigue, \"not sure if I can do this today\". BP slightly high per OT but WNL    Objective:    : BP: 139/78 sitting, 149/93 standing. Attempted a few vision/vestibular tests (smooth pursuits, VOR and VOR cancellation, convergence/divergence) - negative for abnormality in all except double vision noted at ~5in convergence test.    24  BP   1) prior to treatment: 110/68  2) after 1st walk: 122/76  3) after 2nd walk: 128/72  - asymptomatic for all except SOB after walking    Lorenzo/56  10MWT: 1'05\" on second attempt (unable to achieve distance first attempt)    Modalities:   Precautions:  Exercises: Exercises completed marked with \"x\":  Exercise  Gait, balance Reps/ Time Weight/ Level Comments    Nu step  tband around LE 6 mins L1.0 SPM up to 80, maintained around 55       Gait belt with all wt bearing activity.     Gait lynette walker 2x5 ft   x   Pivot transfers lynette walker mat to w/c  2x  CGA x   10MWT x  24:   1) 1 ft shy of 10MWT distance:1'15\" at that point  2) 1'05\"    Lorenzo x   (See flowsheet for specifics)           Balance       Turning over shoulder 2x5 ea  Intermittent UE assist needed    Mini squat 2x10  UE assist needed                  Sit to standing 10x   HEP    Gastro stretch w/ strap  x  Verbal, demo, written HEP    Soleus stretch w/ strap  x  Verbal, demo, written HEP.    Hip flexion 10x AA AROM, R    LAQ 10x P AROM R    Piriformis stretch  3x20\" ea  HEP    bridging 10x      Hip flexor stretch  add                                                                            Other:      Specific Instructions for next treatment:  - car focus: getting in/out of car, working on walking to improve safety with short distances using hemiwalker      Treatment Charges: Mins Units   []  Modalities     []  Ther Exercise     []  Manual Therapy     []  Ther Activities     [x]  Neuro Re-ed     []

## 2024-04-22 ENCOUNTER — APPOINTMENT (OUTPATIENT)
Dept: OCCUPATIONAL THERAPY | Age: 69
End: 2024-04-22
Attending: PHYSICAL MEDICINE & REHABILITATION
Payer: MEDICARE

## 2024-04-22 ENCOUNTER — APPOINTMENT (OUTPATIENT)
Dept: PHYSICAL THERAPY | Age: 69
End: 2024-04-22
Attending: PHYSICAL MEDICINE & REHABILITATION
Payer: MEDICARE

## 2024-04-23 ENCOUNTER — HOSPITAL ENCOUNTER (OUTPATIENT)
Dept: PHYSICAL THERAPY | Age: 69
Setting detail: THERAPIES SERIES
Discharge: HOME | End: 2024-04-23
Attending: PHYSICAL MEDICINE & REHABILITATION
Payer: MEDICARE

## 2024-04-23 PROCEDURE — 97116 GAIT TRAINING THERAPY: CPT

## 2024-04-23 PROCEDURE — 97110 THERAPEUTIC EXERCISES: CPT

## 2024-04-23 NOTE — FLOWSHEET NOTE
L PF/inverters to allow maximal improvements with ROM post regular botox  Patient to be independent with home exercise program as demonstrated by performance with correct form without cues.  Demonstrate Knowledge of fall prevention     LTG: (to be met in 20 treatments)  ? ROM: L DF PROM to at least 5deg to allow improved ankle mobility for transfers, low sit<>stands  ? Strength:  A. Pt able to complete 5x STS with UE assist from standard height chair in less than 26 seconds, demonstrating improved core strength.  ? Balance: Lorenzo balance score to improve by 3 points.  ? Function:  A. 10MWT improve by 1.5 seconds or more, demonstrating improved gait speed  B.  SIS-16 score improve to be 32% functionally impaired or less  C. ABC Confidence Scale improve to 60% impairment or less.  5. Patient able to ambulate 150 feet with lynette walker with her best technique independently.        Patient goals: \"walk up and down steps, walk more\"    Pt. Education:  [x] Yes  [] No  [x] Reviewed Prior HEP/Ed  Method of Education: [x] Verbal  [x] Demo  [] Written  Comprehension of Education:  [x] Verbalizes understanding.  [x] Demonstrates understanding.  [] Needs review.  [x] Demonstrates/verbalizes HEP/Ed previously given.     Access Code: [email or web address]  URL: [email or web address]  Date: 04/02/2024  Prepared by: Sari Hong    Exercises  - Long Sitting Calf Stretch with Strap  - 2 x daily - 7 x weekly - 1 sets - 3 reps - 30 hold  - Long Sitting Soleus Stretch on Bolster with Strap  - 2 x daily - 7 x weekly - 1 sets - 3 reps - 30 hold  - Sit to Stand with Counter Support  - 1-2 x daily - 7 x weekly - 2 sets - 10 reps  - Supine Bridge  - 1-2 x daily - 7 x weekly - 2 sets - 10 reps  - Seated Table Piriformis Stretch  - 2 x daily - 7 x weekly - 1 sets - 1 reps - 30 hold    Plan: [x] Continue current frequency toward long and short term goals.    [x] Specific Instructions for subsequent treatments: see above.     Frequency: 2 x/weeks for 20

## 2024-04-25 ENCOUNTER — HOSPITAL ENCOUNTER (OUTPATIENT)
Dept: PHYSICAL THERAPY | Age: 69
Setting detail: THERAPIES SERIES
Discharge: HOME | End: 2024-04-25
Attending: PHYSICAL MEDICINE & REHABILITATION
Payer: MEDICARE

## 2024-04-25 ENCOUNTER — HOSPITAL ENCOUNTER (OUTPATIENT)
Dept: OCCUPATIONAL THERAPY | Age: 69
Setting detail: THERAPIES SERIES
Discharge: HOME | End: 2024-04-25
Attending: PHYSICAL MEDICINE & REHABILITATION
Payer: MEDICARE

## 2024-04-25 NOTE — FLOWSHEET NOTE
[x] [facility]       Occupational Therapy       [address]       Phone: [phone]       Fax: [phone] [] [facility] Occupational  Therapy at [place]       [address]       Phone: [phone]       Fax: [phone] [] [facility]  Outpatient Rehabilitation &  Therapy  [address]   [address]  P: [phone]  F: [phone]           Occupational Therapy Cancel/No Show note    Date: 2024  Patient: Rosa Cook  : 1955  MRN: [id number]  Cancels/No Shows to date:      For today's appointment patient:  [x]  Cancelled  []  Rescheduled appointment  []  No-show     Reason given by patient:  [x]  Patient ill  []  Conflicting appointment  []  No transportation    []  Conflict with work  []  No reason given  []  Other:    Comments:      Electronically signed by: CHRIS Brownlee/L

## 2024-04-25 NOTE — FLOWSHEET NOTE
[x] [facility]  Outpatient Rehabilitation &  Therapy  [address]  [phone]  [phone] [] [facility]  Outpatient Rehabilitation &  Therapy  [address]  P: [phone]  F: [phone] [] [facility]  Outpatient Rehabilitation &  Therapy  [address]  P: [phone]  F: [phone] [] [facility]  Outpatient Rehabilitation &  Therapy  [address]  [phone]  [phone] [] [facility]  Outpatient Rehabilitation &  Therapy  [address]   P: [phone]  F: [phone]  [] [facility]  Outpatient Rehabilitation &  Therapy  [address]  P: [phone]  F: [phone] [] [facility]  Outpatient Rehabilitation &  Therapy  [address]  P: [phone]  F: [phone] [] [facility]  Outpatient Rehabilitation &  Therapy  [address]  P: [phone]  F: [phone] [] [facility]  Outpatient Rehabilitation &  Therapy  [address]  P: [phone]  F: [phone]  [] [facility] Outpatient Rehabilitation &  Therapy  [address]  P: [phone]  F: [phone]     Therapy Cancel/No Show note    Date: 2024  Patient: Rosa Cook  : 1955  MRN: [id number]    Cancels/No Shows to date: 2 cx/ 0 ns    For today's appointment patient:    [x]  Cancelled    [] Rescheduled appointment    [] No-show     Reason given by patient:    [x]  Patient ill    []  Conflicting appointment    [] No transportation      [] Conflict with work    [] No reason given    [] Weather related    [] COVID-19    [x] Other:      Comments: Patient not feeling well. Plan at last visit on  with primary PT was discussed, and pt elects to hold PT/OT until next physician visit at end of May d/t increased fatigue impacting

## 2024-05-01 ENCOUNTER — HOSPITAL ENCOUNTER (OUTPATIENT)
Dept: PHYSICAL THERAPY | Age: 69
Setting detail: THERAPIES SERIES
Discharge: HOME | End: 2024-05-01
Attending: PHYSICAL MEDICINE & REHABILITATION
Payer: MEDICARE

## 2024-05-01 ENCOUNTER — TELEPHONE (OUTPATIENT)
Dept: PHARMACY | Age: 69
End: 2024-05-01

## 2024-05-01 PROCEDURE — 97110 THERAPEUTIC EXERCISES: CPT

## 2024-05-01 PROCEDURE — 97530 THERAPEUTIC ACTIVITIES: CPT

## 2024-05-01 NOTE — FLOWSHEET NOTE
daily - 7 x weekly - 2 sets - 10 reps  - Supine Bridge  - 1-2 x daily - 7 x weekly - 2 sets - 10 reps  - Seated Table Piriformis Stretch  - 2 x daily - 7 x weekly - 1 sets - 1 reps - 30 hold    Plan: [x] Continue current frequency toward long and short term goals.    [x] Specific Instructions for subsequent treatments: see above.     Frequency: 2 x/weeks for 20 visits     Time In: 1:53 pm           Time Out: 2:43 pm    Electronically signed by:  Kasie Palmer, PT

## 2024-05-01 NOTE — TELEPHONE ENCOUNTER
Patient did not answer for her phone visit today. I called and left VM.     Nalini ALVAREZ Ph., CACP, Clinical Pharmacist  Anticoagulation Services, [facility]  5/1/2024  3:53 PM

## 2024-05-07 ENCOUNTER — HOSPITAL ENCOUNTER (OUTPATIENT)
Dept: OCCUPATIONAL THERAPY | Age: 69
Setting detail: THERAPIES SERIES
Discharge: HOME | End: 2024-05-07
Attending: PHYSICAL MEDICINE & REHABILITATION
Payer: MEDICARE

## 2024-05-07 NOTE — FLOWSHEET NOTE
[x] [facility]       Occupational Therapy       [address]       Phone: [phone]       Fax: [phone] [] [facility] Occupational  Therapy at [place]       [address]       Phone: [phone]       Fax: [phone] [] [facility]  Outpatient Rehabilitation &  Therapy  [address]   [address]  P: [phone]  F: [phone]           Occupational Therapy Cancel/No Show note    Date: 2024  Patient: Rosa Cook  : 1955  MRN: [id number]  Cancels/No Shows to date: 3/0     For today's appointment patient:  [x]  Cancelled  []  Rescheduled appointment  []  No-show     Reason given by patient:  []  Patient ill  []  Conflicting appointment  [x]  No transportation    []  Conflict with work  []  No reason given  [x]  Other: will call to reschedule    Comments:      Electronically signed by: CHRIS Brownlee/L

## 2024-05-10 ENCOUNTER — OFFICE VISIT (OUTPATIENT)
Dept: FAMILY MEDICINE CLINIC | Age: 69
End: 2024-05-10
Payer: MEDICARE

## 2024-05-10 ENCOUNTER — HOSPITAL ENCOUNTER (OUTPATIENT)
Age: 69
Setting detail: SPECIMEN
Discharge: HOME | End: 2024-05-10

## 2024-05-10 ENCOUNTER — TELEPHONE (OUTPATIENT)
Dept: FAMILY MEDICINE CLINIC | Age: 69
End: 2024-05-10

## 2024-05-10 VITALS
DIASTOLIC BLOOD PRESSURE: 70 MMHG | WEIGHT: 189 LBS | OXYGEN SATURATION: 95 % | HEART RATE: 90 BPM | SYSTOLIC BLOOD PRESSURE: 124 MMHG | BODY MASS INDEX: 28.74 KG/M2

## 2024-05-10 DIAGNOSIS — N30.01 ACUTE CYSTITIS WITH HEMATURIA: ICD-10-CM

## 2024-05-10 DIAGNOSIS — F33.42 RECURRENT MAJOR DEPRESSIVE DISORDER, IN FULL REMISSION (HCC): ICD-10-CM

## 2024-05-10 DIAGNOSIS — F33.1 MAJOR DEPRESSIVE DISORDER, RECURRENT, MODERATE (HCC): ICD-10-CM

## 2024-05-10 DIAGNOSIS — R30.0 BURNING WITH URINATION: ICD-10-CM

## 2024-05-10 DIAGNOSIS — Z13.29 SCREENING FOR THYROID DISORDER: ICD-10-CM

## 2024-05-10 DIAGNOSIS — I10 HYPERTENSION, UNSPECIFIED TYPE: Primary | ICD-10-CM

## 2024-05-10 DIAGNOSIS — J44.9 CHRONIC OBSTRUCTIVE PULMONARY DISEASE, UNSPECIFIED COPD TYPE (HCC): ICD-10-CM

## 2024-05-10 DIAGNOSIS — I10 ESSENTIAL HYPERTENSION: ICD-10-CM

## 2024-05-10 DIAGNOSIS — F33.0 MAJOR DEPRESSIVE DISORDER, RECURRENT, MILD (HCC): ICD-10-CM

## 2024-05-10 DIAGNOSIS — G81.94 LEFT HEMIPARESIS (HCC): ICD-10-CM

## 2024-05-10 PROBLEM — I63.511 STROKE DUE TO OCCLUSION OF RIGHT MIDDLE CEREBRAL ARTERY (HCC): Status: RESOLVED | Noted: 2021-05-04 | Resolved: 2024-05-10

## 2024-05-10 PROBLEM — F33.9 MAJOR DEPRESSIVE DISORDER, RECURRENT, UNSPECIFIED (HCC): Status: ACTIVE | Noted: 2024-05-10

## 2024-05-10 PROBLEM — I63.9 ACUTE CVA (CEREBROVASCULAR ACCIDENT) (HCC): Status: RESOLVED | Noted: 2021-05-10 | Resolved: 2024-05-10

## 2024-05-10 LAB
ALBUMIN SERPL-MCNC: 4.1 G/DL (ref 3.5–5.2)
ALBUMIN/GLOB SERPL: 1 {RATIO} (ref 1–2.5)
ALP SERPL-CCNC: 131 U/L (ref 35–104)
ALT SERPL-CCNC: 39 U/L (ref 10–35)
ANION GAP SERPL CALCULATED.3IONS-SCNC: 10 MMOL/L (ref 9–16)
AST SERPL-CCNC: 33 U/L (ref 10–35)
BILIRUB SERPL-MCNC: 0.3 MG/DL (ref 0–1.2)
BILIRUBIN, POC: ABNORMAL
BLOOD URINE, POC: ABNORMAL
BUN SERPL-MCNC: 17 MG/DL (ref 8–23)
CALCIUM SERPL-MCNC: 9.4 MG/DL (ref 8.6–10.4)
CHLORIDE SERPL-SCNC: 106 MMOL/L (ref 98–107)
CLARITY, POC: ABNORMAL
CO2 SERPL-SCNC: 26 MMOL/L (ref 20–31)
COLOR, POC: ABNORMAL
CREAT SERPL-MCNC: 0.7 MG/DL (ref 0.5–0.9)
GFR, ESTIMATED: >90 ML/MIN/1.73M2
GLUCOSE SERPL-MCNC: 99 MG/DL (ref 74–99)
GLUCOSE URINE, POC: ABNORMAL
KETONES, POC: ABNORMAL
LEUKOCYTE EST, POC: ABNORMAL
NITRITE, POC: POSITIVE
PH, POC: 6.5
POTASSIUM SERPL-SCNC: 4.5 MMOL/L (ref 3.7–5.3)
PROT SERPL-MCNC: 6.9 G/DL (ref 6.6–8.7)
PROTEIN, POC: 30
SODIUM SERPL-SCNC: 142 MMOL/L (ref 136–145)
SPECIFIC GRAVITY, POC: 1.01
TSH SERPL DL<=0.05 MIU/L-ACNC: 2.49 UIU/ML (ref 0.27–4.2)
UROBILINOGEN, POC: 0.2

## 2024-05-10 PROCEDURE — 99214 OFFICE O/P EST MOD 30 MIN: CPT | Performed by: NURSE PRACTITIONER

## 2024-05-10 PROCEDURE — 1123F ACP DISCUSS/DSCN MKR DOCD: CPT | Performed by: NURSE PRACTITIONER

## 2024-05-10 PROCEDURE — G8417 CALC BMI ABV UP PARAM F/U: HCPCS | Performed by: NURSE PRACTITIONER

## 2024-05-10 PROCEDURE — G8400 PT W/DXA NO RESULTS DOC: HCPCS | Performed by: NURSE PRACTITIONER

## 2024-05-10 PROCEDURE — 4004F PT TOBACCO SCREEN RCVD TLK: CPT | Performed by: NURSE PRACTITIONER

## 2024-05-10 PROCEDURE — 1090F PRES/ABSN URINE INCON ASSESS: CPT | Performed by: NURSE PRACTITIONER

## 2024-05-10 PROCEDURE — 3078F DIAST BP <80 MM HG: CPT | Performed by: NURSE PRACTITIONER

## 2024-05-10 PROCEDURE — G8427 DOCREV CUR MEDS BY ELIG CLIN: HCPCS | Performed by: NURSE PRACTITIONER

## 2024-05-10 PROCEDURE — 3017F COLORECTAL CA SCREEN DOC REV: CPT | Performed by: NURSE PRACTITIONER

## 2024-05-10 PROCEDURE — 3023F SPIROM DOC REV: CPT | Performed by: NURSE PRACTITIONER

## 2024-05-10 PROCEDURE — 3074F SYST BP LT 130 MM HG: CPT | Performed by: NURSE PRACTITIONER

## 2024-05-10 PROCEDURE — 81003 URINALYSIS AUTO W/O SCOPE: CPT | Performed by: NURSE PRACTITIONER

## 2024-05-10 RX ORDER — AMLODIPINE BESYLATE 5 MG/1
5 TABLET ORAL DAILY
Qty: 30 TABLET | Refills: 5 | Status: SHIPPED | OUTPATIENT
Start: 2024-05-10

## 2024-05-10 RX ORDER — LISINOPRIL 20 MG/1
20 TABLET ORAL DAILY
Qty: 90 TABLET | Refills: 1 | Status: SHIPPED | OUTPATIENT
Start: 2024-05-10

## 2024-05-10 RX ORDER — SULFAMETHOXAZOLE AND TRIMETHOPRIM 800; 160 MG/1; MG/1
1 TABLET ORAL 2 TIMES DAILY
Qty: 6 TABLET | Refills: 0 | Status: SHIPPED | OUTPATIENT
Start: 2024-05-10 | End: 2024-05-13

## 2024-05-10 SDOH — ECONOMIC STABILITY: FOOD INSECURITY: WITHIN THE PAST 12 MONTHS, THE FOOD YOU BOUGHT JUST DIDN'T LAST AND YOU DIDN'T HAVE MONEY TO GET MORE.: NEVER TRUE

## 2024-05-10 SDOH — ECONOMIC STABILITY: INCOME INSECURITY: HOW HARD IS IT FOR YOU TO PAY FOR THE VERY BASICS LIKE FOOD, HOUSING, MEDICAL CARE, AND HEATING?: NOT HARD AT ALL

## 2024-05-10 SDOH — ECONOMIC STABILITY: FOOD INSECURITY: WITHIN THE PAST 12 MONTHS, YOU WORRIED THAT YOUR FOOD WOULD RUN OUT BEFORE YOU GOT MONEY TO BUY MORE.: NEVER TRUE

## 2024-05-10 ASSESSMENT — ENCOUNTER SYMPTOMS
COUGH: 0
SHORTNESS OF BREATH: 0

## 2024-05-10 ASSESSMENT — PATIENT HEALTH QUESTIONNAIRE - PHQ9
SUM OF ALL RESPONSES TO PHQ QUESTIONS 1-9: 0
SUM OF ALL RESPONSES TO PHQ9 QUESTIONS 1 & 2: 0
SUM OF ALL RESPONSES TO PHQ QUESTIONS 1-9: 0
2. FEELING DOWN, DEPRESSED OR HOPELESS: NOT AT ALL
1. LITTLE INTEREST OR PLEASURE IN DOING THINGS: NOT AT ALL
SUM OF ALL RESPONSES TO PHQ QUESTIONS 1-9: 0
SUM OF ALL RESPONSES TO PHQ QUESTIONS 1-9: 0

## 2024-05-10 NOTE — TELEPHONE ENCOUNTER
Also I called in 20 mg lisinopril tablets, when she picks them up stop taking two and just take 1   complains of pain/discomfort

## 2024-05-10 NOTE — TELEPHONE ENCOUNTER
Ok refills sent to pharmacy.  Continue current medications.  I did contact the nurse coordinator and she is going to be reaching out to her.

## 2024-05-10 NOTE — PROGRESS NOTES
Rosa Cook (:  1955) is a 68 y.o. female,Established patient, here for evaluation of the following chief complaint(s):  Hypertension      Assessment & Plan   ASSESSMENT/PLAN:  1. Hypertension, unspecified type  -     External Referral To Home Health  2. Chronic obstructive pulmonary disease, unspecified COPD type (HCC)  -     External Referral To Home Health  3. Major depressive disorder, recurrent, mild  4. Major depressive disorder, recurrent, moderate  5. Recurrent major depressive disorder, in full remission (HCC)  6. Burning with urination  -     POCT Urinalysis No Micro (Auto)  7. Screening for thyroid disorder  -     TSH; Future  8. Left hemiparesis (HCC)  -     External Referral To Home Health      No follow-ups on file.         Subjective   SUBJECTIVE/OBJECTIVE:  Hypertension  Pertinent negatives include no chest pain, palpitations or shortness of breath.   Urinary Pain   This is a new problem. The current episode started more than 1 month ago. The quality of the pain is described as aching. The pain is mild. There has been no fever. Associated symptoms include urgency. Pertinent negatives include no chills or hematuria.     Started taking two lisinopril a month ago. States her pressure was high at an appointment and at home.  She has been asked to bring her bp cuff with her to the next apt.  She does not have pills with her today.    Doesn't want to try chantix.  Doesn't want to try patches.   Wellbutrin didn't work for her and it interrupted her sleep schedule.       Review of Systems   Constitutional:  Negative for chills and fever.   Respiratory:  Negative for cough and shortness of breath.    Cardiovascular:  Negative for chest pain, palpitations and leg swelling.   Genitourinary:  Positive for urgency. Negative for hematuria.     Objective   Vitals:    05/10/24 0859   BP: 124/70   Pulse: 90   SpO2: 95%     Physical Exam  Constitutional:       Appearance: She is well-developed.

## 2024-05-10 NOTE — PROGRESS NOTES
Patient is present for f/u for HTN  Patient states her bp has been running high the last couple months  States it has been 180s/90s  Patient has been taking 2 tablets of lisinopril    Patient does not want to complete mammogram or have colonoscopy

## 2024-05-14 ENCOUNTER — TELEPHONE (OUTPATIENT)
Dept: FAMILY MEDICINE CLINIC | Age: 69
End: 2024-05-14

## 2024-05-14 ENCOUNTER — CARE COORDINATION (OUTPATIENT)
Dept: CARE COORDINATION | Age: 69
End: 2024-05-14

## 2024-05-14 NOTE — TELEPHONE ENCOUNTER
Patient is scheduled for a virtual visit on 5/16.  Provider reviewed appt notes and stated if she is having diarrhea with blood she needs to go to ER.    Called patient to let her know but had to LM for a return call.

## 2024-05-16 NOTE — TELEPHONE ENCOUNTER
Spoke with patient and informed her if she is having blood with diarrhea she needs to go to the ER.  Patient states she is no longer having this.  States it started when she starting taking the Bactrim.  States she took it for 3 days and then stopped.  States she is no longer having any urinary issues either.    Patient states she did not want to do the virutal visit today and it was ok to cancel appt.

## 2024-05-17 ENCOUNTER — CARE COORDINATION (OUTPATIENT)
Dept: CARE COORDINATION | Age: 69
End: 2024-05-17

## 2024-05-17 SDOH — ECONOMIC STABILITY: TRANSPORTATION INSECURITY
IN THE PAST 12 MONTHS, HAS THE LACK OF TRANSPORTATION KEPT YOU FROM MEDICAL APPOINTMENTS OR FROM GETTING MEDICATIONS?: NO

## 2024-05-17 SDOH — ECONOMIC STABILITY: INCOME INSECURITY: IN THE LAST 12 MONTHS, WAS THERE A TIME WHEN YOU WERE NOT ABLE TO PAY THE MORTGAGE OR RENT ON TIME?: NO

## 2024-05-17 SDOH — ECONOMIC STABILITY: TRANSPORTATION INSECURITY
IN THE PAST 12 MONTHS, HAS LACK OF TRANSPORTATION KEPT YOU FROM MEETINGS, WORK, OR FROM GETTING THINGS NEEDED FOR DAILY LIVING?: NO

## 2024-05-17 SDOH — ECONOMIC STABILITY: HOUSING INSECURITY: IN THE LAST 12 MONTHS, HOW MANY PLACES HAVE YOU LIVED?: 1

## 2024-05-17 ASSESSMENT — SOCIAL DETERMINANTS OF HEALTH (SDOH)
HOW OFTEN DO YOU ATTEND CHURCH OR RELIGIOUS SERVICES?: NEVER
DO YOU BELONG TO ANY CLUBS OR ORGANIZATIONS SUCH AS CHURCH GROUPS UNIONS, FRATERNAL OR ATHLETIC GROUPS, OR SCHOOL GROUPS?: NO
IN A TYPICAL WEEK, HOW MANY TIMES DO YOU TALK ON THE PHONE WITH FAMILY, FRIENDS, OR NEIGHBORS?: THREE TIMES A WEEK
HOW OFTEN DO YOU GET TOGETHER WITH FRIENDS OR RELATIVES?: TWICE A WEEK
HOW OFTEN DO YOU ATTENT MEETINGS OF THE CLUB OR ORGANIZATION YOU BELONG TO?: NEVER

## 2024-05-17 NOTE — CARE COORDINATION
PCP referral for care management.    Spoke with patient. She has COPD, HTN, history of CVA 2021, has hemiplegia left arm and leg, gets Botox injections for spasticity of arm by PMR provider. Follows with neurology. Will see cardiology in August.    Medications reviewed, she knows them and manages herself.  Checks blood pressure occasionally at home but hard to do with one arm. No dizziness, chest pain, blurry vision or headaches. Had admit in April for hypertensive urgency.    SDOH- no financial needs. Has a private paid caregiver to help with hygiene and drives her to [employer], has family that helps with housekeeping. She mostly uses wheelchair but able to ambulate short distances with walker. No falls. Going to outpatient PT and OT at [facility].     COPD is stable, only on PRN meds for. Chronic cough with clear phlegm.  Recently took an antibiotic for UTI and had rectal bleeding that stopped after antibiotic completed, did not go to ED but stated will go if happens again. She had loose stools and a lot of cramping while on antibiotic.    She declined care management, did not enroll at this time, encouraged she call writer for any concerns or needs.    Future Appointments   Date Time Provider Department Center   5/20/2024  2:00 PM Tara Hahn MD [facility]   5/22/2024  1:00 PM Natali Galaviz OT [facility] OT [facility]   5/22/2024  2:00 PM Milady Oseguera PTA [facility] PT [facility]   8/5/2024  9:30 AM Jayjay Burris MD [facility]   8/13/2024  1:00 PM [facility]   9/10/2024  1:00 PM Armaan Pineda MD Neuro [facility] Neurology -

## 2024-05-20 ENCOUNTER — PROCEDURE VISIT (OUTPATIENT)
Dept: PHYSICAL MEDICINE AND REHAB | Age: 69
End: 2024-05-20
Payer: MEDICARE

## 2024-05-20 VITALS
TEMPERATURE: 98 F | HEART RATE: 100 BPM | HEIGHT: 68 IN | DIASTOLIC BLOOD PRESSURE: 70 MMHG | WEIGHT: 190 LBS | BODY MASS INDEX: 28.79 KG/M2 | SYSTOLIC BLOOD PRESSURE: 118 MMHG

## 2024-05-20 DIAGNOSIS — I69.354 SPASTIC HEMIPLEGIA OF LEFT NONDOMINANT SIDE AS LATE EFFECT OF CEREBRAL INFARCTION (HCC): Primary | ICD-10-CM

## 2024-05-20 PROCEDURE — 95874 GUIDE NERV DESTR NEEDLE EMG: CPT | Performed by: PHYSICAL MEDICINE & REHABILITATION

## 2024-05-20 PROCEDURE — 64643 CHEMODENERV 1 EXTREM 1-4 EA: CPT | Performed by: PHYSICAL MEDICINE & REHABILITATION

## 2024-05-20 PROCEDURE — 64646 CHEMODENERV TRUNK MUSC 1-5: CPT | Performed by: PHYSICAL MEDICINE & REHABILITATION

## 2024-05-20 PROCEDURE — 64642 CHEMODENERV 1 EXTREMITY 1-4: CPT | Performed by: PHYSICAL MEDICINE & REHABILITATION

## 2024-05-20 NOTE — PROGRESS NOTES
[facility] PHYSICAL MEDICINE AND REHABILITATION  [address] [place] [address]  [place] [address]  Dept: [phone]  Dept Fax: [phone]    Outpatient Followup Note    Rosa Cook, 68 y.o., female, presents for follow up c/o of Spasms  .     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 She is noting improved passive ROM after treatment. L calf and L fingers are primary areas of increased tone that she would like to target, but she is also having difficulty with L arm flexion and adduction.     Past Medical History:   Diagnosis Date    Asthma     COPD (chronic obstructive pulmonary disease) (HCC)     Diabetes mellitus (HCC)     Esophageal ring     Gastritis     Hyperlipidemia     Hyperplastic colon polyp     Hypertension     Osteoarthritis     Patient in clinical research study 05/04/2021    DAXA     Stroke due to occlusion of right middle cerebral artery (HCC) 05/04/2021    TIA (transient ischemic attack) 2013    Per Daughter      Past Surgical History:   Procedure Laterality Date    APPENDECTOMY      COLONOSCOPY  09/20/2017    FRAGMENTS OF HYPERPLASTIC POLYP    ENDOSCOPY, COLON, DIAGNOSTIC      ESOPHAGEAL DILATATION      HYSTERECTOMY (CERVIX STATUS UNKNOWN)      NV COLSC FLX W/RMVL OF TUMOR POLYP LESION SNARE TQ N/A 9/20/2017    COLONOSCOPY POLYPECTOMY COLD BIOPSY performed by Raquel Bains MD at [facility] OR    NV EGD TRANSORAL BIOPSY SINGLE/MULTIPLE N/A 9/20/2017    EGD BIOPSY with esophageal dilitation performed by Raquel Bains MD at [facility] OR    UPPER GASTROINTESTINAL ENDOSCOPY  09/2017    E-ring and gastritis      Family History   Problem Relation Age of Onset    Other Mother     Diabetes Mother     Prostate Cancer Father     Cancer Brother         lining of lung     Social History     Socioeconomic History    Marital status:    Tobacco Use

## 2024-05-22 ENCOUNTER — HOSPITAL ENCOUNTER (OUTPATIENT)
Dept: PHYSICAL THERAPY | Age: 69
Setting detail: THERAPIES SERIES
Discharge: HOME | End: 2024-05-22
Attending: PHYSICAL MEDICINE & REHABILITATION
Payer: MEDICARE

## 2024-05-22 ENCOUNTER — TELEPHONE (OUTPATIENT)
Dept: PHARMACY | Age: 69
End: 2024-05-22

## 2024-05-22 ENCOUNTER — HOSPITAL ENCOUNTER (OUTPATIENT)
Dept: OCCUPATIONAL THERAPY | Age: 69
Setting detail: THERAPIES SERIES
Discharge: HOME | End: 2024-05-22
Attending: PHYSICAL MEDICINE & REHABILITATION
Payer: MEDICARE

## 2024-05-22 NOTE — FLOWSHEET NOTE
[x] [facility]       Occupational Therapy       [address]st [address]       Phone: [phone]       Fax: [phone] [] [facility] Occupational  Therapy at [place]       [address]       Phone: [phone]       Fax: [phone] [] [facility]  Outpatient Rehabilitation &  Therapy  [address]   [address]  P: [phone]  F: [phone]           Occupational Therapy Cancel/No Show note    Date: 2024  Patient: Rosa Cook  : 1955  MRN: [id number]  Cancels/No Shows to date:      For today's appointment patient:  [x]  Cancelled  []  Rescheduled appointment  []  No-show     Reason given by patient:  []  Patient ill  []  Conflicting appointment  []  No transportation    []  Conflict with work  []  No reason given  [x]  Other: \"leg pain\"   Comments: will call back to reschedule     Electronically signed by: CHRIS Leyva/ALEN

## 2024-05-22 NOTE — FLOWSHEET NOTE
[x] [facility]  Outpatient Rehabilitation &  Therapy  [address]  [phone]  [phone] [] [facility]  Outpatient Rehabilitation &  Therapy  [address]  P: [phone]  F: [phone] [] [facility]  Outpatient Rehabilitation &  Therapy  [address]  P: [phone]  F: [phone] [] [facility]  Outpatient Rehabilitation &  Therapy  [address]  [phone]  [phone] [] [facility]  Outpatient Rehabilitation &  Therapy  [address]   P: [phone]  F: [phone]  [] [facility]  Outpatient Rehabilitation &  Therapy  [address]  P: [phone]  F: [phone] [] [facility]  Outpatient Rehabilitation &  Therapy  [address]  P: [phone]  F: [phone] [] [facility]  Outpatient Rehabilitation &  Therapy  [address]  P: [phone]  F: [phone] [] [facility]  Outpatient Rehabilitation &  Therapy  [address]  P: [phone]  F: [phone]  [] [facility] Outpatient Rehabilitation &  Therapy  [address]  P: [phone]  F: [phone]     Therapy Cancel/No Show note    Date: 2024  Patient: Rosa Cook  : 1955  MRN: [id number]    Cancels/No Shows to date: 3 cx/ 0 ns    For today's appointment patient:    [x]  Cancelled    [] Rescheduled appointment    [] No-show     Reason given by patient:    [x]  Patient ill    []  Conflicting appointment    [] No transportation      [] Conflict with work    [] No reason given    [] Weather related    [] COVID-19    [x] Other:      Comments: Stated she has leg pain.  Will call us back to reschedule when she is ready.       [x] Next appointment was confirmed    Electronically signed by: Milady Oseguera PTA

## 2024-05-22 NOTE — TELEPHONE ENCOUNTER
Called patient to check in on smoking cessation progress.  Patient admits that it is not going well.  She is not counting her cigarettes.  Patient will not quit at this time and does not want to proceed with trying.  She is on Wellbutrin 150 mg SR twice daily.  I advised decreasing to Wellbutrin once daily for one week.  Then reduce to one tablet every 48 hours for 7 doses. Then stop.    Patient is agreeable to plan.    Medication has already been discontinued by her provider.       Nalini WADE. Ph., CACP, Clinical Pharmacist  Anticoagulation Services, [facility]  5/22/2024  12:29 PM    For Pharmacy Admin Tracking Only    Program: Medication Management  CPA in place:  Yes  Recommendation Provided To: Patient/Caregiver: 1 via Telephone  Intervention Detail: Dose Adjustment: 1, reason: Therapy De-escalation  Intervention Accepted By: Patient/Caregiver: 1  Gap Closed?: No   Time Spent (min): 10

## 2024-06-01 DIAGNOSIS — E03.9 HYPOTHYROIDISM, UNSPECIFIED TYPE: ICD-10-CM

## 2024-06-03 RX ORDER — LEVOTHYROXINE SODIUM 0.03 MG/1
37.5 TABLET ORAL DAILY
Qty: 135 TABLET | Refills: 1 | Status: SHIPPED | OUTPATIENT
Start: 2024-06-03

## 2024-06-03 RX ORDER — ALBUTEROL SULFATE 90 UG/1
AEROSOL, METERED RESPIRATORY (INHALATION)
Qty: 54 G | Refills: 5 | Status: SHIPPED | OUTPATIENT
Start: 2024-06-03

## 2024-06-03 NOTE — TELEPHONE ENCOUNTER
Last visit: 05/10/24  Last Med refill: 05/18/24 and ?  Does patient have enough medication for 72 hours: Yes    Next Visit Date:  Future Appointments   Date Time Provider Department Center   8/5/2024  9:30 AM Jayjay Burris MD [facility]   8/13/2024  1:00 PM [facility]   9/10/2024  1:00 PM Armaan Pineda MD Neuro [facility] -   9/16/2024  3:30 PM Tara Hahn MD [facility]       Health Maintenance   Topic Date Due    Shingles vaccine (1 of 2) Never done    Low dose CT lung screening &/or counseling  Never done    DEXA (modify frequency per FRAX score)  Never done    Respiratory Syncytial Virus (RSV) Pregnant or age 60 yrs+ (1 - 1-dose 60+ series) Never done    Pneumococcal 65+ years Vaccine (2 of 2 - PCV) 10/29/2020    COVID-19 Vaccine (2 - 2023-24 season) 09/01/2023    Annual Wellness Visit (Medicare)  03/20/2024    Breast cancer screen  03/20/2024    Colorectal Cancer Screen  03/20/2024    Flu vaccine (Season Ended) 08/01/2024    A1C test (Diabetic or Prediabetic)  03/19/2025    Lipids  03/19/2025    Depression Monitoring  05/10/2025    DTaP/Tdap/Td vaccine (3 - Td or Tdap) 01/01/2026    Hepatitis C screen  Completed    Hepatitis A vaccine  Aged Out    Hepatitis B vaccine  Aged Out    Hib vaccine  Aged Out    Polio vaccine  Aged Out    Meningococcal (ACWY) vaccine  Aged Out    Depression Screen  Discontinued    Pneumococcal 0-64 years Vaccine  Discontinued    HIV screen  Discontinued       Hemoglobin A1C (%)   Date Value   03/19/2024 5.8   11/21/2022 6.0   05/05/2021 5.9             ( goal A1C is < 7)   No components found for: \"LABMICR\"  No components found for: \"LDLCHOLESTEROL\", \"LDLCALC\"    (goal LDL is <100)   AST (U/L)   Date Value   05/10/2024 33     ALT (U/L)   Date Value   05/10/2024 39 (H)     BUN (mg/dL)   Date Value   05/10/2024 17     BP Readings from Last 3 Encounters:   05/20/24 118/70   05/10/24 124/70

## 2024-06-04 DIAGNOSIS — R05.1 ACUTE COUGH: ICD-10-CM

## 2024-06-04 RX ORDER — PREDNISONE 20 MG/1
TABLET ORAL
Qty: 10 TABLET | Refills: 0 | OUTPATIENT
Start: 2024-06-04

## 2024-06-06 ENCOUNTER — TELEPHONE (OUTPATIENT)
Dept: FAMILY MEDICINE CLINIC | Age: 69
End: 2024-06-06

## 2024-06-06 NOTE — TELEPHONE ENCOUNTER
Pt called asking for refill on prednisone, has a bad cough she has had for a couple of weeks, has tried a bunch of different OTC cough syrups, coughing up clear mucus, cough seems worse over the last week     No fever, no body aches, no sore throat   Stated prednisone was called in for her back in 12/2023 and it really helped with her cough

## 2024-06-07 NOTE — TELEPHONE ENCOUNTER
Pt advised to do e visit, office scheduling out a month, pt stated she will see if she can get somebody to take her to the urgent care

## 2024-08-30 NOTE — TELEPHONE ENCOUNTER
Spoke to patient. She stated she had some inhalers in her purse and they went \"overboard\" so she is out.     Last visit: 5/10/24  Last Med refill: 6/3/24  Does patient have enough medication for 72 hours: No:     Next Visit Date:  Future Appointments   Date Time Provider Department Center   9/5/2024  3:30 PM Jayjay Burris MD [facility]   9/10/2024  1:00 PM Armaan Pineda MD Neuro [facility] -   9/16/2024  3:30 PM Tara Hahn MD [facility]       Health Maintenance   Topic Date Due    Shingles vaccine (1 of 2) Never done    Lung Cancer Screening &/or Counseling  Never done    DEXA (modify frequency per FRAX score)  Never done    Respiratory Syncytial Virus (RSV) Pregnant or age 60 yrs+ (1 - 1-dose 60+ series) Never done    Pneumococcal 65+ years Vaccine (2 of 2 - PCV) 10/29/2020    COVID-19 Vaccine (2 - 2023-24 season) 09/01/2023    Annual Wellness Visit (Medicare)  03/20/2024    Breast cancer screen  03/20/2024    Colorectal Cancer Screen  03/20/2024    Flu vaccine (1) 08/01/2024    A1C test (Diabetic or Prediabetic)  03/19/2025    Lipids  03/19/2025    Depression Monitoring  05/10/2025    DTaP/Tdap/Td vaccine (3 - Td or Tdap) 01/01/2026    Hepatitis C screen  Completed    Hepatitis A vaccine  Aged Out    Hepatitis B vaccine  Aged Out    Hib vaccine  Aged Out    Polio vaccine  Aged Out    Meningococcal (ACWY) vaccine  Aged Out    Depression Screen  Discontinued    Pneumococcal 0-64 years Vaccine  Discontinued    HIV screen  Discontinued       Hemoglobin A1C (%)   Date Value   03/19/2024 5.8   11/21/2022 6.0   05/05/2021 5.9             ( goal A1C is < 7)   No components found for: \"LABMICR\"  No components found for: \"LDLCHOLESTEROL\", \"LDLCALC\"    (goal LDL is <100)   AST (U/L)   Date Value   05/10/2024 33     ALT (U/L)   Date Value   05/10/2024 39 (H)     BUN (mg/dL)   Date Value   05/10/2024 17     BP Readings from Last 3 Encounters:   05/20/24 118/70   05/10/24

## 2024-09-03 RX ORDER — ALBUTEROL SULFATE 90 UG/1
AEROSOL, METERED RESPIRATORY (INHALATION)
Qty: 54 G | Refills: 4 | Status: SHIPPED | OUTPATIENT
Start: 2024-09-03

## 2024-09-16 ENCOUNTER — PROCEDURE VISIT (OUTPATIENT)
Dept: PHYSICAL MEDICINE AND REHAB | Age: 69
End: 2024-09-16
Payer: MEDICARE

## 2024-09-16 VITALS — SYSTOLIC BLOOD PRESSURE: 150 MMHG | DIASTOLIC BLOOD PRESSURE: 68 MMHG | TEMPERATURE: 97.8 F | HEART RATE: 106 BPM

## 2024-09-16 DIAGNOSIS — I69.354 SPASTIC HEMIPLEGIA OF LEFT NONDOMINANT SIDE AS LATE EFFECT OF CEREBRAL INFARCTION (HCC): Primary | ICD-10-CM

## 2024-09-16 PROCEDURE — 64646 CHEMODENERV TRUNK MUSC 1-5: CPT | Performed by: PHYSICAL MEDICINE & REHABILITATION

## 2024-09-16 PROCEDURE — 64643 CHEMODENERV 1 EXTREM 1-4 EA: CPT | Performed by: PHYSICAL MEDICINE & REHABILITATION

## 2024-09-16 PROCEDURE — 95874 GUIDE NERV DESTR NEEDLE EMG: CPT | Performed by: PHYSICAL MEDICINE & REHABILITATION

## 2024-09-16 PROCEDURE — 64642 CHEMODENERV 1 EXTREMITY 1-4: CPT | Performed by: PHYSICAL MEDICINE & REHABILITATION

## 2024-09-18 ENCOUNTER — OFFICE VISIT (OUTPATIENT)
Dept: FAMILY MEDICINE CLINIC | Age: 69
End: 2024-09-18
Payer: MEDICARE

## 2024-09-18 ENCOUNTER — TELEPHONE (OUTPATIENT)
Dept: FAMILY MEDICINE CLINIC | Age: 69
End: 2024-09-18

## 2024-09-18 VITALS
HEIGHT: 68 IN | SYSTOLIC BLOOD PRESSURE: 148 MMHG | WEIGHT: 180 LBS | RESPIRATION RATE: 18 BRPM | DIASTOLIC BLOOD PRESSURE: 74 MMHG | BODY MASS INDEX: 27.28 KG/M2 | OXYGEN SATURATION: 89 % | HEART RATE: 94 BPM

## 2024-09-18 DIAGNOSIS — J45.40 MODERATE PERSISTENT ASTHMA WITHOUT COMPLICATION: ICD-10-CM

## 2024-09-18 DIAGNOSIS — J44.9 CHRONIC OBSTRUCTIVE PULMONARY DISEASE, UNSPECIFIED COPD TYPE (HCC): Primary | ICD-10-CM

## 2024-09-18 PROCEDURE — 3023F SPIROM DOC REV: CPT | Performed by: NURSE PRACTITIONER

## 2024-09-18 PROCEDURE — 4004F PT TOBACCO SCREEN RCVD TLK: CPT | Performed by: NURSE PRACTITIONER

## 2024-09-18 PROCEDURE — 99214 OFFICE O/P EST MOD 30 MIN: CPT | Performed by: NURSE PRACTITIONER

## 2024-09-18 PROCEDURE — 1090F PRES/ABSN URINE INCON ASSESS: CPT | Performed by: NURSE PRACTITIONER

## 2024-09-18 PROCEDURE — G8427 DOCREV CUR MEDS BY ELIG CLIN: HCPCS | Performed by: NURSE PRACTITIONER

## 2024-09-18 PROCEDURE — 3078F DIAST BP <80 MM HG: CPT | Performed by: NURSE PRACTITIONER

## 2024-09-18 PROCEDURE — 1123F ACP DISCUSS/DSCN MKR DOCD: CPT | Performed by: NURSE PRACTITIONER

## 2024-09-18 PROCEDURE — G8417 CALC BMI ABV UP PARAM F/U: HCPCS | Performed by: NURSE PRACTITIONER

## 2024-09-18 PROCEDURE — 3077F SYST BP >= 140 MM HG: CPT | Performed by: NURSE PRACTITIONER

## 2024-09-18 PROCEDURE — G8400 PT W/DXA NO RESULTS DOC: HCPCS | Performed by: NURSE PRACTITIONER

## 2024-09-18 PROCEDURE — 3017F COLORECTAL CA SCREEN DOC REV: CPT | Performed by: NURSE PRACTITIONER

## 2024-09-18 RX ORDER — PREDNISONE 20 MG/1
TABLET ORAL
Qty: 30 TABLET | Refills: 0 | Status: SHIPPED | OUTPATIENT
Start: 2024-09-18 | End: 2024-09-28

## 2024-09-18 RX ORDER — MONTELUKAST SODIUM 10 MG/1
10 TABLET ORAL DAILY
Qty: 30 TABLET | Refills: 3 | Status: SHIPPED | OUTPATIENT
Start: 2024-09-18

## 2024-09-18 RX ORDER — FLUTICASONE FUROATE AND VILANTEROL 100; 25 UG/1; UG/1
1 POWDER RESPIRATORY (INHALATION) DAILY
Qty: 1 EACH | Refills: 5 | Status: SHIPPED | OUTPATIENT
Start: 2024-09-18

## 2024-09-18 RX ORDER — BENZONATATE 100 MG/1
100-200 CAPSULE ORAL 3 TIMES DAILY PRN
Qty: 60 CAPSULE | Refills: 0 | Status: SHIPPED | OUTPATIENT
Start: 2024-09-18 | End: 2024-09-25

## 2024-09-18 ASSESSMENT — ENCOUNTER SYMPTOMS
SPUTUM PRODUCTION: 1
DIFFICULTY BREATHING: 1
SHORTNESS OF BREATH: 1
WHEEZING: 1
CHEST TIGHTNESS: 1

## 2024-12-03 ENCOUNTER — TELEPHONE (OUTPATIENT)
Dept: FAMILY MEDICINE CLINIC | Age: 69
End: 2024-12-03

## 2024-12-03 NOTE — TELEPHONE ENCOUNTER
Spoke to patient. She is overdue for AWV. Patient needs to make sure she has transportation before scheduling.

## 2025-01-06 NOTE — TELEPHONE ENCOUNTER
Patient Active Problem List:     Pneumonia of left lower lobe due to infectious organism     Tobacco abuse     Legionella pneumonia (HCC)     Dysphagia     Hyperplastic colon polyp     Esophageal ring     Gastritis     Elbow effusion, right     Nihss score 15     Acute left hemiparesis (HCC)     Cerebrovascular accident (CVA) due to occlusion of right middle cerebral artery (HCC)     COPD (chronic obstructive pulmonary disease) (HCC)     Essential hypertension     Mixed hyperlipidemia     Pre-diabetes     Left hemiplegia (HCC)     History of stroke     Hypertension, unspecified type     Hypertensive urgency     Lightheadedness     Major depressive disorder, recurrent, mild     Major depressive disorder, recurrent, moderate     Major depressive disorder, recurrent, unspecified

## 2025-01-07 RX ORDER — ALBUTEROL SULFATE 0.83 MG/ML
2.5 SOLUTION RESPIRATORY (INHALATION) EVERY 6 HOURS PRN
Qty: 120 EACH | Refills: 5 | Status: SHIPPED | OUTPATIENT
Start: 2025-01-07

## 2025-01-29 NOTE — TELEPHONE ENCOUNTER
Last visit: 09/18/2024  Last Med refill: 09/03/2024  Does patient have enough medication for 72 hours: YES    Next Visit Date:  Future Appointments   Date Time Provider Department Center   4/21/2025  2:00 PM Tara Hahn MD [facility]       Health Maintenance   Topic Date Due    Shingles vaccine (1 of 2) Never done    Lung Cancer Screening &/or Counseling  Never done    DEXA (modify frequency per FRAX score)  Never done    Respiratory Syncytial Virus (RSV) Pregnant or age 60 yrs+ (1 - Risk 60-74 years 1-dose series) Never done    Pneumococcal 65+ years Vaccine (2 of 2 - PCV) 10/29/2020    Annual Wellness Visit (Medicare)  03/20/2024    Breast cancer screen  03/20/2024    Colorectal Cancer Screen  03/20/2024    Flu vaccine (1) 08/01/2024    COVID-19 Vaccine (2 - 2023-24 season) 09/01/2024    A1C test (Diabetic or Prediabetic)  03/19/2025    Lipids  03/19/2025    Depression Monitoring  05/10/2025    DTaP/Tdap/Td vaccine (3 - Td or Tdap) 01/01/2026    Hepatitis C screen  Completed    Hepatitis A vaccine  Aged Out    Hepatitis B vaccine  Aged Out    Hib vaccine  Aged Out    Polio vaccine  Aged Out    Meningococcal (ACWY) vaccine  Aged Out    Depression Screen  Discontinued    Pneumococcal 0-64 years Vaccine  Discontinued    HIV screen  Discontinued       Hemoglobin A1C (%)   Date Value   03/19/2024 5.8   11/21/2022 6.0   05/05/2021 5.9             ( goal A1C is < 7)   No components found for: \"LABMICR\"  No components found for: \"LDLCHOLESTEROL\", \"LDLCALC\"    (goal LDL is <100)   AST (U/L)   Date Value   05/10/2024 33     ALT (U/L)   Date Value   05/10/2024 39 (H)     BUN (mg/dL)   Date Value   05/10/2024 17     BP Readings from Last 3 Encounters:   09/18/24 (!) 148/74   09/16/24 (!) 150/68   05/20/24 118/70          (goal 120/80)    All Future Testing planned in [facility]  Lab Frequency Next Occurrence   TSH Once 05/10/2024               Patient Active Problem List:     Pneumonia of left lower lobe due to

## 2025-01-30 RX ORDER — ALBUTEROL SULFATE 90 UG/1
INHALANT RESPIRATORY (INHALATION)
Qty: 54 G | Refills: 3 | Status: SHIPPED | OUTPATIENT
Start: 2025-01-30

## 2025-03-24 ENCOUNTER — PROCEDURE VISIT (OUTPATIENT)
Dept: PHYSICAL MEDICINE AND REHAB | Age: 70
End: 2025-03-24
Payer: MEDICARE

## 2025-03-24 VITALS — HEART RATE: 83 BPM | SYSTOLIC BLOOD PRESSURE: 154 MMHG | TEMPERATURE: 97.6 F | DIASTOLIC BLOOD PRESSURE: 90 MMHG

## 2025-03-24 DIAGNOSIS — I16.0 HYPERTENSIVE URGENCY: ICD-10-CM

## 2025-03-24 DIAGNOSIS — R60.0 LEG EDEMA, LEFT: ICD-10-CM

## 2025-03-24 DIAGNOSIS — I69.354 SPASTIC HEMIPLEGIA OF LEFT NONDOMINANT SIDE AS LATE EFFECT OF CEREBRAL INFARCTION (HCC): Primary | ICD-10-CM

## 2025-03-24 PROCEDURE — 1159F MED LIST DOCD IN RCRD: CPT | Performed by: PHYSICAL MEDICINE & REHABILITATION

## 2025-03-24 PROCEDURE — G8427 DOCREV CUR MEDS BY ELIG CLIN: HCPCS | Performed by: PHYSICAL MEDICINE & REHABILITATION

## 2025-03-24 PROCEDURE — 1090F PRES/ABSN URINE INCON ASSESS: CPT | Performed by: PHYSICAL MEDICINE & REHABILITATION

## 2025-03-24 PROCEDURE — 4004F PT TOBACCO SCREEN RCVD TLK: CPT | Performed by: PHYSICAL MEDICINE & REHABILITATION

## 2025-03-24 PROCEDURE — 95874 GUIDE NERV DESTR NEEDLE EMG: CPT | Performed by: PHYSICAL MEDICINE & REHABILITATION

## 2025-03-24 PROCEDURE — 3080F DIAST BP >= 90 MM HG: CPT | Performed by: PHYSICAL MEDICINE & REHABILITATION

## 2025-03-24 PROCEDURE — 99214 OFFICE O/P EST MOD 30 MIN: CPT | Performed by: PHYSICAL MEDICINE & REHABILITATION

## 2025-03-24 PROCEDURE — 1123F ACP DISCUSS/DSCN MKR DOCD: CPT | Performed by: PHYSICAL MEDICINE & REHABILITATION

## 2025-03-24 PROCEDURE — 64644 CHEMODENERV 1 EXTREM 5/> MUS: CPT | Performed by: PHYSICAL MEDICINE & REHABILITATION

## 2025-03-24 PROCEDURE — G8400 PT W/DXA NO RESULTS DOC: HCPCS | Performed by: PHYSICAL MEDICINE & REHABILITATION

## 2025-03-24 PROCEDURE — 64646 CHEMODENERV TRUNK MUSC 1-5: CPT | Performed by: PHYSICAL MEDICINE & REHABILITATION

## 2025-03-24 PROCEDURE — G8417 CALC BMI ABV UP PARAM F/U: HCPCS | Performed by: PHYSICAL MEDICINE & REHABILITATION

## 2025-03-24 PROCEDURE — 3077F SYST BP >= 140 MM HG: CPT | Performed by: PHYSICAL MEDICINE & REHABILITATION

## 2025-03-24 PROCEDURE — 3017F COLORECTAL CA SCREEN DOC REV: CPT | Performed by: PHYSICAL MEDICINE & REHABILITATION

## 2025-03-24 NOTE — PROGRESS NOTES
[facility], [facility] PHYSICAL MEDICINE AND REHABILITATION  [address] [place] [address]  [address]  [place] [address]  Dept: [phone]  Dept Fax: [phone]    Outpatient Followup Note    Rosa Cook, 69 y.o., female, presents for follow up c/o of Botox Injection  .     HPI:     History of Present Illness  The patient presents for evaluation of spasticity L leg/arm today. She also c/o left leg swelling and redness, and has elevated blood pressure.    She has been receiving Botox injections, to address the spasticity. She has not consulted her neurologist recently. She has an appointment at [facility] outpatient to be evaluated for functional E stim device Bioness.     She reports a sensation of warmth in her left leg, which is typically cold. The leg appears different than usual. The leg has been swollen for the past few days, with the onset of redness being a recent development. These symptoms have been present intermittently for several months but have worsened in the past few days. There is no similar presentation in the right leg. She reports no pain or tenderness in the affected leg and has not experienced any recent injuries. She also mentions that her socks leave an indentation on her leg due to the swelling. She finds relief from using a massager at home. She experiences severe cramps upon waking, which render her leg immobile.    Her blood pressure is currently elevated, which she attributes to forgetting her medication today. Her blood pressure typically measures around 120/70. She does not monitor her blood pressure regularly as she finds it challenging to put on the cuff due to her hemiparesis.             Past Medical History:   Diagnosis Date    Asthma     COPD (chronic obstructive pulmonary disease) (HCC)     Diabetes mellitus (HCC)     Esophageal ring     Gastritis     Hyperlipidemia     Hyperplastic colon polyp     Hypertension     Osteoarthritis

## 2025-03-25 ENCOUNTER — APPOINTMENT (OUTPATIENT)
Dept: VASCULAR LAB | Age: 70
End: 2025-03-25
Payer: MEDICARE

## 2025-03-25 ENCOUNTER — HOSPITAL ENCOUNTER (EMERGENCY)
Age: 70
Discharge: HOME | End: 2025-03-25
Attending: STUDENT IN AN ORGANIZED HEALTH CARE EDUCATION/TRAINING PROGRAM
Payer: MEDICARE

## 2025-03-25 VITALS
HEART RATE: 89 BPM | OXYGEN SATURATION: 94 % | RESPIRATION RATE: 18 BRPM | SYSTOLIC BLOOD PRESSURE: 149 MMHG | DIASTOLIC BLOOD PRESSURE: 129 MMHG | BODY MASS INDEX: 27.38 KG/M2 | WEIGHT: 180 LBS | TEMPERATURE: 98 F

## 2025-03-25 DIAGNOSIS — I10 ASYMPTOMATIC HYPERTENSION: ICD-10-CM

## 2025-03-25 DIAGNOSIS — L03.116 CELLULITIS OF LEFT LOWER EXTREMITY: Primary | ICD-10-CM

## 2025-03-25 LAB
ALBUMIN SERPL-MCNC: 4 G/DL (ref 3.5–5.2)
ALBUMIN/GLOB SERPL: 1.3 {RATIO} (ref 1–2.5)
ALP SERPL-CCNC: 110 U/L (ref 35–104)
ALT SERPL-CCNC: 18 U/L (ref 10–35)
ANION GAP SERPL CALCULATED.3IONS-SCNC: 10 MMOL/L (ref 9–16)
AST SERPL-CCNC: 18 U/L (ref 10–35)
BASOPHILS # BLD: 0.04 K/UL (ref 0–0.2)
BASOPHILS NFR BLD: 0 % (ref 0–2)
BILIRUB SERPL-MCNC: 0.2 MG/DL (ref 0–1.2)
BUN SERPL-MCNC: 12 MG/DL (ref 8–23)
CALCIUM SERPL-MCNC: 9.4 MG/DL (ref 8.6–10.4)
CHLORIDE SERPL-SCNC: 104 MMOL/L (ref 98–107)
CO2 SERPL-SCNC: 27 MMOL/L (ref 20–31)
CREAT SERPL-MCNC: 0.6 MG/DL (ref 0.6–0.9)
EOSINOPHIL # BLD: 0.37 K/UL (ref 0–0.44)
EOSINOPHILS RELATIVE PERCENT: 4 % (ref 1–4)
ERYTHROCYTE [DISTWIDTH] IN BLOOD BY AUTOMATED COUNT: 14.5 % (ref 11.8–14.4)
GFR, ESTIMATED: >90 ML/MIN/1.73M2
GLUCOSE SERPL-MCNC: 86 MG/DL (ref 74–99)
HCT VFR BLD AUTO: 41.2 % (ref 36.3–47.1)
HGB BLD-MCNC: 13.4 G/DL (ref 11.9–15.1)
IMM GRANULOCYTES # BLD AUTO: 0.05 K/UL (ref 0–0.3)
IMM GRANULOCYTES NFR BLD: 1 %
LYMPHOCYTES NFR BLD: 2.04 K/UL (ref 1.1–3.7)
LYMPHOCYTES RELATIVE PERCENT: 21 % (ref 24–43)
MCH RBC QN AUTO: 28.2 PG (ref 25.2–33.5)
MCHC RBC AUTO-ENTMCNC: 32.5 G/DL (ref 28.4–34.8)
MCV RBC AUTO: 86.7 FL (ref 82.6–102.9)
MONOCYTES NFR BLD: 0.59 K/UL (ref 0.1–1.2)
MONOCYTES NFR BLD: 6 % (ref 3–12)
NEUTROPHILS NFR BLD: 68 % (ref 36–65)
NEUTS SEG NFR BLD: 6.63 K/UL (ref 1.5–8.1)
NRBC BLD-RTO: 0 PER 100 WBC
PLATELET # BLD AUTO: 387 K/UL (ref 138–453)
PMV BLD AUTO: 8.8 FL (ref 8.1–13.5)
POTASSIUM SERPL-SCNC: 3.5 MMOL/L (ref 3.7–5.3)
PROT SERPL-MCNC: 7 G/DL (ref 6.6–8.7)
RBC # BLD AUTO: 4.75 M/UL (ref 3.95–5.11)
RBC # BLD: ABNORMAL 10*6/UL
SODIUM SERPL-SCNC: 141 MMOL/L (ref 136–145)
WBC OTHER # BLD: 9.7 K/UL (ref 3.5–11.3)

## 2025-03-25 PROCEDURE — 99284 EMERGENCY DEPT VISIT MOD MDM: CPT

## 2025-03-25 PROCEDURE — 93971 EXTREMITY STUDY: CPT

## 2025-03-25 PROCEDURE — 85025 COMPLETE CBC W/AUTO DIFF WBC: CPT

## 2025-03-25 PROCEDURE — 93971 EXTREMITY STUDY: CPT | Performed by: SURGERY

## 2025-03-25 PROCEDURE — 6370000000 HC RX 637 (ALT 250 FOR IP)

## 2025-03-25 PROCEDURE — 80053 COMPREHEN METABOLIC PANEL: CPT

## 2025-03-25 RX ORDER — DOXYCYCLINE HYCLATE 100 MG
100 TABLET ORAL 2 TIMES DAILY
Qty: 14 TABLET | Refills: 0 | Status: SHIPPED | OUTPATIENT
Start: 2025-03-25 | End: 2025-04-01

## 2025-03-25 RX ORDER — LISINOPRIL 40 MG/1
40 TABLET ORAL DAILY
Qty: 30 TABLET | Refills: 0 | Status: SHIPPED | OUTPATIENT
Start: 2025-03-25 | End: 2025-04-24

## 2025-03-25 RX ORDER — LISINOPRIL 10 MG/1
20 TABLET ORAL DAILY
Status: DISCONTINUED | OUTPATIENT
Start: 2025-03-25 | End: 2025-03-25 | Stop reason: HOSPADM

## 2025-03-25 RX ORDER — DOXYCYCLINE HYCLATE 100 MG
100 TABLET ORAL ONCE
Status: COMPLETED | OUTPATIENT
Start: 2025-03-25 | End: 2025-03-25

## 2025-03-25 RX ADMIN — LISINOPRIL 20 MG: 10 TABLET ORAL at 12:21

## 2025-03-25 RX ADMIN — DOXYCYCLINE HYCLATE 100 MG: 100 TABLET, COATED ORAL at 12:22

## 2025-03-25 ASSESSMENT — ENCOUNTER SYMPTOMS
COUGH: 0
VOMITING: 0
BACK PAIN: 0
DIARRHEA: 0
NAUSEA: 0
SHORTNESS OF BREATH: 0
COLOR CHANGE: 1
ABDOMINAL PAIN: 0

## 2025-03-25 NOTE — ED NOTES
Pt to room 39 via wheelchair with c/o left pain and swelling. Pt reports that her left leg has gotten more swollen and red. Pt reports that she was told it might be cellulitis and to be evaluated. Pt denies chest pain, denies SOB. Pt alert and oriented x4, talking in complete sentences, respirations even and unlabored. Pt acting age appropriate. Will continue to plan of care. Patient placed on continuous cardiac monitor, bp and pulse ox.

## 2025-03-25 NOTE — ED PROVIDER NOTES
[facility]     Emergency Department     Faculty Attestation    I performed a history and physical examination of the patient and discussed management with the resident. I have reviewed and agree with the resident’s findings including all diagnostic interpretations, and treatment plans as written at the time of my review. Any areas of disagreement are noted on the chart. I was personally present for the key portions of any procedures. I have documented in the chart those procedures where I was not present during the key portions. For Physician Assistant/ Nurse Practitioner cases/documentation I have personally evaluated this patient and have completed at least one if not all key elements of the E/M (history, physical exam, and MDM). Additional findings are as noted.    PtName: Rosa Cook  MRN: [id number]  Birthdate 1955  Date of evaluation: 3/25/25  Note Started: 10:37 AM EDT    Primary Care Physician: Mariam Marquez, KEELY - CNP    Brief HPI:  69-year-old female presents emergency department with left lower extremity swelling and redness.  Symptoms noted yesterday.  No history of DVT.  Denies fevers or chills.    Pertinent Physical Exam Findings:  Vitals:    03/25/25 1037   Pulse: 89   Resp: 18   Temp: 98 °F (36.7 °C)   SpO2: 94%   Appears well, resting comfortably, no acute distress.  Left lower extremity with blanchable erythema, anterior and posterior tenderness, soft, strong palpable DP pulse.    Medical Decision Making: Patient is a 69 y.o. female presenting to the emergency department with left lower extremity swelling and erythema. The chart was reviewed for pertinent history relating to the chief complaint.  The patient appears well at this time, no acute distress, vitals are stable.  Presenting with left lower extremity swelling and erythema for 1 day.  Consideration for cellulitis versus DVT.  Plan for evaluation.    All results, including 
MD   acetaminophen (TYLENOL) 500 MG tablet Take 2 tablets by mouth every 8 hours 6/10/21   Tara Hahn MD         REVIEW OF SYSTEMS       Review of Systems   Constitutional:  Negative for chills and fever.   Respiratory:  Negative for cough and shortness of breath.    Cardiovascular:  Positive for leg swelling. Negative for chest pain.   Gastrointestinal:  Negative for abdominal pain, diarrhea, nausea and vomiting.   Genitourinary:  Negative for dysuria and frequency.   Musculoskeletal:  Negative for back pain and neck pain.   Skin:  Positive for color change. Negative for rash.   Neurological:  Negative for dizziness, syncope, numbness and headaches.   All other systems reviewed and are negative.      PHYSICAL EXAM      INITIAL VITALS:   BP (!) 176/135   Pulse 89   Temp 98 °F (36.7 °C) (Oral)   Resp 18   Wt 81.6 kg (180 lb)   SpO2 94%   BMI 27.38 kg/m²     Physical Exam  Vitals and nursing note reviewed.   Constitutional:       General: She is not in acute distress.     Appearance: She is well-developed. She is not diaphoretic.   HENT:      Head: Normocephalic and atraumatic.      Nose: Nose normal.   Eyes:      Pupils: Pupils are equal, round, and reactive to light.   Cardiovascular:      Rate and Rhythm: Normal rate and regular rhythm.      Pulses:           Radial pulses are 2+ on the right side and 2+ on the left side.        Dorsalis pedis pulses are 2+ on the right side and 2+ on the left side.      Heart sounds: Normal heart sounds.      Comments: Patient with adequate palpable pulse as well as capillary refill to bilateral upper and lower distal extremities.  Pulmonary:      Effort: Pulmonary effort is normal. No respiratory distress.      Breath sounds: Normal breath sounds.   Abdominal:      Palpations: Abdomen is soft.      Tenderness: There is no abdominal tenderness. There is no right CVA tenderness or left CVA tenderness.   Musculoskeletal:         General: Tenderness present. Normal

## 2025-03-25 NOTE — DISCHARGE INSTRUCTIONS
Take your medication as indicated and prescribed.  You are being given an antibiotic, so make sure you get the prescription filled and take the antibiotics until finished.  Drink plenty of water while taking the antibiotics.  Avoid drinking alcohol or drinks that have caffeine in it while taking antibiotics.       Your blood pressure medications need further adjustment.  Please follow-up with your PCP and keep a log of your blood pressures until you are seen by them at least once a day.    For pain use acetaminophen (Tylenol) or ibuprofen (Motrin / Advil), unless prescribed medications that have acetaminophen or ibuprofen (or similar medications) in it.  You can take over the counter acetaminophen tablets (1 - 2 tablets of the 500-mg strength every 6 hours) or ibuprofen tablets (2 tablets every 4 hours).    PLEASE RETURN TO THE EMERGENCY DEPARTMENT IMMEDIATELY for worsening symptoms, white drainage from the area, worsened redness or streaking, or if you develop any concerning symptoms such as: high fever not relieved by acetaminophen (Tylenol) and/or ibuprofen (Motrin / Advil), chills, feeling of your heart fluttering or racing, persistent nausea and/or vomiting, changed numbness, weakness or tingling in the leg or new change in color of the extremities, changes in mental status, unable to follow up with your physician, or any other care or concern.

## 2025-03-31 ENCOUNTER — OFFICE VISIT (OUTPATIENT)
Dept: FAMILY MEDICINE CLINIC | Age: 70
End: 2025-03-31
Payer: MEDICARE

## 2025-03-31 VITALS
OXYGEN SATURATION: 92 % | HEART RATE: 91 BPM | TEMPERATURE: 98 F | DIASTOLIC BLOOD PRESSURE: 100 MMHG | BODY MASS INDEX: 28.9 KG/M2 | SYSTOLIC BLOOD PRESSURE: 158 MMHG | WEIGHT: 190 LBS

## 2025-03-31 DIAGNOSIS — I10 UNCONTROLLED HYPERTENSION: Primary | ICD-10-CM

## 2025-03-31 DIAGNOSIS — L03.116 CELLULITIS OF LEFT ANTERIOR LOWER LEG: ICD-10-CM

## 2025-03-31 DIAGNOSIS — R73.03 PRE-DIABETES: ICD-10-CM

## 2025-03-31 LAB
CHP ED QC CHECK: ABNORMAL
GLUCOSE BLD-MCNC: 132 MG/DL

## 2025-03-31 PROCEDURE — 3080F DIAST BP >= 90 MM HG: CPT | Performed by: INTERNAL MEDICINE

## 2025-03-31 PROCEDURE — 1123F ACP DISCUSS/DSCN MKR DOCD: CPT | Performed by: INTERNAL MEDICINE

## 2025-03-31 PROCEDURE — 4004F PT TOBACCO SCREEN RCVD TLK: CPT | Performed by: INTERNAL MEDICINE

## 2025-03-31 PROCEDURE — 99214 OFFICE O/P EST MOD 30 MIN: CPT | Performed by: INTERNAL MEDICINE

## 2025-03-31 PROCEDURE — 1090F PRES/ABSN URINE INCON ASSESS: CPT | Performed by: INTERNAL MEDICINE

## 2025-03-31 PROCEDURE — G8400 PT W/DXA NO RESULTS DOC: HCPCS | Performed by: INTERNAL MEDICINE

## 2025-03-31 PROCEDURE — 1159F MED LIST DOCD IN RCRD: CPT | Performed by: INTERNAL MEDICINE

## 2025-03-31 PROCEDURE — 3017F COLORECTAL CA SCREEN DOC REV: CPT | Performed by: INTERNAL MEDICINE

## 2025-03-31 PROCEDURE — 82962 GLUCOSE BLOOD TEST: CPT | Performed by: INTERNAL MEDICINE

## 2025-03-31 PROCEDURE — G8427 DOCREV CUR MEDS BY ELIG CLIN: HCPCS | Performed by: INTERNAL MEDICINE

## 2025-03-31 PROCEDURE — 3077F SYST BP >= 140 MM HG: CPT | Performed by: INTERNAL MEDICINE

## 2025-03-31 PROCEDURE — G8417 CALC BMI ABV UP PARAM F/U: HCPCS | Performed by: INTERNAL MEDICINE

## 2025-03-31 RX ORDER — SULFAMETHOXAZOLE AND TRIMETHOPRIM 800; 160 MG/1; MG/1
1 TABLET ORAL 2 TIMES DAILY
Qty: 14 TABLET | Refills: 0 | Status: SHIPPED | OUTPATIENT
Start: 2025-03-31 | End: 2025-04-07

## 2025-03-31 RX ORDER — AMLODIPINE BESYLATE 5 MG/1
5 TABLET ORAL DAILY
Qty: 30 TABLET | Refills: 5 | Status: SHIPPED | OUTPATIENT
Start: 2025-03-31

## 2025-03-31 SDOH — ECONOMIC STABILITY: FOOD INSECURITY: WITHIN THE PAST 12 MONTHS, YOU WORRIED THAT YOUR FOOD WOULD RUN OUT BEFORE YOU GOT MONEY TO BUY MORE.: NEVER TRUE

## 2025-03-31 SDOH — ECONOMIC STABILITY: FOOD INSECURITY: WITHIN THE PAST 12 MONTHS, THE FOOD YOU BOUGHT JUST DIDN'T LAST AND YOU DIDN'T HAVE MONEY TO GET MORE.: NEVER TRUE

## 2025-03-31 ASSESSMENT — PATIENT HEALTH QUESTIONNAIRE - PHQ9
10. IF YOU CHECKED OFF ANY PROBLEMS, HOW DIFFICULT HAVE THESE PROBLEMS MADE IT FOR YOU TO DO YOUR WORK, TAKE CARE OF THINGS AT HOME, OR GET ALONG WITH OTHER PEOPLE: NOT DIFFICULT AT ALL
SUM OF ALL RESPONSES TO PHQ QUESTIONS 1-9: 7
1. LITTLE INTEREST OR PLEASURE IN DOING THINGS: NOT AT ALL
9. THOUGHTS THAT YOU WOULD BE BETTER OFF DEAD, OR OF HURTING YOURSELF: NOT AT ALL
4. FEELING TIRED OR HAVING LITTLE ENERGY: NEARLY EVERY DAY
5. POOR APPETITE OR OVEREATING: NOT AT ALL
7. TROUBLE CONCENTRATING ON THINGS, SUCH AS READING THE NEWSPAPER OR WATCHING TELEVISION: SEVERAL DAYS
SUM OF ALL RESPONSES TO PHQ QUESTIONS 1-9: 7
6. FEELING BAD ABOUT YOURSELF - OR THAT YOU ARE A FAILURE OR HAVE LET YOURSELF OR YOUR FAMILY DOWN: NOT AT ALL
SUM OF ALL RESPONSES TO PHQ QUESTIONS 1-9: 7
2. FEELING DOWN, DEPRESSED OR HOPELESS: NOT AT ALL
SUM OF ALL RESPONSES TO PHQ QUESTIONS 1-9: 7
3. TROUBLE FALLING OR STAYING ASLEEP: NEARLY EVERY DAY
8. MOVING OR SPEAKING SO SLOWLY THAT OTHER PEOPLE COULD HAVE NOTICED. OR THE OPPOSITE, BEING SO FIGETY OR RESTLESS THAT YOU HAVE BEEN MOVING AROUND A LOT MORE THAN USUAL: NOT AT ALL

## 2025-03-31 NOTE — PROGRESS NOTES
[facility]  [facility]  [address] [place] [address]  Dept: [phone]  Dept Fax: [phone]      Rosa Cook is a 69 y.o. female who presents today for hermedical conditions/complaints as noted below.  Rosa Cook is c/o of Edema (In the left leg ), Hypertension (Bp has been running high no approximate date on when it started ), and Cough (Pt. States has been coughing and sneezing started last night 03/30/2025)        Assessment/Plan:     1. Uncontrolled hypertension  Comments:  continue lisinopril. restart amlodipine 5mg daily  Orders:  -     amLODIPine (NORVASC) 5 MG tablet; Take 1 tablet by mouth daily, Disp-30 tablet, R-5Normal  2. Cellulitis of left anterior lower leg  Comments:  d/c doxycycline  Orders:  -     sulfamethoxazole-trimethoprim (BACTRIM DS) 800-160 MG per tablet; Take 1 tablet by mouth 2 times daily for 7 days, Disp-14 tablet, R-0Normal  3. Pre-diabetes  -     POCT Glucose          No follow-ups on file.      HPI     Has not been taking her norvasc since her last visit here several months ago.   Compliant with lisinopril; denies chest pain, palpitations, dizziness, dyspnea on exertion, orthopnea, paroxysmal nocturnal dyspnea.  Baseline using manual wheelchair       Edema  This is a recurrent problem. The current episode started more than 1 month ago. The problem occurs constantly. The problem has been gradually worsening. Associated symptoms include fatigue and a rash. Pertinent negatives include no abdominal pain, anorexia, arthralgias, change in bowel habit, chest pain, chills, coughing, diaphoresis, fever, joint swelling, myalgias, nausea, numbness or vomiting. Nothing aggravates the symptoms. Treatments tried: antibiotics. The treatment provided no relief.       BP Readings from Last 3 Encounters:   03/31/25 (!) 158/100   03/25/25 (!) 149/129   03/24/25 (!) 154/90              Past Medical History:   Diagnosis Date    Asthma     COPD

## 2025-04-01 ASSESSMENT — ENCOUNTER SYMPTOMS
ANAL BLEEDING: 0
CONSTIPATION: 0
DIARRHEA: 0
ABDOMINAL PAIN: 0
WHEEZING: 0
CHEST TIGHTNESS: 0
VOMITING: 0
CHOKING: 0
CHANGE IN BOWEL HABIT: 0
SHORTNESS OF BREATH: 0
NAUSEA: 0
COUGH: 0
BLOOD IN STOOL: 0

## 2025-04-01 ASSESSMENT — VISUAL ACUITY: OU: 1

## 2025-04-02 DIAGNOSIS — J44.9 CHRONIC OBSTRUCTIVE PULMONARY DISEASE, UNSPECIFIED COPD TYPE (HCC): Primary | ICD-10-CM

## 2025-04-02 RX ORDER — ALBUTEROL SULFATE 0.83 MG/ML
2.5 SOLUTION RESPIRATORY (INHALATION) EVERY 6 HOURS PRN
Qty: 120 EACH | Refills: 5 | Status: SHIPPED | OUTPATIENT
Start: 2025-04-02

## 2025-04-22 ENCOUNTER — APPOINTMENT (OUTPATIENT)
Dept: GENERAL RADIOLOGY | Age: 70
DRG: 603 | End: 2025-04-22
Payer: MEDICARE

## 2025-04-22 ENCOUNTER — APPOINTMENT (OUTPATIENT)
Dept: VASCULAR LAB | Age: 70
DRG: 603 | End: 2025-04-22
Payer: MEDICARE

## 2025-04-22 ENCOUNTER — APPOINTMENT (OUTPATIENT)
Dept: CT IMAGING | Age: 70
DRG: 603 | End: 2025-04-22
Payer: MEDICARE

## 2025-04-22 ENCOUNTER — HOSPITAL ENCOUNTER (INPATIENT)
Age: 70
LOS: 1 days | Discharge: HOME | DRG: 603 | End: 2025-04-23
Attending: EMERGENCY MEDICINE | Admitting: INTERNAL MEDICINE
Payer: MEDICARE

## 2025-04-22 DIAGNOSIS — L03.116 CELLULITIS OF LEFT LOWER EXTREMITY: Primary | ICD-10-CM

## 2025-04-22 PROBLEM — E03.9 ACQUIRED HYPOTHYROIDISM: Status: ACTIVE | Noted: 2025-04-22

## 2025-04-22 LAB
ANION GAP SERPL CALCULATED.3IONS-SCNC: 12 MMOL/L (ref 9–16)
ANION GAP SERPL CALCULATED.3IONS-SCNC: 13 MMOL/L (ref 9–16)
BASOPHILS # BLD: 0.04 K/UL (ref 0–0.2)
BASOPHILS # BLD: 0.04 K/UL (ref 0–0.2)
BASOPHILS NFR BLD: 0 % (ref 0–2)
BASOPHILS NFR BLD: 1 % (ref 0–2)
BUN SERPL-MCNC: 14 MG/DL (ref 8–23)
BUN SERPL-MCNC: 16 MG/DL (ref 8–23)
CALCIUM SERPL-MCNC: 10.1 MG/DL (ref 8.6–10.4)
CALCIUM SERPL-MCNC: 9.4 MG/DL (ref 8.6–10.4)
CHLORIDE SERPL-SCNC: 101 MMOL/L (ref 98–107)
CHLORIDE SERPL-SCNC: 104 MMOL/L (ref 98–107)
CK SERPL-CCNC: 307 U/L (ref 26–192)
CO2 SERPL-SCNC: 24 MMOL/L (ref 20–31)
CO2 SERPL-SCNC: 27 MMOL/L (ref 20–31)
CREAT SERPL-MCNC: 0.7 MG/DL (ref 0.6–0.9)
CREAT SERPL-MCNC: 0.7 MG/DL (ref 0.6–0.9)
CRP SERPL HS-MCNC: 21.9 MG/L (ref 0–5)
ECHO BSA: 2.02 M2
EOSINOPHIL # BLD: 0.25 K/UL (ref 0–0.44)
EOSINOPHIL # BLD: 0.28 K/UL (ref 0–0.44)
EOSINOPHILS RELATIVE PERCENT: 2 % (ref 1–4)
EOSINOPHILS RELATIVE PERCENT: 3 % (ref 1–4)
ERYTHROCYTE [DISTWIDTH] IN BLOOD BY AUTOMATED COUNT: 15 % (ref 11.8–14.4)
ERYTHROCYTE [DISTWIDTH] IN BLOOD BY AUTOMATED COUNT: 15.1 % (ref 11.8–14.4)
ERYTHROCYTE [SEDIMENTATION RATE] IN BLOOD BY PHOTOMETRIC METHOD: 70 MM/HR (ref 0–30)
GFR, ESTIMATED: >90 ML/MIN/1.73M2
GFR, ESTIMATED: >90 ML/MIN/1.73M2
GLUCOSE SERPL-MCNC: 106 MG/DL (ref 74–99)
GLUCOSE SERPL-MCNC: 132 MG/DL (ref 74–99)
HCT VFR BLD AUTO: 40 % (ref 36.3–47.1)
HCT VFR BLD AUTO: 43.5 % (ref 36.3–47.1)
HGB BLD-MCNC: 12.5 G/DL (ref 11.9–15.1)
HGB BLD-MCNC: 14 G/DL (ref 11.9–15.1)
IMM GRANULOCYTES # BLD AUTO: 0.06 K/UL (ref 0–0.3)
IMM GRANULOCYTES # BLD AUTO: 0.07 K/UL (ref 0–0.3)
IMM GRANULOCYTES NFR BLD: 1 %
IMM GRANULOCYTES NFR BLD: 1 %
LYMPHOCYTES NFR BLD: 1.78 K/UL (ref 1.1–3.7)
LYMPHOCYTES NFR BLD: 2 K/UL (ref 1.1–3.7)
LYMPHOCYTES RELATIVE PERCENT: 19 % (ref 24–43)
LYMPHOCYTES RELATIVE PERCENT: 21 % (ref 24–43)
MCH RBC QN AUTO: 28.2 PG (ref 25.2–33.5)
MCH RBC QN AUTO: 28.3 PG (ref 25.2–33.5)
MCHC RBC AUTO-ENTMCNC: 31.3 G/DL (ref 28.4–34.8)
MCHC RBC AUTO-ENTMCNC: 32.2 G/DL (ref 28.4–34.8)
MCV RBC AUTO: 87.9 FL (ref 82.6–102.9)
MCV RBC AUTO: 90.1 FL (ref 82.6–102.9)
MONOCYTES NFR BLD: 0.45 K/UL (ref 0.1–1.2)
MONOCYTES NFR BLD: 0.46 K/UL (ref 0.1–1.2)
MONOCYTES NFR BLD: 4 % (ref 3–12)
MONOCYTES NFR BLD: 5 % (ref 3–12)
MYOGLOBIN SERPL-MCNC: 412 NG/ML (ref 25–58)
NEUTROPHILS NFR BLD: 69 % (ref 36–65)
NEUTROPHILS NFR BLD: 74 % (ref 36–65)
NEUTS SEG NFR BLD: 5.71 K/UL (ref 1.5–8.1)
NEUTS SEG NFR BLD: 7.96 K/UL (ref 1.5–8.1)
NRBC BLD-RTO: 0 PER 100 WBC
NRBC BLD-RTO: 0 PER 100 WBC
PLATELET # BLD AUTO: 387 K/UL (ref 138–453)
PLATELET # BLD AUTO: 401 K/UL (ref 138–453)
PMV BLD AUTO: 8.8 FL (ref 8.1–13.5)
PMV BLD AUTO: 8.9 FL (ref 8.1–13.5)
POTASSIUM SERPL-SCNC: 3.7 MMOL/L (ref 3.7–5.3)
POTASSIUM SERPL-SCNC: 4.2 MMOL/L (ref 3.7–5.3)
RBC # BLD AUTO: 4.44 M/UL (ref 3.95–5.11)
RBC # BLD AUTO: 4.95 M/UL (ref 3.95–5.11)
RBC # BLD: ABNORMAL 10*6/UL
RBC # BLD: ABNORMAL 10*6/UL
SODIUM SERPL-SCNC: 140 MMOL/L (ref 136–145)
SODIUM SERPL-SCNC: 141 MMOL/L (ref 136–145)
TSH SERPL DL<=0.05 MIU/L-ACNC: 2.5 UIU/ML (ref 0.27–4.2)
WBC OTHER # BLD: 10.8 K/UL (ref 3.5–11.3)
WBC OTHER # BLD: 8.3 K/UL (ref 3.5–11.3)

## 2025-04-22 PROCEDURE — 94640 AIRWAY INHALATION TREATMENT: CPT

## 2025-04-22 PROCEDURE — 82550 ASSAY OF CK (CPK): CPT

## 2025-04-22 PROCEDURE — 87641 MR-STAPH DNA AMP PROBE: CPT

## 2025-04-22 PROCEDURE — 6370000000 HC RX 637 (ALT 250 FOR IP)

## 2025-04-22 PROCEDURE — 2500000003 HC RX 250 WO HCPCS

## 2025-04-22 PROCEDURE — 2580000003 HC RX 258

## 2025-04-22 PROCEDURE — 6370000000 HC RX 637 (ALT 250 FOR IP): Performed by: INTERNAL MEDICINE

## 2025-04-22 PROCEDURE — 73590 X-RAY EXAM OF LOWER LEG: CPT

## 2025-04-22 PROCEDURE — 87040 BLOOD CULTURE FOR BACTERIA: CPT

## 2025-04-22 PROCEDURE — 6360000002 HC RX W HCPCS

## 2025-04-22 PROCEDURE — 86140 C-REACTIVE PROTEIN: CPT

## 2025-04-22 PROCEDURE — 6370000000 HC RX 637 (ALT 250 FOR IP): Performed by: EMERGENCY MEDICINE

## 2025-04-22 PROCEDURE — 80048 BASIC METABOLIC PNL TOTAL CA: CPT

## 2025-04-22 PROCEDURE — 94761 N-INVAS EAR/PLS OXIMETRY MLT: CPT

## 2025-04-22 PROCEDURE — 36415 COLL VENOUS BLD VENIPUNCTURE: CPT

## 2025-04-22 PROCEDURE — 93971 EXTREMITY STUDY: CPT

## 2025-04-22 PROCEDURE — 85025 COMPLETE CBC W/AUTO DIFF WBC: CPT

## 2025-04-22 PROCEDURE — 99222 1ST HOSP IP/OBS MODERATE 55: CPT | Performed by: INTERNAL MEDICINE

## 2025-04-22 PROCEDURE — 1200000000 HC SEMI PRIVATE

## 2025-04-22 PROCEDURE — 85652 RBC SED RATE AUTOMATED: CPT

## 2025-04-22 PROCEDURE — 83874 ASSAY OF MYOGLOBIN: CPT

## 2025-04-22 PROCEDURE — 73700 CT LOWER EXTREMITY W/O DYE: CPT

## 2025-04-22 PROCEDURE — 84443 ASSAY THYROID STIM HORMONE: CPT

## 2025-04-22 PROCEDURE — 99285 EMERGENCY DEPT VISIT HI MDM: CPT

## 2025-04-22 PROCEDURE — 96374 THER/PROPH/DIAG INJ IV PUSH: CPT

## 2025-04-22 RX ORDER — ALBUTEROL SULFATE 0.83 MG/ML
2.5 SOLUTION RESPIRATORY (INHALATION) EVERY 6 HOURS PRN
Status: DISCONTINUED | OUTPATIENT
Start: 2025-04-22 | End: 2025-04-22

## 2025-04-22 RX ORDER — ATORVASTATIN CALCIUM 80 MG/1
80 TABLET, FILM COATED ORAL DAILY
Status: DISCONTINUED | OUTPATIENT
Start: 2025-04-22 | End: 2025-04-23 | Stop reason: HOSPADM

## 2025-04-22 RX ORDER — ONDANSETRON 4 MG/1
4 TABLET, ORALLY DISINTEGRATING ORAL EVERY 8 HOURS PRN
Status: DISCONTINUED | OUTPATIENT
Start: 2025-04-22 | End: 2025-04-23 | Stop reason: HOSPADM

## 2025-04-22 RX ORDER — ALBUTEROL SULFATE 0.83 MG/ML
2.5 SOLUTION RESPIRATORY (INHALATION) EVERY 4 HOURS PRN
Status: DISCONTINUED | OUTPATIENT
Start: 2025-04-22 | End: 2025-04-23 | Stop reason: HOSPADM

## 2025-04-22 RX ORDER — LEVOTHYROXINE SODIUM 75 UG/1
37.5 TABLET ORAL DAILY
Status: DISCONTINUED | OUTPATIENT
Start: 2025-04-22 | End: 2025-04-23 | Stop reason: HOSPADM

## 2025-04-22 RX ORDER — ALBUTEROL SULFATE 90 UG/1
1 INHALANT RESPIRATORY (INHALATION) EVERY 6 HOURS PRN
Status: DISCONTINUED | OUTPATIENT
Start: 2025-04-22 | End: 2025-04-22

## 2025-04-22 RX ORDER — SODIUM CHLORIDE 9 MG/ML
INJECTION, SOLUTION INTRAVENOUS PRN
Status: DISCONTINUED | OUTPATIENT
Start: 2025-04-22 | End: 2025-04-23 | Stop reason: HOSPADM

## 2025-04-22 RX ORDER — IPRATROPIUM BROMIDE AND ALBUTEROL SULFATE 2.5; .5 MG/3ML; MG/3ML
1 SOLUTION RESPIRATORY (INHALATION) EVERY 4 HOURS PRN
Status: DISCONTINUED | OUTPATIENT
Start: 2025-04-22 | End: 2025-04-22

## 2025-04-22 RX ORDER — POLYETHYLENE GLYCOL 3350 17 G/17G
17 POWDER, FOR SOLUTION ORAL DAILY PRN
Status: DISCONTINUED | OUTPATIENT
Start: 2025-04-22 | End: 2025-04-23 | Stop reason: HOSPADM

## 2025-04-22 RX ORDER — LISINOPRIL 20 MG/1
40 TABLET ORAL DAILY
Status: DISCONTINUED | OUTPATIENT
Start: 2025-04-22 | End: 2025-04-23 | Stop reason: HOSPADM

## 2025-04-22 RX ORDER — MAGNESIUM SULFATE IN WATER 40 MG/ML
2000 INJECTION, SOLUTION INTRAVENOUS PRN
Status: DISCONTINUED | OUTPATIENT
Start: 2025-04-22 | End: 2025-04-23 | Stop reason: HOSPADM

## 2025-04-22 RX ORDER — ONDANSETRON 2 MG/ML
4 INJECTION INTRAMUSCULAR; INTRAVENOUS EVERY 6 HOURS PRN
Status: DISCONTINUED | OUTPATIENT
Start: 2025-04-22 | End: 2025-04-23 | Stop reason: HOSPADM

## 2025-04-22 RX ORDER — ACETAMINOPHEN 325 MG/1
650 TABLET ORAL EVERY 6 HOURS PRN
Status: DISCONTINUED | OUTPATIENT
Start: 2025-04-22 | End: 2025-04-23 | Stop reason: HOSPADM

## 2025-04-22 RX ORDER — IPRATROPIUM BROMIDE AND ALBUTEROL SULFATE 2.5; .5 MG/3ML; MG/3ML
1 SOLUTION RESPIRATORY (INHALATION)
Status: DISCONTINUED | OUTPATIENT
Start: 2025-04-22 | End: 2025-04-23 | Stop reason: HOSPADM

## 2025-04-22 RX ORDER — ASPIRIN 81 MG/1
81 TABLET, CHEWABLE ORAL DAILY
Status: DISCONTINUED | OUTPATIENT
Start: 2025-04-22 | End: 2025-04-23 | Stop reason: HOSPADM

## 2025-04-22 RX ORDER — SODIUM CHLORIDE 0.9 % (FLUSH) 0.9 %
5-40 SYRINGE (ML) INJECTION PRN
Status: DISCONTINUED | OUTPATIENT
Start: 2025-04-22 | End: 2025-04-23 | Stop reason: HOSPADM

## 2025-04-22 RX ORDER — ALBUTEROL SULFATE 90 UG/1
1 INHALANT RESPIRATORY (INHALATION) EVERY 4 HOURS PRN
Status: DISCONTINUED | OUTPATIENT
Start: 2025-04-22 | End: 2025-04-23 | Stop reason: HOSPADM

## 2025-04-22 RX ORDER — SODIUM CHLORIDE 0.9 % (FLUSH) 0.9 %
5-40 SYRINGE (ML) INJECTION EVERY 12 HOURS SCHEDULED
Status: DISCONTINUED | OUTPATIENT
Start: 2025-04-22 | End: 2025-04-23 | Stop reason: HOSPADM

## 2025-04-22 RX ORDER — POTASSIUM CHLORIDE 1500 MG/1
40 TABLET, EXTENDED RELEASE ORAL PRN
Status: DISCONTINUED | OUTPATIENT
Start: 2025-04-22 | End: 2025-04-23 | Stop reason: HOSPADM

## 2025-04-22 RX ORDER — AMLODIPINE BESYLATE 5 MG/1
5 TABLET ORAL DAILY
Status: DISCONTINUED | OUTPATIENT
Start: 2025-04-22 | End: 2025-04-23 | Stop reason: HOSPADM

## 2025-04-22 RX ORDER — ACETAMINOPHEN 650 MG/1
650 SUPPOSITORY RECTAL EVERY 6 HOURS PRN
Status: DISCONTINUED | OUTPATIENT
Start: 2025-04-22 | End: 2025-04-23 | Stop reason: HOSPADM

## 2025-04-22 RX ORDER — ENOXAPARIN SODIUM 100 MG/ML
40 INJECTION SUBCUTANEOUS DAILY
Status: DISCONTINUED | OUTPATIENT
Start: 2025-04-22 | End: 2025-04-23 | Stop reason: HOSPADM

## 2025-04-22 RX ORDER — POTASSIUM CHLORIDE 7.45 MG/ML
10 INJECTION INTRAVENOUS PRN
Status: DISCONTINUED | OUTPATIENT
Start: 2025-04-22 | End: 2025-04-23 | Stop reason: HOSPADM

## 2025-04-22 RX ADMIN — CEFEPIME 2000 MG: 2 INJECTION, POWDER, FOR SOLUTION INTRAVENOUS at 21:45

## 2025-04-22 RX ADMIN — ENOXAPARIN SODIUM 40 MG: 100 INJECTION SUBCUTANEOUS at 16:25

## 2025-04-22 RX ADMIN — ATORVASTATIN CALCIUM 80 MG: 80 TABLET, FILM COATED ORAL at 16:23

## 2025-04-22 RX ADMIN — ASPIRIN 81 MG: 81 TABLET, CHEWABLE ORAL at 21:46

## 2025-04-22 RX ADMIN — SODIUM CHLORIDE, PRESERVATIVE FREE 10 ML: 5 INJECTION INTRAVENOUS at 20:25

## 2025-04-22 RX ADMIN — AMLODIPINE BESYLATE 5 MG: 5 TABLET ORAL at 16:24

## 2025-04-22 RX ADMIN — IPRATROPIUM BROMIDE AND ALBUTEROL SULFATE 1 DOSE: .5; 2.5 SOLUTION RESPIRATORY (INHALATION) at 15:00

## 2025-04-22 RX ADMIN — WATER 1000 MG: 1 INJECTION INTRAMUSCULAR; INTRAVENOUS; SUBCUTANEOUS at 14:16

## 2025-04-22 RX ADMIN — IPRATROPIUM BROMIDE AND ALBUTEROL SULFATE 1 DOSE: .5; 2.5 SOLUTION RESPIRATORY (INHALATION) at 21:15

## 2025-04-22 RX ADMIN — LEVOTHYROXINE SODIUM 37.5 MCG: 0.07 TABLET ORAL at 20:24

## 2025-04-22 RX ADMIN — VANCOMYCIN HYDROCHLORIDE 1000 MG: 1 INJECTION, POWDER, LYOPHILIZED, FOR SOLUTION INTRAVENOUS at 14:27

## 2025-04-22 RX ADMIN — ALBUTEROL SULFATE 1 PUFF: 90 AEROSOL, METERED RESPIRATORY (INHALATION) at 17:45

## 2025-04-22 ASSESSMENT — ENCOUNTER SYMPTOMS
NAUSEA: 0
ABDOMINAL PAIN: 0
COLOR CHANGE: 1
CHEST TIGHTNESS: 0
CONSTIPATION: 0
RECTAL PAIN: 0
COUGH: 0
STRIDOR: 0
ABDOMINAL DISTENTION: 0
WHEEZING: 0
VOMITING: 0
SHORTNESS OF BREATH: 0
ANAL BLEEDING: 0

## 2025-04-22 ASSESSMENT — PAIN DESCRIPTION - DESCRIPTORS: DESCRIPTORS: ACHING

## 2025-04-22 ASSESSMENT — PAIN DESCRIPTION - ORIENTATION: ORIENTATION: LEFT;LOWER

## 2025-04-22 ASSESSMENT — PAIN SCALES - GENERAL
PAINLEVEL_OUTOF10: 6
PAINLEVEL_OUTOF10: 2

## 2025-04-22 ASSESSMENT — PAIN DESCRIPTION - LOCATION: LOCATION: LEG

## 2025-04-22 NOTE — ED PROVIDER NOTES
[facility]     Emergency Department     Faculty Attestation  2:21 PM EDT      I performed a history and physical examination of the patient and discussed management with the resident. I have reviewed and agree with the resident’s findings including all diagnostic interpretations, and treatment plans as written. Any areas of disagreement are noted on the chart. I was personally present for the key portions of any procedures. I have documented in the chart those procedures where I was not present during the key portions. I have reviewed the emergency nurses triage note. I agree with the chief complaint, past medical history, past surgical history, allergies, medications, social and family history as documented unless otherwise noted below. Documentation of the HPI, Physical Exam and Medical Decision Making performed by scribjeri is based on my personal performance of the HPI, PE and MDM. For Physician Assistant/ Nurse Practitioner cases/documentation I have personally evaluated this patient and have completed at least one if not all key elements of the E/M (history, physical exam, and MDM). Additional findings are as noted.    PAtient with previous h/o stroke and LEft sided weakness, here with care giver, reports chronic leg edema, but worse over hte past month with swelling. Has been on bactrim and then doxycyline due to redness without improvements, and came in for further eval, doppler 1 month ago was negative. No fevers, no trauma.   She does report increased pain    On exam edema unilateral to the left lower leg with erythema from ankle to knee with increased warmth.  Tender to palpation diffusely.    Repeat DVT study, check labs, cellulitis  Xray to r/o trauma      Melissa Ambrose D.O, M.P.H  Attending Emergency Medicine Physician        Melissa Ambrose, DO  04/22/25 3130

## 2025-04-22 NOTE — ED NOTES
Writer called phlebotomy to follow up on blood culture draws. Per phlebotomy someone would be down to collect cultures.    Instructions: This plan will send the code FBSE to the PM system.  DO NOT or CHANGE the price. Price (Do Not Change): 0.00 Detail Level: Simple

## 2025-04-22 NOTE — PROGRESS NOTES
Spiritual Health History and Assessment/Progress Note  [facility]    (P) Loneliness/Social Isolation,  ,  ,      Name: Rosa Cook MRN: [id number]    Age: 69 y.o.     Sex: female   Language: English   Congregational: None   Cellulitis of left lower extremity     Date: 4/22/2025            Total Time Calculated: (P) 10 min              [facility]        Referral/Consult From: (P) Other (comment) (Intake)   Encounter Overview/Reason: (P) Loneliness/Social Isolation  Service Provided For: (P) Patient    Pt awake upright in bed alert and oriented. Pt was anxious upon entrance and upset that she is in hospital so much. Pt was under the impression that she would get IV antibiotics then was informed that those would not start until 2 in the morning. Pt became angry about this. Writer talked with pt and gave safe space to vent emotions. Pt finds meaning and purpose through family connection. Pt was thankful for visit. Writer offered further support via perfect serve 24/7    [place], Belief, Meaning:   Patient has beliefs or practices that help with coping during difficult times  Family/Friends No family/friends present      Importance and Influence:  Patient has spiritual/personal beliefs that influence decisions regarding their health  Family/Friends No family/friends present    Community:  Patient feels well-supported. Support system includes: Children, Friends, and Extended family  Family/Friends No family/friends present    Assessment and Plan of Care:     Patient Interventions include: Facilitated expression of thoughts and feelings and Affirmed coping skills/support systems  Family/Friends Interventions include: No family/friends present    Patient Plan of Care: Spiritual Care available upon further referral  Family/Friends Plan of Care: No family/friends present     04/22/25 1901   Encounter Summary   Encounter Overview/Reason Loneliness/Social Isolation   Service

## 2025-04-22 NOTE — ED NOTES
ED to inpatient nurses report      Chief Complaint:  Chief Complaint   Patient presents with    Leg Pain     Present to ED from: home    MOA:     LOC: alert and orientated to name, place, date  Mobility: Requires assistance * 2  Oxygen Baseline: 95%    Current needs required: RA   Pending ED orders:  none  Present condition: stable    Why did the patient come to the ED?      Pt presents to ED with c/o LLE pain.  Pt reports being seen 3 weeks ago for antibiotic therapy but the antibiotics are not working.  Pt leg warm and swollen on inspection with non pitting edema.  Pt reports having CVA 4 years ago and presents with left sided deficits     What is the plan? admit  Any procedures or intervention occur? Vascular US, labs, antibiotics  Any safety concerns?? Fall risk    Mental Status:       Psych Assessment:   Psychosocial  Psychosocial (WDL): Within Defined Limits  Vital signs   Vitals:    04/22/25 1125 04/22/25 1138 04/22/25 1156 04/22/25 1345   BP: 131/76 132/73  135/80   Pulse: 94      Resp:  18     Temp: 97.7 °F (36.5 °C)      SpO2: 94% 94%  95%   Weight:   86.2 kg (190 lb)    Height:   1.702 m (5' 7\")         Vitals:  Patient Vitals for the past 24 hrs:   BP Temp Pulse Resp SpO2 Height Weight   04/22/25 1345 135/80 -- -- -- 95 % -- --   04/22/25 1156 -- -- -- -- -- 1.702 m (5' 7\") 86.2 kg (190 lb)   04/22/25 1138 132/73 -- -- 18 94 % -- --   04/22/25 1125 131/76 97.7 °F (36.5 °C) 94 -- 94 % -- --      Visit Vitals  /80   Pulse 94   Temp 97.7 °F (36.5 °C)   Resp 18   Ht 1.702 m (5' 7\")   Wt 86.2 kg (190 lb)   SpO2 95%   BMI 29.76 kg/m²        LDAs:      Ambulatory Status:  Presents to emergency department  because of falls (Syncope, seizure, or loss of consciousness): No, Age > 70: No, Altered Mental Status, Intoxication with alcohol or substance confusion (Disorientation, impaired judgment, poor safety awaremess, or inability to follow instructions): No, Impaired Mobility: Ambulates or transfers with

## 2025-04-22 NOTE — ED NOTES
Pt return from vascular lab. Pt hooked back up to monitor, RR even non labored no acute distress noted.

## 2025-04-22 NOTE — ED NOTES
Pt presents to ED with c/o LLE pain.  Pt reports being seen 3 weeks ago for antibiotic therapy but the antibiotics are not working.  Pt leg warm and swollen on inspection with non pitting edema.  Pt reports having CVA 4 years ago and presents with left sided deficits.   Patient alert and oriented x4, talking in complete sentences. Respirations even and unlabored. Call light in reach, all needs met at this time.

## 2025-04-22 NOTE — H&P
[facility]     Department of Internal Medicine - Staff Internal Medicine Teaching Service          ADMISSION NOTE/HISTORY AND PHYSICAL EXAMINATION   Date: 4/22/2025  Patient Name: Rosa Cook  Date of admission: 4/22/2025 11:28 AM  YOB: 1955  PCP: Mariam Marquez APRN - CNP  History Obtained From:  patient, electronic medical record    CHIEF COMPLAINT     Chief complaint: Left leg swelling and pain    HISTORY OF PRESENTING ILLNESS     The patient is a pleasant 69 y.o. female with a PMHx significant for     Hypertension  COPD  Hypothyroidism   Hyperlipidemia  Right MCA stroke, with left-sided plegia, hemineglect      presents with a chief complaint of left leg swelling and pain.  According to the patient left leg swelling started a month ago which is associated with pain and erythema, progressive in nature.  As patient had progressive edema of left legs he was seen by her PCP's office and was treated with doxycycline and Bactrim for presumed cellulitis of left lower extremity.  However her symptoms continues to be progressive and worsening despite oral antibiotic.    Patient denies fever, chills, rigors, myalgia, night sweats, chest pain, shortness of breath.  Patient is wheelchair-bound at baseline however she also states that she has some difficulty to ambulate secondary to pain of the affected extremity.    Patient denies any prior history of DVT/PE and is not on any anticoagulation at home.  She also denies recent travel history.    Initial lab work in ED showed elevated ESR (70) and CRP (21.9) otherwise CBC, BMP unremarkable.    Doppler scan of bilateral lower extremity is negative for DVT.  CT scan of the tibia and fibula of left limb is negative for osteomyelitis however there is concern of subcutaneous edema compatible with cellulitis.  No evidence of abscess or soft tissue gas.    Patient was admitted for management of cellulitis of left lower

## 2025-04-22 NOTE — RT PROTOCOL NOTE
RT Nebulizer Bronchodilator Protocol Note    There is a bronchodilator order in the chart from a provider indicating to follow the RT Bronchodilator Protocol and there is an “Initiate RT Bronchodilator Protocol” order as well (see protocol at bottom of note).    CXR Findings:  No results found.    The findings from the last RT Protocol Assessment were as follows:  Smoking: Chronic pulmonary disease  Respiratory Pattern: Regular pattern and RR 12-20 bpm  Breath Sounds: Slightly diminished and/or crackles  Cough: Strong, productive  Indication for Bronchodilator Therapy:    Bronchodilator Assessment Score: 5    Aerosolized bronchodilator medication orders have been revised according to the RT Nebulizer Bronchodilator Protocol below.    Respiratory Therapist to perform RT Therapy Protocol Assessment initially then follow the protocol.  Repeat RT Therapy Protocol Assessment PRN for score 0-3 or on second treatment, BID, and PRN for scores above 3.    No Indications - adjust the frequency to every 6 hours PRN wheezing or bronchospasm, if no treatments needed after 48 hours then discontinue using Per Protocol order mode.     If indication present, adjust the RT bronchodilator orders based on the Bronchodilator Assessment Score as indicated below.  If a patient is on this medication at home then do not decrease Frequency below that used at home.    0-3 - enter or revise RT bronchodilator order(s) to equivalent RT Bronchodilator order with Frequency of every 4 hours PRN for wheezing or increased work of breathing using Per Protocol order mode.       4-6 - enter or revise RT Bronchodilator order(s) to two equivalent RT bronchodilator orders with one order with BID Frequency and one order with Frequency of every 4 hours PRN wheezing or increased work of breathing using Per Protocol order mode.         7-10 - enter or revise RT Bronchodilator order(s) to two equivalent RT bronchodilator orders with one order with TID Frequency

## 2025-04-22 NOTE — ED PROVIDER NOTES
[facility] EMERGENCY DEPARTMENT  Emergency Department Encounter  Emergency Medicine Resident     Pt Name:Rosa Cook  MRN: [id number]  Birthdate 1955  Date of evaluation: 4/22/25  PCP:  Mariam Marquez APRN - CNP  Note Started: 11:44 AM EDT      CHIEF COMPLAINT       Chief Complaint   Patient presents with    Leg Pain       HISTORY OF PRESENT ILLNESS  (Location/Symptom, Timing/Onset, Context/Setting, Quality, Duration, Modifying Factors, Severity.)      Rosa Cook is a 69 y.o. female history of prior CVA with left-sided deficits presents with 1 month history of progressively worsening left lower extremity edema, erythema and pain.  Patient states she was evaluated in the emergency department approximately 1 month prior, had negative duplex ultrasound of left lower extremity at that time.  She has been treated with doxycycline as well as Bactrim for presumed cellulitis of the left lower extremity.  Her symptoms continue to be progressing despite oral antibiotics.  She denies associate fever, chills, myalgias or night sweats.  She still is able to ambulate with a rolling walker which is her baseline, reportedly has some difficulty secondary to pain of the affected extremity.  No prior history of DVT/PE.  She denies associated chest pain or shortness of breath.    PAST MEDICAL / SURGICAL / SOCIAL / FAMILY HISTORY      has a past medical history of Asthma, COPD (chronic obstructive pulmonary disease) (HCC), Diabetes mellitus (HCC), Esophageal ring, Gastritis, Hyperlipidemia, Hyperplastic colon polyp, Hypertension, Osteoarthritis, Patient in clinical research study, Stroke due to occlusion of right middle cerebral artery (HCC), and TIA (transient ischemic attack).       has a past surgical history that includes Appendectomy; Hysterectomy; Esophagus dilation; Colonoscopy (09/20/2017); Endoscopy, colon, diagnostic; pr egd transoral biopsy single/multiple (N/A, 9/20/2017); pr colsc flx

## 2025-04-22 NOTE — PROGRESS NOTES
[facility]   Pharmacy Pharmacokinetic Monitoring Service - Vancomycin     Rosa Cook is a 69 y.o. female starting on vancomycin therapy for SSTI. Pharmacy consulted by Dr. Kelly for monitoring and adjustment.    Target Concentration: Goal AUC/-600 mg*hr/L    Additional Antimicrobials: Ceftriaxone    Pertinent Laboratory Values:   Wt Readings from Last 1 Encounters:   04/22/25 86.2 kg (190 lb)     Temp Readings from Last 1 Encounters:   04/22/25 97.7 °F (36.5 °C)     Estimated Creatinine Clearance: 88 mL/min (based on SCr of 0.7 mg/dL).  Recent Labs     04/22/25  1147   CREATININE 0.7   BUN 16   WBC 8.3     Procalcitonin: n/a    Pertinent Cultures:  Culture Date Source Results   N/a N/a N/a   MRSA Nasal Swab: N/A. Non-respiratory infection.    Plan:  Dosing recommendations based on Bayesian software  Start vancomycin 1,000 mg q12h  Anticipated AUC of ~500 and trough concentration of ~15 at steady state  Renal labs as indicated   Vancomycin concentration not ordered yet  Pharmacy will continue to monitor patient and adjust therapy as indicated    Thank you for the consult,  GABRIEL REYES  4/22/2025 1:55 PM

## 2025-04-23 ENCOUNTER — APPOINTMENT (OUTPATIENT)
Dept: GENERAL RADIOLOGY | Age: 70
DRG: 603 | End: 2025-04-23
Payer: MEDICARE

## 2025-04-23 VITALS
BODY MASS INDEX: 33.81 KG/M2 | DIASTOLIC BLOOD PRESSURE: 68 MMHG | HEIGHT: 67 IN | HEART RATE: 80 BPM | WEIGHT: 215.39 LBS | TEMPERATURE: 97.9 F | RESPIRATION RATE: 12 BRPM | OXYGEN SATURATION: 100 % | SYSTOLIC BLOOD PRESSURE: 134 MMHG

## 2025-04-23 LAB
ANION GAP SERPL CALCULATED.3IONS-SCNC: 11 MMOL/L (ref 9–16)
BASOPHILS # BLD: 0.04 K/UL (ref 0–0.2)
BASOPHILS NFR BLD: 1 % (ref 0–2)
BUN SERPL-MCNC: 12 MG/DL (ref 8–23)
CALCIUM SERPL-MCNC: 8.9 MG/DL (ref 8.6–10.4)
CHLORIDE SERPL-SCNC: 103 MMOL/L (ref 98–107)
CO2 SERPL-SCNC: 25 MMOL/L (ref 20–31)
CREAT SERPL-MCNC: 0.7 MG/DL (ref 0.6–0.9)
CRP SERPL HS-MCNC: 22.6 MG/L (ref 0–5)
EOSINOPHIL # BLD: 0.25 K/UL (ref 0–0.44)
EOSINOPHILS RELATIVE PERCENT: 3 % (ref 1–4)
ERYTHROCYTE [DISTWIDTH] IN BLOOD BY AUTOMATED COUNT: 14.9 % (ref 11.8–14.4)
GFR, ESTIMATED: >90 ML/MIN/1.73M2
GLUCOSE SERPL-MCNC: 129 MG/DL (ref 74–99)
HCT VFR BLD AUTO: 39.8 % (ref 36.3–47.1)
HGB BLD-MCNC: 12.4 G/DL (ref 11.9–15.1)
IMM GRANULOCYTES # BLD AUTO: 0.06 K/UL (ref 0–0.3)
IMM GRANULOCYTES NFR BLD: 1 %
LYMPHOCYTES NFR BLD: 1.5 K/UL (ref 1.1–3.7)
LYMPHOCYTES RELATIVE PERCENT: 21 % (ref 24–43)
MCH RBC QN AUTO: 28.5 PG (ref 25.2–33.5)
MCHC RBC AUTO-ENTMCNC: 31.2 G/DL (ref 28.4–34.8)
MCV RBC AUTO: 91.5 FL (ref 82.6–102.9)
MONOCYTES NFR BLD: 0.42 K/UL (ref 0.1–1.2)
MONOCYTES NFR BLD: 6 % (ref 3–12)
MRSA, DNA, NASAL: NEGATIVE
NEUTROPHILS NFR BLD: 68 % (ref 36–65)
NEUTS SEG NFR BLD: 5.01 K/UL (ref 1.5–8.1)
NRBC BLD-RTO: 0 PER 100 WBC
PLATELET # BLD AUTO: 325 K/UL (ref 138–453)
PMV BLD AUTO: 9.1 FL (ref 8.1–13.5)
POTASSIUM SERPL-SCNC: 3.8 MMOL/L (ref 3.7–5.3)
RBC # BLD AUTO: 4.35 M/UL (ref 3.95–5.11)
RBC # BLD: ABNORMAL 10*6/UL
SODIUM SERPL-SCNC: 139 MMOL/L (ref 136–145)
SPECIMEN DESCRIPTION: NORMAL
WBC OTHER # BLD: 7.3 K/UL (ref 3.5–11.3)

## 2025-04-23 PROCEDURE — 6360000002 HC RX W HCPCS

## 2025-04-23 PROCEDURE — 94640 AIRWAY INHALATION TREATMENT: CPT

## 2025-04-23 PROCEDURE — 80048 BASIC METABOLIC PNL TOTAL CA: CPT

## 2025-04-23 PROCEDURE — 6370000000 HC RX 637 (ALT 250 FOR IP)

## 2025-04-23 PROCEDURE — 71046 X-RAY EXAM CHEST 2 VIEWS: CPT

## 2025-04-23 PROCEDURE — 2580000003 HC RX 258

## 2025-04-23 PROCEDURE — 94760 N-INVAS EAR/PLS OXIMETRY 1: CPT

## 2025-04-23 PROCEDURE — 6370000000 HC RX 637 (ALT 250 FOR IP): Performed by: INTERNAL MEDICINE

## 2025-04-23 PROCEDURE — 86140 C-REACTIVE PROTEIN: CPT

## 2025-04-23 PROCEDURE — 85025 COMPLETE CBC W/AUTO DIFF WBC: CPT

## 2025-04-23 PROCEDURE — 6360000002 HC RX W HCPCS: Performed by: INTERNAL MEDICINE

## 2025-04-23 PROCEDURE — 36415 COLL VENOUS BLD VENIPUNCTURE: CPT

## 2025-04-23 PROCEDURE — 99232 SBSQ HOSP IP/OBS MODERATE 35: CPT | Performed by: INTERNAL MEDICINE

## 2025-04-23 RX ORDER — ASPIRIN 81 MG/1
81 TABLET, CHEWABLE ORAL DAILY
Qty: 30 TABLET | Refills: 3 | Status: SHIPPED | OUTPATIENT
Start: 2025-04-24

## 2025-04-23 RX ORDER — LINEZOLID 600 MG/1
600 TABLET, FILM COATED ORAL 2 TIMES DAILY
Qty: 28 TABLET | Refills: 0 | Status: SHIPPED | OUTPATIENT
Start: 2025-04-23 | End: 2025-05-07

## 2025-04-23 RX ADMIN — IPRATROPIUM BROMIDE AND ALBUTEROL SULFATE 1 DOSE: .5; 2.5 SOLUTION RESPIRATORY (INHALATION) at 08:54

## 2025-04-23 RX ADMIN — AMLODIPINE BESYLATE 5 MG: 5 TABLET ORAL at 08:50

## 2025-04-23 RX ADMIN — ENOXAPARIN SODIUM 40 MG: 100 INJECTION SUBCUTANEOUS at 08:49

## 2025-04-23 RX ADMIN — ASPIRIN 81 MG: 81 TABLET, CHEWABLE ORAL at 08:49

## 2025-04-23 RX ADMIN — LISINOPRIL 40 MG: 20 TABLET ORAL at 08:50

## 2025-04-23 RX ADMIN — ACETAMINOPHEN 650 MG: 325 TABLET ORAL at 08:49

## 2025-04-23 RX ADMIN — ALBUTEROL SULFATE 2.5 MG: 2.5 SOLUTION RESPIRATORY (INHALATION) at 04:56

## 2025-04-23 RX ADMIN — ATORVASTATIN CALCIUM 80 MG: 80 TABLET, FILM COATED ORAL at 08:49

## 2025-04-23 RX ADMIN — Medication 1 PUFF: at 00:42

## 2025-04-23 RX ADMIN — LEVOTHYROXINE SODIUM 37.5 MCG: 0.07 TABLET ORAL at 05:27

## 2025-04-23 RX ADMIN — VANCOMYCIN HYDROCHLORIDE 1000 MG: 1 INJECTION, POWDER, LYOPHILIZED, FOR SOLUTION INTRAVENOUS at 13:59

## 2025-04-23 RX ADMIN — ALBUTEROL SULFATE 2.5 MG: 2.5 SOLUTION RESPIRATORY (INHALATION) at 12:49

## 2025-04-23 RX ADMIN — ACETAMINOPHEN 650 MG: 325 TABLET ORAL at 15:27

## 2025-04-23 RX ADMIN — CEFEPIME 2000 MG: 2 INJECTION, POWDER, FOR SOLUTION INTRAVENOUS at 08:54

## 2025-04-23 RX ADMIN — VANCOMYCIN HYDROCHLORIDE 1000 MG: 1 INJECTION, POWDER, LYOPHILIZED, FOR SOLUTION INTRAVENOUS at 02:14

## 2025-04-23 RX ADMIN — ACETAMINOPHEN 650 MG: 325 TABLET ORAL at 01:31

## 2025-04-23 ASSESSMENT — PAIN SCALES - GENERAL
PAINLEVEL_OUTOF10: 0
PAINLEVEL_OUTOF10: 6
PAINLEVEL_OUTOF10: 2
PAINLEVEL_OUTOF10: 3

## 2025-04-23 ASSESSMENT — PAIN DESCRIPTION - DESCRIPTORS: DESCRIPTORS: ACHING;SORE

## 2025-04-23 ASSESSMENT — PAIN DESCRIPTION - LOCATION
LOCATION: SHOULDER;NECK
LOCATION: HEAD

## 2025-04-23 ASSESSMENT — PAIN DESCRIPTION - ORIENTATION: ORIENTATION: MID

## 2025-04-23 NOTE — PROGRESS NOTES
CLINICAL PHARMACY NOTE: MEDS TO BEDS    Total # of Prescriptions Filled: 2   The following medications were delivered to the patient:  Linezolid 600mg tabs  Aspirin 81mg chew    Additional Documentation:  Delivered to pt in room   350  on 4/23/2025 at 4:54 PM. Copay was $70.35 paid with [employer]

## 2025-04-23 NOTE — PLAN OF CARE
Problem: Chronic Conditions and Co-morbidities  Goal: Patient's chronic conditions and co-morbidity symptoms are monitored and maintained or improved  4/23/2025 1721 by Yana Kline RN  Outcome: Completed  4/23/2025 1337 by Yana Kline RN  Outcome: Progressing  4/23/2025 0634 by Shelton Rosario RN  Outcome: Progressing     Problem: Pain  Goal: Verbalizes/displays adequate comfort level or baseline comfort level  4/23/2025 1721 by Yana Kline RN  Outcome: Completed  4/23/2025 1337 by Yana Kline RN  Outcome: Progressing  4/23/2025 0634 by Shelton Rosario RN  Outcome: Progressing     Problem: Skin/Tissue Integrity  Goal: Skin integrity remains intact  Description: 1.  Monitor for areas of redness and/or skin breakdown2.  Assess vascular access sites hourly3.  Every 4-6 hours minimum:  Change oxygen saturation probe site4.  Every 4-6 hours:  If on nasal continuous positive airway pressure, respiratory therapy assess nares and determine need for appliance change or resting period  4/23/2025 1721 by Yana Kline RN  Outcome: Completed  4/23/2025 1337 by Yana Kline RN  Outcome: Progressing  4/23/2025 0634 by Shelton Rosario RN  Outcome: Progressing     Problem: Safety - Adult  Goal: Free from fall injury  4/23/2025 1721 by Yana Kline RN  Outcome: Completed  4/23/2025 1337 by Yana Kline RN  Outcome: Progressing  4/23/2025 0634 by Shelton Rosario RN  Outcome: Progressing     Problem: Respiratory - Adult  Goal: Achieves optimal ventilation and oxygenation  4/23/2025 1721 by Yana lKine RN  Outcome: Completed  4/23/2025 1337 by Yana Kline RN  Outcome: Progressing  Flowsheets (Taken 4/23/2025 0856 by Sierra Evans RCP)  Achieves optimal ventilation and oxygenation:   Assess for changes in respiratory status   Assess for changes in mentation and behavior   Position to facilitate oxygenation and minimize respiratory effort   Oxygen supplementation based on oxygen saturation or

## 2025-04-23 NOTE — PROGRESS NOTES
Physical Therapy        Physical Therapy Cancel Note      DATE: 2025    NAME: Rosa Cook  MRN: [id number]   : 1955      Patient not seen this date for Physical Therapy due to:    Other: pt declines therapy services, reports mobility is baseline. Pt educated on purpose of PT eval, POC, and importance of mobility during admission. Pt verbalizing no wishes for therapy evaluation at this time. PT to sign off per patient request, please reorder if needs arise.       Electronically signed by Linda Vuong PT on 2025 at 2:27 PM

## 2025-04-23 NOTE — PLAN OF CARE
Problem: Chronic Conditions and Co-morbidities  Goal: Patient's chronic conditions and co-morbidity symptoms are monitored and maintained or improved  4/23/2025 1337 by Yana Kline RN  Outcome: Progressing  4/23/2025 0634 by Shelton Rosario RN  Outcome: Progressing     Problem: Pain  Goal: Verbalizes/displays adequate comfort level or baseline comfort level  4/23/2025 1337 by Yana Kline RN  Outcome: Progressing  4/23/2025 0634 by Shelton Rosario RN  Outcome: Progressing     Problem: Skin/Tissue Integrity  Goal: Skin integrity remains intact  Description: 1.  Monitor for areas of redness and/or skin breakdown2.  Assess vascular access sites hourly3.  Every 4-6 hours minimum:  Change oxygen saturation probe site4.  Every 4-6 hours:  If on nasal continuous positive airway pressure, respiratory therapy assess nares and determine need for appliance change or resting period  4/23/2025 1337 by Yana Kline RN  Outcome: Progressing  4/23/2025 0634 by Shelton Rosario RN  Outcome: Progressing     Problem: Safety - Adult  Goal: Free from fall injury  4/23/2025 1337 by Yana Kline RN  Outcome: Progressing  4/23/2025 0634 by Shelton oRsario RN  Outcome: Progressing     Problem: Respiratory - Adult  Goal: Achieves optimal ventilation and oxygenation  Outcome: Progressing  Flowsheets (Taken 4/23/2025 0856 by Sierra Evans, [facility])  Achieves optimal ventilation and oxygenation:   Assess for changes in respiratory status   Assess for changes in mentation and behavior   Position to facilitate oxygenation and minimize respiratory effort   Oxygen supplementation based on oxygen saturation or arterial blood gases   Assess the need for suctioning and aspirate as needed   Assess and instruct to report shortness of breath or any respiratory difficulty   Respiratory therapy support as indicated

## 2025-04-23 NOTE — DISCHARGE INSTRUCTIONS
Please take the antibiotic zyvox twice daily for 14 days for your cellulitis  Please call and make an appointment with your primary care provider in 1-2 weeks to follow up on your recent hospital admission.  Please take the rest of your medications as prescribed.  We recommended you stay for 1 more night of IV antibiotics as you had already failed 2 oral antibiotics but you insisted on being discharged, you are being discharged against our recommendations. Please keep a close eye on your skin infection. If your symptoms worsen or you start to experience worsening pain, fevers, chills, nausea, or vomiting please return the the ED for further evaluation and management.

## 2025-04-23 NOTE — PROGRESS NOTES
04/23/25 0856   Care Plan - Respiratory Goals   Achieves optimal ventilation and oxygenation Assess for changes in respiratory status;Assess for changes in mentation and behavior;Position to facilitate oxygenation and minimize respiratory effort;Oxygen supplementation based on oxygen saturation or arterial blood gases;Assess the need for suctioning and aspirate as needed;Assess and instruct to report shortness of breath or any respiratory difficulty;Respiratory therapy support as indicated

## 2025-04-23 NOTE — PROGRESS NOTES
Patient given discharge instructions. Patient stated that she understood all discharge instructions, all questions and concerns were answered.    Waiting on meds to beds then she stated she would call her ride.    Electronically signed by Yana Kline RN on 4/23/2025 at 3:53 PM

## 2025-04-23 NOTE — PROGRESS NOTES
Patient was taken off unit in a wheelchair with all belongings in hand. Patient was discharged to home.     Electronically signed by Yana Kline RN on 4/23/2025 at 5:21 PM

## 2025-04-23 NOTE — CARE COORDINATION
Case Management Assessment  Initial Evaluation    Date/Time of Evaluation: 4/23/2025 1:47 PM  Assessment Completed by: IAN BATES RN    If patient is discharged prior to next notation, then this note serves as note for discharge by case management.    Patient Name: Rosa Cook                   YOB: 1955  Diagnosis: Cellulitis of left lower extremity [L03.116]                   Date / Time: 4/22/2025 11:28 AM    Patient Admission Status: Inpatient   Readmission Risk (Low < 19, Mod (19-27), High > 27): Readmission Risk Score: 13.8    Current PCP: Mariam Marquez APRN - CNP  PCP verified by CM? Yes    Chart Reviewed: Yes      History Provided by: Patient  Patient Orientation: Alert and Oriented    Patient Cognition: Alert    Hospitalization in the last 30 days (Readmission):  No    If yes, Readmission Assessment in CM Navigator will be completed.    Advance Directives:      Code Status: Full Code   Patient's Primary Decision Maker is:        Discharge Planning:    Patient lives with: Alone Type of Home: House  Primary Care Giver: Self  Patient Support Systems include: Friends/Neighbors, Children   Current Financial resources:    Current community resources:    Current services prior to admission: Durable Medical Equipment            Current DME: Wheelchair, Walker            Type of Home Care services:  None    ADLS  Prior functional level: Independent in ADLs/IADLs  Current functional level: Independent in ADLs/IADLs    PT AM-PAC:   /24  OT AM-PAC:   /24    Family can provide assistance at DC: No  Would you like Case Management to discuss the discharge plan with any other family members/significant others, and if so, who? No  Plans to Return to Present Housing: Yes  Other Identified Issues/Barriers to RETURNING to current housing: none  Potential Assistance needed at discharge: N/A            Potential DME:    Patient expects to discharge to: House  Plan for transportation at

## 2025-04-23 NOTE — PROGRESS NOTES
[facility]  Internal Medicine Teaching Residency Program  Inpatient Daily Progress Note  ______________________________________________________________________________    Patient: Rosa Cook  YOB: 1955   MRN:[id number]    Acct: [id number]     Room: 0350/0350-01  Admit date: 4/22/2025  Today's date: 04/23/25  Number of days in the hospital: 1    SUBJECTIVE   Admitting Diagnosis: Cellulitis of left lower extremity  CC: Left leg swelling and pain    No events overnight  Pt examined at bedside. Chart & results reviewed.   Patient is eating ok, sleeping ok, normal bowel/ bladder movements.  Wheelchair-bound at baseline  Patient is hemodynamically stable with blood pressure 120/79, afebrile    Morning labs  CBC, BMP unremarkable  Blood culture negative so far  -Awaited MRSA result      ROS:  Constitutional:  negative for chills, fevers, sweats  Respiratory:  negative for cough, dyspnea on exertion, hemoptysis, shortness of breath, wheezing  Cardiovascular:  negative for chest pain, chest pressure/discomfort, lower extremity edema, palpitations  Gastrointestinal:  negative for abdominal pain, constipation, diarrhea, nausea, vomiting  Neurological:  negative for dizziness, headache    BRIEF HISTORY     The patient is a pleasant 69 y.o. female with a PMHx significant for      Hypertension  COPD  Hypothyroidism   Hyperlipidemia  Right MCA stroke, with left-sided plegia, hemineglect      presents with a chief complaint of left leg swelling and pain.  According to the patient left leg swelling started a month ago which is associated with pain and erythema, progressive in nature.  As patient had progressive edema of left legs he was seen by her PCP's office and was treated with doxycycline and Bactrim for presumed cellulitis of left lower extremity.  However her symptoms continues to be progressive and worsening despite oral antibiotic.     Patient denies fever,

## 2025-04-24 ENCOUNTER — TELEPHONE (OUTPATIENT)
Dept: FAMILY MEDICINE CLINIC | Age: 70
End: 2025-04-24

## 2025-04-24 NOTE — TELEPHONE ENCOUNTER
Care Transitions Initial Follow Up Call    Outreach made within 2 business days of discharge: Yes    Patient: Rosa Cook Patient : 1955   MRN: [id number]  Reason for Admission: Cellulitis of left lower extremity   Discharge Date: 25       Spoke with: AMBERLY for patient to call the office    Discharge department/facility: [facility] Interactive Patient Contact:  Was patient able to fill all prescriptions:   Was patient instructed to bring all medications to the follow-up visit:   Is patient taking all medications as directed in the discharge summary?   Does patient understand their discharge instructions:   Does patient have questions or concerns that need addressed prior to 7-14 day follow up office visit:     Additional needs identified to be addressed with provider               Scheduled appointment with PCP within 7-14 days    Follow Up  Future Appointments   Date Time Provider Department Center   2025 11:45 AM Mariam Marquez, APRN - CNP [facility]   2025  1:00 PM Tara Hahn MD [facility]       Pat Morris MA

## 2025-04-25 NOTE — PROGRESS NOTES
Physician Progress Note      PATIENT:               CATALINA PATTERSON  [facility] #:                  [id number]  :                       1955  ADMIT DATE:       2025 11:28 AM  DISCH DATE:        2025 5:47 PM  RESPONDING  PROVIDER #:        CHENCHO BEAN          QUERY TEXT:    Please document the relationship, if any, between the cellulitis and diabetes:    The clinical indicators include:  per ED Note; past medical history of Asthma, COPD, HTN, Diabetes mellitus , On   examination she has 2+ pitting edema, erythema, warm to touch, calf   tenderness.  Per H/P  and IM  Note; \"Cellulitis of left lower extremity.\" \"Failure   of outpatient antibiotic therapy (doxycycline and Bactrim)\"  \"-Patient is   hemiplegic on left side secondary to right MCA infarct in the   past-Wheelchair-bound\"  \"  CT scan of left lower extremity negative for osteomyelitis and fracture   however concerning for cellulitis.  No signs and symptoms of abscess or soft   tissue gas\"  on arrival ;RR 18, HR 94, Temp 97.7,  >132>129, CRP 21.9, SED RATE 70  IV vancomycin and cefepime, Rocephin 1x,  Options provided:  -- left lower extremity cellulitis related to Diabetes  -- left lower extremity cellulitis unrelated to Diabetes  -- Other - I will add my own diagnosis  -- Disagree - Not applicable / Not valid  -- Disagree - Clinically unable to determine / Unknown  -- Refer to Clinical Documentation Reviewer    PROVIDER RESPONSE TEXT:    left lower extremity cellulitis unrelated to Diabetes.    Query created by: Radha Siegel on 2025 12:02 PM      Electronically signed by:  CHENCHO BEAN 2025 9:25 PM

## 2025-04-27 LAB
MICROORGANISM SPEC CULT: NORMAL
MICROORGANISM SPEC CULT: NORMAL
SERVICE CMNT-IMP: NORMAL
SERVICE CMNT-IMP: NORMAL
SPECIMEN DESCRIPTION: NORMAL
SPECIMEN DESCRIPTION: NORMAL

## 2025-04-28 ENCOUNTER — OFFICE VISIT (OUTPATIENT)
Dept: FAMILY MEDICINE CLINIC | Age: 70
End: 2025-04-28
Payer: MEDICARE

## 2025-04-28 VITALS — HEART RATE: 93 BPM | SYSTOLIC BLOOD PRESSURE: 134 MMHG | OXYGEN SATURATION: 98 % | DIASTOLIC BLOOD PRESSURE: 80 MMHG

## 2025-04-28 DIAGNOSIS — R73.03 PRE-DIABETES: ICD-10-CM

## 2025-04-28 DIAGNOSIS — J44.9 CHRONIC OBSTRUCTIVE PULMONARY DISEASE, UNSPECIFIED COPD TYPE (HCC): ICD-10-CM

## 2025-04-28 DIAGNOSIS — F33.1 MAJOR DEPRESSIVE DISORDER, RECURRENT, MODERATE (HCC): ICD-10-CM

## 2025-04-28 DIAGNOSIS — E78.2 MIXED HYPERLIPIDEMIA: Primary | ICD-10-CM

## 2025-04-28 PROCEDURE — G8400 PT W/DXA NO RESULTS DOC: HCPCS | Performed by: NURSE PRACTITIONER

## 2025-04-28 PROCEDURE — 3075F SYST BP GE 130 - 139MM HG: CPT | Performed by: NURSE PRACTITIONER

## 2025-04-28 PROCEDURE — 1090F PRES/ABSN URINE INCON ASSESS: CPT | Performed by: NURSE PRACTITIONER

## 2025-04-28 PROCEDURE — 1123F ACP DISCUSS/DSCN MKR DOCD: CPT | Performed by: NURSE PRACTITIONER

## 2025-04-28 PROCEDURE — 3017F COLORECTAL CA SCREEN DOC REV: CPT | Performed by: NURSE PRACTITIONER

## 2025-04-28 PROCEDURE — 3079F DIAST BP 80-89 MM HG: CPT | Performed by: NURSE PRACTITIONER

## 2025-04-28 PROCEDURE — G8417 CALC BMI ABV UP PARAM F/U: HCPCS | Performed by: NURSE PRACTITIONER

## 2025-04-28 PROCEDURE — 99214 OFFICE O/P EST MOD 30 MIN: CPT | Performed by: NURSE PRACTITIONER

## 2025-04-28 PROCEDURE — 4004F PT TOBACCO SCREEN RCVD TLK: CPT | Performed by: NURSE PRACTITIONER

## 2025-04-28 PROCEDURE — 3023F SPIROM DOC REV: CPT | Performed by: NURSE PRACTITIONER

## 2025-04-28 PROCEDURE — 1159F MED LIST DOCD IN RCRD: CPT | Performed by: NURSE PRACTITIONER

## 2025-04-28 PROCEDURE — G8427 DOCREV CUR MEDS BY ELIG CLIN: HCPCS | Performed by: NURSE PRACTITIONER

## 2025-04-28 PROCEDURE — 1111F DSCHRG MED/CURRENT MED MERGE: CPT | Performed by: NURSE PRACTITIONER

## 2025-04-28 RX ORDER — ALBUTEROL SULFATE 0.83 MG/ML
2.5 SOLUTION RESPIRATORY (INHALATION) EVERY 6 HOURS PRN
Qty: 120 EACH | Refills: 5 | Status: SHIPPED | OUTPATIENT
Start: 2025-04-28

## 2025-04-28 ASSESSMENT — ENCOUNTER SYMPTOMS
SHORTNESS OF BREATH: 0
COUGH: 0

## 2025-04-28 NOTE — PROGRESS NOTES
[address] [facility] [address]   4/28/2025    Rosa Cook is a 69 y.o. female who presents today for her medical conditions and/or complaints as noted below.    Rosa Cook is scheduled today for Hypertension and 1 Month Follow-Up  .  HPI:     History of Present Illness        The patient is a 69-year-old female here for a 1-month follow-up for blood pressure. Her blood pressure is controlled today.    She has been experiencing cellulitis in her left leg for slightly over a month, which has necessitated two hospital admissions and consultations with Dr. Kessler. Despite receiving 2 to 3 courses of IV and oral therapy, the condition persists with minimal improvement. The leg remains red and swollen, extending up to the knee, and is associated with mild pain upon palpation. The other leg is also puffy. She really doesn't attempt to elevate the leg during the day when possible. She has not consulted a vascular specialist. An ultrasound was performed to rule out deep vein thrombosis (DVT), which returned negative results. She was discharged on 04/23/2025 with oral Zyvox, which she continues to take. Post-discharge, she was prescribed doxycycline and Bactrim, but the specific IV medications administered during her hospital stay are unknown to her.    She has discontinued Norvasc due to an inability to refill the prescription. Attempts have not been made to contact the pharmacy for a refill.    She has been unable to afford her long-term inhalers. Approximately 2 weeks' worth of Breo Ellipta remains at home, which is used as needed, typically before leaving the house. It is not used daily.  The inhaler was last used this morning- states she generally uses it before leaving the house. She has not left the house since her last ER visit, during which breathing treatments were received. Smoking continues at a rate of half a pack of cigarettes daily. Humid weather exacerbates her

## 2025-05-01 ENCOUNTER — CARE COORDINATION (OUTPATIENT)
Dept: CARE COORDINATION | Age: 70
End: 2025-05-01

## 2025-05-02 ENCOUNTER — TELEPHONE (OUTPATIENT)
Dept: FAMILY MEDICINE CLINIC | Age: 70
End: 2025-05-02

## 2025-05-02 NOTE — CARE COORDINATION
Ambulatory Care Coordination Note     2025 11:45 AM     Patient Current Location:  Home: [address] [place] [address]     This patient was received as a referral from Provider.    ACM contacted the patient by telephone. Verified name and  with patient as identifiers. Provided introduction to self, and explanation of the ACM role.   Patient accepted care management services at this time.          ACM: Karly Garnett RN     Challenges to be reviewed by the provider   Additional needs identified to be addressed with provider No  none               Method of communication with provider: none.    Utilization: Initial Call - N/A    Care Summary Note: Spoke to Rosa. Introduced myself and reason for call. She was agreeable to talk.   Rosa lives alone. She had a stroke about 4 years ago. She was driving up until then. She uses a wheelchair to get around the house, although her hallway to the bathroom is narrow and she has a hard time maneuvering it around the corner and into the bathroom. She wanted to find out about getting a smaller wheelchair. Explained that if she has had this one less than 5 years. It's likely her insurance will not cover another one yet. [facility]. She said she has already called them. She has a 18 inch seat and she tried to get a 16 inch but they don't have any available right now. Asked if they were going to keep her name on a list when one becomes available. She was not sure. Asked about a ramp. She said she does have a ramp at the back door, but would like to get one at the front door also.   She wants meals on wheels also. She said she called the [facility] about it, a month ago, but hasn't followed up.   Asked if she thinks she would be eligible to apply for Medicaid and get waiver services. She said she didn't think so because she owns her house.   She has a friend that comes over to help her with her showers; and takes her to her appointments.

## 2025-05-02 NOTE — TELEPHONE ENCOUNTER
Patient states the Linezolid is giving her bad diarrhea. Can you send something to help it? Ari gomez please.

## 2025-05-05 ENCOUNTER — HOSPITAL ENCOUNTER (OUTPATIENT)
Age: 70
Setting detail: SPECIMEN
Discharge: HOME | End: 2025-05-05

## 2025-05-05 DIAGNOSIS — R73.03 PRE-DIABETES: ICD-10-CM

## 2025-05-05 DIAGNOSIS — E78.2 MIXED HYPERLIPIDEMIA: ICD-10-CM

## 2025-05-05 LAB
CHOLEST SERPL-MCNC: 242 MG/DL (ref 0–199)
CHOLESTEROL/HDL RATIO: 4.8
EST. AVERAGE GLUCOSE BLD GHB EST-MCNC: 143 MG/DL
HBA1C MFR BLD: 6.6 % (ref 4–6)
HDLC SERPL-MCNC: 50 MG/DL
LDLC SERPL CALC-MCNC: 144 MG/DL (ref 0–100)
TRIGL SERPL-MCNC: 240 MG/DL (ref 0–149)
VLDLC SERPL CALC-MCNC: 48 MG/DL (ref 1–30)

## 2025-05-06 DIAGNOSIS — R19.7 DIARRHEA OF PRESUMED INFECTIOUS ORIGIN: Primary | ICD-10-CM

## 2025-05-06 NOTE — PLAN OF CARE
Transferring to room 222 when room is ready. Report called to Sarwat SALCIDO. Daughter notified of room change, she will be in later this afternoon. Belongings sent.  Virginie Forbes RN     RN  Outcome: Ongoing     Problem: Nutrition  Goal: Optimal nutrition therapy  6/6/2021 1542 by Louise Hung RN  Outcome: Ongoing  6/6/2021 1542 by Louise Hung RN  Outcome: Ongoing  6/6/2021 0414 by Viral Rockwell RN  Outcome: Ongoing

## 2025-05-07 ENCOUNTER — CARE COORDINATION (OUTPATIENT)
Dept: CARE COORDINATION | Age: 70
End: 2025-05-07

## 2025-05-07 NOTE — CARE COORDINATION
Initial Contact Social Work Note - Ambulatory  5/7/2025      Date of referral: 5/5/2025  Referral received from: KATHY Edmondson   Reason for referral: Senior meals, Transportation, and possible Assisted Living    Previous SW referral: No  If yes, brief summary of outcome: n/a    Two Identifiers Verified: Yes    Insurance Provider: Medicare    Support System:  Adult Child/Children     Status:  n/a    Community Providers:  None    ADL Assistance Needed: Bathing    Housing/Living Concerns or Home Modification Needs: Needs a smaller wheelchair due to the width does not fit in the hallway.     Transportation Concern: yes    Medication Cost Concern: yes     Medication Adherence Concern: no    Financial Concern(s): yes    Income (only if applicable): ss    Ability to Read/Write: Yes    Advance Care Plan:  N/A    Other: Transportation, Senior Meals, Smaller wheelchair, and possible AL.       called and spoke to Rosa.  Rosa reports that she is doing \"ok\".      dicussed [employer] service.  Rosa noted that she does not qualify for Medicaid.  Rosa noted that a friend of a friend is currently helping her with showers.    Rosa noted that she has her groceries delivered to her house.       discussed the different Senior Meal Plans.  Rosa noted it would be too expensive to pay per meal for mobile meals or [employer].     Rosa was agreeable for  to send [employer] an application.     and Rosa completed Senior Transportation application.     Method of Communication With Provider (if appropriate): N/a      Plan:    sent referral to [employer].   Senior transportation application completed.    Follow up in 7-10 days.

## 2025-05-08 ENCOUNTER — HOSPITAL ENCOUNTER (OUTPATIENT)
Age: 70
Setting detail: SPECIMEN
Discharge: HOME | End: 2025-05-08

## 2025-05-08 DIAGNOSIS — R19.7 DIARRHEA OF PRESUMED INFECTIOUS ORIGIN: ICD-10-CM

## 2025-05-09 ENCOUNTER — CARE COORDINATION (OUTPATIENT)
Dept: CARE COORDINATION | Age: 70
End: 2025-05-09

## 2025-05-09 ENCOUNTER — RESULTS FOLLOW-UP (OUTPATIENT)
Dept: FAMILY MEDICINE CLINIC | Age: 70
End: 2025-05-09

## 2025-05-09 LAB
C DIFF GDH + TOXINS A+B STL QL IA.RAPID: NEGATIVE
SPECIMEN DESCRIPTION: NORMAL

## 2025-05-09 RX ORDER — ALUMINUM ZIRCONIUM OCTACHLOROHYDREX GLY 16 G/100G
10 GEL TOPICAL DAILY
Qty: 30 CAPSULE | Refills: 0 | Status: SHIPPED | OUTPATIENT
Start: 2025-05-09

## 2025-05-12 ENCOUNTER — CARE COORDINATION (OUTPATIENT)
Dept: CARE COORDINATION | Age: 70
End: 2025-05-12

## 2025-05-13 NOTE — CARE COORDINATION
Ambulatory Care Coordination Note     2025 2:50 PM     Patient Current Location:  Home: [address] [place] [id number]     ACM contacted the patient by telephone. Verified name and  with patient as identifiers.         ACM: Karly Garnett RN     Challenges to be reviewed by the provider   Additional needs identified to be addressed with provider No  none               Method of communication with provider: none.    Utilization: Patient has not had any utilization since our last call.     Care Summary Note: Rosa states she is doing ok. She did not get the probiotic yet. Informed her that [employer] delivers medication, but they have a fee of $5.00. She said she would ask her friend to pick it up. She is getting a couple of her meds through the [facility] pharmacy. Reviewed patient assistance program for Breo. She would have to pay $600 out of pocket on her medications before she is eligible. She would never reach that amount by the end of the year. Looked at alternative inhalers similar to Breo. Only one has a PAP, and it's the same program as the Breo.   She is working, with , on getting meals delivered and transportation. Still trying to get a smaller wheelchair. Will check with [facility] again, and make sure she is on their list to be notified when one becomes available.   She said her left leg is still swollen. Still slightly red. She is taking the antibiotic once per day, instead of twice, so she doesn't have anymore diarrhea. Explained that the antibiotic will likely not be effective if it can't keep a constant level of medication in her system to fight off the infection. She said if she starts taking 2, she will get the diarrhea back. Encouraged her to get some yogurt, and the probiotic, to help prevent the diarrhea.   Discussed assisted living. Asked what she would do with the house, if she went to assisted living. She stated that she would probably sell it. Explained that

## 2025-05-19 ENCOUNTER — CARE COORDINATION (OUTPATIENT)
Dept: CARE COORDINATION | Age: 70
End: 2025-05-19

## 2025-05-19 ASSESSMENT — ENCOUNTER SYMPTOMS: DYSPNEA ASSOCIATED WITH: EXERTION

## 2025-05-19 NOTE — CARE COORDINATION
called and spoke to Rosa.  Rosa reports that she received her first meal from [employer] today.  Rosa appreciative.    Rosa denied having time to call [facility] to follow up if they have a smaller wheelchair.      Rosa is interested in a Life Alert button.    noted that she can call insurance to see if they will cover the cost of the device however Rosa will have a month fee cost.  Rosa noted that she can afford a small monthly fee.    Plan:   will contact insurance to see if they will cover cost of Life Alert.   will get back with Rosa in 2 weeks.

## 2025-05-19 NOTE — CARE COORDINATION
Ambulatory Care Coordination Note     2025 4:25 PM     Patient Current Location:  Home: [address] [place] [address]     ACM contacted the patient by telephone. Verified name and  with patient as identifiers.         ACM: Karly Garnett RN     Challenges to be reviewed by the provider   Additional needs identified to be addressed with provider Yes  Rosa states the redness has gone away in her left leg, but she still has a lot of swelling in her left foot/toes. She said it looks like a \"cartoon\" foot. She denies any open areas or signs of infection. No discoloration of toes, but toes are cool.                Method of communication with provider: chart routing.    Utilization: Patient has not had any utilization since our last call.     Care Summary Note: Rosa states she is doing ok. She said the cellulitis is improving in her left leg. She said the redness is gone, but her left foot is so swollen it looks like a \"cartoon\" foot. She said her toes are swollen. No discoloration. No open areas. Does state the toes are cooler.   She continues to work with RADHA Baldwin on resources for home.   She denies any other needs today.     Offered patient enrollment in the Remote Patient Monitoring (RPM) program for in-home monitoring: Patient is not eligible for RPM program because: patient does not have qualifying diagnosis.     Assessments Completed:   COPD Assessment    Does the patient understand envrionmental exposure?: Yes  Is the patient able to verbalize Rescue vs. Long Acting medications?: Yes  Does the patient have a nebulizer?: Yes  Does the patient use a space with inhaled medications?: No     No patient-reported symptoms         Symptoms:  None: Yes      Symptom course: stable  Breathlessness: exertion  Increase use of rapid acting/rescue inhaled medications?: No  Change in chronic cough?: No/At Baseline  Change in sputum?: No/At Baseline  Self Monitoring - SaO2: No  Have you had a recent

## 2025-05-20 ENCOUNTER — RESULTS FOLLOW-UP (OUTPATIENT)
Dept: FAMILY MEDICINE CLINIC | Age: 70
End: 2025-05-20

## 2025-05-20 NOTE — RESULT ENCOUNTER NOTE
Pre diabetes has worsened, needs to make some improvements to diet or we will need to add on some medications. Is she still taking her lipitor?

## 2025-05-20 NOTE — TELEPHONE ENCOUNTER
That sounds like the swelling is worse, is she willing to see a vascular specialist or seeing the edema clinic?

## 2025-05-21 NOTE — TELEPHONE ENCOUNTER
Pt stated her legs are still swollen just not as bad, not as red, but toes are very swollen,     Is okay with seeing vascular

## 2025-05-28 ENCOUNTER — TELEPHONE (OUTPATIENT)
Dept: FAMILY MEDICINE CLINIC | Age: 70
End: 2025-05-28

## 2025-05-28 NOTE — RESULT ENCOUNTER NOTE
I am unsure why she stopped taking her cholesterol medication, this puts her at a greater risk of having another cardiovascular event.

## 2025-05-28 NOTE — TELEPHONE ENCOUNTER
Called pt in regards to results, while on the call, pt asked if she could get another refill on the antibiotic for the cellulitis, stated her leg swelling has went down, but the LT foot is still swollen

## 2025-06-02 ENCOUNTER — CARE COORDINATION (OUTPATIENT)
Dept: CARE COORDINATION | Age: 70
End: 2025-06-02

## 2025-06-02 ASSESSMENT — ENCOUNTER SYMPTOMS: DYSPNEA ASSOCIATED WITH: EXERTION

## 2025-06-02 NOTE — CARE COORDINATION
COPD Zone, taught by Karly Garnett RN at 6/2/2025  5:40 PM.  Learner: Patient  Readiness: Acceptance  Method: Explanation, Teachback  Response: Verbalizes Understanding    Educate chronic obstructive pulmonary disease exacerbation signs and symptoms, taught by Karly Garnett RN at 6/2/2025  5:40 PM.  Learner: Patient  Readiness: Acceptance  Method: Explanation, Teachback  Response: Verbalizes Understanding    Education Comments  No comments found.     ,    Goals Addressed                   This Visit's Progress     Medication Management   On track     I will take my medication as directed.  I will notify my provider of any problems with medications, like adverse effects or side effects.  I will notify my provider/Care Coordinator if I am unable to afford my medications.  I will notify my provider for advice before I stop taking any of my medication.    Barriers: financial and lack of education  Plan for overcoming my barriers: Care Management   Confidence: 8/10  Anticipated Goal Completion Date: 7/31/2025               PCP/Specialist follow up:   Future Appointments         Provider Specialty Dept Phone    6/18/2025 1:00 PM Mariam Marquez APRN  CNP Family Medicine [phone]    6/24/2025 1:00 PM Tara Hahn MD Physical Medicine and Rehab [phone]    7/28/2025 1:00 PM Mariam Marquez APRN  CNP Family Medicine [phone]            Follow Up:   Plan for next AC outreach in approximately 1 week to complete:  - disease specific assessments  - advance care planning   - goal progression.   Patient  is agreeable to this plan.

## 2025-06-03 RX ORDER — ALBUTEROL SULFATE 90 UG/1
INHALANT RESPIRATORY (INHALATION)
Qty: 54 G | Refills: 2 | OUTPATIENT
Start: 2025-06-03

## 2025-06-10 RX ORDER — ALBUTEROL SULFATE 90 UG/1
INHALANT RESPIRATORY (INHALATION)
Qty: 54 G | Refills: 2 | Status: SHIPPED | OUTPATIENT
Start: 2025-06-10

## 2025-06-10 NOTE — TELEPHONE ENCOUNTER
Encounters:   04/28/25 134/80   04/23/25 134/68   03/31/25 (!) 158/100          (goal 120/80)    All Future Testing planned in [facility]  Lab Frequency Next Occurrence               Patient Active Problem List:     Pneumonia of left lower lobe due to infectious organism     Tobacco abuse     Legionella pneumonia (HCC)     Dysphagia     Hyperplastic colon polyp     Esophageal ring     Gastritis     Elbow effusion, right     Nihss score 15     Acute left hemiparesis (HCC)     Cerebrovascular accident (CVA) due to occlusion of right middle cerebral artery (HCC)     COPD (chronic obstructive pulmonary disease) (HCC)     Essential hypertension     Mixed hyperlipidemia     Pre-diabetes     Left hemiplegia (HCC)     History of right MCA stroke     Hypertension, unspecified type     Hypertensive urgency     Lightheadedness     Major depressive disorder, recurrent, mild     Major depressive disorder, recurrent, moderate     Major depressive disorder, recurrent, unspecified     Cellulitis of left lower extremity     Acquired hypothyroidism

## 2025-06-16 ENCOUNTER — CARE COORDINATION (OUTPATIENT)
Dept: CARE COORDINATION | Age: 70
End: 2025-06-16

## 2025-06-16 NOTE — CARE COORDINATION
attempted to contact Rosa.   left a message with name and number.   requested a call back to [phone].    Plan:    will attempt to follow up with Rosa in 10 days.

## 2025-06-16 NOTE — CARE COORDINATION
Per [facility] request, Attempted to reach patient for continued Care Coordination follow up and education.  Patient was unavailable at the time of my call, and a generic voicemail message was left asking patient to return my call at [phone].

## 2025-06-20 ENCOUNTER — TELEPHONE (OUTPATIENT)
Dept: PHYSICAL MEDICINE AND REHAB | Age: 70
End: 2025-06-20

## 2025-06-23 ENCOUNTER — CARE COORDINATION (OUTPATIENT)
Dept: CARE COORDINATION | Age: 70
End: 2025-06-23

## 2025-06-23 NOTE — CARE COORDINATION
Incoming call from patient returning my 6.16 outreach call. I spoke with the patient for continued Care Coordination follow up and patient states she is doing good as long as she currently stays indoors due to the heat advisory outside.  Patient states she has had to use her inhaler more frequently than usual during this heat wave.  She has air conditioning and is staying indoors. Reviewed upcoming Vascular appointment for 6.26 with the patient and she verbally understands.  I advised patient to contact PCP office if needed.  No further needs at this time.   Will advise ACM   CC f/u 2 weeks

## 2025-06-25 ENCOUNTER — TELEPHONE (OUTPATIENT)
Dept: VASCULAR SURGERY | Age: 70
End: 2025-06-25

## 2025-06-25 ENCOUNTER — CARE COORDINATION (OUTPATIENT)
Dept: CARE COORDINATION | Age: 70
End: 2025-06-25

## 2025-06-25 NOTE — TELEPHONE ENCOUNTER
LVM for patient in regards to appointment on 6/26 provider will be out of the office so this needs to be rescheduled

## 2025-07-03 ENCOUNTER — TELEPHONE (OUTPATIENT)
Dept: FAMILY MEDICINE CLINIC | Age: 70
End: 2025-07-03

## 2025-07-03 RX ORDER — ONDANSETRON 4 MG/1
4 TABLET, FILM COATED ORAL DAILY PRN
Qty: 30 TABLET | Refills: 0 | Status: CANCELLED | OUTPATIENT
Start: 2025-07-03

## 2025-07-03 NOTE — TELEPHONE ENCOUNTER
Rosa is calling to request a script for Zofran due to having nausea for the past 3-4 days.  She states is not getting better    LOV  was  04/28/25

## 2025-07-03 NOTE — TELEPHONE ENCOUNTER
Has not been seen in the office for this condition in the last 30 days. Would advise E visit with on call provider or walk in clinic visit for evaluation.

## 2025-07-07 ENCOUNTER — HOSPITAL ENCOUNTER (EMERGENCY)
Age: 70
Discharge: HOME | End: 2025-07-07
Attending: STUDENT IN AN ORGANIZED HEALTH CARE EDUCATION/TRAINING PROGRAM
Payer: MEDICARE

## 2025-07-07 ENCOUNTER — APPOINTMENT (OUTPATIENT)
Dept: MRI IMAGING | Age: 70
End: 2025-07-07
Payer: MEDICARE

## 2025-07-07 ENCOUNTER — TELEPHONE (OUTPATIENT)
Dept: PHYSICAL MEDICINE AND REHAB | Age: 70
End: 2025-07-07

## 2025-07-07 ENCOUNTER — APPOINTMENT (OUTPATIENT)
Dept: CT IMAGING | Age: 70
End: 2025-07-07
Payer: MEDICARE

## 2025-07-07 VITALS
SYSTOLIC BLOOD PRESSURE: 163 MMHG | BODY MASS INDEX: 33.81 KG/M2 | TEMPERATURE: 97.9 F | WEIGHT: 215.39 LBS | HEART RATE: 70 BPM | RESPIRATION RATE: 18 BRPM | DIASTOLIC BLOOD PRESSURE: 85 MMHG | OXYGEN SATURATION: 98 % | HEIGHT: 67 IN

## 2025-07-07 DIAGNOSIS — R42 DIZZINESS: Primary | ICD-10-CM

## 2025-07-07 DIAGNOSIS — H53.8 BLURRY VISION: ICD-10-CM

## 2025-07-07 LAB
ALBUMIN SERPL-MCNC: 3.8 G/DL (ref 3.5–5.2)
ALBUMIN/GLOB SERPL: 1.1 {RATIO} (ref 1–2.5)
ALP SERPL-CCNC: 112 U/L (ref 35–104)
ALT SERPL-CCNC: 15 U/L (ref 10–35)
ANION GAP SERPL CALCULATED.3IONS-SCNC: 11 MMOL/L (ref 9–16)
AST SERPL-CCNC: 19 U/L (ref 10–35)
BASOPHILS # BLD: 0.03 K/UL (ref 0–0.2)
BASOPHILS NFR BLD: 0 % (ref 0–2)
BILIRUB SERPL-MCNC: 0.4 MG/DL (ref 0–1.2)
BNP SERPL-MCNC: 205 PG/ML (ref 0–125)
BUN BLD-MCNC: 13 MG/DL (ref 8–26)
BUN SERPL-MCNC: 12 MG/DL (ref 8–23)
CA-I BLD-SCNC: 1.19 MMOL/L (ref 1.15–1.33)
CALCIUM SERPL-MCNC: 9.1 MG/DL (ref 8.6–10.4)
CHLORIDE BLD-SCNC: 105 MMOL/L (ref 98–107)
CHLORIDE SERPL-SCNC: 104 MMOL/L (ref 98–107)
CO2 BLD CALC-SCNC: 27 MMOL/L (ref 22–30)
CO2 SERPL-SCNC: 25 MMOL/L (ref 20–31)
CREAT SERPL-MCNC: 0.8 MG/DL (ref 0.6–0.9)
EGFR, POC: 81 ML/MIN/1.73M2
EOSINOPHIL # BLD: 0.25 K/UL (ref 0–0.44)
EOSINOPHILS RELATIVE PERCENT: 3 % (ref 1–4)
ERYTHROCYTE [DISTWIDTH] IN BLOOD BY AUTOMATED COUNT: 14.3 % (ref 11.8–14.4)
GFR, ESTIMATED: 81 ML/MIN/1.73M2
GLUCOSE BLD-MCNC: 107 MG/DL (ref 74–100)
GLUCOSE SERPL-MCNC: 101 MG/DL (ref 74–99)
HCO3 VENOUS: 28.3 MMOL/L (ref 22–29)
HCT VFR BLD AUTO: 40.2 % (ref 36.3–47.1)
HCT VFR BLD AUTO: 42 % (ref 36–46)
HGB BLD-MCNC: 12.6 G/DL (ref 11.9–15.1)
IMM GRANULOCYTES # BLD AUTO: 0.04 K/UL (ref 0–0.3)
IMM GRANULOCYTES NFR BLD: 1 %
LIPASE SERPL-CCNC: 16 U/L (ref 13–60)
LYMPHOCYTES NFR BLD: 1.75 K/UL (ref 1.1–3.7)
LYMPHOCYTES RELATIVE PERCENT: 22 % (ref 24–43)
MCH RBC QN AUTO: 28.3 PG (ref 25.2–33.5)
MCHC RBC AUTO-ENTMCNC: 31.3 G/DL (ref 28.4–34.8)
MCV RBC AUTO: 90.3 FL (ref 82.6–102.9)
MONOCYTES NFR BLD: 0.43 K/UL (ref 0.1–1.2)
MONOCYTES NFR BLD: 6 % (ref 3–12)
NEUTROPHILS NFR BLD: 68 % (ref 36–65)
NEUTS SEG NFR BLD: 5.35 K/UL (ref 1.5–8.1)
NRBC BLD-RTO: 0 PER 100 WBC
O2 SAT, VEN: 27.2 % (ref 60–85)
PCO2 VENOUS: 48.9 MM HG (ref 41–51)
PH VENOUS: 7.37 (ref 7.32–7.43)
PLATELET # BLD AUTO: 367 K/UL (ref 138–453)
PMV BLD AUTO: 8.8 FL (ref 8.1–13.5)
PO2 VENOUS: 19 MM HG (ref 30–50)
POC ANION GAP: 10 MMOL/L (ref 7–16)
POC CREATININE: 0.8 MG/DL (ref 0.51–1.19)
POC HEMOGLOBIN (CALC): 14.2 G/DL (ref 12–16)
POC LACTIC ACID: 0.8 MMOL/L (ref 0.56–1.39)
POSITIVE BASE EXCESS, VEN: 2.1 MMOL/L (ref 0–3)
POTASSIUM BLD-SCNC: 3.3 MMOL/L (ref 3.5–4.5)
POTASSIUM SERPL-SCNC: 3.6 MMOL/L (ref 3.7–5.3)
PROT SERPL-MCNC: 7.2 G/DL (ref 6.6–8.7)
RBC # BLD AUTO: 4.45 M/UL (ref 3.95–5.11)
SODIUM BLD-SCNC: 141 MMOL/L (ref 138–146)
SODIUM SERPL-SCNC: 140 MMOL/L (ref 136–145)
TROPONIN I SERPL HS-MCNC: 16 NG/L (ref 0–14)
TROPONIN I SERPL HS-MCNC: 20 NG/L (ref 0–14)
TSH SERPL DL<=0.05 MIU/L-ACNC: 2.31 UIU/ML (ref 0.27–4.2)
WBC OTHER # BLD: 7.9 K/UL (ref 3.5–11.3)

## 2025-07-07 PROCEDURE — 83880 ASSAY OF NATRIURETIC PEPTIDE: CPT

## 2025-07-07 PROCEDURE — 84520 ASSAY OF UREA NITROGEN: CPT

## 2025-07-07 PROCEDURE — 82803 BLOOD GASES ANY COMBINATION: CPT

## 2025-07-07 PROCEDURE — 82330 ASSAY OF CALCIUM: CPT

## 2025-07-07 PROCEDURE — 82947 ASSAY GLUCOSE BLOOD QUANT: CPT

## 2025-07-07 PROCEDURE — 93005 ELECTROCARDIOGRAM TRACING: CPT

## 2025-07-07 PROCEDURE — 70498 CT ANGIOGRAPHY NECK: CPT

## 2025-07-07 PROCEDURE — 84484 ASSAY OF TROPONIN QUANT: CPT

## 2025-07-07 PROCEDURE — 83690 ASSAY OF LIPASE: CPT

## 2025-07-07 PROCEDURE — 84443 ASSAY THYROID STIM HORMONE: CPT

## 2025-07-07 PROCEDURE — 70450 CT HEAD/BRAIN W/O DYE: CPT

## 2025-07-07 PROCEDURE — 70551 MRI BRAIN STEM W/O DYE: CPT

## 2025-07-07 PROCEDURE — 82565 ASSAY OF CREATININE: CPT

## 2025-07-07 PROCEDURE — 83605 ASSAY OF LACTIC ACID: CPT

## 2025-07-07 PROCEDURE — 6360000004 HC RX CONTRAST MEDICATION

## 2025-07-07 PROCEDURE — 85025 COMPLETE CBC W/AUTO DIFF WBC: CPT

## 2025-07-07 PROCEDURE — 80053 COMPREHEN METABOLIC PANEL: CPT

## 2025-07-07 PROCEDURE — 80051 ELECTROLYTE PANEL: CPT

## 2025-07-07 PROCEDURE — 85014 HEMATOCRIT: CPT

## 2025-07-07 PROCEDURE — 99285 EMERGENCY DEPT VISIT HI MDM: CPT | Performed by: EMERGENCY MEDICINE

## 2025-07-07 RX ORDER — IOPAMIDOL 755 MG/ML
75 INJECTION, SOLUTION INTRAVASCULAR
Status: COMPLETED | OUTPATIENT
Start: 2025-07-07 | End: 2025-07-07

## 2025-07-07 RX ADMIN — IOPAMIDOL 75 ML: 755 INJECTION, SOLUTION INTRAVENOUS at 18:49

## 2025-07-07 ASSESSMENT — VISUAL ACUITY
OS: 20/32
OD: 20/25
OU: 20/20

## 2025-07-07 ASSESSMENT — ENCOUNTER SYMPTOMS: RESPIRATORY NEGATIVE: 1

## 2025-07-07 NOTE — ED NOTES
Pt to ED with complaints of blurred vision, dizziness and nausea for x5 days. Pt has a history of stroke 4 years ago. Pt has left sided deficits at baseline. Pt states that she was vomiting last Wednesday and Thursday. Pt is not complaining of any pain at this time. Pt denies any falls. VSS, NAD, AOx4. Patient resting in bed, respirations even and unlabored. Call light in reach.

## 2025-07-07 NOTE — TELEPHONE ENCOUNTER
Rosa called in complaining of nausea, vomiting, headache, dizziness, and loss of balance. Wanted to know the symptoms of having another stroke. I gave her a list of symptoms and advised her to go to the ER to be evaluated. She said she thinks she will go to [facility] to be evaluated and will find someone to drive her. I let her know if she cannot find someone to drive her to call 911 and go by ambulance. She said symptoms have been ongoing for past week. I let her know that a stroke can progress over time and is not necessarily onset all the time.     Routing to Dr Bryan since Dr Hahn is off this week. Please advise if you want to add anything to this encounter. Thank you!

## 2025-07-08 ENCOUNTER — CARE COORDINATION (OUTPATIENT)
Dept: CARE COORDINATION | Age: 70
End: 2025-07-08

## 2025-07-08 LAB
EKG ATRIAL RATE: 78 BPM
EKG P AXIS: 22 DEGREES
EKG P-R INTERVAL: 112 MS
EKG Q-T INTERVAL: 392 MS
EKG QRS DURATION: 88 MS
EKG QTC CALCULATION (BAZETT): 446 MS
EKG R AXIS: 17 DEGREES
EKG T AXIS: 59 DEGREES
EKG VENTRICULAR RATE: 78 BPM

## 2025-07-08 PROCEDURE — 93010 ELECTROCARDIOGRAM REPORT: CPT | Performed by: INTERNAL MEDICINE

## 2025-07-08 NOTE — CARE COORDINATION
Ambulatory Care Coordination Note     2025 11:12 AM     Patient Current Location:  Home: [address] [place] [address]     ACM contacted the patient by telephone. Verified name and  with patient as identifiers.     Patient graduated from the High Risk Care Management program on 2025.  Patient progressing towards self-management. .  Care management goals have been completed. No further Ambulatory Care Manager follow up scheduled.      ACM: Karly Garnett RN     Challenges to be reviewed by the provider   Additional needs identified to be addressed with provider No                 Method of communication with provider: none.    Utilization: Has the patient been seen in the ED since your last call? Yes,   Discharge Date: 25   Discharge Facility: [facility]  Reason for ED Visit: burry vision; dizziness; nausea  Visit Diagnosis: Dizziness; blurry vision     Number of ED visits in the last 6 months: 2      Do you have any ongoing symptoms? No  Did you call your PCP prior to going to the ED? Contacted her PM&R provider     Review of Discharge Instructions:   [x] AVS discharge instructions  [x] Right Care, Right Place, Right Time document  [] Medication changes  [x] Follow up appointments  [] Referral follow up   []        Care Summary Note: Rosa states she is feeling better. She denies any symptoms of dizziness, blurry vision, or nausea today. She stated that last week she was nauseated and vomiting for a few days, and then became dizzy and almost \"took a face plant\" in the bathroom sink. She states she is eating and drinking with no issues currently and staying hydrated. She denies any questions regarding the discharge instructions. She will call to schedule a follow up appt with PCP and the neurology clinic at [place]. She denies any other needs for this care manager.   She did state that her friend, who helps her with her showers, is going to be having rotator cuff

## 2025-07-08 NOTE — CONSULTS
Lymphocytes % 22 (L) 24 - 43 %    Monocytes % 6 3 - 12 %    Eosinophils % 3 1 - 4 %    Basophils % 0 0 - 2 %    Immature Granulocytes % 1 (H) 0 %    Neutrophils Absolute 5.35 1.50 - 8.10 k/uL    Lymphocytes Absolute 1.75 1.10 - 3.70 k/uL    Monocytes Absolute 0.43 0.10 - 1.20 k/uL    Eosinophils Absolute 0.25 0.00 - 0.44 k/uL    Basophils Absolute 0.03 0.00 - 0.20 k/uL    Immature Granulocytes Absolute 0.04 0.00 - 0.30 k/uL   CMP    Collection Time: 07/07/25  6:12 PM   Result Value Ref Range    Sodium 140 136 - 145 mmol/L    Potassium 3.6 (L) 3.7 - 5.3 mmol/L    Chloride 104 98 - 107 mmol/L    CO2 25 20 - 31 mmol/L    Anion Gap 11 9 - 16 mmol/L    Glucose 101 (H) 74 - 99 mg/dL    BUN 12 8 - 23 mg/dL    Creatinine 0.8 0.6 - 0.9 mg/dL    Est, Glom Filt Rate 81 >60 mL/min/1.73m2    Calcium 9.1 8.6 - 10.4 mg/dL    Total Protein 7.2 6.6 - 8.7 g/dL    Albumin 3.8 3.5 - 5.2 g/dL    Albumin/Globulin Ratio 1.1 1.0 - 2.5    Total Bilirubin 0.4 0.0 - 1.2 mg/dL    Alkaline Phosphatase 112 (H) 35 - 104 U/L    ALT 15 10 - 35 U/L    AST 19 10 - 35 U/L   Lipase    Collection Time: 07/07/25  6:12 PM   Result Value Ref Range    Lipase 16 13 - 60 U/L   TSH reflex to FT4    Collection Time: 07/07/25  6:12 PM   Result Value Ref Range    TSH 2.31 0.27 - 4.20 uIU/mL   Brain Natriuretic Peptide    Collection Time: 07/07/25  6:12 PM   Result Value Ref Range    NT Pro- (H) 0 - 125 pg/mL   Troponin    Collection Time: 07/07/25  6:12 PM   Result Value Ref Range    Troponin, High Sensitivity 20 (H) 0 - 14 ng/L   Venous Blood Gas, POC    Collection Time: 07/07/25  6:13 PM   Result Value Ref Range    pH, [place] 7.371 7.320 - 7.430    pCO2, [place] 48.9 41.0 - 51.0 mm Hg    PO2, [place] 19.0 (L) 30.0 - 50.0 mm Hg    HCO3, Venous 28.3 22.0 - 29.0 mmol/L    Positive Base Excess, [place] 2.1 0.0 - 3.0 mmol/L    O2 Sat, [place] 27.2 (L) 60.0 - 85.0 %   ELECTROLYTES PLUS    Collection Time: 07/07/25  6:13 PM   Result Value Ref Range    POC Sodium 141 138 - 146

## 2025-07-08 NOTE — ED PROVIDER NOTES
[facility] EMERGENCY DEPARTMENT  Emergency Department  Faculty Sign-Out Addendum     Note Started: 9:39 PM EDT     Care of Rosa Cook was assumed from previous attending and is being seen for Blurred Vision (X5 days), Dizziness, and Nausea  .  The patient's initial evaluation and plan have been discussed with the prior provider who initially evaluated the patient.    Handoff taken on the following patient from prior Attending Physician:    Attestation    I was available and discussed any additional care issues that arose and coordinated the management plans with the resident(s) caring for the patient during my duty period. Any areas of disagreement with resident’s documentation of care or procedures are noted on the chart. I was personally present for the key portions of any/all procedures during my duty period. I have documented in the chart those procedures where I was not present during the key portions.      EMERGENCY DEPARTMENT COURSE / MEDICAL DECISION MAKING:       MEDICATIONS GIVEN:  Orders Placed This Encounter   Medications    iopamidol (ISOVUE-370) 76 % injection 75 mL       LABS / RADIOLOGY:     Labs Reviewed   CBC WITH AUTO DIFFERENTIAL - Abnormal; Notable for the following components:       Result Value    Neutrophils % 68 (*)     Lymphocytes % 22 (*)     Immature Granulocytes % 1 (*)     All other components within normal limits   COMPREHENSIVE METABOLIC PANEL - Abnormal; Notable for the following components:    Potassium 3.6 (*)     Glucose 101 (*)     Alkaline Phosphatase 112 (*)     All other components within normal limits   BRAIN NATRIURETIC PEPTIDE - Abnormal; Notable for the following components:    NT Pro- (*)     All other components within normal limits   TROPONIN - Abnormal; Notable for the following components:    Troponin, High Sensitivity 20 (*)     All other components within normal limits   ELECTROLYTES PLUS - Abnormal; Notable for the following components: 
        [facility] EMERGENCY DEPARTMENT  Emergency Department Encounter  Emergency Medicine Resident     Pt Name:Rosa Cook  MRN: [id number]  Birthdate 1955  Date of evaluation: 7/7/25  PCP:  Mariam Marquez APRN - CNP  Note Started: 6:28 PM EDT      CHIEF COMPLAINT       Chief Complaint   Patient presents with    Blurred Vision     X5 days    Dizziness    Nausea       HISTORY OF PRESENT ILLNESS  (Location/Symptom, Timing/Onset, Context/Setting, Quality, Duration, Modifying Factors, Severity.)      Rosa Cook is a 69 y.o. female with significant pertinent history of previous stroke multiple years ago, has left-sided deficits, including left arm significant weakness as well as left leg significant weakness, minimal movement on that side, as well as contractures in the left hand at baseline who presents with blurred vision, nausea, vomiting.    Blurred vision has been going on for 5 days, the patient states that her vision just seems blurry, and on initial exam it seems as if she has some blurred vision in the left lower quadrant of her left eye.    Has no fever or chills does have nausea significant vomiting, this started 2 days ago and has persisted.  She has no shortness of breath or chest pains, review of system otherwise reassuring for no acute process.    PAST MEDICAL / SURGICAL / SOCIAL / FAMILY HISTORY      has a past medical history of Asthma, COPD (chronic obstructive pulmonary disease) (HCC), Diabetes mellitus (HCC), Esophageal ring, Gastritis, Hyperlipidemia, Hyperplastic colon polyp, Hypertension, Osteoarthritis, Patient in clinical research study, Stroke due to occlusion of right middle cerebral artery (HCC), and TIA (transient ischemic attack).       has a past surgical history that includes Appendectomy; Hysterectomy; Esophagus dilation; Colonoscopy (09/20/2017); Endoscopy, colon, diagnostic; pr egd transoral biopsy single/multiple (N/A, 9/20/2017); pr colsc flx w/rmvl of 
   [facility]     Emergency Department     Faculty Attestation    6:42 PM EDT      I performed a history and physical examination of the patient and discussed management with the resident. I reviewed the resident’s note and agree with the documented findings and plan of care. Any areas of disagreement are noted on the chart. I was personally present for the key portions of any procedures. I have documented in the chart those procedures where I was not present during the key portions. I have reviewed the emergency nurses triage note. I agree with the chief complaint, past medical history, past surgical history, allergies, medications, social and family history as documented unless otherwise noted below. Documentation of the HPI, Physical Exam and Medical Decision Making performed by medical students or scribes is based on my personal performance of the HPI, PE and MDM. For Physician Assistant/ Nurse Practitioner cases/documentation I have personally evaluated this patient and have completed at least one if not all key elements of the E/M (history, physical exam, and MDM). Additional findings are as noted.    Vital signs:   Vitals:    07/07/25 1757   BP: (!) 149/87   Pulse: 77   Resp: 18   Temp: 97.9 °F (36.6 °C)   SpO2: 96%   69-year-old female who presents to the emergency department due to 5 days of intermittent dizziness, blurry vision.  Has a history of strokes and left-sided deficits, states that the blurry vision comes and goes.  Neuroexam at baseline, visual acuity at baseline.  Given patient's history will obtain CT head, CTA head and neck as well as cardiac workup.  She denies any lightheadedness does report that she had some dizziness intermittently but does not have any at this time.  Plan for neuro consult given her history and concern for possible TIA.    Patient care signed out at end of shift            Anton Bowman M.D,  Attending Emergency  Physician           Anton Bowman 
achievable. COMPARISON: None. HISTORY: ORDERING SYSTEM PROVIDED HISTORY: 5 days blurred vision, previous stroke TECHNOLOGIST PROVIDED HISTORY: 5 days blurred vision, previous stroke Decision Support Exception - unselect if not a suspected or confirmed emergency medical condition->Emergency Medical Condition (MA); ORDERING SYSTEM PROVIDED HISTORY: pt with 5 days of blurred vision, previosu stroke TECHNOLOGIST PROVIDED HISTORY: pt with 5 days of blurred vision, previosu stroke Decision Support Exception - unselect if not a suspected or confirmed emergency medical condition->Emergency Medical Condition (MA) FINDINGS: CTA NECK: AORTIC ARCH/ARCH VESSELS: No dissection or arterial injury.  No significant stenosis of the brachiocephalic or subclavian arteries. CAROTID ARTERIES: No dissection, arterial injury, or hemodynamically significant stenosis by NASCET criteria. VERTEBRAL ARTERIES: There is attenuated flow within the distal left vertebral artery. SOFT TISSUES: The lung apices are clear.  No cervical or superior mediastinal lymphadenopathy.  The larynx and pharynx are unremarkable.  No acute abnormality of the salivary and thyroid glands. BONES: No acute osseous abnormality. CTA HEAD: ANTERIOR CIRCULATION: Carotid siphon calcifications are noted.  The ACAs and left MCA demonstrate normal arborization patterns.  There is likely chronic occlusion of the right MCA.  Bilateral posterior communicating arteries are present.  No aneurysm. POSTERIOR CIRCULATION: There is a severe stenosis of the distal V4 segment of the left vertebral artery.  Mild stenoses of the basilar artery are present. There are severe stenoses of the proximal right PCA.  Both PCAs are patent to the extent visualized.  No aneurysm. OTHER: No dural venous sinus thrombosis on this non-dedicated study. BRAIN: There is a large remote right MCA vascular territory infarct with associated encephalomalacia.  No mass, shift, or bleed is identified.  There is

## 2025-07-08 NOTE — DISCHARGE INSTRUCTIONS
You have been evaluated in the Emergency Department at [facility] 7/7/2025.    Instructions & Next Steps:  -Findings: Your MRI studies were negative for acute intracranial abnormality.  -Medications: Continue to take your other home meds/previously prescribed medications unless directed otherwise.  -Follow-up: Number was provided for outpatient neurology follow-up please call them in the next 1 week to establish care. schedule an appointment with your primary care provider (PCP) or establish care with a provider if you do not have one.  -Additional Recommendations: Stay well-hydrated keep taking all your medications as directed, and as below if included.    When to Seek Immediate Medical Attention:  Return to the Emergency Department if you experience:    - New numbness tingling or weakness, severe headache, inability tolerate p.o., vision changes  -Worsening or changing symptoms  -Inability to follow up with your PCP  -Any other urgent health concerns  -When in Doubt: If you feel worse or have new concerning symptoms, return to the ER immediately.    For questions about your care or further treatment, call the [facility] Emergency Department at [phone].

## 2025-07-11 ENCOUNTER — CARE COORDINATION (OUTPATIENT)
Dept: CARE COORDINATION | Age: 70
End: 2025-07-11

## 2025-07-11 NOTE — CARE COORDINATION
attempted to contact Rosa.   left message.   E-mailed Rosa a list of [facility] agencies per KATHY Edmondson request.     will also have a copy snail mail to Rosa.    Plan:    will follow up in 2-3 weeks.

## 2025-07-24 ENCOUNTER — CARE COORDINATION (OUTPATIENT)
Dept: CARE COORDINATION | Age: 70
End: 2025-07-24

## 2025-07-31 ENCOUNTER — CARE COORDINATION (OUTPATIENT)
Dept: CARE COORDINATION | Age: 70
End: 2025-07-31

## 2025-07-31 ENCOUNTER — SOCIAL WORK (OUTPATIENT)
Dept: CARE COORDINATION | Age: 70
End: 2025-07-31

## 2025-07-31 NOTE — CARE COORDINATION
attempted to contact Rosa.  NO answer.   has struggled to make contact with Rosa.  No answers or call backs.  Plan:    will sign off.

## 2025-08-04 ENCOUNTER — HOSPITAL ENCOUNTER (INPATIENT)
Age: 70
LOS: 8 days | Discharge: NURSING, REHAB OR LONG-TERM CARE | DRG: 871 | End: 2025-08-13
Attending: EMERGENCY MEDICINE | Admitting: INTERNAL MEDICINE
Payer: MEDICARE

## 2025-08-04 ENCOUNTER — APPOINTMENT (OUTPATIENT)
Dept: GENERAL RADIOLOGY | Age: 70
DRG: 871 | End: 2025-08-04
Payer: MEDICARE

## 2025-08-04 DIAGNOSIS — R06.02 SHORTNESS OF BREATH: ICD-10-CM

## 2025-08-04 DIAGNOSIS — R82.71 ASYMPTOMATIC BACTERIURIA: ICD-10-CM

## 2025-08-04 DIAGNOSIS — J18.9 PNEUMONIA OF RIGHT LOWER LOBE DUE TO INFECTIOUS ORGANISM: ICD-10-CM

## 2025-08-04 DIAGNOSIS — I50.9 ACUTE CONGESTIVE HEART FAILURE, UNSPECIFIED HEART FAILURE TYPE (HCC): Primary | ICD-10-CM

## 2025-08-04 PROCEDURE — 87040 BLOOD CULTURE FOR BACTERIA: CPT

## 2025-08-04 PROCEDURE — 80053 COMPREHEN METABOLIC PANEL: CPT

## 2025-08-04 PROCEDURE — 6360000002 HC RX W HCPCS

## 2025-08-04 PROCEDURE — 96367 TX/PROPH/DG ADDL SEQ IV INF: CPT

## 2025-08-04 PROCEDURE — 85379 FIBRIN DEGRADATION QUANT: CPT

## 2025-08-04 PROCEDURE — 2500000003 HC RX 250 WO HCPCS

## 2025-08-04 PROCEDURE — 99285 EMERGENCY DEPT VISIT HI MDM: CPT

## 2025-08-04 PROCEDURE — 93005 ELECTROCARDIOGRAM TRACING: CPT

## 2025-08-04 PROCEDURE — 85025 COMPLETE CBC W/AUTO DIFF WBC: CPT

## 2025-08-04 PROCEDURE — 84484 ASSAY OF TROPONIN QUANT: CPT

## 2025-08-04 PROCEDURE — 94660 CPAP INITIATION&MGMT: CPT

## 2025-08-04 PROCEDURE — 83880 ASSAY OF NATRIURETIC PEPTIDE: CPT

## 2025-08-04 PROCEDURE — 83605 ASSAY OF LACTIC ACID: CPT

## 2025-08-04 RX ORDER — MAGNESIUM SULFATE IN WATER 40 MG/ML
2000 INJECTION, SOLUTION INTRAVENOUS ONCE
Status: COMPLETED | OUTPATIENT
Start: 2025-08-04 | End: 2025-08-05

## 2025-08-04 RX ADMIN — MAGNESIUM SULFATE HEPTAHYDRATE 2000 MG: 40 INJECTION, SOLUTION INTRAVENOUS at 23:55

## 2025-08-05 ENCOUNTER — APPOINTMENT (OUTPATIENT)
Dept: GENERAL RADIOLOGY | Age: 70
DRG: 871 | End: 2025-08-05
Payer: MEDICARE

## 2025-08-05 ENCOUNTER — APPOINTMENT (OUTPATIENT)
Age: 70
DRG: 871 | End: 2025-08-05
Payer: MEDICARE

## 2025-08-05 PROBLEM — J15.9 COMMUNITY ACQUIRED BACTERIAL PNEUMONIA: Status: ACTIVE | Noted: 2025-08-05

## 2025-08-05 PROBLEM — J44.1 COPD EXACERBATION (HCC): Status: ACTIVE | Noted: 2025-08-05

## 2025-08-05 PROBLEM — I50.9 ACUTE CONGESTIVE HEART FAILURE (HCC): Status: ACTIVE | Noted: 2025-08-05

## 2025-08-05 LAB
ALBUMIN SERPL-MCNC: 4 G/DL (ref 3.5–5.2)
ALBUMIN/GLOB SERPL: 1.1 {RATIO} (ref 1–2.5)
ALLEN TEST: POSITIVE
ALP SERPL-CCNC: 109 U/L (ref 35–104)
ALT SERPL-CCNC: 21 U/L (ref 10–35)
ANION GAP SERPL CALCULATED.3IONS-SCNC: 14 MMOL/L (ref 9–16)
ANION GAP SERPL CALCULATED.3IONS-SCNC: 18 MMOL/L (ref 9–16)
ANION GAP SERPL CALCULATED.3IONS-SCNC: 18 MMOL/L (ref 9–16)
ANTI-XA UNFRAC HEPARIN: 0.28 IU/L
ANTI-XA UNFRAC HEPARIN: 0.37 IU/L
ANTI-XA UNFRAC HEPARIN: <0.1 IU/L
AST SERPL-CCNC: 26 U/L (ref 10–35)
B-OH-BUTYR SERPL-MCNC: 0.4 MMOL/L (ref 0.02–0.27)
BACTERIA URNS QL MICRO: ABNORMAL
BASOPHILS # BLD: 0 K/UL (ref 0–0.2)
BASOPHILS # BLD: 0.05 K/UL (ref 0–0.2)
BASOPHILS NFR BLD: 0 % (ref 0–2)
BASOPHILS NFR BLD: 0 % (ref 0–2)
BILIRUB SERPL-MCNC: 0.3 MG/DL (ref 0–1.2)
BILIRUB UR QL STRIP: NEGATIVE
BNP SERPL-MCNC: 114 PG/ML (ref 0–125)
BODY TEMPERATURE: 37
BUN SERPL-MCNC: 13 MG/DL (ref 8–23)
BUN SERPL-MCNC: 15 MG/DL (ref 8–23)
BUN SERPL-MCNC: 17 MG/DL (ref 8–23)
CALCIUM SERPL-MCNC: 8.4 MG/DL (ref 8.6–10.4)
CALCIUM SERPL-MCNC: 8.7 MG/DL (ref 8.6–10.4)
CALCIUM SERPL-MCNC: 9.1 MG/DL (ref 8.6–10.4)
CASTS #/AREA URNS LPF: ABNORMAL /LPF (ref 0–8)
CHLORIDE SERPL-SCNC: 100 MMOL/L (ref 98–107)
CHLORIDE SERPL-SCNC: 102 MMOL/L (ref 98–107)
CHLORIDE SERPL-SCNC: 99 MMOL/L (ref 98–107)
CLARITY UR: ABNORMAL
CO2 SERPL-SCNC: 18 MMOL/L (ref 20–31)
CO2 SERPL-SCNC: 20 MMOL/L (ref 20–31)
CO2 SERPL-SCNC: 22 MMOL/L (ref 20–31)
COHGB MFR BLD: 1.4 % (ref 0–5)
COLOR UR: YELLOW
CREAT SERPL-MCNC: 0.7 MG/DL (ref 0.6–0.9)
CREAT SERPL-MCNC: 1 MG/DL (ref 0.6–0.9)
CREAT SERPL-MCNC: 1.2 MG/DL (ref 0.6–0.9)
CRITICAL ACTION: NORMAL
CRITICAL NOTIFICATION DATE/TIME: NORMAL
CRITICAL NOTIFICATION: NORMAL
CRITICAL VALUE READ BACK: YES
D DIMER PPP FEU-MCNC: <0.27 UG/ML FEU (ref 0–0.57)
ECHO AO ROOT DIAM: 3.1 CM
ECHO AO ROOT INDEX: 1.48 CM/M2
ECHO AV AREA PEAK VELOCITY: 2.1 CM2
ECHO AV AREA VTI: 2 CM2
ECHO AV AREA/BSA PEAK VELOCITY: 1 CM2/M2
ECHO AV AREA/BSA VTI: 1 CM2/M2
ECHO AV MEAN GRADIENT: 5 MMHG
ECHO AV MEAN VELOCITY: 1.1 M/S
ECHO AV PEAK GRADIENT: 12 MMHG
ECHO AV PEAK VELOCITY: 1.7 M/S
ECHO AV VELOCITY RATIO: 0.65
ECHO AV VTI: 22.2 CM
ECHO BSA: 2.15 M2
ECHO LA AREA 2C: 18.3 CM2
ECHO LA AREA 4C: 11.5 CM2
ECHO LA DIAMETER INDEX: 1.77 CM/M2
ECHO LA DIAMETER: 3.7 CM
ECHO LA MAJOR AXIS: 3.9 CM
ECHO LA MINOR AXIS: 4.9 CM
ECHO LA TO AORTIC ROOT RATIO: 1.19
ECHO LA VOL MOD A2C: 57 ML (ref 22–52)
ECHO LA VOL MOD A4C: 27 ML (ref 22–52)
ECHO LA VOLUME INDEX MOD A2C: 27 ML/M2 (ref 16–34)
ECHO LA VOLUME INDEX MOD A4C: 13 ML/M2 (ref 16–34)
ECHO LV E' LATERAL VELOCITY: 6.74 CM/S
ECHO LV E' SEPTAL VELOCITY: 4.46 CM/S
ECHO LV EF PHYSICIAN: 65 %
ECHO LV FRACTIONAL SHORTENING: 37 % (ref 28–44)
ECHO LV INTERNAL DIMENSION DIASTOLE INDEX: 2.2 CM/M2
ECHO LV INTERNAL DIMENSION DIASTOLIC: 4.6 CM (ref 3.9–5.3)
ECHO LV INTERNAL DIMENSION SYSTOLIC INDEX: 1.39 CM/M2
ECHO LV INTERNAL DIMENSION SYSTOLIC: 2.9 CM
ECHO LV IVSD: 1 CM (ref 0.6–0.9)
ECHO LV MASS 2D: 158.8 G (ref 67–162)
ECHO LV MASS INDEX 2D: 76 G/M2 (ref 43–95)
ECHO LV POSTERIOR WALL DIASTOLIC: 1 CM (ref 0.6–0.9)
ECHO LV RELATIVE WALL THICKNESS RATIO: 0.43
ECHO LVOT AREA: 3.1 CM2
ECHO LVOT AV VTI INDEX: 0.65
ECHO LVOT DIAM: 2 CM
ECHO LVOT MEAN GRADIENT: 2 MMHG
ECHO LVOT PEAK GRADIENT: 5 MMHG
ECHO LVOT PEAK VELOCITY: 1.1 M/S
ECHO LVOT STROKE VOLUME INDEX: 21.6 ML/M2
ECHO LVOT SV: 45.2 ML
ECHO LVOT VTI: 14.4 CM
ECHO MV A VELOCITY: 1.14 M/S
ECHO MV E DECELERATION TIME (DT): 162 MS
ECHO MV E VELOCITY: 0.69 M/S
ECHO MV E/A RATIO: 0.61
ECHO MV E/E' LATERAL: 10.24
ECHO MV E/E' RATIO (AVERAGED): 12.85
ECHO MV E/E' SEPTAL: 15.47
ECHO PV MAX VELOCITY: 1.5 M/S
ECHO PV PEAK GRADIENT: 9 MMHG
ECHO RA AREA 4C: 12.6 CM2
ECHO RA END SYSTOLIC VOLUME APICAL 4 CHAMBER INDEX BSA: 13 ML/M2
ECHO RA VOLUME: 28 ML
ECHO RV BASAL DIMENSION: 3.1 CM
ECHO RV FREE WALL PEAK S': 14.9 CM/S
ECHO RV TAPSE: 1.8 CM (ref 1.7–?)
EKG ATRIAL RATE: 115 BPM
EKG P AXIS: 64 DEGREES
EKG P-R INTERVAL: 148 MS
EKG Q-T INTERVAL: 332 MS
EKG QRS DURATION: 84 MS
EKG QTC CALCULATION (BAZETT): 459 MS
EKG R AXIS: 57 DEGREES
EKG T AXIS: 63 DEGREES
EKG VENTRICULAR RATE: 115 BPM
EOSINOPHIL # BLD: 0 K/UL (ref 0–0.44)
EOSINOPHIL # BLD: 0.36 K/UL (ref 0–0.44)
EOSINOPHILS RELATIVE PERCENT: 0 % (ref 1–4)
EOSINOPHILS RELATIVE PERCENT: 3 % (ref 1–4)
EPI CELLS #/AREA URNS HPF: ABNORMAL /HPF (ref 0–5)
ERYTHROCYTE [DISTWIDTH] IN BLOOD BY AUTOMATED COUNT: 14.2 % (ref 11.8–14.4)
ERYTHROCYTE [DISTWIDTH] IN BLOOD BY AUTOMATED COUNT: 14.4 % (ref 11.8–14.4)
EST. AVERAGE GLUCOSE BLD GHB EST-MCNC: 131 MG/DL
EST. AVERAGE GLUCOSE BLD GHB EST-MCNC: 134 MG/DL
FIO2 ON VENT: ABNORMAL %
FIO2: 100
FIO2: 60
GFR, ESTIMATED: 50 ML/MIN/1.73M2
GFR, ESTIMATED: 62 ML/MIN/1.73M2
GFR, ESTIMATED: >90 ML/MIN/1.73M2
GLUCOSE BLD-MCNC: 221 MG/DL (ref 65–105)
GLUCOSE BLD-MCNC: 235 MG/DL (ref 65–105)
GLUCOSE BLD-MCNC: 235 MG/DL (ref 65–105)
GLUCOSE BLD-MCNC: 315 MG/DL (ref 65–105)
GLUCOSE BLD-MCNC: 383 MG/DL (ref 65–105)
GLUCOSE BLD-MCNC: 404 MG/DL (ref 65–105)
GLUCOSE BLD-MCNC: 408 MG/DL (ref 74–100)
GLUCOSE BLD-MCNC: 479 MG/DL (ref 74–100)
GLUCOSE SERPL-MCNC: 214 MG/DL (ref 74–99)
GLUCOSE SERPL-MCNC: 270 MG/DL (ref 74–99)
GLUCOSE SERPL-MCNC: 392 MG/DL (ref 74–99)
GLUCOSE UR STRIP-MCNC: ABNORMAL MG/DL
HBA1C MFR BLD: 6.2 % (ref 4–6)
HBA1C MFR BLD: 6.3 % (ref 4–6)
HCO3 VENOUS: 26.4 MMOL/L (ref 24–30)
HCT VFR BLD AUTO: 41.2 % (ref 36.3–47.1)
HCT VFR BLD AUTO: 41.5 % (ref 36.3–47.1)
HGB BLD-MCNC: 12.6 G/DL (ref 11.9–15.1)
HGB BLD-MCNC: 13.1 G/DL (ref 11.9–15.1)
HGB UR QL STRIP.AUTO: ABNORMAL
IMM GRANULOCYTES # BLD AUTO: 0.09 K/UL (ref 0–0.3)
IMM GRANULOCYTES # BLD AUTO: 0.16 K/UL (ref 0–0.3)
IMM GRANULOCYTES NFR BLD: 1 %
IMM GRANULOCYTES NFR BLD: 1 %
INR PPP: 1
KETONES UR STRIP-MCNC: ABNORMAL MG/DL
LACTIC ACID, SEPSIS WHOLE BLOOD: 1.7 MMOL/L (ref 0.5–1.9)
LEUKOCYTE ESTERASE UR QL STRIP: NEGATIVE
LYMPHOCYTES NFR BLD: 0.64 K/UL (ref 1.1–3.7)
LYMPHOCYTES NFR BLD: 1.71 K/UL (ref 1.1–3.7)
LYMPHOCYTES RELATIVE PERCENT: 13 % (ref 24–43)
LYMPHOCYTES RELATIVE PERCENT: 4 % (ref 24–43)
MAGNESIUM SERPL-MCNC: 1.8 MG/DL (ref 1.6–2.4)
MAGNESIUM SERPL-MCNC: 2.1 MG/DL (ref 1.6–2.4)
MCH RBC QN AUTO: 28.1 PG (ref 25.2–33.5)
MCH RBC QN AUTO: 28.3 PG (ref 25.2–33.5)
MCHC RBC AUTO-ENTMCNC: 30.4 G/DL (ref 28.4–34.8)
MCHC RBC AUTO-ENTMCNC: 31.8 G/DL (ref 28.4–34.8)
MCV RBC AUTO: 88.4 FL (ref 82.6–102.9)
MCV RBC AUTO: 93 FL (ref 82.6–102.9)
MODE: ABNORMAL
MONOCYTES NFR BLD: 0.48 K/UL (ref 0.1–1.2)
MONOCYTES NFR BLD: 0.74 K/UL (ref 0.1–1.2)
MONOCYTES NFR BLD: 3 % (ref 3–12)
MONOCYTES NFR BLD: 6 % (ref 3–12)
MORPHOLOGY: NORMAL
MRSA, DNA, NASAL: NEGATIVE
NEGATIVE BASE EXCESS, ART: 4.8 MMOL/L (ref 0–2)
NEGATIVE BASE EXCESS, ART: 5.3 MMOL/L (ref 0–2)
NEGATIVE BASE EXCESS, VEN: 2 MMOL/L (ref 0–2)
NEUTROPHILS NFR BLD: 77 % (ref 36–65)
NEUTROPHILS NFR BLD: 92 % (ref 36–65)
NEUTS SEG NFR BLD: 10.06 K/UL (ref 1.5–8.1)
NEUTS SEG NFR BLD: 14.82 K/UL (ref 1.5–8.1)
NITRITE UR QL STRIP: NEGATIVE
NRBC BLD-RTO: 0 PER 100 WBC
NRBC BLD-RTO: 0 PER 100 WBC
O2 DELIVERY DEVICE: ABNORMAL
O2 DELIVERY DEVICE: ABNORMAL
O2 SAT, VEN: 92.5 % (ref 60–85)
PARTIAL THROMBOPLASTIN TIME: 25 SEC (ref 23–36.5)
PCO2 VENOUS: 61.8 MM HG (ref 39–55)
PH UR STRIP: 5.5 [PH] (ref 5–8)
PH VENOUS: 7.25 (ref 7.32–7.42)
PHOSPHATE SERPL-MCNC: 2.4 MG/DL (ref 2.5–4.5)
PLATELET # BLD AUTO: 331 K/UL (ref 138–453)
PLATELET # BLD AUTO: 393 K/UL (ref 138–453)
PMV BLD AUTO: 8.8 FL (ref 8.1–13.5)
PMV BLD AUTO: 8.9 FL (ref 8.1–13.5)
PO2 VENOUS: 75.3 MM HG (ref 30–50)
POC HCO3: 23.2 MMOL/L (ref 21–28)
POC HCO3: 24.1 MMOL/L (ref 21–28)
POC LACTIC ACID: 3.7 MMOL/L (ref 0.56–1.39)
POC LACTIC ACID: 4.9 MMOL/L (ref 0.56–1.39)
POC O2 SATURATION: 94.5 % (ref 94–98)
POC O2 SATURATION: 99.7 % (ref 94–98)
POC PCO2: 54 MM HG (ref 35–48)
POC PCO2: 63.7 MM HG (ref 35–48)
POC PH: 7.19 (ref 7.35–7.45)
POC PH: 7.24 (ref 7.35–7.45)
POC PO2: 254.6 MM HG (ref 83–108)
POC PO2: 86.7 MM HG (ref 83–108)
POTASSIUM SERPL-SCNC: 2.9 MMOL/L (ref 3.7–5.3)
POTASSIUM SERPL-SCNC: 3 MMOL/L (ref 3.7–5.3)
POTASSIUM SERPL-SCNC: 3.6 MMOL/L (ref 3.7–5.3)
POTASSIUM SERPL-SCNC: 3.6 MMOL/L (ref 3.7–5.3)
PROT SERPL-MCNC: 7.6 G/DL (ref 6.6–8.7)
PROT UR STRIP-MCNC: ABNORMAL MG/DL
PROTHROMBIN TIME: 13.3 SEC (ref 11.7–14.9)
RBC # BLD AUTO: 4.46 M/UL (ref 3.95–5.11)
RBC # BLD AUTO: 4.66 M/UL (ref 3.95–5.11)
RBC #/AREA URNS HPF: ABNORMAL /HPF (ref 0–4)
SAMPLE SITE: ABNORMAL
SAMPLE SITE: ABNORMAL
SODIUM SERPL-SCNC: 136 MMOL/L (ref 136–145)
SODIUM SERPL-SCNC: 137 MMOL/L (ref 136–145)
SODIUM SERPL-SCNC: 138 MMOL/L (ref 136–145)
SP GR UR STRIP: 1.01 (ref 1–1.03)
SPECIMEN DESCRIPTION: NORMAL
TROPONIN I SERPL HS-MCNC: 154 NG/L (ref 0–14)
TROPONIN I SERPL HS-MCNC: 167 NG/L (ref 0–14)
TROPONIN I SERPL HS-MCNC: 177 NG/L (ref 0–14)
TROPONIN I SERPL HS-MCNC: 178 NG/L (ref 0–14)
TROPONIN I SERPL HS-MCNC: 184 NG/L (ref 0–14)
TROPONIN I SERPL HS-MCNC: 29 NG/L (ref 0–14)
TROPONIN I SERPL HS-MCNC: 85 NG/L (ref 0–14)
TROPONIN I SERPL HS-MCNC: 93 NG/L (ref 0–14)
UROBILINOGEN UR STRIP-ACNC: NORMAL EU/DL (ref 0–1)
WBC #/AREA URNS HPF: ABNORMAL /HPF (ref 0–5)
WBC OTHER # BLD: 13 K/UL (ref 3.5–11.3)
WBC OTHER # BLD: 16.1 K/UL (ref 3.5–11.3)

## 2025-08-05 PROCEDURE — 96365 THER/PROPH/DIAG IV INF INIT: CPT

## 2025-08-05 PROCEDURE — 5A1945Z RESPIRATORY VENTILATION, 24-96 CONSECUTIVE HOURS: ICD-10-PCS | Performed by: STUDENT IN AN ORGANIZED HEALTH CARE EDUCATION/TRAINING PROGRAM

## 2025-08-05 PROCEDURE — 2500000003 HC RX 250 WO HCPCS

## 2025-08-05 PROCEDURE — 83036 HEMOGLOBIN GLYCOSYLATED A1C: CPT

## 2025-08-05 PROCEDURE — 6360000002 HC RX W HCPCS

## 2025-08-05 PROCEDURE — 36415 COLL VENOUS BLD VENIPUNCTURE: CPT

## 2025-08-05 PROCEDURE — 6370000000 HC RX 637 (ALT 250 FOR IP)

## 2025-08-05 PROCEDURE — 85520 HEPARIN ASSAY: CPT

## 2025-08-05 PROCEDURE — 84132 ASSAY OF SERUM POTASSIUM: CPT

## 2025-08-05 PROCEDURE — 84100 ASSAY OF PHOSPHORUS: CPT

## 2025-08-05 PROCEDURE — 6370000000 HC RX 637 (ALT 250 FOR IP): Performed by: NURSE PRACTITIONER

## 2025-08-05 PROCEDURE — 85025 COMPLETE CBC W/AUTO DIFF WBC: CPT

## 2025-08-05 PROCEDURE — 93306 TTE W/DOPPLER COMPLETE: CPT

## 2025-08-05 PROCEDURE — 36600 WITHDRAWAL OF ARTERIAL BLOOD: CPT

## 2025-08-05 PROCEDURE — 84484 ASSAY OF TROPONIN QUANT: CPT

## 2025-08-05 PROCEDURE — 83735 ASSAY OF MAGNESIUM: CPT

## 2025-08-05 PROCEDURE — 3E033XZ INTRODUCTION OF VASOPRESSOR INTO PERIPHERAL VEIN, PERCUTANEOUS APPROACH: ICD-10-PCS

## 2025-08-05 PROCEDURE — 94002 VENT MGMT INPAT INIT DAY: CPT

## 2025-08-05 PROCEDURE — 82803 BLOOD GASES ANY COMBINATION: CPT

## 2025-08-05 PROCEDURE — 96375 TX/PRO/DX INJ NEW DRUG ADDON: CPT

## 2025-08-05 PROCEDURE — 85730 THROMBOPLASTIN TIME PARTIAL: CPT

## 2025-08-05 PROCEDURE — 81001 URINALYSIS AUTO W/SCOPE: CPT

## 2025-08-05 PROCEDURE — 2580000003 HC RX 258

## 2025-08-05 PROCEDURE — 82947 ASSAY GLUCOSE BLOOD QUANT: CPT

## 2025-08-05 PROCEDURE — 2700000000 HC OXYGEN THERAPY PER DAY

## 2025-08-05 PROCEDURE — 99291 CRITICAL CARE FIRST HOUR: CPT | Performed by: INTERNAL MEDICINE

## 2025-08-05 PROCEDURE — 94640 AIRWAY INHALATION TREATMENT: CPT

## 2025-08-05 PROCEDURE — 87040 BLOOD CULTURE FOR BACTERIA: CPT

## 2025-08-05 PROCEDURE — 74018 RADEX ABDOMEN 1 VIEW: CPT

## 2025-08-05 PROCEDURE — 0BH17EZ INSERTION OF ENDOTRACHEAL AIRWAY INTO TRACHEA, VIA NATURAL OR ARTIFICIAL OPENING: ICD-10-PCS

## 2025-08-05 PROCEDURE — 71045 X-RAY EXAM CHEST 1 VIEW: CPT

## 2025-08-05 PROCEDURE — 36620 INSERTION CATHETER ARTERY: CPT

## 2025-08-05 PROCEDURE — 82805 BLOOD GASES W/O2 SATURATION: CPT

## 2025-08-05 PROCEDURE — 85610 PROTHROMBIN TIME: CPT

## 2025-08-05 PROCEDURE — 80048 BASIC METABOLIC PNL TOTAL CA: CPT

## 2025-08-05 PROCEDURE — 93010 ELECTROCARDIOGRAM REPORT: CPT | Performed by: INTERNAL MEDICINE

## 2025-08-05 PROCEDURE — 82010 KETONE BODYS QUAN: CPT

## 2025-08-05 PROCEDURE — 87205 SMEAR GRAM STAIN: CPT

## 2025-08-05 PROCEDURE — 93306 TTE W/DOPPLER COMPLETE: CPT | Performed by: INTERNAL MEDICINE

## 2025-08-05 PROCEDURE — 94644 CONT INHLJ TX 1ST HOUR: CPT

## 2025-08-05 PROCEDURE — 2500000003 HC RX 250 WO HCPCS: Performed by: NURSE PRACTITIONER

## 2025-08-05 PROCEDURE — 94761 N-INVAS EAR/PLS OXIMETRY MLT: CPT

## 2025-08-05 PROCEDURE — 93005 ELECTROCARDIOGRAM TRACING: CPT | Performed by: NURSE PRACTITIONER

## 2025-08-05 PROCEDURE — 36620 INSERTION CATHETER ARTERY: CPT | Performed by: INTERNAL MEDICINE

## 2025-08-05 PROCEDURE — 6360000002 HC RX W HCPCS: Performed by: NURSE PRACTITIONER

## 2025-08-05 PROCEDURE — 94645 CONT INHLJ TX EACH ADDL HOUR: CPT

## 2025-08-05 PROCEDURE — 03HY32Z INSERTION OF MONITORING DEVICE INTO UPPER ARTERY, PERCUTANEOUS APPROACH: ICD-10-PCS

## 2025-08-05 PROCEDURE — 83605 ASSAY OF LACTIC ACID: CPT

## 2025-08-05 PROCEDURE — 2000000000 HC ICU R&B

## 2025-08-05 PROCEDURE — 87641 MR-STAPH DNA AMP PROBE: CPT

## 2025-08-05 RX ORDER — ONDANSETRON 4 MG/1
4 TABLET, ORALLY DISINTEGRATING ORAL EVERY 8 HOURS PRN
Status: DISCONTINUED | OUTPATIENT
Start: 2025-08-05 | End: 2025-08-13 | Stop reason: HOSPADM

## 2025-08-05 RX ORDER — POTASSIUM CHLORIDE 7.45 MG/ML
10 INJECTION INTRAVENOUS PRN
Status: CANCELLED | OUTPATIENT
Start: 2025-08-05 | End: 2025-08-08

## 2025-08-05 RX ORDER — HEPARIN SODIUM 10000 [USP'U]/100ML
5-30 INJECTION, SOLUTION INTRAVENOUS CONTINUOUS
Status: DISCONTINUED | OUTPATIENT
Start: 2025-08-05 | End: 2025-08-07

## 2025-08-05 RX ORDER — FENTANYL CITRATE 50 UG/ML
100 INJECTION, SOLUTION INTRAMUSCULAR; INTRAVENOUS ONCE
Status: COMPLETED | OUTPATIENT
Start: 2025-08-05 | End: 2025-08-05

## 2025-08-05 RX ORDER — ENOXAPARIN SODIUM 100 MG/ML
40 INJECTION SUBCUTANEOUS DAILY
Status: DISCONTINUED | OUTPATIENT
Start: 2025-08-05 | End: 2025-08-05

## 2025-08-05 RX ORDER — HEPARIN SODIUM 1000 [USP'U]/ML
4000 INJECTION, SOLUTION INTRAVENOUS; SUBCUTANEOUS PRN
Status: DISCONTINUED | OUTPATIENT
Start: 2025-08-05 | End: 2025-08-07

## 2025-08-05 RX ORDER — GLUCAGON 1 MG/ML
1 KIT INJECTION PRN
Status: DISCONTINUED | OUTPATIENT
Start: 2025-08-05 | End: 2025-08-13 | Stop reason: HOSPADM

## 2025-08-05 RX ORDER — PROPOFOL 10 MG/ML
INJECTION, EMULSION INTRAVENOUS
Status: COMPLETED
Start: 2025-08-05 | End: 2025-08-05

## 2025-08-05 RX ORDER — ONDANSETRON 2 MG/ML
4 INJECTION INTRAMUSCULAR; INTRAVENOUS EVERY 6 HOURS PRN
Status: DISCONTINUED | OUTPATIENT
Start: 2025-08-05 | End: 2025-08-13 | Stop reason: HOSPADM

## 2025-08-05 RX ORDER — NITROGLYCERIN 20 MG/100ML
5-200 INJECTION INTRAVENOUS CONTINUOUS
Status: DISCONTINUED | OUTPATIENT
Start: 2025-08-05 | End: 2025-08-05

## 2025-08-05 RX ORDER — SODIUM CHLORIDE 450 MG/100ML
INJECTION, SOLUTION INTRAVENOUS CONTINUOUS
Status: DISCONTINUED | OUTPATIENT
Start: 2025-08-05 | End: 2025-08-07

## 2025-08-05 RX ORDER — VECURONIUM BROMIDE 1 MG/ML
INJECTION, POWDER, LYOPHILIZED, FOR SOLUTION INTRAVENOUS
Status: COMPLETED
Start: 2025-08-05 | End: 2025-08-05

## 2025-08-05 RX ORDER — HEPARIN SODIUM 1000 [USP'U]/ML
2000 INJECTION, SOLUTION INTRAVENOUS; SUBCUTANEOUS PRN
Status: DISCONTINUED | OUTPATIENT
Start: 2025-08-05 | End: 2025-08-07

## 2025-08-05 RX ORDER — DEXTROSE MONOHYDRATE, SODIUM CHLORIDE, AND POTASSIUM CHLORIDE 50; 1.49; 4.5 G/1000ML; G/1000ML; G/1000ML
INJECTION, SOLUTION INTRAVENOUS CONTINUOUS PRN
Status: CANCELLED | OUTPATIENT
Start: 2025-08-05

## 2025-08-05 RX ORDER — SODIUM CHLORIDE 450 MG/100ML
INJECTION, SOLUTION INTRAVENOUS CONTINUOUS
Status: CANCELLED | OUTPATIENT
Start: 2025-08-05

## 2025-08-05 RX ORDER — ATORVASTATIN CALCIUM 80 MG/1
80 TABLET, FILM COATED ORAL DAILY
Status: DISCONTINUED | OUTPATIENT
Start: 2025-08-05 | End: 2025-08-13 | Stop reason: HOSPADM

## 2025-08-05 RX ORDER — HEPARIN SODIUM 1000 [USP'U]/ML
4000 INJECTION, SOLUTION INTRAVENOUS; SUBCUTANEOUS ONCE
Status: COMPLETED | OUTPATIENT
Start: 2025-08-05 | End: 2025-08-05

## 2025-08-05 RX ORDER — DEXTROSE MONOHYDRATE, SODIUM CHLORIDE, AND POTASSIUM CHLORIDE 50; 1.49; 4.5 G/1000ML; G/1000ML; G/1000ML
INJECTION, SOLUTION INTRAVENOUS CONTINUOUS PRN
Status: DISCONTINUED | OUTPATIENT
Start: 2025-08-05 | End: 2025-08-06

## 2025-08-05 RX ORDER — POTASSIUM CHLORIDE 7.45 MG/ML
10 INJECTION INTRAVENOUS PRN
Status: DISPENSED | OUTPATIENT
Start: 2025-08-05 | End: 2025-08-08

## 2025-08-05 RX ORDER — INSULIN LISPRO 100 [IU]/ML
0-16 INJECTION, SOLUTION INTRAVENOUS; SUBCUTANEOUS EVERY 4 HOURS
Status: DISCONTINUED | OUTPATIENT
Start: 2025-08-05 | End: 2025-08-05

## 2025-08-05 RX ORDER — LEVOTHYROXINE SODIUM 75 UG/1
37.5 TABLET ORAL DAILY
Status: DISCONTINUED | OUTPATIENT
Start: 2025-08-05 | End: 2025-08-13 | Stop reason: HOSPADM

## 2025-08-05 RX ORDER — ACETAMINOPHEN 650 MG/1
650 SUPPOSITORY RECTAL EVERY 6 HOURS PRN
Status: DISCONTINUED | OUTPATIENT
Start: 2025-08-05 | End: 2025-08-13 | Stop reason: HOSPADM

## 2025-08-05 RX ORDER — DEXTROSE MONOHYDRATE 100 MG/ML
INJECTION, SOLUTION INTRAVENOUS CONTINUOUS PRN
Status: DISCONTINUED | OUTPATIENT
Start: 2025-08-05 | End: 2025-08-13 | Stop reason: HOSPADM

## 2025-08-05 RX ORDER — ONDANSETRON 2 MG/ML
4 INJECTION INTRAMUSCULAR; INTRAVENOUS ONCE
Status: COMPLETED | OUTPATIENT
Start: 2025-08-05 | End: 2025-08-05

## 2025-08-05 RX ORDER — IPRATROPIUM BROMIDE AND ALBUTEROL SULFATE 2.5; .5 MG/3ML; MG/3ML
1 SOLUTION RESPIRATORY (INHALATION)
Status: DISCONTINUED | OUTPATIENT
Start: 2025-08-05 | End: 2025-08-09

## 2025-08-05 RX ORDER — SODIUM CHLORIDE 0.9 % (FLUSH) 0.9 %
10 SYRINGE (ML) INJECTION PRN
Status: DISCONTINUED | OUTPATIENT
Start: 2025-08-05 | End: 2025-08-13 | Stop reason: HOSPADM

## 2025-08-05 RX ORDER — ALBUTEROL SULFATE 0.83 MG/ML
2.5 SOLUTION RESPIRATORY (INHALATION) EVERY 6 HOURS PRN
Status: DISCONTINUED | OUTPATIENT
Start: 2025-08-05 | End: 2025-08-06

## 2025-08-05 RX ORDER — SODIUM CHLORIDE 9 MG/ML
INJECTION, SOLUTION INTRAVENOUS PRN
Status: DISCONTINUED | OUTPATIENT
Start: 2025-08-05 | End: 2025-08-13 | Stop reason: HOSPADM

## 2025-08-05 RX ORDER — AZITHROMYCIN 250 MG/1
500 TABLET, FILM COATED ORAL EVERY 24 HOURS
Status: COMPLETED | OUTPATIENT
Start: 2025-08-05 | End: 2025-08-07

## 2025-08-05 RX ORDER — POTASSIUM CHLORIDE 7.45 MG/ML
10 INJECTION INTRAVENOUS
Status: COMPLETED | OUTPATIENT
Start: 2025-08-05 | End: 2025-08-05

## 2025-08-05 RX ORDER — IPRATROPIUM BROMIDE AND ALBUTEROL SULFATE 2.5; .5 MG/3ML; MG/3ML
1 SOLUTION RESPIRATORY (INHALATION)
Status: DISCONTINUED | OUTPATIENT
Start: 2025-08-05 | End: 2025-08-05

## 2025-08-05 RX ORDER — INSULIN LISPRO 100 [IU]/ML
10 INJECTION, SOLUTION INTRAVENOUS; SUBCUTANEOUS ONCE
Status: COMPLETED | OUTPATIENT
Start: 2025-08-05 | End: 2025-08-05

## 2025-08-05 RX ORDER — MAGNESIUM SULFATE IN WATER 40 MG/ML
2000 INJECTION, SOLUTION INTRAVENOUS PRN
Status: CANCELLED | OUTPATIENT
Start: 2025-08-05

## 2025-08-05 RX ORDER — ACETAMINOPHEN 325 MG/1
650 TABLET ORAL EVERY 6 HOURS PRN
Status: DISCONTINUED | OUTPATIENT
Start: 2025-08-05 | End: 2025-08-13 | Stop reason: HOSPADM

## 2025-08-05 RX ORDER — ALBUTEROL SULFATE 2.5 MG/.5ML
20 SOLUTION RESPIRATORY (INHALATION) CONTINUOUS
Status: DISPENSED | OUTPATIENT
Start: 2025-08-05 | End: 2025-08-05

## 2025-08-05 RX ORDER — MAGNESIUM SULFATE IN WATER 40 MG/ML
2000 INJECTION, SOLUTION INTRAVENOUS PRN
Status: DISCONTINUED | OUTPATIENT
Start: 2025-08-05 | End: 2025-08-13 | Stop reason: HOSPADM

## 2025-08-05 RX ORDER — LISINOPRIL 20 MG/1
40 TABLET ORAL DAILY
Status: DISCONTINUED | OUTPATIENT
Start: 2025-08-05 | End: 2025-08-13 | Stop reason: HOSPADM

## 2025-08-05 RX ORDER — ONDANSETRON 2 MG/ML
INJECTION INTRAMUSCULAR; INTRAVENOUS
Status: COMPLETED
Start: 2025-08-05 | End: 2025-08-05

## 2025-08-05 RX ORDER — SODIUM CHLORIDE 0.9 % (FLUSH) 0.9 %
5-40 SYRINGE (ML) INJECTION EVERY 12 HOURS SCHEDULED
Status: DISCONTINUED | OUTPATIENT
Start: 2025-08-05 | End: 2025-08-13 | Stop reason: HOSPADM

## 2025-08-05 RX ORDER — ALBUTEROL SULFATE 0.83 MG/ML
20 SOLUTION RESPIRATORY (INHALATION) CONTINUOUS
Status: DISCONTINUED | OUTPATIENT
Start: 2025-08-05 | End: 2025-08-05

## 2025-08-05 RX ORDER — SODIUM CHLORIDE, SODIUM LACTATE, POTASSIUM CHLORIDE, CALCIUM CHLORIDE 600; 310; 30; 20 MG/100ML; MG/100ML; MG/100ML; MG/100ML
INJECTION, SOLUTION INTRAVENOUS CONTINUOUS
Status: DISCONTINUED | OUTPATIENT
Start: 2025-08-05 | End: 2025-08-05

## 2025-08-05 RX ORDER — PROPOFOL 10 MG/ML
5-50 INJECTION, EMULSION INTRAVENOUS CONTINUOUS
Status: DISCONTINUED | OUTPATIENT
Start: 2025-08-05 | End: 2025-08-09

## 2025-08-05 RX ORDER — VECURONIUM BROMIDE 1 MG/ML
10 INJECTION, POWDER, LYOPHILIZED, FOR SOLUTION INTRAVENOUS ONCE
Status: COMPLETED | OUTPATIENT
Start: 2025-08-05 | End: 2025-08-05

## 2025-08-05 RX ORDER — ALBUTEROL SULFATE 0.83 MG/ML
2.5 SOLUTION RESPIRATORY (INHALATION)
Status: DISCONTINUED | OUTPATIENT
Start: 2025-08-05 | End: 2025-08-05

## 2025-08-05 RX ADMIN — POTASSIUM CHLORIDE 10 MEQ: 7.46 INJECTION, SOLUTION INTRAVENOUS at 21:13

## 2025-08-05 RX ADMIN — SODIUM CHLORIDE, PRESERVATIVE FREE 40 MG: 5 INJECTION INTRAVENOUS at 08:11

## 2025-08-05 RX ADMIN — WATER 40 MG: 1 INJECTION INTRAMUSCULAR; INTRAVENOUS; SUBCUTANEOUS at 21:38

## 2025-08-05 RX ADMIN — WATER 1000 MG: 1 INJECTION INTRAMUSCULAR; INTRAVENOUS; SUBCUTANEOUS at 00:20

## 2025-08-05 RX ADMIN — PROPOFOL 30 MCG/KG/MIN: 10 INJECTION, EMULSION INTRAVENOUS at 14:33

## 2025-08-05 RX ADMIN — POTASSIUM CHLORIDE 10 MEQ: 7.46 INJECTION, SOLUTION INTRAVENOUS at 15:32

## 2025-08-05 RX ADMIN — INSULIN LISPRO 16 UNITS: 100 INJECTION, SOLUTION INTRAVENOUS; SUBCUTANEOUS at 08:11

## 2025-08-05 RX ADMIN — Medication 35 MCG/MIN: at 11:25

## 2025-08-05 RX ADMIN — IPRATROPIUM BROMIDE AND ALBUTEROL SULFATE 1 DOSE: .5; 2.5 SOLUTION RESPIRATORY (INHALATION) at 19:32

## 2025-08-05 RX ADMIN — Medication 125 MCG/HR: at 21:25

## 2025-08-05 RX ADMIN — SODIUM CHLORIDE: 0.45 INJECTION, SOLUTION INTRAVENOUS at 15:03

## 2025-08-05 RX ADMIN — POTASSIUM BICARBONATE 40 MEQ: 782 TABLET, EFFERVESCENT ORAL at 09:56

## 2025-08-05 RX ADMIN — SODIUM CHLORIDE 10 MCG/MIN: 0.9 INJECTION, SOLUTION INTRAVENOUS at 07:51

## 2025-08-05 RX ADMIN — AZITHROMYCIN DIHYDRATE 500 MG: 250 TABLET ORAL at 23:00

## 2025-08-05 RX ADMIN — ONDANSETRON 4 MG: 2 INJECTION INTRAMUSCULAR; INTRAVENOUS at 00:28

## 2025-08-05 RX ADMIN — Medication 50 MCG/HR: at 04:46

## 2025-08-05 RX ADMIN — POTASSIUM BICARBONATE 20 MEQ: 782 TABLET, EFFERVESCENT ORAL at 10:12

## 2025-08-05 RX ADMIN — POTASSIUM CHLORIDE 10 MEQ: 7.46 INJECTION, SOLUTION INTRAVENOUS at 14:28

## 2025-08-05 RX ADMIN — WATER 1000 MG: 1 INJECTION INTRAMUSCULAR; INTRAVENOUS; SUBCUTANEOUS at 23:00

## 2025-08-05 RX ADMIN — Medication 24 MCG/MIN: at 16:16

## 2025-08-05 RX ADMIN — ALBUTEROL SULFATE 20 MG: 2.5 SOLUTION RESPIRATORY (INHALATION) at 08:03

## 2025-08-05 RX ADMIN — INSULIN LISPRO 10 UNITS: 100 INJECTION, SOLUTION INTRAVENOUS; SUBCUTANEOUS at 06:56

## 2025-08-05 RX ADMIN — INSULIN LISPRO 12 UNITS: 100 INJECTION, SOLUTION INTRAVENOUS; SUBCUTANEOUS at 16:07

## 2025-08-05 RX ADMIN — SODIUM CHLORIDE: 0.45 INJECTION, SOLUTION INTRAVENOUS at 18:55

## 2025-08-05 RX ADMIN — PROPOFOL 40 MCG/KG/MIN: 10 INJECTION, EMULSION INTRAVENOUS at 21:32

## 2025-08-05 RX ADMIN — Medication 26 MCG/MIN: at 21:29

## 2025-08-05 RX ADMIN — FENTANYL CITRATE 100 MCG: 50 INJECTION INTRAMUSCULAR; INTRAVENOUS at 03:22

## 2025-08-05 RX ADMIN — INSULIN LISPRO 16 UNITS: 100 INJECTION, SOLUTION INTRAVENOUS; SUBCUTANEOUS at 12:22

## 2025-08-05 RX ADMIN — SODIUM CHLORIDE: 0.45 INJECTION, SOLUTION INTRAVENOUS at 23:04

## 2025-08-05 RX ADMIN — PROPOFOL 30 MCG/KG/MIN: 10 INJECTION, EMULSION INTRAVENOUS at 07:47

## 2025-08-05 RX ADMIN — SODIUM CHLORIDE, SODIUM LACTATE, POTASSIUM CHLORIDE, AND CALCIUM CHLORIDE: .6; .31; .03; .02 INJECTION, SOLUTION INTRAVENOUS at 07:35

## 2025-08-05 RX ADMIN — HEPARIN SODIUM AND DEXTROSE 10 UNITS/KG/HR: 10000; 5 INJECTION INTRAVENOUS at 07:43

## 2025-08-05 RX ADMIN — PROPOFOL 35 MCG/KG/MIN: 10 INJECTION, EMULSION INTRAVENOUS at 04:25

## 2025-08-05 RX ADMIN — POTASSIUM BICARBONATE 40 MEQ: 782 TABLET, EFFERVESCENT ORAL at 13:22

## 2025-08-05 RX ADMIN — ALBUTEROL SULFATE 2.5 MG: 2.5 SOLUTION RESPIRATORY (INHALATION) at 23:20

## 2025-08-05 RX ADMIN — WATER 40 MG: 1 INJECTION INTRAMUSCULAR; INTRAVENOUS; SUBCUTANEOUS at 13:54

## 2025-08-05 RX ADMIN — HEPARIN SODIUM 4000 UNITS: 1000 INJECTION, SOLUTION INTRAVENOUS; SUBCUTANEOUS at 07:30

## 2025-08-05 RX ADMIN — WATER 40 MG: 1 INJECTION INTRAMUSCULAR; INTRAVENOUS; SUBCUTANEOUS at 06:58

## 2025-08-05 RX ADMIN — ALBUTEROL SULFATE 20 MG: 2.5 SOLUTION RESPIRATORY (INHALATION) at 03:19

## 2025-08-05 RX ADMIN — IPRATROPIUM BROMIDE 0.5 MG: 0.5 SOLUTION RESPIRATORY (INHALATION) at 03:20

## 2025-08-05 RX ADMIN — LEVOTHYROXINE SODIUM 37.5 MCG: 0.07 TABLET ORAL at 08:11

## 2025-08-05 RX ADMIN — Medication 10 MCG/MIN: at 07:51

## 2025-08-05 RX ADMIN — SODIUM CHLORIDE, PRESERVATIVE FREE 10 ML: 5 INJECTION INTRAVENOUS at 08:13

## 2025-08-05 RX ADMIN — NITROGLYCERIN 50 MCG/MIN: 20 INJECTION INTRAVENOUS at 02:06

## 2025-08-05 RX ADMIN — VECURONIUM BROMIDE 10 MG: 1 INJECTION, POWDER, LYOPHILIZED, FOR SOLUTION INTRAVENOUS at 07:53

## 2025-08-05 RX ADMIN — SODIUM CHLORIDE 4.2 UNITS/HR: 9 INJECTION, SOLUTION INTRAVENOUS at 19:12

## 2025-08-05 RX ADMIN — POTASSIUM CHLORIDE 10 MEQ: 7.46 INJECTION, SOLUTION INTRAVENOUS at 23:40

## 2025-08-05 RX ADMIN — ATORVASTATIN CALCIUM 80 MG: 80 TABLET, FILM COATED ORAL at 08:12

## 2025-08-05 RX ADMIN — HEPARIN SODIUM 2000 UNITS: 1000 INJECTION, SOLUTION INTRAVENOUS; SUBCUTANEOUS at 13:15

## 2025-08-05 RX ADMIN — POTASSIUM CHLORIDE 10 MEQ: 7.46 INJECTION, SOLUTION INTRAVENOUS at 19:20

## 2025-08-05 RX ADMIN — POTASSIUM CHLORIDE 10 MEQ: 7.46 INJECTION, SOLUTION INTRAVENOUS at 13:26

## 2025-08-05 RX ADMIN — PROPOFOL 40 MCG/KG/MIN: 10 INJECTION, EMULSION INTRAVENOUS at 17:50

## 2025-08-05 RX ADMIN — AZITHROMYCIN MONOHYDRATE 500 MG: 500 INJECTION, POWDER, LYOPHILIZED, FOR SOLUTION INTRAVENOUS at 00:42

## 2025-08-05 RX ADMIN — SODIUM CHLORIDE, PRESERVATIVE FREE 10 ML: 5 INJECTION INTRAVENOUS at 21:05

## 2025-08-05 ASSESSMENT — PULMONARY FUNCTION TESTS
PIF_VALUE: 33
PIF_VALUE: 31
PIF_VALUE: 29
PIF_VALUE: 31
PIF_VALUE: 32
PIF_VALUE: 33
PIF_VALUE: 30
PIF_VALUE: 30
PIF_VALUE: 31
PIF_VALUE: 29
PIF_VALUE: 49
PIF_VALUE: 37
PIF_VALUE: 48
PIF_VALUE: 30
PIF_VALUE: 29
PIF_VALUE: 32
PIF_VALUE: 28
PIF_VALUE: 29
PIF_VALUE: 29
PIF_VALUE: 28
PIF_VALUE: 31
PIF_VALUE: 32
PIF_VALUE: 30
PIF_VALUE: 31
PIF_VALUE: 32
PIF_VALUE: 33
PIF_VALUE: 31
PIF_VALUE: 31

## 2025-08-06 ENCOUNTER — APPOINTMENT (OUTPATIENT)
Dept: GENERAL RADIOLOGY | Age: 70
DRG: 871 | End: 2025-08-06
Payer: MEDICARE

## 2025-08-06 PROBLEM — J96.01 ACUTE HYPOXEMIC RESPIRATORY FAILURE (HCC): Status: ACTIVE | Noted: 2025-08-06

## 2025-08-06 LAB
ALLEN TEST: ABNORMAL
ANION GAP SERPL CALCULATED.3IONS-SCNC: 12 MMOL/L (ref 9–16)
ANION GAP SERPL CALCULATED.3IONS-SCNC: 12 MMOL/L (ref 9–16)
ANION GAP SERPL CALCULATED.3IONS-SCNC: 14 MMOL/L (ref 9–16)
ANION GAP SERPL CALCULATED.3IONS-SCNC: 8 MMOL/L (ref 9–16)
ANION GAP SERPL CALCULATED.3IONS-SCNC: 8 MMOL/L (ref 9–16)
ANION GAP SERPL CALCULATED.3IONS-SCNC: 9 MMOL/L (ref 9–16)
ANTI-XA UNFRAC HEPARIN: 0.21 IU/L
ANTI-XA UNFRAC HEPARIN: 0.31 IU/L
ANTI-XA UNFRAC HEPARIN: 0.44 IU/L
ANTI-XA UNFRAC HEPARIN: 0.46 IU/L
BASOPHILS # BLD: 0 K/UL (ref 0–0.2)
BASOPHILS NFR BLD: 0 % (ref 0–2)
BUN SERPL-MCNC: 11 MG/DL (ref 8–23)
BUN SERPL-MCNC: 12 MG/DL (ref 8–23)
BUN SERPL-MCNC: 13 MG/DL (ref 8–23)
BUN SERPL-MCNC: 15 MG/DL (ref 8–23)
CALCIUM SERPL-MCNC: 8 MG/DL (ref 8.6–10.4)
CALCIUM SERPL-MCNC: 8.2 MG/DL (ref 8.6–10.4)
CALCIUM SERPL-MCNC: 8.3 MG/DL (ref 8.6–10.4)
CALCIUM SERPL-MCNC: 8.4 MG/DL (ref 8.6–10.4)
CALCIUM SERPL-MCNC: 8.6 MG/DL (ref 8.6–10.4)
CALCIUM SERPL-MCNC: 8.6 MG/DL (ref 8.6–10.4)
CHLORIDE SERPL-SCNC: 104 MMOL/L (ref 98–107)
CHLORIDE SERPL-SCNC: 110 MMOL/L (ref 98–107)
CHLORIDE SERPL-SCNC: 111 MMOL/L (ref 98–107)
CHLORIDE SERPL-SCNC: 112 MMOL/L (ref 98–107)
CHLORIDE SERPL-SCNC: 113 MMOL/L (ref 98–107)
CHLORIDE SERPL-SCNC: 114 MMOL/L (ref 98–107)
CO2 SERPL-SCNC: 22 MMOL/L (ref 20–31)
CO2 SERPL-SCNC: 23 MMOL/L (ref 20–31)
CO2 SERPL-SCNC: 23 MMOL/L (ref 20–31)
CO2 SERPL-SCNC: 24 MMOL/L (ref 20–31)
CO2 SERPL-SCNC: 25 MMOL/L (ref 20–31)
CO2 SERPL-SCNC: 25 MMOL/L (ref 20–31)
CREAT SERPL-MCNC: 0.6 MG/DL (ref 0.6–0.9)
CREAT SERPL-MCNC: 0.6 MG/DL (ref 0.6–0.9)
CREAT SERPL-MCNC: 0.7 MG/DL (ref 0.6–0.9)
CREAT SERPL-MCNC: 0.7 MG/DL (ref 0.6–0.9)
CREAT SERPL-MCNC: 0.8 MG/DL (ref 0.6–0.9)
CREAT SERPL-MCNC: 0.9 MG/DL (ref 0.6–0.9)
EOSINOPHIL # BLD: 0 K/UL (ref 0–0.4)
EOSINOPHILS RELATIVE PERCENT: 0 % (ref 1–4)
ERYTHROCYTE [DISTWIDTH] IN BLOOD BY AUTOMATED COUNT: 14.7 % (ref 11.8–14.4)
FIO2: 35
FIO2: 40
FIO2: 50
GFR, ESTIMATED: 70 ML/MIN/1.73M2
GFR, ESTIMATED: 81 ML/MIN/1.73M2
GFR, ESTIMATED: >90 ML/MIN/1.73M2
GLUCOSE BLD-MCNC: 140 MG/DL (ref 65–105)
GLUCOSE BLD-MCNC: 157 MG/DL (ref 65–105)
GLUCOSE BLD-MCNC: 165 MG/DL (ref 65–105)
GLUCOSE BLD-MCNC: 166 MG/DL (ref 65–105)
GLUCOSE BLD-MCNC: 169 MG/DL (ref 65–105)
GLUCOSE BLD-MCNC: 173 MG/DL (ref 65–105)
GLUCOSE BLD-MCNC: 173 MG/DL (ref 65–105)
GLUCOSE BLD-MCNC: 178 MG/DL (ref 65–105)
GLUCOSE BLD-MCNC: 180 MG/DL (ref 65–105)
GLUCOSE BLD-MCNC: 181 MG/DL (ref 65–105)
GLUCOSE BLD-MCNC: 185 MG/DL (ref 74–100)
GLUCOSE BLD-MCNC: 187 MG/DL (ref 65–105)
GLUCOSE BLD-MCNC: 193 MG/DL (ref 65–105)
GLUCOSE BLD-MCNC: 194 MG/DL (ref 65–105)
GLUCOSE BLD-MCNC: 198 MG/DL (ref 65–105)
GLUCOSE BLD-MCNC: 200 MG/DL (ref 65–105)
GLUCOSE BLD-MCNC: 248 MG/DL (ref 74–100)
GLUCOSE BLD-MCNC: 261 MG/DL (ref 74–100)
GLUCOSE SERPL-MCNC: 167 MG/DL (ref 74–99)
GLUCOSE SERPL-MCNC: 178 MG/DL (ref 74–99)
GLUCOSE SERPL-MCNC: 187 MG/DL (ref 74–99)
GLUCOSE SERPL-MCNC: 189 MG/DL (ref 74–99)
GLUCOSE SERPL-MCNC: 200 MG/DL (ref 74–99)
GLUCOSE SERPL-MCNC: 225 MG/DL (ref 74–99)
HCT VFR BLD AUTO: 35.9 % (ref 36.3–47.1)
HGB BLD-MCNC: 11.4 G/DL (ref 11.9–15.1)
IMM GRANULOCYTES # BLD AUTO: 0.31 K/UL (ref 0–0.3)
IMM GRANULOCYTES NFR BLD: 1 %
LYMPHOCYTES NFR BLD: 0.62 K/UL (ref 1–4.8)
LYMPHOCYTES RELATIVE PERCENT: 2 % (ref 24–44)
MAGNESIUM SERPL-MCNC: 1.8 MG/DL (ref 1.6–2.4)
MAGNESIUM SERPL-MCNC: 1.9 MG/DL (ref 1.6–2.4)
MAGNESIUM SERPL-MCNC: 2 MG/DL (ref 1.6–2.4)
MAGNESIUM SERPL-MCNC: 2.1 MG/DL (ref 1.6–2.4)
MCH RBC QN AUTO: 28.4 PG (ref 25.2–33.5)
MCHC RBC AUTO-ENTMCNC: 31.8 G/DL (ref 28.4–34.8)
MCV RBC AUTO: 89.5 FL (ref 82.6–102.9)
MODE: ABNORMAL
MONOCYTES NFR BLD: 0.62 K/UL (ref 0.1–0.8)
MONOCYTES NFR BLD: 2 % (ref 1–7)
MORPHOLOGY: ABNORMAL
NEGATIVE BASE EXCESS, ART: 2.5 MMOL/L (ref 0–2)
NEUTROPHILS NFR BLD: 95 % (ref 36–66)
NEUTS SEG NFR BLD: 29.55 K/UL (ref 1.8–7.7)
NRBC BLD-RTO: 0 PER 100 WBC
O2 DELIVERY DEVICE: ABNORMAL
O2 DELIVERY DEVICE: ABNORMAL
O2 DELIVERY DEVICE: NORMAL
PATIENT TEMP: 36.1
PHOSPHATE SERPL-MCNC: 1.3 MG/DL (ref 2.5–4.5)
PHOSPHATE SERPL-MCNC: 1.6 MG/DL (ref 2.5–4.5)
PHOSPHATE SERPL-MCNC: 1.7 MG/DL (ref 2.5–4.5)
PHOSPHATE SERPL-MCNC: 2.5 MG/DL (ref 2.5–4.5)
PHOSPHATE SERPL-MCNC: 2.5 MG/DL (ref 2.5–4.5)
PHOSPHATE SERPL-MCNC: 2.8 MG/DL (ref 2.5–4.5)
PLATELET # BLD AUTO: 424 K/UL (ref 138–453)
PMV BLD AUTO: 9 FL (ref 8.1–13.5)
POC HCO3: 27 MMOL/L (ref 21–28)
POC HCO3: 28.1 MMOL/L (ref 21–28)
POC HCO3: 29.4 MMOL/L (ref 21–28)
POC LACTIC ACID: 0.9 MMOL/L (ref 0.56–1.39)
POC LACTIC ACID: 2 MMOL/L (ref 0.56–1.39)
POC O2 SATURATION: 87.8 % (ref 94–98)
POC O2 SATURATION: 92.8 % (ref 94–98)
POC O2 SATURATION: 97.1 % (ref 94–98)
POC PCO2: 45 MM HG (ref 35–48)
POC PCO2: 50.4 MM HG (ref 35–48)
POC PCO2: 73.7 MM HG (ref 35–48)
POC PH: 7.19 (ref 7.35–7.45)
POC PH: 7.37 (ref 7.35–7.45)
POC PH: 7.39 (ref 7.35–7.45)
POC PO2: 69.3 MM HG (ref 83–108)
POC PO2: 69.3 MM HG (ref 83–108)
POC PO2: 93.2 MM HG (ref 83–108)
POSITIVE BASE EXCESS, ART: 1.5 MMOL/L (ref 0–3)
POSITIVE BASE EXCESS, ART: 3.3 MMOL/L (ref 0–3)
POTASSIUM SERPL-SCNC: 3.6 MMOL/L (ref 3.7–5.3)
POTASSIUM SERPL-SCNC: 3.7 MMOL/L (ref 3.7–5.3)
POTASSIUM SERPL-SCNC: 3.8 MMOL/L (ref 3.7–5.3)
POTASSIUM SERPL-SCNC: 4.2 MMOL/L (ref 3.7–5.3)
POTASSIUM SERPL-SCNC: 4.6 MMOL/L (ref 3.7–5.3)
POTASSIUM SERPL-SCNC: 4.6 MMOL/L (ref 3.7–5.3)
RBC # BLD AUTO: 4.01 M/UL (ref 3.95–5.11)
SAMPLE SITE: ABNORMAL
SAMPLE SITE: NORMAL
SODIUM SERPL-SCNC: 140 MMOL/L (ref 136–145)
SODIUM SERPL-SCNC: 144 MMOL/L (ref 136–145)
SODIUM SERPL-SCNC: 145 MMOL/L (ref 136–145)
SODIUM SERPL-SCNC: 145 MMOL/L (ref 136–145)
SODIUM SERPL-SCNC: 147 MMOL/L (ref 136–145)
SODIUM SERPL-SCNC: 148 MMOL/L (ref 136–145)
TROPONIN I SERPL HS-MCNC: 106 NG/L (ref 0–14)
TROPONIN I SERPL HS-MCNC: 109 NG/L (ref 0–14)
TROPONIN I SERPL HS-MCNC: 83 NG/L (ref 0–14)
TROPONIN I SERPL HS-MCNC: 89 NG/L (ref 0–14)
WBC OTHER # BLD: 31.1 K/UL (ref 3.5–11.3)

## 2025-08-06 PROCEDURE — 2580000003 HC RX 258

## 2025-08-06 PROCEDURE — 85520 HEPARIN ASSAY: CPT

## 2025-08-06 PROCEDURE — 84100 ASSAY OF PHOSPHORUS: CPT

## 2025-08-06 PROCEDURE — 2700000000 HC OXYGEN THERAPY PER DAY

## 2025-08-06 PROCEDURE — 6370000000 HC RX 637 (ALT 250 FOR IP)

## 2025-08-06 PROCEDURE — 94003 VENT MGMT INPAT SUBQ DAY: CPT

## 2025-08-06 PROCEDURE — 6360000002 HC RX W HCPCS

## 2025-08-06 PROCEDURE — 2500000003 HC RX 250 WO HCPCS

## 2025-08-06 PROCEDURE — 2580000003 HC RX 258: Performed by: NURSE PRACTITIONER

## 2025-08-06 PROCEDURE — 6370000000 HC RX 637 (ALT 250 FOR IP): Performed by: NURSE PRACTITIONER

## 2025-08-06 PROCEDURE — 2500000003 HC RX 250 WO HCPCS: Performed by: NURSE PRACTITIONER

## 2025-08-06 PROCEDURE — 2000000000 HC ICU R&B

## 2025-08-06 PROCEDURE — 6360000002 HC RX W HCPCS: Performed by: NURSE PRACTITIONER

## 2025-08-06 PROCEDURE — 99223 1ST HOSP IP/OBS HIGH 75: CPT | Performed by: INTERNAL MEDICINE

## 2025-08-06 PROCEDURE — 99291 CRITICAL CARE FIRST HOUR: CPT | Performed by: INTERNAL MEDICINE

## 2025-08-06 PROCEDURE — 94761 N-INVAS EAR/PLS OXIMETRY MLT: CPT

## 2025-08-06 PROCEDURE — 94640 AIRWAY INHALATION TREATMENT: CPT

## 2025-08-06 PROCEDURE — 84484 ASSAY OF TROPONIN QUANT: CPT

## 2025-08-06 PROCEDURE — 83735 ASSAY OF MAGNESIUM: CPT

## 2025-08-06 PROCEDURE — 85025 COMPLETE CBC W/AUTO DIFF WBC: CPT

## 2025-08-06 PROCEDURE — 71045 X-RAY EXAM CHEST 1 VIEW: CPT

## 2025-08-06 PROCEDURE — 82803 BLOOD GASES ANY COMBINATION: CPT

## 2025-08-06 PROCEDURE — 37799 UNLISTED PX VASCULAR SURGERY: CPT

## 2025-08-06 PROCEDURE — 80048 BASIC METABOLIC PNL TOTAL CA: CPT

## 2025-08-06 PROCEDURE — 83605 ASSAY OF LACTIC ACID: CPT

## 2025-08-06 RX ORDER — INSULIN LISPRO 100 [IU]/ML
0-8 INJECTION, SOLUTION INTRAVENOUS; SUBCUTANEOUS EVERY 6 HOURS
Status: DISCONTINUED | OUTPATIENT
Start: 2025-08-06 | End: 2025-08-09

## 2025-08-06 RX ORDER — INSULIN GLARGINE 100 [IU]/ML
15 INJECTION, SOLUTION SUBCUTANEOUS DAILY
Status: DISCONTINUED | OUTPATIENT
Start: 2025-08-06 | End: 2025-08-13 | Stop reason: HOSPADM

## 2025-08-06 RX ORDER — ALBUTEROL SULFATE 0.83 MG/ML
2.5 SOLUTION RESPIRATORY (INHALATION) EVERY 4 HOURS PRN
Status: DISCONTINUED | OUTPATIENT
Start: 2025-08-06 | End: 2025-08-13 | Stop reason: HOSPADM

## 2025-08-06 RX ORDER — PREDNISONE 20 MG/1
40 TABLET ORAL DAILY
Status: DISCONTINUED | OUTPATIENT
Start: 2025-08-07 | End: 2025-08-09

## 2025-08-06 RX ADMIN — POTASSIUM CHLORIDE, DEXTROSE MONOHYDRATE AND SODIUM CHLORIDE: 150; 5; 450 INJECTION, SOLUTION INTRAVENOUS at 02:08

## 2025-08-06 RX ADMIN — ATORVASTATIN CALCIUM 80 MG: 80 TABLET, FILM COATED ORAL at 08:17

## 2025-08-06 RX ADMIN — HEPARIN SODIUM AND DEXTROSE 14 UNITS/KG/HR: 10000; 5 INJECTION INTRAVENOUS at 19:24

## 2025-08-06 RX ADMIN — SODIUM CHLORIDE, PRESERVATIVE FREE 10 ML: 5 INJECTION INTRAVENOUS at 20:37

## 2025-08-06 RX ADMIN — ALBUTEROL SULFATE 2.5 MG: 2.5 SOLUTION RESPIRATORY (INHALATION) at 03:10

## 2025-08-06 RX ADMIN — HEPARIN SODIUM AND DEXTROSE 12 UNITS/KG/HR: 10000; 5 INJECTION INTRAVENOUS at 00:05

## 2025-08-06 RX ADMIN — SODIUM CHLORIDE, PRESERVATIVE FREE 40 MG: 5 INJECTION INTRAVENOUS at 08:17

## 2025-08-06 RX ADMIN — WATER 1000 MG: 1 INJECTION INTRAMUSCULAR; INTRAVENOUS; SUBCUTANEOUS at 23:03

## 2025-08-06 RX ADMIN — POTASSIUM CHLORIDE, DEXTROSE MONOHYDRATE AND SODIUM CHLORIDE: 150; 5; 450 INJECTION, SOLUTION INTRAVENOUS at 08:48

## 2025-08-06 RX ADMIN — POTASSIUM BICARBONATE 40 MEQ: 782 TABLET, EFFERVESCENT ORAL at 10:13

## 2025-08-06 RX ADMIN — AZITHROMYCIN DIHYDRATE 500 MG: 250 TABLET ORAL at 23:06

## 2025-08-06 RX ADMIN — WATER 40 MG: 1 INJECTION INTRAMUSCULAR; INTRAVENOUS; SUBCUTANEOUS at 05:57

## 2025-08-06 RX ADMIN — Medication 125 MCG/HR: at 18:09

## 2025-08-06 RX ADMIN — SODIUM PHOSPHATE, MONOBASIC, MONOHYDRATE AND SODIUM PHOSPHATE, DIBASIC, ANHYDROUS 15 MMOL: 142; 276 INJECTION, SOLUTION INTRAVENOUS at 01:15

## 2025-08-06 RX ADMIN — PROPOFOL 45 MCG/KG/MIN: 10 INJECTION, EMULSION INTRAVENOUS at 05:03

## 2025-08-06 RX ADMIN — ALBUTEROL SULFATE 2.5 MG: 2.5 SOLUTION RESPIRATORY (INHALATION) at 19:38

## 2025-08-06 RX ADMIN — SODIUM CHLORIDE: 0.9 INJECTION, SOLUTION INTRAVENOUS at 04:23

## 2025-08-06 RX ADMIN — IPRATROPIUM BROMIDE AND ALBUTEROL SULFATE 1 DOSE: .5; 2.5 SOLUTION RESPIRATORY (INHALATION) at 14:44

## 2025-08-06 RX ADMIN — IPRATROPIUM BROMIDE AND ALBUTEROL SULFATE 1 DOSE: .5; 2.5 SOLUTION RESPIRATORY (INHALATION) at 11:25

## 2025-08-06 RX ADMIN — PROPOFOL 40 MCG/KG/MIN: 10 INJECTION, EMULSION INTRAVENOUS at 20:39

## 2025-08-06 RX ADMIN — IPRATROPIUM BROMIDE AND ALBUTEROL SULFATE 1 DOSE: .5; 2.5 SOLUTION RESPIRATORY (INHALATION) at 07:35

## 2025-08-06 RX ADMIN — INSULIN GLARGINE 15 UNITS: 100 INJECTION, SOLUTION SUBCUTANEOUS at 14:55

## 2025-08-06 RX ADMIN — IPRATROPIUM BROMIDE AND ALBUTEROL SULFATE 1 DOSE: .5; 2.5 SOLUTION RESPIRATORY (INHALATION) at 19:38

## 2025-08-06 RX ADMIN — LEVOTHYROXINE SODIUM 37.5 MCG: 0.07 TABLET ORAL at 08:17

## 2025-08-06 RX ADMIN — HEPARIN SODIUM 2000 UNITS: 1000 INJECTION, SOLUTION INTRAVENOUS; SUBCUTANEOUS at 06:14

## 2025-08-06 RX ADMIN — Medication 12 MCG/MIN: at 10:26

## 2025-08-06 RX ADMIN — SODIUM CHLORIDE 8.3 UNITS/HR: 9 INJECTION, SOLUTION INTRAVENOUS at 05:36

## 2025-08-06 RX ADMIN — Medication 24 MCG/MIN: at 02:41

## 2025-08-06 RX ADMIN — PROPOFOL 45 MCG/KG/MIN: 10 INJECTION, EMULSION INTRAVENOUS at 09:08

## 2025-08-06 RX ADMIN — POTASSIUM CHLORIDE, DEXTROSE MONOHYDRATE AND SODIUM CHLORIDE: 150; 5; 450 INJECTION, SOLUTION INTRAVENOUS at 15:29

## 2025-08-06 RX ADMIN — SODIUM PHOSPHATE, MONOBASIC, MONOHYDRATE AND SODIUM PHOSPHATE, DIBASIC, ANHYDROUS 15 MMOL: 142; 276 INJECTION, SOLUTION INTRAVENOUS at 07:58

## 2025-08-06 RX ADMIN — SODIUM CHLORIDE: 0.45 INJECTION, SOLUTION INTRAVENOUS at 17:58

## 2025-08-06 RX ADMIN — ALBUTEROL SULFATE 2.5 MG: 2.5 SOLUTION RESPIRATORY (INHALATION) at 23:20

## 2025-08-06 RX ADMIN — PROPOFOL 45 MCG/KG/MIN: 10 INJECTION, EMULSION INTRAVENOUS at 01:57

## 2025-08-06 RX ADMIN — PROPOFOL 45 MCG/KG/MIN: 10 INJECTION, EMULSION INTRAVENOUS at 16:47

## 2025-08-06 ASSESSMENT — PULMONARY FUNCTION TESTS
PIF_VALUE: 27
PIF_VALUE: 27
PIF_VALUE: 26
PIF_VALUE: 28
PIF_VALUE: 28
PIF_VALUE: 27
PIF_VALUE: 26
PIF_VALUE: 26
PIF_VALUE: 28
PIF_VALUE: 27
PIF_VALUE: 28
PIF_VALUE: 27
PIF_VALUE: 28
PIF_VALUE: 26
PIF_VALUE: 26
PIF_VALUE: 28
PIF_VALUE: 27
PIF_VALUE: 28
PIF_VALUE: 26
PIF_VALUE: 28
PIF_VALUE: 27
PIF_VALUE: 28
PIF_VALUE: 23
PIF_VALUE: 25
PIF_VALUE: 24
PIF_VALUE: 27

## 2025-08-07 PROBLEM — R82.71 ASYMPTOMATIC BACTERIURIA: Status: ACTIVE | Noted: 2025-08-07

## 2025-08-07 PROBLEM — R06.02 SHORTNESS OF BREATH: Status: ACTIVE | Noted: 2025-08-07

## 2025-08-07 LAB
ANION GAP SERPL CALCULATED.3IONS-SCNC: 10 MMOL/L (ref 9–16)
ANTI-XA UNFRAC HEPARIN: 0.44 IU/L
BASOPHILS # BLD: 0.03 K/UL (ref 0–0.2)
BASOPHILS NFR BLD: 0 % (ref 0–2)
BUN SERPL-MCNC: 15 MG/DL (ref 8–23)
CALCIUM SERPL-MCNC: 8.3 MG/DL (ref 8.6–10.4)
CHLORIDE SERPL-SCNC: 108 MMOL/L (ref 98–107)
CO2 SERPL-SCNC: 25 MMOL/L (ref 20–31)
CREAT SERPL-MCNC: 0.6 MG/DL (ref 0.6–0.9)
EKG ATRIAL RATE: 115 BPM
EKG P AXIS: 61 DEGREES
EKG P-R INTERVAL: 138 MS
EKG Q-T INTERVAL: 326 MS
EKG QRS DURATION: 90 MS
EKG QTC CALCULATION (BAZETT): 450 MS
EKG R AXIS: 47 DEGREES
EKG T AXIS: 61 DEGREES
EKG VENTRICULAR RATE: 115 BPM
EOSINOPHIL # BLD: 0.03 K/UL (ref 0–0.44)
EOSINOPHILS RELATIVE PERCENT: 0 % (ref 1–4)
ERYTHROCYTE [DISTWIDTH] IN BLOOD BY AUTOMATED COUNT: 15.4 % (ref 11.8–14.4)
FIO2: 30
GFR, ESTIMATED: >90 ML/MIN/1.73M2
GLUCOSE BLD-MCNC: 129 MG/DL (ref 65–105)
GLUCOSE BLD-MCNC: 138 MG/DL (ref 65–105)
GLUCOSE BLD-MCNC: 142 MG/DL (ref 65–105)
GLUCOSE BLD-MCNC: 151 MG/DL (ref 65–105)
GLUCOSE BLD-MCNC: 163 MG/DL (ref 65–105)
GLUCOSE BLD-MCNC: 163 MG/DL (ref 74–100)
GLUCOSE SERPL-MCNC: 150 MG/DL (ref 74–99)
HCT VFR BLD AUTO: 32.3 % (ref 36.3–47.1)
HGB BLD-MCNC: 10 G/DL (ref 11.9–15.1)
IMM GRANULOCYTES # BLD AUTO: 0.3 K/UL (ref 0–0.3)
IMM GRANULOCYTES NFR BLD: 1 %
LYMPHOCYTES NFR BLD: 2.12 K/UL (ref 1.1–3.7)
LYMPHOCYTES RELATIVE PERCENT: 9 % (ref 24–43)
MCH RBC QN AUTO: 28.2 PG (ref 25.2–33.5)
MCHC RBC AUTO-ENTMCNC: 31 G/DL (ref 28.4–34.8)
MCV RBC AUTO: 91.2 FL (ref 82.6–102.9)
MONOCYTES NFR BLD: 1.06 K/UL (ref 0.1–1.2)
MONOCYTES NFR BLD: 5 % (ref 3–12)
NEUTROPHILS NFR BLD: 85 % (ref 36–65)
NEUTS SEG NFR BLD: 19.59 K/UL (ref 1.5–8.1)
NRBC BLD-RTO: 0 PER 100 WBC
PLATELET # BLD AUTO: 356 K/UL (ref 138–453)
PMV BLD AUTO: 9.3 FL (ref 8.1–13.5)
POC HCO3: 32.2 MMOL/L (ref 21–28)
POC O2 SATURATION: 87.8 % (ref 94–98)
POC PCO2: 58.6 MM HG (ref 35–48)
POC PH: 7.35 (ref 7.35–7.45)
POC PO2: 58.8 MM HG (ref 83–108)
POSITIVE BASE EXCESS, ART: 5.1 MMOL/L (ref 0–3)
POTASSIUM SERPL-SCNC: 4.1 MMOL/L (ref 3.7–5.3)
RBC # BLD AUTO: 3.54 M/UL (ref 3.95–5.11)
RBC # BLD: ABNORMAL 10*6/UL
SODIUM SERPL-SCNC: 143 MMOL/L (ref 136–145)
WBC OTHER # BLD: 23.1 K/UL (ref 3.5–11.3)

## 2025-08-07 PROCEDURE — 85025 COMPLETE CBC W/AUTO DIFF WBC: CPT

## 2025-08-07 PROCEDURE — 2000000000 HC ICU R&B

## 2025-08-07 PROCEDURE — 6370000000 HC RX 637 (ALT 250 FOR IP): Performed by: NURSE PRACTITIONER

## 2025-08-07 PROCEDURE — 6360000002 HC RX W HCPCS: Performed by: NURSE PRACTITIONER

## 2025-08-07 PROCEDURE — 36415 COLL VENOUS BLD VENIPUNCTURE: CPT

## 2025-08-07 PROCEDURE — 94003 VENT MGMT INPAT SUBQ DAY: CPT

## 2025-08-07 PROCEDURE — 2500000003 HC RX 250 WO HCPCS

## 2025-08-07 PROCEDURE — 82803 BLOOD GASES ANY COMBINATION: CPT

## 2025-08-07 PROCEDURE — 80048 BASIC METABOLIC PNL TOTAL CA: CPT

## 2025-08-07 PROCEDURE — 94761 N-INVAS EAR/PLS OXIMETRY MLT: CPT

## 2025-08-07 PROCEDURE — 6370000000 HC RX 637 (ALT 250 FOR IP)

## 2025-08-07 PROCEDURE — 85520 HEPARIN ASSAY: CPT

## 2025-08-07 PROCEDURE — 6360000002 HC RX W HCPCS

## 2025-08-07 PROCEDURE — 99291 CRITICAL CARE FIRST HOUR: CPT | Performed by: INTERNAL MEDICINE

## 2025-08-07 PROCEDURE — 2580000003 HC RX 258: Performed by: NURSE PRACTITIONER

## 2025-08-07 PROCEDURE — 2580000003 HC RX 258

## 2025-08-07 PROCEDURE — 2500000003 HC RX 250 WO HCPCS: Performed by: NURSE PRACTITIONER

## 2025-08-07 PROCEDURE — 94640 AIRWAY INHALATION TREATMENT: CPT

## 2025-08-07 PROCEDURE — 36600 WITHDRAWAL OF ARTERIAL BLOOD: CPT

## 2025-08-07 PROCEDURE — 82947 ASSAY GLUCOSE BLOOD QUANT: CPT

## 2025-08-07 PROCEDURE — 93010 ELECTROCARDIOGRAM REPORT: CPT | Performed by: INTERNAL MEDICINE

## 2025-08-07 PROCEDURE — 2700000000 HC OXYGEN THERAPY PER DAY

## 2025-08-07 RX ORDER — FUROSEMIDE 10 MG/ML
40 INJECTION INTRAMUSCULAR; INTRAVENOUS ONCE
Status: COMPLETED | OUTPATIENT
Start: 2025-08-07 | End: 2025-08-07

## 2025-08-07 RX ADMIN — LEVOTHYROXINE SODIUM 37.5 MCG: 0.07 TABLET ORAL at 08:50

## 2025-08-07 RX ADMIN — IPRATROPIUM BROMIDE AND ALBUTEROL SULFATE 1 DOSE: .5; 2.5 SOLUTION RESPIRATORY (INHALATION) at 08:03

## 2025-08-07 RX ADMIN — SODIUM CHLORIDE, PRESERVATIVE FREE 10 ML: 5 INJECTION INTRAVENOUS at 19:57

## 2025-08-07 RX ADMIN — IPRATROPIUM BROMIDE AND ALBUTEROL SULFATE 1 DOSE: .5; 2.5 SOLUTION RESPIRATORY (INHALATION) at 19:42

## 2025-08-07 RX ADMIN — PROPOFOL 45 MCG/KG/MIN: 10 INJECTION, EMULSION INTRAVENOUS at 04:47

## 2025-08-07 RX ADMIN — ATORVASTATIN CALCIUM 80 MG: 80 TABLET, FILM COATED ORAL at 08:50

## 2025-08-07 RX ADMIN — SODIUM CHLORIDE, PRESERVATIVE FREE 40 MG: 5 INJECTION INTRAVENOUS at 08:51

## 2025-08-07 RX ADMIN — SODIUM CHLORIDE: 0.45 INJECTION, SOLUTION INTRAVENOUS at 03:59

## 2025-08-07 RX ADMIN — Medication 5 MCG/MIN: at 16:41

## 2025-08-07 RX ADMIN — INSULIN GLARGINE 15 UNITS: 100 INJECTION, SOLUTION SUBCUTANEOUS at 08:51

## 2025-08-07 RX ADMIN — PROPOFOL 40 MCG/KG/MIN: 10 INJECTION, EMULSION INTRAVENOUS at 00:57

## 2025-08-07 RX ADMIN — PROPOFOL 25 MCG/KG/MIN: 10 INJECTION, EMULSION INTRAVENOUS at 13:40

## 2025-08-07 RX ADMIN — IPRATROPIUM BROMIDE AND ALBUTEROL SULFATE 1 DOSE: .5; 2.5 SOLUTION RESPIRATORY (INHALATION) at 15:39

## 2025-08-07 RX ADMIN — WATER 1000 MG: 1 INJECTION INTRAMUSCULAR; INTRAVENOUS; SUBCUTANEOUS at 23:46

## 2025-08-07 RX ADMIN — PROPOFOL 45 MCG/KG/MIN: 10 INJECTION, EMULSION INTRAVENOUS at 06:49

## 2025-08-07 RX ADMIN — IPRATROPIUM BROMIDE AND ALBUTEROL SULFATE 1 DOSE: .5; 2.5 SOLUTION RESPIRATORY (INHALATION) at 11:36

## 2025-08-07 RX ADMIN — SODIUM CHLORIDE, PRESERVATIVE FREE 10 ML: 5 INJECTION INTRAVENOUS at 08:52

## 2025-08-07 RX ADMIN — PROPOFOL 25 MCG/KG/MIN: 10 INJECTION, EMULSION INTRAVENOUS at 19:57

## 2025-08-07 RX ADMIN — Medication 50 MCG/HR: at 18:02

## 2025-08-07 RX ADMIN — ALBUTEROL SULFATE 2.5 MG: 2.5 SOLUTION RESPIRATORY (INHALATION) at 03:12

## 2025-08-07 RX ADMIN — FUROSEMIDE 40 MG: 10 INJECTION, SOLUTION INTRAMUSCULAR; INTRAVENOUS at 12:08

## 2025-08-07 RX ADMIN — PREDNISONE 40 MG: 20 TABLET ORAL at 08:51

## 2025-08-07 RX ADMIN — SODIUM CHLORIDE: 0.9 INJECTION, SOLUTION INTRAVENOUS at 03:59

## 2025-08-07 RX ADMIN — AZITHROMYCIN DIHYDRATE 500 MG: 250 TABLET ORAL at 23:46

## 2025-08-07 ASSESSMENT — PULMONARY FUNCTION TESTS
PIF_VALUE: 16
PIF_VALUE: 29
PIF_VALUE: 19
PIF_VALUE: 13
PIF_VALUE: 23
PIF_VALUE: 22
PIF_VALUE: 21
PIF_VALUE: 16
PIF_VALUE: 24
PIF_VALUE: 25

## 2025-08-08 LAB
ALLEN TEST: POSITIVE
ANION GAP SERPL CALCULATED.3IONS-SCNC: 10 MMOL/L (ref 9–16)
BASOPHILS # BLD: 0.03 K/UL (ref 0–0.2)
BASOPHILS NFR BLD: 0 % (ref 0–2)
BUN SERPL-MCNC: 20 MG/DL (ref 8–23)
CALCIUM SERPL-MCNC: 8.7 MG/DL (ref 8.6–10.4)
CHLORIDE SERPL-SCNC: 103 MMOL/L (ref 98–107)
CO2 SERPL-SCNC: 30 MMOL/L (ref 20–31)
CREAT SERPL-MCNC: 0.7 MG/DL (ref 0.6–0.9)
EOSINOPHIL # BLD: 0.05 K/UL (ref 0–0.44)
EOSINOPHILS RELATIVE PERCENT: 0 % (ref 1–4)
ERYTHROCYTE [DISTWIDTH] IN BLOOD BY AUTOMATED COUNT: 15.2 % (ref 11.8–14.4)
FIO2: 35
GFR, ESTIMATED: >90 ML/MIN/1.73M2
GLUCOSE BLD-MCNC: 112 MG/DL (ref 65–105)
GLUCOSE BLD-MCNC: 129 MG/DL (ref 65–105)
GLUCOSE BLD-MCNC: 147 MG/DL (ref 74–100)
GLUCOSE SERPL-MCNC: 139 MG/DL (ref 74–99)
HCT VFR BLD AUTO: 31.6 % (ref 36.3–47.1)
HGB BLD-MCNC: 9.9 G/DL (ref 11.9–15.1)
IMM GRANULOCYTES # BLD AUTO: 0.28 K/UL (ref 0–0.3)
IMM GRANULOCYTES NFR BLD: 2 %
LYMPHOCYTES NFR BLD: 1.96 K/UL (ref 1.1–3.7)
LYMPHOCYTES RELATIVE PERCENT: 12 % (ref 24–43)
MCH RBC QN AUTO: 28.6 PG (ref 25.2–33.5)
MCHC RBC AUTO-ENTMCNC: 31.3 G/DL (ref 28.4–34.8)
MCV RBC AUTO: 91.3 FL (ref 82.6–102.9)
MONOCYTES NFR BLD: 0.89 K/UL (ref 0.1–1.2)
MONOCYTES NFR BLD: 5 % (ref 3–12)
NEUTROPHILS NFR BLD: 81 % (ref 36–65)
NEUTS SEG NFR BLD: 13.84 K/UL (ref 1.5–8.1)
NRBC BLD-RTO: 0 PER 100 WBC
PLATELET # BLD AUTO: 319 K/UL (ref 138–453)
PMV BLD AUTO: 9.3 FL (ref 8.1–13.5)
POC HCO3: 35.6 MMOL/L (ref 21–28)
POC LACTIC ACID: 0.8 MMOL/L (ref 0.56–1.39)
POC O2 SATURATION: 94.8 % (ref 94–98)
POC PCO2: 46.7 MM HG (ref 35–48)
POC PH: 7.49 (ref 7.35–7.45)
POC PO2: 69.6 MM HG (ref 83–108)
POSITIVE BASE EXCESS, ART: 10.9 MMOL/L (ref 0–3)
POTASSIUM SERPL-SCNC: 4.1 MMOL/L (ref 3.7–5.3)
RBC # BLD AUTO: 3.46 M/UL (ref 3.95–5.11)
RBC # BLD: ABNORMAL 10*6/UL
SAMPLE SITE: ABNORMAL
SODIUM SERPL-SCNC: 143 MMOL/L (ref 136–145)
WBC OTHER # BLD: 17.1 K/UL (ref 3.5–11.3)

## 2025-08-08 PROCEDURE — 83605 ASSAY OF LACTIC ACID: CPT

## 2025-08-08 PROCEDURE — 6370000000 HC RX 637 (ALT 250 FOR IP): Performed by: NURSE PRACTITIONER

## 2025-08-08 PROCEDURE — 6360000002 HC RX W HCPCS

## 2025-08-08 PROCEDURE — 6370000000 HC RX 637 (ALT 250 FOR IP)

## 2025-08-08 PROCEDURE — 85025 COMPLETE CBC W/AUTO DIFF WBC: CPT

## 2025-08-08 PROCEDURE — 82803 BLOOD GASES ANY COMBINATION: CPT

## 2025-08-08 PROCEDURE — 6360000002 HC RX W HCPCS: Performed by: NURSE PRACTITIONER

## 2025-08-08 PROCEDURE — 94003 VENT MGMT INPAT SUBQ DAY: CPT

## 2025-08-08 PROCEDURE — 80048 BASIC METABOLIC PNL TOTAL CA: CPT

## 2025-08-08 PROCEDURE — 2700000000 HC OXYGEN THERAPY PER DAY

## 2025-08-08 PROCEDURE — 2500000003 HC RX 250 WO HCPCS

## 2025-08-08 PROCEDURE — 2580000003 HC RX 258: Performed by: NURSE PRACTITIONER

## 2025-08-08 PROCEDURE — 2000000000 HC ICU R&B

## 2025-08-08 PROCEDURE — 94761 N-INVAS EAR/PLS OXIMETRY MLT: CPT

## 2025-08-08 PROCEDURE — 36600 WITHDRAWAL OF ARTERIAL BLOOD: CPT

## 2025-08-08 PROCEDURE — 36415 COLL VENOUS BLD VENIPUNCTURE: CPT

## 2025-08-08 PROCEDURE — 2500000003 HC RX 250 WO HCPCS: Performed by: NURSE PRACTITIONER

## 2025-08-08 PROCEDURE — 94640 AIRWAY INHALATION TREATMENT: CPT

## 2025-08-08 PROCEDURE — 99291 CRITICAL CARE FIRST HOUR: CPT | Performed by: INTERNAL MEDICINE

## 2025-08-08 PROCEDURE — G0108 DIAB MANAGE TRN  PER INDIV: HCPCS

## 2025-08-08 PROCEDURE — 82947 ASSAY GLUCOSE BLOOD QUANT: CPT

## 2025-08-08 RX ORDER — HEPARIN SODIUM 5000 [USP'U]/ML
5000 INJECTION, SOLUTION INTRAVENOUS; SUBCUTANEOUS 2 TIMES DAILY
Status: DISCONTINUED | OUTPATIENT
Start: 2025-08-08 | End: 2025-08-09

## 2025-08-08 RX ADMIN — WATER 1000 MG: 1 INJECTION INTRAMUSCULAR; INTRAVENOUS; SUBCUTANEOUS at 23:49

## 2025-08-08 RX ADMIN — HEPARIN SODIUM 5000 UNITS: 5000 INJECTION, SOLUTION INTRAVENOUS; SUBCUTANEOUS at 21:53

## 2025-08-08 RX ADMIN — IPRATROPIUM BROMIDE AND ALBUTEROL SULFATE 1 DOSE: .5; 2.5 SOLUTION RESPIRATORY (INHALATION) at 20:02

## 2025-08-08 RX ADMIN — SODIUM CHLORIDE, PRESERVATIVE FREE 10 ML: 5 INJECTION INTRAVENOUS at 21:54

## 2025-08-08 RX ADMIN — ATORVASTATIN CALCIUM 80 MG: 80 TABLET, FILM COATED ORAL at 07:34

## 2025-08-08 RX ADMIN — IPRATROPIUM BROMIDE AND ALBUTEROL SULFATE 1 DOSE: .5; 2.5 SOLUTION RESPIRATORY (INHALATION) at 08:22

## 2025-08-08 RX ADMIN — PROPOFOL 25 MCG/KG/MIN: 10 INJECTION, EMULSION INTRAVENOUS at 00:30

## 2025-08-08 RX ADMIN — SODIUM CHLORIDE, PRESERVATIVE FREE 10 ML: 5 INJECTION INTRAVENOUS at 07:35

## 2025-08-08 RX ADMIN — IPRATROPIUM BROMIDE AND ALBUTEROL SULFATE 1 DOSE: .5; 2.5 SOLUTION RESPIRATORY (INHALATION) at 15:32

## 2025-08-08 RX ADMIN — PROPOFOL 25 MCG/KG/MIN: 10 INJECTION, EMULSION INTRAVENOUS at 06:36

## 2025-08-08 RX ADMIN — LEVOTHYROXINE SODIUM 37.5 MCG: 0.07 TABLET ORAL at 07:33

## 2025-08-08 RX ADMIN — PREDNISONE 40 MG: 20 TABLET ORAL at 07:33

## 2025-08-08 RX ADMIN — IPRATROPIUM BROMIDE AND ALBUTEROL SULFATE 1 DOSE: .5; 2.5 SOLUTION RESPIRATORY (INHALATION) at 11:31

## 2025-08-08 RX ADMIN — SODIUM CHLORIDE: 0.9 INJECTION, SOLUTION INTRAVENOUS at 05:17

## 2025-08-08 RX ADMIN — HEPARIN SODIUM 5000 UNITS: 5000 INJECTION, SOLUTION INTRAVENOUS; SUBCUTANEOUS at 14:39

## 2025-08-08 RX ADMIN — INSULIN GLARGINE 15 UNITS: 100 INJECTION, SOLUTION SUBCUTANEOUS at 07:34

## 2025-08-08 RX ADMIN — SODIUM CHLORIDE, PRESERVATIVE FREE 40 MG: 5 INJECTION INTRAVENOUS at 07:35

## 2025-08-08 ASSESSMENT — PULMONARY FUNCTION TESTS
PIF_VALUE: 27
PIF_VALUE: 25
PIF_VALUE: 26
PIF_VALUE: 19

## 2025-08-09 ENCOUNTER — APPOINTMENT (OUTPATIENT)
Dept: GENERAL RADIOLOGY | Age: 70
DRG: 871 | End: 2025-08-09
Payer: MEDICARE

## 2025-08-09 LAB
ANION GAP SERPL CALCULATED.3IONS-SCNC: 14 MMOL/L (ref 9–16)
BASOPHILS # BLD: 0.07 K/UL (ref 0–0.2)
BASOPHILS NFR BLD: 1 % (ref 0–2)
BUN SERPL-MCNC: 21 MG/DL (ref 8–23)
CALCIUM SERPL-MCNC: 9 MG/DL (ref 8.6–10.4)
CHLORIDE SERPL-SCNC: 102 MMOL/L (ref 98–107)
CO2 SERPL-SCNC: 25 MMOL/L (ref 20–31)
CREAT SERPL-MCNC: 0.6 MG/DL (ref 0.6–0.9)
EOSINOPHIL # BLD: 0.1 K/UL (ref 0–0.44)
EOSINOPHILS RELATIVE PERCENT: 1 % (ref 1–4)
ERYTHROCYTE [DISTWIDTH] IN BLOOD BY AUTOMATED COUNT: 14.4 % (ref 11.8–14.4)
GFR, ESTIMATED: >90 ML/MIN/1.73M2
GLUCOSE BLD-MCNC: 109 MG/DL (ref 65–105)
GLUCOSE BLD-MCNC: 119 MG/DL (ref 65–105)
GLUCOSE BLD-MCNC: 136 MG/DL (ref 65–105)
GLUCOSE BLD-MCNC: 80 MG/DL (ref 65–105)
GLUCOSE BLD-MCNC: 83 MG/DL (ref 65–105)
GLUCOSE BLD-MCNC: 88 MG/DL (ref 65–105)
GLUCOSE SERPL-MCNC: 117 MG/DL (ref 74–99)
HCT VFR BLD AUTO: 38.1 % (ref 36.3–47.1)
HGB BLD-MCNC: 12.1 G/DL (ref 11.9–15.1)
IMM GRANULOCYTES # BLD AUTO: 0.27 K/UL (ref 0–0.3)
IMM GRANULOCYTES NFR BLD: 2 %
LYMPHOCYTES NFR BLD: 1.04 K/UL (ref 1.1–3.7)
LYMPHOCYTES RELATIVE PERCENT: 8 % (ref 24–43)
MCH RBC QN AUTO: 28.4 PG (ref 25.2–33.5)
MCHC RBC AUTO-ENTMCNC: 31.8 G/DL (ref 28.4–34.8)
MCV RBC AUTO: 89.4 FL (ref 82.6–102.9)
MONOCYTES NFR BLD: 0.33 K/UL (ref 0.1–1.2)
MONOCYTES NFR BLD: 3 % (ref 3–12)
NEUTROPHILS NFR BLD: 85 % (ref 36–65)
NEUTS SEG NFR BLD: 10.64 K/UL (ref 1.5–8.1)
NRBC BLD-RTO: 0 PER 100 WBC
PLATELET # BLD AUTO: 300 K/UL (ref 138–453)
PMV BLD AUTO: 9.1 FL (ref 8.1–13.5)
POTASSIUM SERPL-SCNC: 4.5 MMOL/L (ref 3.7–5.3)
RBC # BLD AUTO: 4.26 M/UL (ref 3.95–5.11)
SODIUM SERPL-SCNC: 141 MMOL/L (ref 136–145)
WBC OTHER # BLD: 12.5 K/UL (ref 3.5–11.3)

## 2025-08-09 PROCEDURE — 71045 X-RAY EXAM CHEST 1 VIEW: CPT

## 2025-08-09 PROCEDURE — 99222 1ST HOSP IP/OBS MODERATE 55: CPT | Performed by: FAMILY MEDICINE

## 2025-08-09 PROCEDURE — 6360000002 HC RX W HCPCS

## 2025-08-09 PROCEDURE — 94640 AIRWAY INHALATION TREATMENT: CPT

## 2025-08-09 PROCEDURE — 6360000002 HC RX W HCPCS: Performed by: NURSE PRACTITIONER

## 2025-08-09 PROCEDURE — 2500000003 HC RX 250 WO HCPCS

## 2025-08-09 PROCEDURE — 6370000000 HC RX 637 (ALT 250 FOR IP)

## 2025-08-09 PROCEDURE — 82947 ASSAY GLUCOSE BLOOD QUANT: CPT

## 2025-08-09 PROCEDURE — 80048 BASIC METABOLIC PNL TOTAL CA: CPT

## 2025-08-09 PROCEDURE — 2500000003 HC RX 250 WO HCPCS: Performed by: NURSE PRACTITIONER

## 2025-08-09 PROCEDURE — 97162 PT EVAL MOD COMPLEX 30 MIN: CPT

## 2025-08-09 PROCEDURE — 94761 N-INVAS EAR/PLS OXIMETRY MLT: CPT

## 2025-08-09 PROCEDURE — 2700000000 HC OXYGEN THERAPY PER DAY

## 2025-08-09 PROCEDURE — 99291 CRITICAL CARE FIRST HOUR: CPT | Performed by: INTERNAL MEDICINE

## 2025-08-09 PROCEDURE — 85025 COMPLETE CBC W/AUTO DIFF WBC: CPT

## 2025-08-09 PROCEDURE — 2060000000 HC ICU INTERMEDIATE R&B

## 2025-08-09 PROCEDURE — 97530 THERAPEUTIC ACTIVITIES: CPT

## 2025-08-09 PROCEDURE — 36415 COLL VENOUS BLD VENIPUNCTURE: CPT

## 2025-08-09 PROCEDURE — 92610 EVALUATE SWALLOWING FUNCTION: CPT

## 2025-08-09 PROCEDURE — 6370000000 HC RX 637 (ALT 250 FOR IP): Performed by: NURSE PRACTITIONER

## 2025-08-09 RX ORDER — HEPARIN SODIUM 5000 [USP'U]/ML
5000 INJECTION, SOLUTION INTRAVENOUS; SUBCUTANEOUS EVERY 8 HOURS SCHEDULED
Status: DISCONTINUED | OUTPATIENT
Start: 2025-08-09 | End: 2025-08-13 | Stop reason: HOSPADM

## 2025-08-09 RX ORDER — INSULIN LISPRO 100 [IU]/ML
0-8 INJECTION, SOLUTION INTRAVENOUS; SUBCUTANEOUS
Status: DISCONTINUED | OUTPATIENT
Start: 2025-08-09 | End: 2025-08-09

## 2025-08-09 RX ORDER — PREDNISONE 20 MG/1
40 TABLET ORAL DAILY
Status: DISCONTINUED | OUTPATIENT
Start: 2025-08-09 | End: 2025-08-12

## 2025-08-09 RX ORDER — INSULIN LISPRO 100 [IU]/ML
0-8 INJECTION, SOLUTION INTRAVENOUS; SUBCUTANEOUS
Status: DISCONTINUED | OUTPATIENT
Start: 2025-08-09 | End: 2025-08-13 | Stop reason: HOSPADM

## 2025-08-09 RX ORDER — IPRATROPIUM BROMIDE AND ALBUTEROL SULFATE 2.5; .5 MG/3ML; MG/3ML
1 SOLUTION RESPIRATORY (INHALATION)
Status: DISCONTINUED | OUTPATIENT
Start: 2025-08-09 | End: 2025-08-13 | Stop reason: HOSPADM

## 2025-08-09 RX ADMIN — SODIUM CHLORIDE, PRESERVATIVE FREE 40 MG: 5 INJECTION INTRAVENOUS at 08:38

## 2025-08-09 RX ADMIN — SODIUM CHLORIDE, PRESERVATIVE FREE 10 ML: 5 INJECTION INTRAVENOUS at 20:41

## 2025-08-09 RX ADMIN — IPRATROPIUM BROMIDE AND ALBUTEROL SULFATE 1 DOSE: .5; 2.5 SOLUTION RESPIRATORY (INHALATION) at 06:35

## 2025-08-09 RX ADMIN — IPRATROPIUM BROMIDE AND ALBUTEROL SULFATE 1 DOSE: .5; 2.5 SOLUTION RESPIRATORY (INHALATION) at 15:59

## 2025-08-09 RX ADMIN — SODIUM CHLORIDE, PRESERVATIVE FREE 10 ML: 5 INJECTION INTRAVENOUS at 08:20

## 2025-08-09 RX ADMIN — HEPARIN SODIUM 5000 UNITS: 5000 INJECTION, SOLUTION INTRAVENOUS; SUBCUTANEOUS at 08:38

## 2025-08-09 RX ADMIN — IPRATROPIUM BROMIDE AND ALBUTEROL SULFATE 1 DOSE: .5; 2.5 SOLUTION RESPIRATORY (INHALATION) at 11:13

## 2025-08-09 RX ADMIN — HEPARIN SODIUM 5000 UNITS: 5000 INJECTION INTRAVENOUS; SUBCUTANEOUS at 20:41

## 2025-08-09 RX ADMIN — WATER 40 MG: 1 INJECTION INTRAMUSCULAR; INTRAVENOUS; SUBCUTANEOUS at 06:39

## 2025-08-09 RX ADMIN — LEVOTHYROXINE SODIUM 37.5 MCG: 0.07 TABLET ORAL at 08:38

## 2025-08-09 RX ADMIN — WATER 1000 MG: 1 INJECTION INTRAMUSCULAR; INTRAVENOUS; SUBCUTANEOUS at 23:17

## 2025-08-09 RX ADMIN — HEPARIN SODIUM 5000 UNITS: 5000 INJECTION INTRAVENOUS; SUBCUTANEOUS at 15:18

## 2025-08-09 RX ADMIN — ATORVASTATIN CALCIUM 80 MG: 80 TABLET, FILM COATED ORAL at 08:38

## 2025-08-09 ASSESSMENT — PAIN SCALES - GENERAL
PAINLEVEL_OUTOF10: 0

## 2025-08-10 LAB
ANION GAP SERPL CALCULATED.3IONS-SCNC: 11 MMOL/L (ref 9–16)
BASOPHILS # BLD: <0.03 K/UL (ref 0–0.2)
BASOPHILS NFR BLD: 0 % (ref 0–2)
BUN SERPL-MCNC: 23 MG/DL (ref 8–23)
CALCIUM SERPL-MCNC: 8.8 MG/DL (ref 8.6–10.4)
CHLORIDE SERPL-SCNC: 102 MMOL/L (ref 98–107)
CO2 SERPL-SCNC: 28 MMOL/L (ref 20–31)
CREAT SERPL-MCNC: 0.6 MG/DL (ref 0.6–0.9)
EOSINOPHIL # BLD: 0.39 K/UL (ref 0–0.44)
EOSINOPHILS RELATIVE PERCENT: 3 % (ref 1–4)
ERYTHROCYTE [DISTWIDTH] IN BLOOD BY AUTOMATED COUNT: 14.1 % (ref 11.8–14.4)
GFR, ESTIMATED: >90 ML/MIN/1.73M2
GLUCOSE BLD-MCNC: 151 MG/DL (ref 65–105)
GLUCOSE BLD-MCNC: 171 MG/DL (ref 65–105)
GLUCOSE BLD-MCNC: 213 MG/DL (ref 65–105)
GLUCOSE BLD-MCNC: 84 MG/DL (ref 65–105)
GLUCOSE SERPL-MCNC: 88 MG/DL (ref 74–99)
HCT VFR BLD AUTO: 38.4 % (ref 36.3–47.1)
HGB BLD-MCNC: 11.9 G/DL (ref 11.9–15.1)
IMM GRANULOCYTES # BLD AUTO: 0.42 K/UL (ref 0–0.3)
IMM GRANULOCYTES NFR BLD: 4 %
LYMPHOCYTES NFR BLD: 2.66 K/UL (ref 1.1–3.7)
LYMPHOCYTES RELATIVE PERCENT: 22 % (ref 24–43)
MCH RBC QN AUTO: 27.9 PG (ref 25.2–33.5)
MCHC RBC AUTO-ENTMCNC: 31 G/DL (ref 28.4–34.8)
MCV RBC AUTO: 89.9 FL (ref 82.6–102.9)
MICROORGANISM SPEC CULT: NORMAL
MICROORGANISM SPEC CULT: NORMAL
MONOCYTES NFR BLD: 0.75 K/UL (ref 0.1–1.2)
MONOCYTES NFR BLD: 6 % (ref 3–12)
NEUTROPHILS NFR BLD: 65 % (ref 36–65)
NEUTS SEG NFR BLD: 7.81 K/UL (ref 1.5–8.1)
NRBC BLD-RTO: 0 PER 100 WBC
PLATELET # BLD AUTO: 322 K/UL (ref 138–453)
PMV BLD AUTO: 9.2 FL (ref 8.1–13.5)
POTASSIUM SERPL-SCNC: 3.6 MMOL/L (ref 3.7–5.3)
RBC # BLD AUTO: 4.27 M/UL (ref 3.95–5.11)
SERVICE CMNT-IMP: NORMAL
SERVICE CMNT-IMP: NORMAL
SODIUM SERPL-SCNC: 141 MMOL/L (ref 136–145)
SPECIMEN DESCRIPTION: NORMAL
SPECIMEN DESCRIPTION: NORMAL
WBC OTHER # BLD: 12 K/UL (ref 3.5–11.3)

## 2025-08-10 PROCEDURE — 6370000000 HC RX 637 (ALT 250 FOR IP)

## 2025-08-10 PROCEDURE — 6360000002 HC RX W HCPCS: Performed by: STUDENT IN AN ORGANIZED HEALTH CARE EDUCATION/TRAINING PROGRAM

## 2025-08-10 PROCEDURE — 2500000003 HC RX 250 WO HCPCS: Performed by: NURSE PRACTITIONER

## 2025-08-10 PROCEDURE — 6360000002 HC RX W HCPCS

## 2025-08-10 PROCEDURE — 6370000000 HC RX 637 (ALT 250 FOR IP): Performed by: NURSE PRACTITIONER

## 2025-08-10 PROCEDURE — 36415 COLL VENOUS BLD VENIPUNCTURE: CPT

## 2025-08-10 PROCEDURE — 99233 SBSQ HOSP IP/OBS HIGH 50: CPT | Performed by: STUDENT IN AN ORGANIZED HEALTH CARE EDUCATION/TRAINING PROGRAM

## 2025-08-10 PROCEDURE — 94640 AIRWAY INHALATION TREATMENT: CPT

## 2025-08-10 PROCEDURE — 94761 N-INVAS EAR/PLS OXIMETRY MLT: CPT

## 2025-08-10 PROCEDURE — 85025 COMPLETE CBC W/AUTO DIFF WBC: CPT

## 2025-08-10 PROCEDURE — 82947 ASSAY GLUCOSE BLOOD QUANT: CPT

## 2025-08-10 PROCEDURE — 2700000000 HC OXYGEN THERAPY PER DAY

## 2025-08-10 PROCEDURE — 99232 SBSQ HOSP IP/OBS MODERATE 35: CPT | Performed by: STUDENT IN AN ORGANIZED HEALTH CARE EDUCATION/TRAINING PROGRAM

## 2025-08-10 PROCEDURE — 80048 BASIC METABOLIC PNL TOTAL CA: CPT

## 2025-08-10 PROCEDURE — 2500000003 HC RX 250 WO HCPCS

## 2025-08-10 PROCEDURE — 2060000000 HC ICU INTERMEDIATE R&B

## 2025-08-10 PROCEDURE — 6370000000 HC RX 637 (ALT 250 FOR IP): Performed by: STUDENT IN AN ORGANIZED HEALTH CARE EDUCATION/TRAINING PROGRAM

## 2025-08-10 RX ORDER — ECHINACEA PURPUREA EXTRACT 125 MG
1 TABLET ORAL PRN
Status: DISCONTINUED | OUTPATIENT
Start: 2025-08-10 | End: 2025-08-13 | Stop reason: HOSPADM

## 2025-08-10 RX ORDER — FUROSEMIDE 10 MG/ML
20 INJECTION INTRAMUSCULAR; INTRAVENOUS 2 TIMES DAILY
Status: COMPLETED | OUTPATIENT
Start: 2025-08-10 | End: 2025-08-11

## 2025-08-10 RX ORDER — GUAIFENESIN 600 MG/1
600 TABLET, EXTENDED RELEASE ORAL 2 TIMES DAILY
Status: DISCONTINUED | OUTPATIENT
Start: 2025-08-10 | End: 2025-08-13 | Stop reason: HOSPADM

## 2025-08-10 RX ORDER — BUDESONIDE AND FORMOTEROL FUMARATE DIHYDRATE 80; 4.5 UG/1; UG/1
2 AEROSOL RESPIRATORY (INHALATION)
Status: DISCONTINUED | OUTPATIENT
Start: 2025-08-10 | End: 2025-08-13 | Stop reason: HOSPADM

## 2025-08-10 RX ADMIN — ATORVASTATIN CALCIUM 80 MG: 80 TABLET, FILM COATED ORAL at 08:08

## 2025-08-10 RX ADMIN — FUROSEMIDE 20 MG: 10 INJECTION, SOLUTION INTRAMUSCULAR; INTRAVENOUS at 16:53

## 2025-08-10 RX ADMIN — SODIUM CHLORIDE, PRESERVATIVE FREE 40 MG: 5 INJECTION INTRAVENOUS at 08:07

## 2025-08-10 RX ADMIN — BUDESONIDE AND FORMOTEROL FUMARATE DIHYDRATE 2 PUFF: 80; 4.5 AEROSOL RESPIRATORY (INHALATION) at 21:07

## 2025-08-10 RX ADMIN — PREDNISONE 40 MG: 20 TABLET ORAL at 08:07

## 2025-08-10 RX ADMIN — SODIUM CHLORIDE, PRESERVATIVE FREE 10 ML: 5 INJECTION INTRAVENOUS at 08:08

## 2025-08-10 RX ADMIN — HEPARIN SODIUM 5000 UNITS: 5000 INJECTION INTRAVENOUS; SUBCUTANEOUS at 06:11

## 2025-08-10 RX ADMIN — ACETAMINOPHEN 650 MG: 325 TABLET ORAL at 02:48

## 2025-08-10 RX ADMIN — BUDESONIDE AND FORMOTEROL FUMARATE DIHYDRATE 2 PUFF: 80; 4.5 AEROSOL RESPIRATORY (INHALATION) at 12:04

## 2025-08-10 RX ADMIN — GUAIFENESIN 600 MG: 600 TABLET, EXTENDED RELEASE ORAL at 20:33

## 2025-08-10 RX ADMIN — IPRATROPIUM BROMIDE AND ALBUTEROL SULFATE 1 DOSE: .5; 2.5 SOLUTION RESPIRATORY (INHALATION) at 21:07

## 2025-08-10 RX ADMIN — LEVOTHYROXINE SODIUM 37.5 MCG: 0.07 TABLET ORAL at 06:11

## 2025-08-10 RX ADMIN — IPRATROPIUM BROMIDE AND ALBUTEROL SULFATE 1 DOSE: .5; 2.5 SOLUTION RESPIRATORY (INHALATION) at 16:08

## 2025-08-10 RX ADMIN — ACETAMINOPHEN 650 MG: 325 TABLET ORAL at 21:05

## 2025-08-10 RX ADMIN — INSULIN LISPRO 2 UNITS: 100 INJECTION, SOLUTION INTRAVENOUS; SUBCUTANEOUS at 16:54

## 2025-08-10 RX ADMIN — ALBUTEROL SULFATE 2.5 MG: 2.5 SOLUTION RESPIRATORY (INHALATION) at 03:01

## 2025-08-10 RX ADMIN — IPRATROPIUM BROMIDE AND ALBUTEROL SULFATE 1 DOSE: .5; 2.5 SOLUTION RESPIRATORY (INHALATION) at 07:31

## 2025-08-10 RX ADMIN — SODIUM CHLORIDE, PRESERVATIVE FREE 10 ML: 5 INJECTION INTRAVENOUS at 20:33

## 2025-08-10 RX ADMIN — HEPARIN SODIUM 5000 UNITS: 5000 INJECTION INTRAVENOUS; SUBCUTANEOUS at 20:33

## 2025-08-10 ASSESSMENT — PAIN SCALES - GENERAL
PAINLEVEL_OUTOF10: 0
PAINLEVEL_OUTOF10: 5
PAINLEVEL_OUTOF10: 0
PAINLEVEL_OUTOF10: 5
PAINLEVEL_OUTOF10: 0
PAINLEVEL_OUTOF10: 2
PAINLEVEL_OUTOF10: 2

## 2025-08-10 ASSESSMENT — PAIN DESCRIPTION - DESCRIPTORS
DESCRIPTORS: ACHING
DESCRIPTORS: ACHING

## 2025-08-10 ASSESSMENT — PAIN - FUNCTIONAL ASSESSMENT
PAIN_FUNCTIONAL_ASSESSMENT: 0-10

## 2025-08-10 ASSESSMENT — PAIN DESCRIPTION - LOCATION
LOCATION: HEAD
LOCATION: HEAD

## 2025-08-10 ASSESSMENT — PAIN DESCRIPTION - ORIENTATION
ORIENTATION: LEFT;RIGHT
ORIENTATION: RIGHT;LEFT

## 2025-08-11 PROBLEM — E08.10 DIABETIC KETOACIDOSIS WITHOUT COMA ASSOCIATED WITH DIABETES MELLITUS DUE TO UNDERLYING CONDITION (HCC): Status: ACTIVE | Noted: 2025-08-11

## 2025-08-11 PROBLEM — Z51.5 ENCOUNTER FOR PALLIATIVE CARE: Status: ACTIVE | Noted: 2025-08-11

## 2025-08-11 PROBLEM — Z71.89 GOALS OF CARE, COUNSELING/DISCUSSION: Status: ACTIVE | Noted: 2025-08-11

## 2025-08-11 LAB
ANION GAP SERPL CALCULATED.3IONS-SCNC: 14 MMOL/L (ref 9–16)
BASOPHILS # BLD: 0 K/UL (ref 0–0.2)
BASOPHILS NFR BLD: 0 % (ref 0–2)
BUN SERPL-MCNC: 23 MG/DL (ref 8–23)
CALCIUM SERPL-MCNC: 9.2 MG/DL (ref 8.6–10.4)
CHLORIDE SERPL-SCNC: 100 MMOL/L (ref 98–107)
CO2 SERPL-SCNC: 28 MMOL/L (ref 20–31)
CREAT SERPL-MCNC: 0.7 MG/DL (ref 0.6–0.9)
EOSINOPHIL # BLD: 0.39 K/UL (ref 0–0.44)
EOSINOPHILS RELATIVE PERCENT: 3 % (ref 1–4)
ERYTHROCYTE [DISTWIDTH] IN BLOOD BY AUTOMATED COUNT: 14.1 % (ref 11.8–14.4)
GFR, ESTIMATED: >90 ML/MIN/1.73M2
GLUCOSE BLD-MCNC: 138 MG/DL (ref 65–105)
GLUCOSE BLD-MCNC: 138 MG/DL (ref 65–105)
GLUCOSE BLD-MCNC: 162 MG/DL (ref 65–105)
GLUCOSE BLD-MCNC: 206 MG/DL (ref 65–105)
GLUCOSE SERPL-MCNC: 138 MG/DL (ref 74–99)
HCT VFR BLD AUTO: 39.8 % (ref 36.3–47.1)
HGB BLD-MCNC: 12.8 G/DL (ref 11.9–15.1)
IMM GRANULOCYTES # BLD AUTO: 0.65 K/UL (ref 0–0.3)
IMM GRANULOCYTES NFR BLD: 5 %
LYMPHOCYTES NFR BLD: 2.71 K/UL (ref 1.1–3.7)
LYMPHOCYTES RELATIVE PERCENT: 21 % (ref 24–43)
MAGNESIUM SERPL-MCNC: 2 MG/DL (ref 1.6–2.4)
MCH RBC QN AUTO: 28.2 PG (ref 25.2–33.5)
MCHC RBC AUTO-ENTMCNC: 32.2 G/DL (ref 28.4–34.8)
MCV RBC AUTO: 87.7 FL (ref 82.6–102.9)
MONOCYTES NFR BLD: 0.9 K/UL (ref 0.1–1.2)
MONOCYTES NFR BLD: 7 % (ref 3–12)
MORPHOLOGY: NORMAL
NEUTROPHILS NFR BLD: 64 % (ref 36–65)
NEUTS SEG NFR BLD: 8.25 K/UL (ref 1.5–8.1)
NRBC BLD-RTO: 0 PER 100 WBC
PLATELET # BLD AUTO: 341 K/UL (ref 138–453)
PMV BLD AUTO: 9.4 FL (ref 8.1–13.5)
POTASSIUM SERPL-SCNC: 3.1 MMOL/L (ref 3.7–5.3)
RBC # BLD AUTO: 4.54 M/UL (ref 3.95–5.11)
SODIUM SERPL-SCNC: 142 MMOL/L (ref 136–145)
WBC OTHER # BLD: 12.9 K/UL (ref 3.5–11.3)

## 2025-08-11 PROCEDURE — 6360000002 HC RX W HCPCS: Performed by: STUDENT IN AN ORGANIZED HEALTH CARE EDUCATION/TRAINING PROGRAM

## 2025-08-11 PROCEDURE — 6370000000 HC RX 637 (ALT 250 FOR IP)

## 2025-08-11 PROCEDURE — 36415 COLL VENOUS BLD VENIPUNCTURE: CPT

## 2025-08-11 PROCEDURE — 2500000003 HC RX 250 WO HCPCS: Performed by: NURSE PRACTITIONER

## 2025-08-11 PROCEDURE — 6370000000 HC RX 637 (ALT 250 FOR IP): Performed by: NURSE PRACTITIONER

## 2025-08-11 PROCEDURE — 80048 BASIC METABOLIC PNL TOTAL CA: CPT

## 2025-08-11 PROCEDURE — 6370000000 HC RX 637 (ALT 250 FOR IP): Performed by: STUDENT IN AN ORGANIZED HEALTH CARE EDUCATION/TRAINING PROGRAM

## 2025-08-11 PROCEDURE — 6360000002 HC RX W HCPCS

## 2025-08-11 PROCEDURE — 2700000000 HC OXYGEN THERAPY PER DAY

## 2025-08-11 PROCEDURE — 2500000003 HC RX 250 WO HCPCS

## 2025-08-11 PROCEDURE — 85025 COMPLETE CBC W/AUTO DIFF WBC: CPT

## 2025-08-11 PROCEDURE — 94640 AIRWAY INHALATION TREATMENT: CPT

## 2025-08-11 PROCEDURE — 83735 ASSAY OF MAGNESIUM: CPT

## 2025-08-11 PROCEDURE — 99222 1ST HOSP IP/OBS MODERATE 55: CPT

## 2025-08-11 PROCEDURE — 82947 ASSAY GLUCOSE BLOOD QUANT: CPT

## 2025-08-11 PROCEDURE — 94761 N-INVAS EAR/PLS OXIMETRY MLT: CPT

## 2025-08-11 PROCEDURE — 99231 SBSQ HOSP IP/OBS SF/LOW 25: CPT | Performed by: INTERNAL MEDICINE

## 2025-08-11 PROCEDURE — 2060000000 HC ICU INTERMEDIATE R&B

## 2025-08-11 PROCEDURE — 99232 SBSQ HOSP IP/OBS MODERATE 35: CPT | Performed by: STUDENT IN AN ORGANIZED HEALTH CARE EDUCATION/TRAINING PROGRAM

## 2025-08-11 RX ORDER — POTASSIUM CHLORIDE 1500 MG/1
40 TABLET, EXTENDED RELEASE ORAL PRN
Status: DISCONTINUED | OUTPATIENT
Start: 2025-08-11 | End: 2025-08-13 | Stop reason: HOSPADM

## 2025-08-11 RX ORDER — POTASSIUM CHLORIDE 7.45 MG/ML
10 INJECTION INTRAVENOUS PRN
Status: DISCONTINUED | OUTPATIENT
Start: 2025-08-11 | End: 2025-08-13 | Stop reason: HOSPADM

## 2025-08-11 RX ADMIN — POTASSIUM CHLORIDE 40 MEQ: 1500 TABLET, EXTENDED RELEASE ORAL at 13:15

## 2025-08-11 RX ADMIN — SODIUM CHLORIDE, PRESERVATIVE FREE 40 MG: 5 INJECTION INTRAVENOUS at 08:06

## 2025-08-11 RX ADMIN — ACETAMINOPHEN 650 MG: 325 TABLET ORAL at 06:25

## 2025-08-11 RX ADMIN — HEPARIN SODIUM 5000 UNITS: 5000 INJECTION INTRAVENOUS; SUBCUTANEOUS at 22:22

## 2025-08-11 RX ADMIN — HEPARIN SODIUM 5000 UNITS: 5000 INJECTION INTRAVENOUS; SUBCUTANEOUS at 13:15

## 2025-08-11 RX ADMIN — FUROSEMIDE 20 MG: 10 INJECTION, SOLUTION INTRAMUSCULAR; INTRAVENOUS at 08:07

## 2025-08-11 RX ADMIN — GUAIFENESIN 600 MG: 600 TABLET, EXTENDED RELEASE ORAL at 08:06

## 2025-08-11 RX ADMIN — INSULIN LISPRO 2 UNITS: 100 INJECTION, SOLUTION INTRAVENOUS; SUBCUTANEOUS at 16:57

## 2025-08-11 RX ADMIN — SODIUM CHLORIDE, PRESERVATIVE FREE 10 ML: 5 INJECTION INTRAVENOUS at 08:08

## 2025-08-11 RX ADMIN — ALBUTEROL SULFATE 2.5 MG: 2.5 SOLUTION RESPIRATORY (INHALATION) at 14:02

## 2025-08-11 RX ADMIN — SODIUM CHLORIDE, PRESERVATIVE FREE 10 ML: 5 INJECTION INTRAVENOUS at 22:00

## 2025-08-11 RX ADMIN — INSULIN GLARGINE 15 UNITS: 100 INJECTION, SOLUTION SUBCUTANEOUS at 08:07

## 2025-08-11 RX ADMIN — HEPARIN SODIUM 5000 UNITS: 5000 INJECTION INTRAVENOUS; SUBCUTANEOUS at 06:25

## 2025-08-11 RX ADMIN — BUDESONIDE AND FORMOTEROL FUMARATE DIHYDRATE 2 PUFF: 80; 4.5 AEROSOL RESPIRATORY (INHALATION) at 07:25

## 2025-08-11 RX ADMIN — IPRATROPIUM BROMIDE AND ALBUTEROL SULFATE 1 DOSE: .5; 2.5 SOLUTION RESPIRATORY (INHALATION) at 07:25

## 2025-08-11 RX ADMIN — PREDNISONE 40 MG: 20 TABLET ORAL at 08:06

## 2025-08-11 RX ADMIN — ATORVASTATIN CALCIUM 80 MG: 80 TABLET, FILM COATED ORAL at 08:06

## 2025-08-11 RX ADMIN — IPRATROPIUM BROMIDE AND ALBUTEROL SULFATE 1 DOSE: .5; 2.5 SOLUTION RESPIRATORY (INHALATION) at 19:57

## 2025-08-11 RX ADMIN — BUDESONIDE AND FORMOTEROL FUMARATE DIHYDRATE 2 PUFF: 80; 4.5 AEROSOL RESPIRATORY (INHALATION) at 19:58

## 2025-08-11 RX ADMIN — LEVOTHYROXINE SODIUM 37.5 MCG: 0.07 TABLET ORAL at 06:25

## 2025-08-11 ASSESSMENT — PAIN SCALES - GENERAL
PAINLEVEL_OUTOF10: 0
PAINLEVEL_OUTOF10: 0
PAINLEVEL_OUTOF10: 3
PAINLEVEL_OUTOF10: 0
PAINLEVEL_OUTOF10: 5
PAINLEVEL_OUTOF10: 0

## 2025-08-11 ASSESSMENT — PAIN - FUNCTIONAL ASSESSMENT
PAIN_FUNCTIONAL_ASSESSMENT: 0-10
PAIN_FUNCTIONAL_ASSESSMENT: 0-10

## 2025-08-11 ASSESSMENT — ENCOUNTER SYMPTOMS
SHORTNESS OF BREATH: 0
ABDOMINAL PAIN: 0

## 2025-08-11 ASSESSMENT — PAIN DESCRIPTION - LOCATION: LOCATION: HEAD

## 2025-08-11 ASSESSMENT — PAIN DESCRIPTION - DESCRIPTORS: DESCRIPTORS: ACHING

## 2025-08-11 ASSESSMENT — PAIN DESCRIPTION - ORIENTATION: ORIENTATION: RIGHT;LEFT

## 2025-08-12 LAB
ANION GAP SERPL CALCULATED.3IONS-SCNC: 14 MMOL/L (ref 9–16)
BASOPHILS # BLD: 0 K/UL (ref 0–0.2)
BASOPHILS NFR BLD: 0 % (ref 0–2)
BUN SERPL-MCNC: 28 MG/DL (ref 8–23)
CALCIUM SERPL-MCNC: 9.3 MG/DL (ref 8.6–10.4)
CHLORIDE SERPL-SCNC: 101 MMOL/L (ref 98–107)
CO2 SERPL-SCNC: 27 MMOL/L (ref 20–31)
CREAT SERPL-MCNC: 0.7 MG/DL (ref 0.6–0.9)
EOSINOPHIL # BLD: 0.29 K/UL (ref 0–0.44)
EOSINOPHILS RELATIVE PERCENT: 2 % (ref 1–4)
ERYTHROCYTE [DISTWIDTH] IN BLOOD BY AUTOMATED COUNT: 14.4 % (ref 11.8–14.4)
GFR, ESTIMATED: >90 ML/MIN/1.73M2
GLUCOSE BLD-MCNC: 116 MG/DL (ref 65–105)
GLUCOSE BLD-MCNC: 172 MG/DL (ref 65–105)
GLUCOSE BLD-MCNC: 176 MG/DL (ref 65–105)
GLUCOSE BLD-MCNC: 212 MG/DL (ref 65–105)
GLUCOSE SERPL-MCNC: 116 MG/DL (ref 74–99)
HCT VFR BLD AUTO: 39.5 % (ref 36.3–47.1)
HGB BLD-MCNC: 12.6 G/DL (ref 11.9–15.1)
IMM GRANULOCYTES # BLD AUTO: 0.58 K/UL (ref 0–0.3)
IMM GRANULOCYTES NFR BLD: 4 %
LYMPHOCYTES NFR BLD: 3.31 K/UL (ref 1.1–3.7)
LYMPHOCYTES RELATIVE PERCENT: 23 % (ref 24–43)
MAGNESIUM SERPL-MCNC: 2.1 MG/DL (ref 1.6–2.4)
MCH RBC QN AUTO: 28.1 PG (ref 25.2–33.5)
MCHC RBC AUTO-ENTMCNC: 31.9 G/DL (ref 28.4–34.8)
MCV RBC AUTO: 88 FL (ref 82.6–102.9)
MONOCYTES NFR BLD: 1.01 K/UL (ref 0.1–1.2)
MONOCYTES NFR BLD: 7 % (ref 3–12)
MORPHOLOGY: NORMAL
NEUTROPHILS NFR BLD: 64 % (ref 36–65)
NEUTS SEG NFR BLD: 9.21 K/UL (ref 1.5–8.1)
NRBC BLD-RTO: 0 PER 100 WBC
PLATELET # BLD AUTO: 354 K/UL (ref 138–453)
PMV BLD AUTO: 9.1 FL (ref 8.1–13.5)
POTASSIUM SERPL-SCNC: 3.2 MMOL/L (ref 3.7–5.3)
RBC # BLD AUTO: 4.49 M/UL (ref 3.95–5.11)
SODIUM SERPL-SCNC: 142 MMOL/L (ref 136–145)
WBC OTHER # BLD: 14.4 K/UL (ref 3.5–11.3)

## 2025-08-12 PROCEDURE — 85025 COMPLETE CBC W/AUTO DIFF WBC: CPT

## 2025-08-12 PROCEDURE — 2500000003 HC RX 250 WO HCPCS: Performed by: NURSE PRACTITIONER

## 2025-08-12 PROCEDURE — 2700000000 HC OXYGEN THERAPY PER DAY

## 2025-08-12 PROCEDURE — 6370000000 HC RX 637 (ALT 250 FOR IP): Performed by: STUDENT IN AN ORGANIZED HEALTH CARE EDUCATION/TRAINING PROGRAM

## 2025-08-12 PROCEDURE — 2060000000 HC ICU INTERMEDIATE R&B

## 2025-08-12 PROCEDURE — 6360000002 HC RX W HCPCS

## 2025-08-12 PROCEDURE — 80048 BASIC METABOLIC PNL TOTAL CA: CPT

## 2025-08-12 PROCEDURE — 99231 SBSQ HOSP IP/OBS SF/LOW 25: CPT | Performed by: INTERNAL MEDICINE

## 2025-08-12 PROCEDURE — 2500000003 HC RX 250 WO HCPCS

## 2025-08-12 PROCEDURE — 6370000000 HC RX 637 (ALT 250 FOR IP)

## 2025-08-12 PROCEDURE — 6370000000 HC RX 637 (ALT 250 FOR IP): Performed by: NURSE PRACTITIONER

## 2025-08-12 PROCEDURE — 83735 ASSAY OF MAGNESIUM: CPT

## 2025-08-12 PROCEDURE — 97530 THERAPEUTIC ACTIVITIES: CPT

## 2025-08-12 PROCEDURE — 97535 SELF CARE MNGMENT TRAINING: CPT

## 2025-08-12 PROCEDURE — 99222 1ST HOSP IP/OBS MODERATE 55: CPT | Performed by: STUDENT IN AN ORGANIZED HEALTH CARE EDUCATION/TRAINING PROGRAM

## 2025-08-12 PROCEDURE — 99232 SBSQ HOSP IP/OBS MODERATE 35: CPT | Performed by: STUDENT IN AN ORGANIZED HEALTH CARE EDUCATION/TRAINING PROGRAM

## 2025-08-12 PROCEDURE — 82947 ASSAY GLUCOSE BLOOD QUANT: CPT

## 2025-08-12 PROCEDURE — 97166 OT EVAL MOD COMPLEX 45 MIN: CPT

## 2025-08-12 PROCEDURE — 94640 AIRWAY INHALATION TREATMENT: CPT

## 2025-08-12 PROCEDURE — 36415 COLL VENOUS BLD VENIPUNCTURE: CPT

## 2025-08-12 RX ORDER — PREDNISONE 20 MG/1
10 TABLET ORAL DAILY
Status: DISCONTINUED | OUTPATIENT
Start: 2025-08-19 | End: 2025-08-13 | Stop reason: HOSPADM

## 2025-08-12 RX ORDER — GUAIFENESIN 200 MG/10ML
100 LIQUID ORAL 2 TIMES DAILY PRN
Status: DISCONTINUED | OUTPATIENT
Start: 2025-08-12 | End: 2025-08-13 | Stop reason: HOSPADM

## 2025-08-12 RX ORDER — PREDNISONE 20 MG/1
20 TABLET ORAL DAILY
Status: DISCONTINUED | OUTPATIENT
Start: 2025-08-16 | End: 2025-08-13 | Stop reason: HOSPADM

## 2025-08-12 RX ORDER — PREDNISONE 20 MG/1
30 TABLET ORAL DAILY
Status: DISCONTINUED | OUTPATIENT
Start: 2025-08-13 | End: 2025-08-13 | Stop reason: HOSPADM

## 2025-08-12 RX ADMIN — SODIUM CHLORIDE, PRESERVATIVE FREE 40 MG: 5 INJECTION INTRAVENOUS at 08:48

## 2025-08-12 RX ADMIN — BUDESONIDE AND FORMOTEROL FUMARATE DIHYDRATE 2 PUFF: 80; 4.5 AEROSOL RESPIRATORY (INHALATION) at 19:55

## 2025-08-12 RX ADMIN — IPRATROPIUM BROMIDE AND ALBUTEROL SULFATE 1 DOSE: .5; 2.5 SOLUTION RESPIRATORY (INHALATION) at 19:55

## 2025-08-12 RX ADMIN — IPRATROPIUM BROMIDE AND ALBUTEROL SULFATE 1 DOSE: .5; 2.5 SOLUTION RESPIRATORY (INHALATION) at 15:54

## 2025-08-12 RX ADMIN — SODIUM CHLORIDE, PRESERVATIVE FREE 10 ML: 5 INJECTION INTRAVENOUS at 08:48

## 2025-08-12 RX ADMIN — GUAIFENESIN 600 MG: 600 TABLET, EXTENDED RELEASE ORAL at 08:48

## 2025-08-12 RX ADMIN — POTASSIUM CHLORIDE 40 MEQ: 1500 TABLET, EXTENDED RELEASE ORAL at 08:53

## 2025-08-12 RX ADMIN — GUAIFENESIN 600 MG: 600 TABLET, EXTENDED RELEASE ORAL at 21:59

## 2025-08-12 RX ADMIN — BUDESONIDE AND FORMOTEROL FUMARATE DIHYDRATE 2 PUFF: 80; 4.5 AEROSOL RESPIRATORY (INHALATION) at 07:37

## 2025-08-12 RX ADMIN — LEVOTHYROXINE SODIUM 37.5 MCG: 0.07 TABLET ORAL at 05:40

## 2025-08-12 RX ADMIN — IPRATROPIUM BROMIDE AND ALBUTEROL SULFATE 1 DOSE: .5; 2.5 SOLUTION RESPIRATORY (INHALATION) at 07:37

## 2025-08-12 RX ADMIN — SODIUM CHLORIDE, PRESERVATIVE FREE 10 ML: 5 INJECTION INTRAVENOUS at 22:03

## 2025-08-12 RX ADMIN — IPRATROPIUM BROMIDE AND ALBUTEROL SULFATE 1 DOSE: .5; 2.5 SOLUTION RESPIRATORY (INHALATION) at 11:22

## 2025-08-12 RX ADMIN — INSULIN GLARGINE 15 UNITS: 100 INJECTION, SOLUTION SUBCUTANEOUS at 08:49

## 2025-08-12 RX ADMIN — ACETAMINOPHEN 650 MG: 325 TABLET ORAL at 08:50

## 2025-08-12 RX ADMIN — HEPARIN SODIUM 5000 UNITS: 5000 INJECTION INTRAVENOUS; SUBCUTANEOUS at 13:34

## 2025-08-12 RX ADMIN — PREDNISONE 40 MG: 20 TABLET ORAL at 09:32

## 2025-08-12 RX ADMIN — HEPARIN SODIUM 5000 UNITS: 5000 INJECTION INTRAVENOUS; SUBCUTANEOUS at 21:59

## 2025-08-12 RX ADMIN — ATORVASTATIN CALCIUM 80 MG: 80 TABLET, FILM COATED ORAL at 08:49

## 2025-08-12 RX ADMIN — INSULIN LISPRO 2 UNITS: 100 INJECTION, SOLUTION INTRAVENOUS; SUBCUTANEOUS at 17:56

## 2025-08-12 RX ADMIN — HEPARIN SODIUM 5000 UNITS: 5000 INJECTION INTRAVENOUS; SUBCUTANEOUS at 05:40

## 2025-08-12 ASSESSMENT — PAIN SCALES - GENERAL
PAINLEVEL_OUTOF10: 0
PAINLEVEL_OUTOF10: 3
PAINLEVEL_OUTOF10: 0

## 2025-08-12 ASSESSMENT — PAIN - FUNCTIONAL ASSESSMENT
PAIN_FUNCTIONAL_ASSESSMENT: 0-10
PAIN_FUNCTIONAL_ASSESSMENT: 0-10

## 2025-08-12 ASSESSMENT — ENCOUNTER SYMPTOMS
SHORTNESS OF BREATH: 0
ABDOMINAL PAIN: 0

## 2025-08-13 ENCOUNTER — HOSPITAL ENCOUNTER (INPATIENT)
Age: 70
LOS: 12 days | Discharge: NURSING, REHAB OR LONG-TERM CARE | DRG: 056 | End: 2025-08-25
Attending: PHYSICAL MEDICINE & REHABILITATION | Admitting: PHYSICAL MEDICINE & REHABILITATION
Payer: MEDICARE

## 2025-08-13 VITALS
SYSTOLIC BLOOD PRESSURE: 138 MMHG | HEIGHT: 67 IN | DIASTOLIC BLOOD PRESSURE: 80 MMHG | TEMPERATURE: 98.3 F | RESPIRATION RATE: 17 BRPM | BODY MASS INDEX: 31.45 KG/M2 | WEIGHT: 200.4 LBS | HEART RATE: 79 BPM | OXYGEN SATURATION: 93 %

## 2025-08-13 DIAGNOSIS — I10 ESSENTIAL HYPERTENSION: ICD-10-CM

## 2025-08-13 DIAGNOSIS — E03.9 HYPOTHYROIDISM, UNSPECIFIED TYPE: ICD-10-CM

## 2025-08-13 DIAGNOSIS — J44.9 CHRONIC OBSTRUCTIVE PULMONARY DISEASE, UNSPECIFIED COPD TYPE (HCC): ICD-10-CM

## 2025-08-13 DIAGNOSIS — J45.40 MODERATE PERSISTENT ASTHMA WITHOUT COMPLICATION: ICD-10-CM

## 2025-08-13 DIAGNOSIS — J96.01 ACUTE HYPOXEMIC RESPIRATORY FAILURE (HCC): Primary | ICD-10-CM

## 2025-08-13 DIAGNOSIS — R73.03 PRE-DIABETES: ICD-10-CM

## 2025-08-13 PROBLEM — R53.81 DEBILITY: Status: ACTIVE | Noted: 2025-08-13

## 2025-08-13 LAB
ANION GAP SERPL CALCULATED.3IONS-SCNC: 13 MMOL/L (ref 9–16)
BASOPHILS # BLD: 0 K/UL (ref 0–0.2)
BASOPHILS NFR BLD: 0 % (ref 0–2)
BUN SERPL-MCNC: 26 MG/DL (ref 8–23)
CALCIUM SERPL-MCNC: 9.5 MG/DL (ref 8.6–10.4)
CHLORIDE SERPL-SCNC: 104 MMOL/L (ref 98–107)
CO2 SERPL-SCNC: 25 MMOL/L (ref 20–31)
CREAT SERPL-MCNC: 0.7 MG/DL (ref 0.6–0.9)
EOSINOPHIL # BLD: 0.15 K/UL (ref 0–0.4)
EOSINOPHILS RELATIVE PERCENT: 1 % (ref 1–4)
ERYTHROCYTE [DISTWIDTH] IN BLOOD BY AUTOMATED COUNT: 14.4 % (ref 11.8–14.4)
GFR, ESTIMATED: >90 ML/MIN/1.73M2
GLUCOSE BLD-MCNC: 104 MG/DL (ref 65–105)
GLUCOSE BLD-MCNC: 133 MG/DL (ref 65–105)
GLUCOSE BLD-MCNC: 212 MG/DL (ref 65–105)
GLUCOSE SERPL-MCNC: 102 MG/DL (ref 74–99)
HCT VFR BLD AUTO: 41.3 % (ref 36.3–47.1)
HGB BLD-MCNC: 12.8 G/DL (ref 11.9–15.1)
IMM GRANULOCYTES # BLD AUTO: 0.3 K/UL (ref 0–0.3)
IMM GRANULOCYTES NFR BLD: 2 %
LYMPHOCYTES NFR BLD: 2.98 K/UL (ref 1–4.8)
LYMPHOCYTES RELATIVE PERCENT: 20 % (ref 24–44)
MCH RBC QN AUTO: 27.5 PG (ref 25.2–33.5)
MCHC RBC AUTO-ENTMCNC: 31 G/DL (ref 28.4–34.8)
MCV RBC AUTO: 88.8 FL (ref 82.6–102.9)
MONOCYTES NFR BLD: 0.75 K/UL (ref 0.1–0.8)
MONOCYTES NFR BLD: 5 % (ref 1–7)
MORPHOLOGY: NORMAL
NEUTROPHILS NFR BLD: 72 % (ref 36–66)
NEUTS SEG NFR BLD: 10.72 K/UL (ref 1.8–7.7)
NRBC BLD-RTO: 0 PER 100 WBC
PLATELET # BLD AUTO: 339 K/UL (ref 138–453)
PMV BLD AUTO: 9.5 FL (ref 8.1–13.5)
POTASSIUM SERPL-SCNC: 3.6 MMOL/L (ref 3.7–5.3)
RBC # BLD AUTO: 4.65 M/UL (ref 3.95–5.11)
SODIUM SERPL-SCNC: 142 MMOL/L (ref 136–145)
WBC OTHER # BLD: 14.9 K/UL (ref 3.5–11.3)

## 2025-08-13 PROCEDURE — 6370000000 HC RX 637 (ALT 250 FOR IP): Performed by: NURSE PRACTITIONER

## 2025-08-13 PROCEDURE — 85025 COMPLETE CBC W/AUTO DIFF WBC: CPT

## 2025-08-13 PROCEDURE — 6370000000 HC RX 637 (ALT 250 FOR IP)

## 2025-08-13 PROCEDURE — 6370000000 HC RX 637 (ALT 250 FOR IP): Performed by: STUDENT IN AN ORGANIZED HEALTH CARE EDUCATION/TRAINING PROGRAM

## 2025-08-13 PROCEDURE — 99239 HOSP IP/OBS DSCHRG MGMT >30: CPT | Performed by: STUDENT IN AN ORGANIZED HEALTH CARE EDUCATION/TRAINING PROGRAM

## 2025-08-13 PROCEDURE — 82947 ASSAY GLUCOSE BLOOD QUANT: CPT

## 2025-08-13 PROCEDURE — 6360000002 HC RX W HCPCS

## 2025-08-13 PROCEDURE — 2700000000 HC OXYGEN THERAPY PER DAY

## 2025-08-13 PROCEDURE — 94640 AIRWAY INHALATION TREATMENT: CPT

## 2025-08-13 PROCEDURE — 1180000000 HC REHAB R&B

## 2025-08-13 PROCEDURE — 6360000002 HC RX W HCPCS: Performed by: STUDENT IN AN ORGANIZED HEALTH CARE EDUCATION/TRAINING PROGRAM

## 2025-08-13 PROCEDURE — 99231 SBSQ HOSP IP/OBS SF/LOW 25: CPT | Performed by: INTERNAL MEDICINE

## 2025-08-13 PROCEDURE — 6370000000 HC RX 637 (ALT 250 FOR IP): Performed by: INTERNAL MEDICINE

## 2025-08-13 PROCEDURE — 36415 COLL VENOUS BLD VENIPUNCTURE: CPT

## 2025-08-13 PROCEDURE — 2500000003 HC RX 250 WO HCPCS

## 2025-08-13 PROCEDURE — 80048 BASIC METABOLIC PNL TOTAL CA: CPT

## 2025-08-13 PROCEDURE — 2500000003 HC RX 250 WO HCPCS: Performed by: NURSE PRACTITIONER

## 2025-08-13 RX ORDER — PREDNISONE 10 MG/1
10 TABLET ORAL DAILY
Status: COMPLETED | OUTPATIENT
Start: 2025-08-19 | End: 2025-08-21

## 2025-08-13 RX ORDER — SENNOSIDES 8.6 MG/1
2 TABLET ORAL DAILY PRN
Status: CANCELLED | OUTPATIENT
Start: 2025-08-13

## 2025-08-13 RX ORDER — HEPARIN SODIUM 5000 [USP'U]/ML
5000 INJECTION, SOLUTION INTRAVENOUS; SUBCUTANEOUS EVERY 8 HOURS SCHEDULED
Status: CANCELLED | OUTPATIENT
Start: 2025-08-13

## 2025-08-13 RX ORDER — SODIUM CHLORIDE 0.9 % (FLUSH) 0.9 %
5-40 SYRINGE (ML) INJECTION EVERY 12 HOURS SCHEDULED
Status: CANCELLED | OUTPATIENT
Start: 2025-08-13

## 2025-08-13 RX ORDER — BUDESONIDE AND FORMOTEROL FUMARATE DIHYDRATE 80; 4.5 UG/1; UG/1
2 AEROSOL RESPIRATORY (INHALATION)
Status: CANCELLED | OUTPATIENT
Start: 2025-08-13

## 2025-08-13 RX ORDER — ATORVASTATIN CALCIUM 80 MG/1
80 TABLET, FILM COATED ORAL DAILY
Status: DISCONTINUED | OUTPATIENT
Start: 2025-08-14 | End: 2025-08-25 | Stop reason: HOSPADM

## 2025-08-13 RX ORDER — MAGNESIUM SULFATE HEPTAHYDRATE 40 MG/ML
2000 INJECTION, SOLUTION INTRAVENOUS PRN
Status: DISCONTINUED | OUTPATIENT
Start: 2025-08-13 | End: 2025-08-13

## 2025-08-13 RX ORDER — IPRATROPIUM BROMIDE AND ALBUTEROL SULFATE 2.5; .5 MG/3ML; MG/3ML
1 SOLUTION RESPIRATORY (INHALATION)
Status: CANCELLED | OUTPATIENT
Start: 2025-08-13

## 2025-08-13 RX ORDER — ACETAMINOPHEN 325 MG/1
650 TABLET ORAL EVERY 4 HOURS PRN
Status: DISCONTINUED | OUTPATIENT
Start: 2025-08-13 | End: 2025-08-25 | Stop reason: HOSPADM

## 2025-08-13 RX ORDER — BUDESONIDE AND FORMOTEROL FUMARATE DIHYDRATE 80; 4.5 UG/1; UG/1
2 AEROSOL RESPIRATORY (INHALATION)
Status: DISCONTINUED | OUTPATIENT
Start: 2025-08-13 | End: 2025-08-25 | Stop reason: HOSPADM

## 2025-08-13 RX ORDER — POLYETHYLENE GLYCOL 3350 17 G/17G
17 POWDER, FOR SOLUTION ORAL DAILY
Status: CANCELLED | OUTPATIENT
Start: 2025-08-13

## 2025-08-13 RX ORDER — ONDANSETRON 2 MG/ML
4 INJECTION INTRAMUSCULAR; INTRAVENOUS EVERY 6 HOURS PRN
Status: DISCONTINUED | OUTPATIENT
Start: 2025-08-13 | End: 2025-08-13

## 2025-08-13 RX ORDER — GUAIFENESIN 600 MG/1
600 TABLET, EXTENDED RELEASE ORAL 2 TIMES DAILY
Status: DISCONTINUED | OUTPATIENT
Start: 2025-08-13 | End: 2025-08-25 | Stop reason: HOSPADM

## 2025-08-13 RX ORDER — INSULIN GLARGINE 100 [IU]/ML
15 INJECTION, SOLUTION SUBCUTANEOUS DAILY
Status: DISCONTINUED | OUTPATIENT
Start: 2025-08-14 | End: 2025-08-22

## 2025-08-13 RX ORDER — LISINOPRIL 20 MG/1
40 TABLET ORAL DAILY
Status: DISCONTINUED | OUTPATIENT
Start: 2025-08-14 | End: 2025-08-22

## 2025-08-13 RX ORDER — DEXTROSE MONOHYDRATE 100 MG/ML
INJECTION, SOLUTION INTRAVENOUS CONTINUOUS PRN
Status: DISCONTINUED | OUTPATIENT
Start: 2025-08-13 | End: 2025-08-25 | Stop reason: HOSPADM

## 2025-08-13 RX ORDER — ONDANSETRON 4 MG/1
4 TABLET, ORALLY DISINTEGRATING ORAL EVERY 8 HOURS PRN
Status: CANCELLED | OUTPATIENT
Start: 2025-08-13

## 2025-08-13 RX ORDER — PREDNISONE 20 MG/1
20 TABLET ORAL DAILY
Status: CANCELLED | OUTPATIENT
Start: 2025-08-16 | End: 2025-08-19

## 2025-08-13 RX ORDER — GUAIFENESIN 200 MG/10ML
100 LIQUID ORAL 2 TIMES DAILY PRN
Status: CANCELLED | OUTPATIENT
Start: 2025-08-13

## 2025-08-13 RX ORDER — SODIUM CHLORIDE 0.9 % (FLUSH) 0.9 %
5-40 SYRINGE (ML) INJECTION EVERY 12 HOURS SCHEDULED
Status: DISCONTINUED | OUTPATIENT
Start: 2025-08-13 | End: 2025-08-13

## 2025-08-13 RX ORDER — SODIUM CHLORIDE 0.9 % (FLUSH) 0.9 %
5-40 SYRINGE (ML) INJECTION PRN
Status: DISCONTINUED | OUTPATIENT
Start: 2025-08-13 | End: 2025-08-25 | Stop reason: HOSPADM

## 2025-08-13 RX ORDER — BISACODYL 10 MG
10 SUPPOSITORY, RECTAL RECTAL DAILY PRN
Status: DISCONTINUED | OUTPATIENT
Start: 2025-08-13 | End: 2025-08-25 | Stop reason: HOSPADM

## 2025-08-13 RX ORDER — SODIUM CHLORIDE 0.9 % (FLUSH) 0.9 %
10 SYRINGE (ML) INJECTION PRN
Status: DISCONTINUED | OUTPATIENT
Start: 2025-08-13 | End: 2025-08-25 | Stop reason: HOSPADM

## 2025-08-13 RX ORDER — ONDANSETRON 2 MG/ML
4 INJECTION INTRAMUSCULAR; INTRAVENOUS EVERY 6 HOURS PRN
Status: CANCELLED | OUTPATIENT
Start: 2025-08-13

## 2025-08-13 RX ORDER — LEVOTHYROXINE SODIUM 75 UG/1
37.5 TABLET ORAL DAILY
Status: DISCONTINUED | OUTPATIENT
Start: 2025-08-14 | End: 2025-08-25 | Stop reason: HOSPADM

## 2025-08-13 RX ORDER — GUAIFENESIN 200 MG/10ML
100 LIQUID ORAL 2 TIMES DAILY PRN
Status: DISCONTINUED | OUTPATIENT
Start: 2025-08-13 | End: 2025-08-25 | Stop reason: HOSPADM

## 2025-08-13 RX ORDER — ACETAMINOPHEN 325 MG/1
650 TABLET ORAL EVERY 4 HOURS PRN
Status: CANCELLED | OUTPATIENT
Start: 2025-08-13

## 2025-08-13 RX ORDER — BISACODYL 10 MG
10 SUPPOSITORY, RECTAL RECTAL DAILY PRN
Status: CANCELLED | OUTPATIENT
Start: 2025-08-13

## 2025-08-13 RX ORDER — IPRATROPIUM BROMIDE AND ALBUTEROL SULFATE 2.5; .5 MG/3ML; MG/3ML
1 SOLUTION RESPIRATORY (INHALATION)
Status: DISCONTINUED | OUTPATIENT
Start: 2025-08-14 | End: 2025-08-23

## 2025-08-13 RX ORDER — MAGNESIUM SULFATE IN WATER 40 MG/ML
2000 INJECTION, SOLUTION INTRAVENOUS PRN
Status: CANCELLED | OUTPATIENT
Start: 2025-08-13

## 2025-08-13 RX ORDER — SODIUM CHLORIDE 9 MG/ML
INJECTION, SOLUTION INTRAVENOUS PRN
Status: DISCONTINUED | OUTPATIENT
Start: 2025-08-13 | End: 2025-08-25 | Stop reason: HOSPADM

## 2025-08-13 RX ORDER — POLYETHYLENE GLYCOL 3350 17 G/17G
17 POWDER, FOR SOLUTION ORAL DAILY
Status: DISCONTINUED | OUTPATIENT
Start: 2025-08-13 | End: 2025-08-25 | Stop reason: HOSPADM

## 2025-08-13 RX ORDER — ECHINACEA PURPUREA EXTRACT 125 MG
1 TABLET ORAL PRN
Status: CANCELLED | OUTPATIENT
Start: 2025-08-13

## 2025-08-13 RX ORDER — POTASSIUM CHLORIDE 1500 MG/1
40 TABLET, EXTENDED RELEASE ORAL PRN
Status: DISCONTINUED | OUTPATIENT
Start: 2025-08-13 | End: 2025-08-25 | Stop reason: HOSPADM

## 2025-08-13 RX ORDER — DEXTROSE MONOHYDRATE 100 MG/ML
INJECTION, SOLUTION INTRAVENOUS CONTINUOUS PRN
Status: CANCELLED | OUTPATIENT
Start: 2025-08-13

## 2025-08-13 RX ORDER — INSULIN LISPRO 100 [IU]/ML
0-8 INJECTION, SOLUTION INTRAVENOUS; SUBCUTANEOUS
Status: DISCONTINUED | OUTPATIENT
Start: 2025-08-13 | End: 2025-08-25 | Stop reason: HOSPADM

## 2025-08-13 RX ORDER — ACETAMINOPHEN 650 MG/1
650 SUPPOSITORY RECTAL EVERY 6 HOURS PRN
Status: DISCONTINUED | OUTPATIENT
Start: 2025-08-13 | End: 2025-08-13

## 2025-08-13 RX ORDER — GLUCAGON 1 MG/ML
1 KIT INJECTION PRN
Status: CANCELLED | OUTPATIENT
Start: 2025-08-13

## 2025-08-13 RX ORDER — ATORVASTATIN CALCIUM 80 MG/1
80 TABLET, FILM COATED ORAL DAILY
Status: CANCELLED | OUTPATIENT
Start: 2025-08-14

## 2025-08-13 RX ORDER — PREDNISONE 10 MG/1
TABLET ORAL
Qty: 15 TABLET | Refills: 0 | Status: ON HOLD | OUTPATIENT
Start: 2025-08-14 | End: 2025-08-24 | Stop reason: HOSPADM

## 2025-08-13 RX ORDER — ONDANSETRON 4 MG/1
4 TABLET, ORALLY DISINTEGRATING ORAL EVERY 8 HOURS PRN
Status: DISCONTINUED | OUTPATIENT
Start: 2025-08-13 | End: 2025-08-25 | Stop reason: HOSPADM

## 2025-08-13 RX ORDER — GUAIFENESIN 600 MG/1
600 TABLET, EXTENDED RELEASE ORAL 2 TIMES DAILY
Status: CANCELLED | OUTPATIENT
Start: 2025-08-13

## 2025-08-13 RX ORDER — POTASSIUM CHLORIDE 7.45 MG/ML
10 INJECTION INTRAVENOUS PRN
Status: DISCONTINUED | OUTPATIENT
Start: 2025-08-13 | End: 2025-08-13

## 2025-08-13 RX ORDER — INSULIN GLARGINE 100 [IU]/ML
15 INJECTION, SOLUTION SUBCUTANEOUS DAILY
Status: CANCELLED | OUTPATIENT
Start: 2025-08-14

## 2025-08-13 RX ORDER — POTASSIUM CHLORIDE 1500 MG/1
40 TABLET, EXTENDED RELEASE ORAL PRN
Status: CANCELLED | OUTPATIENT
Start: 2025-08-13

## 2025-08-13 RX ORDER — ACETAMINOPHEN 650 MG/1
650 SUPPOSITORY RECTAL EVERY 6 HOURS PRN
Status: CANCELLED | OUTPATIENT
Start: 2025-08-13

## 2025-08-13 RX ORDER — PREDNISONE 20 MG/1
10 TABLET ORAL DAILY
Status: CANCELLED | OUTPATIENT
Start: 2025-08-19 | End: 2025-08-22

## 2025-08-13 RX ORDER — LISINOPRIL 20 MG/1
40 TABLET ORAL DAILY
Status: CANCELLED | OUTPATIENT
Start: 2025-08-14

## 2025-08-13 RX ORDER — PREDNISONE 20 MG/1
20 TABLET ORAL DAILY
Status: COMPLETED | OUTPATIENT
Start: 2025-08-16 | End: 2025-08-18

## 2025-08-13 RX ORDER — INSULIN LISPRO 100 [IU]/ML
0-8 INJECTION, SOLUTION INTRAVENOUS; SUBCUTANEOUS
Status: CANCELLED | OUTPATIENT
Start: 2025-08-13

## 2025-08-13 RX ORDER — SODIUM CHLORIDE 0.9 % (FLUSH) 0.9 %
5-40 SYRINGE (ML) INJECTION EVERY 12 HOURS SCHEDULED
Status: DISCONTINUED | OUTPATIENT
Start: 2025-08-13 | End: 2025-08-15

## 2025-08-13 RX ORDER — SODIUM CHLORIDE 0.9 % (FLUSH) 0.9 %
10 SYRINGE (ML) INJECTION PRN
Status: CANCELLED | OUTPATIENT
Start: 2025-08-13

## 2025-08-13 RX ORDER — PREDNISONE 20 MG/1
30 TABLET ORAL DAILY
Status: CANCELLED | OUTPATIENT
Start: 2025-08-14 | End: 2025-08-16

## 2025-08-13 RX ORDER — ACETAMINOPHEN 325 MG/1
650 TABLET ORAL EVERY 6 HOURS PRN
Status: DISCONTINUED | OUTPATIENT
Start: 2025-08-13 | End: 2025-08-13

## 2025-08-13 RX ORDER — SODIUM CHLORIDE 9 MG/ML
INJECTION, SOLUTION INTRAVENOUS PRN
Status: CANCELLED | OUTPATIENT
Start: 2025-08-13

## 2025-08-13 RX ORDER — ACETAMINOPHEN 325 MG/1
650 TABLET ORAL EVERY 6 HOURS PRN
Status: CANCELLED | OUTPATIENT
Start: 2025-08-13

## 2025-08-13 RX ORDER — LEVOTHYROXINE SODIUM 75 UG/1
37.5 TABLET ORAL DAILY
Status: CANCELLED | OUTPATIENT
Start: 2025-08-14

## 2025-08-13 RX ORDER — ALBUTEROL SULFATE 0.83 MG/ML
2.5 SOLUTION RESPIRATORY (INHALATION) EVERY 4 HOURS PRN
Status: DISCONTINUED | OUTPATIENT
Start: 2025-08-13 | End: 2025-08-25 | Stop reason: HOSPADM

## 2025-08-13 RX ORDER — POTASSIUM CHLORIDE 7.45 MG/ML
10 INJECTION INTRAVENOUS PRN
Status: CANCELLED | OUTPATIENT
Start: 2025-08-13

## 2025-08-13 RX ORDER — SODIUM CHLORIDE 0.9 % (FLUSH) 0.9 %
5-40 SYRINGE (ML) INJECTION PRN
Status: CANCELLED | OUTPATIENT
Start: 2025-08-13

## 2025-08-13 RX ORDER — HEPARIN SODIUM 5000 [USP'U]/ML
5000 INJECTION, SOLUTION INTRAVENOUS; SUBCUTANEOUS EVERY 8 HOURS SCHEDULED
Status: DISCONTINUED | OUTPATIENT
Start: 2025-08-13 | End: 2025-08-25 | Stop reason: HOSPADM

## 2025-08-13 RX ORDER — SENNOSIDES 8.6 MG/1
2 TABLET ORAL DAILY PRN
Status: DISCONTINUED | OUTPATIENT
Start: 2025-08-13 | End: 2025-08-25 | Stop reason: HOSPADM

## 2025-08-13 RX ORDER — GUAIFENESIN 600 MG/1
600 TABLET, EXTENDED RELEASE ORAL 2 TIMES DAILY
Qty: 14 TABLET | Refills: 0 | Status: SHIPPED | OUTPATIENT
Start: 2025-08-13

## 2025-08-13 RX ORDER — ALBUTEROL SULFATE 0.83 MG/ML
2.5 SOLUTION RESPIRATORY (INHALATION) EVERY 4 HOURS PRN
Status: CANCELLED | OUTPATIENT
Start: 2025-08-13

## 2025-08-13 RX ADMIN — HEPARIN SODIUM 5000 UNITS: 5000 INJECTION INTRAVENOUS; SUBCUTANEOUS at 12:44

## 2025-08-13 RX ADMIN — SODIUM CHLORIDE, PRESERVATIVE FREE 10 ML: 5 INJECTION INTRAVENOUS at 09:18

## 2025-08-13 RX ADMIN — IPRATROPIUM BROMIDE AND ALBUTEROL SULFATE 1 DOSE: .5; 2.5 SOLUTION RESPIRATORY (INHALATION) at 08:13

## 2025-08-13 RX ADMIN — PREDNISONE 30 MG: 20 TABLET ORAL at 09:20

## 2025-08-13 RX ADMIN — HEPARIN SODIUM 5000 UNITS: 5000 INJECTION INTRAVENOUS; SUBCUTANEOUS at 04:27

## 2025-08-13 RX ADMIN — GUAIFENESIN 600 MG: 600 TABLET, EXTENDED RELEASE ORAL at 09:18

## 2025-08-13 RX ADMIN — HEPARIN SODIUM 5000 UNITS: 5000 INJECTION INTRAVENOUS; SUBCUTANEOUS at 21:17

## 2025-08-13 RX ADMIN — ATORVASTATIN CALCIUM 80 MG: 80 TABLET, FILM COATED ORAL at 09:18

## 2025-08-13 RX ADMIN — INSULIN GLARGINE 15 UNITS: 100 INJECTION, SOLUTION SUBCUTANEOUS at 09:21

## 2025-08-13 RX ADMIN — BUDESONIDE AND FORMOTEROL FUMARATE DIHYDRATE 2 PUFF: 80; 4.5 AEROSOL RESPIRATORY (INHALATION) at 08:13

## 2025-08-13 RX ADMIN — INSULIN LISPRO 2 UNITS: 100 INJECTION, SOLUTION INTRAVENOUS; SUBCUTANEOUS at 21:18

## 2025-08-13 RX ADMIN — ACETAMINOPHEN 650 MG: 325 TABLET ORAL at 17:41

## 2025-08-13 RX ADMIN — SODIUM CHLORIDE, PRESERVATIVE FREE 40 MG: 5 INJECTION INTRAVENOUS at 09:18

## 2025-08-13 RX ADMIN — ACETAMINOPHEN 650 MG: 325 TABLET ORAL at 04:26

## 2025-08-13 RX ADMIN — LEVOTHYROXINE SODIUM 37.5 MCG: 0.07 TABLET ORAL at 09:19

## 2025-08-13 RX ADMIN — IPRATROPIUM BROMIDE AND ALBUTEROL SULFATE 1 DOSE: .5; 2.5 SOLUTION RESPIRATORY (INHALATION) at 15:03

## 2025-08-13 ASSESSMENT — PAIN DESCRIPTION - DESCRIPTORS: DESCRIPTORS: ACHING

## 2025-08-13 ASSESSMENT — PAIN - FUNCTIONAL ASSESSMENT
PAIN_FUNCTIONAL_ASSESSMENT: 0-10
PAIN_FUNCTIONAL_ASSESSMENT: ACTIVITIES ARE NOT PREVENTED

## 2025-08-13 ASSESSMENT — PAIN SCALES - GENERAL
PAINLEVEL_OUTOF10: 0
PAINLEVEL_OUTOF10: 3
PAINLEVEL_OUTOF10: 5
PAINLEVEL_OUTOF10: 8

## 2025-08-13 ASSESSMENT — ENCOUNTER SYMPTOMS
SHORTNESS OF BREATH: 0
ABDOMINAL PAIN: 0
SHORTNESS OF BREATH: 1

## 2025-08-13 ASSESSMENT — PAIN DESCRIPTION - ORIENTATION: ORIENTATION: POSTERIOR

## 2025-08-13 ASSESSMENT — PAIN DESCRIPTION - LOCATION: LOCATION: BACK

## 2025-08-14 PROBLEM — I69.354 SPASTIC HEMIPARESIS OF LEFT NONDOMINANT SIDE AS LATE EFFECT OF CEREBRAL INFARCTION (HCC): Status: ACTIVE | Noted: 2025-08-14

## 2025-08-14 LAB
ALBUMIN SERPL-MCNC: 3.6 G/DL (ref 3.5–5.2)
ALP SERPL-CCNC: 94 U/L (ref 35–104)
ALT SERPL-CCNC: 26 U/L (ref 10–35)
ANION GAP SERPL CALCULATED.3IONS-SCNC: 14 MMOL/L (ref 9–16)
AST SERPL-CCNC: 22 U/L (ref 10–35)
BASOPHILS # BLD: 0.15 K/UL (ref 0–0.2)
BASOPHILS NFR BLD: 1 % (ref 0–2)
BILIRUB DIRECT SERPL-MCNC: 0.1 MG/DL (ref 0–0.3)
BILIRUB INDIRECT SERPL-MCNC: 0.3 MG/DL (ref 0–1)
BILIRUB SERPL-MCNC: 0.4 MG/DL (ref 0–1.2)
BUN SERPL-MCNC: 23 MG/DL (ref 8–23)
CALCIUM SERPL-MCNC: 9.2 MG/DL (ref 8.6–10.4)
CHLORIDE SERPL-SCNC: 103 MMOL/L (ref 98–107)
CO2 SERPL-SCNC: 24 MMOL/L (ref 20–31)
CREAT SERPL-MCNC: 0.7 MG/DL (ref 0.7–1.2)
EOSINOPHIL # BLD: 0.29 K/UL (ref 0–0.44)
EOSINOPHILS RELATIVE PERCENT: 2 % (ref 0–4)
ERYTHROCYTE [DISTWIDTH] IN BLOOD BY AUTOMATED COUNT: 14.4 % (ref 11.5–14.9)
GFR, ESTIMATED: >90 ML/MIN/1.73M2
GLUCOSE BLD-MCNC: 105 MG/DL (ref 65–105)
GLUCOSE BLD-MCNC: 113 MG/DL (ref 65–105)
GLUCOSE BLD-MCNC: 189 MG/DL (ref 65–105)
GLUCOSE BLD-MCNC: 190 MG/DL (ref 65–105)
GLUCOSE SERPL-MCNC: 151 MG/DL (ref 74–99)
HCT VFR BLD AUTO: 41.4 % (ref 36–46)
HGB BLD-MCNC: 12.9 G/DL (ref 12–16)
IMM GRANULOCYTES # BLD AUTO: 0.88 K/UL (ref 0–0.3)
IMM GRANULOCYTES NFR BLD: 6 %
LYMPHOCYTES NFR BLD: 3.65 K/UL (ref 1.1–3.7)
LYMPHOCYTES RELATIVE PERCENT: 25 % (ref 24–44)
MCH RBC QN AUTO: 28.1 PG (ref 26–34)
MCHC RBC AUTO-ENTMCNC: 31.2 G/DL (ref 31–37)
MCV RBC AUTO: 90.2 FL (ref 80–100)
MONOCYTES NFR BLD: 0.73 K/UL (ref 0.1–1.2)
MONOCYTES NFR BLD: 5 % (ref 3–12)
MORPHOLOGY: NORMAL
NEUTROPHILS NFR BLD: 61 % (ref 36–66)
NEUTS SEG NFR BLD: 8.9 K/UL (ref 1.5–8.1)
NRBC BLD-RTO: 0 PER 100 WBC
PLATELET # BLD AUTO: 329 K/UL (ref 150–450)
PMV BLD AUTO: 9.3 FL (ref 8–13.5)
POTASSIUM SERPL-SCNC: 3.6 MMOL/L (ref 3.7–5.3)
PROT SERPL-MCNC: 6.8 G/DL (ref 6.6–8.7)
RBC # BLD AUTO: 4.59 M/UL (ref 3.95–5.11)
SODIUM SERPL-SCNC: 141 MMOL/L (ref 136–145)
WBC OTHER # BLD: 14.6 K/UL (ref 3.5–11)

## 2025-08-14 PROCEDURE — 2700000000 HC OXYGEN THERAPY PER DAY

## 2025-08-14 PROCEDURE — 6370000000 HC RX 637 (ALT 250 FOR IP): Performed by: STUDENT IN AN ORGANIZED HEALTH CARE EDUCATION/TRAINING PROGRAM

## 2025-08-14 PROCEDURE — 1180000000 HC REHAB R&B

## 2025-08-14 PROCEDURE — 97530 THERAPEUTIC ACTIVITIES: CPT

## 2025-08-14 PROCEDURE — 97535 SELF CARE MNGMENT TRAINING: CPT

## 2025-08-14 PROCEDURE — 80048 BASIC METABOLIC PNL TOTAL CA: CPT

## 2025-08-14 PROCEDURE — 36415 COLL VENOUS BLD VENIPUNCTURE: CPT

## 2025-08-14 PROCEDURE — 80076 HEPATIC FUNCTION PANEL: CPT

## 2025-08-14 PROCEDURE — 6370000000 HC RX 637 (ALT 250 FOR IP): Performed by: PHYSICAL MEDICINE & REHABILITATION

## 2025-08-14 PROCEDURE — 94761 N-INVAS EAR/PLS OXIMETRY MLT: CPT

## 2025-08-14 PROCEDURE — 99223 1ST HOSP IP/OBS HIGH 75: CPT | Performed by: PHYSICAL MEDICINE & REHABILITATION

## 2025-08-14 PROCEDURE — 6360000002 HC RX W HCPCS: Performed by: STUDENT IN AN ORGANIZED HEALTH CARE EDUCATION/TRAINING PROGRAM

## 2025-08-14 PROCEDURE — 99222 1ST HOSP IP/OBS MODERATE 55: CPT | Performed by: INTERNAL MEDICINE

## 2025-08-14 PROCEDURE — 82947 ASSAY GLUCOSE BLOOD QUANT: CPT

## 2025-08-14 PROCEDURE — 85025 COMPLETE CBC W/AUTO DIFF WBC: CPT

## 2025-08-14 PROCEDURE — 97162 PT EVAL MOD COMPLEX 30 MIN: CPT

## 2025-08-14 PROCEDURE — 97110 THERAPEUTIC EXERCISES: CPT

## 2025-08-14 PROCEDURE — 94640 AIRWAY INHALATION TREATMENT: CPT

## 2025-08-14 PROCEDURE — 99213 OFFICE O/P EST LOW 20 MIN: CPT

## 2025-08-14 PROCEDURE — 97166 OT EVAL MOD COMPLEX 45 MIN: CPT

## 2025-08-14 RX ORDER — PANTOPRAZOLE SODIUM 40 MG/1
40 TABLET, DELAYED RELEASE ORAL
Status: DISCONTINUED | OUTPATIENT
Start: 2025-08-14 | End: 2025-08-25 | Stop reason: HOSPADM

## 2025-08-14 RX ADMIN — LISINOPRIL 40 MG: 20 TABLET ORAL at 07:35

## 2025-08-14 RX ADMIN — INSULIN LISPRO 2 UNITS: 100 INJECTION, SOLUTION INTRAVENOUS; SUBCUTANEOUS at 17:02

## 2025-08-14 RX ADMIN — IPRATROPIUM BROMIDE AND ALBUTEROL SULFATE 1 DOSE: 2.5; .5 SOLUTION RESPIRATORY (INHALATION) at 19:17

## 2025-08-14 RX ADMIN — INSULIN GLARGINE 15 UNITS: 100 INJECTION, SOLUTION SUBCUTANEOUS at 07:34

## 2025-08-14 RX ADMIN — HEPARIN SODIUM 5000 UNITS: 5000 INJECTION INTRAVENOUS; SUBCUTANEOUS at 20:54

## 2025-08-14 RX ADMIN — IPRATROPIUM BROMIDE AND ALBUTEROL SULFATE 1 DOSE: 2.5; .5 SOLUTION RESPIRATORY (INHALATION) at 15:28

## 2025-08-14 RX ADMIN — PANTOPRAZOLE SODIUM 40 MG: 40 TABLET, DELAYED RELEASE ORAL at 11:36

## 2025-08-14 RX ADMIN — BUDESONIDE AND FORMOTEROL FUMARATE DIHYDRATE 2 PUFF: 80; 4.5 AEROSOL RESPIRATORY (INHALATION) at 19:20

## 2025-08-14 RX ADMIN — PREDNISONE 30 MG: 20 TABLET ORAL at 07:35

## 2025-08-14 RX ADMIN — SENNOSIDES 17.2 MG: 8.6 TABLET, FILM COATED ORAL at 07:34

## 2025-08-14 RX ADMIN — ATORVASTATIN CALCIUM 80 MG: 80 TABLET, FILM COATED ORAL at 07:35

## 2025-08-14 RX ADMIN — GUAIFENESIN 600 MG: 600 TABLET ORAL at 07:35

## 2025-08-14 RX ADMIN — HEPARIN SODIUM 5000 UNITS: 5000 INJECTION INTRAVENOUS; SUBCUTANEOUS at 05:34

## 2025-08-14 RX ADMIN — BUDESONIDE AND FORMOTEROL FUMARATE DIHYDRATE 2 PUFF: 80; 4.5 AEROSOL RESPIRATORY (INHALATION) at 06:58

## 2025-08-14 RX ADMIN — LEVOTHYROXINE SODIUM 37.5 MCG: 0.07 TABLET ORAL at 05:33

## 2025-08-14 RX ADMIN — ACETAMINOPHEN 650 MG: 325 TABLET ORAL at 19:14

## 2025-08-14 RX ADMIN — IPRATROPIUM BROMIDE AND ALBUTEROL SULFATE 1 DOSE: 2.5; .5 SOLUTION RESPIRATORY (INHALATION) at 06:48

## 2025-08-14 RX ADMIN — IPRATROPIUM BROMIDE AND ALBUTEROL SULFATE 1 DOSE: 2.5; .5 SOLUTION RESPIRATORY (INHALATION) at 10:26

## 2025-08-14 RX ADMIN — ACETAMINOPHEN 650 MG: 325 TABLET ORAL at 06:23

## 2025-08-14 RX ADMIN — ONDANSETRON 4 MG: 4 TABLET, ORALLY DISINTEGRATING ORAL at 17:02

## 2025-08-14 RX ADMIN — POLYETHYLENE GLYCOL 3350 17 G: 17 POWDER, FOR SOLUTION ORAL at 07:34

## 2025-08-14 RX ADMIN — HEPARIN SODIUM 5000 UNITS: 5000 INJECTION INTRAVENOUS; SUBCUTANEOUS at 15:28

## 2025-08-14 RX ADMIN — INSULIN LISPRO 2 UNITS: 100 INJECTION, SOLUTION INTRAVENOUS; SUBCUTANEOUS at 20:54

## 2025-08-14 ASSESSMENT — PAIN DESCRIPTION - DESCRIPTORS
DESCRIPTORS: ACHING
DESCRIPTORS: ACHING

## 2025-08-14 ASSESSMENT — PAIN - FUNCTIONAL ASSESSMENT
PAIN_FUNCTIONAL_ASSESSMENT: ACTIVITIES ARE NOT PREVENTED
PAIN_FUNCTIONAL_ASSESSMENT: 0-10
PAIN_FUNCTIONAL_ASSESSMENT: ACTIVITIES ARE NOT PREVENTED
PAIN_FUNCTIONAL_ASSESSMENT: 0-10

## 2025-08-14 ASSESSMENT — PAIN SCALES - GENERAL
PAINLEVEL_OUTOF10: 0
PAINLEVEL_OUTOF10: 3
PAINLEVEL_OUTOF10: 0
PAINLEVEL_OUTOF10: 6

## 2025-08-14 ASSESSMENT — PAIN DESCRIPTION - LOCATION
LOCATION: HEAD
LOCATION: OTHER (COMMENT)

## 2025-08-15 LAB
GLUCOSE BLD-MCNC: 127 MG/DL (ref 65–105)
GLUCOSE BLD-MCNC: 129 MG/DL (ref 65–105)
GLUCOSE BLD-MCNC: 129 MG/DL (ref 65–105)

## 2025-08-15 PROCEDURE — 6370000000 HC RX 637 (ALT 250 FOR IP): Performed by: STUDENT IN AN ORGANIZED HEALTH CARE EDUCATION/TRAINING PROGRAM

## 2025-08-15 PROCEDURE — 94640 AIRWAY INHALATION TREATMENT: CPT

## 2025-08-15 PROCEDURE — 99232 SBSQ HOSP IP/OBS MODERATE 35: CPT | Performed by: INTERNAL MEDICINE

## 2025-08-15 PROCEDURE — 2700000000 HC OXYGEN THERAPY PER DAY

## 2025-08-15 PROCEDURE — 1180000000 HC REHAB R&B

## 2025-08-15 PROCEDURE — 6360000002 HC RX W HCPCS: Performed by: STUDENT IN AN ORGANIZED HEALTH CARE EDUCATION/TRAINING PROGRAM

## 2025-08-15 PROCEDURE — 94761 N-INVAS EAR/PLS OXIMETRY MLT: CPT

## 2025-08-15 PROCEDURE — 6370000000 HC RX 637 (ALT 250 FOR IP): Performed by: PHYSICAL MEDICINE & REHABILITATION

## 2025-08-15 PROCEDURE — 97530 THERAPEUTIC ACTIVITIES: CPT

## 2025-08-15 PROCEDURE — 97535 SELF CARE MNGMENT TRAINING: CPT

## 2025-08-15 PROCEDURE — 99232 SBSQ HOSP IP/OBS MODERATE 35: CPT | Performed by: PHYSICAL MEDICINE & REHABILITATION

## 2025-08-15 PROCEDURE — 97110 THERAPEUTIC EXERCISES: CPT

## 2025-08-15 PROCEDURE — 82947 ASSAY GLUCOSE BLOOD QUANT: CPT

## 2025-08-15 RX ADMIN — PREDNISONE 30 MG: 20 TABLET ORAL at 07:26

## 2025-08-15 RX ADMIN — ATORVASTATIN CALCIUM 80 MG: 80 TABLET, FILM COATED ORAL at 07:23

## 2025-08-15 RX ADMIN — GUAIFENESIN 600 MG: 600 TABLET ORAL at 07:24

## 2025-08-15 RX ADMIN — INSULIN GLARGINE 15 UNITS: 100 INJECTION, SOLUTION SUBCUTANEOUS at 07:39

## 2025-08-15 RX ADMIN — LEVOTHYROXINE SODIUM 37.5 MCG: 0.07 TABLET ORAL at 06:10

## 2025-08-15 RX ADMIN — BUDESONIDE AND FORMOTEROL FUMARATE DIHYDRATE 2 PUFF: 80; 4.5 AEROSOL RESPIRATORY (INHALATION) at 07:53

## 2025-08-15 RX ADMIN — SENNOSIDES 17.2 MG: 8.6 TABLET, FILM COATED ORAL at 10:31

## 2025-08-15 RX ADMIN — HEPARIN SODIUM 5000 UNITS: 5000 INJECTION INTRAVENOUS; SUBCUTANEOUS at 14:25

## 2025-08-15 RX ADMIN — IPRATROPIUM BROMIDE AND ALBUTEROL SULFATE 1 DOSE: 2.5; .5 SOLUTION RESPIRATORY (INHALATION) at 07:53

## 2025-08-15 RX ADMIN — GUAIFENESIN 600 MG: 600 TABLET ORAL at 21:01

## 2025-08-15 RX ADMIN — IPRATROPIUM BROMIDE AND ALBUTEROL SULFATE 1 DOSE: 2.5; .5 SOLUTION RESPIRATORY (INHALATION) at 16:17

## 2025-08-15 RX ADMIN — ACETAMINOPHEN 650 MG: 325 TABLET ORAL at 10:26

## 2025-08-15 RX ADMIN — HEPARIN SODIUM 5000 UNITS: 5000 INJECTION INTRAVENOUS; SUBCUTANEOUS at 06:09

## 2025-08-15 RX ADMIN — HEPARIN SODIUM 5000 UNITS: 5000 INJECTION INTRAVENOUS; SUBCUTANEOUS at 21:01

## 2025-08-15 RX ADMIN — PANTOPRAZOLE SODIUM 40 MG: 40 TABLET, DELAYED RELEASE ORAL at 06:10

## 2025-08-15 RX ADMIN — ACETAMINOPHEN 650 MG: 325 TABLET ORAL at 17:13

## 2025-08-15 ASSESSMENT — PAIN DESCRIPTION - LOCATION
LOCATION: BACK;HEAD
LOCATION: BACK
LOCATION: BACK

## 2025-08-15 ASSESSMENT — PAIN DESCRIPTION - DESCRIPTORS
DESCRIPTORS: BURNING
DESCRIPTORS: ACHING

## 2025-08-15 ASSESSMENT — 9 HOLE PEG TEST
TESTTIME_SECONDS: 26.47
TEST_RESULT: FUNCTIONAL

## 2025-08-15 ASSESSMENT — PAIN - FUNCTIONAL ASSESSMENT
PAIN_FUNCTIONAL_ASSESSMENT: 0-10
PAIN_FUNCTIONAL_ASSESSMENT: 0-10
PAIN_FUNCTIONAL_ASSESSMENT: ACTIVITIES ARE NOT PREVENTED

## 2025-08-15 ASSESSMENT — PAIN SCALES - GENERAL
PAINLEVEL_OUTOF10: 1
PAINLEVEL_OUTOF10: 5
PAINLEVEL_OUTOF10: 9
PAINLEVEL_OUTOF10: 3
PAINLEVEL_OUTOF10: 5

## 2025-08-16 LAB
GLUCOSE BLD-MCNC: 110 MG/DL (ref 65–105)
GLUCOSE BLD-MCNC: 115 MG/DL (ref 65–105)
GLUCOSE BLD-MCNC: 165 MG/DL (ref 65–105)
GLUCOSE BLD-MCNC: 180 MG/DL (ref 65–105)

## 2025-08-16 PROCEDURE — 1180000000 HC REHAB R&B

## 2025-08-16 PROCEDURE — 6370000000 HC RX 637 (ALT 250 FOR IP): Performed by: STUDENT IN AN ORGANIZED HEALTH CARE EDUCATION/TRAINING PROGRAM

## 2025-08-16 PROCEDURE — 2580000003 HC RX 258: Performed by: NURSE PRACTITIONER

## 2025-08-16 PROCEDURE — 94761 N-INVAS EAR/PLS OXIMETRY MLT: CPT

## 2025-08-16 PROCEDURE — 99232 SBSQ HOSP IP/OBS MODERATE 35: CPT | Performed by: PHYSICAL MEDICINE & REHABILITATION

## 2025-08-16 PROCEDURE — 97112 NEUROMUSCULAR REEDUCATION: CPT

## 2025-08-16 PROCEDURE — 94640 AIRWAY INHALATION TREATMENT: CPT

## 2025-08-16 PROCEDURE — 97542 WHEELCHAIR MNGMENT TRAINING: CPT

## 2025-08-16 PROCEDURE — 6370000000 HC RX 637 (ALT 250 FOR IP): Performed by: PHYSICAL MEDICINE & REHABILITATION

## 2025-08-16 PROCEDURE — 82947 ASSAY GLUCOSE BLOOD QUANT: CPT

## 2025-08-16 PROCEDURE — 94664 DEMO&/EVAL PT USE INHALER: CPT

## 2025-08-16 PROCEDURE — 97535 SELF CARE MNGMENT TRAINING: CPT

## 2025-08-16 PROCEDURE — 97530 THERAPEUTIC ACTIVITIES: CPT

## 2025-08-16 PROCEDURE — 6360000002 HC RX W HCPCS: Performed by: STUDENT IN AN ORGANIZED HEALTH CARE EDUCATION/TRAINING PROGRAM

## 2025-08-16 PROCEDURE — 2700000000 HC OXYGEN THERAPY PER DAY

## 2025-08-16 PROCEDURE — 99231 SBSQ HOSP IP/OBS SF/LOW 25: CPT | Performed by: INTERNAL MEDICINE

## 2025-08-16 RX ORDER — 0.9 % SODIUM CHLORIDE 0.9 %
500 INTRAVENOUS SOLUTION INTRAVENOUS ONCE
Status: COMPLETED | OUTPATIENT
Start: 2025-08-16 | End: 2025-08-17

## 2025-08-16 RX ORDER — SODIUM CHLORIDE 9 MG/ML
INJECTION, SOLUTION INTRAVENOUS CONTINUOUS
Status: DISCONTINUED | OUTPATIENT
Start: 2025-08-17 | End: 2025-08-19

## 2025-08-16 RX ADMIN — IPRATROPIUM BROMIDE AND ALBUTEROL SULFATE 1 DOSE: 2.5; .5 SOLUTION RESPIRATORY (INHALATION) at 06:52

## 2025-08-16 RX ADMIN — IPRATROPIUM BROMIDE AND ALBUTEROL SULFATE 1 DOSE: 2.5; .5 SOLUTION RESPIRATORY (INHALATION) at 15:02

## 2025-08-16 RX ADMIN — POLYETHYLENE GLYCOL 3350 17 G: 17 POWDER, FOR SOLUTION ORAL at 08:28

## 2025-08-16 RX ADMIN — LISINOPRIL 40 MG: 20 TABLET ORAL at 08:29

## 2025-08-16 RX ADMIN — IPRATROPIUM BROMIDE AND ALBUTEROL SULFATE 1 DOSE: 2.5; .5 SOLUTION RESPIRATORY (INHALATION) at 20:58

## 2025-08-16 RX ADMIN — SODIUM CHLORIDE 500 ML: 0.9 INJECTION, SOLUTION INTRAVENOUS at 22:44

## 2025-08-16 RX ADMIN — HEPARIN SODIUM 5000 UNITS: 5000 INJECTION INTRAVENOUS; SUBCUTANEOUS at 22:36

## 2025-08-16 RX ADMIN — ACETAMINOPHEN 650 MG: 325 TABLET ORAL at 17:57

## 2025-08-16 RX ADMIN — GUAIFENESIN 600 MG: 600 TABLET ORAL at 22:36

## 2025-08-16 RX ADMIN — BUDESONIDE AND FORMOTEROL FUMARATE DIHYDRATE 2 PUFF: 80; 4.5 AEROSOL RESPIRATORY (INHALATION) at 06:52

## 2025-08-16 RX ADMIN — HEPARIN SODIUM 5000 UNITS: 5000 INJECTION INTRAVENOUS; SUBCUTANEOUS at 14:07

## 2025-08-16 RX ADMIN — ACETAMINOPHEN 650 MG: 325 TABLET ORAL at 00:05

## 2025-08-16 RX ADMIN — INSULIN GLARGINE 15 UNITS: 100 INJECTION, SOLUTION SUBCUTANEOUS at 08:28

## 2025-08-16 RX ADMIN — PREDNISONE 20 MG: 20 TABLET ORAL at 08:29

## 2025-08-16 RX ADMIN — BUDESONIDE AND FORMOTEROL FUMARATE DIHYDRATE 2 PUFF: 80; 4.5 AEROSOL RESPIRATORY (INHALATION) at 20:58

## 2025-08-16 RX ADMIN — GUAIFENESIN 600 MG: 600 TABLET ORAL at 08:29

## 2025-08-16 RX ADMIN — ATORVASTATIN CALCIUM 80 MG: 80 TABLET, FILM COATED ORAL at 08:29

## 2025-08-16 RX ADMIN — PANTOPRAZOLE SODIUM 40 MG: 40 TABLET, DELAYED RELEASE ORAL at 06:24

## 2025-08-16 RX ADMIN — IPRATROPIUM BROMIDE AND ALBUTEROL SULFATE 1 DOSE: 2.5; .5 SOLUTION RESPIRATORY (INHALATION) at 10:50

## 2025-08-16 RX ADMIN — ONDANSETRON 4 MG: 4 TABLET, ORALLY DISINTEGRATING ORAL at 16:54

## 2025-08-16 RX ADMIN — LEVOTHYROXINE SODIUM 37.5 MCG: 0.07 TABLET ORAL at 06:24

## 2025-08-16 RX ADMIN — HEPARIN SODIUM 5000 UNITS: 5000 INJECTION INTRAVENOUS; SUBCUTANEOUS at 06:24

## 2025-08-16 ASSESSMENT — PAIN - FUNCTIONAL ASSESSMENT
PAIN_FUNCTIONAL_ASSESSMENT: 0-10
PAIN_FUNCTIONAL_ASSESSMENT: ACTIVITIES ARE NOT PREVENTED

## 2025-08-16 ASSESSMENT — PAIN SCALES - GENERAL
PAINLEVEL_OUTOF10: 0
PAINLEVEL_OUTOF10: 7

## 2025-08-16 ASSESSMENT — PAIN DESCRIPTION - DESCRIPTORS: DESCRIPTORS: ACHING

## 2025-08-16 ASSESSMENT — PAIN DESCRIPTION - ORIENTATION: ORIENTATION: OTHER (COMMENT)

## 2025-08-16 ASSESSMENT — PAIN DESCRIPTION - LOCATION: LOCATION: HEAD

## 2025-08-17 LAB
GLUCOSE BLD-MCNC: 177 MG/DL (ref 65–105)
GLUCOSE BLD-MCNC: 182 MG/DL (ref 65–105)
GLUCOSE BLD-MCNC: 210 MG/DL (ref 65–105)
GLUCOSE BLD-MCNC: 99 MG/DL (ref 65–105)

## 2025-08-17 PROCEDURE — 94640 AIRWAY INHALATION TREATMENT: CPT

## 2025-08-17 PROCEDURE — 6370000000 HC RX 637 (ALT 250 FOR IP): Performed by: STUDENT IN AN ORGANIZED HEALTH CARE EDUCATION/TRAINING PROGRAM

## 2025-08-17 PROCEDURE — 2580000003 HC RX 258: Performed by: NURSE PRACTITIONER

## 2025-08-17 PROCEDURE — 82947 ASSAY GLUCOSE BLOOD QUANT: CPT

## 2025-08-17 PROCEDURE — 97542 WHEELCHAIR MNGMENT TRAINING: CPT

## 2025-08-17 PROCEDURE — 1180000000 HC REHAB R&B

## 2025-08-17 PROCEDURE — 94761 N-INVAS EAR/PLS OXIMETRY MLT: CPT

## 2025-08-17 PROCEDURE — 6360000002 HC RX W HCPCS: Performed by: STUDENT IN AN ORGANIZED HEALTH CARE EDUCATION/TRAINING PROGRAM

## 2025-08-17 PROCEDURE — 97110 THERAPEUTIC EXERCISES: CPT

## 2025-08-17 PROCEDURE — 99232 SBSQ HOSP IP/OBS MODERATE 35: CPT | Performed by: PHYSICAL MEDICINE & REHABILITATION

## 2025-08-17 PROCEDURE — 97535 SELF CARE MNGMENT TRAINING: CPT

## 2025-08-17 PROCEDURE — 6370000000 HC RX 637 (ALT 250 FOR IP): Performed by: PHYSICAL MEDICINE & REHABILITATION

## 2025-08-17 PROCEDURE — 97530 THERAPEUTIC ACTIVITIES: CPT

## 2025-08-17 PROCEDURE — 99232 SBSQ HOSP IP/OBS MODERATE 35: CPT | Performed by: INTERNAL MEDICINE

## 2025-08-17 RX ADMIN — HEPARIN SODIUM 5000 UNITS: 5000 INJECTION INTRAVENOUS; SUBCUTANEOUS at 14:45

## 2025-08-17 RX ADMIN — IPRATROPIUM BROMIDE AND ALBUTEROL SULFATE 1 DOSE: 2.5; .5 SOLUTION RESPIRATORY (INHALATION) at 11:00

## 2025-08-17 RX ADMIN — GUAIFENESIN 600 MG: 600 TABLET ORAL at 08:29

## 2025-08-17 RX ADMIN — IPRATROPIUM BROMIDE AND ALBUTEROL SULFATE 1 DOSE: 2.5; .5 SOLUTION RESPIRATORY (INHALATION) at 06:38

## 2025-08-17 RX ADMIN — ONDANSETRON 4 MG: 4 TABLET, ORALLY DISINTEGRATING ORAL at 23:49

## 2025-08-17 RX ADMIN — HEPARIN SODIUM 5000 UNITS: 5000 INJECTION INTRAVENOUS; SUBCUTANEOUS at 06:48

## 2025-08-17 RX ADMIN — ONDANSETRON 4 MG: 4 TABLET, ORALLY DISINTEGRATING ORAL at 08:28

## 2025-08-17 RX ADMIN — BUDESONIDE AND FORMOTEROL FUMARATE DIHYDRATE 2 PUFF: 80; 4.5 AEROSOL RESPIRATORY (INHALATION) at 19:59

## 2025-08-17 RX ADMIN — INSULIN LISPRO 2 UNITS: 100 INJECTION, SOLUTION INTRAVENOUS; SUBCUTANEOUS at 16:52

## 2025-08-17 RX ADMIN — ACETAMINOPHEN 650 MG: 325 TABLET ORAL at 08:02

## 2025-08-17 RX ADMIN — INSULIN LISPRO 2 UNITS: 100 INJECTION, SOLUTION INTRAVENOUS; SUBCUTANEOUS at 12:29

## 2025-08-17 RX ADMIN — PREDNISONE 20 MG: 20 TABLET ORAL at 08:29

## 2025-08-17 RX ADMIN — SODIUM CHLORIDE: 900 INJECTION, SOLUTION INTRAVENOUS at 00:19

## 2025-08-17 RX ADMIN — HEPARIN SODIUM 5000 UNITS: 5000 INJECTION INTRAVENOUS; SUBCUTANEOUS at 22:13

## 2025-08-17 RX ADMIN — IPRATROPIUM BROMIDE AND ALBUTEROL SULFATE 1 DOSE: 2.5; .5 SOLUTION RESPIRATORY (INHALATION) at 19:52

## 2025-08-17 RX ADMIN — ATORVASTATIN CALCIUM 80 MG: 80 TABLET, FILM COATED ORAL at 08:29

## 2025-08-17 RX ADMIN — PANTOPRAZOLE SODIUM 40 MG: 40 TABLET, DELAYED RELEASE ORAL at 06:48

## 2025-08-17 RX ADMIN — ACETAMINOPHEN 650 MG: 325 TABLET ORAL at 14:44

## 2025-08-17 RX ADMIN — BUDESONIDE AND FORMOTEROL FUMARATE DIHYDRATE 2 PUFF: 80; 4.5 AEROSOL RESPIRATORY (INHALATION) at 06:38

## 2025-08-17 RX ADMIN — LEVOTHYROXINE SODIUM 37.5 MCG: 0.07 TABLET ORAL at 06:48

## 2025-08-17 RX ADMIN — ACETAMINOPHEN 650 MG: 325 TABLET ORAL at 23:49

## 2025-08-17 RX ADMIN — INSULIN GLARGINE 15 UNITS: 100 INJECTION, SOLUTION SUBCUTANEOUS at 08:28

## 2025-08-17 ASSESSMENT — PAIN SCALES - GENERAL
PAINLEVEL_OUTOF10: 3
PAINLEVEL_OUTOF10: 3
PAINLEVEL_OUTOF10: 6
PAINLEVEL_OUTOF10: 3

## 2025-08-17 ASSESSMENT — PAIN - FUNCTIONAL ASSESSMENT
PAIN_FUNCTIONAL_ASSESSMENT: 0-10
PAIN_FUNCTIONAL_ASSESSMENT: ACTIVITIES ARE NOT PREVENTED
PAIN_FUNCTIONAL_ASSESSMENT: PREVENTS OR INTERFERES SOME ACTIVE ACTIVITIES AND ADLS
PAIN_FUNCTIONAL_ASSESSMENT: 0-10
PAIN_FUNCTIONAL_ASSESSMENT: 0-10
PAIN_FUNCTIONAL_ASSESSMENT: ACTIVITIES ARE NOT PREVENTED

## 2025-08-17 ASSESSMENT — PAIN DESCRIPTION - DESCRIPTORS
DESCRIPTORS: DISCOMFORT
DESCRIPTORS: ACHING;DULL
DESCRIPTORS: ACHING

## 2025-08-17 ASSESSMENT — PAIN DESCRIPTION - ORIENTATION
ORIENTATION: LOWER
ORIENTATION: RIGHT;LEFT;MID

## 2025-08-17 ASSESSMENT — PAIN DESCRIPTION - LOCATION
LOCATION: BACK;NECK
LOCATION: BACK
LOCATION: NECK;HEAD

## 2025-08-18 LAB
GLUCOSE BLD-MCNC: 110 MG/DL (ref 65–105)
GLUCOSE BLD-MCNC: 110 MG/DL (ref 65–105)
GLUCOSE BLD-MCNC: 198 MG/DL (ref 65–105)
GLUCOSE BLD-MCNC: 228 MG/DL (ref 65–105)

## 2025-08-18 PROCEDURE — 2700000000 HC OXYGEN THERAPY PER DAY

## 2025-08-18 PROCEDURE — 97530 THERAPEUTIC ACTIVITIES: CPT

## 2025-08-18 PROCEDURE — 6360000002 HC RX W HCPCS: Performed by: STUDENT IN AN ORGANIZED HEALTH CARE EDUCATION/TRAINING PROGRAM

## 2025-08-18 PROCEDURE — 94761 N-INVAS EAR/PLS OXIMETRY MLT: CPT

## 2025-08-18 PROCEDURE — 6370000000 HC RX 637 (ALT 250 FOR IP): Performed by: STUDENT IN AN ORGANIZED HEALTH CARE EDUCATION/TRAINING PROGRAM

## 2025-08-18 PROCEDURE — 1180000000 HC REHAB R&B

## 2025-08-18 PROCEDURE — 99232 SBSQ HOSP IP/OBS MODERATE 35: CPT | Performed by: INTERNAL MEDICINE

## 2025-08-18 PROCEDURE — 97110 THERAPEUTIC EXERCISES: CPT

## 2025-08-18 PROCEDURE — 97116 GAIT TRAINING THERAPY: CPT

## 2025-08-18 PROCEDURE — 99232 SBSQ HOSP IP/OBS MODERATE 35: CPT | Performed by: PHYSICAL MEDICINE & REHABILITATION

## 2025-08-18 PROCEDURE — 82947 ASSAY GLUCOSE BLOOD QUANT: CPT

## 2025-08-18 PROCEDURE — 6370000000 HC RX 637 (ALT 250 FOR IP): Performed by: PHYSICAL MEDICINE & REHABILITATION

## 2025-08-18 PROCEDURE — 94640 AIRWAY INHALATION TREATMENT: CPT

## 2025-08-18 RX ADMIN — BUDESONIDE AND FORMOTEROL FUMARATE DIHYDRATE 2 PUFF: 80; 4.5 AEROSOL RESPIRATORY (INHALATION) at 19:25

## 2025-08-18 RX ADMIN — IPRATROPIUM BROMIDE AND ALBUTEROL SULFATE 1 DOSE: 2.5; .5 SOLUTION RESPIRATORY (INHALATION) at 14:52

## 2025-08-18 RX ADMIN — IPRATROPIUM BROMIDE AND ALBUTEROL SULFATE 1 DOSE: 2.5; .5 SOLUTION RESPIRATORY (INHALATION) at 06:40

## 2025-08-18 RX ADMIN — IPRATROPIUM BROMIDE AND ALBUTEROL SULFATE 1 DOSE: 2.5; .5 SOLUTION RESPIRATORY (INHALATION) at 11:15

## 2025-08-18 RX ADMIN — ACETAMINOPHEN 650 MG: 325 TABLET ORAL at 15:00

## 2025-08-18 RX ADMIN — GUAIFENESIN 600 MG: 600 TABLET ORAL at 08:18

## 2025-08-18 RX ADMIN — LEVOTHYROXINE SODIUM 37.5 MCG: 0.07 TABLET ORAL at 06:13

## 2025-08-18 RX ADMIN — ATORVASTATIN CALCIUM 80 MG: 80 TABLET, FILM COATED ORAL at 08:18

## 2025-08-18 RX ADMIN — BUDESONIDE AND FORMOTEROL FUMARATE DIHYDRATE 2 PUFF: 80; 4.5 AEROSOL RESPIRATORY (INHALATION) at 06:50

## 2025-08-18 RX ADMIN — HEPARIN SODIUM 5000 UNITS: 5000 INJECTION INTRAVENOUS; SUBCUTANEOUS at 15:01

## 2025-08-18 RX ADMIN — IPRATROPIUM BROMIDE AND ALBUTEROL SULFATE 1 DOSE: 2.5; .5 SOLUTION RESPIRATORY (INHALATION) at 19:24

## 2025-08-18 RX ADMIN — INSULIN LISPRO 2 UNITS: 100 INJECTION, SOLUTION INTRAVENOUS; SUBCUTANEOUS at 20:52

## 2025-08-18 RX ADMIN — GUAIFENESIN 600 MG: 600 TABLET ORAL at 20:43

## 2025-08-18 RX ADMIN — PANTOPRAZOLE SODIUM 40 MG: 40 TABLET, DELAYED RELEASE ORAL at 06:13

## 2025-08-18 RX ADMIN — HEPARIN SODIUM 5000 UNITS: 5000 INJECTION INTRAVENOUS; SUBCUTANEOUS at 06:13

## 2025-08-18 RX ADMIN — PREDNISONE 20 MG: 20 TABLET ORAL at 08:18

## 2025-08-18 RX ADMIN — ONDANSETRON 4 MG: 4 TABLET, ORALLY DISINTEGRATING ORAL at 17:00

## 2025-08-18 RX ADMIN — HEPARIN SODIUM 5000 UNITS: 5000 INJECTION INTRAVENOUS; SUBCUTANEOUS at 20:42

## 2025-08-18 RX ADMIN — INSULIN GLARGINE 15 UNITS: 100 INJECTION, SOLUTION SUBCUTANEOUS at 08:19

## 2025-08-18 RX ADMIN — ACETAMINOPHEN 650 MG: 325 TABLET ORAL at 08:24

## 2025-08-18 ASSESSMENT — PAIN SCALES - GENERAL
PAINLEVEL_OUTOF10: 4
PAINLEVEL_OUTOF10: 6
PAINLEVEL_OUTOF10: 4
PAINLEVEL_OUTOF10: 0

## 2025-08-18 ASSESSMENT — PAIN DESCRIPTION - DESCRIPTORS
DESCRIPTORS: ACHING

## 2025-08-18 ASSESSMENT — PAIN DESCRIPTION - ORIENTATION
ORIENTATION: POSTERIOR
ORIENTATION: POSTERIOR;UPPER
ORIENTATION: INNER

## 2025-08-18 ASSESSMENT — PAIN DESCRIPTION - LOCATION
LOCATION: NECK;BACK
LOCATION: BACK;NECK
LOCATION: HEAD

## 2025-08-18 ASSESSMENT — PAIN - FUNCTIONAL ASSESSMENT
PAIN_FUNCTIONAL_ASSESSMENT: ACTIVITIES ARE NOT PREVENTED
PAIN_FUNCTIONAL_ASSESSMENT: 0-10

## 2025-08-19 LAB
GLUCOSE BLD-MCNC: 113 MG/DL (ref 65–105)
GLUCOSE BLD-MCNC: 150 MG/DL (ref 65–105)
GLUCOSE BLD-MCNC: 151 MG/DL (ref 65–105)
GLUCOSE BLD-MCNC: 94 MG/DL (ref 65–105)

## 2025-08-19 PROCEDURE — 94761 N-INVAS EAR/PLS OXIMETRY MLT: CPT

## 2025-08-19 PROCEDURE — 6370000000 HC RX 637 (ALT 250 FOR IP): Performed by: STUDENT IN AN ORGANIZED HEALTH CARE EDUCATION/TRAINING PROGRAM

## 2025-08-19 PROCEDURE — 94640 AIRWAY INHALATION TREATMENT: CPT

## 2025-08-19 PROCEDURE — 97116 GAIT TRAINING THERAPY: CPT

## 2025-08-19 PROCEDURE — 6360000002 HC RX W HCPCS: Performed by: STUDENT IN AN ORGANIZED HEALTH CARE EDUCATION/TRAINING PROGRAM

## 2025-08-19 PROCEDURE — 97530 THERAPEUTIC ACTIVITIES: CPT

## 2025-08-19 PROCEDURE — 97110 THERAPEUTIC EXERCISES: CPT

## 2025-08-19 PROCEDURE — 99232 SBSQ HOSP IP/OBS MODERATE 35: CPT | Performed by: INTERNAL MEDICINE

## 2025-08-19 PROCEDURE — 82947 ASSAY GLUCOSE BLOOD QUANT: CPT

## 2025-08-19 PROCEDURE — 1180000000 HC REHAB R&B

## 2025-08-19 PROCEDURE — 6370000000 HC RX 637 (ALT 250 FOR IP): Performed by: PHYSICAL MEDICINE & REHABILITATION

## 2025-08-19 PROCEDURE — 97535 SELF CARE MNGMENT TRAINING: CPT

## 2025-08-19 PROCEDURE — 99233 SBSQ HOSP IP/OBS HIGH 50: CPT | Performed by: PHYSICAL MEDICINE & REHABILITATION

## 2025-08-19 RX ADMIN — HEPARIN SODIUM 5000 UNITS: 5000 INJECTION INTRAVENOUS; SUBCUTANEOUS at 20:45

## 2025-08-19 RX ADMIN — INSULIN GLARGINE 15 UNITS: 100 INJECTION, SOLUTION SUBCUTANEOUS at 07:11

## 2025-08-19 RX ADMIN — HEPARIN SODIUM 5000 UNITS: 5000 INJECTION INTRAVENOUS; SUBCUTANEOUS at 05:33

## 2025-08-19 RX ADMIN — HEPARIN SODIUM 5000 UNITS: 5000 INJECTION INTRAVENOUS; SUBCUTANEOUS at 15:35

## 2025-08-19 RX ADMIN — ACETAMINOPHEN 650 MG: 325 TABLET ORAL at 11:16

## 2025-08-19 RX ADMIN — IPRATROPIUM BROMIDE AND ALBUTEROL SULFATE 1 DOSE: 2.5; .5 SOLUTION RESPIRATORY (INHALATION) at 15:52

## 2025-08-19 RX ADMIN — LEVOTHYROXINE SODIUM 37.5 MCG: 0.07 TABLET ORAL at 05:33

## 2025-08-19 RX ADMIN — IPRATROPIUM BROMIDE AND ALBUTEROL SULFATE 1 DOSE: 2.5; .5 SOLUTION RESPIRATORY (INHALATION) at 11:16

## 2025-08-19 RX ADMIN — IPRATROPIUM BROMIDE AND ALBUTEROL SULFATE 1 DOSE: 2.5; .5 SOLUTION RESPIRATORY (INHALATION) at 06:40

## 2025-08-19 RX ADMIN — BUDESONIDE AND FORMOTEROL FUMARATE DIHYDRATE 2 PUFF: 80; 4.5 AEROSOL RESPIRATORY (INHALATION) at 06:50

## 2025-08-19 RX ADMIN — PREDNISONE 10 MG: 10 TABLET ORAL at 07:11

## 2025-08-19 RX ADMIN — PANTOPRAZOLE SODIUM 40 MG: 40 TABLET, DELAYED RELEASE ORAL at 05:33

## 2025-08-19 RX ADMIN — ACETAMINOPHEN 650 MG: 325 TABLET ORAL at 00:56

## 2025-08-19 RX ADMIN — ATORVASTATIN CALCIUM 80 MG: 80 TABLET, FILM COATED ORAL at 07:11

## 2025-08-19 RX ADMIN — POLYETHYLENE GLYCOL 3350 17 G: 17 POWDER, FOR SOLUTION ORAL at 07:10

## 2025-08-19 RX ADMIN — ACETAMINOPHEN 650 MG: 325 TABLET ORAL at 15:34

## 2025-08-19 ASSESSMENT — PAIN SCALES - GENERAL
PAINLEVEL_OUTOF10: 8
PAINLEVEL_OUTOF10: 5
PAINLEVEL_OUTOF10: 0
PAINLEVEL_OUTOF10: 4
PAINLEVEL_OUTOF10: 6
PAINLEVEL_OUTOF10: 3
PAINLEVEL_OUTOF10: 3

## 2025-08-19 ASSESSMENT — PAIN DESCRIPTION - ORIENTATION
ORIENTATION: INNER
ORIENTATION: MID
ORIENTATION: INNER

## 2025-08-19 ASSESSMENT — PAIN DESCRIPTION - DESCRIPTORS
DESCRIPTORS: ACHING

## 2025-08-19 ASSESSMENT — PAIN - FUNCTIONAL ASSESSMENT
PAIN_FUNCTIONAL_ASSESSMENT: 0-10
PAIN_FUNCTIONAL_ASSESSMENT: ACTIVITIES ARE NOT PREVENTED
PAIN_FUNCTIONAL_ASSESSMENT: ACTIVITIES ARE NOT PREVENTED
PAIN_FUNCTIONAL_ASSESSMENT: 0-10
PAIN_FUNCTIONAL_ASSESSMENT: ACTIVITIES ARE NOT PREVENTED
PAIN_FUNCTIONAL_ASSESSMENT: ACTIVITIES ARE NOT PREVENTED
PAIN_FUNCTIONAL_ASSESSMENT: 0-10

## 2025-08-19 ASSESSMENT — PAIN DESCRIPTION - LOCATION
LOCATION: HEAD

## 2025-08-20 ENCOUNTER — TELEPHONE (OUTPATIENT)
Dept: FAMILY MEDICINE CLINIC | Age: 70
End: 2025-08-20

## 2025-08-20 LAB
GLUCOSE BLD-MCNC: 128 MG/DL (ref 65–105)
GLUCOSE BLD-MCNC: 156 MG/DL (ref 65–105)
GLUCOSE BLD-MCNC: 172 MG/DL (ref 65–105)
GLUCOSE BLD-MCNC: 89 MG/DL (ref 65–105)

## 2025-08-20 PROCEDURE — 97542 WHEELCHAIR MNGMENT TRAINING: CPT

## 2025-08-20 PROCEDURE — 94640 AIRWAY INHALATION TREATMENT: CPT

## 2025-08-20 PROCEDURE — 97110 THERAPEUTIC EXERCISES: CPT

## 2025-08-20 PROCEDURE — 1180000000 HC REHAB R&B

## 2025-08-20 PROCEDURE — 6370000000 HC RX 637 (ALT 250 FOR IP): Performed by: STUDENT IN AN ORGANIZED HEALTH CARE EDUCATION/TRAINING PROGRAM

## 2025-08-20 PROCEDURE — 97116 GAIT TRAINING THERAPY: CPT

## 2025-08-20 PROCEDURE — 97530 THERAPEUTIC ACTIVITIES: CPT

## 2025-08-20 PROCEDURE — 6360000002 HC RX W HCPCS: Performed by: STUDENT IN AN ORGANIZED HEALTH CARE EDUCATION/TRAINING PROGRAM

## 2025-08-20 PROCEDURE — 97535 SELF CARE MNGMENT TRAINING: CPT

## 2025-08-20 PROCEDURE — 99232 SBSQ HOSP IP/OBS MODERATE 35: CPT | Performed by: INTERNAL MEDICINE

## 2025-08-20 PROCEDURE — 99232 SBSQ HOSP IP/OBS MODERATE 35: CPT | Performed by: PHYSICAL MEDICINE & REHABILITATION

## 2025-08-20 PROCEDURE — 82947 ASSAY GLUCOSE BLOOD QUANT: CPT

## 2025-08-20 PROCEDURE — 6370000000 HC RX 637 (ALT 250 FOR IP): Performed by: PHYSICAL MEDICINE & REHABILITATION

## 2025-08-20 RX ORDER — CALCIUM CARBONATE 500 MG/1
500 TABLET, CHEWABLE ORAL 3 TIMES DAILY PRN
Status: DISCONTINUED | OUTPATIENT
Start: 2025-08-20 | End: 2025-08-25 | Stop reason: HOSPADM

## 2025-08-20 RX ADMIN — ONDANSETRON 4 MG: 4 TABLET, ORALLY DISINTEGRATING ORAL at 15:01

## 2025-08-20 RX ADMIN — ACETAMINOPHEN 650 MG: 325 TABLET ORAL at 14:10

## 2025-08-20 RX ADMIN — HEPARIN SODIUM 5000 UNITS: 5000 INJECTION INTRAVENOUS; SUBCUTANEOUS at 14:11

## 2025-08-20 RX ADMIN — PREDNISONE 10 MG: 10 TABLET ORAL at 08:03

## 2025-08-20 RX ADMIN — HEPARIN SODIUM 5000 UNITS: 5000 INJECTION INTRAVENOUS; SUBCUTANEOUS at 19:48

## 2025-08-20 RX ADMIN — GUAIFENESIN 600 MG: 600 TABLET ORAL at 08:03

## 2025-08-20 RX ADMIN — BUDESONIDE AND FORMOTEROL FUMARATE DIHYDRATE 2 PUFF: 80; 4.5 AEROSOL RESPIRATORY (INHALATION) at 06:52

## 2025-08-20 RX ADMIN — IPRATROPIUM BROMIDE AND ALBUTEROL SULFATE 1 DOSE: 2.5; .5 SOLUTION RESPIRATORY (INHALATION) at 20:48

## 2025-08-20 RX ADMIN — SENNOSIDES 17.2 MG: 8.6 TABLET, FILM COATED ORAL at 08:03

## 2025-08-20 RX ADMIN — GUAIFENESIN 600 MG: 600 TABLET ORAL at 19:48

## 2025-08-20 RX ADMIN — BUDESONIDE AND FORMOTEROL FUMARATE DIHYDRATE 2 PUFF: 80; 4.5 AEROSOL RESPIRATORY (INHALATION) at 20:49

## 2025-08-20 RX ADMIN — INSULIN GLARGINE 15 UNITS: 100 INJECTION, SOLUTION SUBCUTANEOUS at 08:03

## 2025-08-20 RX ADMIN — LEVOTHYROXINE SODIUM 37.5 MCG: 0.07 TABLET ORAL at 05:51

## 2025-08-20 RX ADMIN — HEPARIN SODIUM 5000 UNITS: 5000 INJECTION INTRAVENOUS; SUBCUTANEOUS at 05:51

## 2025-08-20 RX ADMIN — IPRATROPIUM BROMIDE AND ALBUTEROL SULFATE 1 DOSE: 2.5; .5 SOLUTION RESPIRATORY (INHALATION) at 15:09

## 2025-08-20 RX ADMIN — IPRATROPIUM BROMIDE AND ALBUTEROL SULFATE 1 DOSE: 2.5; .5 SOLUTION RESPIRATORY (INHALATION) at 10:33

## 2025-08-20 RX ADMIN — ATORVASTATIN CALCIUM 80 MG: 80 TABLET, FILM COATED ORAL at 08:03

## 2025-08-20 RX ADMIN — ANTACID TABLETS 500 MG: 500 TABLET, CHEWABLE ORAL at 09:30

## 2025-08-20 RX ADMIN — IPRATROPIUM BROMIDE AND ALBUTEROL SULFATE 1 DOSE: 2.5; .5 SOLUTION RESPIRATORY (INHALATION) at 06:42

## 2025-08-20 RX ADMIN — PANTOPRAZOLE SODIUM 40 MG: 40 TABLET, DELAYED RELEASE ORAL at 05:51

## 2025-08-20 ASSESSMENT — PAIN DESCRIPTION - DESCRIPTORS: DESCRIPTORS: PRESSURE

## 2025-08-20 ASSESSMENT — PAIN SCALES - GENERAL
PAINLEVEL_OUTOF10: 0
PAINLEVEL_OUTOF10: 0
PAINLEVEL_OUTOF10: 6
PAINLEVEL_OUTOF10: 0

## 2025-08-20 ASSESSMENT — PAIN - FUNCTIONAL ASSESSMENT: PAIN_FUNCTIONAL_ASSESSMENT: 0-10

## 2025-08-20 ASSESSMENT — PAIN DESCRIPTION - LOCATION: LOCATION: HEAD

## 2025-08-21 LAB
ANION GAP SERPL CALCULATED.3IONS-SCNC: 15 MMOL/L (ref 9–16)
BASOPHILS # BLD: 0.06 K/UL (ref 0–0.2)
BASOPHILS NFR BLD: 1 % (ref 0–2)
BUN SERPL-MCNC: 18 MG/DL (ref 8–23)
CALCIUM SERPL-MCNC: 9.8 MG/DL (ref 8.6–10.4)
CHLORIDE SERPL-SCNC: 102 MMOL/L (ref 98–107)
CO2 SERPL-SCNC: 22 MMOL/L (ref 20–31)
CREAT SERPL-MCNC: 0.9 MG/DL (ref 0.7–1.2)
EOSINOPHIL # BLD: 0.33 K/UL (ref 0–0.44)
EOSINOPHILS RELATIVE PERCENT: 3 % (ref 0–4)
ERYTHROCYTE [DISTWIDTH] IN BLOOD BY AUTOMATED COUNT: 15.5 % (ref 11.5–14.9)
GFR, ESTIMATED: 70 ML/MIN/1.73M2
GLUCOSE BLD-MCNC: 107 MG/DL (ref 65–105)
GLUCOSE BLD-MCNC: 116 MG/DL (ref 65–105)
GLUCOSE BLD-MCNC: 149 MG/DL (ref 65–105)
GLUCOSE BLD-MCNC: 165 MG/DL (ref 65–105)
GLUCOSE SERPL-MCNC: 172 MG/DL (ref 74–99)
HCT VFR BLD AUTO: 40 % (ref 36–46)
HGB BLD-MCNC: 12.3 G/DL (ref 12–16)
IMM GRANULOCYTES # BLD AUTO: 0.14 K/UL (ref 0–0.3)
IMM GRANULOCYTES NFR BLD: 1 %
LYMPHOCYTES NFR BLD: 3.19 K/UL (ref 1.1–3.7)
LYMPHOCYTES RELATIVE PERCENT: 25 % (ref 24–44)
MCH RBC QN AUTO: 27.8 PG (ref 26–34)
MCHC RBC AUTO-ENTMCNC: 30.8 G/DL (ref 31–37)
MCV RBC AUTO: 90.5 FL (ref 80–100)
MONOCYTES NFR BLD: 0.52 K/UL (ref 0.1–1.2)
MONOCYTES NFR BLD: 4 % (ref 3–12)
NEUTROPHILS NFR BLD: 66 % (ref 36–66)
NEUTS SEG NFR BLD: 8.32 K/UL (ref 1.5–8.1)
NRBC BLD-RTO: 0 PER 100 WBC
PLATELET # BLD AUTO: 318 K/UL (ref 150–450)
PMV BLD AUTO: 9.3 FL (ref 8–13.5)
POTASSIUM SERPL-SCNC: 3.8 MMOL/L (ref 3.7–5.3)
RBC # BLD AUTO: 4.42 M/UL (ref 3.95–5.11)
SODIUM SERPL-SCNC: 139 MMOL/L (ref 136–145)
WBC OTHER # BLD: 12.6 K/UL (ref 3.5–11)

## 2025-08-21 PROCEDURE — 94761 N-INVAS EAR/PLS OXIMETRY MLT: CPT

## 2025-08-21 PROCEDURE — 82947 ASSAY GLUCOSE BLOOD QUANT: CPT

## 2025-08-21 PROCEDURE — 6360000002 HC RX W HCPCS: Performed by: STUDENT IN AN ORGANIZED HEALTH CARE EDUCATION/TRAINING PROGRAM

## 2025-08-21 PROCEDURE — 94640 AIRWAY INHALATION TREATMENT: CPT

## 2025-08-21 PROCEDURE — 1180000000 HC REHAB R&B

## 2025-08-21 PROCEDURE — 97535 SELF CARE MNGMENT TRAINING: CPT

## 2025-08-21 PROCEDURE — 6370000000 HC RX 637 (ALT 250 FOR IP): Performed by: STUDENT IN AN ORGANIZED HEALTH CARE EDUCATION/TRAINING PROGRAM

## 2025-08-21 PROCEDURE — 36415 COLL VENOUS BLD VENIPUNCTURE: CPT

## 2025-08-21 PROCEDURE — 2700000000 HC OXYGEN THERAPY PER DAY

## 2025-08-21 PROCEDURE — 99232 SBSQ HOSP IP/OBS MODERATE 35: CPT | Performed by: PHYSICAL MEDICINE & REHABILITATION

## 2025-08-21 PROCEDURE — 97530 THERAPEUTIC ACTIVITIES: CPT

## 2025-08-21 PROCEDURE — 97110 THERAPEUTIC EXERCISES: CPT

## 2025-08-21 PROCEDURE — 97116 GAIT TRAINING THERAPY: CPT

## 2025-08-21 PROCEDURE — 6370000000 HC RX 637 (ALT 250 FOR IP): Performed by: PHYSICAL MEDICINE & REHABILITATION

## 2025-08-21 PROCEDURE — 99232 SBSQ HOSP IP/OBS MODERATE 35: CPT | Performed by: INTERNAL MEDICINE

## 2025-08-21 PROCEDURE — 85025 COMPLETE CBC W/AUTO DIFF WBC: CPT

## 2025-08-21 PROCEDURE — 80048 BASIC METABOLIC PNL TOTAL CA: CPT

## 2025-08-21 RX ADMIN — ACETAMINOPHEN 650 MG: 325 TABLET ORAL at 22:06

## 2025-08-21 RX ADMIN — PREDNISONE 10 MG: 10 TABLET ORAL at 07:54

## 2025-08-21 RX ADMIN — ACETAMINOPHEN 650 MG: 325 TABLET ORAL at 13:22

## 2025-08-21 RX ADMIN — HEPARIN SODIUM 5000 UNITS: 5000 INJECTION INTRAVENOUS; SUBCUTANEOUS at 05:55

## 2025-08-21 RX ADMIN — IPRATROPIUM BROMIDE AND ALBUTEROL SULFATE 1 DOSE: 2.5; .5 SOLUTION RESPIRATORY (INHALATION) at 15:12

## 2025-08-21 RX ADMIN — IPRATROPIUM BROMIDE AND ALBUTEROL SULFATE 1 DOSE: 2.5; .5 SOLUTION RESPIRATORY (INHALATION) at 20:18

## 2025-08-21 RX ADMIN — LEVOTHYROXINE SODIUM 37.5 MCG: 0.07 TABLET ORAL at 05:55

## 2025-08-21 RX ADMIN — PANTOPRAZOLE SODIUM 40 MG: 40 TABLET, DELAYED RELEASE ORAL at 05:55

## 2025-08-21 RX ADMIN — IPRATROPIUM BROMIDE AND ALBUTEROL SULFATE 1 DOSE: 2.5; .5 SOLUTION RESPIRATORY (INHALATION) at 06:40

## 2025-08-21 RX ADMIN — ATORVASTATIN CALCIUM 80 MG: 80 TABLET, FILM COATED ORAL at 07:54

## 2025-08-21 RX ADMIN — ANTACID TABLETS 500 MG: 500 TABLET, CHEWABLE ORAL at 01:39

## 2025-08-21 RX ADMIN — ACETAMINOPHEN 650 MG: 325 TABLET ORAL at 01:39

## 2025-08-21 RX ADMIN — INSULIN GLARGINE 15 UNITS: 100 INJECTION, SOLUTION SUBCUTANEOUS at 07:53

## 2025-08-21 RX ADMIN — HEPARIN SODIUM 5000 UNITS: 5000 INJECTION INTRAVENOUS; SUBCUTANEOUS at 15:57

## 2025-08-21 RX ADMIN — BUDESONIDE AND FORMOTEROL FUMARATE DIHYDRATE 2 PUFF: 80; 4.5 AEROSOL RESPIRATORY (INHALATION) at 06:50

## 2025-08-21 RX ADMIN — HEPARIN SODIUM 5000 UNITS: 5000 INJECTION INTRAVENOUS; SUBCUTANEOUS at 21:28

## 2025-08-21 ASSESSMENT — PAIN - FUNCTIONAL ASSESSMENT
PAIN_FUNCTIONAL_ASSESSMENT: 0-10
PAIN_FUNCTIONAL_ASSESSMENT: ACTIVITIES ARE NOT PREVENTED
PAIN_FUNCTIONAL_ASSESSMENT: 0-10
PAIN_FUNCTIONAL_ASSESSMENT: 0-10
PAIN_FUNCTIONAL_ASSESSMENT: ACTIVITIES ARE NOT PREVENTED

## 2025-08-21 ASSESSMENT — PAIN DESCRIPTION - DESCRIPTORS
DESCRIPTORS: ACHING;SORE
DESCRIPTORS: ACHING
DESCRIPTORS: PRESSURE

## 2025-08-21 ASSESSMENT — PAIN DESCRIPTION - LOCATION
LOCATION: EAR;HEAD
LOCATION: EAR

## 2025-08-21 ASSESSMENT — PAIN DESCRIPTION - ORIENTATION
ORIENTATION: RIGHT;LEFT
ORIENTATION: RIGHT;LEFT

## 2025-08-21 ASSESSMENT — PAIN SCALES - GENERAL
PAINLEVEL_OUTOF10: 3
PAINLEVEL_OUTOF10: 0
PAINLEVEL_OUTOF10: 6
PAINLEVEL_OUTOF10: 6
PAINLEVEL_OUTOF10: 0
PAINLEVEL_OUTOF10: 3
PAINLEVEL_OUTOF10: 6

## 2025-08-22 LAB
GLUCOSE BLD-MCNC: 145 MG/DL (ref 65–105)
GLUCOSE BLD-MCNC: 164 MG/DL (ref 65–105)
GLUCOSE BLD-MCNC: 82 MG/DL (ref 65–105)
GLUCOSE BLD-MCNC: 91 MG/DL (ref 65–105)

## 2025-08-22 PROCEDURE — 99232 SBSQ HOSP IP/OBS MODERATE 35: CPT | Performed by: INTERNAL MEDICINE

## 2025-08-22 PROCEDURE — 1180000000 HC REHAB R&B

## 2025-08-22 PROCEDURE — 97110 THERAPEUTIC EXERCISES: CPT

## 2025-08-22 PROCEDURE — 97535 SELF CARE MNGMENT TRAINING: CPT

## 2025-08-22 PROCEDURE — 6360000002 HC RX W HCPCS: Performed by: STUDENT IN AN ORGANIZED HEALTH CARE EDUCATION/TRAINING PROGRAM

## 2025-08-22 PROCEDURE — 97530 THERAPEUTIC ACTIVITIES: CPT

## 2025-08-22 PROCEDURE — 94640 AIRWAY INHALATION TREATMENT: CPT

## 2025-08-22 PROCEDURE — 82947 ASSAY GLUCOSE BLOOD QUANT: CPT

## 2025-08-22 PROCEDURE — 6370000000 HC RX 637 (ALT 250 FOR IP): Performed by: STUDENT IN AN ORGANIZED HEALTH CARE EDUCATION/TRAINING PROGRAM

## 2025-08-22 PROCEDURE — 99232 SBSQ HOSP IP/OBS MODERATE 35: CPT | Performed by: PHYSICAL MEDICINE & REHABILITATION

## 2025-08-22 PROCEDURE — 6370000000 HC RX 637 (ALT 250 FOR IP): Performed by: PHYSICAL MEDICINE & REHABILITATION

## 2025-08-22 PROCEDURE — 94761 N-INVAS EAR/PLS OXIMETRY MLT: CPT

## 2025-08-22 PROCEDURE — 97116 GAIT TRAINING THERAPY: CPT

## 2025-08-22 PROCEDURE — 2700000000 HC OXYGEN THERAPY PER DAY

## 2025-08-22 PROCEDURE — 94762 N-INVAS EAR/PLS OXIMTRY CONT: CPT

## 2025-08-22 RX ORDER — LISINOPRIL 40 MG/1
40 TABLET ORAL DAILY
Qty: 30 TABLET | Refills: 3 | Status: CANCELLED | OUTPATIENT
Start: 2025-08-22

## 2025-08-22 RX ORDER — INSULIN GLARGINE 100 [IU]/ML
15 INJECTION, SOLUTION SUBCUTANEOUS NIGHTLY
Qty: 5 ADJUSTABLE DOSE PRE-FILLED PEN SYRINGE | Refills: 3 | Status: CANCELLED | OUTPATIENT
Start: 2025-08-22

## 2025-08-22 RX ORDER — PEN NEEDLE, DIABETIC 29 GAUGE
1 NEEDLE, DISPOSABLE MISCELLANEOUS DAILY
Qty: 100 EACH | Refills: 3 | Status: CANCELLED | OUTPATIENT
Start: 2025-08-22

## 2025-08-22 RX ORDER — IPRATROPIUM BROMIDE AND ALBUTEROL SULFATE 2.5; .5 MG/3ML; MG/3ML
3 SOLUTION RESPIRATORY (INHALATION)
Qty: 360 ML | Refills: 1 | Status: CANCELLED | OUTPATIENT
Start: 2025-08-22

## 2025-08-22 RX ADMIN — ACETAMINOPHEN 650 MG: 325 TABLET ORAL at 07:45

## 2025-08-22 RX ADMIN — IPRATROPIUM BROMIDE AND ALBUTEROL SULFATE 1 DOSE: 2.5; .5 SOLUTION RESPIRATORY (INHALATION) at 06:51

## 2025-08-22 RX ADMIN — IPRATROPIUM BROMIDE AND ALBUTEROL SULFATE 1 DOSE: 2.5; .5 SOLUTION RESPIRATORY (INHALATION) at 15:42

## 2025-08-22 RX ADMIN — IPRATROPIUM BROMIDE AND ALBUTEROL SULFATE 1 DOSE: 2.5; .5 SOLUTION RESPIRATORY (INHALATION) at 21:09

## 2025-08-22 RX ADMIN — IPRATROPIUM BROMIDE AND ALBUTEROL SULFATE 1 DOSE: 2.5; .5 SOLUTION RESPIRATORY (INHALATION) at 11:17

## 2025-08-22 RX ADMIN — LEVOTHYROXINE SODIUM 37.5 MCG: 0.07 TABLET ORAL at 05:50

## 2025-08-22 RX ADMIN — HEPARIN SODIUM 5000 UNITS: 5000 INJECTION INTRAVENOUS; SUBCUTANEOUS at 20:37

## 2025-08-22 RX ADMIN — INSULIN GLARGINE 15 UNITS: 100 INJECTION, SOLUTION SUBCUTANEOUS at 07:46

## 2025-08-22 RX ADMIN — ACETAMINOPHEN 650 MG: 325 TABLET ORAL at 20:37

## 2025-08-22 RX ADMIN — HEPARIN SODIUM 5000 UNITS: 5000 INJECTION INTRAVENOUS; SUBCUTANEOUS at 05:51

## 2025-08-22 RX ADMIN — BUDESONIDE AND FORMOTEROL FUMARATE DIHYDRATE 2 PUFF: 80; 4.5 AEROSOL RESPIRATORY (INHALATION) at 06:51

## 2025-08-22 RX ADMIN — GUAIFENESIN 600 MG: 600 TABLET ORAL at 20:37

## 2025-08-22 RX ADMIN — BUDESONIDE AND FORMOTEROL FUMARATE DIHYDRATE 2 PUFF: 80; 4.5 AEROSOL RESPIRATORY (INHALATION) at 21:09

## 2025-08-22 RX ADMIN — PANTOPRAZOLE SODIUM 40 MG: 40 TABLET, DELAYED RELEASE ORAL at 05:50

## 2025-08-22 ASSESSMENT — PAIN SCALES - GENERAL
PAINLEVEL_OUTOF10: 3
PAINLEVEL_OUTOF10: 4
PAINLEVEL_OUTOF10: 0

## 2025-08-22 ASSESSMENT — PAIN DESCRIPTION - DESCRIPTORS: DESCRIPTORS: DISCOMFORT

## 2025-08-22 ASSESSMENT — PAIN DESCRIPTION - LOCATION: LOCATION: HEAD

## 2025-08-22 ASSESSMENT — PAIN DESCRIPTION - ORIENTATION: ORIENTATION: ANTERIOR

## 2025-08-23 LAB
GLUCOSE BLD-MCNC: 118 MG/DL (ref 65–105)
GLUCOSE BLD-MCNC: 150 MG/DL (ref 65–105)

## 2025-08-23 PROCEDURE — 94640 AIRWAY INHALATION TREATMENT: CPT

## 2025-08-23 PROCEDURE — 97530 THERAPEUTIC ACTIVITIES: CPT

## 2025-08-23 PROCEDURE — 97110 THERAPEUTIC EXERCISES: CPT

## 2025-08-23 PROCEDURE — 6360000002 HC RX W HCPCS: Performed by: STUDENT IN AN ORGANIZED HEALTH CARE EDUCATION/TRAINING PROGRAM

## 2025-08-23 PROCEDURE — 1180000000 HC REHAB R&B

## 2025-08-23 PROCEDURE — 99232 SBSQ HOSP IP/OBS MODERATE 35: CPT | Performed by: STUDENT IN AN ORGANIZED HEALTH CARE EDUCATION/TRAINING PROGRAM

## 2025-08-23 PROCEDURE — 6370000000 HC RX 637 (ALT 250 FOR IP): Performed by: PHYSICAL MEDICINE & REHABILITATION

## 2025-08-23 PROCEDURE — 6370000000 HC RX 637 (ALT 250 FOR IP): Performed by: STUDENT IN AN ORGANIZED HEALTH CARE EDUCATION/TRAINING PROGRAM

## 2025-08-23 PROCEDURE — 97535 SELF CARE MNGMENT TRAINING: CPT

## 2025-08-23 PROCEDURE — 99232 SBSQ HOSP IP/OBS MODERATE 35: CPT | Performed by: INTERNAL MEDICINE

## 2025-08-23 PROCEDURE — 82947 ASSAY GLUCOSE BLOOD QUANT: CPT

## 2025-08-23 PROCEDURE — 94761 N-INVAS EAR/PLS OXIMETRY MLT: CPT

## 2025-08-23 PROCEDURE — 97542 WHEELCHAIR MNGMENT TRAINING: CPT

## 2025-08-23 PROCEDURE — 97116 GAIT TRAINING THERAPY: CPT

## 2025-08-23 RX ORDER — IPRATROPIUM BROMIDE AND ALBUTEROL SULFATE 2.5; .5 MG/3ML; MG/3ML
1 SOLUTION RESPIRATORY (INHALATION)
Status: DISCONTINUED | OUTPATIENT
Start: 2025-08-24 | End: 2025-08-25 | Stop reason: HOSPADM

## 2025-08-23 RX ADMIN — ACETAMINOPHEN 650 MG: 325 TABLET ORAL at 14:55

## 2025-08-23 RX ADMIN — HEPARIN SODIUM 5000 UNITS: 5000 INJECTION INTRAVENOUS; SUBCUTANEOUS at 07:09

## 2025-08-23 RX ADMIN — HEPARIN SODIUM 5000 UNITS: 5000 INJECTION INTRAVENOUS; SUBCUTANEOUS at 21:31

## 2025-08-23 RX ADMIN — BUDESONIDE AND FORMOTEROL FUMARATE DIHYDRATE 2 PUFF: 80; 4.5 AEROSOL RESPIRATORY (INHALATION) at 07:41

## 2025-08-23 RX ADMIN — ONDANSETRON 4 MG: 4 TABLET, ORALLY DISINTEGRATING ORAL at 09:12

## 2025-08-23 RX ADMIN — ATORVASTATIN CALCIUM 80 MG: 80 TABLET, FILM COATED ORAL at 09:02

## 2025-08-23 RX ADMIN — IPRATROPIUM BROMIDE AND ALBUTEROL SULFATE 1 DOSE: 2.5; .5 SOLUTION RESPIRATORY (INHALATION) at 07:41

## 2025-08-23 RX ADMIN — PANTOPRAZOLE SODIUM 40 MG: 40 TABLET, DELAYED RELEASE ORAL at 07:09

## 2025-08-23 RX ADMIN — IPRATROPIUM BROMIDE AND ALBUTEROL SULFATE 1 DOSE: 2.5; .5 SOLUTION RESPIRATORY (INHALATION) at 19:16

## 2025-08-23 RX ADMIN — HEPARIN SODIUM 5000 UNITS: 5000 INJECTION INTRAVENOUS; SUBCUTANEOUS at 14:55

## 2025-08-23 RX ADMIN — IPRATROPIUM BROMIDE AND ALBUTEROL SULFATE 1 DOSE: 2.5; .5 SOLUTION RESPIRATORY (INHALATION) at 15:19

## 2025-08-23 RX ADMIN — BUDESONIDE AND FORMOTEROL FUMARATE DIHYDRATE 2 PUFF: 80; 4.5 AEROSOL RESPIRATORY (INHALATION) at 19:16

## 2025-08-23 RX ADMIN — LEVOTHYROXINE SODIUM 37.5 MCG: 0.07 TABLET ORAL at 07:09

## 2025-08-23 ASSESSMENT — PAIN DESCRIPTION - LOCATION: LOCATION: BACK

## 2025-08-23 ASSESSMENT — PAIN DESCRIPTION - ORIENTATION: ORIENTATION: MID;LOWER;UPPER

## 2025-08-23 ASSESSMENT — PAIN - FUNCTIONAL ASSESSMENT
PAIN_FUNCTIONAL_ASSESSMENT: ACTIVITIES ARE NOT PREVENTED
PAIN_FUNCTIONAL_ASSESSMENT: 0-10

## 2025-08-23 ASSESSMENT — PAIN SCALES - GENERAL
PAINLEVEL_OUTOF10: 0
PAINLEVEL_OUTOF10: 0
PAINLEVEL_OUTOF10: 6

## 2025-08-23 ASSESSMENT — PAIN DESCRIPTION - DESCRIPTORS: DESCRIPTORS: ACHING

## 2025-08-24 LAB
GLUCOSE BLD-MCNC: 113 MG/DL (ref 65–105)
GLUCOSE BLD-MCNC: 115 MG/DL (ref 65–105)
GLUCOSE BLD-MCNC: 165 MG/DL (ref 65–105)

## 2025-08-24 PROCEDURE — 6370000000 HC RX 637 (ALT 250 FOR IP): Performed by: STUDENT IN AN ORGANIZED HEALTH CARE EDUCATION/TRAINING PROGRAM

## 2025-08-24 PROCEDURE — 97110 THERAPEUTIC EXERCISES: CPT

## 2025-08-24 PROCEDURE — 97535 SELF CARE MNGMENT TRAINING: CPT

## 2025-08-24 PROCEDURE — 94761 N-INVAS EAR/PLS OXIMETRY MLT: CPT

## 2025-08-24 PROCEDURE — 97530 THERAPEUTIC ACTIVITIES: CPT

## 2025-08-24 PROCEDURE — 99232 SBSQ HOSP IP/OBS MODERATE 35: CPT | Performed by: STUDENT IN AN ORGANIZED HEALTH CARE EDUCATION/TRAINING PROGRAM

## 2025-08-24 PROCEDURE — 94664 DEMO&/EVAL PT USE INHALER: CPT

## 2025-08-24 PROCEDURE — 94762 N-INVAS EAR/PLS OXIMTRY CONT: CPT

## 2025-08-24 PROCEDURE — 6360000002 HC RX W HCPCS: Performed by: STUDENT IN AN ORGANIZED HEALTH CARE EDUCATION/TRAINING PROGRAM

## 2025-08-24 PROCEDURE — 6370000000 HC RX 637 (ALT 250 FOR IP): Performed by: INTERNAL MEDICINE

## 2025-08-24 PROCEDURE — 94640 AIRWAY INHALATION TREATMENT: CPT

## 2025-08-24 PROCEDURE — 99232 SBSQ HOSP IP/OBS MODERATE 35: CPT | Performed by: INTERNAL MEDICINE

## 2025-08-24 PROCEDURE — 82947 ASSAY GLUCOSE BLOOD QUANT: CPT

## 2025-08-24 PROCEDURE — 97116 GAIT TRAINING THERAPY: CPT

## 2025-08-24 PROCEDURE — 1180000000 HC REHAB R&B

## 2025-08-24 PROCEDURE — 6370000000 HC RX 637 (ALT 250 FOR IP): Performed by: PHYSICAL MEDICINE & REHABILITATION

## 2025-08-24 RX ORDER — CALCIUM CARBONATE 500 MG/1
500 TABLET, CHEWABLE ORAL 3 TIMES DAILY PRN
COMMUNITY
Start: 2025-08-24 | End: 2025-09-23

## 2025-08-24 RX ORDER — FLUTICASONE FUROATE AND VILANTEROL 100; 25 UG/1; UG/1
1 POWDER RESPIRATORY (INHALATION) DAILY
Qty: 1 EACH | Refills: 0 | Status: SHIPPED | OUTPATIENT
Start: 2025-08-24 | End: 2025-08-27 | Stop reason: SDUPTHER

## 2025-08-24 RX ORDER — PANTOPRAZOLE SODIUM 40 MG/1
40 TABLET, DELAYED RELEASE ORAL
Qty: 30 TABLET | Refills: 0 | Status: SHIPPED | OUTPATIENT
Start: 2025-08-24 | End: 2025-08-27 | Stop reason: SDUPTHER

## 2025-08-24 RX ORDER — LEVOTHYROXINE SODIUM 25 UG/1
37.5 TABLET ORAL DAILY
Qty: 45 TABLET | Refills: 0 | Status: SHIPPED | OUTPATIENT
Start: 2025-08-24 | End: 2025-08-27 | Stop reason: SDUPTHER

## 2025-08-24 RX ORDER — ATORVASTATIN CALCIUM 80 MG/1
80 TABLET, FILM COATED ORAL DAILY
Qty: 30 TABLET | Refills: 0 | Status: SHIPPED | OUTPATIENT
Start: 2025-08-24 | End: 2025-08-27 | Stop reason: SDUPTHER

## 2025-08-24 RX ORDER — ONDANSETRON 4 MG/1
4 TABLET, ORALLY DISINTEGRATING ORAL EVERY 8 HOURS PRN
Qty: 15 TABLET | Refills: 0 | Status: SHIPPED | OUTPATIENT
Start: 2025-08-24 | End: 2025-08-29

## 2025-08-24 RX ORDER — IPRATROPIUM BROMIDE AND ALBUTEROL SULFATE 2.5; .5 MG/3ML; MG/3ML
3 SOLUTION RESPIRATORY (INHALATION)
Qty: 180 ML | Refills: 0 | Status: SHIPPED | OUTPATIENT
Start: 2025-08-25

## 2025-08-24 RX ORDER — ALOGLIPTIN 6.25 MG/1
6.25 TABLET, FILM COATED ORAL DAILY
Status: DISCONTINUED | OUTPATIENT
Start: 2025-08-24 | End: 2025-08-25 | Stop reason: HOSPADM

## 2025-08-24 RX ORDER — POLYETHYLENE GLYCOL 3350 17 G/17G
17 POWDER, FOR SOLUTION ORAL DAILY PRN
COMMUNITY
Start: 2025-08-24 | End: 2025-09-23

## 2025-08-24 RX ADMIN — HEPARIN SODIUM 5000 UNITS: 5000 INJECTION INTRAVENOUS; SUBCUTANEOUS at 15:57

## 2025-08-24 RX ADMIN — PANTOPRAZOLE SODIUM 40 MG: 40 TABLET, DELAYED RELEASE ORAL at 06:18

## 2025-08-24 RX ADMIN — BUDESONIDE AND FORMOTEROL FUMARATE DIHYDRATE 2 PUFF: 80; 4.5 AEROSOL RESPIRATORY (INHALATION) at 06:49

## 2025-08-24 RX ADMIN — BUDESONIDE AND FORMOTEROL FUMARATE DIHYDRATE 2 PUFF: 80; 4.5 AEROSOL RESPIRATORY (INHALATION) at 19:12

## 2025-08-24 RX ADMIN — HEPARIN SODIUM 5000 UNITS: 5000 INJECTION INTRAVENOUS; SUBCUTANEOUS at 06:18

## 2025-08-24 RX ADMIN — ACETAMINOPHEN 650 MG: 325 TABLET ORAL at 16:07

## 2025-08-24 RX ADMIN — HEPARIN SODIUM 5000 UNITS: 5000 INJECTION INTRAVENOUS; SUBCUTANEOUS at 20:43

## 2025-08-24 RX ADMIN — ONDANSETRON 4 MG: 4 TABLET, ORALLY DISINTEGRATING ORAL at 12:59

## 2025-08-24 RX ADMIN — LEVOTHYROXINE SODIUM 37.5 MCG: 0.07 TABLET ORAL at 06:18

## 2025-08-24 RX ADMIN — ATORVASTATIN CALCIUM 80 MG: 80 TABLET, FILM COATED ORAL at 09:01

## 2025-08-24 RX ADMIN — ALOGLIPTIN 6.25 MG: 6.25 TABLET, FILM COATED ORAL at 15:53

## 2025-08-24 RX ADMIN — IPRATROPIUM BROMIDE AND ALBUTEROL SULFATE 1 DOSE: .5; 2.5 SOLUTION RESPIRATORY (INHALATION) at 19:12

## 2025-08-24 RX ADMIN — IPRATROPIUM BROMIDE AND ALBUTEROL SULFATE 1 DOSE: .5; 2.5 SOLUTION RESPIRATORY (INHALATION) at 06:49

## 2025-08-24 ASSESSMENT — PAIN - FUNCTIONAL ASSESSMENT
PAIN_FUNCTIONAL_ASSESSMENT: 0-10
PAIN_FUNCTIONAL_ASSESSMENT: ACTIVITIES ARE NOT PREVENTED

## 2025-08-24 ASSESSMENT — PAIN DESCRIPTION - ORIENTATION: ORIENTATION: UPPER;LOWER

## 2025-08-24 ASSESSMENT — PAIN SCALES - GENERAL
PAINLEVEL_OUTOF10: 5
PAINLEVEL_OUTOF10: 0
PAINLEVEL_OUTOF10: 5
PAINLEVEL_OUTOF10: 0

## 2025-08-24 ASSESSMENT — PAIN DESCRIPTION - LOCATION: LOCATION: BACK;SHOULDER

## 2025-08-24 ASSESSMENT — PAIN DESCRIPTION - DESCRIPTORS: DESCRIPTORS: ACHING

## 2025-08-25 VITALS
HEART RATE: 78 BPM | SYSTOLIC BLOOD PRESSURE: 123 MMHG | OXYGEN SATURATION: 95 % | HEIGHT: 67 IN | DIASTOLIC BLOOD PRESSURE: 78 MMHG | WEIGHT: 196 LBS | BODY MASS INDEX: 30.76 KG/M2 | RESPIRATION RATE: 16 BRPM | TEMPERATURE: 98.1 F

## 2025-08-25 LAB
GLUCOSE BLD-MCNC: 121 MG/DL (ref 65–105)
GLUCOSE BLD-MCNC: 122 MG/DL (ref 65–105)

## 2025-08-25 PROCEDURE — 97530 THERAPEUTIC ACTIVITIES: CPT

## 2025-08-25 PROCEDURE — 97535 SELF CARE MNGMENT TRAINING: CPT

## 2025-08-25 PROCEDURE — 6360000002 HC RX W HCPCS: Performed by: STUDENT IN AN ORGANIZED HEALTH CARE EDUCATION/TRAINING PROGRAM

## 2025-08-25 PROCEDURE — 99232 SBSQ HOSP IP/OBS MODERATE 35: CPT | Performed by: INTERNAL MEDICINE

## 2025-08-25 PROCEDURE — 6370000000 HC RX 637 (ALT 250 FOR IP): Performed by: STUDENT IN AN ORGANIZED HEALTH CARE EDUCATION/TRAINING PROGRAM

## 2025-08-25 PROCEDURE — 6370000000 HC RX 637 (ALT 250 FOR IP): Performed by: PHYSICAL MEDICINE & REHABILITATION

## 2025-08-25 PROCEDURE — 82947 ASSAY GLUCOSE BLOOD QUANT: CPT

## 2025-08-25 PROCEDURE — 97110 THERAPEUTIC EXERCISES: CPT

## 2025-08-25 PROCEDURE — 99239 HOSP IP/OBS DSCHRG MGMT >30: CPT | Performed by: GENERAL PRACTICE

## 2025-08-25 PROCEDURE — 6370000000 HC RX 637 (ALT 250 FOR IP): Performed by: INTERNAL MEDICINE

## 2025-08-25 RX ORDER — ALOGLIPTIN 6.25 MG/1
6.25 TABLET, FILM COATED ORAL DAILY
Qty: 30 TABLET | Refills: 0 | Status: SHIPPED | OUTPATIENT
Start: 2025-08-26 | End: 2025-08-27 | Stop reason: SDUPTHER

## 2025-08-25 RX ADMIN — LEVOTHYROXINE SODIUM 37.5 MCG: 0.07 TABLET ORAL at 06:10

## 2025-08-25 RX ADMIN — ALOGLIPTIN 6.25 MG: 6.25 TABLET, FILM COATED ORAL at 09:24

## 2025-08-25 RX ADMIN — HEPARIN SODIUM 5000 UNITS: 5000 INJECTION INTRAVENOUS; SUBCUTANEOUS at 06:10

## 2025-08-25 RX ADMIN — PANTOPRAZOLE SODIUM 40 MG: 40 TABLET, DELAYED RELEASE ORAL at 06:10

## 2025-08-25 RX ADMIN — ATORVASTATIN CALCIUM 80 MG: 80 TABLET, FILM COATED ORAL at 09:24

## 2025-08-25 ASSESSMENT — PAIN SCALES - GENERAL: PAINLEVEL_OUTOF10: 0

## 2025-08-26 ENCOUNTER — TELEPHONE (OUTPATIENT)
Dept: FAMILY MEDICINE CLINIC | Age: 70
End: 2025-08-26

## 2025-09-04 ENCOUNTER — HOSPITAL ENCOUNTER (OUTPATIENT)
Age: 70
Setting detail: SPECIMEN
Discharge: HOME | End: 2025-09-04

## 2025-09-04 DIAGNOSIS — I10 ESSENTIAL HYPERTENSION: ICD-10-CM

## 2025-09-04 DIAGNOSIS — Z79.899 MEDICATION MANAGEMENT: ICD-10-CM

## 2025-09-04 LAB
ANION GAP SERPL CALCULATED.3IONS-SCNC: 13 MMOL/L (ref 9–16)
BUN SERPL-MCNC: 11 MG/DL (ref 8–23)
CALCIUM SERPL-MCNC: 9.3 MG/DL (ref 8.6–10.4)
CHLORIDE SERPL-SCNC: 107 MMOL/L (ref 98–107)
CO2 SERPL-SCNC: 27 MMOL/L (ref 20–31)
CREAT SERPL-MCNC: 0.7 MG/DL (ref 0.6–0.9)
ERYTHROCYTE [DISTWIDTH] IN BLOOD BY AUTOMATED COUNT: 15.9 % (ref 11.8–14.4)
GFR, ESTIMATED: >90 ML/MIN/1.73M2
GLUCOSE SERPL-MCNC: 100 MG/DL (ref 74–99)
HCT VFR BLD AUTO: 38.7 % (ref 36.3–47.1)
HGB BLD-MCNC: 11.8 G/DL (ref 11.9–15.1)
MCH RBC QN AUTO: 28.3 PG (ref 25.2–33.5)
MCHC RBC AUTO-ENTMCNC: 30.5 G/DL (ref 28.4–34.8)
MCV RBC AUTO: 92.8 FL (ref 82.6–102.9)
NRBC BLD-RTO: 0 PER 100 WBC
PLATELET # BLD AUTO: 360 K/UL (ref 138–453)
PMV BLD AUTO: 9.1 FL (ref 8.1–13.5)
POTASSIUM SERPL-SCNC: 3.4 MMOL/L (ref 3.7–5.3)
RBC # BLD AUTO: 4.17 M/UL (ref 3.95–5.11)
SODIUM SERPL-SCNC: 147 MMOL/L (ref 136–145)
TSH SERPL DL<=0.05 MIU/L-ACNC: 1.72 UIU/ML (ref 0.27–4.2)
WBC OTHER # BLD: 7.9 K/UL (ref 3.5–11.3)

## (undated) DEVICE — JELLY,LUBE,STERILE,FLIP TOP,TUBE,2-OZ: Brand: MEDLINE

## (undated) DEVICE — ESOPHAGEAL BALLOON DILATATION CATHETER: Brand: CRE FIXED WIRE

## (undated) DEVICE — AIRLIFE™ NASAL OXYGEN CANNULA CURVED, FLARED TIP, WITH 7 FEET (2.1 M) CRUSH RESISTANT TUBING, OVER-THE-EAR STYLE: Brand: AIRLIFE™

## (undated) DEVICE — SYRINGE INFL 60ML DISP ALLIANCE II

## (undated) DEVICE — BITEBLOCK 54FR W/ DENT RIM BLOX

## (undated) DEVICE — DEFENDO AIR WATER SUCTION AND BIOPSY VALVE KIT FOR  OLYMPUS: Brand: DEFENDO AIR/WATER/SUCTION AND BIOPSY VALVE

## (undated) DEVICE — FORCEPS BX L240CM WRK CHN 2.8MM STD CAP W/ NDL MIC MESH